# Patient Record
Sex: MALE | Race: WHITE | Employment: OTHER | URBAN - METROPOLITAN AREA
[De-identification: names, ages, dates, MRNs, and addresses within clinical notes are randomized per-mention and may not be internally consistent; named-entity substitution may affect disease eponyms.]

---

## 2018-09-26 ENCOUNTER — HOSPITAL ENCOUNTER (EMERGENCY)
Age: 53
Discharge: HOME | End: 2018-09-26
Attending: EMERGENCY MEDICINE
Payer: OTHER GOVERNMENT

## 2018-09-26 VITALS
TEMPERATURE: 98.9 F | HEIGHT: 69 IN | WEIGHT: 165 LBS | DIASTOLIC BLOOD PRESSURE: 75 MMHG | HEART RATE: 103 BPM | SYSTOLIC BLOOD PRESSURE: 112 MMHG | BODY MASS INDEX: 24.44 KG/M2 | OXYGEN SATURATION: 99 % | RESPIRATION RATE: 16 BRPM

## 2018-09-26 DIAGNOSIS — Z87.81 H/O FRACTURE OF FEMUR: ICD-10-CM

## 2018-09-26 DIAGNOSIS — G89.18 POST-OP PAIN: Primary | ICD-10-CM

## 2018-09-26 PROCEDURE — 74011250637 HC RX REV CODE- 250/637: Performed by: EMERGENCY MEDICINE

## 2018-09-26 PROCEDURE — 99285 EMERGENCY DEPT VISIT HI MDM: CPT

## 2018-09-26 PROCEDURE — 74011250636 HC RX REV CODE- 250/636: Performed by: EMERGENCY MEDICINE

## 2018-09-26 PROCEDURE — 96372 THER/PROPH/DIAG INJ SC/IM: CPT

## 2018-09-26 RX ORDER — HYDROMORPHONE HYDROCHLORIDE 2 MG/1
2 TABLET ORAL
Qty: 20 TAB | Refills: 0 | Status: SHIPPED | OUTPATIENT
Start: 2018-09-26 | End: 2020-01-01

## 2018-09-26 RX ORDER — ONDANSETRON 4 MG/1
TABLET, ORALLY DISINTEGRATING ORAL
Qty: 12 TAB | Refills: 1 | Status: SHIPPED | OUTPATIENT
Start: 2018-09-26 | End: 2020-01-01

## 2018-09-26 RX ORDER — KETOROLAC TROMETHAMINE 30 MG/ML
60 INJECTION, SOLUTION INTRAMUSCULAR; INTRAVENOUS
Status: COMPLETED | OUTPATIENT
Start: 2018-09-26 | End: 2018-09-26

## 2018-09-26 RX ORDER — ONDANSETRON 4 MG/1
4 TABLET, ORALLY DISINTEGRATING ORAL
Status: COMPLETED | OUTPATIENT
Start: 2018-09-26 | End: 2018-09-26

## 2018-09-26 RX ORDER — BUTORPHANOL TARTRATE 1 MG/ML
2 INJECTION INTRAMUSCULAR; INTRAVENOUS
Status: COMPLETED | OUTPATIENT
Start: 2018-09-26 | End: 2018-09-26

## 2018-09-26 RX ADMIN — KETOROLAC TROMETHAMINE 60 MG: 30 INJECTION, SOLUTION INTRAMUSCULAR at 04:17

## 2018-09-26 RX ADMIN — ONDANSETRON 4 MG: 4 TABLET, ORALLY DISINTEGRATING ORAL at 04:15

## 2018-09-26 RX ADMIN — BUTORPHANOL TARTRATE 2 MG: 1 INJECTION, SOLUTION INTRAMUSCULAR; INTRAVENOUS at 04:17

## 2018-09-26 NOTE — ED PROVIDER NOTES
EMERGENCY DEPARTMENT HISTORY AND PHYSICAL EXAM 
 
Date: 9/26/2018 Patient Name: Julieann Castleman History of Presenting Illness Chief Complaint Patient presents with  
 Hip Pain History Provided By: Patient Chief Complaint: Hip pain Duration: 4 Days Timing:  Acute Location: Left hip Modifying Factors: Pt has taken Percocet with no pain relief Associated Symptoms: nausea, back pain Additional History (Context):  
3:45 AM 
Julieann Castleman is a 46 y.o. male with PMHX of DM, Gastroparesis, Pancreatitis who presents to the emergency department C/O left hip pain that radiates to the foot onset 4 days ago. Pt fell in the [place] 4 days ago, had surgery 3 days ago with Dr. Sanjuana Camarillo and was released from the hospital 1 day ago. Associated sxs include nausea, back pain. Pt has been taking the rx Percocet (5mg) for pain with no relief. Pt states he smokes tobacco. Pt states he occasionally drinks EtOH. Pt does not take any long term narcotics. Pt denies any allergies to medications, any liver issues, vomiting and any other sxs or complaints. PCP: Meño Springer MD 
 
 
 
Past History Past Medical History: 
Past Medical History:  
Diagnosis Date  Diabetes ([address]  Gastroparesis  Pancreatitis Past Surgical History: 
Past Surgical History:  
Procedure Laterality Date  HX CHOLECYSTECTOMY  HX HIP FRACTURE TX    
 left hip sx 9.23/2018 Family History: No family history on file. Social History: 
Social History Substance Use Topics  Smoking status: Current Every Day Smoker  Smokeless tobacco: Not on file  Alcohol use Yes Allergies: 
No Known Allergies Review of Systems Review of Systems Gastrointestinal: Positive for nausea. Negative for vomiting. Musculoskeletal: Positive for arthralgias (left hip pain), back pain and myalgias (left hip). All other systems reviewed and are negative. Physical Exam  
 
Vitals: 09/26/18 0315 09/26/18 0330 09/26/18 0400 09/26/18 0415 BP: 101/61 105/60 105/58 112/75 Pulse:      
Resp:      
Temp:      
SpO2: 99% 100% 100% 99% Weight:      
Height:      
 
Physical Exam  
Constitutional: He is oriented to person, place, and time. He appears well-developed and well-nourished. No distress. Uncomfortable appearing, non toxic HENT:  
Head: Normocephalic and atraumatic. Right Ear: External ear normal.  
Left Ear: External ear normal.  
Mouth/Throat: Oropharynx is clear and moist. No oropharyngeal exudate. Eyes: Conjunctivae and EOM are normal. Pupils are equal, round, and reactive to light. No scleral icterus. No pallor Neck: Normal range of motion. Neck supple. No JVD present. No tracheal deviation present. No thyromegaly present. Cardiovascular: Normal rate, regular rhythm and normal heart sounds. Pulses: 
     Femoral pulses are 2+ on the left side. Popliteal pulses are 2+ on the left side. Dorsalis pedis pulses are 2+ on the left side. Posterior tibial pulses are 2+ on the left side. Pulmonary/Chest: Effort normal and breath sounds normal. No stridor. No respiratory distress. Abdominal: Soft. Bowel sounds are normal. He exhibits no distension. There is no tenderness. There is no rebound and no guarding. Musculoskeletal: Normal range of motion. He exhibits no edema or tenderness. No soft tissue injuries Left hip - dressing to surgical incision, not bleeding or red, appropriately tender, dressing to distal lateral thigh, not bleeding, swollen appropriately tender  
 
Hip and knee joint with some limited ROM (due to pain) Lymphadenopathy:  
  He has no cervical adenopathy. Neurological: He is alert and oriented to person, place, and time. He has normal reflexes. No cranial nerve deficit. Coordination normal.  
Skin: Skin is warm and dry. No rash noted. He is not diaphoretic. No erythema. Psychiatric: He has a normal mood and affect. His behavior is normal. Judgment and thought content normal.  
Nursing note and vitals reviewed. Diagnostic Study Results Labs - No results found for this or any previous visit (from the past 12 hour(s)). Radiologic Studies - No orders to display CT Results  (Last 48 hours) None CXR Results  (Last 48 hours) None Medications given in the ED- Medications  
butorphanol (STADOL) injection 2 mg (2 mg IntraMUSCular Given 9/26/18 0417)  
ondansetron (ZOFRAN ODT) tablet 4 mg (4 mg Oral Given 9/26/18 0415)  
ketorolac tromethamine (TORADOL) 60 mg/2 mL injection 60 mg (60 mg IntraMUSCular Given 9/26/18 0417) Medical Decision Making I am the first provider for this patient. I reviewed the vital signs, available nursing notes, past medical history, past surgical history, family history and social history. Vital Signs-Reviewed the patient's vital signs. Pulse Oximetry Analysis - 100% on Room Air Records Reviewed: Nursing Notes Provider Notes (Medical Decision Making): Ddx: likely inadequate pain control, chart review and personal hx reveals he does admit to periods of frequent etoh use which may change his tolerance and pain threshold. His current dose regimen is only 5/325 every 6 hours which is probably under dosing. His sx, especially after long return trip from the [place]. Procedures: 
Procedures ED Course:  
3:45 AM 
Initial assessment performed. The patients presenting problems have been discussed, and they are in agreement with the care plan formulated and outlined with them. I have encouraged them to ask questions as they arise throughout their visit. Diagnosis and Disposition DISCHARGE NOTE: 
5:39 AM 
Jacky Gillibertad  results have been reviewed with him. He has been counseled regarding his diagnosis, treatment, and plan.   He verbally conveys understanding and agreement of the signs, symptoms, diagnosis, treatment and prognosis and additionally agrees to follow up as discussed. He also agrees with the care-plan and conveys that all of his questions have been answered. I have also provided discharge instructions for him that include: educational information regarding their diagnosis and treatment, and list of reasons why they would want to return to the ED prior to their follow-up appointment, should his condition change. He has been provided with education for proper emergency department utilization. CLINICAL IMPRESSION: 
 
1. Post-op pain 2. H/O fracture of femur PLAN: 
1. D/C Home 2. Current Discharge Medication List  
  
START taking these medications Details HYDROmorphone (DILAUDID) 2 mg tablet Take 1 Tab by mouth every four (4) hours as needed for Pain. Max Daily Amount: 12 mg. Indications: Severe Pain 
Qty: 20 Tab, Refills: 0 Associated Diagnoses: Post-op pain; H/O fracture of femur  
  
ondansetron (ZOFRAN ODT) 4 mg disintegrating tablet Take 1-2 tablets every 6-8 hours as needed for nausea and vomiting. Qty: 12 Tab, Refills: 1 3. Follow-up Information Follow up With Details Comments Contact Info [facility] Schedule an appointment as soon as possible for a visit in 3 days For primary care follow up [phone] [facility] EMERGENCY DEPT Go to As needed, if symptoms worsen [address] [id number] 
[phone]  
  
 
_______________________________ Attestations: This note is prepared by Don Shabazz, acting as Scribe for Tamia Bush. Aren Zaman MD. Tamia Bush. Aren Zaman MD:  The scribe's documentation has been prepared under my direction and personally reviewed by me in its entirety. I confirm that the note above accurately reflects all work, treatment, procedures, and medical decision making performed by me. 
_______________________________

## 2018-09-26 NOTE — ED TRIAGE NOTES
Presents via medic c/o left hip pain. Pt states he suffered a fall on Saturday and had left hip sx on Sunday. Sent home on percocet 5/325. Pt c/o progressively worsening pain in left hip. Last took percocet at 2300. States he is taking 1tab 5/325 q 6 hours.

## 2018-09-26 NOTE — DISCHARGE INSTRUCTIONS
Learning About Surgery to Repair a Hip Fracture  What is surgery for a hip fracture? Surgery for a hip fracture repairs a broken hip bone. Broken hips are often caused by a fall or other injury. Some kinds of broken bones heal on their own in a cast. But a broken hip is not likely to heal well without surgery. This type of surgery is usually done right after a hip breaks. How is surgery for a hip fracture done? During surgery to fix a fractured hip, you will be asleep and will not feel pain. The surgery takes 2 to 4 hours. Your doctor will:  · Make one or two cuts (incisions) over the broken bone in your hip. · Move the pieces of bone back into the right position. · Attach the pieces of the bone together with metal pins, screws, rods, or plates. · Use X-rays to see if the pins and plates are in the correct place. · Stitch or staple the incisions closed. What can you expect after surgery for a hip fracture ? You will probably stay in the hospital for 2 to 4 days after surgery. Your rehabilitation program (rehab) will start at this time. If you do not have someone to help you at home, you may go from the hospital to a short-term rehabilitation center or a long-term care center. For several months, you may need the help of a walker or crutches. After that, you may need to walk with a cane. At first, you may need help with daily activities such as bathing, dressing, and cooking. Rehab will help you get back to your regular activities, but it will probably take at least 3 months to return to your normal routine. It may take 6 months to 1 year for you to fully recover. Some people, especially older people, are never able to move as well as they used to. · You will slowly return to most of your activities. ¨ You may be able to walk on your own in 4 to 6 weeks. Until then, you will need crutches or a walker. After that, you may need to walk with a cane. ¨ Ask your doctor when you can drive again.   ¨ You may be able to return to work in 4 weeks to 4 months, depending on your job. ¨ Your doctor will tell you when you can walk, swim, dance, golf, or bicycle. Ask your doctor about other activities you would like to do. ¨ Your doctor may advise you to avoid more strenuous activities, such as running or tennis, or those where a fall is possible, such as horseback riding or skiing. Follow-up care is a key part of your treatment and safety. Be sure to make and go to all appointments, and call your doctor if you are having problems. It's also a good idea to know your test results and keep a list of the medicines you take. Where can you learn more? Go to http://elías-shawanda.info/. Enter S946 in the search box to learn more about \"Learning About Surgery to Repair a Hip Fracture. \"  Current as of: November 29, 2017  Content Version: 11.7  © 9215-1533 [employer]. Care instructions adapted under license by [employer] (which disclaims liability or warranty for this information). If you have questions about a medical condition or this instruction, always ask your healthcare professional. [address] any warranty or liability for your use of this information.

## 2018-10-31 ENCOUNTER — HOSPITAL ENCOUNTER (OUTPATIENT)
Dept: PHYSICAL THERAPY | Age: 53
Discharge: HOME | End: 2018-10-31
Payer: OTHER GOVERNMENT

## 2018-10-31 PROCEDURE — 97161 PT EVAL LOW COMPLEX 20 MIN: CPT

## 2018-10-31 PROCEDURE — 97110 THERAPEUTIC EXERCISES: CPT

## 2018-10-31 NOTE — PROGRESS NOTES
[facility]  [address] Oleg Sheikh, [address]  Ph [phone]  Fx [phone]    Plan of Care/ Statement of Necessity for Physical Therapy Services    Patient name: Rody Burnette Start of Care: 10/31/2018   Referral source: Nat Moreno MD : 1965    Medical Diagnosis: Fracture of unspecified parts of lumbosacral spine and pelvis, subsequent encounter for fracture with routine healing [S32. 9XXD]   Onset Date:2018    Treatment Diagnosis: Left hip pain   Prior Hospitalization: see medical history Provider#: [id number]   Medications: Verified on Patient summary List    Comorbidities: DM II, asthma   Prior Level of Function: Independent with ADLs and full work duty      The [address] [place] [address] and following information is based on the information from the initial evaluation. Assessment/ key information: 46year old male presents to OP PT following traumatic left femoral/hip fracture on 2018 with surgical fixation on 2018. He displays decreased left hip ROM, strength, and gait deviations. Patient will continue to benefit from skilled PT services to modify and progress therapeutic interventions, address functional mobility deficits, address ROM deficits, address strength deficits, analyze and address soft tissue restrictions, analyze and cue movement patterns, analyze and modify body mechanics/ergonomics and address imbalance/dizziness to attain remaining goals.     Evaluation Complexity History MEDIUM  Complexity : 1-2 comorbidities / personal factors will impact the outcome/ POC ; Examination LOW Complexity : 1-2 Standardized tests and measures addressing body structure, function, activity limitation and / or participation in recreation  ;Presentation LOW Complexity : Stable, uncomplicated  ;Clinical Decision Making MEDIUM Complexity : FOTO score of 26-74  Overall Complexity Rating: LOW   Problem List: pain affecting function, decrease ROM, decrease strength, impaired gait/ balance, decrease ADL/ functional abilitiies, decrease activity tolerance, decrease flexibility/ joint mobility and decrease transfer abilities   Treatment Plan may include any combination of the following: Therapeutic exercise, Therapeutic activities, Neuromuscular re-education, Physical agent/modality, Gait/balance training, Manual therapy, Patient education, Self Care training, Functional mobility training, Home safety training and Stair training  Patient / Family readiness to learn indicated by: asking questions and trying to perform skills  Persons(s) to be included in education: patient (P)  Barriers to Learning/Limitations: None  Patient Goal (s): Normal use and strength in left hip, knee, and leg. Walk and bend normally with decreased pain and stiffness  Patient Self Reported Health Status: fair  Rehabilitation Potential: good    Short Term Goals: To be accomplished in 4 weeks:              Patient will report compliance with HEP 1x/day to aid in rehabilitation program.              Status at IE: Initiated at evaluation              Current: N/A                 Patient will increase hip ROM to 100% in all planes to aid in completion of ADLs              Status at IE: Left hip PROM: 30-95*              Current: N/A     Long Term Goals: To be accomplished in 6 weeks:              Patient will increase L LE strength to 5/5 MMT throughout to aid in completion of ADLs. Status at IE: 3/5 throughout except 5/5 DF               Current: N/A                 Patient will report pain no greater than 1-2/10 throughout entire day to aid in completion of ADLs              Status at IE: Up to 7-8/10              Current: N/A                 Patent will perform 5 sit<>stands pain-free to demonstrate improvement in status and aid and completion of ADLs.               Status at IE: Able to sit and stand from chair but with pain and compensations              Current: N/A                 Patient will improve FOTO score to 61 points to demonstrate improvement in functional status. Status at IE: 40              Current: N/A    Frequency / Duration: Patient to be seen 2 times per week for 6 weeks. Patient/ Caregiver education and instruction: Diagnosis, prognosis, activity modification and exercises   [x]  Plan of care has been reviewed with ELYSE Veras, PT 10/31/2018 4:21 PM    ________________________________________________________________________    I certify that the above Therapy Services are being furnished while the patient is under my care. I agree with the treatment plan and certify that this therapy is necessary.     Physician's Signature:____________Date:_________TIME:________    [employer], Date and Time must be completed for valid certification **  Please sign and return to [facility]  [address], [address]  Ph [phone]  Fx [phone]

## 2018-10-31 NOTE — PROGRESS NOTES
PT DAILY TREATMENT NOTE/HIP [id number]    Patient Name: Renuka Cheney  Date:10/31/2018  : 1965  [x]  Patient  Verified  Payor: [facility] / Plan: [facility] / Product Type:  /    In time:3:34  Out time:4:18  Total Treatment Time (min): 44  Visit #: 1 of 12    Treatment Area: No admission diagnoses are documented for this encounter. SUBJECTIVE  Pain Level (0-10 scale): 3/10 in left knee and greater trochanter  [x]constant []intermittent [x]improving []worsening []no change since onset  Traumatic, comminuted left hip fracture on 18 with surgery (long gamma placement to the left femur) on 2018. Currently full weightbearing without known precautions. Using quad cane and walker when needed for pain relief. Works at [employer] with 25% coverage at the [employer] that someone else is currently covering. 3 [address]IE with a railing, one flight to bedroom. He reports he has been sleeping on the couch so he doesn't have to go up the stairs. Finished his pain medication 10/30/2018, is still taking tylenol.     CLOF: difficulty donning shoes and socks, cooking, cleaning, doing dishes, waking long distances; working 6-7 hours per day, normally worked 9.5 hours 5 days per week  PLOF: reports right knee pain, but unlimited in activities   Denies numbness, tingling    OBJECTIVE/EXAMINATION    Modality rationale: decrease pain to improve the patients ability to complete his work duties and ADLs   Min Type Additional Details    [] Estim:  []Unatt       []IFC  []Premod                        []Other:  []w/ice   []w/heat  Position:  Location:    [] Estim: []Att    []TENS instruct  []NMES                    []Other:  []w/US   []w/ice   []w/heat  Position:  Location:    []  Traction: [] Cervical       []Lumbar                       [] Prone          []Supine                       []Intermittent   []Continuous Lbs:  [] before manual  [] after manual    []  Ultrasound: []Continuous   [] Pulsed []1MHz   []3MHz Location:  W/cm2:    []  Iontophoresis with dexamethasone         Location: [] Take home patch   [] In clinic   10 [x]  Ice     []  heat  []  Ice massage  []  Laser   []  Anodyne Position: supine with legs over wedge  Location: left anterior hip    []  Laser with stim  []  Other: Position:  Location:    []  Vasopneumatic Device Pressure:       [] lo [] med [] hi   Temperature: [] lo [] med [] hi   [] Skin assessment post-treatment:  []intact []redness- no adverse reaction    []redness - adverse reaction:     15 min [x]Eval                  []Re-Eval       19 min Therapeutic Exercise:  [x] See flow sheet :   Rationale: increase ROM and increase strength to improve the patients ability to return to his PLOF          With   [x] TE   [] TA   [] neuro   [] other: Patient Education: [x] Review HEP    [] Progressed/Changed HEP based on:   [] positioning   [] body mechanics   [] transfers   [x] heat/ice application    [x] other:  PT POC  Access Code: [email or web address]   URL: [facility]/   Date: 10/31/2018   Prepared by: [id number] on Table - 1 reps - 2 sets - 30 seonds hold - 1x daily - 7x weekly   Sitting Heel Slide with Towel - 10 reps - 2 sets - 5 seconds hold - 1x daily - 7x weekly   Supine Straight Leg Raises - 10 reps - 2 sets - 1x daily - 7x weekly   Seated Long Arc Quad - 10 reps - 2 sets - 1x daily - 7x weekly        Other Objective/Functional Measures: BP: 130/88 mmHg; HR: 74 bpm    Physical Therapy Evaluation- Hip    Posture:    Gait:  [] Normal    [] Abnormal    [x] Antalgic    [] NWB    Device: quad cane    Describe: shortened right step length and stance time on left         ROM/Strength        AROM                     PROM        Strength (1-5)  Hip Left Right Left Right Left Right   Flexion  115* 95*  3 5   Extension   Lacking 30* Lacing 7* 3    Abduction     3 4   Adduction     3 4   ER         IR         Knee Left Right Left Right Left Right   Extension     3 5   Flexion     3 4   DF: left 5/5, 5/5 on right     Flexibility: [] Unable to assess at this time  Hamstrings:    (L) Tightness= [] WNL   [] Min   [x] Mod   [] Severe    (R) Tightness= [] WNL   [] Min   [] Mod   [] Severe  Quadriceps:    (L) Tightness= [] WNL   [] Min   [] Mod   [] Severe    (R) Tightness= [] WNL   [] Min   [] Mod   [] Severe  Gastroc:    (L) Tightness= [] WNL   [] Min   [] Mod   [] Severe    (R) Tightness= [] WNL   [] Min   [] Mod   [] Severe                                  Palpation  [x] Min  [] Mod  [] Severe    Location: medial left hamstrings at insertion, tibial tuberosity  [] Min  [] Mod  [] Severe    Location:  [] Min  [] Mod  [] Severe    Location:      Pain Level (0-10 scale) post treatment: 4/10    ASSESSMENT/Changes in Function: 46year old male presents to OP PT following traumatic left femoral/hip fracture on 9/22/2018 with surgical fixation on 9/23/2018. He displays decreased left hip ROM, strength, and gait deviations. Patient will continue to benefit from skilled PT services to modify and progress therapeutic interventions, address functional mobility deficits, address ROM deficits, address strength deficits, analyze and address soft tissue restrictions, analyze and cue movement patterns, analyze and modify body mechanics/ergonomics and address imbalance/dizziness to attain remaining goals. [x]  See Plan of Care  []  See progress note/recertification  []  See Discharge Summary         Progress towards goals / Updated goals:  Short Term Goals: To be accomplished in 4 weeks:   Patient will report compliance with HEP 1x/day to aid in rehabilitation program.   Status at IE: Initiated at evaluation   Current: N/A      Patient will increase hip ROM to 100% in all planes to aid in completion of ADLs   Status at IE: Left hip PROM: 30-95*   Current: N/A    Long Term Goals:  To be accomplished in 6 weeks:   Patient will increase L LE strength to 5/5 MMT throughout to aid in completion of ADLs. Status at IE: 3/5 throughout except 5/5 DF    Current: N/A     Patient will report pain no greater than 1-2/10 throughout entire day to aid in completion of ADLs   Status at IE: Up to 7-8/10   Current: N/A     Patent will perform 5 sit<>stands pain-free to demonstrate improvement in status and aid and completion of ADLs. Status at IE: Able to sit and stand from chair but with pain and compensations   Current: N/A     Patient will improve FOTO score to 61 points to demonstrate improvement in functional status.    Status at IE: 40   Current: N/A    PLAN  [x]  Upgrade activities as tolerated     [x]  Continue plan of care  []  Update interventions per flow sheet       []  Discharge due to:_  []  Other:_      Avani Mueller, PT 10/31/2018  3:37 PM

## 2018-11-06 ENCOUNTER — HOSPITAL ENCOUNTER (OUTPATIENT)
Dept: PHYSICAL THERAPY | Age: 53
Discharge: HOME | End: 2018-11-06
Payer: OTHER GOVERNMENT

## 2018-11-06 PROCEDURE — 97110 THERAPEUTIC EXERCISES: CPT

## 2018-11-06 NOTE — PROGRESS NOTES
PT DAILY TREATMENT NOTE     Patient Name: Jorge Luis Davies  Date:2018  : 1965  [x]  Patient  Verified  Payor:  / Plan: [facility]  [facility]ION / Product Type:  /    In time: 02:30  Out time:03:00  Total Treatment Time (min): 30  Visit #: 2 of 12    Treatment Area: Fracture of unspecified parts of lumbosacral spine and pelvis, subsequent encounter for fracture with routine healing [S32. 9XXD]    SUBJECTIVE  Pain Level (0-10 scale): 10  Any medication changes, allergies to medications, adverse drug reactions, diagnosis change, or new procedure performed?: [x] No    [] Yes (see summary sheet for update)  Subjective functional status/changes:   [] No changes reported      OBJECTIVE    30 min Therapeutic Exercise:  [] See flow sheet :   Rationale: increase ROM and increase strength to improve the patients ability to increase stabilization of hip     Other Objective/Functional Measures:      Pain Level (0-10 scale) post treatment: 4/10    ASSESSMENT/Changes in Function:   Patient tolerated treatment fair with no reports of increased pain. Patient will continue to benefit from skilled PT services to modify and progress therapeutic interventions, address functional mobility deficits, address ROM deficits, address strength deficits, analyze and address soft tissue restrictions, analyze and cue movement patterns, analyze and modify body mechanics/ergonomics and assess and modify postural abnormalities to attain remaining goals.      []  See Plan of Care  []  See progress note/recertification  []  See Discharge Summary         Progress towards goals / Updated goals:  Short Term Goals: To be accomplished in 4 weeks:              Patient will report compliance with HEP 1x/day to aid in rehabilitation program.              HUGO at IE: Initiated at evaluation              DMXYLWR: N/A                 Patient will increase hip ROM to 100% in all planes to aid in completion of ADLs              Status at IE: Left hip PROM: 30-95*              Current: N/A     Long Term Goals: To be accomplished in 6 weeks:              Patient will increase L LE strength to 5/5 MMT throughout to aid in completion of ADLs.             XPOWLO at IE: 3/5 throughout except 5/5 DF               Current: N/A                 Patient will report pain no greater than 1-2/10 throughout entire day to aid in completion of ADLs              Status at IE: Up to 7-8/10              Current: N/A                 Patent will perform 5 sit<>stands pain-free to demonstrate improvement in status and aid and completion of ADLs.             HGAJCG at IE: Able to sit and stand from chair but with pain and compensations              Current: N/A                 Patient will improve FOTO score to 61 points to demonstrate improvement in functional status.               JCAZYQ at IE: 40              Current: N/A    PLAN  []  Upgrade activities as tolerated     [x]  Continue plan of care  []  Update interventions per flow sheet       []  Discharge due to:_  []  Other:_      Osiris Shah 11/6/2018  2:56 PM    Future Appointments   Date Time Provider Matthew Jenkins   11/8/2018  2:00 PM THE [facility] PT [facility]   11/13/2018  3:30 PM Nakul Jack [facility]   11/15/2018  2:30 PM Rivera Peace, PT [facility]   11/20/2018  2:30 PM Nakul Jack [facility]   11/27/2018  2:30 PM [facility]   11/29/2018  2:30 PM Nakul Jack [facility]

## 2018-11-08 ENCOUNTER — HOSPITAL ENCOUNTER (OUTPATIENT)
Dept: PHYSICAL THERAPY | Age: 53
Discharge: HOME | End: 2018-11-08
Payer: OTHER GOVERNMENT

## 2018-11-08 PROCEDURE — 97140 MANUAL THERAPY 1/> REGIONS: CPT

## 2018-11-08 PROCEDURE — 97110 THERAPEUTIC EXERCISES: CPT

## 2018-11-08 NOTE — PROGRESS NOTES
PT DAILY TREATMENT NOTE - [facility]     Patient Name: Brian Shin  Date:2018  : 1965  [x]  Patient  Verified  Payor:  / Plan: [facility] / Product Type:  /    In time:  Out time:   Total Treatment Time (min): 55  Total Timed Codes (min): 55  1:1 Treatment Time ( [address] only): 54  Visit #: 3 of 12    Treatment Area: Fracture of unspecified parts of lumbosacral spine and pelvis, subsequent encounter for fracture with routine healing [S32. 9XXD]    SUBJECTIVE  Pain Level (0-10 scale): 10  Any medication changes, allergies to medications, adverse drug reactions, diagnosis change, or new procedure performed?: [x] No    [] Yes (see summary sheet for update)  Subjective functional status/changes:   [] No changes reported  Patient states he feels sore after having walked a lot on Monday; wife had surgery on that day and he walked a lot around the hospital.  Pain at lateral thigh, not below the knee. Physical observation:  Patient with moderate antalgic gait, utilizing [facility]    OBJECTIVE    Modality rationale: decrease inflammation and increase tissue extensibility to improve the patients ability to tolerate treatment, ROM, WB.    Type Additional Details   [] Estim:  []Unatt       []IFC  []Premod                        []Other:  []w/ice   []w/heat  Position:  Location:   [] Estim: []Att    []TENS instruct  []NMES                    []Other:  []w/US   []w/ice   []w/heat  Position:  Location:   []  Traction: [] Cervical       []Lumbar                       [] Prone          []Supine                       []Intermittent   []Continuous Lbs:  [] before manual  [] after manual   []  Ultrasound: []Continuous   [] Pulsed                           []1MHz   []3MHz W/cm2:  Location:   []  Iontophoresis with dexamethasone         Location: [] Take home patch   [] In clinic   []  Ice     [x]  heat  []  Ice massage  []  Laser   []  Anodyne Position: Supine     Location: L thigh/hip x 10 min        [x] Skin assessment post-treatment:  [x]intact []redness- no adverse reaction    []redness - adverse reaction:       30 min Therapeutic Exercise:  [x] See flow sheet :   Rationale: increase ROM, increase strength and improve balance to improve the patients ability to complete ADLs and improve WB. 15 min Manual Therapy:  Passive quadriceps, hamstring, ITB stretch; myofascial release right quadriceps and ITB   Rationale: decrease pain, increase ROM, increase tissue extensibility and normalize gait to normalize gait. With   [x] TE   [] TA   [] neuro   [] other: Patient Education: [x] Review HEP    [] Progressed/Changed HEP based on:   [] positioning   [] body mechanics   [] transfers   [] heat/ice application    [] other:        Pain Level (0-10 scale) post treatment: 4/10    ASSESSMENT/Changes in Function:   Tolerated therapy well. Hamstring, quadriceps, ITB tightness noted; fatigues easily. Fair motor control. Gait demonstrates poor hip girdle activation. Patient will continue to benefit from skilled PT services to modify and progress therapeutic interventions, address functional mobility deficits, address ROM deficits, address strength deficits, analyze and address soft tissue restrictions, analyze and cue movement patterns, analyze and modify body mechanics/ergonomics and assess and modify postural abnormalities to attain remaining goals.      [x]  See Plan of Care  []  See progress note/recertification  []  See Discharge Summary         Progress towards goals / Updated goals:  Short Term Goals: To be accomplished in 4 weeks:              Patient will report compliance with HEP 1x/day to aid in rehabilitation program.              CWVFGV at IE: Initiated at evaluation              IXGEKEO: Partially met                 Patient will increase hip ROM to 100% in all planes to aid in completion of ADLs              Status at IE: Left hip PROM: 30-95*              Current: N/A     Long Term Goals: To be accomplished in 6 weeks:              Patient will increase L LE strength to 5/5 MMT throughout to aid in completion of ADLs.             CURCUP at IE: 3/5 throughout except 5/5 DF               Current: N/A                 Patient will report pain no greater than 1-2/10 throughout entire day to aid in completion of ADLs              Status at IE: Up to 7-8/10              Current: N/A                 Patent will perform 5 sit<>stands pain-free to demonstrate improvement in status and aid and completion of ADLs.             OHFTRH at IE: Able to sit and stand from chair but with pain and compensations              Current: N/A                 Patient will improve FOTO score to 61 points to demonstrate improvement in functional status.               EVOCNF at IE: 40              Current: N/A        PLAN  []  Upgrade activities as tolerated     [x]  Continue plan of care  []  Update interventions per flow sheet       []  Discharge due to:_  []  Other:_      Kendra Snow, PT, DPT, OCS  11/8/2018  2:10 PM    Future Appointments   Date Time Provider Matthew Jenkins   11/13/2018  3:30 PM Erika Swanson [facility]   11/15/2018  2:30 PM Clay Messina PT [facility]   11/20/2018  2:30 PM Erika Swanson [facility]   11/27/2018  2:30 PM Erika Swanson [facility]   11/29/2018  2:30 PM Erika Swanson THE [facility]

## 2018-11-13 ENCOUNTER — HOSPITAL ENCOUNTER (OUTPATIENT)
Dept: PHYSICAL THERAPY | Age: 53
Discharge: HOME | End: 2018-11-13
Payer: OTHER GOVERNMENT

## 2018-11-13 PROCEDURE — 97110 THERAPEUTIC EXERCISES: CPT

## 2018-11-13 NOTE — PROGRESS NOTES
PT DAILY TREATMENT NOTE     Patient Name: Yusef Gonzalez  Date:2018  : 1965  [x]  Patient  Verified  Payor:  / Plan: [facility] / Product Type:  /    In time:03:30  Out time:04:23  Total Treatment Time (min): 48  Visit #: 4 of 12    Treatment Area: Fracture of unspecified parts of lumbosacral spine and pelvis, subsequent encounter for fracture with routine healing [S32. 9XXD]    SUBJECTIVE  Pain Level (0-10 scale): 4/10  Any medication changes, allergies to medications, adverse drug reactions, diagnosis change, or new procedure performed?: [x] No    [] Yes (see summary sheet for update)  Subjective functional status/changes:   [] No changes reported  Patient reports increased pain today. OBJECTIVE    53 min Therapeutic Exercise:  [] See flow sheet :   Rationale: increase ROM and increase strength to improve the patients ability to increase strength/extensiblity   Other Objective/Functional Measures:      Pain Level (0-10 scale) post treatment: 5/10    ASSESSMENT/Changes in Function:   Patient limited today secondary to pain. PT focused on tissue extensibility with stretches. PT progressed HEP. Patient will continue to benefit from skilled PT services to modify and progress therapeutic interventions, address functional mobility deficits, address ROM deficits, address strength deficits, analyze and address soft tissue restrictions, analyze and cue movement patterns and analyze and modify body mechanics/ergonomics to attain remaining goals.      []  See Plan of Care  []  See progress note/recertification  []  See Discharge Summary         Progress towards goals / Updated goals:  Short Term Goals: To be accomplished in 4 weeks:              Patient will report compliance with HEP 1x/day to aid in rehabilitation program.              AFAWVQ at IE: Initiated at evaluation              HAUODSX: Partially met                 Patient will increase hip ROM to 100% in all planes to aid in completion of ADLs              Status at IE: Left hip PROM: 30-95*              Current: N/A     Long Term Goals: To be accomplished in 6 weeks:              Patient will increase L LE strength to 5/5 MMT throughout to aid in completion of ADLs.             JPULUZ at IE: 3/5 throughout except 5/5 DF               Current: N/A                 Patient will report pain no greater than 1-2/10 throughout entire day to aid in completion of ADLs              Status at IE: Up to 7-8/10              Current: N/A                 Patent will perform 5 sit<>stands pain-free to demonstrate improvement in status and aid and completion of ADLs.             RNKIKS at IE: Able to sit and stand from chair but with pain and compensations              Current: N/A                 Patient will improve FOTO score to 61 points to demonstrate improvement in functional status.               GXAFRX at IE: 40              Current: N/A    PLAN  []  Upgrade activities as tolerated     [x]  Continue plan of care  []  Update interventions per flow sheet       []  Discharge due to:_  []  Other:_      Duong Lambert 11/13/2018  3:54 PM    Future Appointments   Date Time Provider Matthew Jenkins   11/15/2018  2:30 PM Theodosia Bence, [facility]   11/20/2018  2:30 PM Paddy Kocher [facility]   11/27/2018  2:30 PM Paddy Kocher [facility]   11/29/2018  2:30 PM Paddy Kocher [facility]   12/4/2018  3:00 PM Paddy Kocher [facility]   12/6/2018  3:00 PM Paddy Kocher [facility]   12/11/2018  3:00 PM Paddy Kocher [facility]   12/13/2018  3:00 PM Paddy Kocher [facility]   12/17/2018  3:30 PM Jeanie Grimm PT [facility]   12/19/2018  3:30 PM Jeanie Grimm [facility]   12/27/2018  3:00 PM Paddy Kocher THE [facility]

## 2018-11-15 ENCOUNTER — HOSPITAL ENCOUNTER (OUTPATIENT)
Dept: PHYSICAL THERAPY | Age: 53
Discharge: HOME | End: 2018-11-15
Payer: OTHER GOVERNMENT

## 2018-11-15 PROCEDURE — 97110 THERAPEUTIC EXERCISES: CPT | Performed by: PHYSICAL THERAPIST

## 2018-11-15 NOTE — PROGRESS NOTES
PT DAILY TREATMENT NOTE     Patient Name: Emilia Lombardo  Date:11/15/2018  : 1965  [x]  Patient  Verified  Payor: JIE / Plan: [facility] / Product Type: Sara Crisp /    In UBNH:[id number]  Out time:1532  Total Treatment Time (min): 61  Visit #: 5 of 12    Treatment Area: Fracture of unspecified parts of lumbosacral spine and pelvis, subsequent encounter for fracture with routine healing [S32. 9XXD]    SUBJECTIVE  Pain Level (0-10 scale): 5  Any medication changes, allergies to medications, adverse drug reactions, diagnosis change, or new procedure performed?: [x] No    [] Yes (see summary sheet for update)  Subjective functional status/changes:   [] No changes reported  Pt states felt he overdid it walking with cane earlier this week 2 days ago at work   40-50 % better since surgery function  , pt usually using walker at work and quad cane at home , pain ranges 3 to 5-6/10    Pt due to have ortho appt soon     OBJECTIVE      59 min Therapeutic Exercise:  [x] See flow sheet :   Rationale: increase ROM and increase strength to improve the patients ability to increase strength/extensiblity               With   [] TE   [] TA   [] neuro   [] other: Patient Education: [x] Review HEP    [] Progressed/Changed HEP based on:   [] positioning   [] body mechanics   [] transfers   [] heat/ice application    [] other:      Other Objective/Functional Measures: trunk rotation 68 right , 42 left  incr left to 52 deg following repeated motions to right ,   Sit to stand 5 x 1 hand pushoff otherwise if no hands needs min assist to stand from chair     Sitting hip flexion 3/5 , quad 5/5 , HS 4-/5 , add 5/5 , abd 4/5      Balance approx 10 sec SLS    Pain Level (0-10 scale) post treatment: 5/10     ASSESSMENT/Changes in Function: pt progressing, tolerated mild increase in resistance with LAQ and SAQ this session and noted improved quad HS strength and tolerance to resist add /abd in sitting  , difficult to stand from chair without using at least one arm to push up from hand rest .  Starting to progress with SLS balance      Patient will continue to benefit from skilled PT services to modify and progress therapeutic interventions, address functional mobility deficits, address ROM deficits, address strength deficits, analyze and address soft tissue restrictions, analyze and cue movement patterns, analyze and modify body mechanics/ergonomics, assess and modify postural abnormalities and address imbalance/dizziness to attain remaining goals. [x]  See Plan of Care  []  See progress note/recertification  []  See Discharge Summary         Progress towards goals / Updated goals:  Short Term Goals: To be accomplished in 4 weeks:              Patient will report compliance with HEP 1x/day to aid in rehabilitation program.              CGGDKN at IE: Initiated at evaluation              Current: Partially met                 Patient will increase hip ROM to 100% in all planes to aid in completion of ADLs              Status at IE: Left hip PROM: 30-95*              Current: N/A     Long Term Goals: To be accomplished in 6 weeks:              Patient will increase L LE strength to 5/5 MMT throughout to aid in completion of ADLs.             NQOIFL at IE: 3/5 throughout except 5/5 DF               Current: Sitting hip flexion 3/5 , quad 5/5 , HS 4-/5 , add 5/5 , abd 4/5 progressing                  Patient will report pain no greater than 1-2/10 throughout entire day to aid in completion of ADLs              Status at IE: Up to 7-8/10              Current: progressing pain reported 5-6/10 more in thigh and lower leg ache than up in hip                  Patent will perform 5 sit<>stands pain-free to demonstrate improvement in status and aid and completion of ADLs.               WGIZHH at IE: Able to sit and stand from chair but with pain and compensations              Current: some pain must use one hand to push up from hand rest progressing                  Patient will improve FOTO score to 61 points to demonstrate improvement in functional status.               TSQSFJ at IE: 40              Current: 40/100 same            PLAN  [x]  Upgrade activities as tolerated     [x]  Continue plan of care  []  Update interventions per flow sheet       []  Discharge due to:_  []  Other:_      Ressie Bodily, PT 11/15/2018  2:53 PM    Future Appointments   Date Time Provider Matthew Jenkins   11/20/2018  2:30 PM Mary Lou Shrestha [facility]   11/27/2018  2:30 PM Mary Lou Shrestha [facility]   11/29/2018  2:30 PM [facility]   12/4/2018  3:00 PM [facility]   12/6/2018  3:00 PM [facility]   12/11/2018  3:00 PM [facility]   12/13/2018  3:00 PM [facility]   12/17/2018  3:30 PM Lashell Nick, LU[facility]   12/19/2018  3:30 PM Lashell Nick PT [facility]   12/27/2018  3:00 PM Mary Lou Shrestha [facility]

## 2018-11-15 NOTE — PROGRESS NOTES
[facility]  [address] [place], [address] [place]  Ph [phone]  Fx [phone]    Physical Therapy Progress Note  Patient name: Devaughn Terrell Start of Care: 10/31/18   Referral source: Rohan Hermosillo MD : 1965   Medical/Treatment Diagnosis: Fracture of unspecified parts of lumbosacral spine and pelvis, subsequent encounter for fracture with routine healing [S32. 9XXD] Onset Date:2018     Prior Hospitalization: see medical history Provider#: [id number]   Medications: Verified on Patient Summary List    Comorbidities: DM II, asthma   Prior Level of Function: Independent with ADLs and full work duty    Visits from [facility]: 5    Missed Visits:0      Key Functional Changes: pt reports feels he is 40-50 % better functionally since surgery , pt usually using walker at work and quad cane at home , pain ranges 3 to 5-6/10    Strength : Sitting hip flexion 3/5 , quad 5/5 , HS 4-/5 , add 5/5 , abd 4/5          Balance approx 10 sec SLS    Progress towards goals / Updated goals:  Short Term Goals: To be accomplished in 4 weeks:              Patient will report compliance with HEP 1x/day to aid in rehabilitation program.              GVZSTH at IE: Initiated at evaluation              Current: Partially met                 Patient will increase hip ROM to 100% in all planes to aid in completion of ADLs              Status at IE: Left hip PROM: 30-95*              Current: N/A     Long Term Goals: To be accomplished in 6 weeks:              Patient will increase L LE strength to 5/5 MMT throughout to aid in completion of ADLs.               SEGYRJ at IE: 3/5 throughout except 5/5 DF               Current: Sitting hip flexion 3/5 , quad 5/5 , HS 4-/5 , add 5/5 , abd 4/5 progressing                  Patient will report pain no greater than 1-2/10 throughout entire day to aid in completion of ADLs              Status at IE: Up to 7-8/10              Current: progressing pain reported 5-6/10 more in thigh and lower leg ache than up in hip                  Patent will perform 5 sit<>stands pain-free to demonstrate improvement in status and aid and completion of ADLs.             QSMCEJ at IE: Able to sit and stand from chair but with pain and compensations              Current: some pain must use one hand to push up from hand rest progressing                  Patient will improve FOTO score to 61 points to demonstrate improvement in functional status.               DAZWVI at IE: 40              Current: 40/100 same         Updated Goals: to be achieved in 4 weeks:   Continue toward unmet goals   ASSESSMENT/RECOMMENDATIONS:  [x]Continue therapy per initial plan/protocol at a frequency of  2 x per week for 6 weeks total   []Continue therapy with the following recommended changes:_____________________      _____________________________________________________________________  []Discontinue therapy progressing towards or have reached established goals  []Discontinue therapy due to lack of appreciable progress towards goals  []Discontinue therapy due to lack of attendance or compliance  []Await Physician's recommendations/decisions regarding therapy  []Other:________________________________________________________________    Thank you for this referral.   Cody Bruner, PT 11/15/2018 4:55 PM    NOTE TO PHYSICIAN:  PLEASE COMPLETE THE ORDERS BELOW AND   FAX TO [facility]: [phone]  If you are unable to process this request in 24 hours please contact our office: [phone]      ____ I have read the above report and request that my patient continue therapy with the following changes/special instructions:  ____ I have read the above report and request that my patient be discharged from therapy    Physician's Signature:____________Date:_________TIME:________    ** Signature, Date and Time must be completed for valid certification **

## 2018-11-20 ENCOUNTER — HOSPITAL ENCOUNTER (OUTPATIENT)
Dept: PHYSICAL THERAPY | Age: 53
Discharge: HOME | End: 2018-11-20
Payer: OTHER GOVERNMENT

## 2018-11-20 PROCEDURE — 97110 THERAPEUTIC EXERCISES: CPT

## 2018-11-20 NOTE — PROGRESS NOTES
PT DAILY TREATMENT NOTE     Patient Name: Ria Villalobos  Date:2018  : 1965  [x]  Patient  Verified  Payor:  / Plan: [facility] / Product Type:  /    In time:02:33  Out time:03:20  Total Treatment Time (min): 62  Visit #: 6 of 12    Treatment Area: Fracture of unspecified parts of lumbosacral spine and pelvis, subsequent encounter for fracture with routine healing [S32. 9XXD]    SUBJECTIVE  Pain Level (0-10 scale): 2-3/10 hip 4/10 knee  Any medication changes, allergies to medications, adverse drug reactions, diagnosis change, or new procedure performed?: [x] No    [] Yes (see summary sheet for update)  Subjective functional status/changes:   [] No changes reported      OBJECTIVE    Modality rationale: decrease inflammation and decrease pain to improve the patients ability to increase tolerance with activities    Type Additional Details   [] Estim:  []Unatt       []IFC  []Premod                        []Other:  []w/ice   []w/heat  Position:  Location:   [] Estim: []Att    []TENS instruct  []NMES                    []Other:  []w/US   []w/ice   []w/heat  Position:  Location:   []  Traction: [] Cervical       []Lumbar                       [] Prone          []Supine                       []Intermittent   []Continuous Lbs:  [] before manual  [] after manual   []  Ultrasound: []Continuous   [] Pulsed                           []1MHz   []3MHz W/cm2:  Location:   []  Iontophoresis with dexamethasone         Location: [] Take home patch   [] In clinic   [x]  Ice     []  heat  []  Ice massage  []  Laser   []  Anodyne Position:supine  Location: lumbar   []  Laser with stim  []  Other:  Position:  Location:   []  Vasopneumatic Device Pressure:       [] lo [] med [] hi   Temperature: [] lo [] med [] hi   [] Skin assessment post-treatment:  []intact []redness- no adverse reaction    []redness - adverse reaction:       57 min Therapeutic Exercise:  [] See flow sheet :   Rationale: increase ROM and increase strength to improve the patients ability to increase hip stabilization    Other Objective/Functional Measures:      Pain Level (0-10 scale) post treatment: 2-3/10 hip, 4/10 knee    ASSESSMENT/Changes in Function:   PT added exercises focusing on stabilization for hip. Tolerated new exercise well. Patient will continue to benefit from skilled PT services to modify and progress therapeutic interventions, address functional mobility deficits, address ROM deficits, address strength deficits, analyze and address soft tissue restrictions and analyze and cue movement patterns to attain remaining goals. []  See Plan of Care  []  See progress note/recertification  []  See Discharge Summary         Progress towards goals / Updated goals:  Short Term Goals: To be accomplished in 4 weeks:              Patient will report compliance with HEP 1x/day to aid in rehabilitation program.              VSYBZL at IE: Initiated at evaluation              Current: Partially met                 Patient will increase hip ROM to 100% in all planes to aid in completion of ADLs              Status at IE: Left hip PROM: 30-95*              Current: N/A     Long Term Goals: To be accomplished in 6 weeks:              Patient will increase L LE strength to 5/5 MMT throughout to aid in completion of ADLs.             IWDTRW at IE: 3/5 throughout except 5/5 DF               Current: N/A                 Patient will report pain no greater than 1-2/10 throughout entire day to aid in completion of ADLs              Status at IE: Up to 7-8/10              Current: N/A                 Patent will perform 5 sit<>stands pain-free to demonstrate improvement in status and aid and completion of ADLs.               EQULJY at IE: Able to sit and stand from chair but with pain and compensations              Current: N/A                 Patient will improve FOTO score to 61 points to demonstrate improvement in functional status.               TMUVUO at IE: 40              Current: N/A        PLAN  []  Upgrade activities as tolerated     []  Continue plan of care  []  Update interventions per flow sheet       []  Discharge due to:_  []  Other:_      Palmira Tiny 11/20/2018  3:02 PM    Future Appointments   Date Time Provider Matthew Jenkins   11/27/2018  2:30 PM Peggy Moralez [facility]   11/29/2018  2:30 PM Peggy Moralez [facility]   12/4/2018  3:00 PM [facility]   12/6/2018  3:00 PM [facility]   12/11/2018  3:00 PM [facility]   12/13/2018  3:00 PM [facility]   12/17/2018  3:30 PM Rick Edmonds PT [facility]   12/19/2018  3:30 PM Rick Edmonds PT [facility]   12/27/2018  3:00 PM Peggy Moralez [facility]

## 2018-11-27 ENCOUNTER — HOSPITAL ENCOUNTER (OUTPATIENT)
Dept: PHYSICAL THERAPY | Age: 53
Discharge: HOME | End: 2018-11-27
Payer: OTHER GOVERNMENT

## 2018-11-27 PROCEDURE — 97110 THERAPEUTIC EXERCISES: CPT

## 2018-11-27 NOTE — PROGRESS NOTES
PT DAILY TREATMENT NOTE - [facility]     Patient Name: Adelaide Dubois  Date:2018  : 1965  [x]  Patient  Verified  Payor: JIE / Plan: Josef Gregorio 74 / Product Type:  /    In time: 2:30  Out time:03:25  Total Treatment Time (min): 55  Visit #: 7 of 17    Treatment Area: Fracture of unspecified parts of lumbosacral spine and pelvis, subsequent encounter for fracture with routine healing [S32. 9XXD]    SUBJECTIVE  Pain Level (0-10 scale): 5/10  Any medication changes, allergies to medications, adverse drug reactions, diagnosis change, or new procedure performed?: [x] No    [] Yes (see summary sheet for update)  Subjective functional status/changes:   [] No changes reported  Patient reports he had a near fall on Friday at home and had increased pain in left LE however has improved and did contact physician. PT informed patient to observe pain for the remainder of the week to see if any changes in pain to follow up with MD.     OBJECTIVE    55 min Therapeutic Exercise:  [] See flow sheet :   Rationale: increase ROM and increase strength to improve the patients ability to increase hip stabilization     Other Objective/Functional Measures:      Pain Level (0-10 scale) post treatment: 5/10    ASSESSMENT/Changes in Function:   Patient limited with recent near fall however still has made progress since initial evaluation with strength and AROM. Patient will continue to benefit from skilled PT services to modify and progress therapeutic interventions, address functional mobility deficits, address ROM deficits, address strength deficits, analyze and address soft tissue restrictions, analyze and cue movement patterns and analyze and modify body mechanics/ergonomics to attain remaining goals.      []  See Plan of Care  []  See progress note/recertification  []  See Discharge Summary         Progress towards goals / Updated goals:  Short Term Goals: To be accomplished in 4 weeks:              KQLEFKZ will report compliance with HEP 1x/day to aid in rehabilitation program.              PANFVC at IE: Initiated at evaluation              Current: Partially met                 Patient will increase hip ROM to 100% in all planes to aid in completion of ADLs              Status at IE: Left hip PROM: 30-95*              Current: AROM 0-60 degrees, AAROM 0-90 degrees, 7 degrees of AROM extension      Long Term Goals: To be accomplished in 6 weeks:              Patient will increase L LE strength to 5/5 MMT throughout to aid in completion of ADLs.             QBQIEG at IE: 3/5 throughout except 5/5 DF               Current: 3+/5 hip                  Patient will report pain no greater than 1-2/10 throughout entire day to aid in completion of ADLs              Status at IE: Up to 7-8/10              Current: 6-7/10 with no medications                  Patent will perform 5 sit<>stands pain-free to demonstrate improvement in status and aid and completion of ADLs.             AUELLN at IE: Able to sit and stand from chair but with pain and compensations              Current: patient still having pain                  Patient will improve FOTO score to 61 points to demonstrate improvement in functional status.               WNQBKX at IE: 40              Current: 40, no change     PLAN  []  Upgrade activities as tolerated     [x]  Continue plan of care  []  Update interventions per flow sheet       []  Discharge due to:_  []  Other:_      Gregorio Cummings 11/27/2018  2:48 PM    Future Appointments   Date Time Provider Matthew Jenkins   11/29/2018  2:30 PM Zita Gauthier [facility]   12/4/2018  3:00 PM [facility]   12/6/2018  3:00 PM [facility]   12/11/2018  3:00 PM [facility]   12/13/2018  3:00 PM [facility]   12/17/2018  3:30 PM Mel Thomson [facility]   12/19/2018  3:30 PM Mel Thomson, [address] [place] [address] [facility]   12/27/2018  3:00 PM Yokasta Lipscomb THE [facility]

## 2018-11-29 ENCOUNTER — HOSPITAL ENCOUNTER (OUTPATIENT)
Dept: PHYSICAL THERAPY | Age: 53
Discharge: HOME | End: 2018-11-29
Payer: OTHER GOVERNMENT

## 2018-11-29 NOTE — PROGRESS NOTES
PT DAILY TREATMENT NOTE     Patient Name: Sammy Sessions  Date:2018  : 1965  [x]  Patient  Verified  Payor:  / Plan: [facility]ION / Product Type:  /    In time:02:25  Out time 03:35  Total Treatment Time (min): 70  Visit #: 8 of 17    Treatment Area: Fracture of unspecified parts of lumbosacral spine and pelvis, subsequent encounter for fracture with routine healing [S32. 9XXD]    SUBJECTIVE  Pain Level (0-10 scale): 4/10  Any medication changes, allergies to medications, adverse drug reactions, diagnosis change, or new procedure performed?: [x] No    [] Yes (see summary sheet for update)  Subjective functional status/changes:   [] No changes reported  Patient reports pain has much improved since Tuesday with incident last weekend.       OBJECTIVE    Modality rationale: decrease inflammation and decrease pain to improve the patients ability to increase tolerance with activities    Type Additional Details   [] Estim:  []Unatt       []IFC  []Premod                        []Other:  []w/ice   []w/heat  Position:  Location:   [] Estim: []Att    []TENS instruct  []NMES                    []Other:  []w/US   []w/ice   []w/heat  Position:  Location:   []  Traction: [] Cervical       []Lumbar                       [] Prone          []Supine                       []Intermittent   []Continuous Lbs:  [] before manual  [] after manual   []  Ultrasound: []Continuous   [] Pulsed                           []1MHz   []3MHz W/cm2:  Location:   []  Iontophoresis with dexamethasone         Location: [] Take home patch   [] In clinic   [x]  Ice     []  heat  []  Ice massage  []  Laser   []  Anodyne Position: supine  Location: left hip    []  Laser with stim  []  Other:  Position:  Location:   []  Vasopneumatic Device Pressure:       [] lo [] med [] hi   Temperature: [] lo [] med [] hi   [] Skin assessment post-treatment:  []intact []redness- no adverse reaction    []redness - adverse reaction:     30 min Therapeutic Exercise:  [] See flow sheet :   Rationale: increase ROM and increase strength to improve the patients ability to increase hip stabilization     30 min Neuromuscular Re-education:  []  See flow sheet :   Rationale: improve coordination and improve balance  to improve the patients ability to increase balance     Other Objective/Functional Measures:      Pain Level (0-10 scale) post treatment: 4/10    ASSESSMENT/Changes in Function:   Patient demonstrated improved weight shifting with decrease trendelenburg focusing. Noted difficulty with eccentric control of left quad with step downs. Patient will continue to benefit from skilled PT services to modify and progress therapeutic interventions, address functional mobility deficits, address ROM deficits, address strength deficits, analyze and address soft tissue restrictions, analyze and cue movement patterns, analyze and modify body mechanics/ergonomics and assess and modify postural abnormalities to attain remaining goals. []  See Plan of Care  []  See progress note/recertification  []  See Discharge Summary         Progress towards goals / Updated goals:  Short Term Goals: To be accomplished in 4 weeks:              Patient will report compliance with HEP 1x/day to aid in rehabilitation program.              DTXNTS at IE: Initiated at evaluation              Current: Partially met                 Patient will increase hip ROM to 100% in all planes to aid in completion of ADLs              Status at IE: Left hip PROM: 30-95*              Current: AROM 0-60 degrees, AAROM 0-90 degrees, 7 degrees of AROM extension      Long Term Goals: To be accomplished in 6 weeks:              Patient will increase L LE strength to 5/5 MMT throughout to aid in completion of ADLs.               NYVVNT at IE: 3/5 throughout except 5/5 DF               YJMFNTV: 3+/5 hip                  Patient will report pain no greater than 1-2/10 throughout entire day to aid in completion of ADLs              Status at IE: Up to 7-8/10              Current: 6-7/10 with no medications                  Patent will perform 5 sit<>stands pain-free to demonstrate improvement in status and aid and completion of ADLs.             EWQXJA at IE: Able to sit and stand from chair but with pain and compensations              Current: patient still having pain                  Patient will improve FOTO score to 61 points to demonstrate improvement in functional status.               JPNYOE at IE: 40              Current: 40, no change         PLAN  []  Upgrade activities as tolerated     [x]  Continue plan of care  []  Update interventions per flow sheet       []  Discharge due to:_  []  Other:_      Damián Pressley 11/29/2018  2:38 PM    Future Appointments   Date Time Provider Matthew Jenkins   12/4/2018  3:00 PM [facility]   12/6/2018  3:00 PM [facility]   12/11/2018  3:00 PM [facility]   12/13/2018  3:00 PM [facility]   12/17/2018  3:00 PM Lord Nhi PT [facility]   12/19/2018  3:30 PM Lord Nhi PT [facility]   12/27/2018  2:30 PM Anam Mock [facility]

## 2018-12-04 ENCOUNTER — HOSPITAL ENCOUNTER (OUTPATIENT)
Dept: PHYSICAL THERAPY | Age: 53
Discharge: HOME | End: 2018-12-04
Payer: OTHER GOVERNMENT

## 2018-12-04 PROCEDURE — 97112 NEUROMUSCULAR REEDUCATION: CPT

## 2018-12-04 PROCEDURE — 97110 THERAPEUTIC EXERCISES: CPT

## 2018-12-04 NOTE — PROGRESS NOTES
PT DAILY TREATMENT NOTE     Patient Name: Everton Thakkar  Date:2018  : 1965  [x]  Patient  Verified  Payor: JIE / Plan: Renetta Gilmore / Product Type:  /    In time:03:00  Out time:03:52  Total Treatment Time (min): 52  Visit #:     Treatment Area: Fracture of unspecified parts of lumbosacral spine and pelvis, subsequent encounter for fracture with routine healing [S32. 9XXD]    SUBJECTIVE  Pain Level (0-10 scale): 4/10  Any medication changes, allergies to medications, adverse drug reactions, diagnosis change, or new procedure performed?: [x] No    [] Yes (see summary sheet for update)  Subjective functional status/changes:   [] No changes reported  Patient reports he has felt the best he did  after exercises last visit. OBJECTIVE  CP: 10 minutes left knee  22 min Therapeutic Exercise:  [] See flow sheet :   Rationale: increase ROM and increase strength to improve the patients ability to increase hip stabilization     25 min Neuromuscular Re-education:  []  See flow sheet :   Rationale: improve coordination and improve balance  to improve the patients ability to increase balance with activities  10 minutes Gait activities: CGA without  feet with ramps up/down   Other Objective/Functional Measures:      Pain Level (0-10 scale) post treatment: 5/10    ASSESSMENT/Changes in Function:   Patient demonstrating improved weight shifting with exercises. Patient will continue to benefit from skilled PT services to modify and progress therapeutic interventions, address functional mobility deficits, address ROM deficits, address strength deficits, analyze and address soft tissue restrictions, analyze and cue movement patterns and analyze and modify body mechanics/ergonomics to attain remaining goals.      []  See Plan of Care  []  See progress note/recertification  []  See Discharge Summary         Progress towards goals / Updated goals:  Short Term Goals: To be accomplished in 4 weeks:              Patient will report compliance with HEP 1x/day to aid in rehabilitation program.              UFJQFM at IE: Initiated at evaluation              Current: Partially met                 Patient will increase hip ROM to 100% in all planes to aid in completion of ADLs              Status at IE: Left hip PROM: 30-95*              Current: AROM 0-60 degrees, AAROM 0-90 degrees, 7 degrees of AROM extension      Long Term Goals: To be accomplished in 6 weeks:              Patient will increase L LE strength to 5/5 MMT throughout to aid in completion of ADLs.             IGNZHF at IE: 3/5 throughout except 5/5 DF               Current: 3+/5 hip                  Patient will report pain no greater than 1-2/10 throughout entire day to aid in completion of ADLs              Status at IE: Up to 7-8/10              Current: 6-7/10 with no medications                  Patent will perform 5 sit<>stands pain-free to demonstrate improvement in status and aid and completion of ADLs.             LMKSNY at IE: Able to sit and stand from chair but with pain and compensations              Current: patient still having pain                  Patient will improve FOTO score to 61 points to demonstrate improvement in functional status.               QQXEFV at IE: 36              Current: 40, no change       PLAN  []  Upgrade activities as tolerated     [x]  Continue plan of care  []  Update interventions per flow sheet       []  Discharge due to:_  []  Other:_      Osiris Shah 12/4/2018  3:02 PM    Future Appointments   Date Time Provider Matthew Jenkins   12/6/2018  3:00 PM Nakul Jack [facility]   12/11/2018  3:00 PM [facility]   12/13/2018  3:00 PM [facility]   12/17/2018  3:00 PM Rivera Peace PT [facility]   12/19/2018  3:30 PM Mino Laboy PT [facility]   12/27/2018  2:30 PM Nakul Jack [facility]

## 2018-12-06 ENCOUNTER — HOSPITAL ENCOUNTER (OUTPATIENT)
Dept: PHYSICAL THERAPY | Age: 53
Discharge: HOME | End: 2018-12-06
Payer: OTHER GOVERNMENT

## 2018-12-06 PROCEDURE — 97110 THERAPEUTIC EXERCISES: CPT

## 2018-12-06 PROCEDURE — 97016 VASOPNEUMATIC DEVICE THERAPY: CPT

## 2018-12-06 NOTE — PROGRESS NOTES
PT DAILY TREATMENT NOTE     Patient Name: Antonieta Thomas  Date:2018  : 1965  [x]  Patient  Verified  Payor:  / Plan: Alexandra Cornea / Product Type:  /    In time:03:05  Out time: 04:02  Total Treatment Time (min): 57  Visit #: 10 of 17    Treatment Area: Fracture of unspecified parts of lumbosacral spine and pelvis, subsequent encounter for fracture with routine healing [S32. 9XXD]    SUBJECTIVE  Pain Level (0-10 scale): 410  Any medication changes, allergies to medications, adverse drug reactions, diagnosis change, or new procedure performed?: [x] No    [] Yes (see summary sheet for update)  Subjective functional status/changes:   [] No changes reported      OBJECTIVE    Modality rationale: decrease inflammation and decrease pain to improve the patients ability to increase tolerance with activities    Type Additional Details   [] Estim:  []Unatt       []IFC  []Premod                        []Other:  []w/ice   []w/heat  Position:  Location:   [] Estim: []Att    []TENS instruct  []NMES                    []Other:  []w/US   []w/ice   []w/heat  Position:  Location:   []  Traction: [] Cervical       []Lumbar                       [] Prone          []Supine                       []Intermittent   []Continuous Lbs:  [] before manual  [] after manual   []  Ultrasound: []Continuous   [] Pulsed                           []1MHz   []3MHz W/cm2:  Location:   []  Iontophoresis with dexamethasone         Location: [] Take home patch   [] In clinic   []  Ice     []  heat  []  Ice massage  []  Laser   []  Anodyne Position: supine   Location: left hip knee region    []  Laser with stim  []  Other:  Position:  Location:   [x]  Vasopneumatic Device Pressure:       [] lo [x] med [] hi   Temperature: [x] lo [] med [] hi   [] Skin assessment post-treatment:  []intact []redness- no adverse reaction    []redness - adverse reaction:     47 min Therapeutic Exercise:  [] See flow sheet : Rationale: increase ROM and increase strength to improve the patients ability to increase left hip stabilization     Other Objective/Functional Measures: FOTO: 45     Pain Level (0-10 scale) post treatment: 0/10    ASSESSMENT/Changes in Function:   Patient progressing towards goals set with improvement in AROM. Still lacking in strength in LE to improve step through and reduction in trendelenburg. Patient will continue to benefit from skilled PT services to modify and progress therapeutic interventions, address functional mobility deficits, address ROM deficits, address strength deficits, analyze and address soft tissue restrictions, analyze and cue movement patterns, analyze and modify body mechanics/ergonomics and assess and modify postural abnormalities to attain remaining goals. []  See Plan of Care  []  See progress note/recertification  []  See Discharge Summary         Progress towards goals / Updated goals:  Short Term Goals: To be accomplished in 4 weeks:              Patient will report compliance with HEP 1x/day to aid in rehabilitation program.              Status at IE: Initiated at evaluation              Current: Partially met                 Patient will increase hip ROM to 100% in all planes to aid in completion of ADLs              Status at IE: Left hip PROM: 30-95*              Current: AROM 0-60 degrees, AAROM 0-90 degrees, 7 degrees of AROM extension      Long Term Goals: To be accomplished in 6 weeks:              Patient will increase L LE strength to 5/5 MMT throughout to aid in completion of ADLs.               QJQCWQ at IE: 3/5 throughout except 5/5 DF               Current: 3+/5 hip                  Patient will report pain no greater than 1-2/10 throughout entire day to aid in completion of ADLs              Status at IE: Up to 7-8/10              Current: 5/10 with no medications                  Patent will perform 5 sit<>stands pain-free to demonstrate improvement in status and aid and completion of ADLs.             GPSXJM at IE: Able to sit and stand from chair but with pain and compensations              Current:still painful but able to complete sit to stands-progressing                   Patient will improve FOTO score to 61 points to demonstrate improvement in functional status.               FXNKGR at IE: 36              Current: 45, change of 5     PLAN  []  Upgrade activities as tolerated     [x]  Continue plan of care  []  Update interventions per flow sheet       []  Discharge due to:_  []  Other:_      Antonieta Pierre 12/6/2018  3:11 PM    Future Appointments   Date Time Provider Matthew Jenkins   12/11/2018  3:00 PM Arin Chatterjee [facility]   12/13/2018  3:00 PM Geoffrey Rizzo [facility]   12/17/2018  3:00 PM Bharathi Ardon [facility]   12/19/2018  3:30 PM Maggie Leigh [facility]   12/27/2018  2:30 PM Arin Chatterjee [facility]

## 2018-12-11 ENCOUNTER — HOSPITAL ENCOUNTER (OUTPATIENT)
Dept: PHYSICAL THERAPY | Age: 53
Discharge: HOME | End: 2018-12-11
Payer: OTHER GOVERNMENT

## 2018-12-11 PROCEDURE — 97016 VASOPNEUMATIC DEVICE THERAPY: CPT

## 2018-12-11 PROCEDURE — 97110 THERAPEUTIC EXERCISES: CPT

## 2018-12-11 NOTE — PROGRESS NOTES
PT DAILY TREATMENT NOTE     Patient Name: Cristin Joseph  Date:2018  : 1965  [x]  Patient  Verified  Payor: JIE / Plan: Josef Gregorio 74 / Product Type:  /    In time:03:00  Out time: 03:45  Total Treatment Time (min): 45  Visit #: 86 of 17    Treatment Area: Fracture of unspecified parts of lumbosacral spine and pelvis, subsequent encounter for fracture with routine healing [S32. 9XXD]    SUBJECTIVE  Pain Level (0-10 scale): 3/10  Any medication changes, allergies to medications, adverse drug reactions, diagnosis change, or new procedure performed?: [x] No    [] Yes (see summary sheet for update)  Subjective functional status/changes:   [] No changes reported  Patient reports he had a fall on . He reports he did have hip pain however has lessened. PT recommended to patient to report if pain stays the same or worsens for integrity of surgery.      OBJECTIVE    Modality rationale: decrease inflammation and decrease pain to improve the patients ability to increase tolerance    Type Additional Details   [] Estim:  []Unatt       []IFC  []Premod                        []Other:  []w/ice   []w/heat  Position:  Location:   [] Estim: []Att    []TENS instruct  []NMES                    []Other:  []w/US   []w/ice   []w/heat  Position:  Location:   []  Traction: [] Cervical       []Lumbar                       [] Prone          []Supine                       []Intermittent   []Continuous Lbs:  [] before manual  [] after manual   []  Ultrasound: []Continuous   [] Pulsed                           []1MHz   []3MHz W/cm2:  Location:   []  Iontophoresis with dexamethasone         Location: [] Take home patch   [] In clinic   []  Ice     []  heat  []  Ice massage  []  Laser   []  Anodyne Position:  Location:   []  Laser with stim  []  Other:  Position:  Location:   [x]  Vasopneumatic Device   [] Skin assessment post-treatment:  []intact []redness- no adverse reaction    []redness - adverse reaction:     35 min Therapeutic Exercise:  [] See flow sheet :   Rationale: increase ROM and increase strength to improve the patients ability to increase hip stabilization    Other Objective/Functional Measures:      Pain Level (0-10 scale) post treatment: 3/10    ASSESSMENT/Changes in Function:   Patient limited with progress at this time secondary to pain with recent falls. PT informed patient would be good to call physician if pain is an issue since fall to determine course of action. Patient will continue to benefit from skilled PT services to modify and progress therapeutic interventions, address functional mobility deficits, address ROM deficits, address strength deficits, analyze and address soft tissue restrictions, analyze and cue movement patterns, analyze and modify body mechanics/ergonomics and assess and modify postural abnormalities to attain remaining goals. []  See Plan of Care  []  See progress note/recertification  []  See Discharge Summary         Progress towards goals / Updated goals:  Short Term Goals: To be accomplished in 4 weeks:              Patient will report compliance with HEP 1x/day to aid in rehabilitation program.              Status at IE: Initiated at evaluation              Current: Partially met                 Patient will increase hip ROM to 100% in all planes to aid in completion of ADLs              Status at IE: Left hip PROM: 30-95*              Current: AROM 0-60 degrees, AAROM 0-90 degrees, 7 degrees of AROM extension      Long Term Goals: To be accomplished in 6 weeks:              Patient will increase L LE strength to 5/5 MMT throughout to aid in completion of ADLs.               NYWRYD at IE: 3/5 throughout except 5/5 DF               Current: 3+/5 hip                  Patient will report pain no greater than 1-2/10 throughout entire day to aid in completion of ADLs              Status at IE: Up to 7-8/10              Current: 5/10 with no medications                  Patent will perform 5 sit<>stands pain-free to demonstrate improvement in status and aid and completion of ADLs.             LZOSHF at IE: Able to sit and stand from chair but with pain and compensations              Current:still painful but able to complete sit to stands-progressing                   Patient will improve FOTO score to 61 points to demonstrate improvement in functional status.               OHMGCK at IE: 36              Current: 45, change of 5     PLAN  []  Upgrade activities as tolerated     [x]  Continue plan of care  []  Update interventions per flow sheet       []  Discharge due to:_  []  Other:_      Alejandro Harper 12/11/2018  3:14 PM    Future Appointments   Date Time Provider Matthew Jenkins   12/13/2018  3:00 PM Debi Nunes [facility]   12/17/2018  3:00 PM Jaleesa Rock, PT [facility]   12/19/2018  3:30 PM Charity Anaya, PT [facility]   12/27/2018  2:30 PM Debi Nunes [facility]

## 2018-12-13 ENCOUNTER — HOSPITAL ENCOUNTER (OUTPATIENT)
Dept: PHYSICAL THERAPY | Age: 53
Discharge: HOME | End: 2018-12-13
Payer: OTHER GOVERNMENT

## 2018-12-13 PROCEDURE — 97016 VASOPNEUMATIC DEVICE THERAPY: CPT

## 2018-12-13 PROCEDURE — 97110 THERAPEUTIC EXERCISES: CPT

## 2018-12-13 NOTE — PROGRESS NOTES
PT DAILY TREATMENT NOTE     Patient Name: Devaughn Terrell  Date:2018  : 1965  [x]  Patient  Verified  Payor:  / Plan: Josef Gregorio 74 / Product Type:  /    In time:03:00  Out time:03:51  Total Treatment Time (min): 51  Visit #:     Treatment Area: Fracture of unspecified parts of lumbosacral spine and pelvis, subsequent encounter for fracture with routine healing [S32. 9XXD]    SUBJECTIVE  Pain Level (0-10 scale): 4/10  Any medication changes, allergies to medications, adverse drug reactions, diagnosis change, or new procedure performed?: [x] No    [] Yes (see summary sheet for update)  Subjective functional status/changes:   [] No changes reported  Patient reports he had a near fall yesterday however he was able to recover. PT informed patient to follow up with MD with continued issues of balance.      OBJECTIVE    Modality rationale: decrease inflammation and decrease pain to improve the patients ability to increase tolerance with functional activities   Min Type Additional Details    [] Estim:  []Unatt       []IFC  []Premod                        []Other:  []w/ice   []w/heat  Position:  Location:    [] Estim: []Att    []TENS instruct  []NMES                    []Other:  []w/US   []w/ice   []w/heat  Position:  Location:    []  Traction: [] Cervical       []Lumbar                       [] Prone          []Supine                       []Intermittent   []Continuous Lbs:  [] before manual  [] after manual    []  Ultrasound: []Continuous   [] Pulsed                           []1MHz   []3MHz W/cm2:  Location:    []  Iontophoresis with dexamethasone         Location: [] Take home patch   [] In clinic    []  Ice     []  heat  []  Ice massage  []  Laser   []  Anodyne Position:  Location:    []  Laser with stim  []  Other:  Position:  Location:   10 [x]  Vasopneumatic Device Pressure:       [] lo [] med [] hi   Temperature: [] lo [] med [] hi   [] Skin assessment post-treatment:  []intact []redness- no adverse reaction    []redness - adverse reaction:     41 min Therapeutic Exercise:  [] See flow sheet :   Rationale: increase ROM and increase strength to improve the patients ability to increase stabilization       Other Objective/Functional Measures:      Pain Level (0-10 scale) post treatment: 2-3/10    ASSESSMENT/Changes in Function:   Patient tolerated treatment better today however had near fall yesterday. PT encourage patient to follow-up about continued balance issues especially with recent fall over the weekend. Patient will continue to benefit from skilled PT services to modify and progress therapeutic interventions, address functional mobility deficits, address ROM deficits, address strength deficits, analyze and address soft tissue restrictions, analyze and cue movement patterns, analyze and modify body mechanics/ergonomics and assess and modify postural abnormalities to attain remaining goals. []  See Plan of Care  []  See progress note/recertification  []  See Discharge Summary         Progress towards goals / Updated goals:  Short Term Goals: To be accomplished in 4 weeks:              Patient will report compliance with HEP 1x/day to aid in rehabilitation program.              Status at IE: Initiated at evaluation              Current: Partially met                 Patient will increase hip ROM to 100% in all planes to aid in completion of ADLs              Status at IE: Left hip PROM: 30-95*              Current: AROM 0-60 degrees, AAROM 0-90 degrees, 7 degrees of AROM extension      Long Term Goals: To be accomplished in 6 weeks:              Patient will increase L LE strength to 5/5 MMT throughout to aid in completion of ADLs.               ZVVRFW at IE: 3/5 throughout except 5/5 DF               Current: 3+/5 hip                  Patient will report pain no greater than 1-2/10 throughout entire day to aid in completion of ADLs              Status at IE: Up to 7-8/10              Current: 5/10 with no medications                  Patent will perform 5 sit<>stands pain-free to demonstrate improvement in status and aid and completion of ADLs.             ALWMOE at IE: Able to sit and stand from chair but with pain and compensations              Current:still painful but able to complete sit to stands-progressing                   Patient will improve FOTO score to 61 points to demonstrate improvement in functional status.               IMSSH at IE: 36              Current: 45, change of 5      PLAN  []  Upgrade activities as tolerated     [x]  Continue plan of care  []  Update interventions per flow sheet       []  Discharge due to:_  []  Other:_      Alejandro Harper 12/13/2018  3:24 PM    Future Appointments   Date Time Provider Matthew Jenkins   12/17/2018  3:00 PM [address] [facility]   12/19/2018  3:30 PM Charity Anaya, PT [address] [facility]   12/27/2018  2:30 PM Debi Nunes [facility]

## 2018-12-17 ENCOUNTER — HOSPITAL ENCOUNTER (OUTPATIENT)
Dept: PHYSICAL THERAPY | Age: 53
Discharge: HOME | End: 2018-12-17
Payer: OTHER GOVERNMENT

## 2018-12-17 PROCEDURE — 97110 THERAPEUTIC EXERCISES: CPT | Performed by: PHYSICAL THERAPIST

## 2018-12-17 PROCEDURE — 97164 PT RE-EVAL EST PLAN CARE: CPT | Performed by: PHYSICAL THERAPIST

## 2018-12-17 NOTE — PROGRESS NOTES
PT DAILY TREATMENT NOTE     Patient Name: Rody Burnette  Date:2018  : 1965  [x]  Patient  Verified  Payor: JIE / Plan: Josef Gregorio 74 / Product Type:  /    In time:1503  Out time: 5586  Total Treatment Time (min): 72  Visit #: 15     Treatment Area: Fracture of unspecified parts of lumbosacral spine and pelvis, subsequent encounter for fracture with routine healing [S32. 9XXD]    SUBJECTIVE  Pain Level (0-10 scale): 4-5/10  Any medication changes, allergies to medications, adverse drug reactions, diagnosis change, or new procedure performed?: [x] No    [] Yes (see summary sheet for update)  Subjective functional status/changes:   [] No changes reported  Pt feels he is 40-50 % better  Pt had a fall 2 wks ago lost balance in kitchen went to turn foot caught and loss balance , fell onto back and left side iced and felt better over weekend no bruising .    Today sitting at work started to get sharp pains in knee and hip , now coming and going  Usually when sitting down     OBJECTIVE    Modality rationale: decrease pain to improve the patients functional mobility    Min Type Additional Details    [] Estim:  []Unatt       []IFC  []Premod                        []Other:  []w/ice   []w/heat  Position:  Location:    [] Estim: []Att    []TENS instruct  []NMES                    []Other:  []w/US   []w/ice   []w/heat  Position:  Location:    []  Traction: [] Cervical       []Lumbar                       [] Prone          []Supine                       []Intermittent   []Continuous Lbs:  [] before manual  [] after manual    []  Ultrasound: []Continuous   [] Pulsed                           []1MHz   []3MHz W/cm2:  Location:    []  Iontophoresis with dexamethasone         Location: [] Take home patch   [] In clinic   10  [x]  Ice     []  heat  []  Ice massage  []  Laser   []  Anodyne Position:sitting left hip   Location:    []  Laser with stim  []  Other:  Position:  Location:    [] Vasopneumatic Device Pressure:       [] lo [] med [] hi   Temperature: [] lo [] med [] hi   [] Skin assessment post-treatment:  []intact []redness- no adverse reaction    []redness - adverse reaction:     15 min []Eval                  [x]Re-Eval       47 min Therapeutic Exercise:  [x] See flow sheet :   Rationale: increase ROM, increase strength and improve coordination to improve the patients ability to return to PLOF full active mobility and ambulation without device             With   [] TE   [] TA   [] neuro   [] other: Patient Education: [x] Review HEP    [] Progressed/Changed HEP based on:   [] positioning   [] body mechanics   [] transfers   [] heat/ice application    [] other:      Other Objective/Functional Measures:   ROM hip :   Left hip 7 degrees of AROM extension pt lying prone with extension   Left knee to chest 125 deg flexion   abd supine 42 deg left , 55 right   ER 23 deg + pain  , IR 46 deg left   ER 61 deg , IR 41 deg right       Strength : hip flexion today 3 to 3- /5 , hip add 5/5 , abd 3-/5 side lie , quad 4+/5 , HS 4/5 , DF ankle 4/5     Sit to stand 15 reps cues to scoot forward on chair get leg under him smoother last 10     Pain Level (0-10 scale) post treatment: 5    ASSESSMENT/Changes in Function: to day pain more in left hip than usual thigh knee. ROM progressing flexion extension , very limited in ER and pain with this motion, strength difficult at times for pt to actively lift leg up onto bed still without hand assist strength progressing with , ambulating with quad cane.  Side lie abd 50% AROM     Patient will continue to benefit from skilled PT services to modify and progress therapeutic interventions, address functional mobility deficits, address ROM deficits, address strength deficits, analyze and address soft tissue restrictions, analyze and cue movement patterns, analyze and modify body mechanics/ergonomics, assess and modify postural abnormalities and address imbalance/dizziness to attain remaining goals. []  See Plan of Care  [x]  See progress note/recertification  []  See Discharge Summary         Progress towards goals / Updated goals:  Short Term Goals: To be accomplished in 4 weeks:              Patient will report compliance with HEP 1x/day to aid in rehabilitation program.              Status at IE: Initiated at evaluation              Current: Partially met, doing ex 2-3 days a week ( very fatigued after work day )  and some during work day ( leg raises )                  Patient will increase hip ROM to 100% in all planes to aid in completion of ADLs              Status at IE: Left hip PROM: 30-95*              Current:  7 degrees of AROM extension pt lying prone with extension , knee to chest 125 deg flexion   abd supine 42 deg left , 55 right   ER 23 deg + pain  , IR 46 deg left   ER 61 deg , IR 41 deg right       Long Term Goals: To be accomplished in 6 weeks:              Patient will increase L LE strength to 5/5 MMT throughout to aid in completion of ADLs.             AXWDSS at IE: 3/5 throughout except 5/5 DF               Current:  hip flexion today 3 to 3- /5 , hip add 5/5 , abd 3-/5 side lie , quad 4+/5 , HS 4/5 , DF ankle 4/5 ( ? 5/5 at eval ) ( strength may be affected by hip pain today )                  Patient will report pain no greater than 1-2/10 throughout entire day to aid in completion of ADLs              Status at IE: Up to 7-8/10              Current: 5-6/10 with no medications                  Patent will perform 5 sit<>stands pain-free to demonstrate improvement in status and aid and completion of ADLs.             WXJTYU at IE: Able to sit and stand from chair but with pain and compensations              Current:able to complete sit to stands no more pain than baseline -MET                   Patient will improve FOTO score to 61 points to demonstrate improvement in functional status.               RBDDVD at IE: 40              Current: 45 ( 12/06/18) , today back t 40/100         PLAN  [x]  Upgrade activities as tolerated     [x]  Continue plan of care  []  Update interventions per flow sheet       []  Discharge due to:_  []  Other:_      Nawaf Gates PT 12/17/2018  3:31 PM    Future Appointments   Date Time Provider Matthew Jenkins   12/19/2018  3:30 PM Lord Nhi PT [facility]   12/27/2018  2:30 PM Anna Messina [facility]   1/3/2019  3:30 PM Anam Mock [facility]

## 2018-12-17 NOTE — PROGRESS NOTES
[facility]  [address], [address] [facility] [place]  Ph [phone]  Fx [phone]    Physical Therapy Progress Note  Patient name: Juanpablo Bush Start of Care: 10/31/2018   Referral source: Henri Hernadez MD : 1965   Medical/Treatment Diagnosis: Fracture of unspecified parts of lumbosacral spine and pelvis, subsequent encounter for fracture with routine healing [S32. 9XXD] Onset Date:2018     Prior Hospitalization: see medical history Provider#: [id number]   Medications: Verified on Patient Summary List     Comorbidities: DM II, asthma   Prior Level of Function: Independent with ADLs and full work duty    Visits from [employer] [facility]: 13    Missed Visits: 0    Pt subjective report: Pt feels he is 40-50 % better  Pt had a fall 2 wks ago lost balance in kitchen went to turn foot caught and loss balance , fell onto back and left side iced and felt better over weekend no bruising     Goal progress :   Short Term Goals: To be accomplished in 4 weeks:              Patient will report compliance with HEP 1x/day to aid in rehabilitation program.              Status at IE: Initiated at evaluation              Current: Partially met, doing ex 2-3 days a week ( very fatigued after work day )  and some during work day ( leg raises )                  Patient will increase hip ROM to 100% in all planes to aid in completion of ADLs              Status at IE: Left hip PROM: 30-95*              Current:  7 degrees of AROM extension pt lying prone with extension , knee to chest 125 deg flexion   abd supine 42 deg left , 55 right   ER 23 deg + pain  , IR 46 deg left   ER 61 deg , IR 41 deg right       Long Term Goals: To be accomplished in 6 weeks:              Patient will increase L LE strength to 5/5 MMT throughout to aid in completion of ADLs.               IPESYN at IE: 3/5 throughout except 5/5 DF               Current:  hip flexion today 3 to 3- /5 , hip add 5/5 , abd 3-/5 side lie , quad 4+/5 , HS 4/5 , DF ankle 4/5 ( ? 5/5 at eval ) ( strength may be affected by hip pain today )                  Patient will report pain no greater than 1-2/10 throughout entire day to aid in completion of ADLs              Status at IE: Up to 7-8/10              Current: 5-6/10 with no medications                  Patent will perform 5 sit<>stands pain-free to demonstrate improvement in status and aid and completion of ADLs.             VLMCNW at IE: Able to sit and stand from chair but with pain and compensations              Current:able to complete sit to stands no more pain than baseline -MET                   Patient will improve FOTO score to 61 points to demonstrate improvement in functional status.             QARIIQ at IE: 36              Current: 45 ( 12/06/18) , today back to 40/100          Key Functional Changes: progressing with strength noted by increase ease with sit to stand and increase reps in therapy  but still significant noted weakness.   Todays strength reassessment mildly worse than last assessment but probable affected more due to increase hip pain today , ROM ( flexion extension improving ER very limited )  , gait ( ambulating with quad cane )      Updated Goals: to be achieved in 4 weeks:   Continue toward unmet goals   ASSESSMENT/RECOMMENDATIONS:  [x]Continue therapy per initial plan/protocol at a frequency of  2 x per week for 4 more weeks  []Continue therapy with the following recommended changes:_____________________      _____________________________________________________________________  []Discontinue therapy progressing towards or have reached established goals  []Discontinue therapy due to lack of appreciable progress towards goals  []Discontinue therapy due to lack of attendance or compliance  []Await Physician's recommendations/decisions regarding therapy  []Other:________________________________________________________________    Thank you for this referral.   Josh Orona, PT 12/17/2018 4:32 PM    NOTE TO PHYSICIAN:  PLEASE COMPLETE THE ORDERS BELOW AND   FAX TO [facility]: [phone]  If you are unable to process this request in 24 hours please contact our office: [phone]      ____ I have read the above report and request that my patient continue therapy with the following changes/special instructions:  ____ I have read the above report and request that my patient be discharged from therapy    Physician's Signature:____________Date:_________TIME:________    ** Signature, Date and Time must be completed for valid certification **

## 2018-12-19 ENCOUNTER — HOSPITAL ENCOUNTER (OUTPATIENT)
Dept: PHYSICAL THERAPY | Age: 53
Discharge: HOME | End: 2018-12-19
Payer: OTHER GOVERNMENT

## 2018-12-19 PROCEDURE — 97016 VASOPNEUMATIC DEVICE THERAPY: CPT

## 2018-12-19 PROCEDURE — 97110 THERAPEUTIC EXERCISES: CPT

## 2018-12-19 PROCEDURE — 97530 THERAPEUTIC ACTIVITIES: CPT

## 2018-12-19 NOTE — PROGRESS NOTES
PT DAILY TREATMENT NOTE     Patient Name: Mirna Fitzgerald  Date:2018  : 1965  [x]  Patient  Verified  Payor:  / Plan: [facility] Gregorio 74 / Product Type:  /    In time:340  Out time: 428  Total Treatment Time (min): 48  Visit #: 15 of 17    Treatment Area: Fracture of unspecified parts of lumbosacral spine and pelvis, subsequent encounter for fracture with routine healing [S32. 9XXD]    SUBJECTIVE  Pain Level (0-10 scale): 4/10 left knee  Any medication changes, allergies to medications, adverse drug reactions, diagnosis change, or new procedure performed?: [x] No    [] Yes (see summary sheet for update)  Subjective functional status/changes:   [] No changes reported  Reports improvement but still difficulty with prolonged walking, has returned to work as     OBJECTIVE    Modality rationale: decrease pain to improve the patients functional mobility    Type Additional Details   [] Estim:  []Unatt       []IFC  []Premod                        []Other:  []w/ice   []w/heat  Position:  Location:   [] Estim: []Att    []TENS instruct  []NMES                    []Other:  []w/US   []w/ice   []w/heat  Position:  Location:   []  Traction: [] Cervical       []Lumbar                       [] Prone          []Supine                       []Intermittent   []Continuous Lbs:  [] before manual  [] after manual   []  Ultrasound: []Continuous   [] Pulsed                           []1MHz   []3MHz W/cm2:  Location:   []  Iontophoresis with dexamethasone         Location: [] Take home patch   [] In clinic   [x]  Ice     []  heat  []  Ice massage  []  Laser   []  Anodyne Position:sitting left hip   Location:   []  Laser with stim  []  Other:  Position:  Location:   [x]  Vasopneumatic Device left knee/ice hip Pressure:       [] lo [x] med [] hi   Temperature: [] lo [x] med [] hi   [] Skin assessment post-treatment:  []intact []redness- no adverse reaction    []redness - adverse reaction: min []Eval                  [x]Re-Eval       23 min Therapeutic Exercise:  [x] See flow sheet :focus on Glut max/med strength, added Schuyler stretch with knee Flex on left   Rationale: increase ROM, increase strength and improve coordination to improve the patients ability to return to PLOF full active mobility and ambulation without device     15 min Therapeutic Activities: updated ex to focus on primary deficits, updated HEP, initiated SLS balance             With   [] TE   [] TA   [] neuro   [] other: Patient Education: [x] Review HEP    [] Progressed/Changed HEP based on:   [] positioning   [] body mechanics   [] transfers   [] heat/ice application    [] other:      Other Objective/Functional Measures:   ROM hip :   Left hip 7 degrees of AROM extension pt lying prone with extension   Left knee to chest 125 deg flexion   abd supine 42 deg left , 55 right   ER 23 deg + pain  , IR 46 deg left   ER 61 deg , IR 41 deg right  Quad flexibility deficit  Fair Glut max strength, partial bridge  (+) left Trendelenburg     Strength : hip flexion today 3 to 3- /5 , hip add 5/5 , abd 3-/5 side lie , quad 4+/5 , HS 4/5 , DF ankle 4/5         Pain Level (0-10 scale) post treatment: 4    ASSESSMENT/Changes in Function:no change in pain with current activities, marked hip tightness    Patient will continue to benefit from skilled PT services to modify and progress therapeutic interventions, address functional mobility deficits, address ROM deficits, address strength deficits, analyze and address soft tissue restrictions, analyze and cue movement patterns, analyze and modify body mechanics/ergonomics, assess and modify postural abnormalities and address imbalance/dizziness to attain remaining goals.      []  See Plan of Care  [x]  See progress note/recertification  []  See Discharge Summary         Progress towards goals / Updated goals:  Short Term Goals: To be accomplished in 4 weeks:              Patient will report compliance with HEP 1x/day to aid in rehabilitation program.              UTYVHP at IE: Initiated at evaluation              Current: Partially met, doing ex 2-3 days a week ( very fatigued after work day )  and some during work day ( leg raises )                  Patient will increase hip ROM to 100% in all planes to aid in completion of ADLs              Status at IE: Left hip PROM: 30-95*              Current:  7 degrees of AROM extension pt lying prone with extension , knee to chest 125 deg flexion   abd supine 42 deg left , 55 right   ER 23 deg + pain  , IR 46 deg left   ER 61 deg , IR 41 deg right       Long Term Goals: To be accomplished in 6 weeks:              Patient will increase L LE strength to 5/5 MMT throughout to aid in completion of ADLs.             HIAHIK at IE: 3/5 throughout except 5/5 DF               Current:  hip flexion today 3 to 3- /5 , hip add 5/5 , abd 3-/5 side lie , quad 4+/5 , HS 4/5 , DF ankle 4/5 ( ? 5/5 at eval ) ( strength may be affected by hip pain today )                  Patient will report pain no greater than 1-2/10 throughout entire day to aid in completion of ADLs              Status at IE: Up to 7-8/10              Current: 5-6/10 with no medications                  Patent will perform 5 sit<>stands pain-free to demonstrate improvement in status and aid and completion of ADLs.             SBTUTY at IE: Able to sit and stand from chair but with pain and compensations              Current:able to complete sit to stands no more pain than baseline -MET                   Patient will improve FOTO score to 61 points to demonstrate improvement in functional status.               COLBYF at IE: 36              Current: 45 ( 12/06/18) , today back t 40/100         PLAN  [x]  Upgrade activities as tolerated     [x]  Continue plan of care  []  Update interventions per flow sheet       []  Discharge due to:_  []  Other:_      Jhonny Severino, PT 12/19/2018  3:31 PM    Future Appointments Date Time Provider Matthew Jenkins   12/27/2018  2:30 PM Edlorenzo Kimbrough [facility]   [facility]   1/8/2019  3:30 PM [facility]   1/10/2019  3:00 PM Ev Ribera PT [facility]   1/15/2019  3:30 PM Alfonso Kimbrough [facility]   1/17/2019  3:30 PM Alfonso Kimbrough [facility]   1/22/2019  3:30 PM Alfonso Kimbrough [facility]   1/24/2019  3:30 PM Alfonso Kimbrough [facility]   1/29/2019  [facility]   1/31/2019  3:30 PM Alfonso Kimbrough [facility]

## 2018-12-27 ENCOUNTER — HOSPITAL ENCOUNTER (OUTPATIENT)
Dept: PHYSICAL THERAPY | Age: 53
Discharge: HOME | End: 2018-12-27
Payer: OTHER GOVERNMENT

## 2018-12-27 PROCEDURE — 97016 VASOPNEUMATIC DEVICE THERAPY: CPT

## 2018-12-27 PROCEDURE — 97110 THERAPEUTIC EXERCISES: CPT

## 2018-12-27 NOTE — PROGRESS NOTES
PT DAILY TREATMENT NOTE     Patient Name: Eduardo Plummer  Date:2018  : 1965  [x]  Patient  Verified  Payor:  / Plan: [facility] / Product Type:  /    In time:02:30  Out time:03:25  Total Treatment Time (min): 55  Visit #: 15 of 21    Treatment Area: Fracture of unspecified parts of lumbosacral spine and pelvis, subsequent encounter for fracture with routine healing [S32. 9XXD]    SUBJECTIVE  Pain Level (0-10 scale): 3/10  Any medication changes, allergies to medications, adverse drug reactions, diagnosis change, or new procedure performed?: [x] No    [] Yes (see summary sheet for update)  Subjective functional status/changes:   [x] No changes reported      OBJECTIVE    Modality rationale: decrease edema, decrease inflammation and decrease pain to improve the patients ability to increase tolerance with activities    Min Type Additional Details    [] Estim:  []Unatt       []IFC  []Premod                        []Other:  []w/ice   []w/heat  Position:  Location:    [] Estim: []Att    []TENS instruct  []NMES                    []Other:  []w/US   []w/ice   []w/heat  Position:  Location:    []  Traction: [] Cervical       []Lumbar                       [] Prone          []Supine                       []Intermittent   []Continuous Lbs:  [] before manual  [] after manual    []  Ultrasound: []Continuous   [] Pulsed                           []1MHz   []3MHz W/cm2:  Location:    []  Iontophoresis with dexamethasone         Location: [] Take home patch   [] In clinic    []  Ice     []  heat  []  Ice massage  []  Laser   []  Anodyne Position:  Location:    []  Laser with stim  []  Other:  Position:  Location:   10 [x]  Vasopneumatic Device Pressure:       [] lo [x] med [] hi   Temperature: [x] lo [] med [] hi   [] Skin assessment post-treatment:  []intact []redness- no adverse reaction    []redness - adverse reaction:     45 min Therapeutic Exercise:  [] See flow sheet : Rationale: increase ROM and increase strength to improve the patients ability to increase stabilization of hip     Other Objective/Functional Measures:      Pain Level (0-10 scale) post treatment: 0/10    ASSESSMENT/Changes in Function:   PT focused on IR with exercises as patient continues to walk with ER LE with ambulation. Patient will continue to benefit from skilled PT services to modify and progress therapeutic interventions, address functional mobility deficits, address ROM deficits, address strength deficits, analyze and address soft tissue restrictions, analyze and cue movement patterns, analyze and modify body mechanics/ergonomics and assess and modify postural abnormalities to attain remaining goals. []  See Plan of Care  []  See progress note/recertification  []  See Discharge Summary         Progress towards goals / Updated goals:  Short Term Goals: To be accomplished in 4 weeks:              Patient will report compliance with HEP 1x/day to aid in rehabilitation program.              Status at IE: Initiated at evaluation              Current: Partially met, doing ex 2-3 days a week ( very fatigued after work day )  and some during work day ( leg raises )                  Patient will increase hip ROM to 100% in all planes to aid in completion of ADLs              Status at IE: Left hip PROM: 30-95*              Current:  7 degrees of AROM extension pt lying prone with extension , knee to chest 125 deg flexion   abd supine 42 deg left , 55 right   ER 23 deg + pain  , IR 46 deg left   ER 61 deg , IR 41 deg right       Long Term Goals: To be accomplished in 6 weeks:              Patient will increase L LE strength to 5/5 MMT throughout to aid in completion of ADLs.               UNEMNA at IE: 3/5 throughout except 5/5 DF               Current:  hip flexion today 3 to 3- /5 , hip add 5/5 , abd 3-/5 side lie , quad 4+/5 , HS 4/5 , DF ankle 4/5 ( ? 5/5 at eval ) ( strength may be affected by hip pain today )                  Patient will report pain no greater than 1-2/10 throughout entire day to aid in completion of ADLs              Status at IE: Up to 7-8/10              Current: 5-6/10 with no medications                  Patent will perform 5 sit<>stands pain-free to demonstrate improvement in status and aid and completion of ADLs.             YXYXFS at IE: Able to sit and stand from chair but with pain and compensations              Current:able to complete sit to stands no more pain than baseline -MET                   Patient will improve FOTO score to 61 points to demonstrate improvement in functional status.               HAVJRX at IE: 36              Current: 45 ( 12/06/18) , today back to 40/100      PLAN  []  Upgrade activities as tolerated     [x]  Continue plan of care  []  Update interventions per flow sheet       []  Discharge due to:_  []  Other:_      Sheridan Robbins 12/27/2018  2:50 PM    Future Appointments   Date Time Provider Matthew Jenkins   1/3/2019  3:30 PM [facility]   1/8/2019  3:30 PM [facility]   1/10/2019  3:00 PM Rubi Tsang, PT [facility]   1/15/2019  3:30 PM Alysa Cervantes [facility]   1/17/2019  3:30 PM Alysa Cervantes [facility]   1/22/2019  3:30 PM Alysa Cervantes [facility]   1/24/2019  3:30 PM Alysa Cervantes [facility]   1/29/2019  3:30 PM [facility]   1/31/2019  3:30 PM Alysa Cervantes [facility]

## 2019-01-03 ENCOUNTER — HOSPITAL ENCOUNTER (OUTPATIENT)
Dept: PHYSICAL THERAPY | Age: 54
Discharge: HOME | End: 2019-01-03
Payer: OTHER GOVERNMENT

## 2019-01-03 PROCEDURE — 97140 MANUAL THERAPY 1/> REGIONS: CPT

## 2019-01-03 PROCEDURE — 97110 THERAPEUTIC EXERCISES: CPT

## 2019-01-03 NOTE — PROGRESS NOTES
PT DAILY TREATMENT NOTE     Patient Name: Madai Guallpa  Date:1/3/2019  : 1965  [x]  Patient  Verified  Payor:  / Plan: [facility] / Product Type:  /    In time:320  Out time:410  Total Treatment Time (min): 50  Visit #: 16 of 21    Treatment Area: Fracture of unspecified parts of lumbosacral spine and pelvis, subsequent encounter for fracture with routine healing [S32. 9XXD]    SUBJECTIVE  Pain Level (0-10 scale): 3/10  Any medication changes, allergies to medications, adverse drug reactions, diagnosis change, or new procedure performed?: [x] No    [] Yes (see summary sheet for update)  Subjective functional status/changes:   [x] No changes reported  \"It hurts from my hip down to my knee. Especially if I am sitting or driving the car. It feels a bit better once I am walking. \"      OBJECTIVE    Modality rationale: decrease edema, decrease inflammation and decrease pain to improve the patients ability to increase tolerance with activities    Type Additional Details   [] Estim:  []Unatt       []IFC  []Premod                        []Other:  []w/ice   []w/heat  Position:  Location:   [] Estim: []Att    []TENS instruct  []NMES                    []Other:  []w/US   []w/ice   []w/heat  Position:  Location:   []  Traction: [] Cervical       []Lumbar                       [] Prone          []Supine                       []Intermittent   []Continuous Lbs:  [] before manual  [] after manual   []  Ultrasound: []Continuous   [] Pulsed                           []1MHz   []3MHz W/cm2:  Location:   []  Iontophoresis with dexamethasone         Location: [] Take home patch   [] In clinic   []  Ice     [x]  Heat, 10 min  []  Ice massage  []  Laser   []  Anodyne Position:supine  Location:left lateral thight   []  Laser with stim  []  Other:  Position:  Location:   []  Vasopneumatic Device   [] Skin assessment post-treatment:  []intact []redness- no adverse reaction    []redness - adverse reaction:     30 min Therapeutic Exercise:  [] See flow sheet :   Rationale: increase ROM and increase strength to improve the patients ability to increase stabilization of hip    10 min Manual Therapy: CFM to left tensor muscle, STM ITB with use of roller , manual resist for tensor inhibition, passive stretching left anterior hip    Other Objective/Functional Measures:   TTP left tensor muscle> ITB  Anterior hip tightness     Pain Level (0-10 scale) post treatment: 3/10    ASSESSMENT/Changes in Function:   Reduction in TTP post MT. Good tolerance to activities  Patient will continue to benefit from skilled PT services to modify and progress therapeutic interventions, address functional mobility deficits, address ROM deficits, address strength deficits, analyze and address soft tissue restrictions, analyze and cue movement patterns, analyze and modify body mechanics/ergonomics and assess and modify postural abnormalities to attain remaining goals.      [x]  See Plan of Care  []  See progress note/recertification  []  See Discharge Summary         Progress towards goals / Updated goals:  Short Term Goals: To be accomplished in 4 weeks:              Patient will report compliance with HEP 1x/day to aid in rehabilitation program.              Status at IE: Initiated at evaluation              Current: Partially met, doing ex 2-3 days a week ( very fatigued after work day )  and some during work day ( leg raises )                  Patient will increase hip ROM to 100% in all planes to aid in completion of ADLs              Status at IE: Left hip PROM: 30-95*              Current:  7 degrees of AROM extension pt lying prone with extension , knee to chest 125 deg flexion   abd supine 42 deg left , 55 right   ER 23 deg + pain  , IR 46 deg left   ER 61 deg , IR 41 deg right       Long Term Goals: To be accomplished in 6 weeks:              Patient will increase L LE strength to 5/5 MMT throughout to aid in completion of ADLs.              Status at IE: 3/5 throughout except 5/5 DF               Current:  hip flexion today 3 to 3- /5 , hip add 5/5 , abd 3-/5 side lie , quad 4+/5 , HS 4/5 , DF ankle 4/5 ( ? 5/5 at eval ) ( strength may be affected by hip pain today )                  Patient will report pain no greater than 1-2/10 throughout entire day to aid in completion of ADLs              Status at IE: Up to 7-8/10              Current: 5-6/10 with no medications                  Patent will perform 5 sit<>stands pain-free to demonstrate improvement in status and aid and completion of ADLs.             UUGYXU at IE: Able to sit and stand from chair but with pain and compensations              Current:able to complete sit to stands no more pain than baseline -MET                   Patient will improve FOTO score to 61 points to demonstrate improvement in functional status.               YJJLNJ at IE: 36              Current: 45 ( 12/06/18) , today back to 40/100      PLAN  []  Upgrade activities as tolerated     [x]  Continue plan of care  []  Update interventions per flow sheet       []  Discharge due to:_  []  Other:_      Aparna Ards, PT 1/3/2019  2:50 PM    Future Appointments   Date Time Provider Matthew Jenkins   1/8/2019  3:30 PM [facility]   1/10/2019  3:00 PM Barbara Ochoa, PT [facility]   1/15/2019  3:30 PM Alverna Glad [facility]   1/17/2019  3:30 PM Alverna Glad [facility]   1/22/2019  3:30 PM Alverna Glad [facility]   1/24/2019  3:30 PM Alverna Glad [facility]   1/29/2019  3:30 PM [facility]   1/31/2019  3:30 PM Alverna Gla [facility]

## 2019-01-08 ENCOUNTER — HOSPITAL ENCOUNTER (OUTPATIENT)
Dept: PHYSICAL THERAPY | Age: 54
Discharge: HOME | End: 2019-01-08
Payer: OTHER GOVERNMENT

## 2019-01-08 PROCEDURE — 97016 VASOPNEUMATIC DEVICE THERAPY: CPT

## 2019-01-08 PROCEDURE — 97110 THERAPEUTIC EXERCISES: CPT

## 2019-01-08 NOTE — PROGRESS NOTES
PT DAILY TREATMENT NOTE 12    Patient Name: Lisbeth Trotter  Date:2019  : 1965  [x]  Patient  Verified  Payor:  / Plan: Jose[facility] Greg[facility] 74 / Product Type:  /    In time: 03:30   Out time: 4:28  Total Treatment Time (min): 58  Visit #: 17 of 21    Treatment Area: Fracture of unspecified parts of lumbosacral spine and pelvis, subsequent encounter for fracture with routine healing [S32. 9XXD]    SUBJECTIVE  Pain Level (0-10 scale): 2-3/10  Any medication changes, allergies to medications, adverse drug reactions, diagnosis change, or new procedure performed?: [x] No    [] Yes (see summary sheet for update)  Subjective functional status/changes:   [x] No changes reported      OBJECTIVE    Modality rationale: decrease edema, decrease inflammation and decrease pain to improve the patients ability to increase tolerance with activities    Type Additional Details   [] Estim:  []Unatt       []IFC  []Premod                        []Other:  []w/ice   []w/heat  Position:  Location:   [] Estim: []Att    []TENS instruct  []NMES                    []Other:  []w/US   []w/ice   []w/heat  Position:  Location:   []  Traction: [] Cervical       []Lumbar                       [] Prone          []Supine                       []Intermittent   []Continuous Lbs:  [] before manual  [] after manual   []  Ultrasound: []Continuous   [] Pulsed                           []1MHz   []3MHz W/cm2:  Location:   []  Iontophoresis with dexamethasone         Location: [] Take home patch   [] In clinic   []  Ice     []  heat  []  Ice massage  []  Laser   []  Anodyne Position:  Location:   []  Laser with stim  []  Other:  Position:  Location:   [x]  Vasopneumatic Device 10 minute   [] Skin assessment post-treatment:  []intact []redness- no adverse reaction    []redness - adverse reaction:       48 min Therapeutic Exercise:  [] See flow sheet :   Rationale: increase ROM and increase strength to improve the patients ability to increase hip stabilization         With   [] TE   [] TA   [] neuro   [] other: Patient Education: [x] Review HEP    [] Progressed/Changed HEP based on:   [] positioning   [] body mechanics   [] transfers   [] heat/ice application    [] other:      Other Objective/Functional Measures:      Pain Level (0-10 scale) post treatment: 1/10    ASSESSMENT/Changes in Function:   Patient demonstrating decreased hip drop with ambulation and exercises. Patient will continue to benefit from skilled PT services to modify and progress therapeutic interventions, address functional mobility deficits, address ROM deficits, address strength deficits, analyze and address soft tissue restrictions, analyze and cue movement patterns, analyze and modify body mechanics/ergonomics and assess and modify postural abnormalities to attain remaining goals. []  See Plan of Care  []  See progress note/recertification  []  See Discharge Summary         Progress towards goals / Updated goals:    Short Term Goals: To be accomplished in 4 weeks:              Patient will report compliance with HEP 1x/day to aid in rehabilitation program.              Status at IE: Initiated at evaluation              Current: Partially met, doing ex 2-3 days a week ( very fatigued after work day )  and some during work day ( leg raises )                  Patient will increase hip ROM to 100% in all planes to aid in completion of ADLs              Status at IE: Left hip PROM: 30-95*              Current:  7 degrees of AROM extension pt lying prone with extension , knee to chest 125 deg flexion   abd supine 42 deg left , 55 right   ER 23 deg + pain  , IR 46 deg left   ER 61 deg , IR 41 deg right       Long Term Goals: To be accomplished in 6 weeks:              Patient will increase L LE strength to 5/5 MMT throughout to aid in completion of ADLs.               YVMJGS at IE: 3/5 throughout except 5/5 DF               Current:  hip flexion today 3 to 3- /5 , hip add 5/5 , abd 3-/5 side lie , quad 4+/5 , HS 4/5 , DF ankle 4/5 ( ? 5/5 at eval ) ( strength may be affected by hip pain today )                  Patient will report pain no greater than 1-2/10 throughout entire day to aid in completion of ADLs              Status at IE: Up to 7-8/10              Current: 5-6/10 with no medications                  Patent will perform 5 sit<>stands pain-free to demonstrate improvement in status and aid and completion of ADLs.             WRZZAQ at IE: Able to sit and stand from chair but with pain and compensations              Current:able to complete sit to stands no more pain than baseline -MET                   Patient will improve FOTO score to 61 points to demonstrate improvement in functional status.               FYFYTZ at IE: 36              Current: 45 ( 12/06/18) , today back to 40/100     PLAN  []  Upgrade activities as tolerated     [x]  Continue plan of care  []  Update interventions per flow sheet       []  Discharge due to:_  []  Other:_      Luis Arreguin 1/8/2019  3:47 PM    Future Appointments   Date Time Provider Matthew Jenkins   1/10/2019  3:00 PM Alanna Sutton PT [facility]   1/15/2019  3:30 PM Aundra Alcantar [facility]   1/17/2019  3:30 PM Aundra Jobleomer [facility]   1/22/2019  3:30 PM Aundra Jobleod [facility]   1/24/2019  3:30 PM Aundra Jobleomer [facility]   1/29/2019  3:30 PM Brice Pete [facility]   1/31/2019  3:30 PM Aundra Jobleomer [facility]

## 2019-01-10 ENCOUNTER — HOSPITAL ENCOUNTER (OUTPATIENT)
Dept: PHYSICAL THERAPY | Age: 54
Discharge: HOME | End: 2019-01-10
Payer: OTHER GOVERNMENT

## 2019-01-10 PROCEDURE — 97110 THERAPEUTIC EXERCISES: CPT | Performed by: PHYSICAL THERAPIST

## 2019-01-10 PROCEDURE — 97016 VASOPNEUMATIC DEVICE THERAPY: CPT | Performed by: PHYSICAL THERAPIST

## 2019-01-10 NOTE — PROGRESS NOTES
PT DAILY TREATMENT NOTE 12    Patient Name: Jairo Yoder  Date:1/10/2019  : 1965  [x]  Patient  Verified  Payor:  / Plan: Carson Lui / Product Type:  /    In time:1503  Out time:1600  Total Treatment Time (min): 62  Visit #: 18 of 21    Treatment Area: Fracture of unspecified parts of lumbosacral spine and pelvis, subsequent encounter for fracture with routine healing [S32. 9XXD]    SUBJECTIVE  Pain Level (0-10 scale): 2-3 left knee and thigh mm   Any medication changes, allergies to medications, adverse drug reactions, diagnosis change, or new procedure performed?: [x] No    [] Yes (see summary sheet for update)  Subjective functional status/changes:   [x] No changes reported  Pt feels from week to week getting better , and pt states people at work says he is walking better     OBJECTIVE           Modality rationale: decrease pain to improve the patients functional mobility    Type Additional Details    [] Estim:  []Unatt       []IFC  []Premod                        []Other:  []w/ice   []w/heat  Position:  Location:    [] Estim: []Att    []TENS instruct  []NMES                    []Other:  []w/US   []w/ice   []w/heat  Position:  Location:    []  Traction: [] Cervical       []Lumbar                       [] Prone          []Supine                       []Intermittent   []Continuous Lbs:  [] before manual  [] after manual    []  Ultrasound: []Continuous   [] Pulsed                           []1MHz   []3MHz W/cm2:  Location:    []  Iontophoresis with dexamethasone         Location: [] Take home patch   [] In clinic    []  Ice     []  heat  []  Ice massage  []  Laser   []  Anodyne Position:  Location:    []  Laser with stim  []  Other:  Position:  Location:    [x]  Vasopneumatic Device left knee/ice hip Pressure:       [] lo [x] med [] hi   Temperature: [] lo [x] med [] hi    [] Skin assessment post-treatment:  []intact []redness- no adverse reaction    []redness - adverse reaction:           47 min Therapeutic Exercise:  [x] See flow sheet :   Rationale: increase ROM and increase strength to improve the patients ability to increase hip stabilization              With   [] TE   [] TA   [] neuro   [] other: Patient Education: [x] Review HEP    [] Progressed/Changed HEP based on:   [] positioning   [] body mechanics   [] transfers   [] heat/ice application    [] other:      Other Objective/Functional Measures: see ex grid      Pain Level (0-10 scale) post treatment: 2-3    ASSESSMENT/Changes in Function:  Pt SLS still difficult on left but 1 attempt did get to 20 sec , pain in hip following any time SLS and hip hike per pt for a couple minutes but subsides. Patient will continue to benefit from skilled PT services to modify and progress therapeutic interventions, address functional mobility deficits, address ROM deficits, address strength deficits, analyze and address soft tissue restrictions, analyze and cue movement patterns, analyze and modify body mechanics/ergonomics, assess and modify postural abnormalities and address imbalance/dizziness to attain remaining goals.      []  See Plan of Care  [x]  See progress note/recertification  []  See Discharge Summary         Progress towards goals / Updated goals:  Short Term Goals: To be accomplished in 4 weeks:              Patient will report compliance with HEP 1x/day to aid in rehabilitation program.              GGGQEO at IE: Initiated at evaluation              Current: Partially met, doing ex 2-3 days a week ( very fatigued after work day )  and some during work day ( leg raises )                  Patient will increase hip ROM to 100% in all planes to aid in completion of ADLs              Status at IE: Left hip PROM: 30-95*              Current:  7 degrees of AROM extension pt lying prone with extension , knee to chest 125 deg flexion   abd supine 42 deg left , 55 right   ER 23 deg + pain  , IR 46 deg left   ER 61 deg , IR 41 deg right       Long Term Goals: To be accomplished in 6 weeks:              Patient will increase L LE strength to 5/5 MMT throughout to aid in completion of ADLs.             PUZGOH at IE: 3/5 throughout except 5/5 DF               Current:  hip flexion today 3 to 3- /5 , hip add 5/5 , abd 3-/5 side lie , quad 4+/5 , HS 4/5 , DF ankle 4/5 ( ? 5/5 at eval ) ( strength may be affected by hip pain today )                  Patient will report pain no greater than 1-2/10 throughout entire day to aid in completion of ADLs              Status at IE: Up to 7-8/10              Current: 5-6/10 with no medications                  Patent will perform 5 sit<>stands pain-free to demonstrate improvement in status and aid and completion of ADLs.             DNTMCY at IE: Able to sit and stand from chair but with pain and compensations              Current:able to complete sit to stands no more pain than baseline -MET                   Patient will improve FOTO score to 61 points to demonstrate improvement in functional status.               GZHPAD at IE: 36              Current: 45 ( 12/06/18) ,  back to 40/100 ( 12/17/2018) will assess again next session         PLAN  [x]  Upgrade activities as tolerated     [x]  Continue plan of care  []  Update interventions per flow sheet       []  Discharge due to:_  []  Other:_      Matthew Velasquez, PT 1/10/2019  3:33 PM    Future Appointments   Date Time Provider Matthew Jenkins   1/15/2019  3:30 PM [facility]   1/17/2019  3:30 PM Balbir Gupta [facility]   1/22/2019  3:30 PM Balbir Gupta [facility]   1/24/2019  3:30 PM Balbir Gupta [facility]   1/29/2019  3:30 PM [facility]   1/31/2019  3:30 PM Balbir Gupta [facility]

## 2019-01-15 ENCOUNTER — HOSPITAL ENCOUNTER (OUTPATIENT)
Dept: PHYSICAL THERAPY | Age: 54
Discharge: HOME | End: 2019-01-15
Payer: OTHER GOVERNMENT

## 2019-01-15 PROCEDURE — 97110 THERAPEUTIC EXERCISES: CPT

## 2019-01-15 PROCEDURE — 97016 VASOPNEUMATIC DEVICE THERAPY: CPT

## 2019-01-15 NOTE — PROGRESS NOTES
PT DAILY TREATMENT NOTE     Patient Name: Aniceto Jalloh  Date:1/15/2019  : 1965  [x]  Patient  Verified  Payor:  / Plan: [facility] / Product Type:  /    In time: 03:30  Out time:4:30  Total Treatment Time (min): 60  Visit #: 19 of 21    Treatment Area: Fracture of unspecified parts of lumbosacral spine and pelvis, subsequent encounter for fracture with routine healing [S32. 9XXD]    SUBJECTIVE  Pain Level (0-10 scale): 2-3/10  Any medication changes, allergies to medications, adverse drug reactions, diagnosis change, or new procedure performed?: [x] No    [] Yes (see summary sheet for update)  Subjective functional status/changes:   [] No changes reported      OBJECTIVE    Modality rationale: decrease inflammation and decrease pain to improve the patients ability to increase tolerance with activities    Min Type Additional Details    [] Estim:  []Unatt       []IFC  []Premod                        []Other:  []w/ice   []w/heat  Position:  Location:    [] Estim: []Att    []TENS instruct  []NMES                    []Other:  []w/US   []w/ice   []w/heat  Position:  Location:    []  Traction: [] Cervical       []Lumbar                       [] Prone          []Supine                       []Intermittent   []Continuous Lbs:  [] before manual  [] after manual    []  Ultrasound: []Continuous   [] Pulsed                           []1MHz   []3MHz W/cm2:  Location:    []  Iontophoresis with dexamethasone         Location: [] Take home patch   [] In clinic    []  Ice     []  heat  []  Ice massage  []  Laser   []  Anodyne Position:  Location:    []  Laser with stim  []  Other:  Position:  Location:   10 [x]  Vasopneumatic Device Pressure:       [] lo [x] med [] hi   Temperature: [x] lo [] med [] hi   [] Skin assessment post-treatment:  []intact []redness- no adverse reaction    []redness - adverse reaction:     50 min Therapeutic Exercise:  [] See flow sheet :   Rationale: increase ROM and increase strength to improve the patients ability to increase hip/knee stabilization        With   [] TE   [] TA   [] neuro   [] other: Patient Education: [x] Review HEP    [] Progressed/Changed HEP based on:   [] positioning   [] body mechanics   [] transfers   [] heat/ice application    [] other:      Other Objective/Functional Measures: FOTO: 51     Pain Level (0-10 scale) post treatment: 2-3/10    ASSESSMENT/Changes in Function:   Patient progressing with strength gains and AROM. However, patient continues to have trendenlenburg gait. Patient will continue to benefit from skilled PT services to modify and progress therapeutic interventions, address functional mobility deficits, address ROM deficits, address strength deficits, analyze and address soft tissue restrictions, analyze and cue movement patterns, analyze and modify body mechanics/ergonomics and assess and modify postural abnormalities to attain remaining goals. []  See Plan of Care  [x]  See progress note/recertification  []  See Discharge Summary         Progress towards goals / Updated goals:  Short Term Goals: To be accomplished in 4 weeks:              Patient will report compliance with HEP 1x/day to aid in rehabilitation program.              STXMMO at IE: Initiated at evaluation              EALISDL: GDKTHWL continues with partial compliance-partially MET              Patient will increase hip ROM to 100% in all planes to aid in completion of ADLs              Status at IE: Left hip PROM: 30-95*              Current:  7 degrees of AROM extension pt lying prone with extension , knee to chest 102 deg flexion   abd supine 42 deg left , 55 right   ER 18 deg + pain  , IR 35 deg left   ER 61 deg , IR 41 deg right       Long Term Goals: To be accomplished in 6 weeks:              Patient will increase L LE strength to 5/5 MMT throughout to aid in completion of ADLs.               LWVIOT at IE: 3/5 throughout except 5/5 DF               DQFFCJX:  UZD flexion today 3/5 , hip add 5/5 , abd 3/5 side lie , hip extension, quad 4+/5 , HS 5/5 , DF ankle 4/5 ( ? 5/5 at eval ) ( strength may be affected by hip pain today )                  Patient will report pain no greater than 1-2/10 throughout entire day to aid in completion of ADLs              Status at IE: Up to 7-8/10              Current: 2-3/10 with highest 4/10                  Patent will perform 5 sit<>stands pain-free to demonstrate improvement in status and aid and completion of ADLs.             ZDMHNG at IE: Able to sit and stand from chair but with pain and compensations              Current:able to complete sit to stands no more pain than baseline -MET                   Patient will improve FOTO score to 61 points to demonstrate improvement in functional status.               XSPEJJ at IE: 36              Current: 51                   PLAN  []  Upgrade activities as tolerated     [x]  Continue plan of care  []  Update interventions per flow sheet       []  Discharge due to:_  []  Other:_      Soha Marie 1/15/2019  3:37 PM    Future Appointments   Date Time Provider Matthew Jenkins   1/17/2019  3:30 PM Alverna Glad [facility]   1/22/2019  3:30 PM Alverna Glad [facility]   1/24/2019  3:30 PM Alverna Glad [facility]   1/29/2019  3:30 PM Alverna Glad [facility]   1/31/2019  3:30 PM [facility]   2/5/2019  3:00 PM [facility]   2/7/2019  3:00 PM Barbara Ochoa PT [facility]   2/12/2019  3:30 PM Barbara Ochoa PT [facility]   2/14/2019  3:30 PM Alverna Glad [facility]   2/19/2019  3:30 PM Alverna Glad [facility]   2/21/2019  3:30 PM Barbara Ochoa PT [facility]   2/26/2019  3:30 PM Barbara Ochoa, PT [facility]   2/28/2019  3:30 PM Barbara Ochoa, [facility] [facility]

## 2019-01-15 NOTE — PROGRESS NOTES
[facility]  [address], [address]  Ph [phone]  Fx [phone]    Physical Therapy Progress Note  Patient name: Anthony Lauren Start of Care: 10/31/2018   Referral source: Hailey Grace MD : 1965   Medical/Treatment Diagnosis: Fracture of unspecified parts of lumbosacral spine and pelvis, subsequent encounter for fracture with routine healing [S32. 9XXD] Onset Date:2018     Prior Hospitalization: see medical history Provider#: [id number]   Medications: Verified on Patient Summary List    Comorbidities: DM II, asthma   Prior Level of Function: Independent with ADLs and full work duty        Visits from [employer] [facility]: 19    Missed Visits: 0    Established Goals:         Excellent           Good         Limited           None  [] Increased ROM   []  [x]  []  []  [] Increased Strength   []  []  []  []  [] Increased Mobility   []  []  [x]  []   [] Decreased Pain   []  []  [x]  []  [] Decreased Swelling  []  []  []  []    Short Term Goals: To be accomplished in 4 weeks:              Patient will report compliance with HEP 1x/day to aid in rehabilitation program.              ZQHLAC at IE: Initiated at evaluation              YBOCYJI: NMGOXJD continues with partial compliance-partially MET              Patient will increase hip ROM to 100% in all planes to aid in completion of ADLs              Status at IE: Left hip PROM: 30-95*              Current:  7 degrees of AROM extension pt lying prone with extension , knee to chest 102 deg flexion   abd supine 42 deg left , 55 right   ER 18 deg + pain  , IR 35 deg left   ER 61 deg , IR 41 deg right       Long Term Goals: To be accomplished in 6 weeks:              Patient will increase L LE strength to 5/5 MMT throughout to aid in completion of ADLs.               QLZFFB at IE: 3/5 throughout except 5/5 DF               Current:  hip flexion today 3/5 , hip add 5/5 , abd 3/5 side lie , hip extension, quad 4+/5 , HS 5/5 , DF ankle 4/5 ( ? 5/5 at eval ) ( strength may be affected by hip pain today )                  Patient will report pain no greater than 1-2/10 throughout entire day to aid in completion of ADLs              Status at IE: Up to 7-8/10              Current: 2-3/10 with highest 4/10                  Patent will perform 5 sit<>stands pain-free to demonstrate improvement in status and aid and completion of ADLs.             MINI at IE: Able to sit and stand from chair but with pain and compensations              Current:able to complete sit to stands no more pain than baseline -MET                   Patient will improve FOTO score to 61 points to demonstrate improvement in functional status.             SRUZXU at IE: 40              Current:  51                           Key Functional Changes: strength and AROM   Updated Goals: to be achieved in 4 weeks:   Continue with unmet goals     ASSESSMENT/RECOMMENDATIONS:Patient progressing with strength gains and AROM. However, patient continues to have trendenlenburg gait. Patient will continue to benefit from skilled PT services to modify and progress therapeutic interventions, address functional mobility deficits, address ROM deficits, address strength deficits, analyze and address soft tissue restrictions, analyze and cue movement patterns, analyze and modify body mechanics/ergonomics and assess and modify postural abnormalities to attain remaining goals.       [x]Continue therapy per initial plan/protocol at a frequency of  2 x per week for 4 weeks  []Continue therapy with the following recommended changes:_____________________      _____________________________________________________________________  []Discontinue therapy progressing towards or have reached established goals  []Discontinue therapy due to lack of appreciable progress towards goals  []Discontinue therapy due to lack of attendance or compliance  []Await Physician's recommendations/decisions regarding therapy  []Other:________________________________________________________________    Thank you for this referral.   Soha s 1/15/2019 4:07 PM    NOTE TO PHYSICIAN:  PLEASE COMPLETE THE ORDERS BELOW AND   FAX TO [facility]: [phone]  If you are unable to process this request in 24 hours please contact our office: [phone]      ____ I have read the above report and request that my patient continue therapy with the following changes/special instructions:  ____ I have read the above report and request that my patient be discharged from therapy    Physician's Signature:____________Date:_________TIME:________    ** Signature, Date and Time must be completed for valid certification **

## 2019-01-17 ENCOUNTER — HOSPITAL ENCOUNTER (OUTPATIENT)
Dept: PHYSICAL THERAPY | Age: 54
Discharge: HOME | End: 2019-01-17
Payer: OTHER GOVERNMENT

## 2019-01-17 PROCEDURE — 97110 THERAPEUTIC EXERCISES: CPT

## 2019-01-17 PROCEDURE — 97016 VASOPNEUMATIC DEVICE THERAPY: CPT

## 2019-01-17 NOTE — PROGRESS NOTES
PT DAILY TREATMENT NOTE     Patient Name: Chioma Salcedo  Date:2019  : 1965  [x]  Patient  Verified  Payor:  / Plan: Tammy Mayen / Product Type:  /    In time:03:35  Out time:04:28  Total Treatment Time (min): 48  Visit #: 20 of     Treatment Area: Fracture of unspecified parts of lumbosacral spine and pelvis, subsequent encounter for fracture with routine healing [S32. 9XXD]    SUBJECTIVE  Pain Level (0-10 scale): 2-3/10  Any medication changes, allergies to medications, adverse drug reactions, diagnosis change, or new procedure performed?: [x] No    [] Yes (see summary sheet for update)  Subjective functional status/changes:   [x] No changes reported      OBJECTIVE    Modality rationale: decrease pain and increase tissue extensibility to improve the patients ability to increase tolerance with activities    Min Type Additional Details    [] Estim:  []Unatt       []IFC  []Premod                        []Other:  []w/ice   []w/heat  Position:  Location:    [] Estim: []Att    []TENS instruct  []NMES                    []Other:  []w/US   []w/ice   []w/heat  Position:  Location:    []  Traction: [] Cervical       []Lumbar                       [] Prone          []Supine                       []Intermittent   []Continuous Lbs:  [] before manual  [] after manual    []  Ultrasound: []Continuous   [] Pulsed                           []1MHz   []3MHz W/cm2:  Location:    []  Iontophoresis with dexamethasone         Location: [] Take home patch   [] In clinic    []  Ice     []  heat  []  Ice massage  []  Laser   []  Anodyne Position:  Location:    []  Laser with stim  []  Other:  Position:  Location:   10 [x]  Vasopneumatic Device Pressure:       [] lo [x] med [] hi   Temperature: [x] lo [] med [] hi   [] Skin assessment post-treatment:  []intact []redness- no adverse reaction    []redness - adverse reaction:     43 min Therapeutic Exercise:  [] See flow sheet :   Rationale: increase ROM and increase strength to improve the patients ability to increase hip stabilization       With   [] TE   [] TA   [] neuro   [] other: Patient Education: [x] Review HEP    [] Progressed/Changed HEP based on:   [] positioning   [] body mechanics   [] transfers   [] heat/ice application    [] other:      Other Objective/Functional Measures:      Pain Level (0-10 scale) post treatment: 2-3/10    ASSESSMENT/Changes in Function:   Patient continues to have limited tolerance with weight bearing activities. PT focused on hip strengthening in more tolerable psitions. Patient will continue to benefit from skilled PT services to modify and progress therapeutic interventions, address functional mobility deficits, address ROM deficits, address strength deficits, analyze and address soft tissue restrictions, analyze and cue movement patterns and analyze and modify body mechanics/ergonomics to attain remaining goals. []  See Plan of Care  []  See progress note/recertification  []  See Discharge Summary         Progress towards goals / Updated goals:  Short Term Goals: To be accomplished in 4 weeks:              Patient will report compliance with HEP 1x/day to aid in rehabilitation program.              VKHYMP at IE: Initiated at evaluation              UTFKTLU: MDDBXWA continues with partial compliance-partially MET              Patient will increase hip ROM to 100% in all planes to aid in completion of ADLs              Status at IE: Left hip PROM: 30-95*              Current:  7 degrees of AROM extension pt lying prone with extension , knee to chest 102 deg flexion   abd supine 42 deg left , 55 right   ER 18 deg + pain  , IR 35 deg left   ER 61 deg , IR 41 deg right       Long Term Goals: To be accomplished in 6 weeks:              Patient will increase L LE strength to 5/5 MMT throughout to aid in completion of ADLs.               RTNMUZ at IE: 3/5 throughout except 5/5 DF               DPBESUT:  BTK flexion today 3/5 , hip add 5/5 , abd 3/5 side lie , hip extension, quad 4+/5 , HS 5/5 , DF ankle 4/5 ( ? 5/5 at eval ) ( strength may be affected by hip pain today )                  Patient will report pain no greater than 1-2/10 throughout entire day to aid in completion of ADLs              Status at IE: Up to 7-8/10              Current: 2-3/10 with highest 4/10                  Patent will perform 5 sit<>stands pain-free to demonstrate improvement in status and aid and completion of ADLs.             TPQCXO at IE: Able to sit and stand from chair but with pain and compensations              Current:able to complete sit to stands no more pain than baseline -MET                   Patient will improve FOTO score to 61 points to demonstrate improvement in functional status.               WQTDSR at IE: 36              Current:  51   PLAN  []  Upgrade activities as tolerated     [x]  Continue plan of care  []  Update interventions per flow sheet       []  Discharge due to:_  []  Other:_      Ahsan Beckford 1/17/2019  3:50 PM    Future Appointments   Date Time Provider Matthew Jenkins   1/22/2019  3:30 PM Jerryl Marion [facility]   1/24/2019  3:30 PM Jerryl Marion [facility]   1/29/2019  3:30 PM Jerryl Delgado [facility]   1/31/2019  3:30 PM [facility]   2/5/2019  3:00 PM [facility]   2/7/2019  3:00 PM Gopi Alvarado PT [facility]   2/12/2019  3:30 PM Gopi Alvarado PT [facility]   2/14/2019  3:30 PM Jerryl Delgado [facility]   2/19/2019  3:30 PM Jerryl Marion [facility]   2/21/2019  3:30 PM Gopi Alvarado PT [facility]   2/26/2019  3:30 PM Gopi Alvarado PT [facility]   2/28/2019  3:30 PM Gopi Alvarado, [facility]

## 2019-01-22 ENCOUNTER — HOSPITAL ENCOUNTER (OUTPATIENT)
Dept: PHYSICAL THERAPY | Age: 54
Discharge: HOME | End: 2019-01-22
Payer: OTHER GOVERNMENT

## 2019-01-22 PROCEDURE — 97110 THERAPEUTIC EXERCISES: CPT

## 2019-01-22 PROCEDURE — 97016 VASOPNEUMATIC DEVICE THERAPY: CPT

## 2019-01-22 NOTE — PROGRESS NOTES
PT DAILY TREATMENT NOTE     Patient Name: Madai Guallpa  Date:2019  : 1965  [x]  Patient  Verified  Payor:  / Plan: Josef Gregorio 74 / Product Type:  /    In time: 3:32  Out time: 04:28  Total Treatment Time (min): 56  Visit #:  of     Treatment Area: Fracture of unspecified parts of lumbosacral spine and pelvis, subsequent encounter for fracture with routine healing [S32. 9XXD]    SUBJECTIVE  Pain Level (0-10 scale): 310  Any medication changes, allergies to medications, adverse drug reactions, diagnosis change, or new procedure performed?: [x] No    [] Yes (see summary sheet for update)  Subjective functional status/changes:   [] No changes reported      OBJECTIVE    Modality rationale: decrease inflammation and decrease pain to improve the patients ability to increase tolerance with activities    Min Type Additional Details    [] Estim:  []Unatt       []IFC  []Premod                        []Other:  []w/ice   []w/heat  Position:  Location:    [] Estim: []Att    []TENS instruct  []NMES                    []Other:  []w/US   []w/ice   []w/heat  Position:  Location:    []  Traction: [] Cervical       []Lumbar                       [] Prone          []Supine                       []Intermittent   []Continuous Lbs:  [] before manual  [] after manual    []  Ultrasound: []Continuous   [] Pulsed                           []1MHz   []3MHz W/cm2:  Location:    []  Iontophoresis with dexamethasone         Location: [] Take home patch   [] In clinic    []  Ice     []  heat  []  Ice massage  []  Laser   []  Anodyne Position:  Location:    []  Laser with stim  []  Other:  Position:  Location:   10 [x]  Vasopneumatic Device Pressure:       [] lo [x] med [] hi   Temperature: [x] lo [] med [] hi   [] Skin assessment post-treatment:  []intact []redness- no adverse reaction    []redness - adverse reaction:     46 min Therapeutic Exercise:  [] See flow sheet :   Rationale: increase ROM and increase strength to improve the patients ability to increase stabilization        With   [] TE   [] TA   [] neuro   [] other: Patient Education: [x] Review HEP    [] Progressed/Changed HEP based on:   [] positioning   [] body mechanics   [] transfers   [] heat/ice application    [] other:      Other Objective/Functional Measures:      Pain Level (0-10 scale) post treatment: 3/10    ASSESSMENT/Changes in Function:   Patient continues to have pain limitations. PT informed patient if pain near knee joint by end of next week to follow up with MD.    Patient will continue to benefit from skilled PT services to modify and progress therapeutic interventions, address functional mobility deficits, address ROM deficits, address strength deficits, analyze and address soft tissue restrictions, analyze and cue movement patterns and analyze and modify body mechanics/ergonomics to attain remaining goals. []  See Plan of Care  []  See progress note/recertification  []  See Discharge Summary         Progress towards goals / Updated goals:  Short Term Goals: To be accomplished in 4 weeks:              Patient will report compliance with HEP 1x/day to aid in rehabilitation program.              WVUUZK at IE: Initiated at evaluation              YYJMACU: RNZKKZK continues with partial compliance-partially MET              Patient will increase hip ROM to 100% in all planes to aid in completion of ADLs              Status at IE: Left hip PROM: 30-95*              Current:  7 degrees of AROM extension pt lying prone with extension , knee to chest 102 deg flexion   abd supine 42 deg left , 55 right   ER 18 deg + pain  , IR 35 deg left   ER 61 deg , IR 41 deg right       Long Term Goals: To be accomplished in 6 weeks:              Patient will increase L LE strength to 5/5 MMT throughout to aid in completion of ADLs.               EVOMCG at IE: 3/5 throughout except 5/5 DF               Current:  hip flexion today 3/5 , hip add 5/5 , abd 3/5 side lie , hip extension, quad 4+/5 , HS 5/5 , DF ankle 4/5 ( ? 5/5 at eval ) ( strength may be affected by hip pain today )                  Patient will report pain no greater than 1-2/10 throughout entire day to aid in completion of ADLs              Status at IE: Up to 7-8/10              Current: 2-3/10 with highest 4/10                  Patent will perform 5 sit<>stands pain-free to demonstrate improvement in status and aid and completion of ADLs.             DAXNZE at IE: Able to sit and stand from chair but with pain and compensations              Current:able to complete sit to stands no more pain than baseline -MET                   Patient will improve FOTO score to 61 points to demonstrate improvement in functional status.               CBQKHK at IE: 40              Current:  51    PLAN  []  Upgrade activities as tolerated     [x]  Continue plan of care  []  Update interventions per flow sheet       []  Discharge due to:_  []  Other:_      Edilia June 1/22/2019  3:53 PM    Future Appointments   Date Time Provider Matthew Jenkins   1/24/2019  3:30 PM Emilee Per [facility]   1/29/2019  3:30 PM Emilee Gentry [facility]   1/31/2019  3:30 PM Emileewesley Albarado [facility]   2/5/2019  3:00 PM [facility]   2/7/2019  3:00 PM Deni Aldrich, [facility]   2/12/2019  3:30 PM Deni Fengt, [facility]   2/14/2019  3:30 PM Emileedevi Albarado [facility]   2/19/2019  3:30 PM Emilee Gentry [facility]   2/21/2019  3:30 PM Deni Aldrich, [facility]   2/26/2019  3:30 PM Clenicko Fengt, [facility]   2/28/2019  3:30 PM Deni Aldrich, [address] [facility]

## 2019-01-24 ENCOUNTER — HOSPITAL ENCOUNTER (OUTPATIENT)
Dept: PHYSICAL THERAPY | Age: 54
Discharge: HOME | End: 2019-01-24
Payer: OTHER GOVERNMENT

## 2019-01-24 PROCEDURE — 97110 THERAPEUTIC EXERCISES: CPT

## 2019-01-24 PROCEDURE — 97016 VASOPNEUMATIC DEVICE THERAPY: CPT

## 2019-01-24 NOTE — PROGRESS NOTES
PT DAILY TREATMENT NOTE 12    Patient Name: Jairo Yoder  Date:2019  : 1965  [x]  Patient  Verified  Payor:  / Plan: Carson Lui / Product Type:  /    In time:03:24  Out time:04:31  Total Treatment Time (min): 79  Visit #: 25 of 27    Treatment Area: Fracture of unspecified parts of lumbosacral spine and pelvis, subsequent encounter for fracture with routine healing [S32. 9XXD]    SUBJECTIVE  Pain Level (0-10 scale): 2-3/10  Any medication changes, allergies to medications, adverse drug reactions, diagnosis change, or new procedure performed?: [x] No    [] Yes (see summary sheet for update)  Subjective functional status/changes:   [] No changes reported  Patient reports he followed up with MD about increased noted knee pain that PT and him discussed at last visit. MD referred patient to Dr. Bruce Nicole orthopedic. She is not concerned about integrity of surgery.      OBJECTIVE    Modality rationale: decrease pain and increase tissue extensibility to improve the patients ability to increase tolerance with activities    Min Type Additional Details    [] Estim:  []Unatt       []IFC  []Premod                        []Other:  []w/ice   []w/heat  Position:  Location:    [] Estim: []Att    []TENS instruct  []NMES                    []Other:  []w/US   []w/ice   []w/heat  Position:  Location:    []  Traction: [] Cervical       []Lumbar                       [] Prone          []Supine                       []Intermittent   []Continuous Lbs:  [] before manual  [] after manual    []  Ultrasound: []Continuous   [] Pulsed                           []1MHz   []3MHz W/cm2:  Location:    []  Iontophoresis with dexamethasone         Location: [] Take home patch   [] In clinic    []  Ice     []  heat  []  Ice massage  []  Laser   []  Anodyne Position:  Location:    []  Laser with stim  []  Other:  Position:  Location:   10 []  Vasopneumatic Device Pressure:       [] lo [x] med [] hi Temperature: [x] lo [] med [] hi   [] Skin assessment post-treatment:  []intact []redness- no adverse reaction    []redness - adverse reaction:     57 min Therapeutic Exercise:  [] See flow sheet :   Rationale: increase ROM and increase strength to improve the patients ability to         With   [] TE   [] TA   [] neuro   [] other: Patient Education: [x] Review HEP    [] Progressed/Changed HEP based on:   [] positioning   [] body mechanics   [] transfers   [] heat/ice application    [] other:      Other Objective/Functional Measures:      Pain Level (0-10 scale) post treatment: 0/10    ASSESSMENT/Changes in Function:  Focused on adding knee and lumbar activities to help correct patient's gait and with increased tolerance focusing on nn-weightbearing activities. Patient will continue to benefit from skilled PT services to modify and progress therapeutic interventions, address functional mobility deficits, address ROM deficits, address strength deficits, analyze and address soft tissue restrictions, analyze and cue movement patterns, analyze and modify body mechanics/ergonomics and assess and modify postural abnormalities to attain remaining goals.      []  See Plan of Care  []  See progress note/recertification  []  See Discharge Summary         Progress towards goals / Updated goals:  Short Term Goals: To be accomplished in 4 weeks:              Patient will report compliance with HEP 1x/day to aid in rehabilitation program.              CHRISSIE at IE: Initiated at evaluation              LUANAU: WKRKRJV continues with partial compliance-partially MET              Patient will increase hip ROM to 100% in all planes to aid in completion of ADLs              Status at IE: Left hip PROM: 30-95*              Current:  7 degrees of AROM extension pt lying prone with extension , knee to chest 102 deg flexion   abd supine 42 deg left , 55 right   ER 18 deg + pain  , IR 35 deg left   ER 61 deg , IR 41 deg right       Long Term Goals: To be accomplished in 6 weeks:              Patient will increase L LE strength to 5/5 MMT throughout to aid in completion of ADLs.             ZGLPHF at IE: 3/5 throughout except 5/5 DF               Current:  hip flexion today 3/5 , hip add 5/5 , abd 3/5 side lie , hip extension, quad 4+/5 , HS 5/5 , DF ankle 4/5 ( ? 5/5 at eval ) ( strength may be affected by hip pain today )                  Patient will report pain no greater than 1-2/10 throughout entire day to aid in completion of ADLs              Status at IE: Up to 7-8/10              Current: 2-3/10 with highest 4/10                  Patent will perform 5 sit<>stands pain-free to demonstrate improvement in status and aid and completion of ADLs.             PNCYXO at IE: Able to sit and stand from chair but with pain and compensations              Current:able to complete sit to stands no more pain than baseline -MET                   Patient will improve FOTO score to 61 points to demonstrate improvement in functional status.               TTOCXO at IE: 36              Current:  51     PLAN  []  Upgrade activities as tolerated     [x]  Continue plan of care  []  Update interventions per flow sheet       []  Discharge due to:_  []  Other:_      Emiliano Kim 1/24/2019  3:33 PM    Future Appointments   Date Time Provider Matthew Jenkins   1/29/2019  3:30 PM [address] Shreya [facility]   1/31/2019  3:30 PM [address]    2/5/2019  3:00 PM Sophy [facility]   2/7/2019  3:00 PM Driss Richardson [facility]   2/12/2019  3:30 PM Driss Richardson [facility]   2/14/2019  3:30 PM [address] Shreya [facility]   2/19/2019  3:30 PM Everette Shreya [facility]   2/21/2019  3:30 PM Driss Richardson PT [facility]   2/26/2019  3:30 PM Driss Richardson PT [facility]   2/28/2019  3:30 PM Driss Richardson, [facility] [facility]

## 2019-01-29 ENCOUNTER — HOSPITAL ENCOUNTER (OUTPATIENT)
Dept: PHYSICAL THERAPY | Age: 54
Discharge: HOME | End: 2019-01-29
Payer: OTHER GOVERNMENT

## 2019-01-29 PROCEDURE — 97110 THERAPEUTIC EXERCISES: CPT

## 2019-01-29 PROCEDURE — 97016 VASOPNEUMATIC DEVICE THERAPY: CPT

## 2019-01-29 NOTE — PROGRESS NOTES
PHYSICAL THERAPY - DAILY TREATMENT NOTE    Patient Name: Ghanshyam Ryder        Date: 2019  : 1965   yes Patient  Verified  Visit #:     Insurance: Payor: JIE / Plan: Josef Gregorio 74 / Product Type:  /      In time: 3:30 PM Out time: 4:33   Total Treatment Time: 63   TREATMENT AREA =  Fracture of unspecified parts of lumbosacral spine and pelvis, subsequent encounter for fracture with routine healing [S32. 9XXD]    SUBJECTIVE  Pain Level (on 0 to 10 scale):  2  / 10   Medication Changes/New allergies or changes in medical history, any new surgeries or procedures? yes  If yes, update Summary List   Subjective Functional Status/Changes:  []  No changes reported     I saw my doctor today. OBJECTIVE  Modalities Rationale:     decrease edema and decrease inflammation to improve patient's ability to walk for prolonged periods. min [] Estim, type/location:                                      []  att     []  unatt     []  w/US     []  w/ice    []  w/heat    min []  Mechanical Traction: type/lbs                   []  pro   []  sup   []  int   []  cont    []  before manual    []  after manual    min []  Ultrasound, settings/location:      min []  Iontophoresis w/ dexamethasone, location:                                               []  take home patch       []  in clinic    min []  Ice     []  Heat    location/position:    10 min [x]  Vasopneumatic Device, press/temp: Moderate, low     min []  Other:    [x] Skin assessment post-treatment (if applicable):    [x]  intact    []  redness- no adverse reaction     []redness - adverse reaction:        53 min Therapeutic Exercise:  [x]  See flow sheet   Rationale:      increase ROM and increase strength to improve the patients ability to improve functional tolerance.         Billed With/As:   [x] TE   [] TA   [] Neuro   [] Self Care Patient Education: [x] Review HEP    [] Progressed/Changed HEP based on:   [] positioning [] body mechanics   [] transfers   [] heat/ice application    [] other:      Other Objective/Functional Measures:    Pt demonstrated improved tolerance to STS     Post Treatment Pain Level (on 0 to 10) scale:   2  / 10     ASSESSMENT  Assessment/Changes in Function:     Pt demonstrates improved LE strength. []  See Progress Note/Recertification   Patient will continue to benefit from skilled PT services to modify and progress therapeutic interventions, address functional mobility deficits, address ROM deficits, address strength deficits and analyze and address soft tissue restrictions to attain remaining goals. Progress toward goals / Updated goals:    Short Term Goals: To be accomplished in 4 weeks:              Patient will report compliance with HEP 1x/day to aid in rehabilitation program.              CEMPGH at IE: Initiated at evaluation              GUZNTTO: WNNFFRU continues with partial compliance-partially MET              Patient will increase hip ROM to 100% in all planes to aid in completion of ADLs              Status at IE: Left hip PROM: 30-95*              Current:  7 degrees of AROM extension pt lying prone with extension , knee to chest 102 deg flexion   abd supine 42 deg left , 55 right   ER 18 deg + pain  , IR 35 deg left   ER 61 deg , IR 41 deg right       Long Term Goals: To be accomplished in 6 weeks:              Patient will increase L LE strength to 5/5 MMT throughout to aid in completion of ADLs.               SAPXYS at IE: 3/5 throughout except 5/5 DF               Current:  hip flexion today 3/5 , hip add 5/5 , abd 3/5 side lie , hip extension, quad 4+/5 , HS 5/5 , DF ankle 4/5 ( ? 5/5 at eval ) ( strength may be affected by hip pain today )                  Patient will report pain no greater than 1-2/10 throughout entire day to aid in completion of ADLs              Status at IE: Up to 7-8/10              Current: 2-3/10 with highest 4/10                  Patent will perform 5 sit<>stands pain-free to demonstrate improvement in status and aid and completion of ADLs.             ASCFSZ at IE: Able to sit and stand from chair but with pain and compensations              Current:able to complete sit to stands no more pain than baseline -MET                   Patient will improve FOTO score to 61 points to demonstrate improvement in functional status.               SZYOTF at IE: 40              Current:  51         PLAN  []  Upgrade activities as tolerated yes Continue plan of care   []  Discharge due to :    []  Other:      Therapist: Lilliana Davila PT    Date: 1/29/2019 Time: 4:40 PM     Future Appointments   Date Time Provider Matthew Jenkins   1/31/2019  3:30 PM Vida Session [facility]   [facility]   2/7/2019  3:00 PM Jaylin Marino, [address] [facility] [place] [facility]   2/12/2019  3:30 PM Vida Session [facility]   2/14/2019  3:30 PM Vida Session [facility]   2/19/2019  3:30 PM Vida Session [facility]   2/21/2019  3:30 PM Jaylin Marino PT [facility]   2/26/2019  3:30 PM Jaylin Marino, [address] [facility] [place]   2/28/2019  3:30 PM Vida Session [facility]

## 2019-01-31 ENCOUNTER — HOSPITAL ENCOUNTER (OUTPATIENT)
Dept: PHYSICAL THERAPY | Age: 54
Discharge: HOME | End: 2019-01-31
Payer: OTHER GOVERNMENT

## 2019-01-31 PROCEDURE — 97110 THERAPEUTIC EXERCISES: CPT

## 2019-01-31 PROCEDURE — 97016 VASOPNEUMATIC DEVICE THERAPY: CPT

## 2019-01-31 NOTE — PROGRESS NOTES
PT DAILY TREATMENT NOTE     Patient Name: Denise Castillo  Date:2019  : 1965  [x]  Patient  Verified  Payor: JIE / Plan: Josef Gregorio 74 / Product Type: [address] [place] [address] /    In time:03:30  Out time: 04:37  Total Treatment Time (min): 79  Visit #: 24 of 27    Treatment Area: Fracture of unspecified parts of lumbosacral spine and pelvis, subsequent encounter for fracture with routine healing [S32. 9XXD]    SUBJECTIVE  Pain Level (0-10 scale): 3-4/10  Any medication changes, allergies to medications, adverse drug reactions, diagnosis change, or new procedure performed?: [x] No    [] Yes (see summary sheet for update)  Subjective functional status/changes:   [] No changes reported  Patient reported soreness for two days following therapy. He did more standing activities.      OBJECTIVE    Modality rationale: decrease inflammation and decrease pain to improve the patients ability to have improved tolerance    Min Type Additional Details    [] Estim:  []Unatt       []IFC  []Premod                        []Other:  []w/ice   []w/heat  Position:  Location:    [] Estim: []Att    []TENS instruct  []NMES                    []Other:  []w/US   []w/ice   []w/heat  Position:  Location:    []  Traction: [] Cervical       []Lumbar                       [] Prone          []Supine                       []Intermittent   []Continuous Lbs:  [] before manual  [] after manual    []  Ultrasound: []Continuous   [] Pulsed                           []1MHz   []3MHz W/cm2:  Location:    []  Iontophoresis with dexamethasone         Location: [] Take home patch   [] In clinic    []  Ice     []  heat  []  Ice massage  []  Laser   []  Anodyne Position:  Location:    []  Laser with stim  []  Other:  Position:  Location:   10 [x]  Vasopneumatic Device Pressure:       [] lo [x] med [] hi   Temperature: [x] lo [] med [] hi   [] Skin assessment post-treatment:  []intact []redness- no adverse reaction    []redness - adverse reaction:     57 min Therapeutic Exercise:  [] See flow sheet :   Rationale: increase ROM and increase strength to improve the patients ability to increase stabilization    With   [] TE   [] TA   [] neuro   [] other: Patient Education: [x] Review HEP    [] Progressed/Changed HEP based on:   [] positioning   [] body mechanics   [] transfers   [] heat/ice application    [] other:      Other Objective/Functional Measures:      Pain Level (0-10 scale) post treatment: 3/10    ASSESSMENT/Changes in Function:   Patient reported he has improvement with tolerance of activities today. Patient will continue to benefit from skilled PT services to modify and progress therapeutic interventions, address functional mobility deficits, address ROM deficits, address strength deficits, analyze and address soft tissue restrictions, analyze and cue movement patterns, analyze and modify body mechanics/ergonomics and assess and modify postural abnormalities to attain remaining goals. []  See Plan of Care  []  See progress note/recertification  []  See Discharge Summary         Progress towards goals / Updated goals:  Short Term Goals: To be accomplished in 4 weeks:              Patient will report compliance with HEP 1x/day to aid in rehabilitation program.              NUOBWP at IE: Initiated at evaluation              JTMYGSK: OJTIZLQ continues with partial compliance-partially MET              Patient will increase hip ROM to 100% in all planes to aid in completion of ADLs              Status at IE: Left hip PROM: 30-95*              Current:  7 degrees of AROM extension pt lying prone with extension , knee to chest 102 deg flexion   abd supine 42 deg left , 55 right   ER 18 deg + pain  , IR 35 deg left   ER 61 deg , IR 41 deg right       Long Term Goals: To be accomplished in 6 weeks:              Patient will increase L LE strength to 5/5 MMT throughout to aid in completion of ADLs.               RFOVMD at IE: 3/5 throughout except 5/5 DF               QQOLEPV:  NEC flexion today 3/5 , hip add 5/5 , abd 3/5 side lie , hip extension, quad 4+/5 , HS 5/5 , DF ankle 4/5 ( ? 5/5 at eval ) ( strength may be affected by hip pain today )                  Patient will report pain no greater than 1-2/10 throughout entire day to aid in completion of ADLs              Status at IE: Up to 7-8/10              Current: 2-3/10 with highest 4/10                  Patent will perform 5 sit<>stands pain-free to demonstrate improvement in status and aid and completion of ADLs.             NJHRTH at IE: Able to sit and stand from chair but with pain and compensations              Current:able to complete sit to stands no more pain than baseline -MET                   Patient will improve FOTO score to 61 points to demonstrate improvement in functional status.               YDGAOY at IE: 40              Current:  51     PLAN  []  Upgrade activities as tolerated     [x]  Continue plan of care  []  Update interventions per flow sheet       []  Discharge due to:_  []  Other:_      Milta Ill 1/31/2019  4:27 PM    Future Appointments   Date Time Provider Matthew Jenkins   2/5/2019  3:00 PM [facility]   2/7/2019  3:00 PM Marty Ordaz PT [facility]   2/12/2019  3:30 PM Tennis [employer] [facility]   2/14/2019  3:30 PM Tennis Host [facility]   2/19/2019  3:30 PM Tennis Host [facility]   2/21/2019  3:30 PM Marty Ordaz PT [facility]   2/26/2019  3:30 PM Marty Ordaz, [facility]   2/28/2019  3:30 PM [facility]

## 2019-02-05 ENCOUNTER — HOSPITAL ENCOUNTER (OUTPATIENT)
Dept: PHYSICAL THERAPY | Age: 54
Discharge: HOME | End: 2019-02-05
Payer: OTHER GOVERNMENT

## 2019-02-05 PROCEDURE — 97110 THERAPEUTIC EXERCISES: CPT

## 2019-02-05 PROCEDURE — 97016 VASOPNEUMATIC DEVICE THERAPY: CPT

## 2019-02-05 NOTE — PROGRESS NOTES
PT DAILY TREATMENT NOTE     Patient Name: Darrian Iglesias  Date:2019  : 1965  [x]  Patient  Verified  Payor: JIE / Plan: Matilde Mack / Product Type: Eric Roxbury /    In time:03:08  Out time:03: 53  Total Treatment Time (min): 45  Visit #: 2 of 16    Treatment Area: Fracture of unspecified parts of lumbosacral spine and pelvis, subsequent encounter for fracture with routine healing [S32. 9XXD]    SUBJECTIVE  Pain Level (0-10 scale): 5/10  Any medication changes, allergies to medications, adverse drug reactions, diagnosis change, or new procedure performed?: [x] No    [] Yes (see summary sheet for update)  Subjective functional status/changes:   [x] No changes reported  Patient reports that he was found to still have fractures in his hip following surgery that still have not healed. He is to get bone stimulator to help with healing process and was instructed by MD to be weight-bearing as tolerated and to not force any activity. PT discussed with patient plans to continue refraining from standing activities.      OBJECTIVE    Modality rationale: decrease pain and increase tissue extensibility to improve the patients ability to increase tolerance with activities    Min Type Additional Details    [] Estim:  []Unatt       []IFC  []Premod                        []Other:  []w/ice   []w/heat  Position:  Location:    [] Estim: []Att    []TENS instruct  []NMES                    []Other:  []w/US   []w/ice   []w/heat  Position:  Location:    []  Traction: [] Cervical       []Lumbar                       [] Prone          []Supine                       []Intermittent   []Continuous Lbs:  [] before manual  [] after manual    []  Ultrasound: []Continuous   [] Pulsed                           []1MHz   []3MHz W/cm2:  Location:    []  Iontophoresis with dexamethasone         Location: [] Take home patch   [] In clinic    []  Ice     []  heat  []  Ice massage  []  Laser   []  Anodyne Position:  Location: []  Laser with stim  []  Other:  Position:  Location:   10 []  Vasopneumatic Device Pressure:       [] lo [x] med [] hi   Temperature: [x] lo [] med [] hi   [] Skin assessment post-treatment:  []intact []redness- no adverse reaction    []redness - adverse reaction:     35 min Therapeutic Exercise:  [] See flow sheet :   Rationale: increase ROM and increase strength to improve the patients ability to increase tolerance of activities with weight bearing as tolerated in supine and seated        With   [] TE   [] TA   [] neuro   [] other: Patient Education: [x] Review HEP    [] Progressed/Changed HEP based on:   [] positioning   [] body mechanics   [] transfers   [] heat/ice application    [] other:      Other Objective/Functional Measures:      Pain Level (0-10 scale) post treatment: 3-4/10    ASSESSMENT/Changes in Function:   Patient tolerated treatment fair and when pain was reported modified to increase tolerance. Patient will continue to benefit from skilled PT services to modify and progress therapeutic interventions, address functional mobility deficits, address ROM deficits, address strength deficits, analyze and address soft tissue restrictions, analyze and cue movement patterns, analyze and modify body mechanics/ergonomics and assess and modify postural abnormalities to attain remaining goals.      []  See Plan of Care  []  See progress note/recertification  []  See Discharge Summary         Progress towards goals / Updated goals:  Short Term Goals: To be accomplished in 4 weeks:              Patient will report compliance with HEP 1x/day to aid in rehabilitation program.              XVGNYD at IE: Initiated at evaluation              FGSVSSP: PCXDOGK continues with partial compliance-partially MET              Patient will increase hip ROM to 100% in all planes to aid in completion of ADLs              Status at IE: Left hip PROM: 30-95*              Current:  7 degrees of AROM extension pt lying prone with extension , knee to chest 102 deg flexion   abd supine 42 deg left , 55 right   ER 18 deg + pain  , IR 35 deg left   ER 61 deg , IR 41 deg right       Long Term Goals: To be accomplished in 6 weeks:              Patient will increase L LE strength to 5/5 MMT throughout to aid in completion of ADLs.             OQOIAJ at IE: 3/5 throughout except 5/5 DF               Current:  hip flexion today 3/5 , hip add 5/5 , abd 3/5 side lie , hip extension, quad 4+/5 , HS 5/5 , DF ankle 4/5 ( ? 5/5 at eval ) ( strength may be affected by hip pain today )                  Patient will report pain no greater than 1-2/10 throughout entire day to aid in completion of ADLs              Status at IE: Up to 7-8/10              Current: 2-3/10 with highest 4/10                  Patent will perform 5 sit<>stands pain-free to demonstrate improvement in status and aid and completion of ADLs.             YUPYZX at IE: Able to sit and stand from chair but with pain and compensations              Current:able to complete sit to stands no more pain than baseline -MET                   Patient will improve FOTO score to 61 points to demonstrate improvement in functional status.               VBZBCW at IE: 40              Current:  51    PLAN  []  Upgrade activities as tolerated     [x]  Continue plan of care  []  Update interventions per flow sheet       []  Discharge due to:_  []  Other:_  Patient to bring X-ray next visit from recent imaging performed     Emiliano Kim 2/5/2019  3:27 PM    Future Appointments   Date Time Provider Matthew Jenkins   2/7/2019  3:00 PM [facility]   2/12/2019  3:30 PM [address] [facility]   2/14/2019  3:30 PM [facility]   2/19/2019  3:30 PM [address] [facility]   2/21/2019  3:30 PM Driss Richardson, [email or web address] [facility]   2/26/2019  3:30 PM Driss Richardson, [email or web address] [facility]   2/28/2019  3:30 PM [address] [facility]

## 2019-02-07 ENCOUNTER — HOSPITAL ENCOUNTER (OUTPATIENT)
Dept: PHYSICAL THERAPY | Age: 54
Discharge: HOME | End: 2019-02-07
Payer: OTHER GOVERNMENT

## 2019-02-07 PROCEDURE — 97110 THERAPEUTIC EXERCISES: CPT

## 2019-02-07 PROCEDURE — 97016 VASOPNEUMATIC DEVICE THERAPY: CPT

## 2019-02-07 NOTE — PROGRESS NOTES
PT DAILY TREATMENT NOTE     Patient Name: Ghanshyam Ryder  Date:2019  : 1965  [x]  Patient  Verified  Payor: JIE / Plan: Jackie Baevers / Product Type: Allegra Kandy /    In time:03:08  Out time:04:00  Total Treatment Time (min): 46  Visit #: 32 of 27    Treatment Area: Fracture of unspecified parts of lumbosacral spine and pelvis, subsequent encounter for fracture with routine healing [S32. 9XXD]    SUBJECTIVE  Pain Level (0-10 scale): 3/10  Any medication changes, allergies to medications, adverse drug reactions, diagnosis change, or new procedure performed?: [x] No    [] Yes (see summary sheet for update)  Subjective functional status/changes:   [x] No changes reported      OBJECTIVE    Modality rationale: decrease inflammation, decrease pain and increase tissue extensibility to improve the patients ability to increase tolerance with functional activities    Min Type Additional Details    [] Estim:  []Unatt       []IFC  []Premod                        []Other:  []w/ice   []w/heat  Position:  Location:    [] Estim: []Att    []TENS instruct  []NMES                    []Other:  []w/US   []w/ice   []w/heat  Position:  Location:    []  Traction: [] Cervical       []Lumbar                       [] Prone          []Supine                       []Intermittent   []Continuous Lbs:  [] before manual  [] after manual    []  Ultrasound: []Continuous   [] Pulsed                           []1MHz   []3MHz W/cm2:  Location:    []  Iontophoresis with dexamethasone         Location: [] Take home patch   [] In clinic    []  Ice     []  heat  []  Ice massage  []  Laser   []  Anodyne Position:  Location:    []  Laser with stim  []  Other:  Position:  Location:   10 [x]  Vasopneumatic Device Pressure:       [] lo [x] med [] hi   Temperature: [x] lo [] med [] hi   [] Skin assessment post-treatment:  []intact []redness- no adverse reaction    []redness - adverse reaction:     42 min Therapeutic Exercise:  [] See flow sheet :   Rationale: increase ROM and increase strength to improve the patients ability to increase stability of left hip/knee           With   [] TE   [] TA   [] neuro   [] other: Patient Education: [x] Review HEP    [] Progressed/Changed HEP based on:   [] positioning   [] body mechanics   [] transfers   [] heat/ice application    [] other:      Other Objective/Functional Measures:      Pain Level (0-10 scale) post treatment: 3/10    ASSESSMENT/Changes in Function:   Patient tolerated treatment fair with good tolerance of activities focusing on more supine and seated activities. Patient will continue to benefit from skilled PT services to modify and progress therapeutic interventions, address functional mobility deficits, address ROM deficits, address strength deficits, analyze and address soft tissue restrictions, analyze and cue movement patterns, analyze and modify body mechanics/ergonomics and assess and modify postural abnormalities to attain remaining goals. []  See Plan of Care  []  See progress note/recertification  []  See Discharge Summary         Progress towards goals / Updated goals:  Short Term Goals: To be accomplished in 4 weeks:              Patient will report compliance with HEP 1x/day to aid in rehabilitation program.              WZFAYP at IE: Initiated at evaluation              LCPISTC: TFCBWJF continues with partial compliance-partially MET              Patient will increase hip ROM to 100% in all planes to aid in completion of ADLs              Status at IE: Left hip PROM: 30-95*              Current:  7 degrees of AROM extension pt lying prone with extension , knee to chest 102 deg flexion   abd supine 42 deg left , 55 right   ER 18 deg + pain  , IR 35 deg left   ER 61 deg , IR 41 deg right       Long Term Goals: To be accomplished in 6 weeks:              Patient will increase L LE strength to 5/5 MMT throughout to aid in completion of ADLs.               FKXYDD at IE: 3/5 throughout except 5/5 DF               IXFWPWS:  MRL flexion today 3/5 , hip add 5/5 , abd 3/5 side lie , hip extension, quad 4+/5 , HS 5/5 , DF ankle 4/5 ( ? 5/5 at eval ) ( strength may be affected by hip pain today )                  Patient will report pain no greater than 1-2/10 throughout entire day to aid in completion of ADLs              Status at IE: Up to 7-8/10              Current: 2-3/10 with highest 4/10                  Patent will perform 5 sit<>stands pain-free to demonstrate improvement in status and aid and completion of ADLs.             HXBBII at IE: Able to sit and stand from chair but with pain and compensations              Current:able to complete sit to stands no more pain than baseline -MET                   Patient will improve FOTO score to 61 points to demonstrate improvement in functional status.               GOPABR at IE: 40              Current:  51    PLAN  []  Upgrade activities as tolerated     [x]  Continue plan of care  []  Update interventions per flow sheet       []  Discharge due to:_  []  Other:_      Neftali Keita 2/7/2019  4:07 PM    Future Appointments   Date Time Provider Matthew Jenkins   2/12/2019  3:30 PM Alveda Edilma [facility]   2/14/2019  3:30 PM Alvescarlett [place] [facility]   2/19/2019  3:30 PM Alveda Edilma [facility]   2/21/2019  3:30 PM Sesar Coon [facility]   2/26/2019  3:30 PM Caro Moya [facility]   2/28/2019  3:30 PM Bridgett Patten [facility]

## 2019-02-12 ENCOUNTER — HOSPITAL ENCOUNTER (OUTPATIENT)
Dept: PHYSICAL THERAPY | Age: 54
Discharge: HOME | End: 2019-02-12
Payer: OTHER GOVERNMENT

## 2019-02-12 PROCEDURE — 97110 THERAPEUTIC EXERCISES: CPT

## 2019-02-12 PROCEDURE — 97016 VASOPNEUMATIC DEVICE THERAPY: CPT

## 2019-02-12 NOTE — PROGRESS NOTES
PT DAILY TREATMENT NOTE     Patient Name: Anthony Lauren  Date:2019  : 1965  [x]  Patient  Verified  Payor:  / Plan: Aniya Jones / Product Type:  /    In time: 3:34 Out time:04:30  Total Treatment Time (min): 56  Visit #: 32 of 27    Treatment Area: Fracture of unspecified parts of lumbosacral spine and pelvis, subsequent encounter for fracture with routine healing [S32. 9XXD]    SUBJECTIVE  Pain Level (0-10 scale): 2-3/10  Any medication changes, allergies to medications, adverse drug reactions, diagnosis change, or new procedure performed?: [x] No    [] Yes (see summary sheet for update)  Subjective functional status/changes:   [] No changes reported  Patient received bone stimulator today. PT continued to focus on supine to seated activities to decrease too much force/pressure through hip per patient tolerance.      OBJECTIVE    Modality rationale: decrease inflammation and decrease pain to improve the patients ability to increase tolerance with functional activities    Type Additional Details   [] Estim:  []Unatt       []IFC  []Premod                        []Other:  []w/ice   []w/heat  Position:  Location:   [] Estim: []Att    []TENS instruct  []NMES                    []Other:  []w/US   []w/ice   []w/heat  Position:  Location:   []  Traction: [] Cervical       []Lumbar                       [] Prone          []Supine                       []Intermittent   []Continuous Lbs:  [] before manual  [] after manual   []  Ultrasound: []Continuous   [] Pulsed                           []1MHz   []3MHz W/cm2:  Location:   []  Iontophoresis with dexamethasone         Location: [] Take home patch   [] In clinic   []  Ice     []  heat  []  Ice massage  []  Laser   []  Anodyne Position:  Location:   []  Laser with stim  []  Other:  Position:  Location:   []  Vasopneumatic Device 10 minutes Pressure:       [] lo [x] med [] hi   Temperature: [x] lo [] med [] hi   [] Skin assessment post-treatment:  []intact []redness- no adverse reaction    []redness - adverse reaction:     46 min Therapeutic Exercise:  [] See flow sheet :   Rationale: increase ROM and increase strength to improve the patients ability to increase stabilization of hip and knee          With   [] TE   [] TA   [] neuro   [] other: Patient Education: [x] Review HEP    [] Progressed/Changed HEP based on:   [] positioning   [] body mechanics   [] transfers   [] heat/ice application    [] other:      Other Objective/Functional Measures:      Pain Level (0-10 scale) post treatment: 2-3/10    ASSESSMENT/Changes in Function:   Patient is making limited progress at this time as his fracture site is \"healing slowly\" therefore limiting patient tolerance of activities. Patient has received bone stimulator today to begin. Patient will continue to benefit from skilled PT services to modify and progress therapeutic interventions, address functional mobility deficits, address ROM deficits, address strength deficits, analyze and address soft tissue restrictions, analyze and cue movement patterns, analyze and modify body mechanics/ergonomics and assess and modify postural abnormalities to attain remaining goals maintaining weight-bearing tolerance status secondary to current update with fracture site.       []  See Plan of Care  []  See progress note/recertification  []  See Discharge Summary         Progress towards goals / Updated goals:  Short Term Goals: To be accomplished in 4 weeks:              Patient will report compliance with HEP 1x/day to aid in rehabilitation program.              WKXTBJ at IE: Initiated at evaluation              IPYTTTK: PZWWKWU continues with partial compliance-partially MET              Patient will increase hip ROM to 100% in all planes to aid in completion of ADLs              Status at IE: Left hip PROM: 30-95*              Current:  refer to LT goals      Long Term Goals: To be accomplished in 6 weeks:              YYZAWYP will increase L LE strength to 5/5 MMT throughout to aid in completion of ADLs.             MUYJLG at IE: 3/5 throughout except 5/5 DF               Current:  hip flexion today 3/5 , hip add 5/5 , abd 3-/5 side lie , hip extension, quad 5/5 , HS 5/5               Patient will report pain no greater than 1-2/10 throughout entire day to aid in completion of ADLs              Status at IE: Up to 7-8/10              Current: highest 4/10                  Patent will perform 5 sit<>stands pain-free to demonstrate improvement in status and aid and completion of ADLs.             JAXUYE at IE: Able to sit and stand from chair but with pain and compensations              Current:able to complete sit to stands no more pain than baseline -MET                   Patient will improve FOTO score to 61 points to demonstrate improvement in functional status.               JCTVZY at IE: 40              Current:  51           PLAN  []  Upgrade activities as tolerated     []  Continue plan of care  []  Update interventions per flow sheet       []  Discharge due to:_  []  Other:_      Milta Ill 2/12/2019  3:48 PM    Future Appointments   Date Time Provider Matthew Jenkins   2/14/2019  3:30 PM [facility]   2/19/2019  3:30 PM [facility]   2/21/2019  3:30 PM Jorge Alberto Collins PT [facility]   2/26/2019  3:30 PM Marty Ordaz, [address] [facility]   2/28/2019  3:30 PM [facility]

## 2019-02-12 NOTE — PROGRESS NOTES
[facility]  [address] [place], [address]  Ph [phone]  Fx [phone]    Progress Report    Patient name: Marissa Dial     Start of Care: 10/31/2018  Referral source: Linh Ballard MD    : 1965  Medical/Treatment Diagnosis: Fracture of unspecified parts of lumbosacral spine and pelvis, subsequent encounter for fracture with routine healing [S32. 9XXD]  Onset Date:2018  Prior Hospitalization: see medical history   Provider#: [id number]  Comorbidities: DM II, asthma   Prior Level of Function: Independent with ADLs and full work duty         Medications: Verified on Patient Summary List    Visits from Start of Care: 27    Missed Visits: 0  Reporting Period : 1/15/2019 to 2019      Subjective Reports: \"I got my bone stimulator today\"    Short Term Goals: To be accomplished in 4 weeks:              Patient will report compliance with HEP 1x/day to aid in rehabilitation program.              VOLHZZ at IE: Initiated at evaluation              CRBHITY: AYRSFWN continues with partial compliance-partially MET              Patient will increase hip ROM to 100% in all planes to aid in completion of ADLs              Status at IE: Left hip PROM: 30-95*              Current:  refer to LT goals      Long Term Goals: To be accomplished in 6 weeks:              Patient will increase L LE strength to 5/5 MMT throughout to aid in completion of ADLs.             ZXCCUZ at IE: 3/5 throughout except 5/5 DF               Current:  hip flexion today 3/5 , hip add 5/5 , abd 3-/5 side lie , hip extension, quad 5/5 , HS 5/5               Patient will report pain no greater than 1-2/10 throughout entire day to aid in completion of ADLs              Status at IE: Up to 7-8/10              Current: highest 4/10                  Patent will perform 5 sit<>stands pain-free to demonstrate improvement in status and aid and completion of ADLs.               BHNCZG at IE: Able to sit and stand from chair but with pain and compensations              Current:able to complete sit to stands no more pain than baseline -MET                   Patient will improve FOTO score to 61 points to demonstrate improvement in functional status.             PRFSSU at IE: 36              Current:  51    Key functional changes: improvement in knee strength      Problems/ barriers to goal attainment: none     Assessment / Recommendations:Patient is making limited progress at this time as his fracture site is \"healing slowly\" therefore limiting patient tolerance of activities. Patient has received bone stimulator today to begin. Patient will continue to benefit from skilled PT services to modify and progress therapeutic interventions, address functional mobility deficits, address ROM deficits, address strength deficits, analyze and address soft tissue restrictions, analyze and cue movement patterns, analyze and modify body mechanics/ergonomics and assess and modify postural abnormalities to attain remaining goals maintaining weight-bearing tolerance status secondary to current update with fracture site.      Problem List: pain affecting function, decrease ROM, decrease strength, edema affecting function, impaired gait/ balance, decrease ADL/ functional abilitiies, decrease activity tolerance, decrease flexibility/ joint mobility and decrease transfer abilities   Treatment Plan: Therapeutic exercise, Therapeutic activities, Neuromuscular re-education, Physical agent/modality, Gait/balance training, Manual therapy, Patient education, Self Care training, Functional mobility training, Home safety training and Stair training    Updated Goals to be accomplished in 4 weeks:  Continue with unmet goals     Frequency / Duration: Patient to be seen 2 times per week for 4 weeks:    Candace Bragg 2/12/2019 4:35 PM

## 2019-02-14 ENCOUNTER — HOSPITAL ENCOUNTER (OUTPATIENT)
Dept: PHYSICAL THERAPY | Age: 54
Discharge: HOME | End: 2019-02-14
Payer: OTHER GOVERNMENT

## 2019-02-14 PROCEDURE — 97016 VASOPNEUMATIC DEVICE THERAPY: CPT

## 2019-02-14 PROCEDURE — 97110 THERAPEUTIC EXERCISES: CPT

## 2019-02-14 NOTE — PROGRESS NOTES
PT DAILY TREATMENT NOTE     Patient Name: Marissa Dial  Date:2019  : 1965  [x]  Patient  Verified  Payor:  / Plan: Cresencio Gurrola / Product Type: Carvel Dears /    In time:03:30  Out time:04:32  Total Treatment Time (min): 58  Visit #: 28  of 28    Treatment Area: Fracture of unspecified parts of lumbosacral spine and pelvis, subsequent encounter for fracture with routine healing [S32. 9XXD]    SUBJECTIVE  Pain Level (0-10 scale): 210  Any medication changes, allergies to medications, adverse drug reactions, diagnosis change, or new procedure performed?: [x] No    [] Yes (see summary sheet for update)  Subjective functional status/changes:   [] No changes reported      OBJECTIVE    Modality rationale: decrease pain and increase tissue extensibility to improve the patients ability to increase ease with activities    Min Type Additional Details    [] Estim:  []Unatt       []IFC  []Premod                        []Other:  []w/ice   []w/heat  Position:  Location:    [] Estim: []Att    []TENS instruct  []NMES                    []Other:  []w/US   []w/ice   []w/heat  Position:  Location:    []  Traction: [] Cervical       []Lumbar                       [] Prone          []Supine                       []Intermittent   []Continuous Lbs:  [] before manual  [] after manual    []  Ultrasound: []Continuous   [] Pulsed                           []1MHz   []3MHz W/cm2:  Location:    []  Iontophoresis with dexamethasone         Location: [] Take home patch   [] In clinic    []  Ice     []  Heat   []  Ice massage  []  Laser   []  Anodyne Position:  Location:    []  Laser with stim  []  Other:  Position:  Location:   10 []  Vasopneumatic Device 10 minutes Pressure:       [] lo [x] med [] hi   Temperature: [x] lo [] med [] hi   [] Skin assessment post-treatment:  []intact []redness- no adverse reaction    []redness - adverse reaction:       52 min Therapeutic Exercise:  [] See flow sheet : Rationale: increase ROM and increase strength to improve the patients ability to increase hip stabilization     With   [] TE   [] TA   [] neuro   [] other: Patient Education: [x] Review HEP    [] Progressed/Changed HEP based on:   [] positioning   [] body mechanics   [] transfers   [] heat/ice application    [] other:      Other Objective/Functional Measures:      Pain Level (0-10 scale) post treatment: 0/10    ASSESSMENT/Changes in Function:   Patient tolerated treatment fair with no reports of pain. Patient will continue to benefit from skilled PT services to modify and progress therapeutic interventions, address functional mobility deficits, address ROM deficits, address strength deficits, analyze and address soft tissue restrictions, analyze and cue movement patterns and analyze and modify body mechanics/ergonomics to attain remaining goals. []  See Plan of Care  []  See progress note/recertification  []  See Discharge Summary         Progress towards goals / Updated goals:  Short Term Goals: To be accomplished in 4 weeks:              Patient will report compliance with HEP 1x/day to aid in rehabilitation program.              AVJEXT at IE: Initiated at evaluation              XWFJIWV: ZGNFCLZ continues with partial compliance-partially MET              Patient will increase hip ROM to 100% in all planes to aid in completion of ADLs              Status at IE: Left hip PROM: 30-95*              Current:  refer to LT goals      Long Term Goals: To be accomplished in 6 weeks:              Patient will increase L LE strength to 5/5 MMT throughout to aid in completion of ADLs.               MFJAWE at IE: 3/5 throughout except 5/5 DF               Current:  hip flexion today 3/5 , hip add 5/5 , abd 3-/5 side lie , hip extension, quad 5/5 , HS 5/5               Patient will report pain no greater than 1-2/10 throughout entire day to aid in completion of ADLs              Status at IE: Up to 7-8/10              Current: highest 4/10                  Patent will perform 5 sit<>stands pain-free to demonstrate improvement in status and aid and completion of ADLs.             RAUBOM at IE: Able to sit and stand from chair but with pain and compensations              Current:able to complete sit to stands no more pain than baseline -MET                   Patient will improve FOTO score to 61 points to demonstrate improvement in functional status.               WPSFMI at IE: 40              Current:  51  PLAN  []  Upgrade activities as tolerated     [x]  Continue plan of care  []  Update interventions per flow sheet       []  Discharge due to:_  []  Other:_      Stephanie Chavarria 2/14/2019  3:43 PM    Future Appointments   Date Time Provider Matthew Jenkins   2/19/2019  3:30 PM Leidy Nicholson THE [facility]   2/21/2019  3:30 PM Shanita Horton PT [facility] THE [facility]   2/26/2019  3:30 PM Edwin Mejia, [address] THE [facility]   2/28/2019  3:30 PM Leidy Nicholson THE [facility]

## 2019-02-19 ENCOUNTER — APPOINTMENT (OUTPATIENT)
Dept: PHYSICAL THERAPY | Age: 54
End: 2019-02-19
Payer: OTHER GOVERNMENT

## 2019-02-21 ENCOUNTER — HOSPITAL ENCOUNTER (OUTPATIENT)
Dept: PHYSICAL THERAPY | Age: 54
Discharge: HOME | End: 2019-02-21
Payer: OTHER GOVERNMENT

## 2019-02-21 PROCEDURE — 97016 VASOPNEUMATIC DEVICE THERAPY: CPT | Performed by: PHYSICAL THERAPIST

## 2019-02-21 PROCEDURE — 97110 THERAPEUTIC EXERCISES: CPT | Performed by: PHYSICAL THERAPIST

## 2019-02-21 NOTE — PROGRESS NOTES
PT DAILY TREATMENT NOTE     Patient Name: Madai Guallpa  Date:2019  : 1965  [x]  Patient  Verified  Payor: JIE / Plan: Josef Gregorio 74 / Product Type: Teri Leventhal /    In SSBT:5879  Out time:1635  Total Treatment Time (min): 61  Visit #: 34 of 35    Treatment Area: Fracture of unspecified parts of lumbosacral spine and pelvis, subsequent encounter for fracture with routine healing [S32. 9XXD]    SUBJECTIVe  Pain Level (0-10 scale): 2/10  Any medication changes, allergies to medications, adverse drug reactions, diagnosis change, or new procedure performed?: [x] No    [] Yes (see summary sheet for update)  Subjective functional status/changes:   [x] No changes reported      OBJECTIVE    Modality rationale: decrease pain and increase tissue extensibility to improve the patients ability to increase ease with activities    Min Type Additional Details      [] Estim:  []Unatt       []IFC  []Premod                        []Other:  []w/ice   []w/heat  Position:  Location:      [] Estim: []Att    []TENS instruct  []NMES                    []Other:  []w/US   []w/ice   []w/heat  Position:  Location:      []  Traction: [] Cervical       []Lumbar                       [] Prone          []Supine                       []Intermittent   []Continuous Lbs:  [] before manual  [] after manual      []  Ultrasound: []Continuous   [] Pulsed                           []1MHz   []3MHz W/cm2:  Location:      []  Iontophoresis with dexamethasone         Location: [] Take home patch   [] In clinic      []  Ice     []  Heat   []  Ice massage  []  Laser   []  Anodyne Position:  Location:      []  Laser with stim  []  Other:  Position:  Location:    10 []  Vasopneumatic Device 10 minutes Pressure:       [] lo [x] med [] hi   Temperature: [x] lo [] med [] hi    [] Skin assessment post-treatment:  []intact []redness- no adverse reaction    []redness - adverse reaction:         53 min Therapeutic Exercise:  [x] See flow sheet :   Rationale: increase ROM and increase strength to improve the patients ability to increase hip stabilization             With   [] TE   [] TA   [] neuro   [] other: Patient Education: [x] Review HEP    [] Progressed/Changed HEP based on:   [] positioning   [] body mechanics   [] transfers   [] heat/ice application    [] other:      Other Objective/Functional Measures:      Pain Level (0-10 scale) post treatment: 2    ASSESSMENT/Changes in Function: sore with some of the exercise but no worse once finished tolerated well with no adverse affect     Patient will continue to benefit from skilled PT services to modify and progress therapeutic interventions, address functional mobility deficits, address ROM deficits, address strength deficits, analyze and address soft tissue restrictions, analyze and cue movement patterns, analyze and modify body mechanics/ergonomics, assess and modify postural abnormalities and address imbalance/dizziness to attain remaining goals. [x]  See Plan of Care  []  See progress note/recertification  []  See Discharge Summary         Progress towards goals / Updated goals:  Short Term Goals: To be accomplished in 4 weeks:              Patient will report compliance with HEP 1x/day to aid in rehabilitation program.              SYPUDV at IE: Initiated at evaluation              AEMUXRV: OVFSMIL continues with partial compliance-partially MET              Patient will increase hip ROM to 100% in all planes to aid in completion of ADLs              Status at IE: Left hip PROM: 30-95*              Current:  refer to LT goals      Long Term Goals: To be accomplished in 6 weeks:              Patient will increase L LE strength to 5/5 MMT throughout to aid in completion of ADLs.               XNCDSW at IE: 3/5 throughout except 5/5 DF               Current:  hip flexion today 3/5 , hip add 5/5 , abd 3-/5 side lie , hip extension, quad 5/5 , HS 5/5               Patient will report pain no greater than 1-2/10 throughout entire day to aid in completion of ADLs              Status at IE: Up to 7-8/10              Current: highest 3-4/10 in last week                  Patent will perform 5 sit<>stands pain-free to demonstrate improvement in status and aid and completion of ADLs.             PRHAHY at IE: Able to sit and stand from chair but with pain and compensations              Current:able to complete sit to stands no more pain than baseline -MET                   Patient will improve FOTO score to 61 points to demonstrate improvement in functional status.               KQUNNQ at IE: 40              Current:  51        PLAN  [x]  Upgrade activities as tolerated     [x]  Continue plan of care  []  Update interventions per flow sheet       []  Discharge due to:_  []  Other:_      Cheyenne Jane PT 2/21/2019  3:35 PM    Future Appointments   Date Time Provider Matthew Jenkins   2/26/2019  3:30 PM Erlin Castro PT [facility] THE [facility]   2/28/2019  3:30 PM Paul Infante THE [facility]

## 2019-02-26 ENCOUNTER — HOSPITAL ENCOUNTER (OUTPATIENT)
Dept: PHYSICAL THERAPY | Age: 54
Discharge: HOME | End: 2019-02-26
Payer: OTHER GOVERNMENT

## 2019-02-26 PROCEDURE — 97016 VASOPNEUMATIC DEVICE THERAPY: CPT

## 2019-02-26 PROCEDURE — 97110 THERAPEUTIC EXERCISES: CPT

## 2019-02-26 NOTE — PROGRESS NOTES
PT DAILY TREATMENT NOTE     Patient Name: Hector Rocha  Date:2019  : 1965  [x]  Patient  Verified  Payor: JIE / Plan: Lora Nelson / Product Type:  /    In time:03:20  Out time: 4:22  Total Treatment Time (min): 62  Visit #: 30 of 35    Treatment Area: Fracture of unspecified parts of lumbosacral spine and pelvis, subsequent encounter for fracture with routine healing [S32. 9XXD]    SUBJECTIVE  Pain Level (0-10 scale): 2/10  Any medication changes, allergies to medications, adverse drug reactions, diagnosis change, or new procedure performed?: [x] No    [] Yes (see summary sheet for update)  Subjective functional status/changes:   [] No changes reported      OBJECTIVE    Modality rationale: decrease pain and increase tissue extensibility to improve the patients ability to increase tolerance with functional activities    Min Type Additional Details    [] Estim:  []Unatt       []IFC  []Premod                        []Other:  []w/ice   []w/heat  Position:  Location:    [] Estim: []Att    []TENS instruct  []NMES                    []Other:  []w/US   []w/ice   []w/heat  Position:  Location:    []  Traction: [] Cervical       []Lumbar                       [] Prone          []Supine                       []Intermittent   []Continuous Lbs:  [] before manual  [] after manual    []  Ultrasound: []Continuous   [] Pulsed                           []1MHz   []3MHz W/cm2:  Location:    []  Iontophoresis with dexamethasone         Location: [] Take home patch   [] In clinic    []  Ice     []  heat  []  Ice massage  []  Laser   []  Anodyne Position:  Location:    []  Laser with stim  []  Other:  Position:  Location:   10 [x]  Vasopneumatic Device Pressure:       [] lo [x] med [] hi   Temperature: [x] lo [] med [] hi   [] Skin assessment post-treatment:  []intact []redness- no adverse reaction    []redness - adverse reaction:       52 min Therapeutic Exercise:  [] See flow sheet : Rationale: increase ROM and increase strength to improve the patients ability to increase knee stabilization   Other Objective/Functional Measures:      Pain Level (0-10 scale) post treatment: 0/10    ASSESSMENT/Changes in Function:   Patient demonstrated good form with exercises and no reports of increased pain with increased resisted activities. Patient will continue to benefit from skilled PT services to modify and progress therapeutic interventions, address functional mobility deficits, address ROM deficits, address strength deficits, analyze and address soft tissue restrictions, analyze and cue movement patterns, analyze and modify body mechanics/ergonomics and assess and modify postural abnormalities to attain remaining goals. []  See Plan of Care  []  See progress note/recertification  []  See Discharge Summary         Progress towards goals / Updated goals:  Short Term Goals: To be accomplished in 4 weeks:              Patient will report compliance with HEP 1x/day to aid in rehabilitation program.              KOJBSQ at IE: Initiated at evaluation              VOSYJNN: CTMCNTA continues with partial compliance-partially MET              Patient will increase hip ROM to 100% in all planes to aid in completion of ADLs              Status at IE: Left hip PROM: 30-95*              Current:  refer to LT goals      Long Term Goals: To be accomplished in 6 weeks:              Patient will increase L LE strength to 5/5 MMT throughout to aid in completion of ADLs.               VUCZKG at IE: 3/5 throughout except 5/5 DF               Current:  hip flexion today 3/5 , hip add 5/5 , abd 3-/5 side lie , hip extension, quad 5/5 , HS 5/5               Patient will report pain no greater than 1-2/10 throughout entire day to aid in completion of ADLs              Status at IE: Up to 7-8/10              Current: highest 3-4/10 in last week                  Patent will perform 5 sit<>stands pain-free to demonstrate improvement in status and aid and completion of ADLs.             SNAVDD at IE: Able to sit and stand from chair but with pain and compensations              Current:able to complete sit to stands no more pain than baseline -MET                   Patient will improve FOTO score to 61 points to demonstrate improvement in functional status.               NGVCZG at IE: 36              Current:  51, change of 11     PLAN  []  Upgrade activities as tolerated     [x]  Continue plan of care  []  Update interventions per flow sheet        []  Discharge due to:_  []  Other:_      Gabriel Guo 2/26/2019  3:43 PM    Future Appointments   Date Time Provider Matthew Jenkins   2/28/2019  3:30 PM Shannon Cesar THE [facility]   3/5/2019  3:30 PM Winifred Casillas [facility]   3/7/2019  3:30 PM Carlton L[facility]   3/12/2019  3:30 PM Donte Boudreaux, [address] THE [facility]   3/14/2019  3:30 PM Carlton Larson [facility]   3/19/2019  3:30 PM Donte Boudreaux PT [facility]   3/21/2019  3:00 PM Carlton Larson [facility]   3/28/2019  3:30 PM Carlton Larson [facility]

## 2019-02-28 ENCOUNTER — HOSPITAL ENCOUNTER (OUTPATIENT)
Dept: PHYSICAL THERAPY | Age: 54
Discharge: HOME | End: 2019-02-28
Payer: OTHER GOVERNMENT

## 2019-02-28 PROCEDURE — 97016 VASOPNEUMATIC DEVICE THERAPY: CPT

## 2019-02-28 PROCEDURE — 97110 THERAPEUTIC EXERCISES: CPT

## 2019-02-28 NOTE — PROGRESS NOTES
PT DAILY TREATMENT NOTE     Patient Name: Colletta Springer  Date:2019  : 1965  [x]  Patient  Verified  Payor:  / Plan: Ginger Thompson / Product Type:  /    In time: 03:28  Out time: 04:27  Total Treatment Time (min):59  1:1 Time:   Visit #: 52 of 35    Treatment Area: Fracture of unspecified parts of lumbosacral spine and pelvis, subsequent encounter for fracture with routine healing [S32. 9XXD]    SUBJECTIVE  Pain Level (0-10 scale):1-2/10  Any medication changes, allergies to medications, adverse drug reactions, diagnosis change, or new procedure performed?: [x] No    [] Yes (see summary sheet for update)  Subjective functional status/changes:   [] No changes reported      OBJECTIVE    Modality rationale: decrease pain and increase tissue extensibility to improve the patients ability to increase    Min Type Additional Details    [] Estim:  []Unatt       []IFC  []Premod                        []Other:  []w/ice   []w/heat  Position:  Location:    [] Estim: []Att    []TENS instruct  []NMES                    []Other:  []w/US   []w/ice   []w/heat  Position:  Location:    []  Traction: [] Cervical       []Lumbar                       [] Prone          []Supine                       []Intermittent   []Continuous Lbs:  [] before manual  [] after manual    []  Ultrasound: []Continuous   [] Pulsed                           []1MHz   []3MHz W/cm2:  Location:    []  Iontophoresis with dexamethasone         Location: [] Take home patch   [] In clinic    []  Ice     []  heat  []  Ice massage  []  Laser   []  Anodyne Position:  Location:    []  Laser with stim  []  Other:  Position:  Location:   10 [x]  Vasopneumatic Device Pressure:       [] lo [x] med [] hi   Temperature: [x] lo [] med [] hi   [] Skin assessment post-treatment:  []intact []redness- no adverse reaction    []redness - adverse reaction:       49 min Therapeutic Exercise:  [] See flow sheet :   Rationale: increase ROM and increase strength to improve the patients ability to increase hip stabilization           With   [] TE   [] TA   [] neuro   [] other: Patient Education: [x] Review HEP    [] Progressed/Changed HEP based on:   [] positioning   [] body mechanics   [] transfers   [] heat/ice application    [] other:      Other Objective/Functional Measures:      Pain Level (0-10 scale) post treatment: 1-2/10    ASSESSMENT/Changes in Function:   Patient able to tolerated increased resisted activities and still continuing with WBAT. Patient will continue to benefit from skilled PT services to modify and progress therapeutic interventions, address functional mobility deficits, address ROM deficits, address strength deficits, analyze and address soft tissue restrictions, analyze and cue movement patterns, analyze and modify body mechanics/ergonomics and assess and modify postural abnormalities to attain remaining goals. []  See Plan of Care  []  See progress note/recertification  []  See Discharge Summary         Progress towards goals / Updated goals:  Short Term Goals: To be accomplished in 4 weeks:              Patient will report compliance with HEP 1x/day to aid in rehabilitation program.              ANALIA at IE: Initiated at evaluation              MARLENED: ТАТЬЯНА continues with partial compliance-partially MET              Patient will increase hip ROM to 100% in all planes to aid in completion of ADLs              Status at IE: Left hip PROM: 30-95*              Current:  refer to LT goals      Long Term Goals: To be accomplished in 6 weeks:              Patient will increase L LE strength to 5/5 MMT throughout to aid in completion of ADLs.               FYLNAL at IE: 3/5 throughout except 5/5 DF               Current:  hip flexion today 3/5 , hip add 5/5 , abd 3-/5 side lie , hip extension, quad 5/5 , HS 5/5               Patient will report pain no greater than 1-2/10 throughout entire day to aid in completion of ADLs              Status at IE: Up to 7-8/10              Current: highest 3-4/10 in last week                  Patent will perform 5 sit<>stands pain-free to demonstrate improvement in status and aid and completion of ADLs.             SQGKWA at IE: Able to sit and stand from chair but with pain and compensations              Current:able to complete sit to stands no more pain than baseline -MET                   Patient will improve FOTO score to 61 points to demonstrate improvement in functional status.               PNIJQM at IE: 36              Current:  51, change of 11     PLAN  []  Upgrade activities as tolerated     [x]  Continue plan of care  []  Update interventions per flow sheet       []  Discharge due to:_  []  Other:_      Ella Perkins 2/28/2019  3:40 PM    Future Appointments   Date Time Provider Matthew Jenkins   3/5/2019  3:30 PM Aftab Ferrer [facility]   3/7/2019  3:30 PM [facility]   3/12/2019  3:30 PM Jerald Motley, [address] [place] [address] THE [facility]   3/14/2019  3:30 PM [facility]   3/19/2019  3:30 PM Jerald Motley [facility]   3/21/2019  3:00 PM [facility], [facility]   3/28/2019  3:30 PM [facility], [facility]

## 2019-03-05 ENCOUNTER — HOSPITAL ENCOUNTER (OUTPATIENT)
Dept: PHYSICAL THERAPY | Age: 54
Discharge: HOME | End: 2019-03-05
Payer: OTHER GOVERNMENT

## 2019-03-05 PROCEDURE — 97016 VASOPNEUMATIC DEVICE THERAPY: CPT

## 2019-03-05 PROCEDURE — 97110 THERAPEUTIC EXERCISES: CPT

## 2019-03-05 NOTE — PROGRESS NOTES
PT DAILY TREATMENT NOTE     Patient Name: Houston Ruiz  Date:3/5/2019  : 1965  [x]  Patient  Verified  Payor:  / Plan: [facility] / Product Type:  /    In time:3:30  Out time:04:25  Total Treatment Time (min):55   Visit #: 28 of 35    Treatment Area: Fracture of unspecified parts of lumbosacral spine and pelvis, subsequent encounter for fracture with routine healing [S32. 9XXD]    SUBJECTIVE  Pain Level (0-10 scale): 3-4/10  Any medication changes, allergies to medications, adverse drug reactions, diagnosis change, or new procedure performed?: [x] No    [] Yes (see summary sheet for update)  Subjective functional status/changes:   [] No changes reported      OBJECTIVE    Modality rationale: decrease inflammation, decrease pain and increase tissue extensibility to improve the patients ability to increase tolerance with activities    Type Additional Details   [] Estim:  []Unatt       []IFC  []Premod                        []Other:  []w/ice   []w/heat  Position:  Location:   [] Estim: []Att    []TENS instruct  []NMES                    []Other:  []w/US   []w/ice   []w/heat  Position:  Location:   []  Traction: [] Cervical       []Lumbar                       [] Prone          []Supine                       []Intermittent   []Continuous Lbs:  [] before manual  [] after manual   []  Ultrasound: []Continuous   [] Pulsed                           []1MHz   []3MHz W/cm2:  Location:   []  Iontophoresis with dexamethasone         Location: [] Take home patch   [] In clinic   []  Ice     []  heat  []  Ice massage  []  Laser   []  Anodyne Position:  Location:   []  Laser with stim  []  Other:  Position:  Location:   [x]  Vasopneumatic Device 10 minutes   [] Skin assessment post-treatment:  []intact []redness- no adverse reaction    []redness - adverse reaction:     45 min Therapeutic Exercise:  [] See flow sheet :   Rationale: increase ROM and increase strength to improve the patients ability to increase hip stabilization     With   [] TE   [] TA   [] neuro   [] other: Patient Education: [x] Review HEP    [] Progressed/Changed HEP based on:   [] positioning   [] body mechanics   [] transfers   [] heat/ice application    [] other:      Other Objective/Functional Measures:      Pain Level (0-10 scale) post treatment: 2-3/10    ASSESSMENT/Changes in Function:   Patient tolerated treatment fair however no change in treatment as patient had done too much strenuous ambulation and standing activities causing increase in pain. Patient will continue to benefit from skilled PT services to modify and progress therapeutic interventions, address functional mobility deficits, address ROM deficits, address strength deficits, analyze and address soft tissue restrictions, analyze and cue movement patterns, analyze and modify body mechanics/ergonomics and assess and modify postural abnormalities to attain remaining goals. []  See Plan of Care  []  See progress note/recertification  []  See Discharge Summary         Progress towards goals / Updated goals:  Short Term Goals: To be accomplished in 4 weeks:              Patient will report compliance with HEP 1x/day to aid in rehabilitation program.              YTZZMR at IE: Initiated at evaluation              KGADPZQ: OAMSHFC continues with partial compliance-partially MET              Patient will increase hip ROM to 100% in all planes to aid in completion of ADLs              Status at IE: Left hip PROM: 30-95*              Current:  refer to LT goals      Long Term Goals: To be accomplished in 6 weeks:              Patient will increase L LE strength to 5/5 MMT throughout to aid in completion of ADLs.               FQPUGL at IE: 3/5 throughout except 5/5 DF               Current:  hip flexion today 3/5 , hip add 5/5 , abd 3-/5 side lie , hip extension, quad 5/5 , HS 5/5               Patient will report pain no greater than 1-2/10 throughout entire day to aid in completion of ADLs              Status at IE: Up to 7-8/10              Current: highest 3-4/10 in last week                  Patent will perform 5 sit<>stands pain-free to demonstrate improvement in status and aid and completion of ADLs.             ERDCOU at IE: Able to sit and stand from chair but with pain and compensations              Current:able to complete sit to stands no more pain than baseline -MET                   Patient will improve FOTO score to 61 points to demonstrate improvement in functional status.               FDSAEB at IE: 36              Current:  51, change of 11     PLAN  []  Upgrade activities as tolerated     [x]  Continue plan of care  []  Update interventions per flow sheet       []  Discharge due to:_  []  Other:_      Jerri Parrish 3/5/2019  2:57 PM    Future Appointments   Date Time Provider Matthew Jenknis   3/5/2019  3:30 PM Liliya Ashby [facility]   3/7/2019  3:30 PM Floyde Flatness, [facility]   3/12/2019  3:30 PM Keila Challenger, [address] [facility]   3/14/2019  3:30 PM Floyde Flatness, PT [facility]   3/19/2019  3:30 PM Keila Challenger, PT [facility]   3/21/2019  3:00 PM Floyde Flatness, PT [facility]   3/28/2019  3:30 PM Floyde Flatness, PT [facility]

## 2019-03-07 ENCOUNTER — HOSPITAL ENCOUNTER (OUTPATIENT)
Dept: PHYSICAL THERAPY | Age: 54
Discharge: HOME | End: 2019-03-07
Payer: OTHER GOVERNMENT

## 2019-03-07 PROCEDURE — 97110 THERAPEUTIC EXERCISES: CPT | Performed by: PHYSICAL THERAPIST

## 2019-03-07 NOTE — PROGRESS NOTES
PT DAILY TREATMENT NOTE 12-    Patient Name: Venkata Salomon  Date:3/7/2019  : 1965  [x]  Patient  Verified  Payor: JIE / Plan: [facility] / Product Type: [employer] /    In GORM:6533  Out time:1625  Total Treatment Time (min): 48  Visit #: 35 of 35    Treatment Area: Fracture of unspecified parts of lumbosacral spine and pelvis, subsequent encounter for fracture with routine healing [S32. 9XXD]    SUBJECTIVE  Pain Level (0-10 scale): 3  Any medication changes, allergies to medications, adverse drug reactions, diagnosis change, or new procedure performed?: [x] No    [] Yes (see summary sheet for update)  Subjective functional status/changes:   [] No changes reported  Per pt up until this week pain more in left knee but this week more in the hip up to 3-4/10   Pt missed one day at work this week due to hip pain and had to leave early yesterday due to pain today some better      OBJECTIVE       48 min Therapeutic Exercise:  [x] See flow sheet :   Rationale: increase ROM and increase strength to improve the patients ability to increase hip stabilization and return to improved functional mobility , gait with out pain           With   [] TE   [] TA   [] neuro   [] other: Patient Education: [x] Review HEP    [] Progressed/Changed HEP based on:   [] positioning   [] body mechanics   [] transfers   [] heat/ice application    [] other:      Other Objective/Functional Measures:      Pain Level (0-10 scale) post treatment: 3    ASSESSMENT/Changes in Function: attempted to add resist to clamshell with orange band tolerated 15 reps then sore , will switch to peach band until 20 reps is tolerated well, also will attempt to add peach band to SLR in standing with left leg movement .  LAQ and SAQ tolerating 4# just fatigue will switch to 5# next session     Patient will continue to benefit from skilled PT services to modify and progress therapeutic interventions, address functional mobility deficits, address ROM deficits, address strength deficits, analyze and address soft tissue restrictions, analyze and cue movement patterns, analyze and modify body mechanics/ergonomics, assess and modify postural abnormalities and address imbalance/dizziness to attain remaining goals. [x]  See Plan of Care  []  See progress note/recertification  []  See Discharge Summary         Progress towards goals / Updated goals:  Short Term Goals: To be accomplished in 4 weeks:              Patient will report compliance with HEP 1x/day to aid in rehabilitation program.              NXMVYN at IE: Initiated at evaluation              MIXOYNE: VMFBUKT continues with partial compliance-partially MET                Patient will increase hip ROM to 100% in all planes to aid in completion of ADLs              Status at IE: Left hip PROM: 30-95*              Current:  refer to LT goals      Long Term Goals: To be accomplished in 6 weeks:              Patient will increase L LE strength to 5/5 MMT throughout to aid in completion of ADLs.             SIVKBN at IE: 3/5 throughout except 5/5 DF               Current:  hip flexion today 3/5 , hip add 5/5 , abd 3-/5 side lie , hip extension, quad 5/5 , HS 5/5                 Patient will report pain no greater than 1-2/10 throughout entire day to aid in completion of ADLs              Status at IE: Up to 7-8/10              Current: highest 3-4/10 in last week                  Patent will perform 5 sit<>stands pain-free to demonstrate improvement in status and aid and completion of ADLs.             PGHQEY at IE: Able to sit and stand from chair but with pain and compensations              Current:able to complete sit to stands no more pain than baseline -MET                   Patient will improve FOTO score to 61 points to demonstrate improvement in functional status.               GKJEIG at IE: 36              Current:  51 ( 2/26/2019 ) , change of 11            PLAN  [x]  Upgrade activities as tolerated     [x]  Continue plan of care  []  Update interventions per flow sheet       []  Discharge due to:_  []  Other:_      Broadus Moritz, PT 3/7/2019  4:05 PM    Future Appointments   Date Time Provider Matthew Jenkins   3/12/2019  3:30 PM Nery Fonseca, PT [facility]   3/14/2019  3:30 PM Caroline Merchant, PT [facility]   3/19/2019  3:30 PM Nery Fonseca, PT [facility]   3/21/2019  3:00 PM Caroline Merchant, PT [facility]   3/28/2019  3:30 PM Caroline Merchant, PT [facility]

## 2019-03-12 ENCOUNTER — HOSPITAL ENCOUNTER (OUTPATIENT)
Dept: PHYSICAL THERAPY | Age: 54
Discharge: HOME | End: 2019-03-12
Payer: OTHER GOVERNMENT

## 2019-03-12 PROCEDURE — 97530 THERAPEUTIC ACTIVITIES: CPT

## 2019-03-12 PROCEDURE — 97110 THERAPEUTIC EXERCISES: CPT

## 2019-03-12 NOTE — PROGRESS NOTES
PT DAILY TREATMENT NOTE     Patient Name: Waylon Mcdonnell  Date:3/12/2019  : 1965  [x]  Patient  Verified  Payor: JIE / Plan: [facility] / Product Type:  /    In time:331  Out time:431  Total Treatment Time (min): 50  Visit #: 29 of 29 + 8    Treatment Area: Fracture of unspecified parts of lumbosacral spine and pelvis, subsequent encounter for fracture with routine healing [S32. 9XXD]    SUBJECTIVE  Pain Level (0-10 scale): 3  Any medication changes, allergies to medications, adverse drug reactions, diagnosis change, or new procedure performed?: [x] No    [] Yes (see summary sheet for update)  Subjective functional status/changes:   [] No changes reported  MD visit 3/11/19, reports that fracture site has not healed, continue to use bone stimulator, still working full time  Next MD visit 19, consult for pain management  Pain 2-4/10 left hip and leg past knee  Pain with prolonged sitting and standing  Some nocturnal pain  Unable to lie on left side    OBJECTIVE       35 min Therapeutic Exercise:  [x] See flow sheet :   Rationale: increase ROM and increase strength to improve the patients ability to increase hip stabilization and return to improved functional mobility , gait with out pain    15 min therapeutic Activity: reevaluation and discussion of further POC                 With   [] TE   [] TA   [] neuro   [] other: Patient Education: [x] Review HEP    [] Progressed/Changed HEP based on:   [] positioning   [] body mechanics   [] transfers   [] heat/ice application    [] other:      Other Objective/Functional Measures:   Pain 2-4/10  Still left hip pain with prolonged ambulation and sitting  Non healing fracture     Pain Level (0-10 scale) post treatment: 3    ASSESSMENT/Changes in Function: attempted to add resist to clamshell with orange band tolerated 15 reps then sore , will switch to peach band until 20 reps is tolerated well, also will attempt to add peach band to SLR in standing with left leg movement . LAQ and SAQ tolerating 4# just fatigue will switch to 5# next session     Patient will continue to benefit from skilled PT services to modify and progress therapeutic interventions, address functional mobility deficits, address ROM deficits, address strength deficits, analyze and address soft tissue restrictions, analyze and cue movement patterns, analyze and modify body mechanics/ergonomics, assess and modify postural abnormalities and address imbalance/dizziness to attain remaining goals. [x]  See Plan of Care  [x]  See progress note/recertification  []  See Discharge Summary         Progress towards goals / Updated goals:  Short Term Goals: To be accomplished in 4 weeks:              1. Patient will report compliance with HEP 1x/day to aid in rehabilitation program.              TFAQYK at IE: Initiated at evaluation              WNWTUAT: PLASASH continues with partial compliance, limited tolerance to activities as he is working full time and exhausted when coming home-partially MET                2.Patient will increase hip ROM to 100% in all planes to aid in completion of ADLs              Status at IE: Left hip PROM: 30-95*              Current:  passive Flex 110, Ext 0, ABD 40, progressing     Long Term Goals: To be accomplished in 6 weeks:              1. Patient will increase L LE strength to 5/5 MMT throughout to aid in completion of ADLs.               QVYOXR at IE: 3/5 throughout except 5/5 DF               Current:  hip flexion today 4/5 , hip add 5/5 , abd 4-/5 side lie , hip extension 4-/5, quad 5/5 , HS 5/5, progressing                 2.Patient will report pain no greater than 1-2/10 throughout entire day to aid in completion of ADLs              Status at IE: Up to 7-8/10              Current: pain 2-4/10 with ADL, progressing                 Patent will perform 5 sit<>stands pain-free to demonstrate improvement in status and aid and completion of ADLs.              Status at IE: Able to sit and stand from chair but with pain and compensations              Current:able to complete sit to stands no more pain than baseline -MET                   Patient will improve FOTO score to 61 points to demonstrate improvement in functional status.               FYMRXU at IE: 36              Current:  51 ( 2/26/2019 ) , change of 11, progressing            PLAN  [x]  Upgrade activities as tolerated     [x]  Continue plan of care with focus on left hip strength and stability  []  Update interventions per flow sheet       []  Discharge due to:_  []  Other:_      Marion Espinoza, PT 3/12/2019  4:05 PM    Future Appointments   Date Time Provider Matthew Jenkins   [facility]   3/19/2019  3:30 PM Brett Gil, PT [facility]   3/21/2019  3:00 PM Rachelle Levine PT [facility]   3/28/2019  3:30 PM Rachelle Levine PT [facility]

## 2019-03-14 ENCOUNTER — HOSPITAL ENCOUNTER (OUTPATIENT)
Dept: PHYSICAL THERAPY | Age: 54
Discharge: HOME | End: 2019-03-14
Payer: OTHER GOVERNMENT

## 2019-03-14 PROCEDURE — 97016 VASOPNEUMATIC DEVICE THERAPY: CPT | Performed by: PHYSICAL THERAPIST

## 2019-03-14 PROCEDURE — 97110 THERAPEUTIC EXERCISES: CPT | Performed by: PHYSICAL THERAPIST

## 2019-03-14 NOTE — PROGRESS NOTES
PT DAILY TREATMENT NOTE     Patient Name: Madai Guallpa  Date:3/14/2019  : 1965  [x]  Patient  Verified  Payor: JIE / Plan: [facility] 74 / Product Type: Teri Mayal /    In [facility]:8230  Out time:1635  Total Treatment Time (min): 61  Visit #: 28 of 42    Treatment Area: Fracture of unspecified parts of lumbosacral spine and pelvis, subsequent encounter for fracture with routine healing [S32. 9XXD]    SUBJECTIVE  Pain Level (0-10 scale): 3-4 left hip and knee worse earlier to day   Any medication changes, allergies to medications, adverse drug reactions, diagnosis change, or new procedure performed?: [x] No    [] Yes (see summary sheet for update)  Subjective functional status/changes:   [] No changes reported  Pt seen by ortho on Monday new xray notes still not healing fx in hip , talked with PCM through  put referrel into pain management     OBJECTIVE           Modality rationale: decrease pain and increase tissue extensibility to improve the patients ability to increase ease with activities    Min Type Additional Details       [] Estim:  []Unatt       []IFC  []Premod                        []Other:  []w/ice   []w/heat  Position:  Location:       [] Estim: []Att    []TENS instruct  []NMES                    []Other:  []w/US   []w/ice   []w/heat  Position:  Location:       []  Traction: [] Cervical       []Lumbar                       [] Prone          []Supine                       []Intermittent   []Continuous Lbs:  [] before manual  [] after manual       []  Ultrasound: []Continuous   [] Pulsed                           []1MHz   []3MHz W/cm2:  Location:       []  Iontophoresis with dexamethasone         Location: [] Take home patch   [] In clinic       []  Ice     []  Heat   []  Ice massage  []  Laser   []  Anodyne Position:  Location:       []  Laser with stim  []  Other:  Position:  Location:     10 []  Vasopneumatic Device 10 minutes Pressure:       [] lo [x] med [] hi Temperature: [x] lo [] med [] hi     [] Skin assessment post-treatment:  []intact []redness- no adverse reaction    []redness - adverse reaction:         53 min Therapeutic Exercise:  [x] See flow sheet :   Rationale: increase ROM and increase strength to improve the patients ability to increase hip stabilization             With   [] TE   [] TA   [] neuro   [] other: Patient Education: [x] Review HEP    [] Progressed/Changed HEP based on:   [] positioning   [] body mechanics   [] transfers   [] heat/ice application    [] other:      Other Objective/Functional Measures: FOTO :      Pain Level (0-10 scale) post treatment: 3    ASSESSMENT/Changes in Function: pt more sore today was able to watch pain level with ex and adjust as needed , tolerated appt with no adverse affect , pt reported vaso helps when knee is irritated overall no increase pain post therapy     Patient will continue to benefit from skilled PT services to modify and progress therapeutic interventions, address functional mobility deficits, address ROM deficits, address strength deficits, analyze and address soft tissue restrictions, analyze and cue movement patterns, analyze and modify body mechanics/ergonomics, assess and modify postural abnormalities and address imbalance/dizziness to attain remaining goals. [x]  See Plan of Care  []  See progress note/recertification  []  See Discharge Summary         Progress towards goals / Updated goals:  Short Term Goals: To be accomplished in 4 weeks:              1. Patient will report compliance with HEP 1x/day to aid in rehabilitation program.              CEHLGD at IE: Initiated at evaluation              KNSNNMQ: WLNBSZP continues with partial compliance, limited tolerance to activities as he is working full time and exhausted when coming home-partially MET                 2.Patient will increase hip ROM to 100% in all planes to aid in completion of ADLs              Status at IE: Left hip PROM: 30-95*              Current:  passive Flex 110, Ext 0, ABD 40, progressing     Long Term Goals: To be accomplished in 6 weeks:              1. Patient will increase L LE strength to 5/5 MMT throughout to aid in completion of ADLs.             JTZKSE at IE: 3/5 throughout except 5/5 DF               Current:  hip flexion today 4/5 , hip add 5/5 , abd 4-/5 side lie , hip extension 4-/5, quad 5/5 , HS 5/5, progressing                 2.Patient will report pain no greater than 1-2/10 throughout entire day to aid in completion of ADLs              Status at IE: Up to 7-8/10              Current: pain 2-4/10 with ADL, progressing                 Patent will perform 5 sit<>stands pain-free to demonstrate improvement in status and aid and completion of ADLs.             UIDXOI at IE: Able to sit and stand from chair but with pain and compensations              Current:able to complete sit to stands no more pain than baseline -MET                   Patient will improve FOTO score to 61 points to demonstrate improvement in functional status.               BYGIZJ at IE: 36              Current:  51 ( 2/26/2019 ) , change of 11, progressing     PLAN  [x]  Upgrade activities as tolerated     [x]  Continue plan of care  []  Update interventions per flow sheet       []  Discharge due to:_  []  Other:_      Hudson Mandujano PT 3/14/2019  3:38 PM    Future Appointments   Date Time Provider Matthew Jenkins   3/19/2019  3:30 PM Kirby Kline PT [facility]   3/21/2019  3:00 PM Lisa López PT [facility]   3/28/2019  3:30 PM Lisa López PT [facility]

## 2019-03-19 ENCOUNTER — HOSPITAL ENCOUNTER (OUTPATIENT)
Dept: PHYSICAL THERAPY | Age: 54
Discharge: HOME | End: 2019-03-19
Payer: OTHER GOVERNMENT

## 2019-03-19 PROCEDURE — 97016 VASOPNEUMATIC DEVICE THERAPY: CPT

## 2019-03-19 PROCEDURE — 97110 THERAPEUTIC EXERCISES: CPT

## 2019-03-19 NOTE — PROGRESS NOTES
[facility]  [address], [address] [facility]  Ph [phone]  Fx [phone]    Physical Therapy Progress Note     Patient name: Roberto Hicks                                                            Start of Care: 10/31/2018  Referral source: Flash Lemons MD                                       : 1965  Medical/Treatment Diagnosis: Fracture of unspecified parts of lumbosacral spine and pelvis, subsequent encounter for fracture with routine healing [S32. 9XXD]                        Onset Date:2018  Prior Hospitalization: see medical history                           Provider#: [id number]  Comorbidities: DM II, asthma   Prior Level of Function: Independent with ADLs and full work duty        Visits from [employer] [facility]: 34    Missed Visits: 1      Progress towards goals / Updated goals:  Short Term Goals: To be accomplished in 4 weeks:              1. Patient will report compliance with HEP 1x/day to aid in rehabilitation program.              OGGKFX at IE: Initiated at evaluation              GCGCWIQ: MQFDONU continues with partial compliance, limited tolerance to activities as he is working full time and exhausted when coming home-partially MET                2.Patient will increase hip ROM to 100% in all planes to aid in completion of ADLs              Status at IE: Left hip PROM: 30-95*              Current:  passive Flex 110, Ext 0, ABD 40, progressing     Long Term Goals: To be accomplished in 6 weeks:              1. Patient will increase L LE strength to 5/5 MMT throughout to aid in completion of ADLs.               ZBQHTO at IE: 3/5 throughout except 5/5 DF               Current:  hip flexion today 4/5 , hip add 5/5 , abd 4-/5 side lie , hip extension 4-/5, quad 5/5 , HS 5/5, progressing                 2.Patient will report pain no greater than 1-2/10 throughout entire day to aid in completion of ADLs              Status at IE: Up to 7-8/10              Current: pain 2-4/10 with ADL, progressing                 Patent will perform 5 sit<>stands pain-free to demonstrate improvement in status and aid and completion of ADLs.             VVOOVC at IE: Able to sit and stand from chair but with pain and compensations              Current:able to complete sit to stands no more pain than baseline -MET                   Patient will improve FOTO score to 61 points to demonstrate improvement in functional status.             JAZKFS at IE: 36              Current:  51 ( 2/26/2019 ) , change of 11, progressing      Key Functional Changes: increased ease with functional ambulation    ASSESSMENT/RECOMMENDATIONS:Mr. Marshall has been showing gradual improvement in strength , hip stability and has returned to  limited community ambulation with SC. He is still presenting with a non healing fracture left hip and steady pain ranging from 2-4/10. I recommend continuation of therapy to address remaining deficits , establish independent HEP with the goal to d/c patient to independent management of his hip.    []Continue therapy per initial plan/protocol at a frequency of  2 x per week for 4 weeks  []Continue therapy with the following recommended changes:_____________________      _____________________________________________________________________  []Discontinue therapy progressing towards or have reached established goals  []Discontinue therapy due to lack of appreciable progress towards goals  []Discontinue therapy due to lack of attendance or compliance  []Await Physician's recommendations/decisions regarding therapy  []Other:________________________________________________________________    Thank you for this referral.   Charline eDjesus, PT 3/19/2019 4:06 PM    NOTE TO PHYSICIAN:  [address] AND   [facility]X TO [facility]: [phone]  If you are unable to process this request in 24 hours please contact our office: [phone]      ____ I have read the above report and request that my patient continue therapy with the following changes/special instructions:  ____ I have read the above report and request that my patient be discharged from therapy    Physician's Signature:____________Date:_________TIME:________    ** Signature, Date and Time must be completed for valid certification **

## 2019-03-21 ENCOUNTER — HOSPITAL ENCOUNTER (OUTPATIENT)
Dept: PHYSICAL THERAPY | Age: 54
Discharge: HOME | End: 2019-03-21
Payer: OTHER GOVERNMENT

## 2019-03-21 PROCEDURE — 97016 VASOPNEUMATIC DEVICE THERAPY: CPT | Performed by: PHYSICAL THERAPIST

## 2019-03-21 PROCEDURE — 97110 THERAPEUTIC EXERCISES: CPT | Performed by: PHYSICAL THERAPIST

## 2019-03-22 NOTE — PROGRESS NOTES
PT DAILY TREATMENT NOTE    Patient Name: Chante Ramires  Date:3/22/2019  : 1965  [x]  Patient  Verified  Payor:  / Plan: Donell Pantoja / Product Type:  /    In time:1502  Out time:1557  Total Treatment Time (min): 54    Visit #: 40 of 42    Treatment Area: Fracture of unspecified parts of lumbosacral spine and pelvis, subsequent encounter for fracture with routine healing [S32. 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    OBJECTIVE                Modality rationale: decrease pain and increase tissue extensibility to improve the patients ability to increase ease with activities    Min Type Additional Details       [] Estim:  []Unatt       []IFC  []Premod                        []Other:  []w/ice   []w/heat  Position:  Location:       [] Estim: []Att    []TENS instruct  []NMES                    []Other:  []w/US   []w/ice   []w/heat  Position:  Location:       []  Traction: [] Cervical       []Lumbar                       [] Prone          []Supine                       []Intermittent   []Continuous Lbs:  [] before manual  [] after manual       []  Ultrasound: []Continuous   [] Pulsed                           []1MHz   []3MHz W/cm2:  Location:       []  Iontophoresis with dexamethasone         Location: [] Take home patch   [] In clinic       []  Ice     []  Heat   []  Ice massage  []  Laser   []  Anodyne Position:  Location:       []  Laser with stim  []  Other:  Position:  Location:   10 []  Vasopneumatic Device 10 minutes Pressure:       [] lo [x] med [] hi   Temperature: [x] lo [] med [] hi     [] Skin assessment post-treatment:  []intact []redness- no adverse reaction    []redness - adverse reaction:         45 min Therapeutic Exercise:  [x] See flow sheet :   Rationale: increase ROM and increase strength to improve the patients ability to increase hip stabilization             With   [] TE   [] TA   [] neuro   [] other: Patient Education: [x] Review HEP    [] Progressed/Changed HEP based on:   [] positioning   [] body mechanics   [] transfers   [] heat/ice application    [] other:      Other Objective/Functional Measures: see ex grid progressing as pain tolerates      Pain Level (0-10 scale) post treatment: 3    ASSESSMENT/Changes in Function: continuing to progress with current ex , difficulty adding onto ex as pain in hip is limiting , watching pain response closely due to reports of poor healing still at this point. tolerated appt with no adverse affect , pt reported vaso helps when knee is irritated overall no increase pain post therapy     Patient will continue to benefit from skilled PT services to modify and progress therapeutic interventions, address functional mobility deficits, address ROM deficits, address strength deficits, analyze and address soft tissue restrictions, analyze and cue movement patterns, analyze and modify body mechanics/ergonomics, assess and modify postural abnormalities and address imbalance/dizziness to attain remaining goals. [x]  See Plan of Care  []  See progress note/recertification  []  See Discharge Summary         Progress towards goals / Updated goals:  Short Term Goals: To be accomplished in 4 weeks:              1. Patient will report compliance with HEP 1x/day to aid in rehabilitation program.              PHXNNS at IE: Initiated at evaluation              JRCRENF: QKJPLZY continues with partial compliance, limited tolerance to activities as he is working full time and exhausted when coming home-partially MET                 2.Patient will increase hip ROM to 100% in all planes to aid in completion of ADLs              Status at IE: Left hip PROM: 30-95*              Current:  passive Flex 110, Ext 0, ABD 40, progressing     Long Term Goals: To be accomplished in 6 weeks:              1. Patient will increase L LE strength to 5/5 MMT throughout to aid in completion of ADLs.             SCACQR at IE: 3/5 throughout except 5/5 DF               Current:  hip flexion today 4/5 , hip add 5/5 , abd 4-/5 side lie , hip extension 4-/5, quad 5/5 , HS 5/5, progressing                 2.Patient will report pain no greater than 1-2/10 throughout entire day to aid in completion of ADLs              Status at IE: Up to 7-8/10              Current: pain 2-4/10 with ADL, progressing                 Patent will perform 5 sit<>stands pain-free to demonstrate improvement in status and aid and completion of ADLs.             QZXMBJ at IE: Able to sit and stand from chair but with pain and compensations              Current:able to complete sit to stands no more pain than baseline -MET                   Patient will improve FOTO score to 61 points to demonstrate improvement in functional status.               GNYIJW at IE: 36              Current:  51 ( 2/26/2019 ) , change of 11, progressing     PLAN  [x]  Upgrade activities as tolerated     [x]  Continue plan of care  []  Update interventions per flow sheet       []  Discharge due to:_  []  Other:_      Tom Hurt PT 3/22/2019  10:34 AM    Future Appointments   Date Time Provider Matthew Jenkins   3/28/2019  3:30 PM Shaye Wevaer, PT [facility]

## 2019-03-28 ENCOUNTER — HOSPITAL ENCOUNTER (OUTPATIENT)
Dept: PHYSICAL THERAPY | Age: 54
Discharge: HOME | End: 2019-03-28
Payer: OTHER GOVERNMENT

## 2019-03-28 PROCEDURE — 97110 THERAPEUTIC EXERCISES: CPT | Performed by: PHYSICAL THERAPIST

## 2019-03-28 PROCEDURE — 97164 PT RE-EVAL EST PLAN CARE: CPT | Performed by: PHYSICAL THERAPIST

## 2019-03-28 NOTE — PROGRESS NOTES
PT DAILY TREATMENT NOTE    Patient Name: Palmira Cooepr  Date:3/28/2019  : 1965  [x]  Patient  Verified  Payor:  / Plan: Josef Gregorio 74 / Product Type:  /    In time:1525  Out time:1600   Total Treatment Time (min): 35    Visit #: 45 of 42    Treatment Area: Fracture of unspecified parts of lumbosacral spine and pelvis, subsequent encounter for fracture with routine healing [S32. 9XXD]    SUBJECTIVE  Pain Level (0-10 scale): 3  Any medication changes, allergies to medications, adverse drug reactions, diagnosis change, or new procedure performed?: [x] No    [] Yes (see summary sheet for update)  Subjective functional status/changes:   [] No changes reported  Pt states talked with PCM , ok with plan to continue with HEP on own at level pt is currently able to ex till bone heals more , then rerefer to therapy when bone heals and he is able to progress   Pt will see ortho on 2019 , pain management on 2019 ,   Pain 2-4/10 during the day liza with prolonged sitting or ambulation  Sleeping on couch due to pain with stair ambulation  Working full time at [employer] job    Pt feels he is 50-60% better since start of therapy but limited in current progress past this point due to multiple hip fractures not healing   Pt now able to walk better using cane instead of walker , more balance and more strength in leg since start of therapy, dressing easier     OBJECTIVE    15 min []Eval                  [x]Re-Eval       20  min Therapeutic Exercise:  [x] See flow sheet :   Rationale: increase ROM, increase strength and improve coordination to improve the patients ability to progress /increase functional hip mobility for smoother gait and ease with daily activities             With   [] TE   [] TA   [] neuro   [] other: Patient Education: [x] Review HEP    [] Progressed/Changed HEP based on:   [x] positioning   [x] body mechanics   [] transfers   [] heat/ice application    [] other: pt states no questions regarding HEP      Other Objective/Functional Measures: 49 abd , 120/130 flexion   135 knee flexion in prone , 15 deg extension     Hip flexion 4 to 4+/5 , hip ER 3/5 , quAD AND hs 5/5 , add 5/5 , abd 4+/5 in sitting      Pain Level (0-10 scale) post treatment: 3 left hip     ASSESSMENT/Changes in Function: todays reassessment notes improvement in overall strength and ROM but pt has had difficulty progressing exercises further due to pain and poor fx healing   I feel pt would benefit the most from continuing his indep HEP and returning when his fractures have healed more allowing further progression of his exercises. []  See Plan of Care  []  See progress note/recertification  [x]  See Discharge Summary         Progress towards goals / Updated goals:  Short Term Goals: To be accomplished in 4 weeks:              1. Patient will report compliance with HEP 1x/day to aid in rehabilitation program.              UIXZTG at IE: Initiated at evaluation              HQXRRON: EAEIJEJ continues with partial compliance, limited tolerance to activities as he is working full time and exhausted when coming home-partially MET                 2.Patient will increase hip ROM to 100% in all planes to aid in completion of ADLs              Status at IE: Left hip PROM: 30-95*              Current: 49 abd , 120/130 flexion 15 deg extension  135 knee flexion in prone ( quad tightness ) , progressing     Long Term Goals: To be accomplished in 6 weeks:              1. Patient will increase L LE strength to 5/5 MMT throughout to aid in completion of ADLs.               VGOSFG at IE: 3/5 throughout except 5/5 DF               Current: Hip flexion 4 to 4+/5 , hip ER 3/5 , quAD AND hs 5/5 , add 5/5 , abd 4+/5 in sitting ,  progressing                 2.Patient will report pain no greater than 1-2/10 throughout entire day to aid in completion of ADLs              Status at IE: Up to 7-8/10              Current: pain 2-4/10 with ADL, progressing                 Patent will perform 5 sit<>stands pain-free to demonstrate improvement in status and aid and completion of ADLs.             ENRMLV at IE: Able to sit and stand from chair but with pain and compensations              Current:able to complete sit to stands no more pain than baseline -MET  No increase pain 5 sit to stands In 13 sec no hands   ( 3/28/2019 )                  Patient will improve FOTO score to 61 points to demonstrate improvement in functional status.             YNVRZR at IE: 36              Current:  51/100 same  ( grossly staying same FOTO score since 1/15/2019 ) , change of 11 since initial eval , Not met             PLAN  []  Upgrade activities as tolerated     []  Continue plan of care  []  Update interventions per flow sheet       [x]  Discharge due to: although pt has been progressing with ROM and strength he is now indep with current HEP but further progressing his ex is limited due to his persistent hip fracture limited healing. Pt would benifit from continuing his current indep HEP and being re refered back to therapy once his hip heals more . []  Other:_      Sosa Roth, PT 3/28/2019  3:41 PM    No future appointments.

## 2019-03-28 NOTE — PROGRESS NOTES
[facility]  [address], [address] [facility] [address]  Ph [phone]  Fx [phone]    Physical Therapy Discharge Summary    Patient name: Josef Blackburn Start of Care: 10/31/2018   Referral source: Anna Lafleur MD : 1965   Medical/Treatment Diagnosis: Fracture of unspecified parts of lumbosacral spine and pelvis, subsequent encounter for fracture with routine healing [S32. 9XXD] Onset Date:2018     Prior Hospitalization: see medical history Provider#: [id number]   Medications: Verified on Patient Summary List    Comorbidities: DM II, asthma   Prior Level of Function: Independent with ADLs and full work duty    Visits from Start of Care: 38    Missed Visits: 0    Reporting Period : 3/12/2019 to 3/28/2019     Summary of Care:    Subjective functional status/changes:      Pt will see ortho on 2019 , pain management on 2019 ,   Pain 2-4/10 during the day liza with prolonged sitting or ambulation  Sleeping on couch due to pain with stair ambulation  Working full time at [employer] job     Pt feels he is 50-60% better since start of therapy but limited in current progress past this point due to multiple hip fractures not healing   Pt now able to walk better using cane instead of walker , more balance and more strength in leg since start of therapy, dressing easier     Short Term Goals: To be accomplished in 4 weeks:              1. Patient will report compliance with HEP 1x/day to aid in rehabilitation program.              RVZEKA at IE: Initiated at evaluation              Current: patient continues with partial compliance, limited tolerance to activities as he is working full time and exhausted when coming home-partially MET                 2.Patient will increase hip ROM to 100% in all planes to aid in completion of ADLs              Status at IE: Left hip PROM: 30-95*              Current: 49 abd , 120/130 flexion 15 deg extension  135 knee flexion in prone ( quad tightness ) , progressing     Long Term Goals: To be accomplished in 6 weeks:              1. Patient will increase L LE strength to 5/5 MMT throughout to aid in completion of ADLs.             TYEDING at IE: 3/5 throughout except 5/5 DF               Current: Hip flexion 4 to 4+/5 , hip ER 3/5 , quAD AND hs 5/5 , add 5/5 , abd 4+/5 in sitting ,  progressing                 2.Patient will report pain no greater than 1-2/10 throughout entire day to aid in completion of ADLs              Status at IE: Up to 7-8/10              Current: pain 2-4/10 with ADL, progressing                 Patent will perform 5 sit<>stands pain-free to demonstrate improvement in status and aid and completion of ADLs.             HACYNTHIAZO at IE: Able to sit and stand from chair but with pain and compensations              Current:able to complete sit to stands no more pain than baseline -MET  No increase pain 5 sit to stands In 13 sec no hands   ( 3/28/2019 )                  Patient will improve FOTO score to 61 points to demonstrate improvement in functional status.             EYLNWR at IE: 36              Current:  51/100 same  ( grossly staying same FOTO score since 1/15/2019 ) , change of 11 since initial eval , Not met           ASSESSMENT/RECOMMENDATIONS:Mr. Marshall has been showing gradual improvement in strength , hip ROM and stability and has returned to  limited community ambulation with SC. He is still presenting with a non healing fracture left hip and steady pain ranging from 2-4/10. Since he is indep with his current tolerated level of ex with his HEP , I recommend d/c patient with HEP for independent management ,  then as his fracture heals re refer pt once  Healing allows higher level ex progression.      [x]Discontinue therapy: []Patient has reached or is progressing toward set goals      []Patient is non-compliant or has abdicated      [x]Due to indep with HEP and plateau in further progress with current non healing fractures of his left hip  Limiting further ex progression tolerance . Simón Garrett, PT 3/28/2019 4:22 PM    ------------------------------------------------------------------------------------------------------------------------------  NOTE TO PHYSICIAN:  Please complete the following and fax to: [facility] at [phone]  If you are unable to process this request in   24 hours, please contact our office.        [de-identified] Signature:____________________ Date:_________ TIME:________    [facility], Date and Time must be completed for valid certification **

## 2019-04-17 ENCOUNTER — HOSPITAL ENCOUNTER (OUTPATIENT)
Dept: CT IMAGING | Age: 54
Discharge: HOME | End: 2019-04-17
Attending: ORTHOPAEDIC SURGERY
Payer: OTHER GOVERNMENT

## 2019-04-17 DIAGNOSIS — S72.142A INTERTROCHANTERIC FRACTURE OF LEFT FEMUR (HCC): ICD-10-CM

## 2019-04-17 DIAGNOSIS — S72.142K: ICD-10-CM

## 2019-04-17 PROCEDURE — 73700 CT LOWER EXTREMITY W/O DYE: CPT

## 2019-10-07 ENCOUNTER — HOSPITAL ENCOUNTER (OUTPATIENT)
Dept: MRI IMAGING | Age: 54
Discharge: HOME | End: 2019-10-07
Attending: SURGERY
Payer: OTHER GOVERNMENT

## 2019-10-07 DIAGNOSIS — K60.3 ANAL FISTULA: ICD-10-CM

## 2019-10-07 LAB — CREAT UR-MCNC: 0.9 MG/DL (ref 0.6–1.3)

## 2019-10-07 PROCEDURE — 82565 ASSAY OF CREATININE: CPT

## 2019-10-07 PROCEDURE — 72197 MRI PELVIS W/O & W/DYE: CPT

## 2019-10-07 PROCEDURE — A9575 INJ GADOTERATE MEGLUMI 0.1ML: HCPCS | Performed by: SURGERY

## 2019-10-07 PROCEDURE — 74011636320 HC RX REV CODE- 636/320: Performed by: SURGERY

## 2019-10-07 RX ADMIN — GADOTERATE MEGLUMINE 15 ML: 376.9 INJECTION INTRAVENOUS at 15:56

## 2020-01-01 ENCOUNTER — HOME CARE VISIT (OUTPATIENT)
Dept: HOME HEALTH SERVICES | Facility: HOME HEALTH | Age: 55
End: 2020-01-01
Payer: OTHER GOVERNMENT

## 2020-01-01 ENCOUNTER — APPOINTMENT (OUTPATIENT)
Dept: GENERAL RADIOLOGY | Age: 55
DRG: 432 | End: 2020-01-01
Attending: INTERNAL MEDICINE
Payer: OTHER GOVERNMENT

## 2020-01-01 ENCOUNTER — APPOINTMENT (OUTPATIENT)
Dept: VASCULAR SURGERY | Age: 55
DRG: 432 | End: 2020-01-01
Attending: INTERNAL MEDICINE
Payer: OTHER GOVERNMENT

## 2020-01-01 ENCOUNTER — APPOINTMENT (OUTPATIENT)
Dept: GENERAL RADIOLOGY | Age: 55
DRG: 432 | End: 2020-01-01
Attending: HOSPITALIST
Payer: OTHER GOVERNMENT

## 2020-01-01 ENCOUNTER — HOSPITAL ENCOUNTER (OUTPATIENT)
Dept: LAB | Age: 55
Discharge: HOME | End: 2020-11-03

## 2020-01-01 ENCOUNTER — HOME CARE VISIT (OUTPATIENT)
Dept: SCHEDULING | Facility: HOME HEALTH | Age: 55
End: 2020-01-01
Payer: OTHER GOVERNMENT

## 2020-01-01 ENCOUNTER — HOSPITAL ENCOUNTER (EMERGENCY)
Age: 55
Discharge: HOME | End: 2020-09-25
Attending: EMERGENCY MEDICINE
Payer: OTHER GOVERNMENT

## 2020-01-01 ENCOUNTER — APPOINTMENT (OUTPATIENT)
Dept: WOUND CARE | Age: 55
End: 2020-01-01
Attending: HOSPITALIST

## 2020-01-01 ENCOUNTER — HOSPITAL ENCOUNTER (OUTPATIENT)
Dept: LAB | Age: 55
Discharge: HOME | End: 2020-11-10

## 2020-01-01 ENCOUNTER — APPOINTMENT (OUTPATIENT)
Dept: INTERVENTIONAL RADIOLOGY/VASCULAR | Age: 55
DRG: 432 | End: 2020-01-01
Attending: INTERNAL MEDICINE
Payer: OTHER GOVERNMENT

## 2020-01-01 ENCOUNTER — APPOINTMENT (OUTPATIENT)
Dept: VASCULAR SURGERY | Age: 55
DRG: 432 | End: 2020-01-01
Attending: STUDENT IN AN ORGANIZED HEALTH CARE EDUCATION/TRAINING PROGRAM
Payer: OTHER GOVERNMENT

## 2020-01-01 ENCOUNTER — HOSPITAL ENCOUNTER (INPATIENT)
Age: 55
LOS: 5 days | Discharge: HOME HEALTH | DRG: 175 | End: 2020-12-05
Attending: EMERGENCY MEDICINE | Admitting: INTERNAL MEDICINE
Payer: OTHER GOVERNMENT

## 2020-01-01 ENCOUNTER — APPOINTMENT (OUTPATIENT)
Dept: NON INVASIVE DIAGNOSTICS | Age: 55
DRG: 432 | End: 2020-01-01
Attending: INTERNAL MEDICINE
Payer: OTHER GOVERNMENT

## 2020-01-01 ENCOUNTER — ANESTHESIA (OUTPATIENT)
Dept: ICU | Age: 55
DRG: 432 | End: 2020-01-01
Payer: OTHER GOVERNMENT

## 2020-01-01 ENCOUNTER — APPOINTMENT (OUTPATIENT)
Dept: CT IMAGING | Age: 55
DRG: 175 | End: 2020-01-01
Attending: EMERGENCY MEDICINE
Payer: OTHER GOVERNMENT

## 2020-01-01 ENCOUNTER — APPOINTMENT (OUTPATIENT)
Dept: GENERAL RADIOLOGY | Age: 55
DRG: 432 | End: 2020-01-01
Attending: EMERGENCY MEDICINE
Payer: OTHER GOVERNMENT

## 2020-01-01 ENCOUNTER — HOSPITAL ENCOUNTER (OUTPATIENT)
Dept: LAB | Age: 55
Discharge: HOME | End: 2020-11-09
Payer: OTHER GOVERNMENT

## 2020-01-01 ENCOUNTER — HOSPITAL ENCOUNTER (EMERGENCY)
Age: 55
Discharge: HOME | End: 2020-11-23
Attending: EMERGENCY MEDICINE
Payer: OTHER GOVERNMENT

## 2020-01-01 ENCOUNTER — HOSPITAL ENCOUNTER (OUTPATIENT)
Dept: LAB | Age: 55
Discharge: HOME | DRG: 639 | End: 2020-11-03
Payer: OTHER GOVERNMENT

## 2020-01-01 ENCOUNTER — APPOINTMENT (OUTPATIENT)
Dept: GENERAL RADIOLOGY | Age: 55
End: 2020-01-01
Attending: EMERGENCY MEDICINE
Payer: OTHER GOVERNMENT

## 2020-01-01 ENCOUNTER — HOSPITAL ENCOUNTER (INPATIENT)
Age: 55
LOS: 14 days | Discharge: HOME HEALTH | DRG: 432 | End: 2020-12-27
Attending: EMERGENCY MEDICINE | Admitting: INTERNAL MEDICINE
Payer: OTHER GOVERNMENT

## 2020-01-01 ENCOUNTER — ANESTHESIA EVENT (OUTPATIENT)
Dept: ICU | Age: 55
DRG: 432 | End: 2020-01-01
Payer: OTHER GOVERNMENT

## 2020-01-01 ENCOUNTER — HOSPITAL ENCOUNTER (OUTPATIENT)
Dept: LAB | Age: 55
Discharge: HOME | End: 2020-11-17

## 2020-01-01 ENCOUNTER — HOSPITAL ENCOUNTER (OUTPATIENT)
Dept: LAB | Age: 55
Discharge: HOME | End: 2020-11-04

## 2020-01-01 ENCOUNTER — HOSPITAL ENCOUNTER (OUTPATIENT)
Dept: ULTRASOUND IMAGING | Age: 55
Discharge: HOME | DRG: 175 | End: 2020-12-04
Attending: HOSPITALIST
Payer: OTHER GOVERNMENT

## 2020-01-01 ENCOUNTER — HOSPITAL ENCOUNTER (OUTPATIENT)
Dept: WOUND CARE | Age: 55
Discharge: HOME | End: 2020-12-09
Attending: HOSPITALIST
Payer: OTHER GOVERNMENT

## 2020-01-01 ENCOUNTER — APPOINTMENT (OUTPATIENT)
Dept: CT IMAGING | Age: 55
DRG: 432 | End: 2020-01-01
Attending: PHYSICIAN ASSISTANT
Payer: OTHER GOVERNMENT

## 2020-01-01 ENCOUNTER — APPOINTMENT (OUTPATIENT)
Dept: MRI IMAGING | Age: 55
DRG: 432 | End: 2020-01-01
Attending: INTERNAL MEDICINE
Payer: OTHER GOVERNMENT

## 2020-01-01 ENCOUNTER — APPOINTMENT (OUTPATIENT)
Dept: GENERAL RADIOLOGY | Age: 55
DRG: 175 | End: 2020-01-01
Attending: EMERGENCY MEDICINE
Payer: OTHER GOVERNMENT

## 2020-01-01 ENCOUNTER — HOSPITAL ENCOUNTER (INPATIENT)
Age: 55
LOS: 20 days | Discharge: NURSING, REHAB OR LONG-TERM CARE | DRG: 432 | End: 2020-10-29
Attending: EMERGENCY MEDICINE | Admitting: INTERNAL MEDICINE
Payer: OTHER GOVERNMENT

## 2020-01-01 ENCOUNTER — APPOINTMENT (OUTPATIENT)
Dept: NUCLEAR MEDICINE | Age: 55
DRG: 432 | End: 2020-01-01
Attending: INTERNAL MEDICINE
Payer: OTHER GOVERNMENT

## 2020-01-01 ENCOUNTER — APPOINTMENT (OUTPATIENT)
Dept: NON INVASIVE DIAGNOSTICS | Age: 55
DRG: 175 | End: 2020-01-01
Attending: INTERNAL MEDICINE
Payer: OTHER GOVERNMENT

## 2020-01-01 ENCOUNTER — HOSPITAL ENCOUNTER (OUTPATIENT)
Dept: ULTRASOUND IMAGING | Age: 55
Discharge: HOME | DRG: 175 | End: 2020-12-02
Attending: HOSPITALIST
Payer: OTHER GOVERNMENT

## 2020-01-01 ENCOUNTER — HOSPITAL ENCOUNTER (INPATIENT)
Dept: ULTRASOUND IMAGING | Age: 55
Discharge: HOME | DRG: 432 | End: 2020-10-13
Attending: INTERNAL MEDICINE
Payer: OTHER GOVERNMENT

## 2020-01-01 ENCOUNTER — APPOINTMENT (OUTPATIENT)
Dept: VASCULAR SURGERY | Age: 55
DRG: 175 | End: 2020-01-01
Attending: INTERNAL MEDICINE
Payer: OTHER GOVERNMENT

## 2020-01-01 ENCOUNTER — HOSPITAL ENCOUNTER (OUTPATIENT)
Dept: ULTRASOUND IMAGING | Age: 55
Discharge: HOME | DRG: 432 | End: 2020-12-18
Attending: HOSPITALIST
Payer: OTHER GOVERNMENT

## 2020-01-01 ENCOUNTER — APPOINTMENT (OUTPATIENT)
Dept: CT IMAGING | Age: 55
DRG: 432 | End: 2020-01-01
Attending: EMERGENCY MEDICINE
Payer: OTHER GOVERNMENT

## 2020-01-01 VITALS
RESPIRATION RATE: 17 BRPM | DIASTOLIC BLOOD PRESSURE: 68 MMHG | HEART RATE: 78 BPM | SYSTOLIC BLOOD PRESSURE: 110 MMHG | OXYGEN SATURATION: 97 % | TEMPERATURE: 98.2 F

## 2020-01-01 VITALS
HEART RATE: 116 BPM | DIASTOLIC BLOOD PRESSURE: 72 MMHG | TEMPERATURE: 98 F | OXYGEN SATURATION: 97 % | SYSTOLIC BLOOD PRESSURE: 100 MMHG

## 2020-01-01 VITALS — DIASTOLIC BLOOD PRESSURE: 74 MMHG | SYSTOLIC BLOOD PRESSURE: 121 MMHG | TEMPERATURE: 97.8 F

## 2020-01-01 VITALS
SYSTOLIC BLOOD PRESSURE: 120 MMHG | HEART RATE: 77 BPM | OXYGEN SATURATION: 97 % | DIASTOLIC BLOOD PRESSURE: 80 MMHG | TEMPERATURE: 96.9 F | RESPIRATION RATE: 16 BRPM

## 2020-01-01 VITALS
DIASTOLIC BLOOD PRESSURE: 73 MMHG | HEIGHT: 72 IN | BODY MASS INDEX: 18.58 KG/M2 | SYSTOLIC BLOOD PRESSURE: 108 MMHG | HEART RATE: 103 BPM | OXYGEN SATURATION: 100 % | RESPIRATION RATE: 16 BRPM | WEIGHT: 137.2 LBS | TEMPERATURE: 98.3 F

## 2020-01-01 VITALS
OXYGEN SATURATION: 98 % | DIASTOLIC BLOOD PRESSURE: 64 MMHG | SYSTOLIC BLOOD PRESSURE: 90 MMHG | HEART RATE: 116 BPM | TEMPERATURE: 97.8 F

## 2020-01-01 VITALS
HEIGHT: 69 IN | RESPIRATION RATE: 18 BRPM | SYSTOLIC BLOOD PRESSURE: 106 MMHG | BODY MASS INDEX: 20.73 KG/M2 | TEMPERATURE: 98.2 F | OXYGEN SATURATION: 99 % | HEART RATE: 91 BPM | DIASTOLIC BLOOD PRESSURE: 68 MMHG | WEIGHT: 139.99 LBS

## 2020-01-01 VITALS
TEMPERATURE: 98 F | DIASTOLIC BLOOD PRESSURE: 73 MMHG | SYSTOLIC BLOOD PRESSURE: 97 MMHG | HEART RATE: 84 BPM | RESPIRATION RATE: 16 BRPM | OXYGEN SATURATION: 96 %

## 2020-01-01 VITALS
HEART RATE: 103 BPM | WEIGHT: 145 LBS | OXYGEN SATURATION: 98 % | SYSTOLIC BLOOD PRESSURE: 98 MMHG | TEMPERATURE: 97.8 F | DIASTOLIC BLOOD PRESSURE: 75 MMHG | BODY MASS INDEX: 21.48 KG/M2 | RESPIRATION RATE: 16 BRPM | HEIGHT: 69 IN

## 2020-01-01 VITALS
RESPIRATION RATE: 18 BRPM | OXYGEN SATURATION: 99 % | TEMPERATURE: 98.6 F | DIASTOLIC BLOOD PRESSURE: 90 MMHG | HEART RATE: 97 BPM | SYSTOLIC BLOOD PRESSURE: 117 MMHG

## 2020-01-01 VITALS
HEART RATE: 121 BPM | OXYGEN SATURATION: 100 % | TEMPERATURE: 97.4 F | DIASTOLIC BLOOD PRESSURE: 74 MMHG | RESPIRATION RATE: 16 BRPM | SYSTOLIC BLOOD PRESSURE: 121 MMHG

## 2020-01-01 VITALS
RESPIRATION RATE: 16 BRPM | HEART RATE: 122 BPM | TEMPERATURE: 96.8 F | SYSTOLIC BLOOD PRESSURE: 107 MMHG | OXYGEN SATURATION: 100 % | DIASTOLIC BLOOD PRESSURE: 61 MMHG

## 2020-01-01 VITALS
OXYGEN SATURATION: 100 % | HEART RATE: 118 BPM | BODY MASS INDEX: 20.73 KG/M2 | TEMPERATURE: 97.7 F | HEIGHT: 69 IN | DIASTOLIC BLOOD PRESSURE: 80 MMHG | WEIGHT: 140 LBS | SYSTOLIC BLOOD PRESSURE: 107 MMHG | RESPIRATION RATE: 18 BRPM

## 2020-01-01 VITALS
TEMPERATURE: 98.4 F | HEART RATE: 113 BPM | RESPIRATION RATE: 18 BRPM | SYSTOLIC BLOOD PRESSURE: 96 MMHG | DIASTOLIC BLOOD PRESSURE: 70 MMHG | OXYGEN SATURATION: 100 %

## 2020-01-01 VITALS
DIASTOLIC BLOOD PRESSURE: 100 MMHG | TEMPERATURE: 97.7 F | SYSTOLIC BLOOD PRESSURE: 118 MMHG | HEART RATE: 116 BPM | OXYGEN SATURATION: 99 %

## 2020-01-01 VITALS
OXYGEN SATURATION: 97 % | HEART RATE: 113 BPM | TEMPERATURE: 97.5 F | DIASTOLIC BLOOD PRESSURE: 72 MMHG | SYSTOLIC BLOOD PRESSURE: 127 MMHG

## 2020-01-01 VITALS
SYSTOLIC BLOOD PRESSURE: 100 MMHG | BODY MASS INDEX: 17.77 KG/M2 | OXYGEN SATURATION: 100 % | RESPIRATION RATE: 16 BRPM | DIASTOLIC BLOOD PRESSURE: 64 MMHG | HEART RATE: 116 BPM | WEIGHT: 131 LBS

## 2020-01-01 VITALS
TEMPERATURE: 99.5 F | DIASTOLIC BLOOD PRESSURE: 64 MMHG | OXYGEN SATURATION: 100 % | HEIGHT: 69 IN | BODY MASS INDEX: 21.94 KG/M2 | HEART RATE: 97 BPM | SYSTOLIC BLOOD PRESSURE: 103 MMHG | WEIGHT: 148.1 LBS | RESPIRATION RATE: 15 BRPM

## 2020-01-01 VITALS
OXYGEN SATURATION: 99 % | SYSTOLIC BLOOD PRESSURE: 110 MMHG | RESPIRATION RATE: 18 BRPM | HEART RATE: 122 BPM | DIASTOLIC BLOOD PRESSURE: 94 MMHG | TEMPERATURE: 98.7 F

## 2020-01-01 VITALS
RESPIRATION RATE: 16 BRPM | DIASTOLIC BLOOD PRESSURE: 64 MMHG | OXYGEN SATURATION: 95 % | TEMPERATURE: 97.8 F | SYSTOLIC BLOOD PRESSURE: 122 MMHG | HEART RATE: 88 BPM

## 2020-01-01 VITALS
TEMPERATURE: 98 F | SYSTOLIC BLOOD PRESSURE: 100 MMHG | DIASTOLIC BLOOD PRESSURE: 70 MMHG | HEART RATE: 88 BPM | OXYGEN SATURATION: 94 %

## 2020-01-01 VITALS — HEART RATE: 84 BPM | RESPIRATION RATE: 16 BRPM | OXYGEN SATURATION: 96 %

## 2020-01-01 DIAGNOSIS — R74.01 TRANSAMINITIS: ICD-10-CM

## 2020-01-01 DIAGNOSIS — E87.6 HYPOKALEMIA: ICD-10-CM

## 2020-01-01 DIAGNOSIS — J69.0 ASPIRATION PNEUMONIA, UNSPECIFIED ASPIRATION PNEUMONIA TYPE, UNSPECIFIED LATERALITY, UNSPECIFIED PART OF LUNG (HCC): ICD-10-CM

## 2020-01-01 DIAGNOSIS — K72.00: ICD-10-CM

## 2020-01-01 DIAGNOSIS — K70.31 ALCOHOLIC CIRRHOSIS OF LIVER WITH ASCITES (HCC): ICD-10-CM

## 2020-01-01 DIAGNOSIS — D69.6 THROMBOCYTOPENIA (HCC): ICD-10-CM

## 2020-01-01 DIAGNOSIS — L89.309 PRESSURE INJURY OF SKIN OF BUTTOCK, UNSPECIFIED INJURY STAGE, UNSPECIFIED LATERALITY: ICD-10-CM

## 2020-01-01 DIAGNOSIS — D68.9 COAGULOPATHY (HCC): ICD-10-CM

## 2020-01-01 DIAGNOSIS — D68.9 COAGULOPATHY (HCC): Primary | ICD-10-CM

## 2020-01-01 DIAGNOSIS — F17.210 CIGARETTE NICOTINE DEPENDENCE WITHOUT COMPLICATION: ICD-10-CM

## 2020-01-01 DIAGNOSIS — D64.9 ANEMIA, UNSPECIFIED TYPE: ICD-10-CM

## 2020-01-01 DIAGNOSIS — E43 SEVERE PROTEIN-CALORIE MALNUTRITION (HCC): ICD-10-CM

## 2020-01-01 DIAGNOSIS — Z71.89 ADVANCED CARE PLANNING/COUNSELING DISCUSSION: ICD-10-CM

## 2020-01-01 DIAGNOSIS — K76.82 HEPATIC ENCEPHALOPATHY: ICD-10-CM

## 2020-01-01 DIAGNOSIS — L89.152 DECUBITUS ULCER OF SACRAL REGION, STAGE 2 (HCC): Primary | ICD-10-CM

## 2020-01-01 DIAGNOSIS — R73.9 HYPERGLYCEMIA: ICD-10-CM

## 2020-01-01 DIAGNOSIS — I26.93 SINGLE SUBSEGMENTAL PULMONARY EMBOLISM WITHOUT ACUTE COR PULMONALE (HCC): ICD-10-CM

## 2020-01-01 DIAGNOSIS — D65 DIC (DISSEMINATED INTRAVASCULAR COAGULATION) (HCC): ICD-10-CM

## 2020-01-01 DIAGNOSIS — L89.153 PRESSURE INJURY OF SACRAL REGION, STAGE 3 (HCC): ICD-10-CM

## 2020-01-01 DIAGNOSIS — R06.02 SOB (SHORTNESS OF BREATH): ICD-10-CM

## 2020-01-01 DIAGNOSIS — N17.9 AKI (ACUTE KIDNEY INJURY) (HCC): ICD-10-CM

## 2020-01-01 DIAGNOSIS — F10.239 ALCOHOL DEPENDENCE WITH WITHDRAWAL WITH COMPLICATION (HCC): Primary | ICD-10-CM

## 2020-01-01 DIAGNOSIS — E44.0 MODERATE PROTEIN-CALORIE MALNUTRITION (HCC): ICD-10-CM

## 2020-01-01 DIAGNOSIS — F10.20 ALCOHOLISM (HCC): ICD-10-CM

## 2020-01-01 DIAGNOSIS — Z00.00 NORMAL EXAM: ICD-10-CM

## 2020-01-01 DIAGNOSIS — K70.30 ALCOHOLIC CIRRHOSIS OF LIVER WITHOUT ASCITES (HCC): ICD-10-CM

## 2020-01-01 DIAGNOSIS — Z01.810 PREOPERATIVE CARDIOVASCULAR EXAMINATION: ICD-10-CM

## 2020-01-01 DIAGNOSIS — R94.31 ABNORMAL EKG: ICD-10-CM

## 2020-01-01 DIAGNOSIS — M62.59 ATROPHY OF MUSCLE OF MULTIPLE SITES: ICD-10-CM

## 2020-01-01 DIAGNOSIS — J96.01 ACUTE RESPIRATORY FAILURE WITH HYPOXEMIA (HCC): ICD-10-CM

## 2020-01-01 DIAGNOSIS — K70.9 ALCOHOLIC LIVER DISEASE (HCC): ICD-10-CM

## 2020-01-01 DIAGNOSIS — A41.9 SEPSIS WITHOUT ACUTE ORGAN DYSFUNCTION, DUE TO UNSPECIFIED ORGANISM (HCC): Primary | ICD-10-CM

## 2020-01-01 DIAGNOSIS — M19.012 OSTEOARTHRITIS OF LEFT SHOULDER, UNSPECIFIED OSTEOARTHRITIS TYPE: Primary | ICD-10-CM

## 2020-01-01 DIAGNOSIS — K70.31 ASCITES DUE TO ALCOHOLIC CIRRHOSIS (HCC): ICD-10-CM

## 2020-01-01 DIAGNOSIS — S40.012A CONTUSION OF LEFT SHOULDER, INITIAL ENCOUNTER: ICD-10-CM

## 2020-01-01 DIAGNOSIS — K86.0 ALCOHOL-INDUCED CHRONIC PANCREATITIS (HCC): ICD-10-CM

## 2020-01-01 DIAGNOSIS — K52.9 COLITIS: ICD-10-CM

## 2020-01-01 DIAGNOSIS — R65.20: ICD-10-CM

## 2020-01-01 DIAGNOSIS — S70.02XA CONTUSION OF LEFT HIP, INITIAL ENCOUNTER: ICD-10-CM

## 2020-01-01 DIAGNOSIS — E83.51 HYPOCALCEMIA: ICD-10-CM

## 2020-01-01 DIAGNOSIS — A40.8: ICD-10-CM

## 2020-01-01 LAB
ABO + RH BLD: NORMAL
ABO + RH BLD: NORMAL
ACTIN IGG SERPL-ACNC: 18 UNITS (ref 0–19)
ALBUMIN SERPL-MCNC: 2.2 G/DL (ref 3.4–5)
ALBUMIN SERPL-MCNC: 2.4 G/DL (ref 3.4–5)
ALBUMIN SERPL-MCNC: 2.5 G/DL (ref 3.4–5)
ALBUMIN SERPL-MCNC: 2.6 G/DL (ref 3.4–5)
ALBUMIN SERPL-MCNC: 2.7 G/DL (ref 3.4–5)
ALBUMIN SERPL-MCNC: 2.8 G/DL (ref 3.4–5)
ALBUMIN SERPL-MCNC: 2.8 G/DL (ref 3.4–5)
ALBUMIN SERPL-MCNC: 2.9 G/DL (ref 3.4–5)
ALBUMIN SERPL-MCNC: 2.9 G/DL (ref 3.4–5)
ALBUMIN SERPL-MCNC: 3 G/DL (ref 3.4–5)
ALBUMIN SERPL-MCNC: 3.2 G/DL (ref 3.4–5)
ALBUMIN SERPL-MCNC: 3.3 G/DL (ref 3.4–5)
ALBUMIN SERPL-MCNC: 3.3 G/DL (ref 3.4–5)
ALBUMIN SERPL-MCNC: 3.4 G/DL (ref 3.4–5)
ALBUMIN SERPL-MCNC: 3.5 G/DL (ref 3.4–5)
ALBUMIN SERPL-MCNC: 3.6 G/DL (ref 3.4–5)
ALBUMIN SERPL-MCNC: 3.7 G/DL (ref 3.4–5)
ALBUMIN SERPL-MCNC: 3.8 G/DL (ref 3.4–5)
ALBUMIN SERPL-MCNC: 3.9 G/DL (ref 3.4–5)
ALBUMIN SERPL-MCNC: 4 G/DL (ref 3.4–5)
ALBUMIN SERPL-MCNC: 4 G/DL (ref 3.4–5)
ALBUMIN SERPL-MCNC: 4.1 G/DL (ref 3.4–5)
ALBUMIN SERPL-MCNC: 4.1 G/DL (ref 3.4–5)
ALBUMIN SERPL-MCNC: 4.4 G/DL (ref 3.4–5)
ALBUMIN SERPL-MCNC: 5 G/DL (ref 3.4–5)
ALBUMIN/GLOB SERPL: 0.5 {RATIO} (ref 0.8–1.7)
ALBUMIN/GLOB SERPL: 0.6 {RATIO} (ref 0.8–1.7)
ALBUMIN/GLOB SERPL: 0.7 {RATIO} (ref 0.8–1.7)
ALBUMIN/GLOB SERPL: 0.9 {RATIO} (ref 0.8–1.7)
ALBUMIN/GLOB SERPL: 0.9 {RATIO} (ref 0.8–1.7)
ALBUMIN/GLOB SERPL: 1 {RATIO} (ref 0.8–1.7)
ALBUMIN/GLOB SERPL: 1 {RATIO} (ref 0.8–1.7)
ALBUMIN/GLOB SERPL: 1.1 {RATIO} (ref 0.8–1.7)
ALBUMIN/GLOB SERPL: 1.1 {RATIO} (ref 0.8–1.7)
ALBUMIN/GLOB SERPL: 1.2 {RATIO} (ref 0.8–1.7)
ALBUMIN/GLOB SERPL: 1.2 {RATIO} (ref 0.8–1.7)
ALBUMIN/GLOB SERPL: 1.3 {RATIO} (ref 0.8–1.7)
ALBUMIN/GLOB SERPL: 1.3 {RATIO} (ref 0.8–1.7)
ALBUMIN/GLOB SERPL: 1.4 {RATIO} (ref 0.8–1.7)
ALBUMIN/GLOB SERPL: 1.5 {RATIO} (ref 0.8–1.7)
ALBUMIN/GLOB SERPL: 1.6 {RATIO} (ref 0.8–1.7)
ALBUMIN/GLOB SERPL: 1.6 {RATIO} (ref 0.8–1.7)
ALBUMIN/GLOB SERPL: 1.7 {RATIO} (ref 0.8–1.7)
ALBUMIN/GLOB SERPL: 1.8 {RATIO} (ref 0.8–1.7)
ALBUMIN/GLOB SERPL: 1.9 {RATIO} (ref 0.8–1.7)
ALBUMIN/GLOB SERPL: 2 {RATIO} (ref 0.8–1.7)
ALBUMIN/GLOB SERPL: 2.1 {RATIO} (ref 0.8–1.7)
ALBUMIN/GLOB SERPL: 2.2 {RATIO} (ref 0.8–1.7)
ALBUMIN/GLOB SERPL: 2.6 {RATIO} (ref 0.8–1.7)
ALBUMIN/GLOB SERPL: 2.9 {RATIO} (ref 0.8–1.7)
ALP SERPL-CCNC: 106 U/L (ref 45–117)
ALP SERPL-CCNC: 24 U/L (ref 45–117)
ALP SERPL-CCNC: 25 U/L (ref 45–117)
ALP SERPL-CCNC: 29 U/L (ref 45–117)
ALP SERPL-CCNC: 29 U/L (ref 45–117)
ALP SERPL-CCNC: 34 U/L (ref 45–117)
ALP SERPL-CCNC: 37 U/L (ref 45–117)
ALP SERPL-CCNC: 39 U/L (ref 45–117)
ALP SERPL-CCNC: 39 U/L (ref 45–117)
ALP SERPL-CCNC: 40 U/L (ref 45–117)
ALP SERPL-CCNC: 41 U/L (ref 45–117)
ALP SERPL-CCNC: 43 U/L (ref 45–117)
ALP SERPL-CCNC: 44 U/L (ref 45–117)
ALP SERPL-CCNC: 45 U/L (ref 45–117)
ALP SERPL-CCNC: 46 U/L (ref 45–117)
ALP SERPL-CCNC: 47 U/L (ref 45–117)
ALP SERPL-CCNC: 48 U/L (ref 45–117)
ALP SERPL-CCNC: 48 U/L (ref 45–117)
ALP SERPL-CCNC: 49 U/L (ref 45–117)
ALP SERPL-CCNC: 50 U/L (ref 45–117)
ALP SERPL-CCNC: 51 U/L (ref 45–117)
ALP SERPL-CCNC: 53 U/L (ref 45–117)
ALP SERPL-CCNC: 54 U/L (ref 45–117)
ALP SERPL-CCNC: 55 U/L (ref 45–117)
ALP SERPL-CCNC: 55 U/L (ref 45–117)
ALP SERPL-CCNC: 57 U/L (ref 45–117)
ALP SERPL-CCNC: 57 U/L (ref 45–117)
ALP SERPL-CCNC: 59 U/L (ref 45–117)
ALP SERPL-CCNC: 59 U/L (ref 45–117)
ALP SERPL-CCNC: 61 U/L (ref 45–117)
ALP SERPL-CCNC: 66 U/L (ref 45–117)
ALP SERPL-CCNC: 67 U/L (ref 45–117)
ALP SERPL-CCNC: 68 U/L (ref 45–117)
ALP SERPL-CCNC: 73 U/L (ref 45–117)
ALP SERPL-CCNC: 76 U/L (ref 45–117)
ALT SERPL-CCNC: 11 U/L (ref 16–61)
ALT SERPL-CCNC: 12 U/L (ref 16–61)
ALT SERPL-CCNC: 12 U/L (ref 16–61)
ALT SERPL-CCNC: 14 U/L (ref 16–61)
ALT SERPL-CCNC: 15 U/L (ref 16–61)
ALT SERPL-CCNC: 15 U/L (ref 16–61)
ALT SERPL-CCNC: 16 U/L (ref 16–61)
ALT SERPL-CCNC: 16 U/L (ref 16–61)
ALT SERPL-CCNC: 17 U/L (ref 16–61)
ALT SERPL-CCNC: 20 U/L (ref 16–61)
ALT SERPL-CCNC: 22 U/L (ref 16–61)
ALT SERPL-CCNC: 22 U/L (ref 16–61)
ALT SERPL-CCNC: 24 U/L (ref 16–61)
ALT SERPL-CCNC: 24 U/L (ref 16–61)
ALT SERPL-CCNC: 31 U/L (ref 16–61)
ALT SERPL-CCNC: 36 U/L (ref 16–61)
ALT SERPL-CCNC: 37 U/L (ref 16–61)
ALT SERPL-CCNC: 38 U/L (ref 16–61)
ALT SERPL-CCNC: 40 U/L (ref 16–61)
ALT SERPL-CCNC: 41 U/L (ref 16–61)
ALT SERPL-CCNC: 41 U/L (ref 16–61)
ALT SERPL-CCNC: 42 U/L (ref 16–61)
ALT SERPL-CCNC: 43 U/L (ref 16–61)
ALT SERPL-CCNC: 44 U/L (ref 16–61)
ALT SERPL-CCNC: 48 U/L (ref 16–61)
ALT SERPL-CCNC: 48 U/L (ref 16–61)
ALT SERPL-CCNC: 49 U/L (ref 16–61)
ALT SERPL-CCNC: 52 U/L (ref 16–61)
ALT SERPL-CCNC: 59 U/L (ref 16–61)
ALT SERPL-CCNC: 8 U/L (ref 16–61)
ALT SERPL-CCNC: 81 U/L (ref 16–61)
ALT SERPL-CCNC: 9 U/L (ref 16–61)
AMMONIA PLAS-SCNC: 14 UMOL/L (ref 11–32)
AMMONIA PLAS-SCNC: 15 UMOL/L (ref 11–32)
AMMONIA PLAS-SCNC: 16 UMOL/L (ref 11–32)
AMMONIA PLAS-SCNC: 19 UMOL/L (ref 11–32)
AMMONIA PLAS-SCNC: 20 UMOL/L (ref 11–32)
AMMONIA PLAS-SCNC: 21 UMOL/L (ref 11–32)
AMMONIA PLAS-SCNC: 21 UMOL/L (ref 11–32)
AMMONIA PLAS-SCNC: 22 UMOL/L (ref 11–32)
AMMONIA PLAS-SCNC: 24 UMOL/L (ref 11–32)
AMMONIA PLAS-SCNC: 25 UMOL/L (ref 11–32)
AMMONIA PLAS-SCNC: 26 UMOL/L (ref 11–32)
AMMONIA PLAS-SCNC: 32 UMOL/L (ref 11–32)
AMMONIA PLAS-SCNC: 32 UMOL/L (ref 11–32)
AMMONIA PLAS-SCNC: 33 UMOL/L (ref 11–32)
AMMONIA PLAS-SCNC: 34 UMOL/L (ref 11–32)
AMMONIA PLAS-SCNC: 35 UMOL/L (ref 11–32)
AMMONIA PLAS-SCNC: 35 UMOL/L (ref 11–32)
AMMONIA PLAS-SCNC: 43 UMOL/L (ref 11–32)
AMMONIA PLAS-SCNC: 44 UMOL/L (ref 11–32)
AMMONIA PLAS-SCNC: 49 UMOL/L (ref 11–32)
AMMONIA PLAS-SCNC: 50 UMOL/L (ref 11–32)
AMMONIA PLAS-SCNC: 63 UMOL/L (ref 11–32)
AMPHET UR QL SCN: NEGATIVE
AMYLASE SERPL-CCNC: 28 U/L (ref 25–115)
AMYLASE SERPL-CCNC: 40 U/L (ref 25–115)
ANA SER QL: NEGATIVE
ANION GAP SERPL CALC-SCNC: 10 MMOL/L (ref 3–18)
ANION GAP SERPL CALC-SCNC: 11 MMOL/L (ref 3–18)
ANION GAP SERPL CALC-SCNC: 13 MMOL/L (ref 3–18)
ANION GAP SERPL CALC-SCNC: 14 MMOL/L (ref 3–18)
ANION GAP SERPL CALC-SCNC: 3 MMOL/L (ref 3–18)
ANION GAP SERPL CALC-SCNC: 4 MMOL/L (ref 3–18)
ANION GAP SERPL CALC-SCNC: 4 MMOL/L (ref 3–18)
ANION GAP SERPL CALC-SCNC: 6 MMOL/L (ref 3–18)
ANION GAP SERPL CALC-SCNC: 7 MMOL/L (ref 3–18)
ANION GAP SERPL CALC-SCNC: 8 MMOL/L (ref 3–18)
ANION GAP SERPL CALC-SCNC: 9 MMOL/L (ref 3–18)
APPEARANCE FLD: CLEAR
APPEARANCE UR: CLEAR
APTT HEX PL PPP: 7 SEC
APTT IMM NP PPP: 30 SEC
APTT PPP 1:1 SALINE: >121.9 SEC
APTT PPP: 122.3 SEC (ref 23–36.4)
APTT PPP: 134.4 SEC (ref 23–36.4)
APTT PPP: 138.4 SEC (ref 23–36.4)
APTT PPP: 41.1 SEC (ref 23–36.4)
APTT PPP: 55.6 SEC
APTT PPP: 61.9 SEC (ref 23–36.4)
APTT PPP: 68.8 SEC (ref 23–36.4)
APTT PPP: 69.6 SEC (ref 23–36.4)
APTT PPP: 71.3 SEC (ref 23–36.4)
APTT PPP: 74.5 SEC (ref 23–36.4)
APTT PPP: 74.8 SEC (ref 23–36.4)
APTT PPP: 77 SEC (ref 23–36.4)
APTT PPP: 78.4 SEC (ref 23–36.4)
APTT PPP: 83.1 SEC (ref 23–36.4)
APTT PPP: 83.3 SEC (ref 23–36.4)
APTT PPP: 86.5 SEC (ref 23–36.4)
APTT PPP: 98.7 SEC (ref 23–36.4)
APTT PPP: >180 SEC (ref 23–36.4)
ARTERIAL PATENCY WRIST A: ABNORMAL
ARTERIAL PATENCY WRIST A: YES
AST SERPL-CCNC: 102 U/L (ref 10–38)
AST SERPL-CCNC: 119 U/L (ref 10–38)
AST SERPL-CCNC: 14 U/L (ref 10–38)
AST SERPL-CCNC: 172 U/L (ref 10–38)
AST SERPL-CCNC: 18 U/L (ref 10–38)
AST SERPL-CCNC: 18 U/L (ref 10–38)
AST SERPL-CCNC: 19 U/L (ref 10–38)
AST SERPL-CCNC: 21 U/L (ref 10–38)
AST SERPL-CCNC: 26 U/L (ref 10–38)
AST SERPL-CCNC: 26 U/L (ref 10–38)
AST SERPL-CCNC: 28 U/L (ref 10–38)
AST SERPL-CCNC: 32 U/L (ref 10–38)
AST SERPL-CCNC: 33 U/L (ref 10–38)
AST SERPL-CCNC: 37 U/L (ref 10–38)
AST SERPL-CCNC: 39 U/L (ref 10–38)
AST SERPL-CCNC: 42 U/L (ref 10–38)
AST SERPL-CCNC: 42 U/L (ref 10–38)
AST SERPL-CCNC: 43 U/L (ref 10–38)
AST SERPL-CCNC: 43 U/L (ref 10–38)
AST SERPL-CCNC: 44 U/L (ref 10–38)
AST SERPL-CCNC: 46 U/L (ref 10–38)
AST SERPL-CCNC: 47 U/L (ref 10–38)
AST SERPL-CCNC: 48 U/L (ref 10–38)
AST SERPL-CCNC: 50 U/L (ref 10–38)
AST SERPL-CCNC: 54 U/L (ref 10–38)
AST SERPL-CCNC: 56 U/L (ref 10–38)
AST SERPL-CCNC: 56 U/L (ref 10–38)
AST SERPL-CCNC: 63 U/L (ref 10–38)
AST SERPL-CCNC: 65 U/L (ref 10–38)
AST SERPL-CCNC: 68 U/L (ref 10–38)
AST SERPL-CCNC: 68 U/L (ref 10–38)
AST SERPL-CCNC: 72 U/L (ref 10–38)
AST SERPL-CCNC: 73 U/L (ref 10–38)
AST SERPL-CCNC: 86 U/L (ref 10–38)
AST SERPL-CCNC: 86 U/L (ref 10–38)
ATRIAL RATE: 104 BPM
ATRIAL RATE: 108 BPM
ATRIAL RATE: 120 BPM
ATRIAL RATE: 357 BPM
ATRIAL RATE: 88 BPM
AV VELOCITY RATIO: 0.97
AV VELOCITY RATIO: 1
AV VELOCITY RATIO: 1.75
AV VTI RATIO: 0.8
AV VTI RATIO: 0.9
AV VTI RATIO: 1
B2 GLYCOPROT1 IGA SER-ACNC: <10 SAU
B2 GLYCOPROT1 IGA SER-ACNC: <9 GPI IGA UNITS (ref 0–25)
B2 GLYCOPROT1 IGG SER-ACNC: <10 SGU
B2 GLYCOPROT1 IGG SER-ACNC: <9 GPI IGG UNITS (ref 0–20)
B2 GLYCOPROT1 IGM SER-ACNC: <10 SMU
B2 GLYCOPROT1 IGM SER-ACNC: <9 GPI IGM UNITS (ref 0–32)
BACTERIA SPEC CULT: ABNORMAL
BACTERIA SPEC CULT: NORMAL
BARBITURATES UR QL SCN: NEGATIVE
BASE DEFICIT BLD-SCNC: 2 MMOL/L
BASE DEFICIT BLD-SCNC: 2 MMOL/L
BASE DEFICIT BLD-SCNC: 3 MMOL/L
BASE DEFICIT BLD-SCNC: 9 MMOL/L
BASE EXCESS BLD CALC-SCNC: 1 MMOL/L
BASE EXCESS BLD CALC-SCNC: 11 MMOL/L
BASE EXCESS BLD CALC-SCNC: 2 MMOL/L
BASE EXCESS BLD CALC-SCNC: 6 MMOL/L
BASE EXCESS BLD CALC-SCNC: 8 MMOL/L
BASE EXCESS BLDV CALC-SCNC: 6 MMOL/L
BASOPHILS # BLD: 0 K/UL (ref 0–0.1)
BASOPHILS # BLD: 0.1 K/UL (ref 0–0.1)
BASOPHILS NFR BLD: 0 % (ref 0–2)
BASOPHILS NFR BLD: 0 % (ref 0–3)
BASOPHILS NFR BLD: 1 % (ref 0–2)
BASOPHILS NFR BLD: 2 % (ref 0–2)
BDY SITE: ABNORMAL
BENZODIAZ UR QL: NEGATIVE
BILIRUB SERPL-MCNC: 1.1 MG/DL (ref 0.2–1)
BILIRUB SERPL-MCNC: 1.4 MG/DL (ref 0.2–1)
BILIRUB SERPL-MCNC: 1.5 MG/DL (ref 0.2–1)
BILIRUB SERPL-MCNC: 1.6 MG/DL (ref 0.2–1)
BILIRUB SERPL-MCNC: 1.7 MG/DL (ref 0.2–1)
BILIRUB SERPL-MCNC: 1.9 MG/DL (ref 0.2–1)
BILIRUB SERPL-MCNC: 2 MG/DL (ref 0.2–1)
BILIRUB SERPL-MCNC: 2.1 MG/DL (ref 0.2–1)
BILIRUB SERPL-MCNC: 2.2 MG/DL (ref 0.2–1)
BILIRUB SERPL-MCNC: 2.3 MG/DL (ref 0.2–1)
BILIRUB SERPL-MCNC: 2.9 MG/DL (ref 0.2–1)
BILIRUB SERPL-MCNC: 3 MG/DL (ref 0.2–1)
BILIRUB SERPL-MCNC: 3.1 MG/DL (ref 0.2–1)
BILIRUB SERPL-MCNC: 3.2 MG/DL (ref 0.2–1)
BILIRUB SERPL-MCNC: 3.3 MG/DL (ref 0.2–1)
BILIRUB SERPL-MCNC: 3.3 MG/DL (ref 0.2–1)
BILIRUB SERPL-MCNC: 3.6 MG/DL (ref 0.2–1)
BILIRUB SERPL-MCNC: 3.6 MG/DL (ref 0.2–1)
BILIRUB SERPL-MCNC: 3.7 MG/DL (ref 0.2–1)
BILIRUB SERPL-MCNC: 3.8 MG/DL (ref 0.2–1)
BILIRUB SERPL-MCNC: 4 MG/DL (ref 0.2–1)
BILIRUB SERPL-MCNC: 4.1 MG/DL (ref 0.2–1)
BILIRUB SERPL-MCNC: 4.1 MG/DL (ref 0.2–1)
BILIRUB SERPL-MCNC: 4.2 MG/DL (ref 0.2–1)
BILIRUB SERPL-MCNC: 4.3 MG/DL (ref 0.2–1)
BILIRUB SERPL-MCNC: 4.4 MG/DL (ref 0.2–1)
BILIRUB SERPL-MCNC: 4.4 MG/DL (ref 0.2–1)
BILIRUB SERPL-MCNC: 4.7 MG/DL (ref 0.2–1)
BILIRUB SERPL-MCNC: 4.8 MG/DL (ref 0.2–1)
BILIRUB SERPL-MCNC: 5 MG/DL (ref 0.2–1)
BILIRUB SERPL-MCNC: 5.1 MG/DL (ref 0.2–1)
BILIRUB SERPL-MCNC: 5.1 MG/DL (ref 0.2–1)
BILIRUB SERPL-MCNC: 5.5 MG/DL (ref 0.2–1)
BILIRUB UR QL: ABNORMAL
BLD PROD TYP BPU: NORMAL
BLOOD GROUP ANTIBODIES SERPL: NORMAL
BLOOD GROUP ANTIBODIES SERPL: NORMAL
BNP SERPL-MCNC: 407 PG/ML (ref 0–900)
BODY TEMPERATURE: 97.7
BODY TEMPERATURE: 98.1
BODY TEMPERATURE: 98.3
BODY TEMPERATURE: 98.4
BODY TEMPERATURE: 98.7
BPU ID: NORMAL
BUN SERPL-MCNC: 10 MG/DL (ref 7–18)
BUN SERPL-MCNC: 11 MG/DL (ref 7–18)
BUN SERPL-MCNC: 12 MG/DL (ref 7–18)
BUN SERPL-MCNC: 12 MG/DL (ref 7–18)
BUN SERPL-MCNC: 13 MG/DL (ref 7–18)
BUN SERPL-MCNC: 15 MG/DL (ref 7–18)
BUN SERPL-MCNC: 16 MG/DL (ref 7–18)
BUN SERPL-MCNC: 17 MG/DL (ref 7–18)
BUN SERPL-MCNC: 19 MG/DL (ref 7–18)
BUN SERPL-MCNC: 2 MG/DL (ref 7–18)
BUN SERPL-MCNC: 21 MG/DL (ref 7–18)
BUN SERPL-MCNC: 22 MG/DL (ref 7–18)
BUN SERPL-MCNC: 3 MG/DL (ref 7–18)
BUN SERPL-MCNC: 4 MG/DL (ref 7–18)
BUN SERPL-MCNC: 4 MG/DL (ref 7–18)
BUN SERPL-MCNC: 40 MG/DL (ref 7–18)
BUN SERPL-MCNC: 5 MG/DL (ref 7–18)
BUN SERPL-MCNC: 6 MG/DL (ref 7–18)
BUN SERPL-MCNC: 7 MG/DL (ref 7–18)
BUN SERPL-MCNC: 8 MG/DL (ref 7–18)
BUN SERPL-MCNC: 8 MG/DL (ref 7–18)
BUN SERPL-MCNC: 9 MG/DL (ref 7–18)
BUN/CREAT SERPL: 10 (ref 12–20)
BUN/CREAT SERPL: 11 (ref 12–20)
BUN/CREAT SERPL: 12 (ref 12–20)
BUN/CREAT SERPL: 13 (ref 12–20)
BUN/CREAT SERPL: 14 (ref 12–20)
BUN/CREAT SERPL: 15 (ref 12–20)
BUN/CREAT SERPL: 16 (ref 12–20)
BUN/CREAT SERPL: 17 (ref 12–20)
BUN/CREAT SERPL: 18 (ref 12–20)
BUN/CREAT SERPL: 19 (ref 12–20)
BUN/CREAT SERPL: 19 (ref 12–20)
BUN/CREAT SERPL: 2 (ref 12–20)
BUN/CREAT SERPL: 2 (ref 12–20)
BUN/CREAT SERPL: 21 (ref 12–20)
BUN/CREAT SERPL: 23 (ref 12–20)
BUN/CREAT SERPL: 24 (ref 12–20)
BUN/CREAT SERPL: 25 (ref 12–20)
BUN/CREAT SERPL: 3 (ref 12–20)
BUN/CREAT SERPL: 4 (ref 12–20)
BUN/CREAT SERPL: 5 (ref 12–20)
BUN/CREAT SERPL: 5 (ref 12–20)
BUN/CREAT SERPL: 6 (ref 12–20)
BUN/CREAT SERPL: 7 (ref 12–20)
BUN/CREAT SERPL: 7 (ref 12–20)
BUN/CREAT SERPL: 8 (ref 12–20)
BUN/CREAT SERPL: 9 (ref 12–20)
CA-I SERPL-SCNC: 0.82 MMOL/L (ref 1.12–1.32)
CA-I SERPL-SCNC: 0.92 MMOL/L (ref 1.12–1.32)
CA-I SERPL-SCNC: 0.98 MMOL/L (ref 1.12–1.32)
CA-I SERPL-SCNC: 1 MMOL/L (ref 1.12–1.32)
CA-I SERPL-SCNC: 1 MMOL/L (ref 1.12–1.32)
CA-I SERPL-SCNC: 1.05 MMOL/L (ref 1.12–1.32)
CA-I SERPL-SCNC: 1.06 MMOL/L (ref 1.12–1.32)
CA-I SERPL-SCNC: 1.06 MMOL/L (ref 1.12–1.32)
CA-I SERPL-SCNC: 1.08 MMOL/L (ref 1.12–1.32)
CA-I SERPL-SCNC: 1.09 MMOL/L (ref 1.12–1.32)
CA-I SERPL-SCNC: 1.11 MMOL/L (ref 1.12–1.32)
CA-I SERPL-SCNC: 1.11 MMOL/L (ref 1.12–1.32)
CA-I SERPL-SCNC: 1.12 MMOL/L (ref 1.12–1.32)
CA-I SERPL-SCNC: 1.13 MMOL/L (ref 1.12–1.32)
CA-I SERPL-SCNC: 1.18 MMOL/L (ref 1.12–1.32)
CA-I SERPL-SCNC: 1.21 MMOL/L (ref 1.12–1.32)
CALCIUM SERPL-MCNC: 10 MG/DL (ref 8.5–10.1)
CALCIUM SERPL-MCNC: 10.1 MG/DL (ref 8.5–10.1)
CALCIUM SERPL-MCNC: 6.6 MG/DL (ref 8.5–10.1)
CALCIUM SERPL-MCNC: 7 MG/DL (ref 8.5–10.1)
CALCIUM SERPL-MCNC: 7.5 MG/DL (ref 8.5–10.1)
CALCIUM SERPL-MCNC: 7.5 MG/DL (ref 8.5–10.1)
CALCIUM SERPL-MCNC: 7.6 MG/DL (ref 8.5–10.1)
CALCIUM SERPL-MCNC: 7.6 MG/DL (ref 8.5–10.1)
CALCIUM SERPL-MCNC: 7.8 MG/DL (ref 8.5–10.1)
CALCIUM SERPL-MCNC: 8 MG/DL (ref 8.5–10.1)
CALCIUM SERPL-MCNC: 8.1 MG/DL (ref 8.5–10.1)
CALCIUM SERPL-MCNC: 8.2 MG/DL (ref 8.5–10.1)
CALCIUM SERPL-MCNC: 8.2 MG/DL (ref 8.5–10.1)
CALCIUM SERPL-MCNC: 8.3 MG/DL (ref 8.5–10.1)
CALCIUM SERPL-MCNC: 8.3 MG/DL (ref 8.5–10.1)
CALCIUM SERPL-MCNC: 8.4 MG/DL (ref 8.5–10.1)
CALCIUM SERPL-MCNC: 8.5 MG/DL (ref 8.5–10.1)
CALCIUM SERPL-MCNC: 8.5 MG/DL (ref 8.5–10.1)
CALCIUM SERPL-MCNC: 8.6 MG/DL (ref 8.5–10.1)
CALCIUM SERPL-MCNC: 8.7 MG/DL (ref 8.5–10.1)
CALCIUM SERPL-MCNC: 8.7 MG/DL (ref 8.5–10.1)
CALCIUM SERPL-MCNC: 8.9 MG/DL (ref 8.5–10.1)
CALCIUM SERPL-MCNC: 9.1 MG/DL (ref 8.5–10.1)
CALCIUM SERPL-MCNC: 9.2 MG/DL (ref 8.5–10.1)
CALCIUM SERPL-MCNC: 9.3 MG/DL (ref 8.5–10.1)
CALCIUM SERPL-MCNC: 9.4 MG/DL (ref 8.5–10.1)
CALCIUM SERPL-MCNC: 9.5 MG/DL (ref 8.5–10.1)
CALCIUM SERPL-MCNC: 9.6 MG/DL (ref 8.5–10.1)
CALCIUM SERPL-MCNC: 9.7 MG/DL (ref 8.5–10.1)
CALCIUM SERPL-MCNC: 9.7 MG/DL (ref 8.5–10.1)
CALCULATED P AXIS, ECG09: -90 DEGREES
CALCULATED P AXIS, ECG09: -90 DEGREES
CALCULATED P AXIS, ECG09: 66 DEGREES
CALCULATED R AXIS, ECG10: -6 DEGREES
CALCULATED R AXIS, ECG10: -7 DEGREES
CALCULATED R AXIS, ECG10: 16 DEGREES
CALCULATED R AXIS, ECG10: 33 DEGREES
CALCULATED R AXIS, ECG10: 39 DEGREES
CALCULATED T AXIS, ECG11: -12 DEGREES
CALCULATED T AXIS, ECG11: -5 DEGREES
CALCULATED T AXIS, ECG11: 17 DEGREES
CALCULATED T AXIS, ECG11: 28 DEGREES
CALCULATED T AXIS, ECG11: 4 DEGREES
CALLED TO:,BCALL1: NORMAL
CANNABINOIDS UR QL SCN: NEGATIVE
CARDIOLIPIN IGA SER IA-ACNC: <9 APL U/ML (ref 0–11)
CARDIOLIPIN IGG SER IA-ACNC: <10 GPL
CARDIOLIPIN IGM SER IA-ACNC: <10 MPL
CERULOPLASMIN SERPL-MCNC: 13.7 MG/DL (ref 16–31)
CHLORIDE SERPL-SCNC: 100 MMOL/L (ref 100–111)
CHLORIDE SERPL-SCNC: 100 MMOL/L (ref 100–111)
CHLORIDE SERPL-SCNC: 101 MMOL/L (ref 100–111)
CHLORIDE SERPL-SCNC: 101 MMOL/L (ref 100–111)
CHLORIDE SERPL-SCNC: 102 MMOL/L (ref 100–111)
CHLORIDE SERPL-SCNC: 103 MMOL/L (ref 100–111)
CHLORIDE SERPL-SCNC: 104 MMOL/L (ref 100–111)
CHLORIDE SERPL-SCNC: 105 MMOL/L (ref 100–111)
CHLORIDE SERPL-SCNC: 106 MMOL/L (ref 100–111)
CHLORIDE SERPL-SCNC: 107 MMOL/L (ref 100–111)
CHLORIDE SERPL-SCNC: 108 MMOL/L (ref 100–111)
CHLORIDE SERPL-SCNC: 108 MMOL/L (ref 100–111)
CHLORIDE SERPL-SCNC: 109 MMOL/L (ref 100–111)
CHLORIDE SERPL-SCNC: 110 MMOL/L (ref 100–111)
CHLORIDE SERPL-SCNC: 111 MMOL/L (ref 100–111)
CHLORIDE SERPL-SCNC: 111 MMOL/L (ref 100–111)
CHLORIDE SERPL-SCNC: 112 MMOL/L (ref 100–111)
CHLORIDE SERPL-SCNC: 112 MMOL/L (ref 100–111)
CHLORIDE SERPL-SCNC: 113 MMOL/L (ref 100–111)
CHLORIDE SERPL-SCNC: 95 MMOL/L (ref 100–111)
CHLORIDE SERPL-SCNC: 98 MMOL/L (ref 100–111)
CHLORIDE SERPL-SCNC: 98 MMOL/L (ref 100–111)
CHLORIDE SERPL-SCNC: 99 MMOL/L (ref 100–111)
CK MB CFR SERPL CALC: 2.3 % (ref 0–4)
CK MB CFR SERPL CALC: 2.5 % (ref 0–4)
CK MB CFR SERPL CALC: 2.6 % (ref 0–4)
CK MB CFR SERPL CALC: 2.9 % (ref 0–4)
CK MB CFR SERPL CALC: 3.1 % (ref 0–4)
CK MB CFR SERPL CALC: 3.1 % (ref 0–4)
CK MB CFR SERPL CALC: 4 % (ref 0–4)
CK MB CFR SERPL CALC: ABNORMAL % (ref 0–4)
CK MB CFR SERPL CALC: NORMAL % (ref 0–4)
CK MB SERPL-MCNC: 1.5 NG/ML (ref 5–25)
CK MB SERPL-MCNC: 1.6 NG/ML (ref 5–25)
CK MB SERPL-MCNC: 1.6 NG/ML (ref 5–25)
CK MB SERPL-MCNC: 1.7 NG/ML (ref 5–25)
CK MB SERPL-MCNC: 1.7 NG/ML (ref 5–25)
CK MB SERPL-MCNC: 2.1 NG/ML (ref 5–25)
CK MB SERPL-MCNC: 6.8 NG/ML (ref 5–25)
CK MB SERPL-MCNC: <1 NG/ML (ref 5–25)
CK MB SERPL-MCNC: <1 NG/ML (ref 5–25)
CK SERPL-CCNC: 295 U/L (ref 39–308)
CK SERPL-CCNC: 37 U/L (ref 39–308)
CK SERPL-CCNC: 39 U/L (ref 39–308)
CK SERPL-CCNC: 48 U/L (ref 39–308)
CK SERPL-CCNC: 52 U/L (ref 39–308)
CK SERPL-CCNC: 53 U/L (ref 39–308)
CK SERPL-CCNC: 58 U/L (ref 39–308)
CK SERPL-CCNC: 61 U/L (ref 39–308)
CK SERPL-CCNC: 69 U/L (ref 39–308)
CO2 SERPL-SCNC: 21 MMOL/L (ref 21–32)
CO2 SERPL-SCNC: 21 MMOL/L (ref 21–32)
CO2 SERPL-SCNC: 22 MMOL/L (ref 21–32)
CO2 SERPL-SCNC: 23 MMOL/L (ref 21–32)
CO2 SERPL-SCNC: 24 MMOL/L (ref 21–32)
CO2 SERPL-SCNC: 25 MMOL/L (ref 21–32)
CO2 SERPL-SCNC: 26 MMOL/L (ref 21–32)
CO2 SERPL-SCNC: 27 MMOL/L (ref 21–32)
CO2 SERPL-SCNC: 28 MMOL/L (ref 21–32)
CO2 SERPL-SCNC: 29 MMOL/L (ref 21–32)
CO2 SERPL-SCNC: 30 MMOL/L (ref 21–32)
CO2 SERPL-SCNC: 30 MMOL/L (ref 21–32)
CO2 SERPL-SCNC: 31 MMOL/L (ref 21–32)
CO2 SERPL-SCNC: 31 MMOL/L (ref 21–32)
CO2 SERPL-SCNC: 32 MMOL/L (ref 21–32)
CO2 SERPL-SCNC: 33 MMOL/L (ref 21–32)
CO2 SERPL-SCNC: 35 MMOL/L (ref 21–32)
COCAINE UR QL SCN: NEGATIVE
COLOR FLD: YELLOW
COLOR UR: ABNORMAL
CONFIRM DRVVT: 117.8 SEC
COVID-19 RAPID TEST, COVR: NOT DETECTED
CREAT SERPL-MCNC: 0.61 MG/DL (ref 0.6–1.3)
CREAT SERPL-MCNC: 0.61 MG/DL (ref 0.6–1.3)
CREAT SERPL-MCNC: 0.62 MG/DL (ref 0.6–1.3)
CREAT SERPL-MCNC: 0.63 MG/DL (ref 0.6–1.3)
CREAT SERPL-MCNC: 0.65 MG/DL (ref 0.6–1.3)
CREAT SERPL-MCNC: 0.67 MG/DL (ref 0.6–1.3)
CREAT SERPL-MCNC: 0.77 MG/DL (ref 0.6–1.3)
CREAT SERPL-MCNC: 0.77 MG/DL (ref 0.6–1.3)
CREAT SERPL-MCNC: 0.78 MG/DL (ref 0.6–1.3)
CREAT SERPL-MCNC: 0.8 MG/DL (ref 0.6–1.3)
CREAT SERPL-MCNC: 0.8 MG/DL (ref 0.6–1.3)
CREAT SERPL-MCNC: 0.81 MG/DL (ref 0.6–1.3)
CREAT SERPL-MCNC: 0.81 MG/DL (ref 0.6–1.3)
CREAT SERPL-MCNC: 0.82 MG/DL (ref 0.6–1.3)
CREAT SERPL-MCNC: 0.82 MG/DL (ref 0.6–1.3)
CREAT SERPL-MCNC: 0.84 MG/DL (ref 0.6–1.3)
CREAT SERPL-MCNC: 0.85 MG/DL (ref 0.6–1.3)
CREAT SERPL-MCNC: 0.86 MG/DL (ref 0.6–1.3)
CREAT SERPL-MCNC: 0.87 MG/DL (ref 0.6–1.3)
CREAT SERPL-MCNC: 0.87 MG/DL (ref 0.6–1.3)
CREAT SERPL-MCNC: 0.88 MG/DL (ref 0.6–1.3)
CREAT SERPL-MCNC: 0.89 MG/DL (ref 0.6–1.3)
CREAT SERPL-MCNC: 0.89 MG/DL (ref 0.6–1.3)
CREAT SERPL-MCNC: 0.9 MG/DL (ref 0.6–1.3)
CREAT SERPL-MCNC: 0.91 MG/DL (ref 0.6–1.3)
CREAT SERPL-MCNC: 0.91 MG/DL (ref 0.6–1.3)
CREAT SERPL-MCNC: 0.93 MG/DL (ref 0.6–1.3)
CREAT SERPL-MCNC: 0.95 MG/DL (ref 0.6–1.3)
CREAT SERPL-MCNC: 0.95 MG/DL (ref 0.6–1.3)
CREAT SERPL-MCNC: 0.96 MG/DL (ref 0.6–1.3)
CREAT SERPL-MCNC: 0.98 MG/DL (ref 0.6–1.3)
CREAT SERPL-MCNC: 1 MG/DL (ref 0.6–1.3)
CREAT SERPL-MCNC: 1.05 MG/DL (ref 0.6–1.3)
CREAT SERPL-MCNC: 1.07 MG/DL (ref 0.6–1.3)
CREAT SERPL-MCNC: 1.08 MG/DL (ref 0.6–1.3)
CREAT SERPL-MCNC: 1.09 MG/DL (ref 0.6–1.3)
CREAT SERPL-MCNC: 1.11 MG/DL (ref 0.6–1.3)
CREAT SERPL-MCNC: 1.2 MG/DL (ref 0.6–1.3)
CREAT SERPL-MCNC: 1.58 MG/DL (ref 0.6–1.3)
D DIMER PPP FEU-MCNC: 3.43 UG/ML(FEU)
D DIMER PPP FEU-MCNC: 3.8 UG/ML(FEU)
D DIMER PPP FEU-MCNC: 5.37 UG/ML(FEU)
D DIMER PPP FEU-MCNC: 7.84 UG/ML(FEU)
DAT POLY-SP REAG RBC QL: NORMAL
DATE LAST DOSE: NORMAL
DATE LAST DOSE: NORMAL
DIAGNOSIS, 93000: NORMAL
DIFFERENTIAL METHOD BLD: ABNORMAL
ECHO AO ROOT DIAM: 3.72 CM
ECHO AV ANNULUS DIAM: 3.74 CM
ECHO AV ANNULUS DIAM: 4.31 CM
ECHO AV AREA PEAK VELOCITY: 2500.49 CM2
ECHO AV AREA PEAK VELOCITY: 4.1 CM2
ECHO AV AREA PEAK VELOCITY: 4.2 CM2
ECHO AV AREA PEAK VELOCITY: 4.23 CM2
ECHO AV AREA VTI: 3.5 CM2
ECHO AV AREA VTI: 4.1 CM2
ECHO AV AREA VTI: 4.45 CM2
ECHO AV AREA/BSA PEAK VELOCITY: 2.2 CM2/M2
ECHO AV AREA/BSA PEAK VELOCITY: 2.3 CM2/M2
ECHO AV AREA/BSA VTI: 2 CM2/M2
ECHO AV AREA/BSA VTI: 2.2 CM2/M2
ECHO AV AREA/BSA VTI: 2.6 CM2/M2
ECHO AV MEAN GRADIENT: 2.5 MMHG
ECHO AV MEAN GRADIENT: 3.8 MMHG
ECHO AV MEAN GRADIENT: 6.61 MMHG
ECHO AV MEAN VELOCITY: 0.76 M/S
ECHO AV MEAN VELOCITY: 0.92 M/S
ECHO AV MEAN VELOCITY: 1.23 M/S
ECHO AV PEAK GRADIENT: 0 MMHG
ECHO AV PEAK GRADIENT: 11.22 MMHG
ECHO AV PEAK GRADIENT: 4.2 MMHG
ECHO AV PEAK GRADIENT: 6.4 MMHG
ECHO AV PEAK VELOCITY: 0.28 CM/S
ECHO AV PEAK VELOCITY: 102.33 CM/S
ECHO AV PEAK VELOCITY: 126.32 CM/S
ECHO AV PEAK VELOCITY: 167.5 CM/S
ECHO AV VTI: 22.03 CM
ECHO AV VTI: 24.69 CM
ECHO AV VTI: 30.59 CM
ECHO IVC PROX: 1.9 CM
ECHO IVC PROX: 2 CM
ECHO IVC PROX: 2.02 CM
ECHO LA AREA 2C: 7.17 CM2
ECHO LA AREA 2C: 8.58 CM2
ECHO LA AREA 4C: 15.4 CM2
ECHO LA AREA 4C: 8.7 CM2
ECHO LA MAJOR AXIS: 2.72 CM
ECHO LA MAJOR AXIS: 3.79 CM
ECHO LA MAJOR AXIS: 4.09 CM
ECHO LA MINOR AXIS: 1.49 CM
ECHO LA MINOR AXIS: 2.12 CM
ECHO LA MINOR AXIS: 2.35 CM
ECHO LA TO AORTIC ROOT RATIO: 1.1
ECHO LA VOL 2C: 12.02 ML (ref 18–58)
ECHO LA VOL 2C: 16.47 ML (ref 18–58)
ECHO LA VOL 2C: 46.04 ML (ref 18–58)
ECHO LA VOL 4C: 13.94 ML (ref 18–58)
ECHO LA VOL 4C: 32.85 ML (ref 18–58)
ECHO LA VOL 4C: 46.02 ML (ref 18–58)
ECHO LA VOL BP: 16.29 ML (ref 18–58)
ECHO LA VOL BP: 32.26 ML (ref 18–58)
ECHO LA VOL BP: 56.19 ML (ref 18–58)
ECHO LA VOL/BSA BIPLANE: 32.25 ML/M2 (ref 16–28)
ECHO LA VOLUME INDEX A2C: 26.42 ML/M2 (ref 16–28)
ECHO LA VOLUME INDEX A2C: 6.57 ML/M2 (ref 16–28)
ECHO LA VOLUME INDEX A2C: 9.2 ML/M2 (ref 16–28)
ECHO LA VOLUME INDEX A4C: 18.34 ML/M2 (ref 16–28)
ECHO LA VOLUME INDEX A4C: 26.41 ML/M2 (ref 16–28)
ECHO LA VOLUME INDEX A4C: 7.61 ML/M2 (ref 16–28)
ECHO LV E' LATERAL VELOCITY: 12 CM/S
ECHO LV E' LATERAL VELOCITY: 13 CM/S
ECHO LV E' LATERAL VELOCITY: 7 CM/S
ECHO LV E' SEPTAL VELOCITY: 10 CM/S
ECHO LV E' SEPTAL VELOCITY: 6 CM/S
ECHO LV E' SEPTAL VELOCITY: 7 CM/S
ECHO LV EDV A2C: 52.2 ML
ECHO LV EDV A2C: 58.1 ML
ECHO LV EDV A2C: 69.02 ML
ECHO LV EDV A4C: 110.32 ML
ECHO LV EDV A4C: 57.3 ML
ECHO LV EDV A4C: 81.3 ML
ECHO LV EDV BP: 59.7 ML (ref 67–155)
ECHO LV EDV BP: 67.3 ML (ref 67–155)
ECHO LV EDV BP: 87.16 ML (ref 67–155)
ECHO LV EDV INDEX A4C: 31.3 ML/M2
ECHO LV EDV INDEX A4C: 45.4 ML/M2
ECHO LV EDV INDEX A4C: 63.3 ML/M2
ECHO LV EDV INDEX BP: 32.6 ML/M2
ECHO LV EDV INDEX BP: 37.6 ML/M2
ECHO LV EDV INDEX BP: 50 ML/M2
ECHO LV EDV NDEX A2C: 29.1 ML/M2
ECHO LV EDV NDEX A2C: 31.7 ML/M2
ECHO LV EDV NDEX A2C: 39.6 ML/M2
ECHO LV EDV TEICHHOLZ: 0.3 ML
ECHO LV EDV TEICHHOLZ: 0.44 ML
ECHO LV EDV TEICHHOLZ: 0.83 ML
ECHO LV EJECTION FRACTION A2C: 58 %
ECHO LV EJECTION FRACTION A2C: 58 %
ECHO LV EJECTION FRACTION A2C: 64 PERCENT
ECHO LV EJECTION FRACTION A4C: 50 %
ECHO LV EJECTION FRACTION A4C: 67 %
ECHO LV EJECTION FRACTION A4C: 71 PERCENT
ECHO LV EJECTION FRACTION BIPLANE: 54.5 % (ref 55–100)
ECHO LV EJECTION FRACTION BIPLANE: 63.9 % (ref 55–100)
ECHO LV EJECTION FRACTION BIPLANE: 67.3 PERCENT (ref 55–100)
ECHO LV ESV A2C: 21.8 ML
ECHO LV ESV A2C: 24.5 ML
ECHO LV ESV A2C: 24.98 ML
ECHO LV ESV A4C: 27.3 ML
ECHO LV ESV A4C: 28.7 ML
ECHO LV ESV A4C: 32.04 ML
ECHO LV ESV BP: 24.3 ML (ref 22–58)
ECHO LV ESV BP: 27.2 ML (ref 22–58)
ECHO LV ESV BP: 28.46 ML (ref 22–58)
ECHO LV ESV INDEX A2C: 12.2 ML/M2
ECHO LV ESV INDEX A2C: 13.4 ML/M2
ECHO LV ESV INDEX A2C: 14.3 ML/M2
ECHO LV ESV INDEX A4C: 15.2 ML/M2
ECHO LV ESV INDEX A4C: 15.7 ML/M2
ECHO LV ESV INDEX A4C: 18.4 ML/M2
ECHO LV ESV INDEX BP: 13.6 ML/M2
ECHO LV ESV INDEX BP: 14.9 ML/M2
ECHO LV ESV INDEX BP: 16.3 ML/M2
ECHO LV ESV TEICHHOLZ: 0.15 ML
ECHO LV ESV TEICHHOLZ: 0.24 ML
ECHO LV ESV TEICHHOLZ: 0.25 ML
ECHO LV INTERNAL DIMENSION DIASTOLIC: 3.62 CM (ref 4.2–5.9)
ECHO LV INTERNAL DIMENSION DIASTOLIC: 4.17 CM (ref 4.2–5.9)
ECHO LV INTERNAL DIMENSION DIASTOLIC: 5.46 CM (ref 4.2–5.9)
ECHO LV INTERNAL DIMENSION SYSTOLIC: 2.72 CM
ECHO LV INTERNAL DIMENSION SYSTOLIC: 3.23 CM
ECHO LV INTERNAL DIMENSION SYSTOLIC: 3.31 CM
ECHO LV IVSD: 0.92 CM (ref 0.6–1)
ECHO LV IVSD: 1.02 CM (ref 0.6–1)
ECHO LV IVSD: 1.34 CM (ref 0.6–1)
ECHO LV MASS 2D: 133.8 G (ref 88–224)
ECHO LV MASS 2D: 177.2 G (ref 88–224)
ECHO LV MASS 2D: 234.7 G (ref 88–224)
ECHO LV MASS INDEX 2D: 134.7 G/M2 (ref 49–115)
ECHO LV MASS INDEX 2D: 74.7 G/M2 (ref 49–115)
ECHO LV MASS INDEX 2D: 96.8 G/M2 (ref 49–115)
ECHO LV POSTERIOR WALL DIASTOLIC: 1.06 CM (ref 0.6–1)
ECHO LV POSTERIOR WALL DIASTOLIC: 1.15 CM (ref 0.6–1)
ECHO LV POSTERIOR WALL DIASTOLIC: 1.42 CM (ref 0.6–1)
ECHO LV POSTERIOR WALL SYSTOLIC: 0 CM
ECHO LV POSTERIOR WALL SYSTOLIC: 0 CM
ECHO LVOT CARDIAC OUTPUT: 15.8 L/MIN
ECHO LVOT CARDIAC OUTPUT: 21.9 L/MIN
ECHO LVOT DIAM: 2.32 CM
ECHO LVOT DIAM: 2.33 CM
ECHO LVOT DIAM: 2.48 CM
ECHO LVOT PEAK GRADIENT: 4.2 MMHG
ECHO LVOT PEAK GRADIENT: 6 MMHG
ECHO LVOT PEAK GRADIENT: 8.66 MMHG
ECHO LVOT PEAK VELOCITY: 101.95 CM/S
ECHO LVOT PEAK VELOCITY: 122.8 CM/S
ECHO LVOT PEAK VELOCITY: 147.11 CM/S
ECHO LVOT SV: 100.9 ML
ECHO LVOT SV: 136.1 ML
ECHO LVOT SV: 44.1 ML
ECHO LVOT SV: 76.4 ML
ECHO LVOT VTI: 17.93 CM
ECHO LVOT VTI: 23.9 CM
ECHO LVOT VTI: 28.28 CM
ECHO MV A VELOCITY: 75.54 CM/S
ECHO MV A VELOCITY: 82.3 CM/S
ECHO MV A VELOCITY: 90.38 CM/S
ECHO MV AREA PHT: 3.86 CM2
ECHO MV AREA PHT: 4.9 CM2
ECHO MV AREA PHT: 6.4 CM2
ECHO MV E DECELERATION TIME (DT): 119.2 MS
ECHO MV E DECELERATION TIME (DT): 156 MS
ECHO MV E DECELERATION TIME (DT): 196.41 MS
ECHO MV E VELOCITY: 104.09 CM/S
ECHO MV E VELOCITY: 66.13 CM/S
ECHO MV E VELOCITY: 79.07 CM/S
ECHO MV E/A RATIO: 0.73
ECHO MV E/A RATIO: 0.96
ECHO MV E/A RATIO: 1.38
ECHO MV E/E' LATERAL: 6.59
ECHO MV E/E' LATERAL: 8.01
ECHO MV E/E' LATERAL: 9.45
ECHO MV E/E' RATIO (AVERAGED): 10.23
ECHO MV E/E' RATIO (AVERAGED): 8.94
ECHO MV E/E' RATIO (AVERAGED): 9.21
ECHO MV E/E' SEPTAL: 10.41
ECHO MV E/E' SEPTAL: 11.02
ECHO MV E/E' SEPTAL: 11.3
ECHO MV PRESSURE HALF TIME (PHT): 34.6 MS
ECHO MV PRESSURE HALF TIME (PHT): 45.3 MS
ECHO MV PRESSURE HALF TIME (PHT): 56.96 MS
ECHO RA AREA 4C: 17.18 CM2
ECHO RA AREA 4C: 7.46 CM2
ECHO RA AREA 4C: 9.64 CM2
ECHO RA VOLUME: 46.9 ML
ECHO RA VOLUME: 9.8 ML
ECHO RV INTERNAL DIMENSION: 2.19 CM
ECHO RV INTERNAL DIMENSION: 2.24 CM
ECHO RV INTERNAL DIMENSION: 2.32 CM
ECHO RV TAPSE: 1.9 CM (ref 1.5–2)
ECHO RV TAPSE: 2.1 CM (ref 1.5–2)
ECHO RV TAPSE: 2.3 CM (ref 1.5–2)
EOSINOPHIL # BLD: 0 K/UL (ref 0–0.4)
EOSINOPHIL # BLD: 0.1 K/UL (ref 0–0.4)
EOSINOPHIL # BLD: 0.2 K/UL (ref 0–0.4)
EOSINOPHIL NFR BLD: 0 % (ref 0–5)
EOSINOPHIL NFR BLD: 1 % (ref 0–5)
EOSINOPHIL NFR BLD: 2 % (ref 0–5)
EOSINOPHIL NFR BLD: 3 % (ref 0–5)
EOSINOPHIL NFR BLD: 4 % (ref 0–5)
EOSINOPHIL NFR BLD: 4 % (ref 0–5)
EOSINOPHIL NFR FLD MANUAL: 0 %
ERYTHROCYTE [DISTWIDTH] IN BLOOD BY AUTOMATED COUNT: 14.3 % (ref 11.6–14.5)
ERYTHROCYTE [DISTWIDTH] IN BLOOD BY AUTOMATED COUNT: 14.4 % (ref 11.6–14.5)
ERYTHROCYTE [DISTWIDTH] IN BLOOD BY AUTOMATED COUNT: 14.5 % (ref 11.6–14.5)
ERYTHROCYTE [DISTWIDTH] IN BLOOD BY AUTOMATED COUNT: 14.6 % (ref 11.6–14.5)
ERYTHROCYTE [DISTWIDTH] IN BLOOD BY AUTOMATED COUNT: 14.7 % (ref 11.6–14.5)
ERYTHROCYTE [DISTWIDTH] IN BLOOD BY AUTOMATED COUNT: 14.8 % (ref 11.6–14.5)
ERYTHROCYTE [DISTWIDTH] IN BLOOD BY AUTOMATED COUNT: 14.9 % (ref 11.6–14.5)
ERYTHROCYTE [DISTWIDTH] IN BLOOD BY AUTOMATED COUNT: 15 % (ref 11.6–14.5)
ERYTHROCYTE [DISTWIDTH] IN BLOOD BY AUTOMATED COUNT: 15.6 % (ref 11.6–14.5)
ERYTHROCYTE [DISTWIDTH] IN BLOOD BY AUTOMATED COUNT: 15.6 % (ref 11.6–14.5)
ERYTHROCYTE [DISTWIDTH] IN BLOOD BY AUTOMATED COUNT: 15.9 % (ref 11.6–14.5)
ERYTHROCYTE [DISTWIDTH] IN BLOOD BY AUTOMATED COUNT: 16 % (ref 11.6–14.5)
ERYTHROCYTE [DISTWIDTH] IN BLOOD BY AUTOMATED COUNT: 16.1 % (ref 11.6–14.5)
ERYTHROCYTE [DISTWIDTH] IN BLOOD BY AUTOMATED COUNT: 16.6 % (ref 11.6–14.5)
ERYTHROCYTE [DISTWIDTH] IN BLOOD BY AUTOMATED COUNT: 16.7 % (ref 11.6–14.5)
ERYTHROCYTE [DISTWIDTH] IN BLOOD BY AUTOMATED COUNT: 16.7 % (ref 11.6–14.5)
ERYTHROCYTE [DISTWIDTH] IN BLOOD BY AUTOMATED COUNT: 17.2 % (ref 11.6–14.5)
ERYTHROCYTE [DISTWIDTH] IN BLOOD BY AUTOMATED COUNT: 18 % (ref 11.6–14.5)
ERYTHROCYTE [DISTWIDTH] IN BLOOD BY AUTOMATED COUNT: 18.6 % (ref 11.6–14.5)
ERYTHROCYTE [DISTWIDTH] IN BLOOD BY AUTOMATED COUNT: 18.7 % (ref 11.6–14.5)
ERYTHROCYTE [DISTWIDTH] IN BLOOD BY AUTOMATED COUNT: 19.6 % (ref 11.6–14.5)
ERYTHROCYTE [DISTWIDTH] IN BLOOD BY AUTOMATED COUNT: 20 % (ref 11.6–14.5)
ERYTHROCYTE [DISTWIDTH] IN BLOOD BY AUTOMATED COUNT: 20.1 % (ref 11.6–14.5)
ERYTHROCYTE [DISTWIDTH] IN BLOOD BY AUTOMATED COUNT: 20.5 % (ref 11.6–14.5)
ERYTHROCYTE [DISTWIDTH] IN BLOOD BY AUTOMATED COUNT: 21.1 % (ref 11.6–14.5)
ERYTHROCYTE [DISTWIDTH] IN BLOOD BY AUTOMATED COUNT: 21.1 % (ref 11.6–14.5)
EST. AVERAGE GLUCOSE BLD GHB EST-MCNC: 114 MG/DL
EST. AVERAGE GLUCOSE BLD GHB EST-MCNC: 120 MG/DL
ETHANOL SERPL-MCNC: <3 MG/DL (ref 0–3)
FDP BLD QL AGGL: NORMAL UG/ML
FERRITIN SERPL-MCNC: 227 NG/ML (ref 8–388)
FIBRINOGEN PPP-MCNC: 127 MG/DL (ref 210–451)
FIBRINOGEN PPP-MCNC: 129 MG/DL (ref 210–451)
FIBRINOGEN PPP-MCNC: 145 MG/DL (ref 210–451)
FIBRINOGEN PPP-MCNC: 147 MG/DL (ref 210–451)
FIBRINOGEN PPP-MCNC: 149 MG/DL (ref 210–451)
FIBRINOGEN PPP-MCNC: 195 MG/DL (ref 210–451)
FIBRINOGEN PPP-MCNC: 209 MG/DL (ref 210–451)
FIBRINOGEN PPP-MCNC: 226 MG/DL (ref 210–451)
FIBRINOGEN PPP-MCNC: 260 MG/DL (ref 210–451)
FIBRINOGEN PPP-MCNC: 75 MG/DL (ref 210–451)
FIBRINOGEN PPP-MCNC: 85 MG/DL (ref 210–451)
FIBRINOGEN PPP-MCNC: 85 MG/DL (ref 210–451)
FIBRINOGEN PPP-MCNC: 92 MG/DL (ref 210–451)
GAS FLOW.O2 O2 DELIVERY SYS: ABNORMAL L/MIN
GAS FLOW.O2 SETTING OXYMISER: 14 BPM
GAS FLOW.O2 SETTING OXYMISER: 15 L/M
GAS FLOW.O2 SETTING OXYMISER: 16 BPM
GAS FLOW.O2 SETTING OXYMISER: 18 BPM
GAS FLOW.O2 SETTING OXYMISER: 18 BPM
GAS FLOW.O2 SETTING OXYMISER: 22 BPM
GLOBULIN SER CALC-MCNC: 1.7 G/DL (ref 2–4)
GLOBULIN SER CALC-MCNC: 1.7 G/DL (ref 2–4)
GLOBULIN SER CALC-MCNC: 1.8 G/DL (ref 2–4)
GLOBULIN SER CALC-MCNC: 1.9 G/DL (ref 2–4)
GLOBULIN SER CALC-MCNC: 1.9 G/DL (ref 2–4)
GLOBULIN SER CALC-MCNC: 2 G/DL (ref 2–4)
GLOBULIN SER CALC-MCNC: 2 G/DL (ref 2–4)
GLOBULIN SER CALC-MCNC: 2.2 G/DL (ref 2–4)
GLOBULIN SER CALC-MCNC: 2.3 G/DL (ref 2–4)
GLOBULIN SER CALC-MCNC: 2.3 G/DL (ref 2–4)
GLOBULIN SER CALC-MCNC: 2.4 G/DL (ref 2–4)
GLOBULIN SER CALC-MCNC: 2.6 G/DL (ref 2–4)
GLOBULIN SER CALC-MCNC: 2.7 G/DL (ref 2–4)
GLOBULIN SER CALC-MCNC: 2.8 G/DL (ref 2–4)
GLOBULIN SER CALC-MCNC: 2.8 G/DL (ref 2–4)
GLOBULIN SER CALC-MCNC: 2.9 G/DL (ref 2–4)
GLOBULIN SER CALC-MCNC: 3.1 G/DL (ref 2–4)
GLOBULIN SER CALC-MCNC: 3.3 G/DL (ref 2–4)
GLOBULIN SER CALC-MCNC: 3.4 G/DL (ref 2–4)
GLOBULIN SER CALC-MCNC: 3.5 G/DL (ref 2–4)
GLOBULIN SER CALC-MCNC: 3.8 G/DL (ref 2–4)
GLOBULIN SER CALC-MCNC: 4.1 G/DL (ref 2–4)
GLOBULIN SER CALC-MCNC: 4.3 G/DL (ref 2–4)
GLOBULIN SER CALC-MCNC: 4.5 G/DL (ref 2–4)
GLUCOSE BLD STRIP.AUTO-MCNC: 100 MG/DL (ref 70–110)
GLUCOSE BLD STRIP.AUTO-MCNC: 100 MG/DL (ref 70–110)
GLUCOSE BLD STRIP.AUTO-MCNC: 101 MG/DL (ref 70–110)
GLUCOSE BLD STRIP.AUTO-MCNC: 102 MG/DL (ref 70–110)
GLUCOSE BLD STRIP.AUTO-MCNC: 103 MG/DL (ref 70–110)
GLUCOSE BLD STRIP.AUTO-MCNC: 105 MG/DL (ref 70–110)
GLUCOSE BLD STRIP.AUTO-MCNC: 110 MG/DL (ref 70–110)
GLUCOSE BLD STRIP.AUTO-MCNC: 112 MG/DL (ref 70–110)
GLUCOSE BLD STRIP.AUTO-MCNC: 115 MG/DL (ref 70–110)
GLUCOSE BLD STRIP.AUTO-MCNC: 117 MG/DL (ref 70–110)
GLUCOSE BLD STRIP.AUTO-MCNC: 118 MG/DL (ref 70–110)
GLUCOSE BLD STRIP.AUTO-MCNC: 119 MG/DL (ref 70–110)
GLUCOSE BLD STRIP.AUTO-MCNC: 119 MG/DL (ref 70–110)
GLUCOSE BLD STRIP.AUTO-MCNC: 120 MG/DL (ref 70–110)
GLUCOSE BLD STRIP.AUTO-MCNC: 120 MG/DL (ref 70–110)
GLUCOSE BLD STRIP.AUTO-MCNC: 121 MG/DL (ref 70–110)
GLUCOSE BLD STRIP.AUTO-MCNC: 121 MG/DL (ref 70–110)
GLUCOSE BLD STRIP.AUTO-MCNC: 122 MG/DL (ref 70–110)
GLUCOSE BLD STRIP.AUTO-MCNC: 122 MG/DL (ref 70–110)
GLUCOSE BLD STRIP.AUTO-MCNC: 124 MG/DL (ref 70–110)
GLUCOSE BLD STRIP.AUTO-MCNC: 124 MG/DL (ref 70–110)
GLUCOSE BLD STRIP.AUTO-MCNC: 125 MG/DL (ref 70–110)
GLUCOSE BLD STRIP.AUTO-MCNC: 125 MG/DL (ref 70–110)
GLUCOSE BLD STRIP.AUTO-MCNC: 127 MG/DL (ref 70–110)
GLUCOSE BLD STRIP.AUTO-MCNC: 129 MG/DL (ref 70–110)
GLUCOSE BLD STRIP.AUTO-MCNC: 130 MG/DL (ref 70–110)
GLUCOSE BLD STRIP.AUTO-MCNC: 132 MG/DL (ref 70–110)
GLUCOSE BLD STRIP.AUTO-MCNC: 134 MG/DL (ref 70–110)
GLUCOSE BLD STRIP.AUTO-MCNC: 136 MG/DL (ref 70–110)
GLUCOSE BLD STRIP.AUTO-MCNC: 137 MG/DL (ref 70–110)
GLUCOSE BLD STRIP.AUTO-MCNC: 138 MG/DL (ref 70–110)
GLUCOSE BLD STRIP.AUTO-MCNC: 139 MG/DL (ref 70–110)
GLUCOSE BLD STRIP.AUTO-MCNC: 141 MG/DL (ref 70–110)
GLUCOSE BLD STRIP.AUTO-MCNC: 142 MG/DL (ref 70–110)
GLUCOSE BLD STRIP.AUTO-MCNC: 143 MG/DL (ref 70–110)
GLUCOSE BLD STRIP.AUTO-MCNC: 143 MG/DL (ref 70–110)
GLUCOSE BLD STRIP.AUTO-MCNC: 144 MG/DL (ref 70–110)
GLUCOSE BLD STRIP.AUTO-MCNC: 145 MG/DL (ref 70–110)
GLUCOSE BLD STRIP.AUTO-MCNC: 146 MG/DL (ref 70–110)
GLUCOSE BLD STRIP.AUTO-MCNC: 147 MG/DL (ref 70–110)
GLUCOSE BLD STRIP.AUTO-MCNC: 150 MG/DL (ref 70–110)
GLUCOSE BLD STRIP.AUTO-MCNC: 153 MG/DL (ref 70–110)
GLUCOSE BLD STRIP.AUTO-MCNC: 157 MG/DL (ref 70–110)
GLUCOSE BLD STRIP.AUTO-MCNC: 158 MG/DL (ref 70–110)
GLUCOSE BLD STRIP.AUTO-MCNC: 158 MG/DL (ref 70–110)
GLUCOSE BLD STRIP.AUTO-MCNC: 162 MG/DL (ref 70–110)
GLUCOSE BLD STRIP.AUTO-MCNC: 165 MG/DL (ref 70–110)
GLUCOSE BLD STRIP.AUTO-MCNC: 166 MG/DL (ref 70–110)
GLUCOSE BLD STRIP.AUTO-MCNC: 166 MG/DL (ref 70–110)
GLUCOSE BLD STRIP.AUTO-MCNC: 172 MG/DL (ref 70–110)
GLUCOSE BLD STRIP.AUTO-MCNC: 176 MG/DL (ref 70–110)
GLUCOSE BLD STRIP.AUTO-MCNC: 176 MG/DL (ref 70–110)
GLUCOSE BLD STRIP.AUTO-MCNC: 178 MG/DL (ref 70–110)
GLUCOSE BLD STRIP.AUTO-MCNC: 181 MG/DL (ref 70–110)
GLUCOSE BLD STRIP.AUTO-MCNC: 181 MG/DL (ref 70–110)
GLUCOSE BLD STRIP.AUTO-MCNC: 182 MG/DL (ref 70–110)
GLUCOSE BLD STRIP.AUTO-MCNC: 183 MG/DL (ref 70–110)
GLUCOSE BLD STRIP.AUTO-MCNC: 185 MG/DL (ref 70–110)
GLUCOSE BLD STRIP.AUTO-MCNC: 185 MG/DL (ref 70–110)
GLUCOSE BLD STRIP.AUTO-MCNC: 188 MG/DL (ref 70–110)
GLUCOSE BLD STRIP.AUTO-MCNC: 188 MG/DL (ref 70–110)
GLUCOSE BLD STRIP.AUTO-MCNC: 189 MG/DL (ref 70–110)
GLUCOSE BLD STRIP.AUTO-MCNC: 189 MG/DL (ref 70–110)
GLUCOSE BLD STRIP.AUTO-MCNC: 190 MG/DL (ref 70–110)
GLUCOSE BLD STRIP.AUTO-MCNC: 191 MG/DL (ref 70–110)
GLUCOSE BLD STRIP.AUTO-MCNC: 192 MG/DL (ref 70–110)
GLUCOSE BLD STRIP.AUTO-MCNC: 194 MG/DL (ref 70–110)
GLUCOSE BLD STRIP.AUTO-MCNC: 197 MG/DL (ref 70–110)
GLUCOSE BLD STRIP.AUTO-MCNC: 199 MG/DL (ref 70–110)
GLUCOSE BLD STRIP.AUTO-MCNC: 199 MG/DL (ref 70–110)
GLUCOSE BLD STRIP.AUTO-MCNC: 201 MG/DL (ref 70–110)
GLUCOSE BLD STRIP.AUTO-MCNC: 204 MG/DL (ref 70–110)
GLUCOSE BLD STRIP.AUTO-MCNC: 204 MG/DL (ref 70–110)
GLUCOSE BLD STRIP.AUTO-MCNC: 205 MG/DL (ref 70–110)
GLUCOSE BLD STRIP.AUTO-MCNC: 206 MG/DL (ref 70–110)
GLUCOSE BLD STRIP.AUTO-MCNC: 209 MG/DL (ref 70–110)
GLUCOSE BLD STRIP.AUTO-MCNC: 211 MG/DL (ref 70–110)
GLUCOSE BLD STRIP.AUTO-MCNC: 212 MG/DL (ref 70–110)
GLUCOSE BLD STRIP.AUTO-MCNC: 213 MG/DL (ref 70–110)
GLUCOSE BLD STRIP.AUTO-MCNC: 215 MG/DL (ref 70–110)
GLUCOSE BLD STRIP.AUTO-MCNC: 223 MG/DL (ref 70–110)
GLUCOSE BLD STRIP.AUTO-MCNC: 224 MG/DL (ref 70–110)
GLUCOSE BLD STRIP.AUTO-MCNC: 229 MG/DL (ref 70–110)
GLUCOSE BLD STRIP.AUTO-MCNC: 229 MG/DL (ref 70–110)
GLUCOSE BLD STRIP.AUTO-MCNC: 230 MG/DL (ref 70–110)
GLUCOSE BLD STRIP.AUTO-MCNC: 232 MG/DL (ref 70–110)
GLUCOSE BLD STRIP.AUTO-MCNC: 233 MG/DL (ref 70–110)
GLUCOSE BLD STRIP.AUTO-MCNC: 235 MG/DL (ref 70–110)
GLUCOSE BLD STRIP.AUTO-MCNC: 238 MG/DL (ref 70–110)
GLUCOSE BLD STRIP.AUTO-MCNC: 241 MG/DL (ref 70–110)
GLUCOSE BLD STRIP.AUTO-MCNC: 244 MG/DL (ref 70–110)
GLUCOSE BLD STRIP.AUTO-MCNC: 244 MG/DL (ref 70–110)
GLUCOSE BLD STRIP.AUTO-MCNC: 245 MG/DL (ref 70–110)
GLUCOSE BLD STRIP.AUTO-MCNC: 250 MG/DL (ref 70–110)
GLUCOSE BLD STRIP.AUTO-MCNC: 250 MG/DL (ref 70–110)
GLUCOSE BLD STRIP.AUTO-MCNC: 252 MG/DL (ref 70–110)
GLUCOSE BLD STRIP.AUTO-MCNC: 254 MG/DL (ref 70–110)
GLUCOSE BLD STRIP.AUTO-MCNC: 264 MG/DL (ref 70–110)
GLUCOSE BLD STRIP.AUTO-MCNC: 265 MG/DL (ref 70–110)
GLUCOSE BLD STRIP.AUTO-MCNC: 267 MG/DL (ref 70–110)
GLUCOSE BLD STRIP.AUTO-MCNC: 268 MG/DL (ref 70–110)
GLUCOSE BLD STRIP.AUTO-MCNC: 269 MG/DL (ref 70–110)
GLUCOSE BLD STRIP.AUTO-MCNC: 270 MG/DL (ref 70–110)
GLUCOSE BLD STRIP.AUTO-MCNC: 272 MG/DL (ref 70–110)
GLUCOSE BLD STRIP.AUTO-MCNC: 281 MG/DL (ref 70–110)
GLUCOSE BLD STRIP.AUTO-MCNC: 285 MG/DL (ref 70–110)
GLUCOSE BLD STRIP.AUTO-MCNC: 289 MG/DL (ref 70–110)
GLUCOSE BLD STRIP.AUTO-MCNC: 297 MG/DL (ref 70–110)
GLUCOSE BLD STRIP.AUTO-MCNC: 302 MG/DL (ref 70–110)
GLUCOSE BLD STRIP.AUTO-MCNC: 302 MG/DL (ref 70–110)
GLUCOSE BLD STRIP.AUTO-MCNC: 311 MG/DL (ref 70–110)
GLUCOSE BLD STRIP.AUTO-MCNC: 312 MG/DL (ref 70–110)
GLUCOSE BLD STRIP.AUTO-MCNC: 317 MG/DL (ref 70–110)
GLUCOSE BLD STRIP.AUTO-MCNC: 325 MG/DL (ref 70–110)
GLUCOSE BLD STRIP.AUTO-MCNC: 326 MG/DL (ref 70–110)
GLUCOSE BLD STRIP.AUTO-MCNC: 328 MG/DL (ref 70–110)
GLUCOSE BLD STRIP.AUTO-MCNC: 332 MG/DL (ref 70–110)
GLUCOSE BLD STRIP.AUTO-MCNC: 353 MG/DL (ref 70–110)
GLUCOSE BLD STRIP.AUTO-MCNC: 367 MG/DL (ref 70–110)
GLUCOSE BLD STRIP.AUTO-MCNC: 375 MG/DL (ref 70–110)
GLUCOSE BLD STRIP.AUTO-MCNC: 395 MG/DL (ref 70–110)
GLUCOSE BLD STRIP.AUTO-MCNC: 409 MG/DL (ref 70–110)
GLUCOSE BLD STRIP.AUTO-MCNC: 69 MG/DL (ref 70–110)
GLUCOSE BLD STRIP.AUTO-MCNC: 70 MG/DL (ref 70–110)
GLUCOSE BLD STRIP.AUTO-MCNC: 79 MG/DL (ref 70–110)
GLUCOSE BLD STRIP.AUTO-MCNC: 89 MG/DL (ref 70–110)
GLUCOSE BLD STRIP.AUTO-MCNC: 90 MG/DL (ref 70–110)
GLUCOSE BLD STRIP.AUTO-MCNC: 91 MG/DL (ref 70–110)
GLUCOSE BLD STRIP.AUTO-MCNC: 95 MG/DL (ref 70–110)
GLUCOSE BLD STRIP.AUTO-MCNC: 97 MG/DL (ref 70–110)
GLUCOSE BLD STRIP.AUTO-MCNC: 99 MG/DL (ref 70–110)
GLUCOSE SERPL-MCNC: 110 MG/DL (ref 74–99)
GLUCOSE SERPL-MCNC: 112 MG/DL (ref 74–99)
GLUCOSE SERPL-MCNC: 114 MG/DL (ref 74–99)
GLUCOSE SERPL-MCNC: 120 MG/DL (ref 74–99)
GLUCOSE SERPL-MCNC: 123 MG/DL (ref 74–99)
GLUCOSE SERPL-MCNC: 125 MG/DL (ref 74–99)
GLUCOSE SERPL-MCNC: 134 MG/DL (ref 74–99)
GLUCOSE SERPL-MCNC: 135 MG/DL (ref 74–99)
GLUCOSE SERPL-MCNC: 139 MG/DL (ref 74–99)
GLUCOSE SERPL-MCNC: 139 MG/DL (ref 74–99)
GLUCOSE SERPL-MCNC: 141 MG/DL (ref 74–99)
GLUCOSE SERPL-MCNC: 143 MG/DL (ref 74–99)
GLUCOSE SERPL-MCNC: 145 MG/DL (ref 74–99)
GLUCOSE SERPL-MCNC: 147 MG/DL (ref 74–99)
GLUCOSE SERPL-MCNC: 147 MG/DL (ref 74–99)
GLUCOSE SERPL-MCNC: 149 MG/DL (ref 74–99)
GLUCOSE SERPL-MCNC: 151 MG/DL (ref 74–99)
GLUCOSE SERPL-MCNC: 153 MG/DL (ref 74–99)
GLUCOSE SERPL-MCNC: 154 MG/DL (ref 74–99)
GLUCOSE SERPL-MCNC: 159 MG/DL (ref 74–99)
GLUCOSE SERPL-MCNC: 162 MG/DL (ref 74–99)
GLUCOSE SERPL-MCNC: 164 MG/DL (ref 74–99)
GLUCOSE SERPL-MCNC: 165 MG/DL (ref 74–99)
GLUCOSE SERPL-MCNC: 168 MG/DL (ref 74–99)
GLUCOSE SERPL-MCNC: 173 MG/DL (ref 74–99)
GLUCOSE SERPL-MCNC: 181 MG/DL (ref 74–99)
GLUCOSE SERPL-MCNC: 186 MG/DL (ref 74–99)
GLUCOSE SERPL-MCNC: 187 MG/DL (ref 74–99)
GLUCOSE SERPL-MCNC: 188 MG/DL (ref 74–99)
GLUCOSE SERPL-MCNC: 188 MG/DL (ref 74–99)
GLUCOSE SERPL-MCNC: 199 MG/DL (ref 74–99)
GLUCOSE SERPL-MCNC: 200 MG/DL (ref 74–99)
GLUCOSE SERPL-MCNC: 203 MG/DL (ref 74–99)
GLUCOSE SERPL-MCNC: 215 MG/DL (ref 74–99)
GLUCOSE SERPL-MCNC: 249 MG/DL (ref 74–99)
GLUCOSE SERPL-MCNC: 251 MG/DL (ref 74–99)
GLUCOSE SERPL-MCNC: 301 MG/DL (ref 74–99)
GLUCOSE SERPL-MCNC: 316 MG/DL (ref 74–99)
GLUCOSE SERPL-MCNC: 83 MG/DL (ref 74–99)
GLUCOSE SERPL-MCNC: 89 MG/DL (ref 74–99)
GLUCOSE SERPL-MCNC: 91 MG/DL (ref 74–99)
GLUCOSE SERPL-MCNC: 95 MG/DL (ref 74–99)
GLUCOSE UR STRIP.AUTO-MCNC: NEGATIVE MG/DL
GRAM STN SPEC: ABNORMAL
GRAM STN SPEC: NORMAL
GRAM STN SPEC: NORMAL
H PYLORI IGA SER-ACNC: <9 UNITS (ref 0–8.9)
H PYLORI IGG SER IA-ACNC: 0.37 INDEX VALUE (ref 0–0.79)
HAV AB SER QL IA: POSITIVE
HBA1C MFR BLD: 5.6 % (ref 4.2–5.6)
HBA1C MFR BLD: 5.8 % (ref 4.2–5.6)
HBV CORE AB SERPL QL IA: NEGATIVE
HBV SURFACE AB SER QL IA: NEGATIVE
HBV SURFACE AB SER QL IA: NEGATIVE
HBV SURFACE AB SERPL IA-ACNC: 5.85 MIU/ML
HBV SURFACE AB SERPL IA-ACNC: <3.1 MIU/ML
HBV SURFACE AG SER QL: 0.11 INDEX
HBV SURFACE AG SER QL: NEGATIVE
HCO3 BLD-SCNC: 19.2 MMOL/L (ref 22–26)
HCO3 BLD-SCNC: 23.1 MMOL/L (ref 22–26)
HCO3 BLD-SCNC: 23.8 MMOL/L (ref 22–26)
HCO3 BLD-SCNC: 24.4 MMOL/L (ref 22–26)
HCO3 BLD-SCNC: 26.3 MMOL/L (ref 22–26)
HCO3 BLD-SCNC: 26.7 MMOL/L (ref 22–26)
HCO3 BLD-SCNC: 27.6 MMOL/L (ref 22–26)
HCO3 BLD-SCNC: 28.5 MMOL/L (ref 22–26)
HCO3 BLD-SCNC: 28.6 MMOL/L (ref 22–26)
HCO3 BLD-SCNC: 31 MMOL/L (ref 22–26)
HCO3 BLD-SCNC: 33.9 MMOL/L (ref 22–26)
HCO3 BLDV-SCNC: 28.6 MMOL/L (ref 23–28)
HCT VFR BLD AUTO: 20.5 % (ref 36–48)
HCT VFR BLD AUTO: 22.8 % (ref 36–48)
HCT VFR BLD AUTO: 22.9 % (ref 36–48)
HCT VFR BLD AUTO: 23.1 % (ref 36–48)
HCT VFR BLD AUTO: 23.2 % (ref 36–48)
HCT VFR BLD AUTO: 23.3 % (ref 36–48)
HCT VFR BLD AUTO: 23.4 % (ref 36–48)
HCT VFR BLD AUTO: 23.5 % (ref 36–48)
HCT VFR BLD AUTO: 23.5 % (ref 36–48)
HCT VFR BLD AUTO: 23.6 % (ref 36–48)
HCT VFR BLD AUTO: 23.6 % (ref 36–48)
HCT VFR BLD AUTO: 24.4 % (ref 36–48)
HCT VFR BLD AUTO: 24.5 % (ref 36–48)
HCT VFR BLD AUTO: 24.8 % (ref 36–48)
HCT VFR BLD AUTO: 24.8 % (ref 36–48)
HCT VFR BLD AUTO: 24.9 % (ref 36–48)
HCT VFR BLD AUTO: 25.4 % (ref 36–48)
HCT VFR BLD AUTO: 25.7 % (ref 36–48)
HCT VFR BLD AUTO: 27.7 % (ref 36–48)
HCT VFR BLD AUTO: 28.2 % (ref 36–48)
HCT VFR BLD AUTO: 28.6 % (ref 36–48)
HCT VFR BLD AUTO: 29.4 % (ref 36–48)
HCT VFR BLD AUTO: 29.8 % (ref 36–48)
HCT VFR BLD AUTO: 30.4 % (ref 36–48)
HCT VFR BLD AUTO: 30.8 % (ref 36–48)
HCT VFR BLD AUTO: 30.8 % (ref 36–48)
HCT VFR BLD AUTO: 31 % (ref 36–48)
HCT VFR BLD AUTO: 31.2 % (ref 36–48)
HCT VFR BLD AUTO: 31.2 % (ref 36–48)
HCT VFR BLD AUTO: 31.4 % (ref 36–48)
HCT VFR BLD AUTO: 31.7 % (ref 36–48)
HCT VFR BLD AUTO: 31.8 % (ref 36–48)
HCT VFR BLD AUTO: 32.2 % (ref 36–48)
HCT VFR BLD AUTO: 32.8 % (ref 36–48)
HCT VFR BLD AUTO: 32.9 % (ref 36–48)
HCT VFR BLD AUTO: 34.6 % (ref 36–48)
HCT VFR BLD AUTO: 36.3 % (ref 36–48)
HCT VFR BLD AUTO: 40.6 % (ref 36–48)
HCV AB SER IA-ACNC: 0.11 INDEX
HCV AB SERPL QL IA: NEGATIVE
HCV COMMENT,HCGAC: NORMAL
HDSCOM,HDSCOM: ABNORMAL
HEP BS AB COMMENT,HBSAC: ABNORMAL
HEP BS AB COMMENT,HBSAC: ABNORMAL
HGB BLD-MCNC: 10 G/DL (ref 13–16)
HGB BLD-MCNC: 10.1 G/DL (ref 13–16)
HGB BLD-MCNC: 10.2 G/DL (ref 13–16)
HGB BLD-MCNC: 10.3 G/DL (ref 13–16)
HGB BLD-MCNC: 10.4 G/DL (ref 13–16)
HGB BLD-MCNC: 10.6 G/DL (ref 13–16)
HGB BLD-MCNC: 10.7 G/DL (ref 13–16)
HGB BLD-MCNC: 10.8 G/DL (ref 13–16)
HGB BLD-MCNC: 10.8 G/DL (ref 13–16)
HGB BLD-MCNC: 11.2 G/DL (ref 13–16)
HGB BLD-MCNC: 11.6 G/DL (ref 13–16)
HGB BLD-MCNC: 12.4 G/DL (ref 13–16)
HGB BLD-MCNC: 13.9 G/DL (ref 13–16)
HGB BLD-MCNC: 7.1 G/DL (ref 13–16)
HGB BLD-MCNC: 7.5 G/DL (ref 13–16)
HGB BLD-MCNC: 7.7 G/DL (ref 13–16)
HGB BLD-MCNC: 7.8 G/DL (ref 13–16)
HGB BLD-MCNC: 7.8 G/DL (ref 13–16)
HGB BLD-MCNC: 7.9 G/DL (ref 13–16)
HGB BLD-MCNC: 7.9 G/DL (ref 13–16)
HGB BLD-MCNC: 8 G/DL (ref 13–16)
HGB BLD-MCNC: 8 G/DL (ref 13–16)
HGB BLD-MCNC: 8.1 G/DL (ref 13–16)
HGB BLD-MCNC: 8.1 G/DL (ref 13–16)
HGB BLD-MCNC: 8.2 G/DL (ref 13–16)
HGB BLD-MCNC: 8.2 G/DL (ref 13–16)
HGB BLD-MCNC: 8.3 G/DL (ref 13–16)
HGB BLD-MCNC: 8.6 G/DL (ref 13–16)
HGB BLD-MCNC: 8.7 G/DL (ref 13–16)
HGB BLD-MCNC: 8.7 G/DL (ref 13–16)
HGB BLD-MCNC: 8.8 G/DL (ref 13–16)
HGB BLD-MCNC: 9.5 G/DL (ref 13–16)
HGB BLD-MCNC: 9.8 G/DL (ref 13–16)
HGB BLD-MCNC: 9.9 G/DL (ref 13–16)
HGB UR QL STRIP: NEGATIVE
INR PPP: 1.6 (ref 0.8–1.2)
INR PPP: 1.7 (ref 0.8–1.2)
INR PPP: 1.7 (ref 0.8–1.2)
INR PPP: 1.8 (ref 0.8–1.2)
INR PPP: 1.9 (ref 0.8–1.2)
INR PPP: 2 (ref 0.8–1.2)
INR PPP: 2.1 (ref 0.8–1.2)
INR PPP: 2.1 (ref 0.8–1.2)
INR PPP: 2.2 (ref 0.8–1.2)
INR PPP: 2.3 (ref 0.8–1.2)
INR PPP: 2.3 RATIO
INR PPP: 2.4 (ref 0.8–1.2)
INR PPP: 2.4 (ref 0.9–1.1)
INR PPP: 2.5 (ref 0.8–1.2)
INR PPP: 2.7 (ref 0.8–1.2)
INR PPP: 2.9 (ref 0.8–1.2)
INR PPP: 3.4 (ref 0.8–1.2)
INR PPP: 3.5 (ref 0.8–1.2)
INR PPP: 3.5 (ref 0.8–1.2)
INR PPP: 3.6 (ref 0.8–1.2)
INR PPP: 3.7 (ref 0.8–1.2)
INR PPP: 3.9 (ref 0.8–1.2)
INR PPP: 4 (ref 0.8–1.2)
INR PPP: 4 (ref 0.8–1.2)
INR PPP: 4.7 (ref 0.8–1.2)
INR PPP: 6 (ref 0.8–1.2)
INR PPP: 6.9 (ref 0.8–1.2)
INR PPP: 7.5 (ref 0.8–1.2)
INR PPP: 8.5 (ref 0.8–1.2)
INR PPP: 9 (ref 0.8–1.2)
INSPIRATION.DURATION SETTING TIME VENT: 0.85 SEC
INSPIRATION.DURATION SETTING TIME VENT: 0.85 SEC
INSPIRATION.DURATION SETTING TIME VENT: 0.9 SEC
INSPIRATION.DURATION SETTING TIME VENT: 1.09 SEC
IRON SATN MFR SERPL: 100 % (ref 20–50)
IRON SATN MFR SERPL: 65 % (ref 20–50)
IRON SERPL-MCNC: 101 UG/DL (ref 50–175)
IRON SERPL-MCNC: 45 UG/DL (ref 50–175)
KETONES UR QL STRIP.AUTO: NEGATIVE MG/DL
LAC INTERPRETATION, 502038: ABNORMAL
LACTATE BLD-SCNC: 2.38 MMOL/L (ref 0.4–2)
LACTATE BLD-SCNC: 2.45 MMOL/L (ref 0.4–2)
LACTATE SERPL-SCNC: 1.8 MMOL/L (ref 0.4–2)
LACTATE SERPL-SCNC: 2.2 MMOL/L (ref 0.4–2)
LACTATE SERPL-SCNC: 2.3 MMOL/L (ref 0.4–2)
LACTATE SERPL-SCNC: 3.4 MMOL/L (ref 0.4–2)
LEUKOCYTE ESTERASE UR QL STRIP.AUTO: NEGATIVE
LIPASE SERPL-CCNC: 10 U/L (ref 73–393)
LIPASE SERPL-CCNC: 12 U/L (ref 73–393)
LIPASE SERPL-CCNC: 54 U/L (ref 73–393)
LIPASE SERPL-CCNC: 59 U/L (ref 73–393)
LIPASE SERPL-CCNC: <10 U/L (ref 73–393)
LVFS 2D: 22.42 %
LVFS 2D: 24.92 %
LVFS 2D: 39.37 %
LVOT MG: 2.44 MMHG
LVOT MG: 3.71 MMHG
LVOT MG: 6.12 MMHG
LVOT MV: 0.76 CM/S
LVOT MV: 0.93 CM/S
LVOT MV: 1.2 CM/S
LVSV (MOD BI): 18.11 ML
LVSV (MOD BI): 24.22 ML
LVSV (MOD BI): 34.12 ML
LVSV (MOD SINGLE 4C): 15.93 ML
LVSV (MOD SINGLE 4C): 30.45 ML
LVSV (MOD SINGLE 4C): 45.51 ML
LVSV (MOD SINGLE): 17.12 ML
LVSV (MOD SINGLE): 18.69 ML
LVSV (MOD SINGLE): 25.61 ML
LVSV (TEICH): 15.45 ML
LVSV (TEICH): 19.79 ML
LVSV (TEICH): 58.54 ML
LYMPHOCYTES # BLD: 0.4 K/UL (ref 0.9–3.6)
LYMPHOCYTES # BLD: 0.5 K/UL (ref 0.9–3.6)
LYMPHOCYTES # BLD: 0.5 K/UL (ref 0.9–3.6)
LYMPHOCYTES # BLD: 0.6 K/UL (ref 0.9–3.6)
LYMPHOCYTES # BLD: 0.7 K/UL (ref 0.8–3.5)
LYMPHOCYTES # BLD: 0.7 K/UL (ref 0.9–3.6)
LYMPHOCYTES # BLD: 0.7 K/UL (ref 0.9–3.6)
LYMPHOCYTES # BLD: 0.9 K/UL (ref 0.9–3.6)
LYMPHOCYTES # BLD: 1 K/UL (ref 0.9–3.6)
LYMPHOCYTES # BLD: 1.1 K/UL (ref 0.8–3.5)
LYMPHOCYTES # BLD: 1.1 K/UL (ref 0.9–3.6)
LYMPHOCYTES # BLD: 1.1 K/UL (ref 0.9–3.6)
LYMPHOCYTES # BLD: 1.2 K/UL (ref 0.9–3.6)
LYMPHOCYTES # BLD: 1.3 K/UL (ref 0.9–3.6)
LYMPHOCYTES # BLD: 1.4 K/UL (ref 0.9–3.6)
LYMPHOCYTES # BLD: 1.5 K/UL (ref 0.9–3.6)
LYMPHOCYTES # BLD: 1.6 K/UL (ref 0.9–3.6)
LYMPHOCYTES # BLD: 1.6 K/UL (ref 0.9–3.6)
LYMPHOCYTES # BLD: 1.7 K/UL (ref 0.8–3.5)
LYMPHOCYTES # BLD: 1.7 K/UL (ref 0.9–3.6)
LYMPHOCYTES # BLD: 1.7 K/UL (ref 0.9–3.6)
LYMPHOCYTES # BLD: 2.1 K/UL (ref 0.9–3.6)
LYMPHOCYTES # BLD: 2.3 K/UL (ref 0.9–3.6)
LYMPHOCYTES # BLD: 5.4 K/UL (ref 0.8–3.5)
LYMPHOCYTES NFR BLD: 10 % (ref 21–52)
LYMPHOCYTES NFR BLD: 11 % (ref 21–52)
LYMPHOCYTES NFR BLD: 13 % (ref 21–52)
LYMPHOCYTES NFR BLD: 14 % (ref 21–52)
LYMPHOCYTES NFR BLD: 15 % (ref 21–52)
LYMPHOCYTES NFR BLD: 17 % (ref 21–52)
LYMPHOCYTES NFR BLD: 18 % (ref 21–52)
LYMPHOCYTES NFR BLD: 20 % (ref 21–52)
LYMPHOCYTES NFR BLD: 21 % (ref 20–51)
LYMPHOCYTES NFR BLD: 21 % (ref 21–52)
LYMPHOCYTES NFR BLD: 22 % (ref 21–52)
LYMPHOCYTES NFR BLD: 24 % (ref 21–52)
LYMPHOCYTES NFR BLD: 25 % (ref 21–52)
LYMPHOCYTES NFR BLD: 26 % (ref 21–52)
LYMPHOCYTES NFR BLD: 27 % (ref 21–52)
LYMPHOCYTES NFR BLD: 31 % (ref 21–52)
LYMPHOCYTES NFR BLD: 31 % (ref 21–52)
LYMPHOCYTES NFR BLD: 32 % (ref 21–52)
LYMPHOCYTES NFR BLD: 35 % (ref 21–52)
LYMPHOCYTES NFR BLD: 36 % (ref 21–52)
LYMPHOCYTES NFR BLD: 36 % (ref 21–52)
LYMPHOCYTES NFR BLD: 39 % (ref 21–52)
LYMPHOCYTES NFR BLD: 39 % (ref 21–52)
LYMPHOCYTES NFR BLD: 72 % (ref 20–51)
LYMPHOCYTES NFR BLD: 8 % (ref 21–52)
LYMPHOCYTES NFR BLD: 8 % (ref 21–52)
LYMPHOCYTES NFR BLD: 9 % (ref 20–51)
LYMPHOCYTES NFR BLD: 9 % (ref 21–52)
LYMPHOCYTES NFR BLD: 9 % (ref 21–52)
LYMPHOCYTES NFR FLD: 41 %
MACROPHAGES NFR FLD: 42 %
MAGNESIUM SERPL-MCNC: 1.2 MG/DL (ref 1.6–2.6)
MAGNESIUM SERPL-MCNC: 1.3 MG/DL (ref 1.6–2.6)
MAGNESIUM SERPL-MCNC: 1.4 MG/DL (ref 1.6–2.6)
MAGNESIUM SERPL-MCNC: 1.5 MG/DL (ref 1.6–2.6)
MAGNESIUM SERPL-MCNC: 1.6 MG/DL (ref 1.6–2.6)
MAGNESIUM SERPL-MCNC: 1.7 MG/DL (ref 1.6–2.6)
MAGNESIUM SERPL-MCNC: 1.8 MG/DL (ref 1.6–2.6)
MAGNESIUM SERPL-MCNC: 1.9 MG/DL (ref 1.6–2.6)
MAGNESIUM SERPL-MCNC: 2 MG/DL (ref 1.6–2.6)
MAGNESIUM SERPL-MCNC: 2.1 MG/DL (ref 1.6–2.6)
MAGNESIUM SERPL-MCNC: 2.2 MG/DL (ref 1.6–2.6)
MAGNESIUM SERPL-MCNC: 2.4 MG/DL (ref 1.6–2.6)
MCH RBC QN AUTO: 30.7 PG (ref 24–34)
MCH RBC QN AUTO: 30.8 PG (ref 24–34)
MCH RBC QN AUTO: 30.8 PG (ref 24–34)
MCH RBC QN AUTO: 30.9 PG (ref 24–34)
MCH RBC QN AUTO: 31 PG (ref 24–34)
MCH RBC QN AUTO: 31.1 PG (ref 24–34)
MCH RBC QN AUTO: 31.1 PG (ref 24–34)
MCH RBC QN AUTO: 31.3 PG (ref 24–34)
MCH RBC QN AUTO: 31.4 PG (ref 24–34)
MCH RBC QN AUTO: 31.5 PG (ref 24–34)
MCH RBC QN AUTO: 31.6 PG (ref 24–34)
MCH RBC QN AUTO: 31.6 PG (ref 24–34)
MCH RBC QN AUTO: 31.7 PG (ref 24–34)
MCH RBC QN AUTO: 31.8 PG (ref 24–34)
MCH RBC QN AUTO: 31.8 PG (ref 24–34)
MCH RBC QN AUTO: 31.9 PG (ref 24–34)
MCH RBC QN AUTO: 32 PG (ref 24–34)
MCH RBC QN AUTO: 32 PG (ref 24–34)
MCH RBC QN AUTO: 32.2 PG (ref 24–34)
MCH RBC QN AUTO: 32.3 PG (ref 24–34)
MCH RBC QN AUTO: 32.5 PG (ref 24–34)
MCH RBC QN AUTO: 32.5 PG (ref 24–34)
MCH RBC QN AUTO: 32.7 PG (ref 24–34)
MCH RBC QN AUTO: 32.7 PG (ref 24–34)
MCH RBC QN AUTO: 32.8 PG (ref 24–34)
MCH RBC QN AUTO: 32.8 PG (ref 24–34)
MCH RBC QN AUTO: 33.2 PG (ref 24–34)
MCHC RBC AUTO-ENTMCNC: 32.3 G/DL (ref 31–37)
MCHC RBC AUTO-ENTMCNC: 32.5 G/DL (ref 31–37)
MCHC RBC AUTO-ENTMCNC: 32.5 G/DL (ref 31–37)
MCHC RBC AUTO-ENTMCNC: 32.9 G/DL (ref 31–37)
MCHC RBC AUTO-ENTMCNC: 33.2 G/DL (ref 31–37)
MCHC RBC AUTO-ENTMCNC: 33.3 G/DL (ref 31–37)
MCHC RBC AUTO-ENTMCNC: 33.3 G/DL (ref 31–37)
MCHC RBC AUTO-ENTMCNC: 33.4 G/DL (ref 31–37)
MCHC RBC AUTO-ENTMCNC: 33.4 G/DL (ref 31–37)
MCHC RBC AUTO-ENTMCNC: 33.5 G/DL (ref 31–37)
MCHC RBC AUTO-ENTMCNC: 33.6 G/DL (ref 31–37)
MCHC RBC AUTO-ENTMCNC: 33.8 G/DL (ref 31–37)
MCHC RBC AUTO-ENTMCNC: 33.9 G/DL (ref 31–37)
MCHC RBC AUTO-ENTMCNC: 34 G/DL (ref 31–37)
MCHC RBC AUTO-ENTMCNC: 34.1 G/DL (ref 31–37)
MCHC RBC AUTO-ENTMCNC: 34.2 G/DL (ref 31–37)
MCHC RBC AUTO-ENTMCNC: 34.3 G/DL (ref 31–37)
MCHC RBC AUTO-ENTMCNC: 34.3 G/DL (ref 31–37)
MCHC RBC AUTO-ENTMCNC: 34.4 G/DL (ref 31–37)
MCHC RBC AUTO-ENTMCNC: 34.4 G/DL (ref 31–37)
MCHC RBC AUTO-ENTMCNC: 34.6 G/DL (ref 31–37)
MCHC RBC AUTO-ENTMCNC: 34.9 G/DL (ref 31–37)
MCHC RBC AUTO-ENTMCNC: 35.1 G/DL (ref 31–37)
MCHC RBC AUTO-ENTMCNC: 35.1 G/DL (ref 31–37)
MCHC RBC AUTO-ENTMCNC: 35.5 G/DL (ref 31–37)
MCV RBC AUTO: 103 FL (ref 74–97)
MCV RBC AUTO: 88.4 FL (ref 74–97)
MCV RBC AUTO: 88.6 FL (ref 74–97)
MCV RBC AUTO: 88.7 FL (ref 74–97)
MCV RBC AUTO: 88.8 FL (ref 74–97)
MCV RBC AUTO: 89.2 FL (ref 74–97)
MCV RBC AUTO: 89.9 FL (ref 74–97)
MCV RBC AUTO: 90.5 FL (ref 74–97)
MCV RBC AUTO: 91 FL (ref 74–97)
MCV RBC AUTO: 91.2 FL (ref 74–97)
MCV RBC AUTO: 91.4 FL (ref 74–97)
MCV RBC AUTO: 91.8 FL (ref 74–97)
MCV RBC AUTO: 91.9 FL (ref 74–97)
MCV RBC AUTO: 92.4 FL (ref 74–97)
MCV RBC AUTO: 92.7 FL (ref 74–97)
MCV RBC AUTO: 92.8 FL (ref 74–97)
MCV RBC AUTO: 93 FL (ref 74–97)
MCV RBC AUTO: 93.3 FL (ref 74–97)
MCV RBC AUTO: 93.5 FL (ref 74–97)
MCV RBC AUTO: 93.5 FL (ref 74–97)
MCV RBC AUTO: 93.6 FL (ref 74–97)
MCV RBC AUTO: 93.9 FL (ref 74–97)
MCV RBC AUTO: 94.3 FL (ref 74–97)
MCV RBC AUTO: 94.5 FL (ref 74–97)
MCV RBC AUTO: 94.8 FL (ref 74–97)
MCV RBC AUTO: 94.9 FL (ref 74–97)
MCV RBC AUTO: 95.1 FL (ref 74–97)
MCV RBC AUTO: 95.1 FL (ref 74–97)
MCV RBC AUTO: 95.4 FL (ref 74–97)
MCV RBC AUTO: 95.7 FL (ref 74–97)
MCV RBC AUTO: 95.8 FL (ref 74–97)
MCV RBC AUTO: 95.9 FL (ref 74–97)
MCV RBC AUTO: 96.5 FL (ref 74–97)
MCV RBC AUTO: 96.9 FL (ref 74–97)
MCV RBC AUTO: 97.1 FL (ref 74–97)
MCV RBC AUTO: 97.5 FL (ref 74–97)
MCV RBC AUTO: 98 FL (ref 74–97)
MCV RBC AUTO: 98 FL (ref 74–97)
MCV RBC AUTO: 98.7 FL (ref 74–97)
METHADONE UR QL: NEGATIVE
MIXING STUDIES PPP-IMP: ABNORMAL
MONOCYTES # BLD: 0.2 K/UL (ref 0.05–1.2)
MONOCYTES # BLD: 0.2 K/UL (ref 0–1)
MONOCYTES # BLD: 0.3 K/UL (ref 0.05–1.2)
MONOCYTES # BLD: 0.3 K/UL (ref 0–1)
MONOCYTES # BLD: 0.4 K/UL (ref 0.05–1.2)
MONOCYTES # BLD: 0.4 K/UL (ref 0.05–1.2)
MONOCYTES # BLD: 0.5 K/UL (ref 0.05–1.2)
MONOCYTES # BLD: 0.6 K/UL (ref 0.05–1.2)
MONOCYTES # BLD: 0.7 K/UL (ref 0.05–1.2)
MONOCYTES # BLD: 0.7 K/UL (ref 0–1)
MONOCYTES # BLD: 0.8 K/UL (ref 0.05–1.2)
MONOCYTES # BLD: 0.9 K/UL (ref 0.05–1.2)
MONOCYTES # BLD: 1 K/UL (ref 0.05–1.2)
MONOCYTES # BLD: 1.1 K/UL (ref 0.05–1.2)
MONOCYTES NFR BLD: 10 % (ref 2–9)
MONOCYTES NFR BLD: 10 % (ref 3–10)
MONOCYTES NFR BLD: 11 % (ref 3–10)
MONOCYTES NFR BLD: 11 % (ref 3–10)
MONOCYTES NFR BLD: 12 % (ref 3–10)
MONOCYTES NFR BLD: 13 % (ref 3–10)
MONOCYTES NFR BLD: 3 % (ref 2–9)
MONOCYTES NFR BLD: 4 % (ref 2–9)
MONOCYTES NFR BLD: 5 % (ref 3–10)
MONOCYTES NFR BLD: 5 % (ref 3–10)
MONOCYTES NFR BLD: 6 % (ref 3–10)
MONOCYTES NFR BLD: 7 % (ref 3–10)
MONOCYTES NFR BLD: 8 % (ref 3–10)
MONOCYTES NFR BLD: 9 % (ref 3–10)
MONOCYTES NFR FLD: 0 %
MV DEC SLOPE: 5.07
MV DEC SLOPE: 5.3
MV DEC SLOPE: 5.55
NEUTROPHILS NFR FLD: 17 %
NEUTS BAND # FLD: 0 %
NEUTS BAND NFR BLD MANUAL: 1 % (ref 0–5)
NEUTS SEG # BLD: 1.7 K/UL (ref 1.8–8)
NEUTS SEG # BLD: 1.8 K/UL (ref 1.8–8)
NEUTS SEG # BLD: 1.8 K/UL (ref 1.8–8)
NEUTS SEG # BLD: 1.9 K/UL (ref 1.8–8)
NEUTS SEG # BLD: 1.9 K/UL (ref 1.8–8)
NEUTS SEG # BLD: 2 K/UL (ref 1.8–8)
NEUTS SEG # BLD: 2 K/UL (ref 1.8–8)
NEUTS SEG # BLD: 2.3 K/UL (ref 1.8–8)
NEUTS SEG # BLD: 2.5 K/UL (ref 1.8–8)
NEUTS SEG # BLD: 2.5 K/UL (ref 1.8–8)
NEUTS SEG # BLD: 2.7 K/UL (ref 1.8–8)
NEUTS SEG # BLD: 2.8 K/UL (ref 1.8–8)
NEUTS SEG # BLD: 2.8 K/UL (ref 1.8–8)
NEUTS SEG # BLD: 3 K/UL (ref 1.8–8)
NEUTS SEG # BLD: 3.5 K/UL (ref 1.8–8)
NEUTS SEG # BLD: 3.5 K/UL (ref 1.8–8)
NEUTS SEG # BLD: 3.6 K/UL (ref 1.8–8)
NEUTS SEG # BLD: 3.7 K/UL (ref 1.8–8)
NEUTS SEG # BLD: 3.9 K/UL (ref 1.8–8)
NEUTS SEG # BLD: 4.1 K/UL (ref 1.8–8)
NEUTS SEG # BLD: 4.6 K/UL (ref 1.8–8)
NEUTS SEG # BLD: 5.3 K/UL (ref 1.8–8)
NEUTS SEG # BLD: 5.4 K/UL (ref 1.8–8)
NEUTS SEG # BLD: 5.5 K/UL (ref 1.8–8)
NEUTS SEG # BLD: 5.9 K/UL (ref 1.8–8)
NEUTS SEG # BLD: 5.9 K/UL (ref 1.8–8)
NEUTS SEG # BLD: 6 K/UL (ref 1.8–8)
NEUTS SEG # BLD: 6 K/UL (ref 1.8–8)
NEUTS SEG # BLD: 6.1 K/UL (ref 1.8–8)
NEUTS SEG # BLD: 6.6 K/UL (ref 1.8–8)
NEUTS SEG # BLD: 7.2 K/UL (ref 1.8–8)
NEUTS SEG # BLD: 7.8 K/UL (ref 1.8–8)
NEUTS SEG # BLD: 8.7 K/UL (ref 1.8–8)
NEUTS SEG # BLD: 8.9 K/UL (ref 1.8–8)
NEUTS SEG # BLD: 9.2 K/UL (ref 1.8–8)
NEUTS SEG # BLD: 9.5 K/UL (ref 1.8–8)
NEUTS SEG NFR BLD: 25 % (ref 42–75)
NEUTS SEG NFR BLD: 47 % (ref 40–73)
NEUTS SEG NFR BLD: 48 % (ref 40–73)
NEUTS SEG NFR BLD: 50 % (ref 40–73)
NEUTS SEG NFR BLD: 50 % (ref 40–73)
NEUTS SEG NFR BLD: 53 % (ref 40–73)
NEUTS SEG NFR BLD: 55 % (ref 40–73)
NEUTS SEG NFR BLD: 56 % (ref 40–73)
NEUTS SEG NFR BLD: 58 % (ref 40–73)
NEUTS SEG NFR BLD: 58 % (ref 40–73)
NEUTS SEG NFR BLD: 59 % (ref 40–73)
NEUTS SEG NFR BLD: 60 % (ref 40–73)
NEUTS SEG NFR BLD: 62 % (ref 40–73)
NEUTS SEG NFR BLD: 63 % (ref 40–73)
NEUTS SEG NFR BLD: 69 % (ref 40–73)
NEUTS SEG NFR BLD: 72 % (ref 40–73)
NEUTS SEG NFR BLD: 72 % (ref 40–73)
NEUTS SEG NFR BLD: 74 % (ref 40–73)
NEUTS SEG NFR BLD: 74 % (ref 40–73)
NEUTS SEG NFR BLD: 74 % (ref 42–75)
NEUTS SEG NFR BLD: 76 % (ref 40–73)
NEUTS SEG NFR BLD: 76 % (ref 40–73)
NEUTS SEG NFR BLD: 78 % (ref 40–73)
NEUTS SEG NFR BLD: 80 % (ref 40–73)
NEUTS SEG NFR BLD: 81 % (ref 40–73)
NEUTS SEG NFR BLD: 81 % (ref 42–75)
NEUTS SEG NFR BLD: 82 % (ref 40–73)
NEUTS SEG NFR BLD: 83 % (ref 40–73)
NEUTS SEG NFR BLD: 85 % (ref 40–73)
NEUTS SEG NFR BLD: 86 % (ref 40–73)
NITRITE UR QL STRIP.AUTO: NEGATIVE
NUC CELL # FLD: 54 /CU MM
O2/TOTAL GAS SETTING VFR VENT: 0.35 %
O2/TOTAL GAS SETTING VFR VENT: 0.5 %
O2/TOTAL GAS SETTING VFR VENT: 0.5 %
O2/TOTAL GAS SETTING VFR VENT: 0.6 %
O2/TOTAL GAS SETTING VFR VENT: 0.7 %
O2/TOTAL GAS SETTING VFR VENT: 1 %
O2/TOTAL GAS SETTING VFR VENT: 100 %
O2/TOTAL GAS SETTING VFR VENT: 100 %
O2/TOTAL GAS SETTING VFR VENT: 70 %
O2/TOTAL GAS SETTING VFR VENT: 80 %
OPIATES UR QL: POSITIVE
P-R INTERVAL, ECG05: 156 MS
P-R INTERVAL, ECG05: 168 MS
P-R INTERVAL, ECG05: 170 MS
P-R INTERVAL, ECG05: 176 MS
PCO2 BLD: 32.3 MMHG (ref 35–45)
PCO2 BLD: 37.2 MMHG (ref 35–45)
PCO2 BLD: 37.8 MMHG (ref 35–45)
PCO2 BLD: 38.4 MMHG (ref 35–45)
PCO2 BLD: 40.7 MMHG (ref 35–45)
PCO2 BLD: 40.9 MMHG (ref 35–45)
PCO2 BLD: 47.3 MMHG (ref 35–45)
PCO2 BLD: 48.4 MMHG (ref 35–45)
PCO2 BLD: 48.8 MMHG (ref 35–45)
PCO2 BLD: 51 MMHG (ref 35–45)
PCO2 BLD: 72.7 MMHG (ref 35–45)
PCO2 BLDV: 34.5 MMHG (ref 41–51)
PCP UR QL: NEGATIVE
PEEP RESPIRATORY: 10 CMH2O
PEEP RESPIRATORY: 12 CMH2O
PEEP RESPIRATORY: 12 CMH2O
PEEP RESPIRATORY: 14 CMH2O
PEEP RESPIRATORY: 5 CMH2O
PEEP RESPIRATORY: 5 CMH2O
PEEP RESPIRATORY: 6 CMH2O
PEEP RESPIRATORY: 8 CMH2O
PH BLD: 7.2 [PH] (ref 7.35–7.45)
PH BLD: 7.2 [PH] (ref 7.35–7.45)
PH BLD: 7.3 [PH] (ref 7.35–7.45)
PH BLD: 7.32 [PH] (ref 7.35–7.45)
PH BLD: 7.34 [PH] (ref 7.35–7.45)
PH BLD: 7.39 [PH] (ref 7.35–7.45)
PH BLD: 7.43 [PH] (ref 7.35–7.45)
PH BLD: 7.46 [PH] (ref 7.35–7.45)
PH BLD: 7.51 [PH] (ref 7.35–7.45)
PH BLD: 7.53 [PH] (ref 7.35–7.45)
PH BLD: 7.55 [PH] (ref 7.35–7.45)
PH BLDV: 7.53 [PH] (ref 7.32–7.42)
PH UR STRIP: 5.5 [PH] (ref 5–8)
PHOSPHATE SERPL-MCNC: 1.3 MG/DL (ref 2.5–4.9)
PHOSPHATE SERPL-MCNC: 1.7 MG/DL (ref 2.5–4.9)
PHOSPHATE SERPL-MCNC: 1.8 MG/DL (ref 2.5–4.9)
PHOSPHATE SERPL-MCNC: 1.8 MG/DL (ref 2.5–4.9)
PHOSPHATE SERPL-MCNC: 2 MG/DL (ref 2.5–4.9)
PHOSPHATE SERPL-MCNC: 2 MG/DL (ref 2.5–4.9)
PHOSPHATE SERPL-MCNC: 2.1 MG/DL (ref 2.5–4.9)
PHOSPHATE SERPL-MCNC: 2.3 MG/DL (ref 2.5–4.9)
PHOSPHATE SERPL-MCNC: 2.4 MG/DL (ref 2.5–4.9)
PHOSPHATE SERPL-MCNC: 2.5 MG/DL (ref 2.5–4.9)
PHOSPHATE SERPL-MCNC: 2.6 MG/DL (ref 2.5–4.9)
PHOSPHATE SERPL-MCNC: 2.8 MG/DL (ref 2.5–4.9)
PHOSPHATE SERPL-MCNC: 2.8 MG/DL (ref 2.5–4.9)
PHOSPHATE SERPL-MCNC: 3.1 MG/DL (ref 2.5–4.9)
PHOSPHATE SERPL-MCNC: 3.3 MG/DL (ref 2.5–4.9)
PHOSPHATE SERPL-MCNC: 3.3 MG/DL (ref 2.5–4.9)
PHOSPHATE SERPL-MCNC: 3.5 MG/DL (ref 2.5–4.9)
PHOSPHATE SERPL-MCNC: 3.5 MG/DL (ref 2.5–4.9)
PHOSPHATE SERPL-MCNC: 3.6 MG/DL (ref 2.5–4.9)
PIP ISTAT,IPIP: 12
PIP ISTAT,IPIP: 12
PIP ISTAT,IPIP: 18
PIP ISTAT,IPIP: 26
PIP ISTAT,IPIP: 26
PIP ISTAT,IPIP: 27
PIP ISTAT,IPIP: 30
PIP ISTAT,IPIP: 32
PLATELET # BLD AUTO: 107 K/UL (ref 135–420)
PLATELET # BLD AUTO: 107 K/UL (ref 135–420)
PLATELET # BLD AUTO: 22 K/UL (ref 135–420)
PLATELET # BLD AUTO: 23 K/UL (ref 135–420)
PLATELET # BLD AUTO: 25 K/UL (ref 135–420)
PLATELET # BLD AUTO: 28 K/UL (ref 135–420)
PLATELET # BLD AUTO: 29 K/UL (ref 135–420)
PLATELET # BLD AUTO: 31 K/UL (ref 135–420)
PLATELET # BLD AUTO: 34 K/UL (ref 135–420)
PLATELET # BLD AUTO: 34 K/UL (ref 135–420)
PLATELET # BLD AUTO: 38 K/UL (ref 135–420)
PLATELET # BLD AUTO: 39 K/UL (ref 135–420)
PLATELET # BLD AUTO: 40 K/UL (ref 135–420)
PLATELET # BLD AUTO: 42 K/UL (ref 135–420)
PLATELET # BLD AUTO: 43 K/UL (ref 135–420)
PLATELET # BLD AUTO: 44 K/UL (ref 135–420)
PLATELET # BLD AUTO: 44 K/UL (ref 135–420)
PLATELET # BLD AUTO: 46 K/UL (ref 135–420)
PLATELET # BLD AUTO: 47 K/UL (ref 135–420)
PLATELET # BLD AUTO: 50 K/UL (ref 135–420)
PLATELET # BLD AUTO: 52 K/UL (ref 135–420)
PLATELET # BLD AUTO: 52 K/UL (ref 135–420)
PLATELET # BLD AUTO: 53 K/UL (ref 135–420)
PLATELET # BLD AUTO: 57 K/UL (ref 135–420)
PLATELET # BLD AUTO: 63 K/UL (ref 135–420)
PLATELET # BLD AUTO: 68 K/UL (ref 135–420)
PLATELET # BLD AUTO: 74 K/UL (ref 135–420)
PLATELET # BLD AUTO: 76 K/UL (ref 135–420)
PLATELET # BLD AUTO: 77 K/UL (ref 135–420)
PLATELET # BLD AUTO: 82 K/UL (ref 135–420)
PLATELET # BLD AUTO: 84 K/UL (ref 135–420)
PLATELET # BLD AUTO: 85 K/UL (ref 135–420)
PLATELET # BLD AUTO: 88 K/UL (ref 135–420)
PLATELET # BLD AUTO: 89 K/UL (ref 135–420)
PLATELET COMMENTS,PCOM: ABNORMAL
PLATELET NEUTRALIZATION 500049: 0 SEC
PMV BLD AUTO: 11.7 FL (ref 9.2–11.8)
PMV BLD AUTO: 11.8 FL (ref 9.2–11.8)
PMV BLD AUTO: 11.9 FL (ref 9.2–11.8)
PMV BLD AUTO: 12 FL (ref 9.2–11.8)
PMV BLD AUTO: 12.2 FL (ref 9.2–11.8)
PMV BLD AUTO: 12.3 FL (ref 9.2–11.8)
PMV BLD AUTO: 12.4 FL (ref 9.2–11.8)
PMV BLD AUTO: 12.4 FL (ref 9.2–11.8)
PMV BLD AUTO: 12.5 FL (ref 9.2–11.8)
PMV BLD AUTO: 12.7 FL (ref 9.2–11.8)
PMV BLD AUTO: 12.9 FL (ref 9.2–11.8)
PMV BLD AUTO: 13 FL (ref 9.2–11.8)
PMV BLD AUTO: 13.1 FL (ref 9.2–11.8)
PMV BLD AUTO: 13.9 FL (ref 9.2–11.8)
PO2 BLD: 109 MMHG (ref 80–100)
PO2 BLD: 117 MMHG (ref 80–100)
PO2 BLD: 119 MMHG (ref 80–100)
PO2 BLD: 175 MMHG (ref 80–100)
PO2 BLD: 228 MMHG (ref 80–100)
PO2 BLD: 45 MMHG (ref 80–100)
PO2 BLD: 52 MMHG (ref 80–100)
PO2 BLD: 55 MMHG (ref 80–100)
PO2 BLD: 69 MMHG (ref 80–100)
PO2 BLD: 69 MMHG (ref 80–100)
PO2 BLD: 75 MMHG (ref 80–100)
PO2 BLDV: 60 MMHG (ref 25–40)
POTASSIUM SERPL-SCNC: 2.9 MMOL/L (ref 3.5–5.5)
POTASSIUM SERPL-SCNC: 3.1 MMOL/L (ref 3.5–5.5)
POTASSIUM SERPL-SCNC: 3.2 MMOL/L (ref 3.5–5.5)
POTASSIUM SERPL-SCNC: 3.3 MMOL/L (ref 3.5–5.5)
POTASSIUM SERPL-SCNC: 3.4 MMOL/L (ref 3.5–5.5)
POTASSIUM SERPL-SCNC: 3.5 MMOL/L (ref 3.5–5.5)
POTASSIUM SERPL-SCNC: 3.6 MMOL/L (ref 3.5–5.5)
POTASSIUM SERPL-SCNC: 3.7 MMOL/L (ref 3.5–5.5)
POTASSIUM SERPL-SCNC: 3.8 MMOL/L (ref 3.5–5.5)
POTASSIUM SERPL-SCNC: 3.9 MMOL/L (ref 3.5–5.5)
POTASSIUM SERPL-SCNC: 4 MMOL/L (ref 3.5–5.5)
POTASSIUM SERPL-SCNC: 4.1 MMOL/L (ref 3.5–5.5)
POTASSIUM SERPL-SCNC: 4.2 MMOL/L (ref 3.5–5.5)
POTASSIUM SERPL-SCNC: 4.2 MMOL/L (ref 3.5–5.5)
POTASSIUM SERPL-SCNC: 4.3 MMOL/L (ref 3.5–5.5)
POTASSIUM SERPL-SCNC: 4.3 MMOL/L (ref 3.5–5.5)
POTASSIUM SERPL-SCNC: 4.5 MMOL/L (ref 3.5–5.5)
POTASSIUM SERPL-SCNC: 4.8 MMOL/L (ref 3.5–5.5)
PRESSURE SUPPORT SETTING VENT: 7 CMH2O
PROT SERPL-MCNC: 5.2 G/DL (ref 6.4–8.2)
PROT SERPL-MCNC: 5.4 G/DL (ref 6.4–8.2)
PROT SERPL-MCNC: 5.5 G/DL (ref 6.4–8.2)
PROT SERPL-MCNC: 5.5 G/DL (ref 6.4–8.2)
PROT SERPL-MCNC: 5.6 G/DL (ref 6.4–8.2)
PROT SERPL-MCNC: 5.7 G/DL (ref 6.4–8.2)
PROT SERPL-MCNC: 5.7 G/DL (ref 6.4–8.2)
PROT SERPL-MCNC: 5.8 G/DL (ref 6.4–8.2)
PROT SERPL-MCNC: 5.9 G/DL (ref 6.4–8.2)
PROT SERPL-MCNC: 5.9 G/DL (ref 6.4–8.2)
PROT SERPL-MCNC: 6 G/DL (ref 6.4–8.2)
PROT SERPL-MCNC: 6 G/DL (ref 6.4–8.2)
PROT SERPL-MCNC: 6.1 G/DL (ref 6.4–8.2)
PROT SERPL-MCNC: 6.2 G/DL (ref 6.4–8.2)
PROT SERPL-MCNC: 6.3 G/DL (ref 6.4–8.2)
PROT SERPL-MCNC: 6.3 G/DL (ref 6.4–8.2)
PROT SERPL-MCNC: 6.4 G/DL (ref 6.4–8.2)
PROT SERPL-MCNC: 6.4 G/DL (ref 6.4–8.2)
PROT SERPL-MCNC: 6.5 G/DL (ref 6.4–8.2)
PROT SERPL-MCNC: 6.5 G/DL (ref 6.4–8.2)
PROT SERPL-MCNC: 6.6 G/DL (ref 6.4–8.2)
PROT SERPL-MCNC: 6.7 G/DL (ref 6.4–8.2)
PROT SERPL-MCNC: 6.7 G/DL (ref 6.4–8.2)
PROT SERPL-MCNC: 6.8 G/DL (ref 6.4–8.2)
PROT SERPL-MCNC: 6.9 G/DL (ref 6.4–8.2)
PROT SERPL-MCNC: 6.9 G/DL (ref 6.4–8.2)
PROT SERPL-MCNC: 7 G/DL (ref 6.4–8.2)
PROT SERPL-MCNC: 7.1 G/DL (ref 6.4–8.2)
PROT SERPL-MCNC: 7.3 G/DL (ref 6.4–8.2)
PROT UR STRIP-MCNC: NEGATIVE MG/DL
PROTHROMBIN TIME 500372: 22.6 SEC
PROTHROMBIN TIME: 18.3 SEC (ref 11.5–15.2)
PROTHROMBIN TIME: 19.7 SEC (ref 11.5–15.2)
PROTHROMBIN TIME: 19.9 SEC (ref 11.5–15.2)
PROTHROMBIN TIME: 20.2 SEC (ref 11.5–15.2)
PROTHROMBIN TIME: 20.3 SEC (ref 11.5–15.2)
PROTHROMBIN TIME: 20.3 SEC (ref 11.5–15.2)
PROTHROMBIN TIME: 20.4 SEC (ref 11.5–15.2)
PROTHROMBIN TIME: 20.4 SEC (ref 11.5–15.2)
PROTHROMBIN TIME: 20.8 SEC (ref 11.5–15.2)
PROTHROMBIN TIME: 20.9 SEC (ref 11.5–15.2)
PROTHROMBIN TIME: 21 SEC (ref 11.5–15.2)
PROTHROMBIN TIME: 21.1 SEC (ref 11.5–15.2)
PROTHROMBIN TIME: 21.4 SEC (ref 11.5–15.2)
PROTHROMBIN TIME: 22.5 SEC (ref 11.5–15.2)
PROTHROMBIN TIME: 22.8 SEC (ref 11.5–15.2)
PROTHROMBIN TIME: 22.9 SEC (ref 11.5–15.2)
PROTHROMBIN TIME: 23.7 SEC (ref 11.5–15.2)
PROTHROMBIN TIME: 24.5 SEC (ref 9–11.1)
PROTHROMBIN TIME: 25.1 SEC (ref 11.5–15.2)
PROTHROMBIN TIME: 25.4 SEC (ref 11.5–15.2)
PROTHROMBIN TIME: 26.3 SEC (ref 11.5–15.2)
PROTHROMBIN TIME: 28.3 SEC (ref 11.5–15.2)
PROTHROMBIN TIME: 29.5 SEC (ref 11.5–15.2)
PROTHROMBIN TIME: 33.3 SEC (ref 11.5–15.2)
PROTHROMBIN TIME: 34.3 SEC (ref 11.5–15.2)
PROTHROMBIN TIME: 34.3 SEC (ref 11.5–15.2)
PROTHROMBIN TIME: 35.1 SEC (ref 11.5–15.2)
PROTHROMBIN TIME: 35.2 SEC (ref 11.5–15.2)
PROTHROMBIN TIME: 35.6 SEC (ref 11.5–15.2)
PROTHROMBIN TIME: 35.8 SEC (ref 11.5–15.2)
PROTHROMBIN TIME: 36.7 SEC (ref 11.5–15.2)
PROTHROMBIN TIME: 37.4 SEC (ref 11.5–15.2)
PROTHROMBIN TIME: 37.6 SEC (ref 11.5–15.2)
PROTHROMBIN TIME: 42.8 SEC (ref 11.5–15.2)
PROTHROMBIN TIME: 51.5 SEC (ref 11.5–15.2)
PROTHROMBIN TIME: 57 SEC (ref 11.5–15.2)
PROTHROMBIN TIME: 61.2 SEC (ref 11.5–15.2)
PROTHROMBIN TIME: 67.1 SEC (ref 11.5–15.2)
PROTHROMBIN TIME: 69.8 SEC (ref 11.5–15.2)
PT 1H P INC PPP: 13 SEC
PT IMM NP PPP: 13.5 SEC
PT MIXING STUDY,CMS3: ABNORMAL
Q-T INTERVAL, ECG07: 342 MS
Q-T INTERVAL, ECG07: 372 MS
Q-T INTERVAL, ECG07: 376 MS
Q-T INTERVAL, ECG07: 378 MS
Q-T INTERVAL, ECG07: 414 MS
QRS DURATION, ECG06: 100 MS
QRS DURATION, ECG06: 66 MS
QRS DURATION, ECG06: 74 MS
QRS DURATION, ECG06: 76 MS
QRS DURATION, ECG06: 80 MS
QTC CALCULATION (BEZET), ECG08: 457 MS
QTC CALCULATION (BEZET), ECG08: 458 MS
QTC CALCULATION (BEZET), ECG08: 489 MS
QTC CALCULATION (BEZET), ECG08: 528 MS
QTC CALCULATION (BEZET), ECG08: 585 MS
RBC # BLD AUTO: 2.31 M/UL (ref 4.7–5.5)
RBC # BLD AUTO: 2.37 M/UL (ref 4.7–5.5)
RBC # BLD AUTO: 2.38 M/UL (ref 4.7–5.5)
RBC # BLD AUTO: 2.43 M/UL (ref 4.7–5.5)
RBC # BLD AUTO: 2.45 M/UL (ref 4.7–5.5)
RBC # BLD AUTO: 2.46 M/UL (ref 4.7–5.5)
RBC # BLD AUTO: 2.48 M/UL (ref 4.7–5.5)
RBC # BLD AUTO: 2.53 M/UL (ref 4.7–5.5)
RBC # BLD AUTO: 2.54 M/UL (ref 4.7–5.5)
RBC # BLD AUTO: 2.54 M/UL (ref 4.7–5.5)
RBC # BLD AUTO: 2.55 M/UL (ref 4.7–5.5)
RBC # BLD AUTO: 2.57 M/UL (ref 4.7–5.5)
RBC # BLD AUTO: 2.58 M/UL (ref 4.7–5.5)
RBC # BLD AUTO: 2.58 M/UL (ref 4.7–5.5)
RBC # BLD AUTO: 2.6 M/UL (ref 4.7–5.5)
RBC # BLD AUTO: 2.68 M/UL (ref 4.7–5.5)
RBC # BLD AUTO: 2.72 M/UL (ref 4.7–5.5)
RBC # BLD AUTO: 2.73 M/UL (ref 4.7–5.5)
RBC # BLD AUTO: 2.8 M/UL (ref 4.7–5.5)
RBC # BLD AUTO: 2.97 M/UL (ref 4.7–5.5)
RBC # BLD AUTO: 3.01 M/UL (ref 4.7–5.5)
RBC # BLD AUTO: 3.13 M/UL (ref 4.7–5.5)
RBC # BLD AUTO: 3.16 M/UL (ref 4.7–5.5)
RBC # BLD AUTO: 3.18 M/UL (ref 4.7–5.5)
RBC # BLD AUTO: 3.23 M/UL (ref 4.7–5.5)
RBC # BLD AUTO: 3.26 M/UL (ref 4.7–5.5)
RBC # BLD AUTO: 3.26 M/UL (ref 4.7–5.5)
RBC # BLD AUTO: 3.29 M/UL (ref 4.7–5.5)
RBC # BLD AUTO: 3.32 M/UL (ref 4.7–5.5)
RBC # BLD AUTO: 3.35 M/UL (ref 4.7–5.5)
RBC # BLD AUTO: 3.39 M/UL (ref 4.7–5.5)
RBC # BLD AUTO: 3.42 M/UL (ref 4.7–5.5)
RBC # BLD AUTO: 3.44 M/UL (ref 4.7–5.5)
RBC # BLD AUTO: 3.46 M/UL (ref 4.7–5.5)
RBC # BLD AUTO: 3.47 M/UL (ref 4.7–5.5)
RBC # BLD AUTO: 3.57 M/UL (ref 4.7–5.5)
RBC # BLD AUTO: 3.7 M/UL (ref 4.7–5.5)
RBC # BLD AUTO: 3.85 M/UL (ref 4.7–5.5)
RBC # BLD AUTO: 4.38 M/UL (ref 4.7–5.5)
RBC # FLD: 204 /CU MM
RBC MORPH BLD: ABNORMAL
REPORTED DOSE,DOSE: NORMAL UNITS
REPORTED DOSE,DOSE: NORMAL UNITS
REPORTED DOSE/TIME,TMG: 1303
REPORTED DOSE/TIME,TMG: NORMAL
RETICS/RBC NFR AUTO: 0.9 % (ref 0.5–2.3)
SAO2 % BLD: 100 % (ref 92–97)
SAO2 % BLD: 100 % (ref 92–97)
SAO2 % BLD: 78 % (ref 92–97)
SAO2 % BLD: 83 % (ref 92–97)
SAO2 % BLD: 88 % (ref 92–97)
SAO2 % BLD: 92 % (ref 92–97)
SAO2 % BLD: 94 % (ref 92–97)
SAO2 % BLD: 96 % (ref 92–97)
SAO2 % BLD: 98 % (ref 92–97)
SAO2 % BLD: 98 % (ref 92–97)
SAO2 % BLD: 99 % (ref 92–97)
SAO2 % BLDV: 93 % (ref 65–88)
SARS-COV-2, COV2NT: NOT DETECTED
SCREEN DRVVT/NORMAL: 0.8 RATIO
SCREEN DRVVT: 101.8 SEC
SERVICE CMNT-IMP: ABNORMAL
SERVICE CMNT-IMP: NORMAL
SODIUM SERPL-SCNC: 134 MMOL/L (ref 136–145)
SODIUM SERPL-SCNC: 136 MMOL/L (ref 136–145)
SODIUM SERPL-SCNC: 137 MMOL/L (ref 136–145)
SODIUM SERPL-SCNC: 137 MMOL/L (ref 136–145)
SODIUM SERPL-SCNC: 138 MMOL/L (ref 136–145)
SODIUM SERPL-SCNC: 139 MMOL/L (ref 136–145)
SODIUM SERPL-SCNC: 140 MMOL/L (ref 136–145)
SODIUM SERPL-SCNC: 141 MMOL/L (ref 136–145)
SODIUM SERPL-SCNC: 142 MMOL/L (ref 136–145)
SODIUM SERPL-SCNC: 143 MMOL/L (ref 136–145)
SODIUM SERPL-SCNC: 144 MMOL/L (ref 136–145)
SODIUM SERPL-SCNC: 145 MMOL/L (ref 136–145)
SODIUM SERPL-SCNC: 146 MMOL/L (ref 136–145)
SODIUM SERPL-SCNC: 147 MMOL/L (ref 136–145)
SODIUM SERPL-SCNC: 148 MMOL/L (ref 136–145)
SODIUM SERPL-SCNC: 151 MMOL/L (ref 136–145)
SOURCE, COVRS: NORMAL
SOURCE, COVRS: NORMAL
SP GR UR REFRACTOMETRY: 1.02 (ref 1–1.03)
SPECIMEN EXP DATE BLD: NORMAL
SPECIMEN EXP DATE BLD: NORMAL
SPECIMEN SOURCE FLD: ABNORMAL
SPECIMEN TYPE, XMCV1T: NORMAL
SPECIMEN TYPE, XMCV1T: NORMAL
SPECIMEN TYPE: ABNORMAL
SPONTANEOUS TIMED, IST: YES
SPONTANEOUS TIMED, IST: YES
STATUS OF UNIT,%ST: NORMAL
STRESS BASELINE HR: 120 BPM
STRESS ESTIMATED WORKLOAD: 1 METS
STRESS EXERCISE DUR MIN: NORMAL
STRESS PEAK DIAS BP: 110 MMHG
STRESS PEAK SYS BP: 150 MMHG
STRESS PERCENT HR ACHIEVED: 75 %
STRESS POST PEAK HR: 125 BPM
STRESS RATE PRESSURE PRODUCT: NORMAL BPM*MMHG
STRESS TARGET HR: 166 BPM
THROMBIN TIME: 29 SEC
TIBC SERPL-MCNC: 101 UG/DL (ref 250–450)
TIBC SERPL-MCNC: 69 UG/DL (ref 250–450)
TOTAL RESP. RATE, ITRR: 14
TOTAL RESP. RATE, ITRR: 16
TOTAL RESP. RATE, ITRR: 18
TOTAL RESP. RATE, ITRR: 22
TOTAL RESP. RATE, ITRR: 25
TOTAL RESP. RATE, ITRR: 32
TOTAL RESP. RATE, ITRR: 44
TROPONIN I SERPL-MCNC: <0.02 NG/ML (ref 0–0.04)
UNIT DIVISION, %UDIV: 0
UROBILINOGEN UR QL STRIP.AUTO: 1 EU/DL (ref 0.2–1)
VANCOMYCIN TROUGH SERPL-MCNC: 15.9 UG/ML (ref 10–20)
VANCOMYCIN TROUGH SERPL-MCNC: 17.6 UG/ML (ref 10–20)
VANCOMYCIN TROUGH SERPL-MCNC: 19.8 UG/ML (ref 10–20)
VANCOMYCIN TROUGH SERPL-MCNC: 23.7 UG/ML (ref 10–20)
VANCOMYCIN TROUGH SERPL-MCNC: 26.4 UG/ML (ref 10–20)
VENTILATION MODE VENT: ABNORMAL
VENTRICULAR RATE, ECG03: 104 BPM
VENTRICULAR RATE, ECG03: 108 BPM
VENTRICULAR RATE, ECG03: 119 BPM
VENTRICULAR RATE, ECG03: 120 BPM
VENTRICULAR RATE, ECG03: 88 BPM
VOLUME CONTROL PLUS IVLCP: YES
VT SETTING VENT: 420 ML
WBC # BLD AUTO: 10.7 K/UL (ref 4.6–13.2)
WBC # BLD AUTO: 10.8 K/UL (ref 4.6–13.2)
WBC # BLD AUTO: 11.5 K/UL (ref 4.6–13.2)
WBC # BLD AUTO: 11.9 K/UL (ref 4.6–13.2)
WBC # BLD AUTO: 2.9 K/UL (ref 4.6–13.2)
WBC # BLD AUTO: 3.1 K/UL (ref 4.6–13.2)
WBC # BLD AUTO: 3.1 K/UL (ref 4.6–13.2)
WBC # BLD AUTO: 3.4 K/UL (ref 4.6–13.2)
WBC # BLD AUTO: 3.8 K/UL (ref 4.6–13.2)
WBC # BLD AUTO: 3.9 K/UL (ref 4.6–13.2)
WBC # BLD AUTO: 4 K/UL (ref 4.6–13.2)
WBC # BLD AUTO: 4 K/UL (ref 4.6–13.2)
WBC # BLD AUTO: 4.4 K/UL (ref 4.6–13.2)
WBC # BLD AUTO: 4.8 K/UL (ref 4.6–13.2)
WBC # BLD AUTO: 4.8 K/UL (ref 4.6–13.2)
WBC # BLD AUTO: 4.9 K/UL (ref 4.6–13.2)
WBC # BLD AUTO: 5 K/UL (ref 4.6–13.2)
WBC # BLD AUTO: 5.5 K/UL (ref 4.6–13.2)
WBC # BLD AUTO: 5.6 K/UL (ref 4.6–13.2)
WBC # BLD AUTO: 5.7 K/UL (ref 4.6–13.2)
WBC # BLD AUTO: 5.8 K/UL (ref 4.6–13.2)
WBC # BLD AUTO: 5.9 K/UL (ref 4.6–13.2)
WBC # BLD AUTO: 6.5 K/UL (ref 4.6–13.2)
WBC # BLD AUTO: 6.6 K/UL (ref 4.6–13.2)
WBC # BLD AUTO: 6.9 K/UL (ref 4.6–13.2)
WBC # BLD AUTO: 7.1 K/UL (ref 4.6–13.2)
WBC # BLD AUTO: 7.4 K/UL (ref 4.6–13.2)
WBC # BLD AUTO: 7.5 K/UL (ref 4.6–13.2)
WBC # BLD AUTO: 7.6 K/UL (ref 4.6–13.2)
WBC # BLD AUTO: 7.8 K/UL (ref 4.6–13.2)
WBC # BLD AUTO: 8 K/UL (ref 4.6–13.2)
WBC # BLD AUTO: 8 K/UL (ref 4.6–13.2)
WBC # BLD AUTO: 8.3 K/UL (ref 4.6–13.2)
WBC # BLD AUTO: 8.3 K/UL (ref 4.6–13.2)
WBC # BLD AUTO: 8.7 K/UL (ref 4.6–13.2)
WBC # BLD AUTO: 9.4 K/UL (ref 4.6–13.2)
WBC # BLD AUTO: 9.6 K/UL (ref 4.6–13.2)

## 2020-01-01 PROCEDURE — 85610 PROTHROMBIN TIME: CPT

## 2020-01-01 PROCEDURE — 85025 COMPLETE CBC W/AUTO DIFF WBC: CPT

## 2020-01-01 PROCEDURE — 97530 THERAPEUTIC ACTIVITIES: CPT

## 2020-01-01 PROCEDURE — P9047 ALBUMIN (HUMAN), 25%, 50ML: HCPCS | Performed by: HOSPITALIST

## 2020-01-01 PROCEDURE — 74011000250 HC RX REV CODE- 250: Performed by: HOSPITALIST

## 2020-01-01 PROCEDURE — 74011250637 HC RX REV CODE- 250/637: Performed by: INTERNAL MEDICINE

## 2020-01-01 PROCEDURE — 85730 THROMBOPLASTIN TIME PARTIAL: CPT

## 2020-01-01 PROCEDURE — 74011250636 HC RX REV CODE- 250/636: Performed by: INTERNAL MEDICINE

## 2020-01-01 PROCEDURE — 65610000006 HC RM INTENSIVE CARE

## 2020-01-01 PROCEDURE — 74011000258 HC RX REV CODE- 258: Performed by: INTERNAL MEDICINE

## 2020-01-01 PROCEDURE — P9047 ALBUMIN (HUMAN), 25%, 50ML: HCPCS | Performed by: INTERNAL MEDICINE

## 2020-01-01 PROCEDURE — 74011000258 HC RX REV CODE- 258: Performed by: EMERGENCY MEDICINE

## 2020-01-01 PROCEDURE — 84100 ASSAY OF PHOSPHORUS: CPT

## 2020-01-01 PROCEDURE — 74011000250 HC RX REV CODE- 250: Performed by: INTERNAL MEDICINE

## 2020-01-01 PROCEDURE — G0157 HHC PT ASSISTANT EA 15: HCPCS

## 2020-01-01 PROCEDURE — 89051 BODY FLUID CELL COUNT: CPT

## 2020-01-01 PROCEDURE — 74011250637 HC RX REV CODE- 250/637: Performed by: HOSPITALIST

## 2020-01-01 PROCEDURE — 83735 ASSAY OF MAGNESIUM: CPT

## 2020-01-01 PROCEDURE — 97116 GAIT TRAINING THERAPY: CPT

## 2020-01-01 PROCEDURE — 80053 COMPREHEN METABOLIC PANEL: CPT

## 2020-01-01 PROCEDURE — 82550 ASSAY OF CK (CPK): CPT

## 2020-01-01 PROCEDURE — 83540 ASSAY OF IRON: CPT

## 2020-01-01 PROCEDURE — 96375 TX/PRO/DX INJ NEW DRUG ADDON: CPT

## 2020-01-01 PROCEDURE — 97164 PT RE-EVAL EST PLAN CARE: CPT

## 2020-01-01 PROCEDURE — 87040 BLOOD CULTURE FOR BACTERIA: CPT

## 2020-01-01 PROCEDURE — G0152 HHCP-SERV OF OT,EA 15 MIN: HCPCS

## 2020-01-01 PROCEDURE — 74011250636 HC RX REV CODE- 250/636: Performed by: HOSPITALIST

## 2020-01-01 PROCEDURE — 87635 SARS-COV-2 COVID-19 AMP PRB: CPT

## 2020-01-01 PROCEDURE — 36430 TRANSFUSION BLD/BLD COMPNT: CPT

## 2020-01-01 PROCEDURE — C9113 INJ PANTOPRAZOLE SODIUM, VIA: HCPCS | Performed by: INTERNAL MEDICINE

## 2020-01-01 PROCEDURE — 82962 GLUCOSE BLOOD TEST: CPT

## 2020-01-01 PROCEDURE — 74011636637 HC RX REV CODE- 636/637: Performed by: INTERNAL MEDICINE

## 2020-01-01 PROCEDURE — 77010033678 HC OXYGEN DAILY

## 2020-01-01 PROCEDURE — 3331090001 HH PPS REVENUE CREDIT

## 2020-01-01 PROCEDURE — G0299 HHS/HOSPICE OF RN EA 15 MIN: HCPCS

## 2020-01-01 PROCEDURE — 84132 ASSAY OF SERUM POTASSIUM: CPT

## 2020-01-01 PROCEDURE — 65270000029 HC RM PRIVATE

## 2020-01-01 PROCEDURE — 80202 ASSAY OF VANCOMYCIN: CPT

## 2020-01-01 PROCEDURE — 86706 HEP B SURFACE ANTIBODY: CPT

## 2020-01-01 PROCEDURE — 77030040392 HC DRSG OPTIFOAM MDII -A

## 2020-01-01 PROCEDURE — 06HM33Z INSERTION OF INFUSION DEVICE INTO RIGHT FEMORAL VEIN, PERCUTANEOUS APPROACH: ICD-10-PCS | Performed by: INTERNAL MEDICINE

## 2020-01-01 PROCEDURE — 82140 ASSAY OF AMMONIA: CPT

## 2020-01-01 PROCEDURE — 36415 COLL VENOUS BLD VENIPUNCTURE: CPT

## 2020-01-01 PROCEDURE — 82150 ASSAY OF AMYLASE: CPT

## 2020-01-01 PROCEDURE — 3331090002 HH PPS REVENUE DEBIT

## 2020-01-01 PROCEDURE — 96374 THER/PROPH/DIAG INJ IV PUSH: CPT

## 2020-01-01 PROCEDURE — 36600 WITHDRAWAL OF ARTERIAL BLOOD: CPT

## 2020-01-01 PROCEDURE — 93017 CV STRESS TEST TRACING ONLY: CPT

## 2020-01-01 PROCEDURE — 82330 ASSAY OF CALCIUM: CPT

## 2020-01-01 PROCEDURE — 82390 ASSAY OF CERULOPLASMIN: CPT

## 2020-01-01 PROCEDURE — 93005 ELECTROCARDIOGRAM TRACING: CPT

## 2020-01-01 PROCEDURE — 82803 BLOOD GASES ANY COMBINATION: CPT

## 2020-01-01 PROCEDURE — 96361 HYDRATE IV INFUSION ADD-ON: CPT

## 2020-01-01 PROCEDURE — 74011250637 HC RX REV CODE- 250/637: Performed by: EMERGENCY MEDICINE

## 2020-01-01 PROCEDURE — 93970 EXTREMITY STUDY: CPT

## 2020-01-01 PROCEDURE — 97162 PT EVAL MOD COMPLEX 30 MIN: CPT

## 2020-01-01 PROCEDURE — 99223 1ST HOSP IP/OBS HIGH 75: CPT | Performed by: INTERNAL MEDICINE

## 2020-01-01 PROCEDURE — 85611 PROTHROMBIN TEST: CPT

## 2020-01-01 PROCEDURE — 80048 BASIC METABOLIC PNL TOTAL CA: CPT

## 2020-01-01 PROCEDURE — 74011000258 HC RX REV CODE- 258: Performed by: HOSPITALIST

## 2020-01-01 PROCEDURE — 83690 ASSAY OF LIPASE: CPT

## 2020-01-01 PROCEDURE — 74011636637 HC RX REV CODE- 636/637: Performed by: HOSPITALIST

## 2020-01-01 PROCEDURE — 93308 TTE F-UP OR LMTD: CPT

## 2020-01-01 PROCEDURE — 71045 X-RAY EXAM CHEST 1 VIEW: CPT

## 2020-01-01 PROCEDURE — 74011250636 HC RX REV CODE- 250/636: Performed by: EMERGENCY MEDICINE

## 2020-01-01 PROCEDURE — 73030 X-RAY EXAM OF SHOULDER: CPT

## 2020-01-01 PROCEDURE — 85379 FIBRIN DEGRADATION QUANT: CPT

## 2020-01-01 PROCEDURE — 78452 HT MUSCLE IMAGE SPECT MULT: CPT

## 2020-01-01 PROCEDURE — 11045 DBRDMT SUBQ TISS EACH ADDL: CPT

## 2020-01-01 PROCEDURE — 97535 SELF CARE MNGMENT TRAINING: CPT

## 2020-01-01 PROCEDURE — 94003 VENT MGMT INPAT SUBQ DAY: CPT

## 2020-01-01 PROCEDURE — 74011250636 HC RX REV CODE- 250/636: Performed by: FAMILY MEDICINE

## 2020-01-01 PROCEDURE — 30233M1 TRANSFUSION OF NONAUTOLOGOUS PLASMA CRYOPRECIPITATE INTO PERIPHERAL VEIN, PERCUTANEOUS APPROACH: ICD-10-PCS | Performed by: INTERNAL MEDICINE

## 2020-01-01 PROCEDURE — 96376 TX/PRO/DX INJ SAME DRUG ADON: CPT

## 2020-01-01 PROCEDURE — 86677 HELICOBACTER PYLORI ANTIBODY: CPT

## 2020-01-01 PROCEDURE — 77030018798 HC PMP KT ENTRL FED COVD -A

## 2020-01-01 PROCEDURE — P9012 CRYOPRECIPITATE EACH UNIT: HCPCS

## 2020-01-01 PROCEDURE — P9059 PLASMA, FRZ BETWEEN 8-24HOUR: HCPCS

## 2020-01-01 PROCEDURE — 99233 SBSQ HOSP IP/OBS HIGH 50: CPT | Performed by: INTERNAL MEDICINE

## 2020-01-01 PROCEDURE — 400013 HH SOC

## 2020-01-01 PROCEDURE — 94660 CPAP INITIATION&MGMT: CPT

## 2020-01-01 PROCEDURE — 36592 COLLECT BLOOD FROM PICC: CPT

## 2020-01-01 PROCEDURE — 77030027138 HC INCENT SPIROMETER -A

## 2020-01-01 PROCEDURE — 99232 SBSQ HOSP IP/OBS MODERATE 35: CPT | Performed by: INTERNAL MEDICINE

## 2020-01-01 PROCEDURE — 97110 THERAPEUTIC EXERCISES: CPT

## 2020-01-01 PROCEDURE — 65660000000 HC RM CCU STEPDOWN

## 2020-01-01 PROCEDURE — 0W9G3ZZ DRAINAGE OF PERITONEAL CAVITY, PERCUTANEOUS APPROACH: ICD-10-PCS | Performed by: RADIOLOGY

## 2020-01-01 PROCEDURE — 74011636320 HC RX REV CODE- 636/320: Performed by: INTERNAL MEDICINE

## 2020-01-01 PROCEDURE — 97166 OT EVAL MOD COMPLEX 45 MIN: CPT

## 2020-01-01 PROCEDURE — P9035 PLATELET PHERES LEUKOREDUCED: HCPCS

## 2020-01-01 PROCEDURE — 74011250636 HC RX REV CODE- 250/636

## 2020-01-01 PROCEDURE — 02HV33Z INSERTION OF INFUSION DEVICE INTO SUPERIOR VENA CAVA, PERCUTANEOUS APPROACH: ICD-10-PCS | Performed by: INTERNAL MEDICINE

## 2020-01-01 PROCEDURE — G0151 HHCP-SERV OF PT,EA 15 MIN: HCPCS

## 2020-01-01 PROCEDURE — 86880 COOMBS TEST DIRECT: CPT

## 2020-01-01 PROCEDURE — 85384 FIBRINOGEN ACTIVITY: CPT

## 2020-01-01 PROCEDURE — 87070 CULTURE OTHR SPECIMN AEROBIC: CPT

## 2020-01-01 PROCEDURE — 2709999900 HC NON-CHARGEABLE SUPPLY

## 2020-01-01 PROCEDURE — P9045 ALBUMIN (HUMAN), 5%, 250 ML: HCPCS | Performed by: INTERNAL MEDICINE

## 2020-01-01 PROCEDURE — A6260 WOUND CLEANSER ANY TYPE/SIZE: HCPCS

## 2020-01-01 PROCEDURE — 77030021566 MRI ABD W MRCP W WO CONT

## 2020-01-01 PROCEDURE — 77030040361 HC SLV COMPR DVT MDII -B

## 2020-01-01 PROCEDURE — 51798 US URINE CAPACITY MEASURE: CPT

## 2020-01-01 PROCEDURE — 74011250636 HC RX REV CODE- 250/636: Performed by: PHYSICIAN ASSISTANT

## 2020-01-01 PROCEDURE — 77030040393 HC DRSG OPTIFOAM GENT MDII -B

## 2020-01-01 PROCEDURE — 85597 PHOSPHOLIPID PLTLT NEUTRALIZ: CPT

## 2020-01-01 PROCEDURE — 83605 ASSAY OF LACTIC ACID: CPT

## 2020-01-01 PROCEDURE — 87205 SMEAR GRAM STAIN: CPT

## 2020-01-01 PROCEDURE — 77030019563 HC DEV ATTCH FEED HOLL -A

## 2020-01-01 PROCEDURE — 87015 SPECIMEN INFECT AGNT CONCNTJ: CPT

## 2020-01-01 PROCEDURE — 5A1955Z RESPIRATORY VENTILATION, GREATER THAN 96 CONSECUTIVE HOURS: ICD-10-PCS | Performed by: INTERNAL MEDICINE

## 2020-01-01 PROCEDURE — 80307 DRUG TEST PRSMV CHEM ANLYZR: CPT

## 2020-01-01 PROCEDURE — 92526 ORAL FUNCTION THERAPY: CPT

## 2020-01-01 PROCEDURE — 96365 THER/PROPH/DIAG IV INF INIT: CPT

## 2020-01-01 PROCEDURE — 93306 TTE W/DOPPLER COMPLETE: CPT

## 2020-01-01 PROCEDURE — 99233 SBSQ HOSP IP/OBS HIGH 50: CPT | Performed by: NURSE PRACTITIONER

## 2020-01-01 PROCEDURE — 99283 EMERGENCY DEPT VISIT LOW MDM: CPT

## 2020-01-01 PROCEDURE — 86708 HEPATITIS A ANTIBODY: CPT

## 2020-01-01 PROCEDURE — 81003 URINALYSIS AUTO W/O SCOPE: CPT

## 2020-01-01 PROCEDURE — 76450000000

## 2020-01-01 PROCEDURE — 5A09357 ASSISTANCE WITH RESPIRATORY VENTILATION, LESS THAN 24 CONSECUTIVE HOURS, CONTINUOUS POSITIVE AIRWAY PRESSURE: ICD-10-PCS | Performed by: FAMILY MEDICINE

## 2020-01-01 PROCEDURE — C1751 CATH, INF, PER/CENT/MIDLINE: HCPCS

## 2020-01-01 PROCEDURE — 86146 BETA-2 GLYCOPROTEIN ANTIBODY: CPT

## 2020-01-01 PROCEDURE — 97167 OT EVAL HIGH COMPLEX 60 MIN: CPT

## 2020-01-01 PROCEDURE — 85362 FIBRIN DEGRADATION PRODUCTS: CPT

## 2020-01-01 PROCEDURE — 85018 HEMOGLOBIN: CPT

## 2020-01-01 PROCEDURE — 99223 1ST HOSP IP/OBS HIGH 75: CPT | Performed by: NURSE PRACTITIONER

## 2020-01-01 PROCEDURE — 83516 IMMUNOASSAY NONANTIBODY: CPT

## 2020-01-01 PROCEDURE — 74011000636 HC RX REV CODE- 636: Performed by: EMERGENCY MEDICINE

## 2020-01-01 PROCEDURE — 99285 EMERGENCY DEPT VISIT HI MDM: CPT

## 2020-01-01 PROCEDURE — 82728 ASSAY OF FERRITIN: CPT

## 2020-01-01 PROCEDURE — 83036 HEMOGLOBIN GLYCOSYLATED A1C: CPT

## 2020-01-01 PROCEDURE — A9575 INJ GADOTERATE MEGLUMI 0.1ML: HCPCS | Performed by: INTERNAL MEDICINE

## 2020-01-01 PROCEDURE — 77030005513 HC CATH URETH FOL11 MDII -B

## 2020-01-01 PROCEDURE — 76705 ECHO EXAM OF ABDOMEN: CPT

## 2020-01-01 PROCEDURE — P9047 ALBUMIN (HUMAN), 25%, 50ML: HCPCS | Performed by: FAMILY MEDICINE

## 2020-01-01 PROCEDURE — 2709999900 HC NON-CHARGEABLE SUPPLY: Performed by: INTERNAL MEDICINE

## 2020-01-01 PROCEDURE — 30233K1 TRANSFUSION OF NONAUTOLOGOUS FROZEN PLASMA INTO PERIPHERAL VEIN, PERCUTANEOUS APPROACH: ICD-10-PCS | Performed by: INTERNAL MEDICINE

## 2020-01-01 PROCEDURE — 11042 DBRDMT SUBQ TIS 1ST 20SQCM/<: CPT

## 2020-01-01 PROCEDURE — 92610 EVALUATE SWALLOWING FUNCTION: CPT

## 2020-01-01 PROCEDURE — 99231 SBSQ HOSP IP/OBS SF/LOW 25: CPT | Performed by: NURSE PRACTITIONER

## 2020-01-01 PROCEDURE — 0BH17EZ INSERTION OF ENDOTRACHEAL AIRWAY INTO TRACHEA, VIA NATURAL OR ARTIFICIAL OPENING: ICD-10-PCS | Performed by: ANESTHESIOLOGY

## 2020-01-01 PROCEDURE — 74177 CT ABD & PELVIS W/CONTRAST: CPT

## 2020-01-01 PROCEDURE — 86900 BLOOD TYPING SEROLOGIC ABO: CPT

## 2020-01-01 PROCEDURE — 43235 EGD DIAGNOSTIC BRUSH WASH: CPT | Performed by: INTERNAL MEDICINE

## 2020-01-01 PROCEDURE — 85027 COMPLETE CBC AUTOMATED: CPT

## 2020-01-01 PROCEDURE — 93971 EXTREMITY STUDY: CPT

## 2020-01-01 PROCEDURE — A6210 FOAM DRG >16<=48 SQ IN W/O B: HCPCS

## 2020-01-01 PROCEDURE — 83880 ASSAY OF NATRIURETIC PEPTIDE: CPT

## 2020-01-01 PROCEDURE — 80069 RENAL FUNCTION PANEL: CPT

## 2020-01-01 PROCEDURE — 71275 CT ANGIOGRAPHY CHEST: CPT

## 2020-01-01 PROCEDURE — 76040000007: Performed by: INTERNAL MEDICINE

## 2020-01-01 PROCEDURE — 49083 ABD PARACENTESIS W/IMAGING: CPT

## 2020-01-01 PROCEDURE — 86803 HEPATITIS C AB TEST: CPT

## 2020-01-01 PROCEDURE — 31500 INSERT EMERGENCY AIRWAY: CPT

## 2020-01-01 PROCEDURE — 0DJ08ZZ INSPECTION OF UPPER INTESTINAL TRACT, VIA NATURAL OR ARTIFICIAL OPENING ENDOSCOPIC: ICD-10-PCS | Performed by: INTERNAL MEDICINE

## 2020-01-01 PROCEDURE — 85045 AUTOMATED RETICULOCYTE COUNT: CPT

## 2020-01-01 PROCEDURE — 94002 VENT MGMT INPAT INIT DAY: CPT

## 2020-01-01 PROCEDURE — 86038 ANTINUCLEAR ANTIBODIES: CPT

## 2020-01-01 PROCEDURE — 74011250637 HC RX REV CODE- 250/637: Performed by: FAMILY MEDICINE

## 2020-01-01 PROCEDURE — 6A551Z2 PHERESIS OF PLATELETS, MULTIPLE: ICD-10-PCS | Performed by: INTERNAL MEDICINE

## 2020-01-01 PROCEDURE — 73502 X-RAY EXAM HIP UNI 2-3 VIEWS: CPT

## 2020-01-01 PROCEDURE — G0158 HHC OT ASSISTANT EA 15: HCPCS

## 2020-01-01 PROCEDURE — 86704 HEP B CORE ANTIBODY TOTAL: CPT

## 2020-01-01 PROCEDURE — 87340 HEPATITIS B SURFACE AG IA: CPT

## 2020-01-01 PROCEDURE — 87077 CULTURE AEROBIC IDENTIFY: CPT

## 2020-01-01 PROCEDURE — 87186 SC STD MICRODIL/AGAR DIL: CPT

## 2020-01-01 PROCEDURE — 86147 CARDIOLIPIN ANTIBODY EA IG: CPT

## 2020-01-01 PROCEDURE — 99291 CRITICAL CARE FIRST HOUR: CPT

## 2020-01-01 RX ORDER — MAGNESIUM SULFATE HEPTAHYDRATE 40 MG/ML
2 INJECTION, SOLUTION INTRAVENOUS ONCE
Status: COMPLETED | OUTPATIENT
Start: 2020-01-01 | End: 2020-01-01

## 2020-01-01 RX ORDER — HYDROCODONE BITARTRATE AND ACETAMINOPHEN 5; 325 MG/1; MG/1
1 TABLET ORAL
Qty: 8 TAB | Refills: 0 | Status: SHIPPED | OUTPATIENT
Start: 2020-01-01 | End: 2020-01-01

## 2020-01-01 RX ORDER — DULOXETIN HYDROCHLORIDE 60 MG/1
60 CAPSULE, DELAYED RELEASE ORAL DAILY
Status: DISCONTINUED | OUTPATIENT
Start: 2020-01-01 | End: 2020-01-01 | Stop reason: HOSPADM

## 2020-01-01 RX ORDER — INSULIN GLARGINE 100 [IU]/ML
12 INJECTION, SOLUTION SUBCUTANEOUS DAILY
Status: DISCONTINUED | OUTPATIENT
Start: 2020-01-01 | End: 2020-01-01

## 2020-01-01 RX ORDER — METHOCARBAMOL 500 MG/1
2 TABLET, FILM COATED ORAL 4 TIMES DAILY
COMMUNITY
End: 2020-01-01

## 2020-01-01 RX ORDER — IBUPROFEN 200 MG
1 TABLET ORAL DAILY
Status: DISCONTINUED | OUTPATIENT
Start: 2020-01-01 | End: 2020-01-01

## 2020-01-01 RX ORDER — OXYCODONE HYDROCHLORIDE 5 MG/1
5 TABLET ORAL
Qty: 20 TAB | Refills: 0 | Status: SHIPPED | OUTPATIENT
Start: 2020-01-01 | End: 2020-01-01

## 2020-01-01 RX ORDER — SODIUM CHLORIDE 0.9 % (FLUSH) 0.9 %
5-10 SYRINGE (ML) INJECTION AS NEEDED
Status: DISCONTINUED | OUTPATIENT
Start: 2020-01-01 | End: 2020-01-01 | Stop reason: HOSPADM

## 2020-01-01 RX ORDER — FUROSEMIDE 10 MG/ML
40 INJECTION INTRAMUSCULAR; INTRAVENOUS DAILY
Status: DISCONTINUED | OUTPATIENT
Start: 2020-01-01 | End: 2020-01-01

## 2020-01-01 RX ORDER — INSULIN GLARGINE 100 [IU]/ML
5 INJECTION, SOLUTION SUBCUTANEOUS DAILY
Status: DISCONTINUED | OUTPATIENT
Start: 2020-01-01 | End: 2020-01-01

## 2020-01-01 RX ORDER — METOPROLOL TARTRATE 5 MG/5ML
2.5 INJECTION INTRAVENOUS EVERY 6 HOURS
Status: DISCONTINUED | OUTPATIENT
Start: 2020-01-01 | End: 2020-01-01

## 2020-01-01 RX ORDER — FUROSEMIDE 40 MG/1
40 TABLET ORAL DAILY
Status: DISCONTINUED | OUTPATIENT
Start: 2020-01-01 | End: 2020-01-01

## 2020-01-01 RX ORDER — MORPHINE SULFATE 2 MG/ML
1 INJECTION, SOLUTION INTRAMUSCULAR; INTRAVENOUS
Status: DISCONTINUED | OUTPATIENT
Start: 2020-01-01 | End: 2020-01-01

## 2020-01-01 RX ORDER — FUROSEMIDE 20 MG/1
20 TABLET ORAL ONCE
Status: COMPLETED | OUTPATIENT
Start: 2020-01-01 | End: 2020-01-01

## 2020-01-01 RX ORDER — INSULIN LISPRO 100 [IU]/ML
3 INJECTION, SOLUTION INTRAVENOUS; SUBCUTANEOUS
Status: DISCONTINUED | OUTPATIENT
Start: 2020-01-01 | End: 2020-01-01

## 2020-01-01 RX ORDER — MIDAZOLAM HYDROCHLORIDE 1 MG/ML
.25-5 INJECTION, SOLUTION INTRAMUSCULAR; INTRAVENOUS
Status: DISCONTINUED | OUTPATIENT
Start: 2020-01-01 | End: 2020-01-01 | Stop reason: HOSPADM

## 2020-01-01 RX ORDER — ONDANSETRON 2 MG/ML
4 INJECTION INTRAMUSCULAR; INTRAVENOUS
Status: COMPLETED | OUTPATIENT
Start: 2020-01-01 | End: 2020-01-01

## 2020-01-01 RX ORDER — GLYBURIDE-METFORMIN HYDROCHLORIDE 2.5; 5 MG/1; MG/1
1 TABLET ORAL 2 TIMES DAILY
COMMUNITY
End: 2020-01-01

## 2020-01-01 RX ORDER — PHYTONADIONE 10 MG/ML
10 INJECTION, EMULSION INTRAMUSCULAR; INTRAVENOUS; SUBCUTANEOUS ONCE
Status: COMPLETED | OUTPATIENT
Start: 2020-01-01 | End: 2020-01-01

## 2020-01-01 RX ORDER — PANTOPRAZOLE SODIUM 40 MG/1
40 TABLET, DELAYED RELEASE ORAL
Status: DISCONTINUED | OUTPATIENT
Start: 2020-01-01 | End: 2020-01-01 | Stop reason: HOSPADM

## 2020-01-01 RX ORDER — INSULIN GLARGINE 100 [IU]/ML
10 INJECTION, SOLUTION SUBCUTANEOUS DAILY
Status: DISCONTINUED | OUTPATIENT
Start: 2020-01-01 | End: 2020-01-01

## 2020-01-01 RX ORDER — DOCUSATE SODIUM 100 MG/1
100 CAPSULE, LIQUID FILLED ORAL DAILY
Status: DISCONTINUED | OUTPATIENT
Start: 2020-01-01 | End: 2020-01-01

## 2020-01-01 RX ORDER — POTASSIUM CHLORIDE 20 MEQ/1
40 TABLET, EXTENDED RELEASE ORAL
Status: COMPLETED | OUTPATIENT
Start: 2020-01-01 | End: 2020-01-01

## 2020-01-01 RX ORDER — MAGNESIUM SULFATE HEPTAHYDRATE 40 MG/ML
2 INJECTION, SOLUTION INTRAVENOUS
Status: COMPLETED | OUTPATIENT
Start: 2020-01-01 | End: 2020-01-01

## 2020-01-01 RX ORDER — SODIUM CHLORIDE 0.9 % (FLUSH) 0.9 %
5-40 SYRINGE (ML) INJECTION EVERY 8 HOURS
Status: DISCONTINUED | OUTPATIENT
Start: 2020-01-01 | End: 2020-01-01 | Stop reason: HOSPADM

## 2020-01-01 RX ORDER — SODIUM CHLORIDE 9 MG/ML
250 INJECTION, SOLUTION INTRAVENOUS AS NEEDED
Status: DISCONTINUED | OUTPATIENT
Start: 2020-01-01 | End: 2020-01-01 | Stop reason: SDUPTHER

## 2020-01-01 RX ORDER — POTASSIUM CHLORIDE 20 MEQ/1
20 TABLET, EXTENDED RELEASE ORAL DAILY
Qty: 10 TAB | Refills: 0 | Status: SHIPPED
Start: 2020-01-01

## 2020-01-01 RX ORDER — SODIUM CHLORIDE 0.9 % (FLUSH) 0.9 %
5-40 SYRINGE (ML) INJECTION AS NEEDED
Status: DISCONTINUED | OUTPATIENT
Start: 2020-01-01 | End: 2020-01-01 | Stop reason: HOSPADM

## 2020-01-01 RX ORDER — HYDROCODONE BITARTRATE AND ACETAMINOPHEN 5; 325 MG/1; MG/1
1 TABLET ORAL
Status: COMPLETED | OUTPATIENT
Start: 2020-01-01 | End: 2020-01-01

## 2020-01-01 RX ORDER — INSULIN GLARGINE 100 [IU]/ML
20 INJECTION, SOLUTION SUBCUTANEOUS
Qty: 1 VIAL | Refills: 0 | Status: ON HOLD
Start: 2020-01-01 | End: 2020-01-01 | Stop reason: SDUPTHER

## 2020-01-01 RX ORDER — ALBUMIN HUMAN 250 G/1000ML
12.5 SOLUTION INTRAVENOUS EVERY 6 HOURS
Status: DISCONTINUED | OUTPATIENT
Start: 2020-01-01 | End: 2020-01-01

## 2020-01-01 RX ORDER — DEXAMETHASONE SODIUM PHOSPHATE 10 MG/ML
40 INJECTION INTRAMUSCULAR; INTRAVENOUS EVERY 24 HOURS
Status: COMPLETED | OUTPATIENT
Start: 2020-01-01 | End: 2020-01-01

## 2020-01-01 RX ORDER — POTASSIUM CHLORIDE 20 MEQ/1
40 TABLET, EXTENDED RELEASE ORAL EVERY 4 HOURS
Status: DISPENSED | OUTPATIENT
Start: 2020-01-01 | End: 2020-01-01

## 2020-01-01 RX ORDER — METOPROLOL TARTRATE 5 MG/5ML
2.5 INJECTION INTRAVENOUS
Status: DISCONTINUED | OUTPATIENT
Start: 2020-01-01 | End: 2020-01-01 | Stop reason: HOSPADM

## 2020-01-01 RX ORDER — METOPROLOL TARTRATE 5 MG/5ML
5 INJECTION INTRAVENOUS EVERY 6 HOURS
Status: DISCONTINUED | OUTPATIENT
Start: 2020-01-01 | End: 2020-01-01

## 2020-01-01 RX ORDER — POTASSIUM CHLORIDE 7.45 MG/ML
10 INJECTION INTRAVENOUS
Status: DISCONTINUED | OUTPATIENT
Start: 2020-01-01 | End: 2020-01-01

## 2020-01-01 RX ORDER — LORAZEPAM 2 MG/ML
1 INJECTION INTRAMUSCULAR
Status: DISCONTINUED | OUTPATIENT
Start: 2020-01-01 | End: 2020-01-01

## 2020-01-01 RX ORDER — LIDOCAINE HYDROCHLORIDE 10 MG/ML
1-20 INJECTION INFILTRATION; PERINEURAL
Status: COMPLETED | OUTPATIENT
Start: 2020-01-01 | End: 2020-01-01

## 2020-01-01 RX ORDER — ASPIRIN 325 MG/1
100 TABLET, FILM COATED ORAL DAILY
Status: DISCONTINUED | OUTPATIENT
Start: 2020-01-01 | End: 2020-01-01

## 2020-01-01 RX ORDER — SPIRONOLACTONE 25 MG/1
50 TABLET ORAL DAILY
Status: DISCONTINUED | OUTPATIENT
Start: 2020-01-01 | End: 2020-01-01

## 2020-01-01 RX ORDER — SODIUM CHLORIDE 9 MG/ML
250 INJECTION, SOLUTION INTRAVENOUS AS NEEDED
Status: DISCONTINUED | OUTPATIENT
Start: 2020-01-01 | End: 2020-01-01

## 2020-01-01 RX ORDER — POTASSIUM CHLORIDE 7.45 MG/ML
10 INJECTION INTRAVENOUS
Status: COMPLETED | OUTPATIENT
Start: 2020-01-01 | End: 2020-01-01

## 2020-01-01 RX ORDER — LIDOCAINE HYDROCHLORIDE 10 MG/ML
10 INJECTION, SOLUTION EPIDURAL; INFILTRATION; INTRACAUDAL; PERINEURAL
Status: COMPLETED | OUTPATIENT
Start: 2020-01-01 | End: 2020-01-01

## 2020-01-01 RX ORDER — PROPOFOL 10 MG/ML
INJECTION, EMULSION INTRAVENOUS
Status: DISPENSED
Start: 2020-01-01 | End: 2020-01-01

## 2020-01-01 RX ORDER — CALCIUM GLUCONATE 20 MG/ML
2 INJECTION, SOLUTION INTRAVENOUS
Status: COMPLETED | OUTPATIENT
Start: 2020-01-01 | End: 2020-01-01

## 2020-01-01 RX ORDER — MAGNESIUM SULFATE 1 G/100ML
1 INJECTION INTRAVENOUS ONCE
Status: COMPLETED | OUTPATIENT
Start: 2020-01-01 | End: 2020-01-01

## 2020-01-01 RX ORDER — INSULIN LISPRO 100 [IU]/ML
6 INJECTION, SOLUTION INTRAVENOUS; SUBCUTANEOUS
Status: DISCONTINUED | OUTPATIENT
Start: 2020-01-01 | End: 2020-01-01

## 2020-01-01 RX ORDER — POTASSIUM CHLORIDE 20 MEQ/1
40 TABLET, EXTENDED RELEASE ORAL 2 TIMES DAILY
Status: COMPLETED | OUTPATIENT
Start: 2020-01-01 | End: 2020-01-01

## 2020-01-01 RX ORDER — ONDANSETRON 2 MG/ML
4 INJECTION INTRAMUSCULAR; INTRAVENOUS
Status: DISCONTINUED | OUTPATIENT
Start: 2020-01-01 | End: 2020-01-01 | Stop reason: HOSPADM

## 2020-01-01 RX ORDER — ACETAMINOPHEN 650 MG/1
650 SUPPOSITORY RECTAL
Status: DISCONTINUED | OUTPATIENT
Start: 2020-01-01 | End: 2020-01-01 | Stop reason: HOSPADM

## 2020-01-01 RX ORDER — HYDROCODONE BITARTRATE AND ACETAMINOPHEN 5; 325 MG/1; MG/1
1 TABLET ORAL
Qty: 10 TAB | Refills: 0 | Status: SHIPPED | OUTPATIENT
Start: 2020-01-01 | End: 2020-01-01

## 2020-01-01 RX ORDER — FENTANYL CITRATE 50 UG/ML
INJECTION, SOLUTION INTRAMUSCULAR; INTRAVENOUS AS NEEDED
Status: DISCONTINUED | OUTPATIENT
Start: 2020-01-01 | End: 2020-01-01 | Stop reason: HOSPADM

## 2020-01-01 RX ORDER — FUROSEMIDE 10 MG/ML
20 INJECTION INTRAMUSCULAR; INTRAVENOUS EVERY 12 HOURS
Status: DISCONTINUED | OUTPATIENT
Start: 2020-01-01 | End: 2020-01-01

## 2020-01-01 RX ORDER — INSULIN GLARGINE 100 [IU]/ML
10 INJECTION, SOLUTION SUBCUTANEOUS
Qty: 1 VIAL | Refills: 0 | Status: SHIPPED
Start: 2020-01-01

## 2020-01-01 RX ORDER — NOREPINEPHRINE BIT/0.9 % NACL 8 MG/250ML
2-16 INFUSION BOTTLE (ML) INTRAVENOUS
Status: DISCONTINUED | OUTPATIENT
Start: 2020-01-01 | End: 2020-01-01

## 2020-01-01 RX ORDER — DEXAMETHASONE SODIUM PHOSPHATE 4 MG/ML
6 INJECTION, SOLUTION INTRA-ARTICULAR; INTRALESIONAL; INTRAMUSCULAR; INTRAVENOUS; SOFT TISSUE EVERY 24 HOURS
Status: DISCONTINUED | OUTPATIENT
Start: 2020-01-01 | End: 2020-01-01

## 2020-01-01 RX ORDER — MAGNESIUM SULFATE 100 %
4 CRYSTALS MISCELLANEOUS AS NEEDED
Status: DISCONTINUED | OUTPATIENT
Start: 2020-01-01 | End: 2020-01-01 | Stop reason: HOSPADM

## 2020-01-01 RX ORDER — ALBUMIN HUMAN 250 G/1000ML
25 SOLUTION INTRAVENOUS EVERY 6 HOURS
Status: DISCONTINUED | OUTPATIENT
Start: 2020-01-01 | End: 2020-01-01 | Stop reason: HOSPADM

## 2020-01-01 RX ORDER — ONDANSETRON 4 MG/1
4 TABLET, ORALLY DISINTEGRATING ORAL
Status: DISCONTINUED | OUTPATIENT
Start: 2020-01-01 | End: 2020-01-01 | Stop reason: HOSPADM

## 2020-01-01 RX ORDER — FENTANYL CITRATE 50 UG/ML
50 INJECTION, SOLUTION INTRAMUSCULAR; INTRAVENOUS
Status: DISCONTINUED | OUTPATIENT
Start: 2020-01-01 | End: 2020-01-01

## 2020-01-01 RX ORDER — INSULIN LISPRO 100 [IU]/ML
INJECTION, SOLUTION INTRAVENOUS; SUBCUTANEOUS
Status: DISCONTINUED | OUTPATIENT
Start: 2020-01-01 | End: 2020-01-01 | Stop reason: HOSPADM

## 2020-01-01 RX ORDER — CALCIUM GLUCONATE 20 MG/ML
2 INJECTION, SOLUTION INTRAVENOUS ONCE
Status: COMPLETED | OUTPATIENT
Start: 2020-01-01 | End: 2020-01-01

## 2020-01-01 RX ORDER — AMOXICILLIN AND CLAVULANATE POTASSIUM 875; 125 MG/1; MG/1
1 TABLET, FILM COATED ORAL 2 TIMES DAILY
Qty: 8 TAB | Refills: 0 | Status: SHIPPED | OUTPATIENT
Start: 2020-01-01 | End: 2020-01-01

## 2020-01-01 RX ORDER — INSULIN LISPRO 100 [IU]/ML
6 INJECTION, SOLUTION INTRAVENOUS; SUBCUTANEOUS
Status: DISCONTINUED | OUTPATIENT
Start: 2020-01-01 | End: 2020-01-01 | Stop reason: HOSPADM

## 2020-01-01 RX ORDER — OXYCODONE HYDROCHLORIDE 5 MG/1
5 TABLET ORAL
Status: DISCONTINUED | OUTPATIENT
Start: 2020-01-01 | End: 2020-01-01 | Stop reason: HOSPADM

## 2020-01-01 RX ORDER — MIDAZOLAM IN 0.9 % SOD.CHLORID 1 MG/ML
1-5 PLASTIC BAG, INJECTION (ML) INTRAVENOUS
Status: DISCONTINUED | OUTPATIENT
Start: 2020-01-01 | End: 2020-01-01

## 2020-01-01 RX ORDER — LIDOCAINE 4 G/100G
2 PATCH TOPICAL EVERY 24 HOURS
Status: DISCONTINUED | OUTPATIENT
Start: 2020-01-01 | End: 2020-01-01 | Stop reason: HOSPADM

## 2020-01-01 RX ORDER — LANOLIN ALCOHOL/MO/W.PET/CERES
1 CREAM (GRAM) TOPICAL
Status: DISCONTINUED | OUTPATIENT
Start: 2020-01-01 | End: 2020-01-01

## 2020-01-01 RX ORDER — HEPARIN SODIUM 200 [USP'U]/100ML
INJECTION, SOLUTION INTRAVENOUS
Status: COMPLETED
Start: 2020-01-01 | End: 2020-01-01

## 2020-01-01 RX ORDER — LANOLIN ALCOHOL/MO/W.PET/CERES
400 CREAM (GRAM) TOPICAL 2 TIMES DAILY
Status: DISCONTINUED | OUTPATIENT
Start: 2020-01-01 | End: 2020-01-01 | Stop reason: HOSPADM

## 2020-01-01 RX ORDER — HEPARIN SODIUM 10000 [USP'U]/100ML
18-36 INJECTION, SOLUTION INTRAVENOUS
Status: DISCONTINUED | OUTPATIENT
Start: 2020-01-01 | End: 2020-01-01

## 2020-01-01 RX ORDER — SPIRONOLACTONE 100 MG/1
100 TABLET, FILM COATED ORAL DAILY
Status: DISCONTINUED | OUTPATIENT
Start: 2020-01-01 | End: 2020-01-01

## 2020-01-01 RX ORDER — FENTANYL CITRATE 50 UG/ML
50-100 INJECTION, SOLUTION INTRAMUSCULAR; INTRAVENOUS
Status: DISCONTINUED | OUTPATIENT
Start: 2020-01-01 | End: 2020-01-01 | Stop reason: DRUGHIGH

## 2020-01-01 RX ORDER — MORPHINE SULFATE 2 MG/ML
4 INJECTION, SOLUTION INTRAMUSCULAR; INTRAVENOUS
Status: COMPLETED | OUTPATIENT
Start: 2020-01-01 | End: 2020-01-01

## 2020-01-01 RX ORDER — SODIUM CHLORIDE 9 MG/ML
75 INJECTION, SOLUTION INTRAVENOUS CONTINUOUS
Status: DISPENSED | OUTPATIENT
Start: 2020-01-01 | End: 2020-01-01

## 2020-01-01 RX ORDER — ALBUMIN HUMAN 50 G/1000ML
25 SOLUTION INTRAVENOUS EVERY 6 HOURS
Status: DISCONTINUED | OUTPATIENT
Start: 2020-01-01 | End: 2020-01-01

## 2020-01-01 RX ORDER — FUROSEMIDE 10 MG/ML
20 INJECTION INTRAMUSCULAR; INTRAVENOUS ONCE
Status: COMPLETED | OUTPATIENT
Start: 2020-01-01 | End: 2020-01-01

## 2020-01-01 RX ORDER — POTASSIUM CHLORIDE 20 MEQ/1
40 TABLET, EXTENDED RELEASE ORAL ONCE
Status: COMPLETED | OUTPATIENT
Start: 2020-01-01 | End: 2020-01-01

## 2020-01-01 RX ORDER — ALBUMIN HUMAN 250 G/1000ML
25 SOLUTION INTRAVENOUS EVERY 12 HOURS
Status: DISCONTINUED | OUTPATIENT
Start: 2020-01-01 | End: 2020-01-01

## 2020-01-01 RX ORDER — LORAZEPAM 2 MG/ML
1 INJECTION INTRAMUSCULAR ONCE
Status: COMPLETED | OUTPATIENT
Start: 2020-01-01 | End: 2020-01-01

## 2020-01-01 RX ORDER — NALOXONE HYDROCHLORIDE 0.4 MG/ML
0.4 INJECTION, SOLUTION INTRAMUSCULAR; INTRAVENOUS; SUBCUTANEOUS
Status: CANCELLED | OUTPATIENT
Start: 2020-01-01 | End: 2020-01-01

## 2020-01-01 RX ORDER — TRAZODONE HYDROCHLORIDE 50 MG/1
50 TABLET ORAL
Status: DISCONTINUED | OUTPATIENT
Start: 2020-01-01 | End: 2020-01-01 | Stop reason: HOSPADM

## 2020-01-01 RX ORDER — ALBUMIN HUMAN 250 G/1000ML
25 SOLUTION INTRAVENOUS EVERY 6 HOURS
Status: DISCONTINUED | OUTPATIENT
Start: 2020-01-01 | End: 2020-01-01

## 2020-01-01 RX ORDER — FUROSEMIDE 40 MG/1
40 TABLET ORAL DAILY
Status: DISCONTINUED | OUTPATIENT
Start: 2020-01-01 | End: 2020-01-01 | Stop reason: HOSPADM

## 2020-01-01 RX ORDER — ATROPINE SULFATE 0.1 MG/ML
0.5 INJECTION INTRAVENOUS
Status: CANCELLED | OUTPATIENT
Start: 2020-01-01 | End: 2020-01-01

## 2020-01-01 RX ORDER — SODIUM,POTASSIUM PHOSPHATES 280-250MG
2 POWDER IN PACKET (EA) ORAL
Status: COMPLETED | OUTPATIENT
Start: 2020-01-01 | End: 2020-01-01

## 2020-01-01 RX ORDER — FENTANYL CITRATE 50 UG/ML
100 INJECTION, SOLUTION INTRAMUSCULAR; INTRAVENOUS ONCE
Status: COMPLETED | OUTPATIENT
Start: 2020-01-01 | End: 2020-01-01

## 2020-01-01 RX ORDER — INSULIN LISPRO 100 [IU]/ML
INJECTION, SOLUTION INTRAVENOUS; SUBCUTANEOUS EVERY 6 HOURS
Status: DISCONTINUED | OUTPATIENT
Start: 2020-01-01 | End: 2020-01-01

## 2020-01-01 RX ORDER — MAGNESIUM SULFATE 1 G/100ML
1 INJECTION INTRAVENOUS
Status: COMPLETED | OUTPATIENT
Start: 2020-01-01 | End: 2020-01-01

## 2020-01-01 RX ORDER — ASPIRIN 325 MG/1
100 TABLET, FILM COATED ORAL DAILY
Status: DISCONTINUED | OUTPATIENT
Start: 2020-01-01 | End: 2020-01-01 | Stop reason: HOSPADM

## 2020-01-01 RX ORDER — VANCOMYCIN HYDROCHLORIDE
1250 ONCE
Status: COMPLETED | OUTPATIENT
Start: 2020-01-01 | End: 2020-01-01

## 2020-01-01 RX ORDER — MIDAZOLAM HYDROCHLORIDE 1 MG/ML
2 INJECTION, SOLUTION INTRAMUSCULAR; INTRAVENOUS
Status: DISCONTINUED | OUTPATIENT
Start: 2020-01-01 | End: 2020-01-01

## 2020-01-01 RX ORDER — ALBUMIN HUMAN 250 G/1000ML
25 SOLUTION INTRAVENOUS 3 TIMES DAILY
Status: DISCONTINUED | OUTPATIENT
Start: 2020-01-01 | End: 2020-01-01

## 2020-01-01 RX ORDER — FOLIC ACID 1 MG/1
1 TABLET ORAL DAILY
Status: DISCONTINUED | OUTPATIENT
Start: 2020-01-01 | End: 2020-01-01

## 2020-01-01 RX ORDER — SPIRONOLACTONE 25 MG/1
25 TABLET ORAL DAILY
Status: DISCONTINUED | OUTPATIENT
Start: 2020-01-01 | End: 2020-01-01 | Stop reason: HOSPADM

## 2020-01-01 RX ORDER — PHYTONADIONE 10 MG/ML
10 INJECTION, EMULSION INTRAMUSCULAR; INTRAVENOUS; SUBCUTANEOUS DAILY
Status: COMPLETED | OUTPATIENT
Start: 2020-01-01 | End: 2020-01-01

## 2020-01-01 RX ORDER — POTASSIUM CHLORIDE 20 MEQ/1
20 TABLET, EXTENDED RELEASE ORAL DAILY
Status: DISCONTINUED | OUTPATIENT
Start: 2020-01-01 | End: 2020-01-01 | Stop reason: HOSPADM

## 2020-01-01 RX ORDER — INSULIN GLARGINE 100 [IU]/ML
10 INJECTION, SOLUTION SUBCUTANEOUS
Status: DISCONTINUED | OUTPATIENT
Start: 2020-01-01 | End: 2020-01-01

## 2020-01-01 RX ORDER — VANCOMYCIN HYDROCHLORIDE 1 G/20ML
INJECTION, POWDER, LYOPHILIZED, FOR SOLUTION INTRAVENOUS
Status: DISPENSED
Start: 2020-01-01 | End: 2020-01-01

## 2020-01-01 RX ORDER — FUROSEMIDE 40 MG/1
20 TABLET ORAL DAILY
Qty: 30 TAB | Refills: 1 | Status: SHIPPED | OUTPATIENT
Start: 2020-01-01

## 2020-01-01 RX ORDER — MORPHINE SULFATE 2 MG/ML
4 INJECTION, SOLUTION INTRAMUSCULAR; INTRAVENOUS ONCE
Status: COMPLETED | OUTPATIENT
Start: 2020-01-01 | End: 2020-01-01

## 2020-01-01 RX ORDER — FUROSEMIDE 40 MG/1
80 TABLET ORAL DAILY
Status: DISCONTINUED | OUTPATIENT
Start: 2020-01-01 | End: 2020-01-01 | Stop reason: HOSPADM

## 2020-01-01 RX ORDER — INSULIN LISPRO 100 [IU]/ML
INJECTION, SOLUTION INTRAVENOUS; SUBCUTANEOUS
Status: DISCONTINUED | OUTPATIENT
Start: 2020-01-01 | End: 2020-01-01

## 2020-01-01 RX ORDER — ASCORBIC ACID 250 MG
500 TABLET ORAL DAILY
Status: DISCONTINUED | OUTPATIENT
Start: 2020-01-01 | End: 2020-01-01 | Stop reason: HOSPADM

## 2020-01-01 RX ORDER — INSULIN GLARGINE 100 [IU]/ML
14 INJECTION, SOLUTION SUBCUTANEOUS DAILY
Status: DISCONTINUED | OUTPATIENT
Start: 2020-01-01 | End: 2020-01-01

## 2020-01-01 RX ORDER — SODIUM CHLORIDE 0.9 % (FLUSH) 0.9 %
5-40 SYRINGE (ML) INJECTION AS NEEDED
Status: CANCELLED | OUTPATIENT
Start: 2020-01-01

## 2020-01-01 RX ORDER — ASPIRIN 81 MG/1
1 TABLET ORAL DAILY
COMMUNITY
End: 2020-01-01

## 2020-01-01 RX ORDER — POTASSIUM CHLORIDE 20 MEQ/1
40 TABLET, EXTENDED RELEASE ORAL EVERY 4 HOURS
Status: COMPLETED | OUTPATIENT
Start: 2020-01-01 | End: 2020-01-01

## 2020-01-01 RX ORDER — FAMOTIDINE 10 MG/ML
20 INJECTION INTRAVENOUS
Status: DISCONTINUED | OUTPATIENT
Start: 2020-01-01 | End: 2020-01-01

## 2020-01-01 RX ORDER — HEPARIN SODIUM 200 [USP'U]/100ML
500 INJECTION, SOLUTION INTRAVENOUS
Status: COMPLETED | OUTPATIENT
Start: 2020-01-01 | End: 2020-01-01

## 2020-01-01 RX ORDER — DEXTROSE MONOHYDRATE 100 MG/ML
125-250 INJECTION, SOLUTION INTRAVENOUS AS NEEDED
Status: DISCONTINUED | OUTPATIENT
Start: 2020-01-01 | End: 2020-01-01 | Stop reason: HOSPADM

## 2020-01-01 RX ORDER — IBUPROFEN 200 MG
1 TABLET ORAL DAILY
Status: DISCONTINUED | OUTPATIENT
Start: 2020-01-01 | End: 2020-01-01 | Stop reason: HOSPADM

## 2020-01-01 RX ORDER — AMPICILLIN 2 G/1
2 INJECTION, POWDER, FOR SOLUTION INTRAVENOUS EVERY 4 HOURS
Status: DISCONTINUED | OUTPATIENT
Start: 2020-01-01 | End: 2020-01-01 | Stop reason: CLARIF

## 2020-01-01 RX ORDER — DIPHENHYDRAMINE HYDROCHLORIDE 50 MG/ML
50 INJECTION, SOLUTION INTRAMUSCULAR; INTRAVENOUS
Status: DISCONTINUED | OUTPATIENT
Start: 2020-01-01 | End: 2020-01-01

## 2020-01-01 RX ORDER — FLUMAZENIL 0.1 MG/ML
0.2 INJECTION INTRAVENOUS
Status: CANCELLED | OUTPATIENT
Start: 2020-01-01 | End: 2020-01-01

## 2020-01-01 RX ORDER — ASPIRIN 325 MG/1
100 TABLET, FILM COATED ORAL DAILY
Status: DISCONTINUED | OUTPATIENT
Start: 2020-01-01 | End: 2020-01-01 | Stop reason: SDUPTHER

## 2020-01-01 RX ORDER — SODIUM CHLORIDE 9 MG/ML
100 INJECTION, SOLUTION INTRAVENOUS CONTINUOUS
Status: DISPENSED | OUTPATIENT
Start: 2020-01-01 | End: 2020-01-01

## 2020-01-01 RX ORDER — OXYCODONE HYDROCHLORIDE 5 MG/1
5 TABLET ORAL
Qty: 10 TAB | Refills: 0 | Status: SHIPPED | OUTPATIENT
Start: 2020-01-01 | End: 2020-01-01

## 2020-01-01 RX ORDER — METOCLOPRAMIDE HYDROCHLORIDE 5 MG/ML
5 INJECTION INTRAMUSCULAR; INTRAVENOUS EVERY 6 HOURS
Status: DISCONTINUED | OUTPATIENT
Start: 2020-01-01 | End: 2020-01-01

## 2020-01-01 RX ORDER — HYDROMORPHONE HYDROCHLORIDE 1 MG/ML
1 INJECTION, SOLUTION INTRAMUSCULAR; INTRAVENOUS; SUBCUTANEOUS
Status: DISCONTINUED | OUTPATIENT
Start: 2020-01-01 | End: 2020-01-01

## 2020-01-01 RX ORDER — FUROSEMIDE 10 MG/ML
40 INJECTION INTRAMUSCULAR; INTRAVENOUS ONCE
Status: COMPLETED | OUTPATIENT
Start: 2020-01-01 | End: 2020-01-01

## 2020-01-01 RX ORDER — POTASSIUM CHLORIDE 20 MEQ/1
20 TABLET, EXTENDED RELEASE ORAL
Status: DISCONTINUED | OUTPATIENT
Start: 2020-01-01 | End: 2020-01-01

## 2020-01-01 RX ORDER — HEPARIN SODIUM (PORCINE) LOCK FLUSH IV SOLN 100 UNIT/ML 100 UNIT/ML
SOLUTION INTRAVENOUS
Status: COMPLETED
Start: 2020-01-01 | End: 2020-01-01

## 2020-01-01 RX ORDER — PANTOPRAZOLE SODIUM 40 MG/1
40 TABLET, DELAYED RELEASE ORAL
Qty: 30 TAB | Refills: 0 | Status: SHIPPED
Start: 2020-01-01

## 2020-01-01 RX ORDER — CALCIUM CARBONATE 200(500)MG
200 TABLET,CHEWABLE ORAL
Status: DISCONTINUED | OUTPATIENT
Start: 2020-01-01 | End: 2020-01-01

## 2020-01-01 RX ORDER — POTASSIUM CHLORIDE 7.45 MG/ML
10 INJECTION INTRAVENOUS ONCE
Status: COMPLETED | OUTPATIENT
Start: 2020-01-01 | End: 2020-01-01

## 2020-01-01 RX ORDER — CLONIDINE 0.1 MG/24H
1 PATCH, EXTENDED RELEASE TRANSDERMAL
Status: DISCONTINUED | OUTPATIENT
Start: 2020-01-01 | End: 2020-01-01

## 2020-01-01 RX ORDER — MORPHINE SULFATE 2 MG/ML
2 INJECTION, SOLUTION INTRAMUSCULAR; INTRAVENOUS
Status: COMPLETED | OUTPATIENT
Start: 2020-01-01 | End: 2020-01-01

## 2020-01-01 RX ORDER — ACETAMINOPHEN 325 MG/1
650 TABLET ORAL
Status: DISCONTINUED | OUTPATIENT
Start: 2020-01-01 | End: 2020-01-01 | Stop reason: HOSPADM

## 2020-01-01 RX ORDER — OXYCODONE AND ACETAMINOPHEN 5; 325 MG/1; MG/1
1-2 TABLET ORAL
Status: DISCONTINUED | OUTPATIENT
Start: 2020-01-01 | End: 2020-01-01 | Stop reason: HOSPADM

## 2020-01-01 RX ORDER — ENOXAPARIN SODIUM 100 MG/ML
40 INJECTION SUBCUTANEOUS EVERY 24 HOURS
Status: DISCONTINUED | OUTPATIENT
Start: 2020-01-01 | End: 2020-01-01

## 2020-01-01 RX ORDER — LABETALOL HYDROCHLORIDE 5 MG/ML
20 INJECTION, SOLUTION INTRAVENOUS
Status: DISCONTINUED | OUTPATIENT
Start: 2020-01-01 | End: 2020-01-01

## 2020-01-01 RX ORDER — FUROSEMIDE 20 MG/1
20 TABLET ORAL DAILY
Status: DISCONTINUED | OUTPATIENT
Start: 2020-01-01 | End: 2020-01-01

## 2020-01-01 RX ORDER — FUROSEMIDE 10 MG/ML
40 INJECTION INTRAMUSCULAR; INTRAVENOUS 2 TIMES DAILY
Status: DISCONTINUED | OUTPATIENT
Start: 2020-01-01 | End: 2020-01-01

## 2020-01-01 RX ORDER — POTASSIUM CHLORIDE 20 MEQ/1
20 TABLET, EXTENDED RELEASE ORAL DAILY
Status: DISCONTINUED | OUTPATIENT
Start: 2020-01-01 | End: 2020-01-01

## 2020-01-01 RX ORDER — EPINEPHRINE 0.1 MG/ML
1 INJECTION INTRACARDIAC; INTRAVENOUS
Status: CANCELLED | OUTPATIENT
Start: 2020-01-01 | End: 2020-01-01

## 2020-01-01 RX ORDER — INSULIN GLARGINE 100 [IU]/ML
20 INJECTION, SOLUTION SUBCUTANEOUS DAILY
Status: DISCONTINUED | OUTPATIENT
Start: 2020-01-01 | End: 2020-01-01 | Stop reason: HOSPADM

## 2020-01-01 RX ORDER — MORPHINE SULFATE 4 MG/ML
4 INJECTION INTRAVENOUS
Status: COMPLETED | OUTPATIENT
Start: 2020-01-01 | End: 2020-01-01

## 2020-01-01 RX ORDER — LORATADINE 10 MG/1
10 TABLET ORAL DAILY
COMMUNITY
End: 2020-01-01

## 2020-01-01 RX ORDER — PROPOFOL 10 MG/ML
0-50 VIAL (ML) INTRAVENOUS
Status: DISCONTINUED | OUTPATIENT
Start: 2020-01-01 | End: 2020-01-01

## 2020-01-01 RX ORDER — SODIUM CHLORIDE 9 MG/ML
250 INJECTION, SOLUTION INTRAVENOUS AS NEEDED
Status: DISCONTINUED | OUTPATIENT
Start: 2020-01-01 | End: 2020-01-01 | Stop reason: HOSPADM

## 2020-01-01 RX ORDER — POTASSIUM CHLORIDE 750 MG/1
10 TABLET, EXTENDED RELEASE ORAL ONCE
Status: COMPLETED | OUTPATIENT
Start: 2020-01-01 | End: 2020-01-01

## 2020-01-01 RX ORDER — DEXTROSE MONOHYDRATE AND SODIUM CHLORIDE 5; .9 G/100ML; G/100ML
50 INJECTION, SOLUTION INTRAVENOUS CONTINUOUS
Status: DISCONTINUED | OUTPATIENT
Start: 2020-01-01 | End: 2020-01-01

## 2020-01-01 RX ORDER — HEPARIN SODIUM (PORCINE) LOCK FLUSH IV SOLN 100 UNIT/ML 100 UNIT/ML
500 SOLUTION INTRAVENOUS
Status: DISCONTINUED | OUTPATIENT
Start: 2020-01-01 | End: 2020-01-01 | Stop reason: HOSPADM

## 2020-01-01 RX ORDER — SODIUM CHLORIDE 0.9 % (FLUSH) 0.9 %
5-40 SYRINGE (ML) INJECTION EVERY 8 HOURS
Status: CANCELLED | OUTPATIENT
Start: 2020-01-01

## 2020-01-01 RX ORDER — CLINDAMYCIN PHOSPHATE 600 MG/50ML
600 INJECTION, SOLUTION INTRAVENOUS EVERY 8 HOURS
Status: DISCONTINUED | OUTPATIENT
Start: 2020-01-01 | End: 2020-01-01

## 2020-01-01 RX ORDER — LORAZEPAM 2 MG/ML
1 INJECTION INTRAMUSCULAR
Status: DISCONTINUED | OUTPATIENT
Start: 2020-01-01 | End: 2020-01-01 | Stop reason: HOSPADM

## 2020-01-01 RX ORDER — HEPARIN SODIUM 1000 [USP'U]/ML
80 INJECTION, SOLUTION INTRAVENOUS; SUBCUTANEOUS ONCE
Status: COMPLETED | OUTPATIENT
Start: 2020-01-01 | End: 2020-01-01

## 2020-01-01 RX ORDER — POTASSIUM CHLORIDE 20 MEQ/1
20 TABLET, EXTENDED RELEASE ORAL
Status: COMPLETED | OUTPATIENT
Start: 2020-01-01 | End: 2020-01-01

## 2020-01-01 RX ORDER — INSULIN GLARGINE 100 [IU]/ML
10 INJECTION, SOLUTION SUBCUTANEOUS
Status: DISCONTINUED | OUTPATIENT
Start: 2020-01-01 | End: 2020-01-01 | Stop reason: HOSPADM

## 2020-01-01 RX ORDER — ADALIMUMAB 40MG/0.8ML
40 KIT SUBCUTANEOUS
COMMUNITY
End: 2020-01-01

## 2020-01-01 RX ORDER — DULOXETIN HYDROCHLORIDE 60 MG/1
1 CAPSULE, DELAYED RELEASE ORAL DAILY
COMMUNITY
Start: 2019-01-06

## 2020-01-01 RX ORDER — LIDOCAINE 50 MG/G
2 PATCH TOPICAL EVERY 24 HOURS
COMMUNITY

## 2020-01-01 RX ORDER — POLYETHYLENE GLYCOL 3350 17 G/17G
17 POWDER, FOR SOLUTION ORAL DAILY PRN
Status: DISCONTINUED | OUTPATIENT
Start: 2020-01-01 | End: 2020-01-01 | Stop reason: HOSPADM

## 2020-01-01 RX ORDER — TRAZODONE HYDROCHLORIDE 50 MG/1
50 TABLET ORAL
COMMUNITY

## 2020-01-01 RX ORDER — OXYCODONE AND ACETAMINOPHEN 5; 325 MG/1; MG/1
1-2 TABLET ORAL
Qty: 12 TAB | Refills: 0 | Status: SHIPPED | OUTPATIENT
Start: 2020-01-01 | End: 2020-01-01

## 2020-01-01 RX ORDER — INSULIN LISPRO 100 [IU]/ML
6 INJECTION, SOLUTION INTRAVENOUS; SUBCUTANEOUS
Qty: 1 VIAL | Refills: 0 | Status: SHIPPED
Start: 2020-01-01

## 2020-01-01 RX ORDER — IPRATROPIUM BROMIDE AND ALBUTEROL SULFATE 2.5; .5 MG/3ML; MG/3ML
3 SOLUTION RESPIRATORY (INHALATION)
Status: ACTIVE | OUTPATIENT
Start: 2020-01-01 | End: 2020-01-01

## 2020-01-01 RX ORDER — ASPIRIN 325 MG/1
100 TABLET, FILM COATED ORAL DAILY
Qty: 10 TAB | Refills: 0 | Status: SHIPPED
Start: 2020-01-01

## 2020-01-01 RX ORDER — SPIRONOLACTONE 100 MG/1
200 TABLET, FILM COATED ORAL DAILY
Status: DISCONTINUED | OUTPATIENT
Start: 2020-01-01 | End: 2020-01-01 | Stop reason: HOSPADM

## 2020-01-01 RX ORDER — PROPOFOL 10 MG/ML
INJECTION, EMULSION INTRAVENOUS AS NEEDED
Status: DISCONTINUED | OUTPATIENT
Start: 2020-01-01 | End: 2020-01-01 | Stop reason: HOSPADM

## 2020-01-01 RX ORDER — INSULIN GLARGINE 100 [IU]/ML
5 INJECTION, SOLUTION SUBCUTANEOUS DAILY
COMMUNITY
End: 2020-01-01

## 2020-01-01 RX ORDER — OXYCODONE AND ACETAMINOPHEN 5; 325 MG/1; MG/1
1-2 TABLET ORAL
Status: DISCONTINUED | OUTPATIENT
Start: 2020-01-01 | End: 2020-01-01

## 2020-01-01 RX ORDER — MORPHINE SULFATE 2 MG/ML
2 INJECTION, SOLUTION INTRAMUSCULAR; INTRAVENOUS
Status: DISCONTINUED | OUTPATIENT
Start: 2020-01-01 | End: 2020-01-01 | Stop reason: HOSPADM

## 2020-01-01 RX ORDER — SUCCINYLCHOLINE CHLORIDE 100 MG/5ML
SYRINGE (ML) INTRAVENOUS AS NEEDED
Status: DISCONTINUED | OUTPATIENT
Start: 2020-01-01 | End: 2020-01-01 | Stop reason: HOSPADM

## 2020-01-01 RX ORDER — DEXTROMETHORPHAN/PSEUDOEPHED 2.5-7.5/.8
1.2 DROPS ORAL
Status: CANCELLED | OUTPATIENT
Start: 2020-01-01

## 2020-01-01 RX ORDER — METOCLOPRAMIDE HYDROCHLORIDE 5 MG/ML
5 INJECTION INTRAMUSCULAR; INTRAVENOUS
Status: DISCONTINUED | OUTPATIENT
Start: 2020-01-01 | End: 2020-01-01 | Stop reason: HOSPADM

## 2020-01-01 RX ORDER — PHYTONADIONE 10 MG/ML
10 INJECTION, EMULSION INTRAMUSCULAR; INTRAVENOUS; SUBCUTANEOUS ONCE
Status: DISCONTINUED | OUTPATIENT
Start: 2020-01-01 | End: 2020-01-01

## 2020-01-01 RX ADMIN — CALCIUM GLUCONATE 2 G: 20 INJECTION, SOLUTION INTRAVENOUS at 05:30

## 2020-01-01 RX ADMIN — POTASSIUM CHLORIDE 10 MEQ: 7.46 INJECTION, SOLUTION INTRAVENOUS at 14:31

## 2020-01-01 RX ADMIN — INSULIN GLARGINE 10 UNITS: 100 INJECTION, SOLUTION SUBCUTANEOUS at 22:15

## 2020-01-01 RX ADMIN — INSULIN LISPRO 2 UNITS: 100 INJECTION, SOLUTION INTRAVENOUS; SUBCUTANEOUS at 09:07

## 2020-01-01 RX ADMIN — DEXAMETHASONE SODIUM PHOSPHATE 40 MG: 10 INJECTION, SOLUTION INTRAMUSCULAR; INTRAVENOUS at 21:37

## 2020-01-01 RX ADMIN — FOLIC ACID: 5 INJECTION, SOLUTION INTRAMUSCULAR; INTRAVENOUS; SUBCUTANEOUS at 09:42

## 2020-01-01 RX ADMIN — ALBUMIN (HUMAN) 12.5 G: 0.25 INJECTION, SOLUTION INTRAVENOUS at 03:00

## 2020-01-01 RX ADMIN — AMPICILLIN SODIUM 2 G: 2 INJECTION, POWDER, FOR SOLUTION INTRAMUSCULAR; INTRAVENOUS at 23:39

## 2020-01-01 RX ADMIN — MORPHINE SULFATE 1 MG: 2 INJECTION, SOLUTION INTRAMUSCULAR; INTRAVENOUS at 09:41

## 2020-01-01 RX ADMIN — ALBUMIN (HUMAN) 25 G: 0.25 INJECTION, SOLUTION INTRAVENOUS at 12:07

## 2020-01-01 RX ADMIN — OXYCODONE 5 MG: 5 TABLET ORAL at 23:58

## 2020-01-01 RX ADMIN — Medication 10 ML: at 21:53

## 2020-01-01 RX ADMIN — ALBUMIN (HUMAN) 12.5 G: 0.25 INJECTION, SOLUTION INTRAVENOUS at 00:29

## 2020-01-01 RX ADMIN — OXYCODONE 5 MG: 5 TABLET ORAL at 23:14

## 2020-01-01 RX ADMIN — POTASSIUM CHLORIDE 40 MEQ: 1500 TABLET, EXTENDED RELEASE ORAL at 20:01

## 2020-01-01 RX ADMIN — DEXAMETHASONE SODIUM PHOSPHATE 40 MG: 10 INJECTION, SOLUTION INTRAMUSCULAR; INTRAVENOUS at 21:06

## 2020-01-01 RX ADMIN — CALCIUM GLUCONATE 2 G: 20 INJECTION, SOLUTION INTRAVENOUS at 23:43

## 2020-01-01 RX ADMIN — METOPROLOL TARTRATE 5 MG: 5 INJECTION INTRAVENOUS at 23:17

## 2020-01-01 RX ADMIN — OXYCODONE HYDROCHLORIDE AND ACETAMINOPHEN 2 TABLET: 5; 325 TABLET ORAL at 11:00

## 2020-01-01 RX ADMIN — HUMAN INSULIN 5 UNITS: 100 INJECTION, SOLUTION SUBCUTANEOUS at 11:53

## 2020-01-01 RX ADMIN — INSULIN LISPRO 3 UNITS: 100 INJECTION, SOLUTION INTRAVENOUS; SUBCUTANEOUS at 18:47

## 2020-01-01 RX ADMIN — CLINDAMYCIN PHOSPHATE 600 MG: 600 INJECTION, SOLUTION INTRAVENOUS at 18:24

## 2020-01-01 RX ADMIN — ONDANSETRON 4 MG: 2 INJECTION INTRAMUSCULAR; INTRAVENOUS at 07:35

## 2020-01-01 RX ADMIN — PIPERACILLIN AND TAZOBACTAM 3.38 G: 3; .375 INJECTION, POWDER, LYOPHILIZED, FOR SOLUTION INTRAVENOUS at 01:48

## 2020-01-01 RX ADMIN — POTASSIUM CHLORIDE 40 MEQ: 20 TABLET, EXTENDED RELEASE ORAL at 11:19

## 2020-01-01 RX ADMIN — ALBUMIN (HUMAN) 25 G: 0.25 INJECTION, SOLUTION INTRAVENOUS at 06:41

## 2020-01-01 RX ADMIN — PIPERACILLIN AND TAZOBACTAM 3.38 G: 3; .375 INJECTION, POWDER, LYOPHILIZED, FOR SOLUTION INTRAVENOUS at 05:06

## 2020-01-01 RX ADMIN — PIPERACILLIN AND TAZOBACTAM 3.38 G: 3; .375 INJECTION, POWDER, LYOPHILIZED, FOR SOLUTION INTRAVENOUS at 11:17

## 2020-01-01 RX ADMIN — HEPARIN SODIUM (PORCINE) LOCK FLUSH IV SOLN 100 UNIT/ML 500 UNITS: 100 SOLUTION at 15:39

## 2020-01-01 RX ADMIN — ONDANSETRON 4 MG: 2 INJECTION INTRAMUSCULAR; INTRAVENOUS at 04:38

## 2020-01-01 RX ADMIN — VANCOMYCIN HYDROCHLORIDE 1000 MG: 1 INJECTION, POWDER, LYOPHILIZED, FOR SOLUTION INTRAVENOUS at 12:13

## 2020-01-01 RX ADMIN — Medication 400 MG: at 08:18

## 2020-01-01 RX ADMIN — SODIUM CHLORIDE 1000 ML: 900 INJECTION, SOLUTION INTRAVENOUS at 10:15

## 2020-01-01 RX ADMIN — INSULIN LISPRO 2 UNITS: 100 INJECTION, SOLUTION INTRAVENOUS; SUBCUTANEOUS at 22:29

## 2020-01-01 RX ADMIN — ALBUMIN (HUMAN) 12.5 G: 0.25 INJECTION, SOLUTION INTRAVENOUS at 14:36

## 2020-01-01 RX ADMIN — TRAZODONE HYDROCHLORIDE 50 MG: 50 TABLET ORAL at 22:56

## 2020-01-01 RX ADMIN — SODIUM CHLORIDE 75 ML/HR: 900 INJECTION, SOLUTION INTRAVENOUS at 17:14

## 2020-01-01 RX ADMIN — PIPERACILLIN AND TAZOBACTAM 3.38 G: 3; .375 INJECTION, POWDER, LYOPHILIZED, FOR SOLUTION INTRAVENOUS at 13:55

## 2020-01-01 RX ADMIN — INSULIN LISPRO 2 UNITS: 100 INJECTION, SOLUTION INTRAVENOUS; SUBCUTANEOUS at 22:56

## 2020-01-01 RX ADMIN — AMPICILLIN SODIUM 2 G: 2 INJECTION, POWDER, FOR SOLUTION INTRAMUSCULAR; INTRAVENOUS at 08:16

## 2020-01-01 RX ADMIN — ALBUMIN (HUMAN) 12.5 G: 0.25 INJECTION, SOLUTION INTRAVENOUS at 22:12

## 2020-01-01 RX ADMIN — Medication 500 MG: at 09:20

## 2020-01-01 RX ADMIN — HYDROMORPHONE HYDROCHLORIDE 1 MG: 1 INJECTION, SOLUTION INTRAMUSCULAR; INTRAVENOUS; SUBCUTANEOUS at 01:52

## 2020-01-01 RX ADMIN — MIDAZOLAM HYDROCHLORIDE 2 MG: 1 INJECTION, SOLUTION INTRAMUSCULAR; INTRAVENOUS at 05:55

## 2020-01-01 RX ADMIN — INSULIN LISPRO 2 UNITS: 100 INJECTION, SOLUTION INTRAVENOUS; SUBCUTANEOUS at 11:53

## 2020-01-01 RX ADMIN — POTASSIUM CHLORIDE 40 MEQ: 1500 TABLET, EXTENDED RELEASE ORAL at 12:06

## 2020-01-01 RX ADMIN — INSULIN LISPRO 6 UNITS: 100 INJECTION, SOLUTION INTRAVENOUS; SUBCUTANEOUS at 13:56

## 2020-01-01 RX ADMIN — POTASSIUM CHLORIDE 20 MEQ: 1500 TABLET, EXTENDED RELEASE ORAL at 09:18

## 2020-01-01 RX ADMIN — DULOXETINE HYDROCHLORIDE 60 MG: 60 CAPSULE, DELAYED RELEASE ORAL at 08:46

## 2020-01-01 RX ADMIN — CLINDAMYCIN PHOSPHATE 600 MG: 600 INJECTION, SOLUTION INTRAVENOUS at 17:09

## 2020-01-01 RX ADMIN — PIPERACILLIN AND TAZOBACTAM 3.38 G: 3; .375 INJECTION, POWDER, LYOPHILIZED, FOR SOLUTION INTRAVENOUS at 15:38

## 2020-01-01 RX ADMIN — POTASSIUM CHLORIDE 40 MEQ: 1500 TABLET, EXTENDED RELEASE ORAL at 00:23

## 2020-01-01 RX ADMIN — HYDROMORPHONE HYDROCHLORIDE 1 MG: 1 INJECTION, SOLUTION INTRAMUSCULAR; INTRAVENOUS; SUBCUTANEOUS at 22:48

## 2020-01-01 RX ADMIN — CISATRACURIUM BESYLATE 3 MCG/KG/MIN: 2 INJECTION, SOLUTION INTRAVENOUS at 00:21

## 2020-01-01 RX ADMIN — ALBUMIN (HUMAN) 25 G: 0.25 INJECTION, SOLUTION INTRAVENOUS at 23:22

## 2020-01-01 RX ADMIN — PANTOPRAZOLE SODIUM 40 MG: 40 TABLET, DELAYED RELEASE ORAL at 06:54

## 2020-01-01 RX ADMIN — OXYCODONE HYDROCHLORIDE AND ACETAMINOPHEN 1 TABLET: 5; 325 TABLET ORAL at 09:12

## 2020-01-01 RX ADMIN — Medication 100 MG: at 08:12

## 2020-01-01 RX ADMIN — LIDOCAINE HYDROCHLORIDE ANHYDROUS 10 ML: 10 INJECTION, SOLUTION INFILTRATION at 17:00

## 2020-01-01 RX ADMIN — INSULIN LISPRO 4 UNITS: 100 INJECTION, SOLUTION INTRAVENOUS; SUBCUTANEOUS at 18:39

## 2020-01-01 RX ADMIN — AMPICILLIN SODIUM 2 G: 2 INJECTION, POWDER, FOR SOLUTION INTRAMUSCULAR; INTRAVENOUS at 15:23

## 2020-01-01 RX ADMIN — INSULIN LISPRO 6 UNITS: 100 INJECTION, SOLUTION INTRAVENOUS; SUBCUTANEOUS at 12:23

## 2020-01-01 RX ADMIN — FUROSEMIDE 40 MG: 40 TABLET ORAL at 09:17

## 2020-01-01 RX ADMIN — VANCOMYCIN HYDROCHLORIDE 1000 MG: 1 INJECTION, POWDER, LYOPHILIZED, FOR SOLUTION INTRAVENOUS at 13:47

## 2020-01-01 RX ADMIN — HYDROMORPHONE HYDROCHLORIDE 1 MG: 1 INJECTION, SOLUTION INTRAMUSCULAR; INTRAVENOUS; SUBCUTANEOUS at 09:40

## 2020-01-01 RX ADMIN — MORPHINE SULFATE 1 MG: 2 INJECTION, SOLUTION INTRAMUSCULAR; INTRAVENOUS at 04:03

## 2020-01-01 RX ADMIN — OXYCODONE HYDROCHLORIDE AND ACETAMINOPHEN 1 TABLET: 5; 325 TABLET ORAL at 16:30

## 2020-01-01 RX ADMIN — PIPERACILLIN AND TAZOBACTAM 3.38 G: 3; .375 INJECTION, POWDER, LYOPHILIZED, FOR SOLUTION INTRAVENOUS at 05:32

## 2020-01-01 RX ADMIN — DULOXETINE HYDROCHLORIDE 60 MG: 60 CAPSULE, DELAYED RELEASE ORAL at 09:34

## 2020-01-01 RX ADMIN — VANCOMYCIN HYDROCHLORIDE 1000 MG: 1 INJECTION, POWDER, LYOPHILIZED, FOR SOLUTION INTRAVENOUS at 05:19

## 2020-01-01 RX ADMIN — MORPHINE SULFATE 2 MG: 2 INJECTION, SOLUTION INTRAMUSCULAR; INTRAVENOUS at 13:54

## 2020-01-01 RX ADMIN — OXYCODONE 5 MG: 5 TABLET ORAL at 21:02

## 2020-01-01 RX ADMIN — PIPERACILLIN AND TAZOBACTAM 4.5 G: 4; .5 INJECTION, POWDER, LYOPHILIZED, FOR SOLUTION INTRAVENOUS; PARENTERAL at 12:09

## 2020-01-01 RX ADMIN — OXYCODONE 5 MG: 5 TABLET ORAL at 12:03

## 2020-01-01 RX ADMIN — SPIRONOLACTONE 200 MG: 100 TABLET ORAL at 08:19

## 2020-01-01 RX ADMIN — AMPICILLIN SODIUM 2 G: 2 INJECTION, POWDER, FOR SOLUTION INTRAMUSCULAR; INTRAVENOUS at 03:30

## 2020-01-01 RX ADMIN — POTASSIUM CHLORIDE 20 MEQ: 1500 TABLET, EXTENDED RELEASE ORAL at 09:23

## 2020-01-01 RX ADMIN — DULOXETINE HYDROCHLORIDE 60 MG: 60 CAPSULE, DELAYED RELEASE ORAL at 10:21

## 2020-01-01 RX ADMIN — PIPERACILLIN AND TAZOBACTAM 3.38 G: 3; .375 INJECTION, POWDER, LYOPHILIZED, FOR SOLUTION INTRAVENOUS at 11:11

## 2020-01-01 RX ADMIN — INSULIN LISPRO 6 UNITS: 100 INJECTION, SOLUTION INTRAVENOUS; SUBCUTANEOUS at 09:23

## 2020-01-01 RX ADMIN — SPIRONOLACTONE 200 MG: 100 TABLET ORAL at 09:48

## 2020-01-01 RX ADMIN — OXYCODONE 5 MG: 5 TABLET ORAL at 04:21

## 2020-01-01 RX ADMIN — DULOXETINE HYDROCHLORIDE 60 MG: 60 CAPSULE, DELAYED RELEASE ORAL at 09:20

## 2020-01-01 RX ADMIN — INSULIN LISPRO 2 UNITS: 100 INJECTION, SOLUTION INTRAVENOUS; SUBCUTANEOUS at 17:58

## 2020-01-01 RX ADMIN — OXYCODONE HYDROCHLORIDE AND ACETAMINOPHEN 1 TABLET: 5; 325 TABLET ORAL at 12:34

## 2020-01-01 RX ADMIN — AMPICILLIN SODIUM 2 G: 2 INJECTION, POWDER, FOR SOLUTION INTRAMUSCULAR; INTRAVENOUS at 09:09

## 2020-01-01 RX ADMIN — ALBUMIN (HUMAN) 12.5 G: 0.25 INJECTION, SOLUTION INTRAVENOUS at 12:12

## 2020-01-01 RX ADMIN — POTASSIUM CHLORIDE 20 MEQ: 20 TABLET, EXTENDED RELEASE ORAL at 09:25

## 2020-01-01 RX ADMIN — INSULIN LISPRO 6 UNITS: 100 INJECTION, SOLUTION INTRAVENOUS; SUBCUTANEOUS at 22:00

## 2020-01-01 RX ADMIN — OXYCODONE 5 MG: 5 TABLET ORAL at 11:59

## 2020-01-01 RX ADMIN — MORPHINE SULFATE 2 MG: 2 INJECTION, SOLUTION INTRAMUSCULAR; INTRAVENOUS at 13:29

## 2020-01-01 RX ADMIN — METOPROLOL TARTRATE 2.5 MG: 5 INJECTION INTRAVENOUS at 11:01

## 2020-01-01 RX ADMIN — HYDROMORPHONE HYDROCHLORIDE 1 MG: 1 INJECTION, SOLUTION INTRAMUSCULAR; INTRAVENOUS; SUBCUTANEOUS at 02:34

## 2020-01-01 RX ADMIN — ALBUMIN (HUMAN) 25 G: 0.25 INJECTION, SOLUTION INTRAVENOUS at 05:00

## 2020-01-01 RX ADMIN — OXYCODONE 5 MG: 5 TABLET ORAL at 12:05

## 2020-01-01 RX ADMIN — MORPHINE SULFATE 1 MG: 2 INJECTION, SOLUTION INTRAMUSCULAR; INTRAVENOUS at 15:47

## 2020-01-01 RX ADMIN — LACTULOSE 15 ML: 20 SOLUTION ORAL at 21:27

## 2020-01-01 RX ADMIN — FUROSEMIDE 40 MG: 10 INJECTION, SOLUTION INTRAMUSCULAR; INTRAVENOUS at 18:24

## 2020-01-01 RX ADMIN — OXYCODONE 5 MG: 5 TABLET ORAL at 20:33

## 2020-01-01 RX ADMIN — THIAMINE HCL TAB 100 MG 100 MG: 100 TAB at 09:36

## 2020-01-01 RX ADMIN — HYDROCODONE BITARTRATE AND ACETAMINOPHEN 1 TABLET: 5; 325 TABLET ORAL at 08:58

## 2020-01-01 RX ADMIN — OXYCODONE 5 MG: 5 TABLET ORAL at 11:57

## 2020-01-01 RX ADMIN — FOLIC ACID 1 MG: 1 TABLET ORAL at 09:37

## 2020-01-01 RX ADMIN — MORPHINE SULFATE 2 MG: 2 INJECTION, SOLUTION INTRAMUSCULAR; INTRAVENOUS at 10:39

## 2020-01-01 RX ADMIN — ALBUMIN (HUMAN) 25 G: 0.25 INJECTION, SOLUTION INTRAVENOUS at 18:15

## 2020-01-01 RX ADMIN — PANCRELIPASE LIPASE, PANCRELIPASE PROTEASE, PANCRELIPASE AMYLASE 1 CAPSULE: 5000; 17000; 24000 CAPSULE, DELAYED RELEASE ORAL at 12:32

## 2020-01-01 RX ADMIN — VANCOMYCIN HYDROCHLORIDE 1000 MG: 1 INJECTION, POWDER, LYOPHILIZED, FOR SOLUTION INTRAVENOUS at 12:40

## 2020-01-01 RX ADMIN — AMPICILLIN SODIUM 2 G: 2 INJECTION, POWDER, FOR SOLUTION INTRAMUSCULAR; INTRAVENOUS at 08:44

## 2020-01-01 RX ADMIN — MORPHINE SULFATE 1 MG: 2 INJECTION, SOLUTION INTRAMUSCULAR; INTRAVENOUS at 20:33

## 2020-01-01 RX ADMIN — VANCOMYCIN HYDROCHLORIDE 1000 MG: 1 INJECTION, POWDER, LYOPHILIZED, FOR SOLUTION INTRAVENOUS at 00:20

## 2020-01-01 RX ADMIN — LACTULOSE 45 ML: 20 SOLUTION ORAL at 08:18

## 2020-01-01 RX ADMIN — Medication 500 MG: at 10:39

## 2020-01-01 RX ADMIN — VANCOMYCIN HYDROCHLORIDE 1250 MG: 1.25 INJECTION, POWDER, LYOPHILIZED, FOR SOLUTION INTRAVENOUS at 10:13

## 2020-01-01 RX ADMIN — MAGNESIUM SULFATE HEPTAHYDRATE 1 G: 1 INJECTION, SOLUTION INTRAVENOUS at 05:31

## 2020-01-01 RX ADMIN — DEXTROSE MONOHYDRATE AND SODIUM CHLORIDE 50 ML/HR: 5; .9 INJECTION, SOLUTION INTRAVENOUS at 16:18

## 2020-01-01 RX ADMIN — HEPARIN SODIUM 5080 UNITS: 1000 INJECTION INTRAVENOUS; SUBCUTANEOUS at 22:45

## 2020-01-01 RX ADMIN — PANTOPRAZOLE SODIUM 40 MG: 40 TABLET, DELAYED RELEASE ORAL at 06:57

## 2020-01-01 RX ADMIN — Medication 400 MG: at 09:51

## 2020-01-01 RX ADMIN — MIDAZOLAM HYDROCHLORIDE 5 MG/HR: 5 INJECTION INTRAMUSCULAR; INTRAVENOUS at 11:20

## 2020-01-01 RX ADMIN — SPIRONOLACTONE 200 MG: 100 TABLET ORAL at 09:09

## 2020-01-01 RX ADMIN — MORPHINE SULFATE 4 MG: 2 INJECTION, SOLUTION INTRAMUSCULAR; INTRAVENOUS at 18:44

## 2020-01-01 RX ADMIN — HYDROMORPHONE HYDROCHLORIDE 1 MG: 1 INJECTION, SOLUTION INTRAMUSCULAR; INTRAVENOUS; SUBCUTANEOUS at 22:38

## 2020-01-01 RX ADMIN — FOLIC ACID: 5 INJECTION, SOLUTION INTRAMUSCULAR; INTRAVENOUS; SUBCUTANEOUS at 13:26

## 2020-01-01 RX ADMIN — ALBUMIN (HUMAN) 12.5 G: 0.25 INJECTION, SOLUTION INTRAVENOUS at 22:44

## 2020-01-01 RX ADMIN — FOLIC ACID: 5 INJECTION, SOLUTION INTRAMUSCULAR; INTRAVENOUS; SUBCUTANEOUS at 12:05

## 2020-01-01 RX ADMIN — PIPERACILLIN AND TAZOBACTAM 3.38 G: 3; .375 INJECTION, POWDER, LYOPHILIZED, FOR SOLUTION INTRAVENOUS at 23:21

## 2020-01-01 RX ADMIN — FOLIC ACID: 5 INJECTION, SOLUTION INTRAMUSCULAR; INTRAVENOUS; SUBCUTANEOUS at 09:11

## 2020-01-01 RX ADMIN — MORPHINE SULFATE 2 MG: 2 INJECTION, SOLUTION INTRAMUSCULAR; INTRAVENOUS at 08:18

## 2020-01-01 RX ADMIN — FUROSEMIDE 20 MG: 10 INJECTION, SOLUTION INTRAMUSCULAR; INTRAVENOUS at 13:05

## 2020-01-01 RX ADMIN — INSULIN LISPRO 9 UNITS: 100 INJECTION, SOLUTION INTRAVENOUS; SUBCUTANEOUS at 18:37

## 2020-01-01 RX ADMIN — MORPHINE SULFATE 2 MG: 2 INJECTION, SOLUTION INTRAMUSCULAR; INTRAVENOUS at 20:58

## 2020-01-01 RX ADMIN — POTASSIUM CHLORIDE 40 MEQ: 1500 TABLET, EXTENDED RELEASE ORAL at 10:08

## 2020-01-01 RX ADMIN — PIPERACILLIN AND TAZOBACTAM 3.38 G: 3; .375 INJECTION, POWDER, LYOPHILIZED, FOR SOLUTION INTRAVENOUS at 05:44

## 2020-01-01 RX ADMIN — OXYCODONE 5 MG: 5 TABLET ORAL at 20:23

## 2020-01-01 RX ADMIN — POTASSIUM CHLORIDE 40 MEQ: 1500 TABLET, EXTENDED RELEASE ORAL at 05:50

## 2020-01-01 RX ADMIN — INSULIN GLARGINE 10 UNITS: 100 INJECTION, SOLUTION SUBCUTANEOUS at 22:12

## 2020-01-01 RX ADMIN — CLINDAMYCIN PHOSPHATE 600 MG: 600 INJECTION, SOLUTION INTRAVENOUS at 10:16

## 2020-01-01 RX ADMIN — Medication 10 ML: at 22:57

## 2020-01-01 RX ADMIN — HYDROMORPHONE HYDROCHLORIDE 1 MG: 1 INJECTION, SOLUTION INTRAMUSCULAR; INTRAVENOUS; SUBCUTANEOUS at 04:56

## 2020-01-01 RX ADMIN — PIPERACILLIN AND TAZOBACTAM 3.38 G: 3; .375 INJECTION, POWDER, LYOPHILIZED, FOR SOLUTION INTRAVENOUS at 18:20

## 2020-01-01 RX ADMIN — INSULIN LISPRO 4 UNITS: 100 INJECTION, SOLUTION INTRAVENOUS; SUBCUTANEOUS at 23:13

## 2020-01-01 RX ADMIN — PIPERACILLIN AND TAZOBACTAM 4.5 G: 4; .5 INJECTION, POWDER, LYOPHILIZED, FOR SOLUTION INTRAVENOUS; PARENTERAL at 05:15

## 2020-01-01 RX ADMIN — POTASSIUM CHLORIDE 10 MEQ: 7.46 INJECTION, SOLUTION INTRAVENOUS at 12:21

## 2020-01-01 RX ADMIN — DEXAMETHASONE SODIUM PHOSPHATE 40 MG: 10 INJECTION, SOLUTION INTRAMUSCULAR; INTRAVENOUS at 20:48

## 2020-01-01 RX ADMIN — PANCRELIPASE LIPASE, PANCRELIPASE PROTEASE, PANCRELIPASE AMYLASE 1 CAPSULE: 5000; 17000; 24000 CAPSULE, DELAYED RELEASE ORAL at 17:23

## 2020-01-01 RX ADMIN — ALBUMIN (HUMAN) 25 G: 0.25 INJECTION, SOLUTION INTRAVENOUS at 08:14

## 2020-01-01 RX ADMIN — AMPICILLIN SODIUM 2 G: 2 INJECTION, POWDER, FOR SOLUTION INTRAMUSCULAR; INTRAVENOUS at 20:57

## 2020-01-01 RX ADMIN — ALBUMIN (HUMAN) 25 G: 0.25 INJECTION, SOLUTION INTRAVENOUS at 21:17

## 2020-01-01 RX ADMIN — FUROSEMIDE 20 MG: 20 TABLET ORAL at 14:21

## 2020-01-01 RX ADMIN — LACTULOSE 30 ML: 10 SOLUTION ORAL at 09:09

## 2020-01-01 RX ADMIN — ALBUMIN (HUMAN) 25 G: 0.25 INJECTION, SOLUTION INTRAVENOUS at 05:47

## 2020-01-01 RX ADMIN — POTASSIUM PHOSPHATE, MONOBASIC AND POTASSIUM PHOSPHATE, DIBASIC: 224; 236 INJECTION, SOLUTION INTRAVENOUS at 17:28

## 2020-01-01 RX ADMIN — PIPERACILLIN AND TAZOBACTAM 4.5 G: 4; .5 INJECTION, POWDER, LYOPHILIZED, FOR SOLUTION INTRAVENOUS; PARENTERAL at 13:05

## 2020-01-01 RX ADMIN — OXYCODONE HYDROCHLORIDE AND ACETAMINOPHEN 2 TABLET: 5; 325 TABLET ORAL at 03:35

## 2020-01-01 RX ADMIN — OXYCODONE 5 MG: 5 TABLET ORAL at 16:02

## 2020-01-01 RX ADMIN — OXYCODONE 5 MG: 5 TABLET ORAL at 22:14

## 2020-01-01 RX ADMIN — PIPERACILLIN AND TAZOBACTAM 3.38 G: 3; .375 INJECTION, POWDER, LYOPHILIZED, FOR SOLUTION INTRAVENOUS at 20:26

## 2020-01-01 RX ADMIN — TRAZODONE HYDROCHLORIDE 50 MG: 50 TABLET ORAL at 23:18

## 2020-01-01 RX ADMIN — INSULIN LISPRO 15 UNITS: 100 INJECTION, SOLUTION INTRAVENOUS; SUBCUTANEOUS at 11:53

## 2020-01-01 RX ADMIN — HYDROMORPHONE HYDROCHLORIDE 1 MG: 1 INJECTION, SOLUTION INTRAMUSCULAR; INTRAVENOUS; SUBCUTANEOUS at 21:27

## 2020-01-01 RX ADMIN — SPIRONOLACTONE 200 MG: 100 TABLET ORAL at 13:54

## 2020-01-01 RX ADMIN — INSULIN LISPRO 6 UNITS: 100 INJECTION, SOLUTION INTRAVENOUS; SUBCUTANEOUS at 09:16

## 2020-01-01 RX ADMIN — PANCRELIPASE LIPASE, PANCRELIPASE PROTEASE, PANCRELIPASE AMYLASE 1 CAPSULE: 5000; 17000; 24000 CAPSULE, DELAYED RELEASE ORAL at 16:38

## 2020-01-01 RX ADMIN — ONDANSETRON 4 MG: 2 INJECTION INTRAMUSCULAR; INTRAVENOUS at 16:10

## 2020-01-01 RX ADMIN — INSULIN LISPRO 6 UNITS: 100 INJECTION, SOLUTION INTRAVENOUS; SUBCUTANEOUS at 17:30

## 2020-01-01 RX ADMIN — MORPHINE SULFATE 2 MG: 2 INJECTION, SOLUTION INTRAMUSCULAR; INTRAVENOUS at 16:28

## 2020-01-01 RX ADMIN — PIPERACILLIN AND TAZOBACTAM 4.5 G: 4; .5 INJECTION, POWDER, LYOPHILIZED, FOR SOLUTION INTRAVENOUS; PARENTERAL at 07:46

## 2020-01-01 RX ADMIN — PIPERACILLIN AND TAZOBACTAM 3.38 G: 3; .375 INJECTION, POWDER, LYOPHILIZED, FOR SOLUTION INTRAVENOUS at 17:26

## 2020-01-01 RX ADMIN — Medication 400 MG: at 20:58

## 2020-01-01 RX ADMIN — MORPHINE SULFATE 1 MG: 2 INJECTION, SOLUTION INTRAMUSCULAR; INTRAVENOUS at 23:08

## 2020-01-01 RX ADMIN — ONDANSETRON 4 MG: 2 INJECTION INTRAMUSCULAR; INTRAVENOUS at 17:16

## 2020-01-01 RX ADMIN — Medication 10 ML: at 13:01

## 2020-01-01 RX ADMIN — PIPERACILLIN AND TAZOBACTAM 3.38 G: 3; .375 INJECTION, POWDER, LYOPHILIZED, FOR SOLUTION INTRAVENOUS at 02:59

## 2020-01-01 RX ADMIN — METOPROLOL TARTRATE 5 MG: 5 INJECTION INTRAVENOUS at 17:42

## 2020-01-01 RX ADMIN — INSULIN LISPRO 4 UNITS: 100 INJECTION, SOLUTION INTRAVENOUS; SUBCUTANEOUS at 18:24

## 2020-01-01 RX ADMIN — HYDROMORPHONE HYDROCHLORIDE 1 MG: 1 INJECTION, SOLUTION INTRAMUSCULAR; INTRAVENOUS; SUBCUTANEOUS at 11:17

## 2020-01-01 RX ADMIN — SPIRONOLACTONE 200 MG: 100 TABLET ORAL at 08:56

## 2020-01-01 RX ADMIN — POTASSIUM BICARBONATE 40 MEQ: 782 TABLET, EFFERVESCENT ORAL at 05:22

## 2020-01-01 RX ADMIN — ONDANSETRON 4 MG: 2 INJECTION INTRAMUSCULAR; INTRAVENOUS at 22:27

## 2020-01-01 RX ADMIN — ALBUMIN (HUMAN) 12.5 G: 0.25 INJECTION, SOLUTION INTRAVENOUS at 08:46

## 2020-01-01 RX ADMIN — MORPHINE SULFATE 2 MG: 2 INJECTION, SOLUTION INTRAMUSCULAR; INTRAVENOUS at 09:57

## 2020-01-01 RX ADMIN — ONDANSETRON 4 MG: 2 INJECTION INTRAMUSCULAR; INTRAVENOUS at 10:15

## 2020-01-01 RX ADMIN — PROPOFOL 180 MG: 10 INJECTION, EMULSION INTRAVENOUS at 09:30

## 2020-01-01 RX ADMIN — SPIRONOLACTONE 200 MG: 100 TABLET ORAL at 10:20

## 2020-01-01 RX ADMIN — PANTOPRAZOLE SODIUM 40 MG: 40 TABLET, DELAYED RELEASE ORAL at 06:33

## 2020-01-01 RX ADMIN — PANTOPRAZOLE SODIUM 40 MG: 40 TABLET, DELAYED RELEASE ORAL at 06:40

## 2020-01-01 RX ADMIN — PIPERACILLIN AND TAZOBACTAM 3.38 G: 3; .375 INJECTION, POWDER, LYOPHILIZED, FOR SOLUTION INTRAVENOUS at 10:22

## 2020-01-01 RX ADMIN — ONDANSETRON 4 MG: 2 INJECTION INTRAMUSCULAR; INTRAVENOUS at 17:20

## 2020-01-01 RX ADMIN — ALBUMIN (HUMAN) 25 G: 0.25 INJECTION, SOLUTION INTRAVENOUS at 23:32

## 2020-01-01 RX ADMIN — MORPHINE SULFATE 2 MG: 2 INJECTION, SOLUTION INTRAMUSCULAR; INTRAVENOUS at 17:15

## 2020-01-01 RX ADMIN — INSULIN LISPRO 12 UNITS: 100 INJECTION, SOLUTION INTRAVENOUS; SUBCUTANEOUS at 18:02

## 2020-01-01 RX ADMIN — PIPERACILLIN AND TAZOBACTAM 3.38 G: 3; .375 INJECTION, POWDER, LYOPHILIZED, FOR SOLUTION INTRAVENOUS at 23:35

## 2020-01-01 RX ADMIN — Medication 10 ML: at 06:13

## 2020-01-01 RX ADMIN — MIDAZOLAM HYDROCHLORIDE 2 MG: 1 INJECTION, SOLUTION INTRAMUSCULAR; INTRAVENOUS at 00:39

## 2020-01-01 RX ADMIN — PIPERACILLIN AND TAZOBACTAM 4.5 G: 4; .5 INJECTION, POWDER, LYOPHILIZED, FOR SOLUTION INTRAVENOUS; PARENTERAL at 05:38

## 2020-01-01 RX ADMIN — Medication 400 MG: at 20:15

## 2020-01-01 RX ADMIN — DEXTROSE MONOHYDRATE AND SODIUM CHLORIDE 50 ML/HR: 5; .9 INJECTION, SOLUTION INTRAVENOUS at 12:46

## 2020-01-01 RX ADMIN — ALBUMIN (HUMAN) 25 G: 0.25 INJECTION, SOLUTION INTRAVENOUS at 09:59

## 2020-01-01 RX ADMIN — HUMAN INSULIN 10 UNITS: 100 INJECTION, SOLUTION SUBCUTANEOUS at 18:13

## 2020-01-01 RX ADMIN — INSULIN LISPRO 6 UNITS: 100 INJECTION, SOLUTION INTRAVENOUS; SUBCUTANEOUS at 21:01

## 2020-01-01 RX ADMIN — INSULIN GLARGINE 20 UNITS: 100 INJECTION, SOLUTION SUBCUTANEOUS at 08:59

## 2020-01-01 RX ADMIN — PANTOPRAZOLE SODIUM 40 MG: 40 TABLET, DELAYED RELEASE ORAL at 06:44

## 2020-01-01 RX ADMIN — OXYCODONE HYDROCHLORIDE AND ACETAMINOPHEN 1 TABLET: 5; 325 TABLET ORAL at 08:18

## 2020-01-01 RX ADMIN — DULOXETINE HYDROCHLORIDE 60 MG: 60 CAPSULE, DELAYED RELEASE ORAL at 10:40

## 2020-01-01 RX ADMIN — DEXAMETHASONE SODIUM PHOSPHATE 40 MG: 10 INJECTION, SOLUTION INTRAMUSCULAR; INTRAVENOUS at 22:33

## 2020-01-01 RX ADMIN — ONDANSETRON 4 MG: 2 INJECTION INTRAMUSCULAR; INTRAVENOUS at 09:52

## 2020-01-01 RX ADMIN — CALCIUM GLUCONATE 2 G: 20 INJECTION, SOLUTION INTRAVENOUS at 15:22

## 2020-01-01 RX ADMIN — INSULIN GLARGINE 10 UNITS: 100 INJECTION, SOLUTION SUBCUTANEOUS at 22:44

## 2020-01-01 RX ADMIN — TRAZODONE HYDROCHLORIDE 50 MG: 50 TABLET ORAL at 21:46

## 2020-01-01 RX ADMIN — MIDAZOLAM HYDROCHLORIDE 5 MG/HR: 5 INJECTION INTRAMUSCULAR; INTRAVENOUS at 03:05

## 2020-01-01 RX ADMIN — POTASSIUM CHLORIDE 10 MEQ: 7.46 INJECTION, SOLUTION INTRAVENOUS at 11:55

## 2020-01-01 RX ADMIN — INSULIN LISPRO 2 UNITS: 100 INJECTION, SOLUTION INTRAVENOUS; SUBCUTANEOUS at 13:18

## 2020-01-01 RX ADMIN — HYDROMORPHONE HYDROCHLORIDE 1 MG: 1 INJECTION, SOLUTION INTRAMUSCULAR; INTRAVENOUS; SUBCUTANEOUS at 18:54

## 2020-01-01 RX ADMIN — PIPERACILLIN AND TAZOBACTAM 3.38 G: 3; .375 INJECTION, POWDER, LYOPHILIZED, FOR SOLUTION INTRAVENOUS at 01:37

## 2020-01-01 RX ADMIN — PIPERACILLIN AND TAZOBACTAM 3.38 G: 3; .375 INJECTION, POWDER, LYOPHILIZED, FOR SOLUTION INTRAVENOUS at 00:22

## 2020-01-01 RX ADMIN — ANTACID TABLETS 200 MG: 500 TABLET, CHEWABLE ORAL at 08:05

## 2020-01-01 RX ADMIN — OXYCODONE HYDROCHLORIDE AND ACETAMINOPHEN 2 TABLET: 5; 325 TABLET ORAL at 12:42

## 2020-01-01 RX ADMIN — THIAMINE HCL TAB 100 MG 100 MG: 100 TAB at 10:53

## 2020-01-01 RX ADMIN — Medication 100 MG: at 09:49

## 2020-01-01 RX ADMIN — MULTIPLE VITAMINS W/ MINERALS TAB 1 TABLET: TAB at 09:20

## 2020-01-01 RX ADMIN — INSULIN LISPRO 12 UNITS: 100 INJECTION, SOLUTION INTRAVENOUS; SUBCUTANEOUS at 11:58

## 2020-01-01 RX ADMIN — Medication 100 MG: at 08:59

## 2020-01-01 RX ADMIN — POTASSIUM CHLORIDE 20 MEQ: 1500 TABLET, EXTENDED RELEASE ORAL at 11:17

## 2020-01-01 RX ADMIN — FUROSEMIDE 40 MG: 40 TABLET ORAL at 09:23

## 2020-01-01 RX ADMIN — LACTULOSE 15 ML: 20 SOLUTION ORAL at 20:31

## 2020-01-01 RX ADMIN — METOCLOPRAMIDE 5 MG: 5 INJECTION, SOLUTION INTRAMUSCULAR; INTRAVENOUS at 05:46

## 2020-01-01 RX ADMIN — PIPERACILLIN AND TAZOBACTAM 3.38 G: 3; .375 INJECTION, POWDER, LYOPHILIZED, FOR SOLUTION INTRAVENOUS at 12:59

## 2020-01-01 RX ADMIN — THIAMINE HCL TAB 100 MG 100 MG: 100 TAB at 09:52

## 2020-01-01 RX ADMIN — PROMETHAZINE HYDROCHLORIDE 25 MG: 25 INJECTION, SOLUTION INTRAMUSCULAR; INTRAVENOUS at 17:28

## 2020-01-01 RX ADMIN — PIPERACILLIN AND TAZOBACTAM 3.38 G: 3; .375 INJECTION, POWDER, LYOPHILIZED, FOR SOLUTION INTRAVENOUS at 09:45

## 2020-01-01 RX ADMIN — INSULIN LISPRO 6 UNITS: 100 INJECTION, SOLUTION INTRAVENOUS; SUBCUTANEOUS at 00:24

## 2020-01-01 RX ADMIN — INSULIN GLARGINE 5 UNITS: 100 INJECTION, SOLUTION SUBCUTANEOUS at 08:41

## 2020-01-01 RX ADMIN — PIPERACILLIN AND TAZOBACTAM 3.38 G: 3; .375 INJECTION, POWDER, LYOPHILIZED, FOR SOLUTION INTRAVENOUS at 13:25

## 2020-01-01 RX ADMIN — FUROSEMIDE 80 MG: 40 TABLET ORAL at 08:56

## 2020-01-01 RX ADMIN — POTASSIUM CHLORIDE 20 MEQ: 1500 TABLET, EXTENDED RELEASE ORAL at 05:12

## 2020-01-01 RX ADMIN — PIPERACILLIN AND TAZOBACTAM 4.5 G: 4; .5 INJECTION, POWDER, LYOPHILIZED, FOR SOLUTION INTRAVENOUS; PARENTERAL at 00:36

## 2020-01-01 RX ADMIN — AMPICILLIN SODIUM 2 G: 2 INJECTION, POWDER, FOR SOLUTION INTRAMUSCULAR; INTRAVENOUS at 13:30

## 2020-01-01 RX ADMIN — ALBUMIN (HUMAN) 25 G: 12.5 INJECTION, SOLUTION INTRAVENOUS at 13:25

## 2020-01-01 RX ADMIN — FUROSEMIDE 80 MG: 40 TABLET ORAL at 09:52

## 2020-01-01 RX ADMIN — FOLIC ACID: 5 INJECTION, SOLUTION INTRAMUSCULAR; INTRAVENOUS; SUBCUTANEOUS at 09:29

## 2020-01-01 RX ADMIN — OXYCODONE HYDROCHLORIDE AND ACETAMINOPHEN 2 TABLET: 5; 325 TABLET ORAL at 14:17

## 2020-01-01 RX ADMIN — TRAZODONE HYDROCHLORIDE 50 MG: 50 TABLET ORAL at 21:34

## 2020-01-01 RX ADMIN — PIPERACILLIN AND TAZOBACTAM 3.38 G: 3; .375 INJECTION, POWDER, LYOPHILIZED, FOR SOLUTION INTRAVENOUS at 20:00

## 2020-01-01 RX ADMIN — IOPAMIDOL 100 ML: 755 INJECTION, SOLUTION INTRAVENOUS at 19:02

## 2020-01-01 RX ADMIN — MORPHINE SULFATE 2 MG: 2 INJECTION, SOLUTION INTRAMUSCULAR; INTRAVENOUS at 05:06

## 2020-01-01 RX ADMIN — ONDANSETRON 4 MG: 2 INJECTION INTRAMUSCULAR; INTRAVENOUS at 22:12

## 2020-01-01 RX ADMIN — INSULIN LISPRO 6 UNITS: 100 INJECTION, SOLUTION INTRAVENOUS; SUBCUTANEOUS at 17:16

## 2020-01-01 RX ADMIN — ALBUMIN (HUMAN) 25 G: 0.25 INJECTION, SOLUTION INTRAVENOUS at 06:27

## 2020-01-01 RX ADMIN — ALBUMIN (HUMAN) 25 G: 0.25 INJECTION, SOLUTION INTRAVENOUS at 21:46

## 2020-01-01 RX ADMIN — CISATRACURIUM BESYLATE 3 MCG/KG/MIN: 2 INJECTION, SOLUTION INTRAVENOUS at 16:45

## 2020-01-01 RX ADMIN — MORPHINE SULFATE 2 MG: 2 INJECTION, SOLUTION INTRAMUSCULAR; INTRAVENOUS at 19:04

## 2020-01-01 RX ADMIN — MORPHINE SULFATE 2 MG: 2 INJECTION, SOLUTION INTRAMUSCULAR; INTRAVENOUS at 11:56

## 2020-01-01 RX ADMIN — MAGNESIUM SULFATE 2 G: 2 INJECTION INTRAVENOUS at 21:20

## 2020-01-01 RX ADMIN — INSULIN GLARGINE 10 UNITS: 100 INJECTION, SOLUTION SUBCUTANEOUS at 09:32

## 2020-01-01 RX ADMIN — CISATRACURIUM BESYLATE 3 MCG/KG/MIN: 2 INJECTION, SOLUTION INTRAVENOUS at 21:03

## 2020-01-01 RX ADMIN — ALBUMIN (HUMAN) 25 G: 0.25 INJECTION, SOLUTION INTRAVENOUS at 14:28

## 2020-01-01 RX ADMIN — PIPERACILLIN AND TAZOBACTAM 3.38 G: 3; .375 INJECTION, POWDER, LYOPHILIZED, FOR SOLUTION INTRAVENOUS at 22:08

## 2020-01-01 RX ADMIN — MAGNESIUM SULFATE HEPTAHYDRATE 2 G: 40 INJECTION, SOLUTION INTRAVENOUS at 20:34

## 2020-01-01 RX ADMIN — ALBUMIN (HUMAN) 25 G: 0.25 INJECTION, SOLUTION INTRAVENOUS at 21:26

## 2020-01-01 RX ADMIN — INSULIN LISPRO 4 UNITS: 100 INJECTION, SOLUTION INTRAVENOUS; SUBCUTANEOUS at 08:20

## 2020-01-01 RX ADMIN — POTASSIUM & SODIUM PHOSPHATES POWDER PACK 280-160-250 MG 2 PACKET: 280-160-250 PACK at 09:38

## 2020-01-01 RX ADMIN — MORPHINE SULFATE 2 MG: 2 INJECTION, SOLUTION INTRAMUSCULAR; INTRAVENOUS at 16:24

## 2020-01-01 RX ADMIN — SODIUM CHLORIDE 40 MG: 9 INJECTION, SOLUTION INTRAMUSCULAR; INTRAVENOUS; SUBCUTANEOUS at 21:40

## 2020-01-01 RX ADMIN — Medication 100 MG: at 08:31

## 2020-01-01 RX ADMIN — MORPHINE SULFATE 2 MG: 2 INJECTION, SOLUTION INTRAMUSCULAR; INTRAVENOUS at 01:10

## 2020-01-01 RX ADMIN — CALCIUM GLUCONATE 2 G: 20 INJECTION, SOLUTION INTRAVENOUS at 11:43

## 2020-01-01 RX ADMIN — VANCOMYCIN HYDROCHLORIDE 1500 MG: 1.5 INJECTION, POWDER, LYOPHILIZED, FOR SOLUTION INTRAVENOUS at 21:00

## 2020-01-01 RX ADMIN — AMPICILLIN SODIUM 2 G: 2 INJECTION, POWDER, FOR SOLUTION INTRAMUSCULAR; INTRAVENOUS at 03:55

## 2020-01-01 RX ADMIN — ALBUMIN (HUMAN) 12.5 G: 0.25 INJECTION, SOLUTION INTRAVENOUS at 19:45

## 2020-01-01 RX ADMIN — INSULIN LISPRO 6 UNITS: 100 INJECTION, SOLUTION INTRAVENOUS; SUBCUTANEOUS at 12:28

## 2020-01-01 RX ADMIN — INSULIN LISPRO 6 UNITS: 100 INJECTION, SOLUTION INTRAVENOUS; SUBCUTANEOUS at 11:55

## 2020-01-01 RX ADMIN — ALBUMIN (HUMAN) 12.5 G: 0.25 INJECTION, SOLUTION INTRAVENOUS at 20:30

## 2020-01-01 RX ADMIN — FOLIC ACID 1 MG: 1 TABLET ORAL at 09:32

## 2020-01-01 RX ADMIN — FUROSEMIDE 40 MG: 10 INJECTION, SOLUTION INTRAMUSCULAR; INTRAVENOUS at 08:46

## 2020-01-01 RX ADMIN — FOLIC ACID: 5 INJECTION, SOLUTION INTRAMUSCULAR; INTRAVENOUS; SUBCUTANEOUS at 19:10

## 2020-01-01 RX ADMIN — INSULIN LISPRO 3 UNITS: 100 INJECTION, SOLUTION INTRAVENOUS; SUBCUTANEOUS at 12:49

## 2020-01-01 RX ADMIN — OXYCODONE HYDROCHLORIDE AND ACETAMINOPHEN 1 TABLET: 5; 325 TABLET ORAL at 17:36

## 2020-01-01 RX ADMIN — POTASSIUM BICARBONATE 20 MEQ: 782 TABLET, EFFERVESCENT ORAL at 05:09

## 2020-01-01 RX ADMIN — MULTIPLE VITAMINS W/ MINERALS TAB 1 TABLET: TAB at 08:19

## 2020-01-01 RX ADMIN — CISATRACURIUM BESYLATE 3 MCG/KG/MIN: 2 INJECTION, SOLUTION INTRAVENOUS at 11:21

## 2020-01-01 RX ADMIN — INSULIN LISPRO 6 UNITS: 100 INJECTION, SOLUTION INTRAVENOUS; SUBCUTANEOUS at 11:54

## 2020-01-01 RX ADMIN — PIPERACILLIN AND TAZOBACTAM 4.5 G: 4; .5 INJECTION, POWDER, LYOPHILIZED, FOR SOLUTION INTRAVENOUS; PARENTERAL at 21:34

## 2020-01-01 RX ADMIN — Medication 100 MG: at 09:45

## 2020-01-01 RX ADMIN — CISATRACURIUM BESYLATE 3 MCG/KG/MIN: 2 INJECTION, SOLUTION INTRAVENOUS at 16:00

## 2020-01-01 RX ADMIN — ONDANSETRON 4 MG: 2 INJECTION INTRAMUSCULAR; INTRAVENOUS at 20:15

## 2020-01-01 RX ADMIN — MAGNESIUM SULFATE 2 G: 2 INJECTION INTRAVENOUS at 20:53

## 2020-01-01 RX ADMIN — PIPERACILLIN AND TAZOBACTAM 3.38 G: 3; .375 INJECTION, POWDER, LYOPHILIZED, FOR SOLUTION INTRAVENOUS at 05:12

## 2020-01-01 RX ADMIN — SODIUM CHLORIDE 1000 ML: 900 INJECTION, SOLUTION INTRAVENOUS at 17:49

## 2020-01-01 RX ADMIN — ALBUMIN (HUMAN) 25 G: 0.25 INJECTION, SOLUTION INTRAVENOUS at 20:14

## 2020-01-01 RX ADMIN — PIPERACILLIN AND TAZOBACTAM 3.38 G: 3; .375 INJECTION, POWDER, LYOPHILIZED, FOR SOLUTION INTRAVENOUS at 18:56

## 2020-01-01 RX ADMIN — POTASSIUM CHLORIDE 20 MEQ: 1500 TABLET, EXTENDED RELEASE ORAL at 10:38

## 2020-01-01 RX ADMIN — THIAMINE HCL TAB 100 MG 100 MG: 100 TAB at 10:08

## 2020-01-01 RX ADMIN — PROMETHAZINE HYDROCHLORIDE 25 MG: 25 INJECTION, SOLUTION INTRAMUSCULAR; INTRAVENOUS at 02:59

## 2020-01-01 RX ADMIN — MORPHINE SULFATE 2 MG: 2 INJECTION, SOLUTION INTRAMUSCULAR; INTRAVENOUS at 02:37

## 2020-01-01 RX ADMIN — LACTULOSE 45 ML: 20 SOLUTION ORAL at 09:24

## 2020-01-01 RX ADMIN — INSULIN LISPRO 8 UNITS: 100 INJECTION, SOLUTION INTRAVENOUS; SUBCUTANEOUS at 12:06

## 2020-01-01 RX ADMIN — LIDOCAINE HYDROCHLORIDE ANHYDROUS 10 ML: 10 INJECTION, SOLUTION INFILTRATION at 15:10

## 2020-01-01 RX ADMIN — POTASSIUM CHLORIDE 40 MEQ: 1500 TABLET, EXTENDED RELEASE ORAL at 09:17

## 2020-01-01 RX ADMIN — PIPERACILLIN AND TAZOBACTAM 4.5 G: 4; .5 INJECTION, POWDER, LYOPHILIZED, FOR SOLUTION INTRAVENOUS; PARENTERAL at 17:08

## 2020-01-01 RX ADMIN — INSULIN LISPRO 8 UNITS: 100 INJECTION, SOLUTION INTRAVENOUS; SUBCUTANEOUS at 16:38

## 2020-01-01 RX ADMIN — Medication 75 MCG/HR: at 12:30

## 2020-01-01 RX ADMIN — PANTOPRAZOLE SODIUM 40 MG: 40 TABLET, DELAYED RELEASE ORAL at 06:03

## 2020-01-01 RX ADMIN — INSULIN LISPRO 9 UNITS: 100 INJECTION, SOLUTION INTRAVENOUS; SUBCUTANEOUS at 05:36

## 2020-01-01 RX ADMIN — INSULIN LISPRO 6 UNITS: 100 INJECTION, SOLUTION INTRAVENOUS; SUBCUTANEOUS at 16:01

## 2020-01-01 RX ADMIN — MAGNESIUM SULFATE HEPTAHYDRATE 2 G: 40 INJECTION, SOLUTION INTRAVENOUS at 23:22

## 2020-01-01 RX ADMIN — AMPICILLIN SODIUM 2 G: 2 INJECTION, POWDER, FOR SOLUTION INTRAMUSCULAR; INTRAVENOUS at 09:51

## 2020-01-01 RX ADMIN — MORPHINE SULFATE 1 MG: 2 INJECTION, SOLUTION INTRAMUSCULAR; INTRAVENOUS at 07:35

## 2020-01-01 RX ADMIN — PIPERACILLIN AND TAZOBACTAM 4.5 G: 4; .5 INJECTION, POWDER, LYOPHILIZED, FOR SOLUTION INTRAVENOUS; PARENTERAL at 21:01

## 2020-01-01 RX ADMIN — LACTULOSE 15 ML: 20 SOLUTION ORAL at 09:46

## 2020-01-01 RX ADMIN — OXYCODONE 5 MG: 5 TABLET ORAL at 15:14

## 2020-01-01 RX ADMIN — ALBUMIN (HUMAN) 25 G: 0.25 INJECTION, SOLUTION INTRAVENOUS at 09:32

## 2020-01-01 RX ADMIN — ALBUMIN (HUMAN) 25 G: 0.25 INJECTION, SOLUTION INTRAVENOUS at 11:56

## 2020-01-01 RX ADMIN — INSULIN LISPRO 4 UNITS: 100 INJECTION, SOLUTION INTRAVENOUS; SUBCUTANEOUS at 17:42

## 2020-01-01 RX ADMIN — ALBUMIN (HUMAN) 25 G: 0.25 INJECTION, SOLUTION INTRAVENOUS at 09:20

## 2020-01-01 RX ADMIN — LACTULOSE 45 ML: 20 SOLUTION ORAL at 22:34

## 2020-01-01 RX ADMIN — Medication 400 MG: at 23:12

## 2020-01-01 RX ADMIN — INSULIN LISPRO 6 UNITS: 100 INJECTION, SOLUTION INTRAVENOUS; SUBCUTANEOUS at 16:42

## 2020-01-01 RX ADMIN — PIPERACILLIN AND TAZOBACTAM 3.38 G: 3; .375 INJECTION, POWDER, LYOPHILIZED, FOR SOLUTION INTRAVENOUS at 05:49

## 2020-01-01 RX ADMIN — POTASSIUM CHLORIDE 40 MEQ: 1500 TABLET, EXTENDED RELEASE ORAL at 22:29

## 2020-01-01 RX ADMIN — THIAMINE HCL TAB 100 MG 100 MG: 100 TAB at 10:38

## 2020-01-01 RX ADMIN — PIPERACILLIN AND TAZOBACTAM 3.38 G: 3; .375 INJECTION, POWDER, LYOPHILIZED, FOR SOLUTION INTRAVENOUS at 14:34

## 2020-01-01 RX ADMIN — INSULIN LISPRO 2 UNITS: 100 INJECTION, SOLUTION INTRAVENOUS; SUBCUTANEOUS at 13:36

## 2020-01-01 RX ADMIN — LACTULOSE 45 ML: 20 SOLUTION ORAL at 09:52

## 2020-01-01 RX ADMIN — MULTIPLE VITAMINS W/ MINERALS TAB 1 TABLET: TAB at 09:51

## 2020-01-01 RX ADMIN — INSULIN LISPRO 6 UNITS: 100 INJECTION, SOLUTION INTRAVENOUS; SUBCUTANEOUS at 12:07

## 2020-01-01 RX ADMIN — DULOXETINE HYDROCHLORIDE 60 MG: 60 CAPSULE, DELAYED RELEASE ORAL at 09:52

## 2020-01-01 RX ADMIN — POTASSIUM & SODIUM PHOSPHATES POWDER PACK 280-160-250 MG 2 PACKET: 280-160-250 PACK at 11:37

## 2020-01-01 RX ADMIN — ONDANSETRON 4 MG: 2 INJECTION INTRAMUSCULAR; INTRAVENOUS at 13:54

## 2020-01-01 RX ADMIN — THIAMINE HCL TAB 100 MG 100 MG: 100 TAB at 09:10

## 2020-01-01 RX ADMIN — INSULIN GLARGINE 10 UNITS: 100 INJECTION, SOLUTION SUBCUTANEOUS at 09:49

## 2020-01-01 RX ADMIN — OXYCODONE HYDROCHLORIDE AND ACETAMINOPHEN 2 TABLET: 5; 325 TABLET ORAL at 11:52

## 2020-01-01 RX ADMIN — OXYCODONE HYDROCHLORIDE AND ACETAMINOPHEN 2 TABLET: 5; 325 TABLET ORAL at 17:30

## 2020-01-01 RX ADMIN — FOLIC ACID: 5 INJECTION, SOLUTION INTRAMUSCULAR; INTRAVENOUS; SUBCUTANEOUS at 11:04

## 2020-01-01 RX ADMIN — MULTIPLE VITAMINS W/ MINERALS TAB 1 TABLET: TAB at 09:34

## 2020-01-01 RX ADMIN — SODIUM CHLORIDE 1000 ML: 900 INJECTION, SOLUTION INTRAVENOUS at 11:53

## 2020-01-01 RX ADMIN — MORPHINE SULFATE 2 MG: 2 INJECTION, SOLUTION INTRAMUSCULAR; INTRAVENOUS at 10:30

## 2020-01-01 RX ADMIN — PHYTONADIONE 10 MG: 10 INJECTION, EMULSION INTRAMUSCULAR; INTRAVENOUS; SUBCUTANEOUS at 09:29

## 2020-01-01 RX ADMIN — FUROSEMIDE 80 MG: 40 TABLET ORAL at 10:20

## 2020-01-01 RX ADMIN — FUROSEMIDE 80 MG: 40 TABLET ORAL at 08:19

## 2020-01-01 RX ADMIN — OXYCODONE 5 MG: 5 TABLET ORAL at 19:18

## 2020-01-01 RX ADMIN — MAGNESIUM SULFATE HEPTAHYDRATE 2 G: 40 INJECTION, SOLUTION INTRAVENOUS at 02:17

## 2020-01-01 RX ADMIN — SODIUM CHLORIDE 40 MG: 9 INJECTION, SOLUTION INTRAMUSCULAR; INTRAVENOUS; SUBCUTANEOUS at 20:31

## 2020-01-01 RX ADMIN — ALBUMIN (HUMAN) 25 G: 0.25 INJECTION, SOLUTION INTRAVENOUS at 17:30

## 2020-01-01 RX ADMIN — LORAZEPAM 1 MG: 2 INJECTION INTRAMUSCULAR; INTRAVENOUS at 00:27

## 2020-01-01 RX ADMIN — HYDROMORPHONE HYDROCHLORIDE 1 MG: 1 INJECTION, SOLUTION INTRAMUSCULAR; INTRAVENOUS; SUBCUTANEOUS at 05:36

## 2020-01-01 RX ADMIN — INSULIN LISPRO 4 UNITS: 100 INJECTION, SOLUTION INTRAVENOUS; SUBCUTANEOUS at 12:13

## 2020-01-01 RX ADMIN — PHYTONADIONE 10 MG: 10 INJECTION, EMULSION INTRAMUSCULAR; INTRAVENOUS; SUBCUTANEOUS at 16:46

## 2020-01-01 RX ADMIN — MORPHINE SULFATE 4 MG: 4 INJECTION INTRAVENOUS at 18:38

## 2020-01-01 RX ADMIN — TRAZODONE HYDROCHLORIDE 50 MG: 50 TABLET ORAL at 22:15

## 2020-01-01 RX ADMIN — PHYTONADIONE 10 MG: 10 INJECTION, EMULSION INTRAMUSCULAR; INTRAVENOUS; SUBCUTANEOUS at 02:17

## 2020-01-01 RX ADMIN — INSULIN LISPRO 6 UNITS: 100 INJECTION, SOLUTION INTRAVENOUS; SUBCUTANEOUS at 06:31

## 2020-01-01 RX ADMIN — AMPICILLIN SODIUM 2 G: 2 INJECTION, POWDER, FOR SOLUTION INTRAMUSCULAR; INTRAVENOUS at 12:00

## 2020-01-01 RX ADMIN — THIAMINE HCL TAB 100 MG 100 MG: 100 TAB at 08:40

## 2020-01-01 RX ADMIN — DULOXETINE HYDROCHLORIDE 60 MG: 60 CAPSULE, DELAYED RELEASE ORAL at 09:09

## 2020-01-01 RX ADMIN — INSULIN GLARGINE 12 UNITS: 100 INJECTION, SOLUTION SUBCUTANEOUS at 08:15

## 2020-01-01 RX ADMIN — HYDROCODONE BITARTRATE AND ACETAMINOPHEN 1 TABLET: 5; 325 TABLET ORAL at 17:18

## 2020-01-01 RX ADMIN — VANCOMYCIN HYDROCHLORIDE 1000 MG: 1 INJECTION, POWDER, LYOPHILIZED, FOR SOLUTION INTRAVENOUS at 10:37

## 2020-01-01 RX ADMIN — PIPERACILLIN AND TAZOBACTAM 3.38 G: 3; .375 INJECTION, POWDER, LYOPHILIZED, FOR SOLUTION INTRAVENOUS at 00:33

## 2020-01-01 RX ADMIN — MORPHINE SULFATE 2 MG: 2 INJECTION, SOLUTION INTRAMUSCULAR; INTRAVENOUS at 23:19

## 2020-01-01 RX ADMIN — INSULIN LISPRO 4 UNITS: 100 INJECTION, SOLUTION INTRAVENOUS; SUBCUTANEOUS at 12:06

## 2020-01-01 RX ADMIN — SPIRONOLACTONE 25 MG: 25 TABLET ORAL at 12:51

## 2020-01-01 RX ADMIN — Medication 400 MG: at 13:54

## 2020-01-01 RX ADMIN — PIPERACILLIN AND TAZOBACTAM 4.5 G: 4; .5 INJECTION, POWDER, LYOPHILIZED, FOR SOLUTION INTRAVENOUS; PARENTERAL at 05:23

## 2020-01-01 RX ADMIN — INSULIN LISPRO 15 UNITS: 100 INJECTION, SOLUTION INTRAVENOUS; SUBCUTANEOUS at 08:24

## 2020-01-01 RX ADMIN — PANTOPRAZOLE SODIUM 40 MG: 40 TABLET, DELAYED RELEASE ORAL at 06:43

## 2020-01-01 RX ADMIN — PANTOPRAZOLE SODIUM 40 MG: 40 TABLET, DELAYED RELEASE ORAL at 10:21

## 2020-01-01 RX ADMIN — VANCOMYCIN HYDROCHLORIDE 1250 MG: 1.25 INJECTION, POWDER, LYOPHILIZED, FOR SOLUTION INTRAVENOUS at 02:18

## 2020-01-01 RX ADMIN — THIAMINE HCL TAB 100 MG 100 MG: 100 TAB at 09:23

## 2020-01-01 RX ADMIN — LACTULOSE 15 ML: 20 SOLUTION ORAL at 08:41

## 2020-01-01 RX ADMIN — ALBUMIN (HUMAN) 12.5 G: 0.25 INJECTION, SOLUTION INTRAVENOUS at 21:44

## 2020-01-01 RX ADMIN — MORPHINE SULFATE 2 MG: 2 INJECTION, SOLUTION INTRAMUSCULAR; INTRAVENOUS at 21:07

## 2020-01-01 RX ADMIN — MORPHINE SULFATE 2 MG: 2 INJECTION, SOLUTION INTRAMUSCULAR; INTRAVENOUS at 06:46

## 2020-01-01 RX ADMIN — INSULIN LISPRO 15 UNITS: 100 INJECTION, SOLUTION INTRAVENOUS; SUBCUTANEOUS at 18:13

## 2020-01-01 RX ADMIN — HUMAN INSULIN 10 UNITS: 100 INJECTION, SOLUTION SUBCUTANEOUS at 00:38

## 2020-01-01 RX ADMIN — HEPARIN SODIUM 1000 UNITS: 200 INJECTION, SOLUTION INTRAVENOUS at 15:38

## 2020-01-01 RX ADMIN — POTASSIUM BICARBONATE 20 MEQ: 782 TABLET, EFFERVESCENT ORAL at 13:37

## 2020-01-01 RX ADMIN — OXYCODONE HYDROCHLORIDE AND ACETAMINOPHEN 1 TABLET: 5; 325 TABLET ORAL at 03:07

## 2020-01-01 RX ADMIN — ALBUMIN (HUMAN) 25 G: 0.25 INJECTION, SOLUTION INTRAVENOUS at 11:50

## 2020-01-01 RX ADMIN — ALBUMIN (HUMAN) 25 G: 0.25 INJECTION, SOLUTION INTRAVENOUS at 20:31

## 2020-01-01 RX ADMIN — MORPHINE SULFATE 2 MG: 2 INJECTION, SOLUTION INTRAMUSCULAR; INTRAVENOUS at 19:49

## 2020-01-01 RX ADMIN — SPIRONOLACTONE 100 MG: 100 TABLET ORAL at 09:23

## 2020-01-01 RX ADMIN — ALBUMIN (HUMAN) 25 G: 0.25 INJECTION, SOLUTION INTRAVENOUS at 10:12

## 2020-01-01 RX ADMIN — PIPERACILLIN AND TAZOBACTAM 4.5 G: 4; .5 INJECTION, POWDER, LYOPHILIZED, FOR SOLUTION INTRAVENOUS; PARENTERAL at 21:00

## 2020-01-01 RX ADMIN — ALBUMIN (HUMAN) 25 G: 0.25 INJECTION, SOLUTION INTRAVENOUS at 02:30

## 2020-01-01 RX ADMIN — PIPERACILLIN AND TAZOBACTAM 3.38 G: 3; .375 INJECTION, POWDER, LYOPHILIZED, FOR SOLUTION INTRAVENOUS at 02:16

## 2020-01-01 RX ADMIN — METOCLOPRAMIDE 5 MG: 5 INJECTION, SOLUTION INTRAMUSCULAR; INTRAVENOUS at 13:26

## 2020-01-01 RX ADMIN — INSULIN LISPRO 6 UNITS: 100 INJECTION, SOLUTION INTRAVENOUS; SUBCUTANEOUS at 09:36

## 2020-01-01 RX ADMIN — ALBUMIN (HUMAN) 25 G: 12.5 INJECTION, SOLUTION INTRAVENOUS at 05:31

## 2020-01-01 RX ADMIN — HYDROMORPHONE HYDROCHLORIDE 1 MG: 1 INJECTION, SOLUTION INTRAMUSCULAR; INTRAVENOUS; SUBCUTANEOUS at 11:30

## 2020-01-01 RX ADMIN — DEXAMETHASONE SODIUM PHOSPHATE 6 MG: 4 INJECTION, SOLUTION INTRAMUSCULAR; INTRAVENOUS at 13:26

## 2020-01-01 RX ADMIN — ALBUMIN (HUMAN) 12.5 G: 0.25 INJECTION, SOLUTION INTRAVENOUS at 10:37

## 2020-01-01 RX ADMIN — ACETAMINOPHEN 650 MG: 325 TABLET ORAL at 10:07

## 2020-01-01 RX ADMIN — IOPAMIDOL 100 ML: 612 INJECTION, SOLUTION INTRAVENOUS at 10:26

## 2020-01-01 RX ADMIN — MORPHINE SULFATE 2 MG: 2 INJECTION, SOLUTION INTRAMUSCULAR; INTRAVENOUS at 16:10

## 2020-01-01 RX ADMIN — FUROSEMIDE 40 MG: 10 INJECTION, SOLUTION INTRAMUSCULAR; INTRAVENOUS at 12:51

## 2020-01-01 RX ADMIN — RIVAROXABAN 15 MG: 15 TABLET, FILM COATED ORAL at 09:16

## 2020-01-01 RX ADMIN — PIPERACILLIN AND TAZOBACTAM 3.38 G: 3; .375 INJECTION, POWDER, LYOPHILIZED, FOR SOLUTION INTRAVENOUS at 03:07

## 2020-01-01 RX ADMIN — CLINDAMYCIN PHOSPHATE 600 MG: 600 INJECTION, SOLUTION INTRAVENOUS at 10:40

## 2020-01-01 RX ADMIN — ACETAMINOPHEN 650 MG: 325 TABLET ORAL at 16:49

## 2020-01-01 RX ADMIN — ALBUMIN (HUMAN) 12.5 G: 0.25 INJECTION, SOLUTION INTRAVENOUS at 13:18

## 2020-01-01 RX ADMIN — PIPERACILLIN AND TAZOBACTAM 4.5 G: 4; .5 INJECTION, POWDER, LYOPHILIZED, FOR SOLUTION INTRAVENOUS; PARENTERAL at 13:59

## 2020-01-01 RX ADMIN — ONDANSETRON 4 MG: 2 INJECTION INTRAMUSCULAR; INTRAVENOUS at 03:21

## 2020-01-01 RX ADMIN — PIPERACILLIN AND TAZOBACTAM 4.5 G: 4; .5 INJECTION, POWDER, LYOPHILIZED, FOR SOLUTION INTRAVENOUS; PARENTERAL at 18:15

## 2020-01-01 RX ADMIN — ALBUMIN (HUMAN) 12.5 G: 0.25 INJECTION, SOLUTION INTRAVENOUS at 10:39

## 2020-01-01 RX ADMIN — OXYCODONE HYDROCHLORIDE AND ACETAMINOPHEN 2 TABLET: 5; 325 TABLET ORAL at 14:31

## 2020-01-01 RX ADMIN — VANCOMYCIN HYDROCHLORIDE 1000 MG: 1 INJECTION, POWDER, LYOPHILIZED, FOR SOLUTION INTRAVENOUS at 13:03

## 2020-01-01 RX ADMIN — OXYCODONE 5 MG: 5 TABLET ORAL at 13:00

## 2020-01-01 RX ADMIN — POTASSIUM & SODIUM PHOSPHATES POWDER PACK 280-160-250 MG 2 PACKET: 280-160-250 PACK at 08:31

## 2020-01-01 RX ADMIN — METOCLOPRAMIDE 5 MG: 5 INJECTION, SOLUTION INTRAMUSCULAR; INTRAVENOUS at 05:32

## 2020-01-01 RX ADMIN — OXYCODONE HYDROCHLORIDE AND ACETAMINOPHEN 2 TABLET: 5; 325 TABLET ORAL at 20:59

## 2020-01-01 RX ADMIN — MULTIPLE VITAMINS W/ MINERALS TAB 1 TABLET: TAB at 08:18

## 2020-01-01 RX ADMIN — MORPHINE SULFATE 2 MG: 2 INJECTION, SOLUTION INTRAMUSCULAR; INTRAVENOUS at 18:47

## 2020-01-01 RX ADMIN — OXYCODONE 5 MG: 5 TABLET ORAL at 06:40

## 2020-01-01 RX ADMIN — PIPERACILLIN AND TAZOBACTAM 3.38 G: 3; .375 INJECTION, POWDER, LYOPHILIZED, FOR SOLUTION INTRAVENOUS at 01:42

## 2020-01-01 RX ADMIN — OXYCODONE HYDROCHLORIDE AND ACETAMINOPHEN 2 TABLET: 5; 325 TABLET ORAL at 13:47

## 2020-01-01 RX ADMIN — ALBUMIN (HUMAN) 25 G: 0.25 INJECTION, SOLUTION INTRAVENOUS at 18:21

## 2020-01-01 RX ADMIN — MIDAZOLAM HYDROCHLORIDE 4 MG/HR: 5 INJECTION INTRAMUSCULAR; INTRAVENOUS at 19:50

## 2020-01-01 RX ADMIN — Medication 100 MG: at 08:41

## 2020-01-01 RX ADMIN — OXYCODONE HYDROCHLORIDE AND ACETAMINOPHEN 2 TABLET: 5; 325 TABLET ORAL at 20:39

## 2020-01-01 RX ADMIN — PANCRELIPASE LIPASE, PANCRELIPASE PROTEASE, PANCRELIPASE AMYLASE 1 CAPSULE: 5000; 17000; 24000 CAPSULE, DELAYED RELEASE ORAL at 11:32

## 2020-01-01 RX ADMIN — MAGNESIUM SULFATE HEPTAHYDRATE 2 G: 40 INJECTION, SOLUTION INTRAVENOUS at 12:44

## 2020-01-01 RX ADMIN — INSULIN LISPRO 2 UNITS: 100 INJECTION, SOLUTION INTRAVENOUS; SUBCUTANEOUS at 19:12

## 2020-01-01 RX ADMIN — MORPHINE SULFATE 2 MG: 2 INJECTION, SOLUTION INTRAMUSCULAR; INTRAVENOUS at 17:32

## 2020-01-01 RX ADMIN — FUROSEMIDE 80 MG: 40 TABLET ORAL at 13:01

## 2020-01-01 RX ADMIN — ALBUMIN (HUMAN) 25 G: 0.25 INJECTION, SOLUTION INTRAVENOUS at 23:19

## 2020-01-01 RX ADMIN — MIDAZOLAM HYDROCHLORIDE 2 MG: 1 INJECTION, SOLUTION INTRAMUSCULAR; INTRAVENOUS at 13:07

## 2020-01-01 RX ADMIN — Medication 10 ML: at 17:39

## 2020-01-01 RX ADMIN — MORPHINE SULFATE 2 MG: 2 INJECTION, SOLUTION INTRAMUSCULAR; INTRAVENOUS at 01:02

## 2020-01-01 RX ADMIN — INSULIN LISPRO 2 UNITS: 100 INJECTION, SOLUTION INTRAVENOUS; SUBCUTANEOUS at 05:54

## 2020-01-01 RX ADMIN — INSULIN LISPRO 6 UNITS: 100 INJECTION, SOLUTION INTRAVENOUS; SUBCUTANEOUS at 05:45

## 2020-01-01 RX ADMIN — INSULIN LISPRO 6 UNITS: 100 INJECTION, SOLUTION INTRAVENOUS; SUBCUTANEOUS at 09:07

## 2020-01-01 RX ADMIN — DULOXETINE HYDROCHLORIDE 60 MG: 60 CAPSULE, DELAYED RELEASE ORAL at 09:36

## 2020-01-01 RX ADMIN — POTASSIUM CHLORIDE 20 MEQ: 20 TABLET, EXTENDED RELEASE ORAL at 08:59

## 2020-01-01 RX ADMIN — INSULIN LISPRO 2 UNITS: 100 INJECTION, SOLUTION INTRAVENOUS; SUBCUTANEOUS at 16:27

## 2020-01-01 RX ADMIN — MORPHINE SULFATE 2 MG: 2 INJECTION, SOLUTION INTRAMUSCULAR; INTRAVENOUS at 00:23

## 2020-01-01 RX ADMIN — INSULIN LISPRO 3 UNITS: 100 INJECTION, SOLUTION INTRAVENOUS; SUBCUTANEOUS at 08:25

## 2020-01-01 RX ADMIN — ALBUMIN (HUMAN) 25 G: 0.25 INJECTION, SOLUTION INTRAVENOUS at 18:19

## 2020-01-01 RX ADMIN — PIPERACILLIN AND TAZOBACTAM 3.38 G: 3; .375 INJECTION, POWDER, LYOPHILIZED, FOR SOLUTION INTRAVENOUS at 08:06

## 2020-01-01 RX ADMIN — PANTOPRAZOLE SODIUM 40 MG: 40 TABLET, DELAYED RELEASE ORAL at 06:12

## 2020-01-01 RX ADMIN — OXYCODONE 5 MG: 5 TABLET ORAL at 03:47

## 2020-01-01 RX ADMIN — THIAMINE HCL TAB 100 MG 100 MG: 100 TAB at 08:18

## 2020-01-01 RX ADMIN — ANTACID TABLETS 200 MG: 500 TABLET, CHEWABLE ORAL at 12:24

## 2020-01-01 RX ADMIN — MORPHINE SULFATE 2 MG: 2 INJECTION, SOLUTION INTRAMUSCULAR; INTRAVENOUS at 23:17

## 2020-01-01 RX ADMIN — PANTOPRAZOLE SODIUM 40 MG: 40 TABLET, DELAYED RELEASE ORAL at 06:30

## 2020-01-01 RX ADMIN — ALBUMIN (HUMAN) 25 G: 12.5 INJECTION, SOLUTION INTRAVENOUS at 19:13

## 2020-01-01 RX ADMIN — INSULIN LISPRO 2 UNITS: 100 INJECTION, SOLUTION INTRAVENOUS; SUBCUTANEOUS at 12:24

## 2020-01-01 RX ADMIN — MAGNESIUM SULFATE HEPTAHYDRATE 1 G: 1 INJECTION, SOLUTION INTRAVENOUS at 11:08

## 2020-01-01 RX ADMIN — Medication 100 MG: at 15:07

## 2020-01-01 RX ADMIN — POTASSIUM CHLORIDE 40 MEQ: 1500 TABLET, EXTENDED RELEASE ORAL at 10:15

## 2020-01-01 RX ADMIN — HUMAN INSULIN 5 UNITS: 100 INJECTION, SOLUTION SUBCUTANEOUS at 18:02

## 2020-01-01 RX ADMIN — INSULIN LISPRO 2 UNITS: 100 INJECTION, SOLUTION INTRAVENOUS; SUBCUTANEOUS at 17:30

## 2020-01-01 RX ADMIN — HYDROMORPHONE HYDROCHLORIDE 1 MG: 1 INJECTION, SOLUTION INTRAMUSCULAR; INTRAVENOUS; SUBCUTANEOUS at 15:07

## 2020-01-01 RX ADMIN — OXYCODONE HYDROCHLORIDE AND ACETAMINOPHEN 1 TABLET: 5; 325 TABLET ORAL at 21:10

## 2020-01-01 RX ADMIN — VANCOMYCIN HYDROCHLORIDE 1250 MG: 1.25 INJECTION, POWDER, LYOPHILIZED, FOR SOLUTION INTRAVENOUS at 09:32

## 2020-01-01 RX ADMIN — AMPICILLIN SODIUM 2 G: 2 INJECTION, POWDER, FOR SOLUTION INTRAMUSCULAR; INTRAVENOUS at 03:43

## 2020-01-01 RX ADMIN — PIPERACILLIN AND TAZOBACTAM 3.38 G: 3; .375 INJECTION, POWDER, LYOPHILIZED, FOR SOLUTION INTRAVENOUS at 22:13

## 2020-01-01 RX ADMIN — POTASSIUM CHLORIDE 40 MEQ: 1500 TABLET, EXTENDED RELEASE ORAL at 01:36

## 2020-01-01 RX ADMIN — ANTACID TABLETS 200 MG: 500 TABLET, CHEWABLE ORAL at 11:53

## 2020-01-01 RX ADMIN — AMPICILLIN SODIUM 2 G: 2 INJECTION, POWDER, FOR SOLUTION INTRAMUSCULAR; INTRAVENOUS at 20:05

## 2020-01-01 RX ADMIN — ALBUMIN (HUMAN) 25 G: 12.5 INJECTION, SOLUTION INTRAVENOUS at 02:38

## 2020-01-01 RX ADMIN — TRAZODONE HYDROCHLORIDE 50 MG: 50 TABLET ORAL at 23:12

## 2020-01-01 RX ADMIN — SODIUM CHLORIDE 75 ML/HR: 900 INJECTION, SOLUTION INTRAVENOUS at 07:59

## 2020-01-01 RX ADMIN — VANCOMYCIN HYDROCHLORIDE 1000 MG: 1 INJECTION, POWDER, LYOPHILIZED, FOR SOLUTION INTRAVENOUS at 01:04

## 2020-01-01 RX ADMIN — INSULIN LISPRO 4 UNITS: 100 INJECTION, SOLUTION INTRAVENOUS; SUBCUTANEOUS at 21:21

## 2020-01-01 RX ADMIN — INSULIN LISPRO 6 UNITS: 100 INJECTION, SOLUTION INTRAVENOUS; SUBCUTANEOUS at 08:23

## 2020-01-01 RX ADMIN — LACTULOSE 30 ML: 10 SOLUTION ORAL at 21:00

## 2020-01-01 RX ADMIN — POTASSIUM CHLORIDE 40 MEQ: 1500 TABLET, EXTENDED RELEASE ORAL at 21:46

## 2020-01-01 RX ADMIN — AMPICILLIN SODIUM 2 G: 2 INJECTION, POWDER, FOR SOLUTION INTRAMUSCULAR; INTRAVENOUS at 23:12

## 2020-01-01 RX ADMIN — DULOXETINE HYDROCHLORIDE 60 MG: 60 CAPSULE, DELAYED RELEASE ORAL at 10:06

## 2020-01-01 RX ADMIN — ALBUMIN (HUMAN) 25 G: 0.25 INJECTION, SOLUTION INTRAVENOUS at 05:57

## 2020-01-01 RX ADMIN — INSULIN LISPRO 4 UNITS: 100 INJECTION, SOLUTION INTRAVENOUS; SUBCUTANEOUS at 19:14

## 2020-01-01 RX ADMIN — INSULIN LISPRO 6 UNITS: 100 INJECTION, SOLUTION INTRAVENOUS; SUBCUTANEOUS at 13:00

## 2020-01-01 RX ADMIN — FUROSEMIDE 20 MG: 10 INJECTION, SOLUTION INTRAMUSCULAR; INTRAVENOUS at 08:52

## 2020-01-01 RX ADMIN — INSULIN LISPRO 3 UNITS: 100 INJECTION, SOLUTION INTRAVENOUS; SUBCUTANEOUS at 06:28

## 2020-01-01 RX ADMIN — MAGNESIUM SULFATE HEPTAHYDRATE 2 G: 40 INJECTION, SOLUTION INTRAVENOUS at 00:51

## 2020-01-01 RX ADMIN — ALBUMIN (HUMAN) 12.5 G: 0.25 INJECTION, SOLUTION INTRAVENOUS at 08:31

## 2020-01-01 RX ADMIN — MAGNESIUM SULFATE HEPTAHYDRATE 2 G: 40 INJECTION, SOLUTION INTRAVENOUS at 09:32

## 2020-01-01 RX ADMIN — ALBUMIN (HUMAN) 12.5 G: 0.25 INJECTION, SOLUTION INTRAVENOUS at 03:43

## 2020-01-01 RX ADMIN — HYDROMORPHONE HYDROCHLORIDE 1 MG: 1 INJECTION, SOLUTION INTRAMUSCULAR; INTRAVENOUS; SUBCUTANEOUS at 18:55

## 2020-01-01 RX ADMIN — POTASSIUM & SODIUM PHOSPHATES POWDER PACK 280-160-250 MG 2 PACKET: 280-160-250 PACK at 06:22

## 2020-01-01 RX ADMIN — LACTULOSE 45 ML: 20 SOLUTION ORAL at 09:12

## 2020-01-01 RX ADMIN — METOPROLOL TARTRATE 2.5 MG: 5 INJECTION INTRAVENOUS at 18:12

## 2020-01-01 RX ADMIN — HYDROMORPHONE HYDROCHLORIDE 1 MG: 1 INJECTION, SOLUTION INTRAMUSCULAR; INTRAVENOUS; SUBCUTANEOUS at 14:34

## 2020-01-01 RX ADMIN — INSULIN LISPRO 6 UNITS: 100 INJECTION, SOLUTION INTRAVENOUS; SUBCUTANEOUS at 13:05

## 2020-01-01 RX ADMIN — LACTULOSE 15 ML: 20 SOLUTION ORAL at 08:12

## 2020-01-01 RX ADMIN — INSULIN LISPRO 6 UNITS: 100 INJECTION, SOLUTION INTRAVENOUS; SUBCUTANEOUS at 09:22

## 2020-01-01 RX ADMIN — ONDANSETRON 4 MG: 2 INJECTION INTRAMUSCULAR; INTRAVENOUS at 21:39

## 2020-01-01 RX ADMIN — MULTIPLE VITAMINS W/ MINERALS TAB 1 TABLET: TAB at 10:40

## 2020-01-01 RX ADMIN — LORAZEPAM 1 MG: 2 INJECTION INTRAMUSCULAR; INTRAVENOUS at 21:00

## 2020-01-01 RX ADMIN — INSULIN LISPRO 2 UNITS: 100 INJECTION, SOLUTION INTRAVENOUS; SUBCUTANEOUS at 18:11

## 2020-01-01 RX ADMIN — INSULIN LISPRO 6 UNITS: 100 INJECTION, SOLUTION INTRAVENOUS; SUBCUTANEOUS at 09:50

## 2020-01-01 RX ADMIN — AMPICILLIN SODIUM 2 G: 2 INJECTION, POWDER, FOR SOLUTION INTRAMUSCULAR; INTRAVENOUS at 04:59

## 2020-01-01 RX ADMIN — Medication 10 ML: at 14:00

## 2020-01-01 RX ADMIN — ALBUMIN (HUMAN) 25 G: 0.25 INJECTION, SOLUTION INTRAVENOUS at 12:35

## 2020-01-01 RX ADMIN — INSULIN LISPRO 3 UNITS: 100 INJECTION, SOLUTION INTRAVENOUS; SUBCUTANEOUS at 21:34

## 2020-01-01 RX ADMIN — INSULIN GLARGINE 10 UNITS: 100 INJECTION, SOLUTION SUBCUTANEOUS at 23:53

## 2020-01-01 RX ADMIN — AMPICILLIN SODIUM 2 G: 2 INJECTION, POWDER, FOR SOLUTION INTRAMUSCULAR; INTRAVENOUS at 14:25

## 2020-01-01 RX ADMIN — ONDANSETRON 4 MG: 2 INJECTION INTRAMUSCULAR; INTRAVENOUS at 08:12

## 2020-01-01 RX ADMIN — Medication 100 MG: at 09:13

## 2020-01-01 RX ADMIN — ALBUMIN (HUMAN) 25 G: 0.25 INJECTION, SOLUTION INTRAVENOUS at 21:31

## 2020-01-01 RX ADMIN — INSULIN LISPRO 6 UNITS: 100 INJECTION, SOLUTION INTRAVENOUS; SUBCUTANEOUS at 22:07

## 2020-01-01 RX ADMIN — INSULIN LISPRO 3 UNITS: 100 INJECTION, SOLUTION INTRAVENOUS; SUBCUTANEOUS at 17:07

## 2020-01-01 RX ADMIN — Medication 125 MCG/HR: at 13:38

## 2020-01-01 RX ADMIN — CISATRACURIUM BESYLATE 3 MCG/KG/MIN: 2 INJECTION, SOLUTION INTRAVENOUS at 01:54

## 2020-01-01 RX ADMIN — LACTULOSE 45 ML: 20 SOLUTION ORAL at 09:49

## 2020-01-01 RX ADMIN — POTASSIUM CHLORIDE 40 MEQ: 1500 TABLET, EXTENDED RELEASE ORAL at 05:20

## 2020-01-01 RX ADMIN — DULOXETINE HYDROCHLORIDE 60 MG: 60 CAPSULE, DELAYED RELEASE ORAL at 09:18

## 2020-01-01 RX ADMIN — OXYCODONE HYDROCHLORIDE AND ACETAMINOPHEN 2 TABLET: 5; 325 TABLET ORAL at 23:34

## 2020-01-01 RX ADMIN — LACTULOSE 45 ML: 20 SOLUTION ORAL at 09:34

## 2020-01-01 RX ADMIN — INSULIN LISPRO 12 UNITS: 100 INJECTION, SOLUTION INTRAVENOUS; SUBCUTANEOUS at 12:30

## 2020-01-01 RX ADMIN — MORPHINE SULFATE 2 MG: 2 INJECTION, SOLUTION INTRAMUSCULAR; INTRAVENOUS at 04:45

## 2020-01-01 RX ADMIN — CALCIUM GLUCONATE 2 G: 20 INJECTION, SOLUTION INTRAVENOUS at 05:38

## 2020-01-01 RX ADMIN — ONDANSETRON 4 MG: 2 INJECTION INTRAMUSCULAR; INTRAVENOUS at 15:44

## 2020-01-01 RX ADMIN — TRAZODONE HYDROCHLORIDE 50 MG: 50 TABLET ORAL at 21:10

## 2020-01-01 RX ADMIN — INSULIN LISPRO 6 UNITS: 100 INJECTION, SOLUTION INTRAVENOUS; SUBCUTANEOUS at 19:13

## 2020-01-01 RX ADMIN — Medication 10 ML: at 06:49

## 2020-01-01 RX ADMIN — LACTULOSE 15 ML: 20 SOLUTION ORAL at 08:34

## 2020-01-01 RX ADMIN — INSULIN LISPRO 6 UNITS: 100 INJECTION, SOLUTION INTRAVENOUS; SUBCUTANEOUS at 16:50

## 2020-01-01 RX ADMIN — SODIUM CHLORIDE 40 MG: 9 INJECTION, SOLUTION INTRAMUSCULAR; INTRAVENOUS; SUBCUTANEOUS at 20:15

## 2020-01-01 RX ADMIN — DULOXETINE HYDROCHLORIDE 60 MG: 60 CAPSULE, DELAYED RELEASE ORAL at 08:18

## 2020-01-01 RX ADMIN — INSULIN LISPRO 9 UNITS: 100 INJECTION, SOLUTION INTRAVENOUS; SUBCUTANEOUS at 12:23

## 2020-01-01 RX ADMIN — CLINDAMYCIN PHOSPHATE 600 MG: 600 INJECTION, SOLUTION INTRAVENOUS at 10:12

## 2020-01-01 RX ADMIN — SODIUM CHLORIDE 40 MG: 9 INJECTION, SOLUTION INTRAMUSCULAR; INTRAVENOUS; SUBCUTANEOUS at 20:54

## 2020-01-01 RX ADMIN — OXYCODONE 5 MG: 5 TABLET ORAL at 10:29

## 2020-01-01 RX ADMIN — SPIRONOLACTONE 200 MG: 100 TABLET ORAL at 09:34

## 2020-01-01 RX ADMIN — SODIUM CHLORIDE 40 MG: 9 INJECTION, SOLUTION INTRAMUSCULAR; INTRAVENOUS; SUBCUTANEOUS at 22:16

## 2020-01-01 RX ADMIN — ONDANSETRON 4 MG: 2 INJECTION INTRAMUSCULAR; INTRAVENOUS at 22:45

## 2020-01-01 RX ADMIN — OXYCODONE HYDROCHLORIDE AND ACETAMINOPHEN 2 TABLET: 5; 325 TABLET ORAL at 07:07

## 2020-01-01 RX ADMIN — POTASSIUM & SODIUM PHOSPHATES POWDER PACK 280-160-250 MG 2 PACKET: 280-160-250 PACK at 05:09

## 2020-01-01 RX ADMIN — POTASSIUM & SODIUM PHOSPHATES POWDER PACK 280-160-250 MG 2 PACKET: 280-160-250 PACK at 21:20

## 2020-01-01 RX ADMIN — ALBUMIN (HUMAN) 25 G: 0.25 INJECTION, SOLUTION INTRAVENOUS at 00:13

## 2020-01-01 RX ADMIN — ALBUMIN (HUMAN) 25 G: 0.25 INJECTION, SOLUTION INTRAVENOUS at 16:05

## 2020-01-01 RX ADMIN — MAGNESIUM SULFATE IN WATER 2 G: 40 INJECTION, SOLUTION INTRAVENOUS at 10:54

## 2020-01-01 RX ADMIN — PIPERACILLIN AND TAZOBACTAM 3.38 G: 3; .375 INJECTION, POWDER, LYOPHILIZED, FOR SOLUTION INTRAVENOUS at 18:08

## 2020-01-01 RX ADMIN — POTASSIUM CHLORIDE 40 MEQ: 1500 TABLET, EXTENDED RELEASE ORAL at 09:51

## 2020-01-01 RX ADMIN — INSULIN GLARGINE 12 UNITS: 100 INJECTION, SOLUTION SUBCUTANEOUS at 08:33

## 2020-01-01 RX ADMIN — LACTULOSE 45 ML: 20 SOLUTION ORAL at 09:13

## 2020-01-01 RX ADMIN — ALBUMIN (HUMAN) 25 G: 0.25 INJECTION, SOLUTION INTRAVENOUS at 08:51

## 2020-01-01 RX ADMIN — VANCOMYCIN HYDROCHLORIDE 1000 MG: 1 INJECTION, POWDER, LYOPHILIZED, FOR SOLUTION INTRAVENOUS at 02:09

## 2020-01-01 RX ADMIN — PIPERACILLIN AND TAZOBACTAM 3.38 G: 3; .375 INJECTION, POWDER, LYOPHILIZED, FOR SOLUTION INTRAVENOUS at 16:38

## 2020-01-01 RX ADMIN — INSULIN LISPRO 6 UNITS: 100 INJECTION, SOLUTION INTRAVENOUS; SUBCUTANEOUS at 13:37

## 2020-01-01 RX ADMIN — ONDANSETRON 4 MG: 2 INJECTION INTRAMUSCULAR; INTRAVENOUS at 09:35

## 2020-01-01 RX ADMIN — HUMAN INSULIN 10 UNITS: 100 INJECTION, SOLUTION SUBCUTANEOUS at 05:55

## 2020-01-01 RX ADMIN — MORPHINE SULFATE 2 MG: 2 INJECTION, SOLUTION INTRAMUSCULAR; INTRAVENOUS at 13:00

## 2020-01-01 RX ADMIN — HYDROMORPHONE HYDROCHLORIDE 1 MG: 1 INJECTION, SOLUTION INTRAMUSCULAR; INTRAVENOUS; SUBCUTANEOUS at 21:39

## 2020-01-01 RX ADMIN — METOCLOPRAMIDE 5 MG: 5 INJECTION, SOLUTION INTRAMUSCULAR; INTRAVENOUS at 05:29

## 2020-01-01 RX ADMIN — ALBUMIN (HUMAN) 25 G: 0.25 INJECTION, SOLUTION INTRAVENOUS at 08:40

## 2020-01-01 RX ADMIN — ALBUMIN (HUMAN) 25 G: 0.25 INJECTION, SOLUTION INTRAVENOUS at 17:11

## 2020-01-01 RX ADMIN — LACTULOSE 45 ML: 20 SOLUTION ORAL at 08:55

## 2020-01-01 RX ADMIN — POTASSIUM CHLORIDE 20 MEQ: 20 TABLET, EXTENDED RELEASE ORAL at 08:24

## 2020-01-01 RX ADMIN — MORPHINE SULFATE 2 MG: 2 INJECTION, SOLUTION INTRAMUSCULAR; INTRAVENOUS at 04:02

## 2020-01-01 RX ADMIN — PIPERACILLIN AND TAZOBACTAM 4.5 G: 4; .5 INJECTION, POWDER, LYOPHILIZED, FOR SOLUTION INTRAVENOUS; PARENTERAL at 22:46

## 2020-01-01 RX ADMIN — POTASSIUM CHLORIDE 40 MEQ: 1500 TABLET, EXTENDED RELEASE ORAL at 18:55

## 2020-01-01 RX ADMIN — MORPHINE SULFATE 1 MG: 2 INJECTION, SOLUTION INTRAMUSCULAR; INTRAVENOUS at 15:48

## 2020-01-01 RX ADMIN — ARGATROBAN 0.3 MCG/KG/MIN: 100 INJECTION, SOLUTION INTRAVENOUS at 19:28

## 2020-01-01 RX ADMIN — OXYCODONE 5 MG: 5 TABLET ORAL at 09:45

## 2020-01-01 RX ADMIN — POTASSIUM CHLORIDE 40 MEQ: 1500 TABLET, EXTENDED RELEASE ORAL at 10:21

## 2020-01-01 RX ADMIN — PHYTONADIONE 10 MG: 10 INJECTION, EMULSION INTRAMUSCULAR; INTRAVENOUS; SUBCUTANEOUS at 16:00

## 2020-01-01 RX ADMIN — SODIUM PHOSPHATE, MONOBASIC, MONOHYDRATE: 276; 142 INJECTION, SOLUTION INTRAVENOUS at 14:16

## 2020-01-01 RX ADMIN — ALBUMIN (HUMAN) 12.5 G: 0.25 INJECTION, SOLUTION INTRAVENOUS at 06:49

## 2020-01-01 RX ADMIN — INSULIN LISPRO 6 UNITS: 100 INJECTION, SOLUTION INTRAVENOUS; SUBCUTANEOUS at 09:00

## 2020-01-01 RX ADMIN — MAGNESIUM SULFATE HEPTAHYDRATE 1 G: 1 INJECTION, SOLUTION INTRAVENOUS at 16:37

## 2020-01-01 RX ADMIN — THIAMINE HCL TAB 100 MG 100 MG: 100 TAB at 08:19

## 2020-01-01 RX ADMIN — ALBUMIN (HUMAN) 25 G: 0.25 INJECTION, SOLUTION INTRAVENOUS at 22:15

## 2020-01-01 RX ADMIN — OXYCODONE 5 MG: 5 TABLET ORAL at 10:32

## 2020-01-01 RX ADMIN — Medication 10 ML: at 06:29

## 2020-01-01 RX ADMIN — PIPERACILLIN AND TAZOBACTAM 3.38 G: 3; .375 INJECTION, POWDER, LYOPHILIZED, FOR SOLUTION INTRAVENOUS at 13:17

## 2020-01-01 RX ADMIN — ANTACID TABLETS 200 MG: 500 TABLET, CHEWABLE ORAL at 18:03

## 2020-01-01 RX ADMIN — HYDROMORPHONE HYDROCHLORIDE 1 MG: 1 INJECTION, SOLUTION INTRAMUSCULAR; INTRAVENOUS; SUBCUTANEOUS at 00:42

## 2020-01-01 RX ADMIN — HUMAN INSULIN 10 UNITS: 100 INJECTION, SOLUTION SUBCUTANEOUS at 11:53

## 2020-01-01 RX ADMIN — PIPERACILLIN AND TAZOBACTAM 3.38 G: 3; .375 INJECTION, POWDER, LYOPHILIZED, FOR SOLUTION INTRAVENOUS at 18:24

## 2020-01-01 RX ADMIN — METOCLOPRAMIDE 5 MG: 5 INJECTION, SOLUTION INTRAMUSCULAR; INTRAVENOUS at 17:24

## 2020-01-01 RX ADMIN — MORPHINE SULFATE 2 MG: 2 INJECTION, SOLUTION INTRAMUSCULAR; INTRAVENOUS at 03:53

## 2020-01-01 RX ADMIN — LACTULOSE 15 ML: 20 SOLUTION ORAL at 21:47

## 2020-01-01 RX ADMIN — INSULIN GLARGINE 10 UNITS: 100 INJECTION, SOLUTION SUBCUTANEOUS at 21:15

## 2020-01-01 RX ADMIN — VANCOMYCIN HYDROCHLORIDE 1000 MG: 1 INJECTION, POWDER, LYOPHILIZED, FOR SOLUTION INTRAVENOUS at 22:41

## 2020-01-01 RX ADMIN — INSULIN LISPRO 2 UNITS: 100 INJECTION, SOLUTION INTRAVENOUS; SUBCUTANEOUS at 21:15

## 2020-01-01 RX ADMIN — PROPOFOL 50 MCG/KG/MIN: 10 INJECTION, EMULSION INTRAVENOUS at 10:11

## 2020-01-01 RX ADMIN — POTASSIUM & SODIUM PHOSPHATES POWDER PACK 280-160-250 MG 2 PACKET: 280-160-250 PACK at 06:51

## 2020-01-01 RX ADMIN — HYDROMORPHONE HYDROCHLORIDE 1 MG: 1 INJECTION, SOLUTION INTRAMUSCULAR; INTRAVENOUS; SUBCUTANEOUS at 02:18

## 2020-01-01 RX ADMIN — LACTULOSE 45 ML: 20 SOLUTION ORAL at 10:08

## 2020-01-01 RX ADMIN — INSULIN LISPRO 9 UNITS: 100 INJECTION, SOLUTION INTRAVENOUS; SUBCUTANEOUS at 13:05

## 2020-01-01 RX ADMIN — HYDROMORPHONE HYDROCHLORIDE 1 MG: 1 INJECTION, SOLUTION INTRAMUSCULAR; INTRAVENOUS; SUBCUTANEOUS at 05:43

## 2020-01-01 RX ADMIN — MORPHINE SULFATE 2 MG: 2 INJECTION, SOLUTION INTRAMUSCULAR; INTRAVENOUS at 20:15

## 2020-01-01 RX ADMIN — FOLIC ACID 1 MG: 1 TABLET ORAL at 09:49

## 2020-01-01 RX ADMIN — Medication 100 MG: at 08:34

## 2020-01-01 RX ADMIN — LACTULOSE 45 ML: 20 SOLUTION ORAL at 16:13

## 2020-01-01 RX ADMIN — PIPERACILLIN AND TAZOBACTAM 3.38 G: 3; .375 INJECTION, POWDER, LYOPHILIZED, FOR SOLUTION INTRAVENOUS at 06:47

## 2020-01-01 RX ADMIN — ALBUMIN (HUMAN) 25 G: 0.25 INJECTION, SOLUTION INTRAVENOUS at 06:57

## 2020-01-01 RX ADMIN — HYDROMORPHONE HYDROCHLORIDE 1 MG: 1 INJECTION, SOLUTION INTRAMUSCULAR; INTRAVENOUS; SUBCUTANEOUS at 12:09

## 2020-01-01 RX ADMIN — INSULIN LISPRO 2 UNITS: 100 INJECTION, SOLUTION INTRAVENOUS; SUBCUTANEOUS at 22:16

## 2020-01-01 RX ADMIN — POTASSIUM & SODIUM PHOSPHATES POWDER PACK 280-160-250 MG 2 PACKET: 280-160-250 PACK at 05:22

## 2020-01-01 RX ADMIN — INSULIN LISPRO 6 UNITS: 100 INJECTION, SOLUTION INTRAVENOUS; SUBCUTANEOUS at 11:51

## 2020-01-01 RX ADMIN — CALCIUM GLUCONATE 2 G: 20 INJECTION, SOLUTION INTRAVENOUS at 18:36

## 2020-01-01 RX ADMIN — PIPERACILLIN AND TAZOBACTAM 4.5 G: 4; .5 INJECTION, POWDER, LYOPHILIZED, FOR SOLUTION INTRAVENOUS; PARENTERAL at 13:02

## 2020-01-01 RX ADMIN — OXYCODONE HYDROCHLORIDE AND ACETAMINOPHEN 2 TABLET: 5; 325 TABLET ORAL at 20:01

## 2020-01-01 RX ADMIN — INSULIN LISPRO 4 UNITS: 100 INJECTION, SOLUTION INTRAVENOUS; SUBCUTANEOUS at 06:55

## 2020-01-01 RX ADMIN — PIPERACILLIN AND TAZOBACTAM 4.5 G: 4; .5 INJECTION, POWDER, LYOPHILIZED, FOR SOLUTION INTRAVENOUS; PARENTERAL at 21:02

## 2020-01-01 RX ADMIN — MAGNESIUM SULFATE HEPTAHYDRATE 2 G: 40 INJECTION, SOLUTION INTRAVENOUS at 12:15

## 2020-01-01 RX ADMIN — AMPICILLIN SODIUM 2 G: 2 INJECTION, POWDER, FOR SOLUTION INTRAMUSCULAR; INTRAVENOUS at 16:48

## 2020-01-01 RX ADMIN — INSULIN LISPRO 12 UNITS: 100 INJECTION, SOLUTION INTRAVENOUS; SUBCUTANEOUS at 01:14

## 2020-01-01 RX ADMIN — METOCLOPRAMIDE 5 MG: 5 INJECTION, SOLUTION INTRAMUSCULAR; INTRAVENOUS at 12:32

## 2020-01-01 RX ADMIN — POTASSIUM CHLORIDE 10 MEQ: 7.46 INJECTION, SOLUTION INTRAVENOUS at 13:29

## 2020-01-01 RX ADMIN — POTASSIUM & SODIUM PHOSPHATES POWDER PACK 280-160-250 MG 2 PACKET: 280-160-250 PACK at 03:19

## 2020-01-01 RX ADMIN — OXYCODONE HYDROCHLORIDE AND ACETAMINOPHEN 1 TABLET: 5; 325 TABLET ORAL at 23:38

## 2020-01-01 RX ADMIN — Medication 100 MG: at 08:24

## 2020-01-01 RX ADMIN — SODIUM CHLORIDE 40 MG: 9 INJECTION, SOLUTION INTRAMUSCULAR; INTRAVENOUS; SUBCUTANEOUS at 21:01

## 2020-01-01 RX ADMIN — PIPERACILLIN AND TAZOBACTAM 3.38 G: 3; .375 INJECTION, POWDER, LYOPHILIZED, FOR SOLUTION INTRAVENOUS at 18:03

## 2020-01-01 RX ADMIN — MAGNESIUM SULFATE HEPTAHYDRATE 1 G: 1 INJECTION, SOLUTION INTRAVENOUS at 05:03

## 2020-01-01 RX ADMIN — PIPERACILLIN AND TAZOBACTAM 4.5 G: 4; .5 INJECTION, POWDER, LYOPHILIZED, FOR SOLUTION INTRAVENOUS; PARENTERAL at 17:50

## 2020-01-01 RX ADMIN — ALBUMIN (HUMAN) 25 G: 0.25 INJECTION, SOLUTION INTRAVENOUS at 01:46

## 2020-01-01 RX ADMIN — INSULIN LISPRO 6 UNITS: 100 INJECTION, SOLUTION INTRAVENOUS; SUBCUTANEOUS at 11:58

## 2020-01-01 RX ADMIN — METOCLOPRAMIDE 5 MG: 5 INJECTION, SOLUTION INTRAMUSCULAR; INTRAVENOUS at 18:24

## 2020-01-01 RX ADMIN — POTASSIUM & SODIUM PHOSPHATES POWDER PACK 280-160-250 MG 2 PACKET: 280-160-250 PACK at 23:43

## 2020-01-01 RX ADMIN — LACTULOSE 15 ML: 20 SOLUTION ORAL at 20:26

## 2020-01-01 RX ADMIN — PIPERACILLIN AND TAZOBACTAM 3.38 G: 3; .375 INJECTION, POWDER, LYOPHILIZED, FOR SOLUTION INTRAVENOUS at 23:48

## 2020-01-01 RX ADMIN — DULOXETINE HYDROCHLORIDE 60 MG: 60 CAPSULE, DELAYED RELEASE ORAL at 09:17

## 2020-01-01 RX ADMIN — INSULIN LISPRO 6 UNITS: 100 INJECTION, SOLUTION INTRAVENOUS; SUBCUTANEOUS at 00:48

## 2020-01-01 RX ADMIN — MORPHINE SULFATE 2 MG: 2 INJECTION, SOLUTION INTRAMUSCULAR; INTRAVENOUS at 23:35

## 2020-01-01 RX ADMIN — CISATRACURIUM BESYLATE 3 MCG/KG/MIN: 2 INJECTION, SOLUTION INTRAVENOUS at 07:02

## 2020-01-01 RX ADMIN — ALBUMIN (HUMAN) 25 G: 0.25 INJECTION, SOLUTION INTRAVENOUS at 23:55

## 2020-01-01 RX ADMIN — METOCLOPRAMIDE 5 MG: 5 INJECTION, SOLUTION INTRAMUSCULAR; INTRAVENOUS at 13:19

## 2020-01-01 RX ADMIN — ARGATROBAN 0.5 MCG/KG/MIN: 100 INJECTION, SOLUTION INTRAVENOUS at 14:41

## 2020-01-01 RX ADMIN — PIPERACILLIN AND TAZOBACTAM 3.38 G: 3; .375 INJECTION, POWDER, LYOPHILIZED, FOR SOLUTION INTRAVENOUS at 22:10

## 2020-01-01 RX ADMIN — ALBUMIN (HUMAN) 25 G: 0.25 INJECTION, SOLUTION INTRAVENOUS at 18:24

## 2020-01-01 RX ADMIN — MIDAZOLAM HYDROCHLORIDE 3 MG/HR: 5 INJECTION INTRAMUSCULAR; INTRAVENOUS at 09:48

## 2020-01-01 RX ADMIN — TRAZODONE HYDROCHLORIDE 50 MG: 50 TABLET ORAL at 22:31

## 2020-01-01 RX ADMIN — POTASSIUM BICARBONATE 20 MEQ: 782 TABLET, EFFERVESCENT ORAL at 05:31

## 2020-01-01 RX ADMIN — FUROSEMIDE 80 MG: 40 TABLET ORAL at 09:35

## 2020-01-01 RX ADMIN — ALBUMIN (HUMAN) 25 G: 0.25 INJECTION, SOLUTION INTRAVENOUS at 18:02

## 2020-01-01 RX ADMIN — ANTACID TABLETS 200 MG: 500 TABLET, CHEWABLE ORAL at 17:29

## 2020-01-01 RX ADMIN — Medication 100 MG: at 11:17

## 2020-01-01 RX ADMIN — INSULIN LISPRO 2 UNITS: 100 INJECTION, SOLUTION INTRAVENOUS; SUBCUTANEOUS at 22:13

## 2020-01-01 RX ADMIN — HYDROMORPHONE HYDROCHLORIDE 1 MG: 1 INJECTION, SOLUTION INTRAMUSCULAR; INTRAVENOUS; SUBCUTANEOUS at 22:06

## 2020-01-01 RX ADMIN — MORPHINE SULFATE 2 MG: 2 INJECTION, SOLUTION INTRAMUSCULAR; INTRAVENOUS at 00:44

## 2020-01-01 RX ADMIN — FUROSEMIDE 80 MG: 40 TABLET ORAL at 09:09

## 2020-01-01 RX ADMIN — ALBUMIN (HUMAN) 25 G: 0.25 INJECTION, SOLUTION INTRAVENOUS at 21:00

## 2020-01-01 RX ADMIN — POTASSIUM CHLORIDE 20 MEQ: 1500 TABLET, EXTENDED RELEASE ORAL at 09:17

## 2020-01-01 RX ADMIN — ONDANSETRON 4 MG: 2 INJECTION INTRAMUSCULAR; INTRAVENOUS at 10:14

## 2020-01-01 RX ADMIN — MORPHINE SULFATE 4 MG: 2 INJECTION, SOLUTION INTRAMUSCULAR; INTRAVENOUS at 10:14

## 2020-01-01 RX ADMIN — MORPHINE SULFATE 2 MG: 2 INJECTION, SOLUTION INTRAMUSCULAR; INTRAVENOUS at 09:52

## 2020-01-01 RX ADMIN — INSULIN LISPRO 2 UNITS: 100 INJECTION, SOLUTION INTRAVENOUS; SUBCUTANEOUS at 22:34

## 2020-01-01 RX ADMIN — SODIUM CHLORIDE 40 MG: 9 INJECTION, SOLUTION INTRAMUSCULAR; INTRAVENOUS; SUBCUTANEOUS at 20:48

## 2020-01-01 RX ADMIN — HUMAN INSULIN 5 UNITS: 100 INJECTION, SOLUTION SUBCUTANEOUS at 00:15

## 2020-01-01 RX ADMIN — TRAZODONE HYDROCHLORIDE 50 MG: 50 TABLET ORAL at 22:29

## 2020-01-01 RX ADMIN — LACTULOSE 45 ML: 20 SOLUTION ORAL at 10:15

## 2020-01-01 RX ADMIN — LACTULOSE 15 ML: 20 SOLUTION ORAL at 21:01

## 2020-01-01 RX ADMIN — OXYCODONE 5 MG: 5 TABLET ORAL at 13:04

## 2020-01-01 RX ADMIN — Medication 10 ML: at 05:17

## 2020-01-01 RX ADMIN — PIPERACILLIN AND TAZOBACTAM 4.5 G: 4; .5 INJECTION, POWDER, LYOPHILIZED, FOR SOLUTION INTRAVENOUS; PARENTERAL at 18:27

## 2020-01-01 RX ADMIN — MORPHINE SULFATE 2 MG: 2 INJECTION, SOLUTION INTRAMUSCULAR; INTRAVENOUS at 03:46

## 2020-01-01 RX ADMIN — FOLIC ACID 1 MG: 1 TABLET ORAL at 15:08

## 2020-01-01 RX ADMIN — SODIUM CHLORIDE 40 MG: 9 INJECTION, SOLUTION INTRAMUSCULAR; INTRAVENOUS; SUBCUTANEOUS at 21:14

## 2020-01-01 RX ADMIN — METOPROLOL TARTRATE 2.5 MG: 5 INJECTION INTRAVENOUS at 05:25

## 2020-01-01 RX ADMIN — THIAMINE HCL TAB 100 MG 100 MG: 100 TAB at 09:17

## 2020-01-01 RX ADMIN — SODIUM PHOSPHATE, MONOBASIC, MONOHYDRATE: 276; 142 INJECTION, SOLUTION INTRAVENOUS at 12:32

## 2020-01-01 RX ADMIN — INSULIN LISPRO 6 UNITS: 100 INJECTION, SOLUTION INTRAVENOUS; SUBCUTANEOUS at 17:05

## 2020-01-01 RX ADMIN — TRAZODONE HYDROCHLORIDE 50 MG: 50 TABLET ORAL at 21:49

## 2020-01-01 RX ADMIN — ENOXAPARIN SODIUM 40 MG: 40 INJECTION SUBCUTANEOUS at 15:29

## 2020-01-01 RX ADMIN — METOPROLOL TARTRATE 5 MG: 5 INJECTION INTRAVENOUS at 05:09

## 2020-01-01 RX ADMIN — PIPERACILLIN AND TAZOBACTAM 3.38 G: 3; .375 INJECTION, POWDER, LYOPHILIZED, FOR SOLUTION INTRAVENOUS at 00:30

## 2020-01-01 RX ADMIN — INSULIN LISPRO 2 UNITS: 100 INJECTION, SOLUTION INTRAVENOUS; SUBCUTANEOUS at 13:55

## 2020-01-01 RX ADMIN — MORPHINE SULFATE 2 MG: 2 INJECTION, SOLUTION INTRAMUSCULAR; INTRAVENOUS at 14:25

## 2020-01-01 RX ADMIN — LACTULOSE 15 ML: 20 SOLUTION ORAL at 09:37

## 2020-01-01 RX ADMIN — OXYCODONE 5 MG: 5 TABLET ORAL at 16:41

## 2020-01-01 RX ADMIN — FUROSEMIDE 80 MG: 40 TABLET ORAL at 09:36

## 2020-01-01 RX ADMIN — PIPERACILLIN AND TAZOBACTAM 3.38 G: 3; .375 INJECTION, POWDER, LYOPHILIZED, FOR SOLUTION INTRAVENOUS at 06:12

## 2020-01-01 RX ADMIN — Medication 125 MCG/HR: at 20:49

## 2020-01-01 RX ADMIN — POTASSIUM CHLORIDE 20 MEQ: 1500 TABLET, EXTENDED RELEASE ORAL at 08:39

## 2020-01-01 RX ADMIN — INSULIN LISPRO 9 UNITS: 100 INJECTION, SOLUTION INTRAVENOUS; SUBCUTANEOUS at 00:03

## 2020-01-01 RX ADMIN — INSULIN LISPRO 2 UNITS: 100 INJECTION, SOLUTION INTRAVENOUS; SUBCUTANEOUS at 08:08

## 2020-01-01 RX ADMIN — REGADENOSON 0.4 MG: 0.08 INJECTION, SOLUTION INTRAVENOUS at 10:45

## 2020-01-01 RX ADMIN — INSULIN LISPRO 12 UNITS: 100 INJECTION, SOLUTION INTRAVENOUS; SUBCUTANEOUS at 00:14

## 2020-01-01 RX ADMIN — HYDROMORPHONE HYDROCHLORIDE 1 MG: 1 INJECTION, SOLUTION INTRAMUSCULAR; INTRAVENOUS; SUBCUTANEOUS at 00:12

## 2020-01-01 RX ADMIN — VANCOMYCIN HYDROCHLORIDE 1000 MG: 1 INJECTION, POWDER, LYOPHILIZED, FOR SOLUTION INTRAVENOUS at 12:29

## 2020-01-01 RX ADMIN — PHYTONADIONE 10 MG: 10 INJECTION, EMULSION INTRAMUSCULAR; INTRAVENOUS; SUBCUTANEOUS at 08:41

## 2020-01-01 RX ADMIN — MIDAZOLAM HYDROCHLORIDE 2 MG/HR: 5 INJECTION INTRAMUSCULAR; INTRAVENOUS at 04:54

## 2020-01-01 RX ADMIN — ALBUMIN (HUMAN) 12.5 G: 0.25 INJECTION, SOLUTION INTRAVENOUS at 14:25

## 2020-01-01 RX ADMIN — VANCOMYCIN HYDROCHLORIDE 1250 MG: 10 INJECTION, POWDER, LYOPHILIZED, FOR SOLUTION INTRAVENOUS at 17:11

## 2020-01-01 RX ADMIN — INSULIN LISPRO 2 UNITS: 100 INJECTION, SOLUTION INTRAVENOUS; SUBCUTANEOUS at 13:38

## 2020-01-01 RX ADMIN — INSULIN LISPRO 4 UNITS: 100 INJECTION, SOLUTION INTRAVENOUS; SUBCUTANEOUS at 17:31

## 2020-01-01 RX ADMIN — VANCOMYCIN HYDROCHLORIDE 1000 MG: 1 INJECTION, POWDER, LYOPHILIZED, FOR SOLUTION INTRAVENOUS at 03:11

## 2020-01-01 RX ADMIN — HYDROMORPHONE HYDROCHLORIDE 1 MG: 1 INJECTION, SOLUTION INTRAMUSCULAR; INTRAVENOUS; SUBCUTANEOUS at 20:29

## 2020-01-01 RX ADMIN — METOCLOPRAMIDE 5 MG: 5 INJECTION, SOLUTION INTRAMUSCULAR; INTRAVENOUS at 12:09

## 2020-01-01 RX ADMIN — POTASSIUM CHLORIDE 20 MEQ: 20 TABLET, EXTENDED RELEASE ORAL at 09:46

## 2020-01-01 RX ADMIN — INSULIN LISPRO 6 UNITS: 100 INJECTION, SOLUTION INTRAVENOUS; SUBCUTANEOUS at 09:33

## 2020-01-01 RX ADMIN — ANTACID TABLETS 200 MG: 500 TABLET, CHEWABLE ORAL at 07:35

## 2020-01-01 RX ADMIN — POTASSIUM CHLORIDE 10 MEQ: 7.46 INJECTION, SOLUTION INTRAVENOUS at 15:40

## 2020-01-01 RX ADMIN — LACTULOSE 30 ML: 10 SOLUTION ORAL at 08:31

## 2020-01-01 RX ADMIN — VANCOMYCIN HYDROCHLORIDE 1250 MG: 1.25 INJECTION, POWDER, LYOPHILIZED, FOR SOLUTION INTRAVENOUS at 18:45

## 2020-01-01 RX ADMIN — ALBUMIN (HUMAN) 12.5 G: 0.25 INJECTION, SOLUTION INTRAVENOUS at 17:35

## 2020-01-01 RX ADMIN — MORPHINE SULFATE 2 MG: 2 INJECTION, SOLUTION INTRAMUSCULAR; INTRAVENOUS at 15:30

## 2020-01-01 RX ADMIN — ALBUMIN (HUMAN) 25 G: 0.25 INJECTION, SOLUTION INTRAVENOUS at 17:22

## 2020-01-01 RX ADMIN — MORPHINE SULFATE 2 MG: 2 INJECTION, SOLUTION INTRAMUSCULAR; INTRAVENOUS at 21:48

## 2020-01-01 RX ADMIN — PIPERACILLIN AND TAZOBACTAM 3.38 G: 3; .375 INJECTION, POWDER, LYOPHILIZED, FOR SOLUTION INTRAVENOUS at 06:38

## 2020-01-01 RX ADMIN — ALBUMIN (HUMAN) 25 G: 0.25 INJECTION, SOLUTION INTRAVENOUS at 10:56

## 2020-01-01 RX ADMIN — HYDROMORPHONE HYDROCHLORIDE 1 MG: 1 INJECTION, SOLUTION INTRAMUSCULAR; INTRAVENOUS; SUBCUTANEOUS at 15:54

## 2020-01-01 RX ADMIN — HYDROMORPHONE HYDROCHLORIDE 1 MG: 1 INJECTION, SOLUTION INTRAMUSCULAR; INTRAVENOUS; SUBCUTANEOUS at 12:21

## 2020-01-01 RX ADMIN — PIPERACILLIN AND TAZOBACTAM 4.5 G: 4; .5 INJECTION, POWDER, LYOPHILIZED, FOR SOLUTION INTRAVENOUS; PARENTERAL at 23:16

## 2020-01-01 RX ADMIN — DULOXETINE HYDROCHLORIDE 60 MG: 60 CAPSULE, DELAYED RELEASE ORAL at 09:23

## 2020-01-01 RX ADMIN — MORPHINE SULFATE 2 MG: 2 INJECTION, SOLUTION INTRAMUSCULAR; INTRAVENOUS at 04:41

## 2020-01-01 RX ADMIN — POTASSIUM CHLORIDE 40 MEQ: 1500 TABLET, EXTENDED RELEASE ORAL at 22:15

## 2020-01-01 RX ADMIN — INSULIN LISPRO 4 UNITS: 100 INJECTION, SOLUTION INTRAVENOUS; SUBCUTANEOUS at 21:50

## 2020-01-01 RX ADMIN — ALBUMIN (HUMAN) 25 G: 0.25 INJECTION, SOLUTION INTRAVENOUS at 08:31

## 2020-01-01 RX ADMIN — METOPROLOL TARTRATE 2.5 MG: 5 INJECTION INTRAVENOUS at 00:38

## 2020-01-01 RX ADMIN — ALBUMIN (HUMAN) 25 G: 12.5 INJECTION, SOLUTION INTRAVENOUS at 00:33

## 2020-01-01 RX ADMIN — PANCRELIPASE LIPASE, PANCRELIPASE PROTEASE, PANCRELIPASE AMYLASE 1 CAPSULE: 5000; 17000; 24000 CAPSULE, DELAYED RELEASE ORAL at 13:06

## 2020-01-01 RX ADMIN — POTASSIUM CHLORIDE 20 MEQ: 1500 TABLET, EXTENDED RELEASE ORAL at 09:20

## 2020-01-01 RX ADMIN — PIPERACILLIN AND TAZOBACTAM 3.38 G: 3; .375 INJECTION, POWDER, LYOPHILIZED, FOR SOLUTION INTRAVENOUS at 19:04

## 2020-01-01 RX ADMIN — TRAZODONE HYDROCHLORIDE 50 MG: 50 TABLET ORAL at 23:54

## 2020-01-01 RX ADMIN — Medication 400 MG: at 21:34

## 2020-01-01 RX ADMIN — MORPHINE SULFATE 2 MG: 2 INJECTION, SOLUTION INTRAMUSCULAR; INTRAVENOUS at 13:09

## 2020-01-01 RX ADMIN — ONDANSETRON 4 MG: 2 INJECTION INTRAMUSCULAR; INTRAVENOUS at 15:39

## 2020-01-01 RX ADMIN — THIAMINE HCL TAB 100 MG 100 MG: 100 TAB at 10:20

## 2020-01-01 RX ADMIN — INSULIN LISPRO 10 UNITS: 100 INJECTION, SOLUTION INTRAVENOUS; SUBCUTANEOUS at 05:21

## 2020-01-01 RX ADMIN — POTASSIUM BICARBONATE 20 MEQ: 782 TABLET, EFFERVESCENT ORAL at 19:14

## 2020-01-01 RX ADMIN — HEPARIN SODIUM 18 UNITS/KG/HR: 10000 INJECTION, SOLUTION INTRAVENOUS at 22:45

## 2020-01-01 RX ADMIN — INSULIN LISPRO 6 UNITS: 100 INJECTION, SOLUTION INTRAVENOUS; SUBCUTANEOUS at 08:20

## 2020-01-01 RX ADMIN — INSULIN LISPRO 4 UNITS: 100 INJECTION, SOLUTION INTRAVENOUS; SUBCUTANEOUS at 17:23

## 2020-01-01 RX ADMIN — PIPERACILLIN AND TAZOBACTAM 4.5 G: 4; .5 INJECTION, POWDER, LYOPHILIZED, FOR SOLUTION INTRAVENOUS; PARENTERAL at 05:31

## 2020-01-01 RX ADMIN — OXYCODONE HYDROCHLORIDE AND ACETAMINOPHEN 1 TABLET: 5; 325 TABLET ORAL at 08:06

## 2020-01-01 RX ADMIN — INSULIN LISPRO 2 UNITS: 100 INJECTION, SOLUTION INTRAVENOUS; SUBCUTANEOUS at 05:21

## 2020-01-01 RX ADMIN — Medication 125 MCG/HR: at 04:00

## 2020-01-01 RX ADMIN — AMPICILLIN SODIUM 2 G: 2 INJECTION, POWDER, FOR SOLUTION INTRAMUSCULAR; INTRAVENOUS at 00:00

## 2020-01-01 RX ADMIN — OXYCODONE 5 MG: 5 TABLET ORAL at 08:59

## 2020-01-01 RX ADMIN — DULOXETINE HYDROCHLORIDE 60 MG: 60 CAPSULE, DELAYED RELEASE ORAL at 09:06

## 2020-01-01 RX ADMIN — FOLIC ACID 1 MG: 1 TABLET ORAL at 08:34

## 2020-01-01 RX ADMIN — MORPHINE SULFATE 2 MG: 2 INJECTION, SOLUTION INTRAMUSCULAR; INTRAVENOUS at 09:34

## 2020-01-01 RX ADMIN — POTASSIUM CHLORIDE 40 MEQ: 20 TABLET, EXTENDED RELEASE ORAL at 08:36

## 2020-01-01 RX ADMIN — INSULIN LISPRO 6 UNITS: 100 INJECTION, SOLUTION INTRAVENOUS; SUBCUTANEOUS at 12:24

## 2020-01-01 RX ADMIN — PIPERACILLIN AND TAZOBACTAM 4.5 G: 4; .5 INJECTION, POWDER, LYOPHILIZED, FOR SOLUTION INTRAVENOUS; PARENTERAL at 13:33

## 2020-01-01 RX ADMIN — PIPERACILLIN AND TAZOBACTAM 3.38 G: 3; .375 INJECTION, POWDER, LYOPHILIZED, FOR SOLUTION INTRAVENOUS at 13:20

## 2020-01-01 RX ADMIN — POTASSIUM CHLORIDE 20 MEQ: 1500 TABLET, EXTENDED RELEASE ORAL at 09:09

## 2020-01-01 RX ADMIN — INSULIN LISPRO 6 UNITS: 100 INJECTION, SOLUTION INTRAVENOUS; SUBCUTANEOUS at 01:13

## 2020-01-01 RX ADMIN — INSULIN LISPRO 4 UNITS: 100 INJECTION, SOLUTION INTRAVENOUS; SUBCUTANEOUS at 05:20

## 2020-01-01 RX ADMIN — Medication 100 MG: at 09:37

## 2020-01-01 RX ADMIN — OXYCODONE 5 MG: 5 TABLET ORAL at 13:09

## 2020-01-01 RX ADMIN — ALBUMIN (HUMAN) 12.5 G: 0.25 INJECTION, SOLUTION INTRAVENOUS at 21:38

## 2020-01-01 RX ADMIN — ONDANSETRON 4 MG: 2 INJECTION INTRAMUSCULAR; INTRAVENOUS at 17:32

## 2020-01-01 RX ADMIN — POTASSIUM CHLORIDE 10 MEQ: 7.46 INJECTION, SOLUTION INTRAVENOUS at 05:17

## 2020-01-01 RX ADMIN — METOCLOPRAMIDE 5 MG: 5 INJECTION, SOLUTION INTRAMUSCULAR; INTRAVENOUS at 23:31

## 2020-01-01 RX ADMIN — SPIRONOLACTONE 200 MG: 100 TABLET ORAL at 09:51

## 2020-01-01 RX ADMIN — PHYTONADIONE 10 MG: 10 INJECTION, EMULSION INTRAMUSCULAR; INTRAVENOUS; SUBCUTANEOUS at 12:47

## 2020-01-01 RX ADMIN — ENOXAPARIN SODIUM 40 MG: 40 INJECTION SUBCUTANEOUS at 17:57

## 2020-01-01 RX ADMIN — INSULIN LISPRO 4 UNITS: 100 INJECTION, SOLUTION INTRAVENOUS; SUBCUTANEOUS at 05:21

## 2020-01-01 RX ADMIN — OXYCODONE 5 MG: 5 TABLET ORAL at 22:28

## 2020-01-01 RX ADMIN — INSULIN GLARGINE 10 UNITS: 100 INJECTION, SOLUTION SUBCUTANEOUS at 21:10

## 2020-01-01 RX ADMIN — POTASSIUM CHLORIDE 10 MEQ: 7.46 INJECTION, SOLUTION INTRAVENOUS at 19:59

## 2020-01-01 RX ADMIN — FUROSEMIDE 80 MG: 40 TABLET ORAL at 09:18

## 2020-01-01 RX ADMIN — MAGNESIUM SULFATE HEPTAHYDRATE 2 G: 40 INJECTION, SOLUTION INTRAVENOUS at 12:51

## 2020-01-01 RX ADMIN — DULOXETINE HYDROCHLORIDE 60 MG: 60 CAPSULE, DELAYED RELEASE ORAL at 09:48

## 2020-01-01 RX ADMIN — DULOXETINE HYDROCHLORIDE 60 MG: 60 CAPSULE, DELAYED RELEASE ORAL at 10:38

## 2020-01-01 RX ADMIN — PIPERACILLIN AND TAZOBACTAM 3.38 G: 3; .375 INJECTION, POWDER, LYOPHILIZED, FOR SOLUTION INTRAVENOUS at 05:00

## 2020-01-01 RX ADMIN — SPIRONOLACTONE 200 MG: 100 TABLET ORAL at 13:01

## 2020-01-01 RX ADMIN — Medication 100 MG: at 09:25

## 2020-01-01 RX ADMIN — INSULIN LISPRO 4 UNITS: 100 INJECTION, SOLUTION INTRAVENOUS; SUBCUTANEOUS at 13:02

## 2020-01-01 RX ADMIN — OXYCODONE 5 MG: 5 TABLET ORAL at 15:47

## 2020-01-01 RX ADMIN — PANTOPRAZOLE SODIUM 40 MG: 40 TABLET, DELAYED RELEASE ORAL at 06:46

## 2020-01-01 RX ADMIN — CISATRACURIUM BESYLATE 3 MCG/KG/MIN: 2 INJECTION, SOLUTION INTRAVENOUS at 20:16

## 2020-01-01 RX ADMIN — PIPERACILLIN AND TAZOBACTAM 4.5 G: 4; .5 INJECTION, POWDER, LYOPHILIZED, FOR SOLUTION INTRAVENOUS; PARENTERAL at 05:12

## 2020-01-01 RX ADMIN — SODIUM CHLORIDE 40 MG: 9 INJECTION, SOLUTION INTRAMUSCULAR; INTRAVENOUS; SUBCUTANEOUS at 21:26

## 2020-01-01 RX ADMIN — ONDANSETRON 4 MG: 2 INJECTION INTRAMUSCULAR; INTRAVENOUS at 21:28

## 2020-01-01 RX ADMIN — INSULIN LISPRO 6 UNITS: 100 INJECTION, SOLUTION INTRAVENOUS; SUBCUTANEOUS at 08:16

## 2020-01-01 RX ADMIN — INSULIN LISPRO 4 UNITS: 100 INJECTION, SOLUTION INTRAVENOUS; SUBCUTANEOUS at 00:36

## 2020-01-01 RX ADMIN — INSULIN LISPRO 2 UNITS: 100 INJECTION, SOLUTION INTRAVENOUS; SUBCUTANEOUS at 16:33

## 2020-01-01 RX ADMIN — HUMAN INSULIN 10 UNITS: 100 INJECTION, SOLUTION SUBCUTANEOUS at 18:15

## 2020-01-01 RX ADMIN — ALBUMIN (HUMAN) 25 G: 0.25 INJECTION, SOLUTION INTRAVENOUS at 13:26

## 2020-01-01 RX ADMIN — INSULIN LISPRO 2 UNITS: 100 INJECTION, SOLUTION INTRAVENOUS; SUBCUTANEOUS at 06:59

## 2020-01-01 RX ADMIN — MORPHINE SULFATE 2 MG: 2 INJECTION, SOLUTION INTRAMUSCULAR; INTRAVENOUS at 18:52

## 2020-01-01 RX ADMIN — MORPHINE SULFATE 2 MG: 2 INJECTION, SOLUTION INTRAMUSCULAR; INTRAVENOUS at 09:15

## 2020-01-01 RX ADMIN — ALBUMIN (HUMAN) 25 G: 0.25 INJECTION, SOLUTION INTRAVENOUS at 06:05

## 2020-01-01 RX ADMIN — LACTULOSE 45 ML: 20 SOLUTION ORAL at 10:21

## 2020-01-01 RX ADMIN — PIPERACILLIN AND TAZOBACTAM 4.5 G: 4; .5 INJECTION, POWDER, LYOPHILIZED, FOR SOLUTION INTRAVENOUS; PARENTERAL at 21:26

## 2020-01-01 RX ADMIN — ALBUMIN (HUMAN) 12.5 G: 0.25 INJECTION, SOLUTION INTRAVENOUS at 05:14

## 2020-01-01 RX ADMIN — POTASSIUM CHLORIDE 10 MEQ: 7.46 INJECTION, SOLUTION INTRAVENOUS at 13:13

## 2020-01-01 RX ADMIN — FOLIC ACID 1 MG: 1 TABLET ORAL at 08:41

## 2020-01-01 RX ADMIN — AMPICILLIN SODIUM 2 G: 2 INJECTION, POWDER, FOR SOLUTION INTRAMUSCULAR; INTRAVENOUS at 17:20

## 2020-01-01 RX ADMIN — PANTOPRAZOLE SODIUM 40 MG: 40 TABLET, DELAYED RELEASE ORAL at 06:59

## 2020-01-01 RX ADMIN — ONDANSETRON 4 MG: 2 INJECTION INTRAMUSCULAR; INTRAVENOUS at 00:23

## 2020-01-01 RX ADMIN — OXYCODONE HYDROCHLORIDE AND ACETAMINOPHEN 2 TABLET: 5; 325 TABLET ORAL at 08:11

## 2020-01-01 RX ADMIN — POTASSIUM BICARBONATE 40 MEQ: 782 TABLET, EFFERVESCENT ORAL at 21:47

## 2020-01-01 RX ADMIN — OXYCODONE 5 MG: 5 TABLET ORAL at 04:35

## 2020-01-01 RX ADMIN — ALBUMIN (HUMAN) 25 G: 0.25 INJECTION, SOLUTION INTRAVENOUS at 12:05

## 2020-01-01 RX ADMIN — PIPERACILLIN AND TAZOBACTAM 4.5 G: 4; .5 INJECTION, POWDER, LYOPHILIZED, FOR SOLUTION INTRAVENOUS; PARENTERAL at 23:58

## 2020-01-01 RX ADMIN — TRAZODONE HYDROCHLORIDE 50 MG: 50 TABLET ORAL at 22:16

## 2020-01-01 RX ADMIN — INSULIN LISPRO 2 UNITS: 100 INJECTION, SOLUTION INTRAVENOUS; SUBCUTANEOUS at 12:10

## 2020-01-01 RX ADMIN — HYDROMORPHONE HYDROCHLORIDE 1 MG: 1 INJECTION, SOLUTION INTRAMUSCULAR; INTRAVENOUS; SUBCUTANEOUS at 08:41

## 2020-01-01 RX ADMIN — LACTULOSE 45 ML: 20 SOLUTION ORAL at 08:24

## 2020-01-01 RX ADMIN — OXYCODONE HYDROCHLORIDE AND ACETAMINOPHEN 2 TABLET: 5; 325 TABLET ORAL at 06:02

## 2020-01-01 RX ADMIN — POTASSIUM CHLORIDE 10 MEQ: 7.46 INJECTION, SOLUTION INTRAVENOUS at 18:31

## 2020-01-01 RX ADMIN — THIAMINE HCL TAB 100 MG 100 MG: 100 TAB at 08:56

## 2020-01-01 RX ADMIN — LACTULOSE 15 ML: 20 SOLUTION ORAL at 09:32

## 2020-01-01 RX ADMIN — ALBUMIN (HUMAN) 25 G: 0.25 INJECTION, SOLUTION INTRAVENOUS at 16:17

## 2020-01-01 RX ADMIN — HYDROMORPHONE HYDROCHLORIDE 1 MG: 1 INJECTION, SOLUTION INTRAMUSCULAR; INTRAVENOUS; SUBCUTANEOUS at 20:59

## 2020-01-01 RX ADMIN — DULOXETINE HYDROCHLORIDE 60 MG: 60 CAPSULE, DELAYED RELEASE ORAL at 08:40

## 2020-01-01 RX ADMIN — ALBUMIN (HUMAN) 12.5 G: 0.25 INJECTION, SOLUTION INTRAVENOUS at 09:20

## 2020-01-01 RX ADMIN — INSULIN GLARGINE 10 UNITS: 100 INJECTION, SOLUTION SUBCUTANEOUS at 08:25

## 2020-01-01 RX ADMIN — POTASSIUM BICARBONATE 40 MEQ: 782 TABLET, EFFERVESCENT ORAL at 06:11

## 2020-01-01 RX ADMIN — INSULIN LISPRO 2 UNITS: 100 INJECTION, SOLUTION INTRAVENOUS; SUBCUTANEOUS at 06:33

## 2020-01-01 RX ADMIN — METOCLOPRAMIDE 5 MG: 5 INJECTION, SOLUTION INTRAMUSCULAR; INTRAVENOUS at 17:36

## 2020-01-01 RX ADMIN — VANCOMYCIN HYDROCHLORIDE 1000 MG: 1 INJECTION, POWDER, LYOPHILIZED, FOR SOLUTION INTRAVENOUS at 19:39

## 2020-01-01 RX ADMIN — PIPERACILLIN AND TAZOBACTAM 4.5 G: 4; .5 INJECTION, POWDER, LYOPHILIZED, FOR SOLUTION INTRAVENOUS; PARENTERAL at 13:49

## 2020-01-01 RX ADMIN — INSULIN LISPRO 2 UNITS: 100 INJECTION, SOLUTION INTRAVENOUS; SUBCUTANEOUS at 18:13

## 2020-01-01 RX ADMIN — SODIUM CHLORIDE 40 MG: 9 INJECTION, SOLUTION INTRAMUSCULAR; INTRAVENOUS; SUBCUTANEOUS at 20:26

## 2020-01-01 RX ADMIN — INSULIN LISPRO 12 UNITS: 100 INJECTION, SOLUTION INTRAVENOUS; SUBCUTANEOUS at 12:49

## 2020-01-01 RX ADMIN — ALBUMIN (HUMAN) 25 G: 0.25 INJECTION, SOLUTION INTRAVENOUS at 21:02

## 2020-01-01 RX ADMIN — FOLIC ACID 1 MG: 1 TABLET ORAL at 08:12

## 2020-01-01 RX ADMIN — OXYCODONE HYDROCHLORIDE AND ACETAMINOPHEN 1 TABLET: 5; 325 TABLET ORAL at 14:33

## 2020-01-01 RX ADMIN — Medication 100 MG: at 09:32

## 2020-01-01 RX ADMIN — MORPHINE SULFATE 1 MG: 2 INJECTION, SOLUTION INTRAMUSCULAR; INTRAVENOUS at 20:09

## 2020-01-01 RX ADMIN — HUMAN INSULIN 10 UNITS: 100 INJECTION, SOLUTION SUBCUTANEOUS at 00:07

## 2020-01-01 RX ADMIN — FOLIC ACID 1 MG: 1 TABLET ORAL at 11:17

## 2020-01-01 RX ADMIN — PIPERACILLIN AND TAZOBACTAM 4.5 G: 4; .5 INJECTION, POWDER, LYOPHILIZED, FOR SOLUTION INTRAVENOUS; PARENTERAL at 05:13

## 2020-01-01 RX ADMIN — THIAMINE HCL TAB 100 MG 100 MG: 100 TAB at 10:07

## 2020-01-01 RX ADMIN — HYDROMORPHONE HYDROCHLORIDE 1 MG: 1 INJECTION, SOLUTION INTRAMUSCULAR; INTRAVENOUS; SUBCUTANEOUS at 15:48

## 2020-01-01 RX ADMIN — HUMAN INSULIN 10 UNITS: 100 INJECTION, SOLUTION SUBCUTANEOUS at 06:30

## 2020-01-01 RX ADMIN — METOPROLOL TARTRATE 5 MG: 5 INJECTION INTRAVENOUS at 11:41

## 2020-01-01 RX ADMIN — ALBUMIN (HUMAN) 12.5 G: 0.25 INJECTION, SOLUTION INTRAVENOUS at 17:38

## 2020-01-01 RX ADMIN — ALBUMIN (HUMAN) 25 G: 0.25 INJECTION, SOLUTION INTRAVENOUS at 03:55

## 2020-01-01 RX ADMIN — INSULIN LISPRO 2 UNITS: 100 INJECTION, SOLUTION INTRAVENOUS; SUBCUTANEOUS at 17:29

## 2020-01-01 RX ADMIN — PANTOPRAZOLE SODIUM 40 MG: 40 TABLET, DELAYED RELEASE ORAL at 05:56

## 2020-01-01 RX ADMIN — POTASSIUM CHLORIDE 20 MEQ: 20 TABLET, EXTENDED RELEASE ORAL at 09:13

## 2020-01-01 RX ADMIN — INSULIN LISPRO 4 UNITS: 100 INJECTION, SOLUTION INTRAVENOUS; SUBCUTANEOUS at 22:24

## 2020-01-01 RX ADMIN — POTASSIUM CHLORIDE 40 MEQ: 1500 TABLET, EXTENDED RELEASE ORAL at 22:44

## 2020-01-01 RX ADMIN — ONDANSETRON 4 MG: 2 INJECTION INTRAMUSCULAR; INTRAVENOUS at 16:01

## 2020-01-01 RX ADMIN — TRAZODONE HYDROCHLORIDE 50 MG: 50 TABLET ORAL at 22:23

## 2020-01-01 RX ADMIN — OXYCODONE 5 MG: 5 TABLET ORAL at 22:09

## 2020-01-01 RX ADMIN — MORPHINE SULFATE 2 MG: 2 INJECTION, SOLUTION INTRAMUSCULAR; INTRAVENOUS at 14:14

## 2020-01-01 RX ADMIN — PIPERACILLIN AND TAZOBACTAM 4.5 G: 4; .5 INJECTION, POWDER, LYOPHILIZED, FOR SOLUTION INTRAVENOUS; PARENTERAL at 14:47

## 2020-01-01 RX ADMIN — DULOXETINE HYDROCHLORIDE 60 MG: 60 CAPSULE, DELAYED RELEASE ORAL at 08:19

## 2020-01-01 RX ADMIN — POTASSIUM CHLORIDE 10 MEQ: 10 INJECTION, SOLUTION INTRAVENOUS at 08:28

## 2020-01-01 RX ADMIN — INSULIN LISPRO 4 UNITS: 100 INJECTION, SOLUTION INTRAVENOUS; SUBCUTANEOUS at 12:32

## 2020-01-01 RX ADMIN — CALCIUM GLUCONATE 2 G: 20 INJECTION, SOLUTION INTRAVENOUS at 05:11

## 2020-01-01 RX ADMIN — INSULIN LISPRO 15 UNITS: 100 INJECTION, SOLUTION INTRAVENOUS; SUBCUTANEOUS at 18:21

## 2020-01-01 RX ADMIN — MORPHINE SULFATE 1 MG: 2 INJECTION, SOLUTION INTRAMUSCULAR; INTRAVENOUS at 20:00

## 2020-01-01 RX ADMIN — SODIUM CHLORIDE 40 MG: 9 INJECTION, SOLUTION INTRAMUSCULAR; INTRAVENOUS; SUBCUTANEOUS at 21:47

## 2020-01-01 RX ADMIN — OXYCODONE HYDROCHLORIDE AND ACETAMINOPHEN 1 TABLET: 5; 325 TABLET ORAL at 13:30

## 2020-01-01 RX ADMIN — ALBUMIN (HUMAN) 25 G: 0.25 INJECTION, SOLUTION INTRAVENOUS at 15:12

## 2020-01-01 RX ADMIN — PIPERACILLIN AND TAZOBACTAM 3.38 G: 3; .375 INJECTION, POWDER, LYOPHILIZED, FOR SOLUTION INTRAVENOUS at 12:05

## 2020-01-01 RX ADMIN — INSULIN GLARGINE 5 UNITS: 100 INJECTION, SOLUTION SUBCUTANEOUS at 10:12

## 2020-01-01 RX ADMIN — FUROSEMIDE 20 MG: 10 INJECTION, SOLUTION INTRAMUSCULAR; INTRAVENOUS at 20:48

## 2020-01-01 RX ADMIN — MIDAZOLAM HYDROCHLORIDE 5 MG/HR: 5 INJECTION INTRAMUSCULAR; INTRAVENOUS at 16:00

## 2020-01-01 RX ADMIN — INSULIN LISPRO 6 UNITS: 100 INJECTION, SOLUTION INTRAVENOUS; SUBCUTANEOUS at 17:58

## 2020-01-01 RX ADMIN — INSULIN GLARGINE 5 UNITS: 100 INJECTION, SOLUTION SUBCUTANEOUS at 09:36

## 2020-01-01 RX ADMIN — INSULIN GLARGINE 10 UNITS: 100 INJECTION, SOLUTION SUBCUTANEOUS at 00:22

## 2020-01-01 RX ADMIN — PIPERACILLIN AND TAZOBACTAM 4.5 G: 4; .5 INJECTION, POWDER, LYOPHILIZED, FOR SOLUTION INTRAVENOUS; PARENTERAL at 13:13

## 2020-01-01 RX ADMIN — PIPERACILLIN AND TAZOBACTAM 3.38 G: 3; .375 INJECTION, POWDER, LYOPHILIZED, FOR SOLUTION INTRAVENOUS at 06:46

## 2020-01-01 RX ADMIN — AMPICILLIN SODIUM 2 G: 2 INJECTION, POWDER, FOR SOLUTION INTRAMUSCULAR; INTRAVENOUS at 01:11

## 2020-01-01 RX ADMIN — PIPERACILLIN AND TAZOBACTAM 3.38 G: 3; .375 INJECTION, POWDER, LYOPHILIZED, FOR SOLUTION INTRAVENOUS at 00:15

## 2020-01-01 RX ADMIN — POTASSIUM BICARBONATE 20 MEQ: 782 TABLET, EFFERVESCENT ORAL at 00:03

## 2020-01-01 RX ADMIN — FUROSEMIDE 20 MG: 10 INJECTION, SOLUTION INTRAMUSCULAR; INTRAVENOUS at 09:13

## 2020-01-01 RX ADMIN — HYDROMORPHONE HYDROCHLORIDE 1 MG: 1 INJECTION, SOLUTION INTRAMUSCULAR; INTRAVENOUS; SUBCUTANEOUS at 23:23

## 2020-01-01 RX ADMIN — Medication 400 MG: at 08:19

## 2020-01-01 RX ADMIN — INSULIN LISPRO 9 UNITS: 100 INJECTION, SOLUTION INTRAVENOUS; SUBCUTANEOUS at 01:04

## 2020-01-01 RX ADMIN — POTASSIUM PHOSPHATE, MONOBASIC AND POTASSIUM PHOSPHATE, DIBASIC: 224; 236 INJECTION, SOLUTION INTRAVENOUS at 20:13

## 2020-01-01 RX ADMIN — GADOTERATE MEGLUMINE 15 ML: 376.9 INJECTION INTRAVENOUS at 11:26

## 2020-01-01 RX ADMIN — MORPHINE SULFATE 2 MG: 2 INJECTION, SOLUTION INTRAMUSCULAR; INTRAVENOUS at 17:16

## 2020-01-01 RX ADMIN — THIAMINE HCL TAB 100 MG 100 MG: 100 TAB at 09:20

## 2020-01-01 RX ADMIN — ALBUMIN (HUMAN) 25 G: 0.25 INJECTION, SOLUTION INTRAVENOUS at 20:57

## 2020-01-01 RX ADMIN — INSULIN LISPRO 6 UNITS: 100 INJECTION, SOLUTION INTRAVENOUS; SUBCUTANEOUS at 17:21

## 2020-01-01 RX ADMIN — POTASSIUM & SODIUM PHOSPHATES POWDER PACK 280-160-250 MG 2 PACKET: 280-160-250 PACK at 05:17

## 2020-01-01 RX ADMIN — MAGNESIUM SULFATE HEPTAHYDRATE 2 G: 40 INJECTION, SOLUTION INTRAVENOUS at 22:27

## 2020-01-01 RX ADMIN — CALCIUM GLUCONATE 2 G: 20 INJECTION, SOLUTION INTRAVENOUS at 04:40

## 2020-01-01 RX ADMIN — FUROSEMIDE 80 MG: 40 TABLET ORAL at 09:48

## 2020-01-01 RX ADMIN — POTASSIUM CHLORIDE 40 MEQ: 1500 TABLET, EXTENDED RELEASE ORAL at 03:44

## 2020-01-01 RX ADMIN — MORPHINE SULFATE 1 MG: 2 INJECTION, SOLUTION INTRAMUSCULAR; INTRAVENOUS at 03:04

## 2020-01-01 RX ADMIN — VANCOMYCIN HYDROCHLORIDE 1000 MG: 1 INJECTION, POWDER, LYOPHILIZED, FOR SOLUTION INTRAVENOUS at 06:53

## 2020-01-01 RX ADMIN — FOLIC ACID: 5 INJECTION, SOLUTION INTRAMUSCULAR; INTRAVENOUS; SUBCUTANEOUS at 08:35

## 2020-01-01 RX ADMIN — LACTULOSE 45 ML: 20 SOLUTION ORAL at 08:59

## 2020-01-01 RX ADMIN — PIPERACILLIN AND TAZOBACTAM 3.38 G: 3; .375 INJECTION, POWDER, LYOPHILIZED, FOR SOLUTION INTRAVENOUS at 10:00

## 2020-01-01 RX ADMIN — ALBUMIN (HUMAN) 12.5 G: 0.25 INJECTION, SOLUTION INTRAVENOUS at 23:40

## 2020-01-01 RX ADMIN — OXYCODONE HYDROCHLORIDE AND ACETAMINOPHEN 2 TABLET: 5; 325 TABLET ORAL at 05:55

## 2020-01-01 RX ADMIN — INSULIN GLARGINE 14 UNITS: 100 INJECTION, SOLUTION SUBCUTANEOUS at 09:23

## 2020-01-01 RX ADMIN — MULTIPLE VITAMINS W/ MINERALS TAB 1 TABLET: TAB at 09:17

## 2020-01-01 RX ADMIN — ALBUMIN (HUMAN) 25 G: 0.25 INJECTION, SOLUTION INTRAVENOUS at 03:44

## 2020-01-01 RX ADMIN — POTASSIUM CHLORIDE 10 MEQ: 750 TABLET, EXTENDED RELEASE ORAL at 12:29

## 2020-01-01 RX ADMIN — MORPHINE SULFATE 2 MG: 2 INJECTION, SOLUTION INTRAMUSCULAR; INTRAVENOUS at 09:47

## 2020-01-01 RX ADMIN — LACTULOSE 45 ML: 20 SOLUTION ORAL at 16:11

## 2020-01-01 RX ADMIN — MORPHINE SULFATE 2 MG: 2 INJECTION, SOLUTION INTRAMUSCULAR; INTRAVENOUS at 04:56

## 2020-01-01 RX ADMIN — MORPHINE SULFATE 2 MG: 2 INJECTION, SOLUTION INTRAMUSCULAR; INTRAVENOUS at 09:23

## 2020-01-01 RX ADMIN — OXYCODONE HYDROCHLORIDE AND ACETAMINOPHEN 1 TABLET: 5; 325 TABLET ORAL at 00:33

## 2020-01-01 RX ADMIN — POTASSIUM & SODIUM PHOSPHATES POWDER PACK 280-160-250 MG 2 PACKET: 280-160-250 PACK at 09:32

## 2020-01-01 RX ADMIN — TRAZODONE HYDROCHLORIDE 50 MG: 50 TABLET ORAL at 21:38

## 2020-01-01 RX ADMIN — ALBUMIN (HUMAN) 25 G: 0.25 INJECTION, SOLUTION INTRAVENOUS at 19:19

## 2020-01-01 RX ADMIN — PIPERACILLIN AND TAZOBACTAM 4.5 G: 4; .5 INJECTION, POWDER, LYOPHILIZED, FOR SOLUTION INTRAVENOUS; PARENTERAL at 05:41

## 2020-01-01 RX ADMIN — ANTACID TABLETS 200 MG: 500 TABLET, CHEWABLE ORAL at 11:29

## 2020-01-01 RX ADMIN — MORPHINE SULFATE 1 MG: 2 INJECTION, SOLUTION INTRAMUSCULAR; INTRAVENOUS at 00:08

## 2020-01-01 RX ADMIN — LACTULOSE 15 ML: 20 SOLUTION ORAL at 08:54

## 2020-01-01 RX ADMIN — Medication 100 MCG/HR: at 09:49

## 2020-01-01 RX ADMIN — INSULIN LISPRO 6 UNITS: 100 INJECTION, SOLUTION INTRAVENOUS; SUBCUTANEOUS at 12:05

## 2020-01-01 RX ADMIN — PANTOPRAZOLE SODIUM 40 MG: 40 TABLET, DELAYED RELEASE ORAL at 06:14

## 2020-01-01 RX ADMIN — LIDOCAINE HYDROCHLORIDE 3 ML: 10 INJECTION, SOLUTION INFILTRATION; PERINEURAL at 15:39

## 2020-01-01 RX ADMIN — MORPHINE SULFATE 1 MG: 2 INJECTION, SOLUTION INTRAMUSCULAR; INTRAVENOUS at 08:03

## 2020-01-01 RX ADMIN — LACTULOSE 30 ML: 10 SOLUTION ORAL at 20:54

## 2020-01-01 RX ADMIN — INSULIN LISPRO 2 UNITS: 100 INJECTION, SOLUTION INTRAVENOUS; SUBCUTANEOUS at 06:31

## 2020-01-01 RX ADMIN — POTASSIUM & SODIUM PHOSPHATES POWDER PACK 280-160-250 MG 2 PACKET: 280-160-250 PACK at 08:20

## 2020-01-01 RX ADMIN — SODIUM CHLORIDE 40 MG: 9 INJECTION, SOLUTION INTRAMUSCULAR; INTRAVENOUS; SUBCUTANEOUS at 21:43

## 2020-01-01 RX ADMIN — TRAZODONE HYDROCHLORIDE 50 MG: 50 TABLET ORAL at 00:24

## 2020-01-01 RX ADMIN — MAGNESIUM SULFATE IN WATER 2 G: 40 INJECTION, SOLUTION INTRAVENOUS at 15:31

## 2020-01-01 RX ADMIN — Medication 10 ML: at 22:25

## 2020-01-01 RX ADMIN — PIPERACILLIN AND TAZOBACTAM 3.38 G: 3; .375 INJECTION, POWDER, LYOPHILIZED, FOR SOLUTION INTRAVENOUS at 08:41

## 2020-01-01 RX ADMIN — Medication 400 MG: at 09:35

## 2020-01-01 RX ADMIN — POTASSIUM CHLORIDE 40 MEQ: 1500 TABLET, EXTENDED RELEASE ORAL at 20:58

## 2020-01-01 RX ADMIN — POTASSIUM BICARBONATE 40 MEQ: 782 TABLET, EFFERVESCENT ORAL at 03:18

## 2020-01-01 RX ADMIN — INSULIN LISPRO 4 UNITS: 100 INJECTION, SOLUTION INTRAVENOUS; SUBCUTANEOUS at 12:14

## 2020-01-01 RX ADMIN — PIPERACILLIN AND TAZOBACTAM 3.38 G: 3; .375 INJECTION, POWDER, LYOPHILIZED, FOR SOLUTION INTRAVENOUS at 18:54

## 2020-01-01 RX ADMIN — INSULIN LISPRO 4 UNITS: 100 INJECTION, SOLUTION INTRAVENOUS; SUBCUTANEOUS at 12:05

## 2020-01-01 RX ADMIN — LACTULOSE 30 ML: 10 SOLUTION ORAL at 21:35

## 2020-01-01 RX ADMIN — PIPERACILLIN AND TAZOBACTAM 4.5 G: 4; .5 INJECTION, POWDER, LYOPHILIZED, FOR SOLUTION INTRAVENOUS; PARENTERAL at 05:09

## 2020-01-01 RX ADMIN — INSULIN GLARGINE 10 UNITS: 100 INJECTION, SOLUTION SUBCUTANEOUS at 21:47

## 2020-01-01 RX ADMIN — MORPHINE SULFATE 1 MG: 2 INJECTION, SOLUTION INTRAMUSCULAR; INTRAVENOUS at 12:23

## 2020-01-01 RX ADMIN — IOPAMIDOL 100 ML: 612 INJECTION, SOLUTION INTRAVENOUS at 18:44

## 2020-01-01 RX ADMIN — PANTOPRAZOLE SODIUM 40 MG: 40 TABLET, DELAYED RELEASE ORAL at 09:25

## 2020-01-01 RX ADMIN — ALBUMIN (HUMAN) 12.5 G: 0.25 INJECTION, SOLUTION INTRAVENOUS at 13:38

## 2020-01-01 RX ADMIN — PIPERACILLIN AND TAZOBACTAM 4.5 G: 4; .5 INJECTION, POWDER, LYOPHILIZED, FOR SOLUTION INTRAVENOUS; PARENTERAL at 21:14

## 2020-01-01 RX ADMIN — HYDROMORPHONE HYDROCHLORIDE 1 MG: 1 INJECTION, SOLUTION INTRAMUSCULAR; INTRAVENOUS; SUBCUTANEOUS at 11:51

## 2020-01-01 RX ADMIN — INSULIN LISPRO 6 UNITS: 100 INJECTION, SOLUTION INTRAVENOUS; SUBCUTANEOUS at 14:39

## 2020-01-01 RX ADMIN — ALBUMIN (HUMAN) 25 G: 0.25 INJECTION, SOLUTION INTRAVENOUS at 09:10

## 2020-01-01 RX ADMIN — LACTULOSE 15 ML: 20 SOLUTION ORAL at 21:14

## 2020-01-01 RX ADMIN — SPIRONOLACTONE 25 MG: 25 TABLET ORAL at 09:17

## 2020-01-01 RX ADMIN — INSULIN LISPRO 6 UNITS: 100 INJECTION, SOLUTION INTRAVENOUS; SUBCUTANEOUS at 13:31

## 2020-01-01 RX ADMIN — MORPHINE SULFATE 2 MG: 2 INJECTION, SOLUTION INTRAMUSCULAR; INTRAVENOUS at 09:08

## 2020-01-01 RX ADMIN — LACTULOSE 45 ML: 20 SOLUTION ORAL at 09:18

## 2020-01-01 RX ADMIN — POTASSIUM CHLORIDE 10 MEQ: 7.46 INJECTION, SOLUTION INTRAVENOUS at 15:45

## 2020-01-01 RX ADMIN — Medication 10 ML: at 22:45

## 2020-01-01 RX ADMIN — DEXTROSE MONOHYDRATE AND SODIUM CHLORIDE 50 ML/HR: 5; .9 INJECTION, SOLUTION INTRAVENOUS at 19:22

## 2020-01-01 RX ADMIN — ONDANSETRON 4 MG: 2 INJECTION INTRAMUSCULAR; INTRAVENOUS at 23:36

## 2020-01-01 RX ADMIN — PIPERACILLIN AND TAZOBACTAM 3.38 G: 3; .375 INJECTION, POWDER, LYOPHILIZED, FOR SOLUTION INTRAVENOUS at 15:30

## 2020-01-01 RX ADMIN — AMPICILLIN SODIUM 2 G: 2 INJECTION, POWDER, FOR SOLUTION INTRAMUSCULAR; INTRAVENOUS at 21:33

## 2020-01-01 RX ADMIN — HEPARIN SODIUM IN SODIUM CHLORIDE 1000 UNITS: 200 INJECTION INTRAVENOUS at 15:38

## 2020-01-01 RX ADMIN — PIPERACILLIN AND TAZOBACTAM 3.38 G: 3; .375 INJECTION, POWDER, LYOPHILIZED, FOR SOLUTION INTRAVENOUS at 04:43

## 2020-01-01 RX ADMIN — MORPHINE SULFATE 2 MG: 2 INJECTION, SOLUTION INTRAMUSCULAR; INTRAVENOUS at 20:48

## 2020-01-01 RX ADMIN — SODIUM PHOSPHATE, MONOBASIC, MONOHYDRATE 6 MMOL: 276; 142 INJECTION, SOLUTION INTRAVENOUS at 19:53

## 2020-01-01 RX ADMIN — ALBUMIN (HUMAN) 25 G: 12.5 INJECTION, SOLUTION INTRAVENOUS at 04:00

## 2020-01-01 RX ADMIN — TRAZODONE HYDROCHLORIDE 50 MG: 50 TABLET ORAL at 21:17

## 2020-01-01 RX ADMIN — INSULIN LISPRO 2 UNITS: 100 INJECTION, SOLUTION INTRAVENOUS; SUBCUTANEOUS at 13:01

## 2020-01-01 RX ADMIN — LACTULOSE 30 ML: 10 SOLUTION ORAL at 20:30

## 2020-01-01 RX ADMIN — PROMETHAZINE HYDROCHLORIDE 25 MG: 25 INJECTION, SOLUTION INTRAMUSCULAR; INTRAVENOUS at 11:22

## 2020-01-01 RX ADMIN — SODIUM CHLORIDE 1000 ML: 900 INJECTION, SOLUTION INTRAVENOUS at 18:31

## 2020-01-01 RX ADMIN — HYDROMORPHONE HYDROCHLORIDE 1 MG: 1 INJECTION, SOLUTION INTRAMUSCULAR; INTRAVENOUS; SUBCUTANEOUS at 22:32

## 2020-01-01 RX ADMIN — HUMAN INSULIN 10 UNITS: 100 INJECTION, SOLUTION SUBCUTANEOUS at 14:39

## 2020-01-01 RX ADMIN — TRAZODONE HYDROCHLORIDE 50 MG: 50 TABLET ORAL at 22:44

## 2020-01-01 RX ADMIN — ALBUMIN (HUMAN) 25 G: 12.5 INJECTION, SOLUTION INTRAVENOUS at 00:24

## 2020-01-01 RX ADMIN — SPIRONOLACTONE 200 MG: 100 TABLET ORAL at 09:36

## 2020-01-01 RX ADMIN — LORAZEPAM 1 MG: 2 INJECTION INTRAMUSCULAR; INTRAVENOUS at 03:18

## 2020-01-01 RX ADMIN — PHYTONADIONE 10 MG: 10 INJECTION, EMULSION INTRAMUSCULAR; INTRAVENOUS; SUBCUTANEOUS at 10:44

## 2020-01-01 RX ADMIN — INSULIN LISPRO 6 UNITS: 100 INJECTION, SOLUTION INTRAVENOUS; SUBCUTANEOUS at 10:06

## 2020-01-01 RX ADMIN — ENOXAPARIN SODIUM 40 MG: 40 INJECTION SUBCUTANEOUS at 17:22

## 2020-01-01 RX ADMIN — PIPERACILLIN AND TAZOBACTAM 4.5 G: 4; .5 INJECTION, POWDER, LYOPHILIZED, FOR SOLUTION INTRAVENOUS; PARENTERAL at 12:50

## 2020-01-01 RX ADMIN — CALCIUM GLUCONATE 2 G: 20 INJECTION, SOLUTION INTRAVENOUS at 05:22

## 2020-01-01 RX ADMIN — PANTOPRAZOLE SODIUM 40 MG: 40 TABLET, DELAYED RELEASE ORAL at 06:38

## 2020-01-01 RX ADMIN — PANTOPRAZOLE SODIUM 40 MG: 40 TABLET, DELAYED RELEASE ORAL at 08:11

## 2020-01-01 RX ADMIN — MORPHINE SULFATE 2 MG: 2 INJECTION, SOLUTION INTRAMUSCULAR; INTRAVENOUS at 20:38

## 2020-01-01 RX ADMIN — HUMAN INSULIN 5 UNITS: 100 INJECTION, SOLUTION SUBCUTANEOUS at 05:41

## 2020-01-01 RX ADMIN — LACTULOSE 45 ML: 20 SOLUTION ORAL at 09:35

## 2020-01-01 RX ADMIN — PIPERACILLIN AND TAZOBACTAM 3.38 G: 3; .375 INJECTION, POWDER, LYOPHILIZED, FOR SOLUTION INTRAVENOUS at 11:18

## 2020-01-01 RX ADMIN — MORPHINE SULFATE 2 MG: 2 INJECTION, SOLUTION INTRAMUSCULAR; INTRAVENOUS at 20:34

## 2020-01-01 RX ADMIN — POTASSIUM CHLORIDE 10 MEQ: 7.46 INJECTION, SOLUTION INTRAVENOUS at 21:27

## 2020-01-01 RX ADMIN — CLINDAMYCIN PHOSPHATE 600 MG: 600 INJECTION, SOLUTION INTRAVENOUS at 03:47

## 2020-01-01 RX ADMIN — THIAMINE HCL TAB 100 MG 100 MG: 100 TAB at 09:18

## 2020-01-01 RX ADMIN — PIPERACILLIN AND TAZOBACTAM 3.38 G: 3; .375 INJECTION, POWDER, LYOPHILIZED, FOR SOLUTION INTRAVENOUS at 05:11

## 2020-01-01 RX ADMIN — PIPERACILLIN AND TAZOBACTAM 4.5 G: 4; .5 INJECTION, POWDER, LYOPHILIZED, FOR SOLUTION INTRAVENOUS; PARENTERAL at 13:37

## 2020-01-01 RX ADMIN — LORAZEPAM 1 MG: 2 INJECTION INTRAMUSCULAR; INTRAVENOUS at 23:38

## 2020-01-01 RX ADMIN — POTASSIUM CHLORIDE 10 MEQ: 7.46 INJECTION, SOLUTION INTRAVENOUS at 06:22

## 2020-01-01 RX ADMIN — OXYCODONE 5 MG: 5 TABLET ORAL at 09:03

## 2020-01-01 RX ADMIN — INSULIN LISPRO 2 UNITS: 100 INJECTION, SOLUTION INTRAVENOUS; SUBCUTANEOUS at 18:54

## 2020-01-01 RX ADMIN — INSULIN LISPRO 9 UNITS: 100 INJECTION, SOLUTION INTRAVENOUS; SUBCUTANEOUS at 22:09

## 2020-01-01 RX ADMIN — HYDROMORPHONE HYDROCHLORIDE 1 MG: 1 INJECTION, SOLUTION INTRAMUSCULAR; INTRAVENOUS; SUBCUTANEOUS at 20:15

## 2020-01-01 RX ADMIN — INSULIN GLARGINE 10 UNITS: 100 INJECTION, SOLUTION SUBCUTANEOUS at 21:49

## 2020-01-01 RX ADMIN — PANTOPRAZOLE SODIUM 40 MG: 40 TABLET, DELAYED RELEASE ORAL at 08:06

## 2020-01-01 RX ADMIN — ALBUMIN (HUMAN) 25 G: 0.25 INJECTION, SOLUTION INTRAVENOUS at 21:13

## 2020-01-01 RX ADMIN — FUROSEMIDE 40 MG: 10 INJECTION, SOLUTION INTRAMUSCULAR; INTRAVENOUS at 16:02

## 2020-01-01 RX ADMIN — PIPERACILLIN AND TAZOBACTAM 3.38 G: 3; .375 INJECTION, POWDER, LYOPHILIZED, FOR SOLUTION INTRAVENOUS at 12:03

## 2020-01-01 RX ADMIN — POTASSIUM CHLORIDE 40 MEQ: 1500 TABLET, EXTENDED RELEASE ORAL at 06:46

## 2020-01-01 RX ADMIN — VANCOMYCIN HYDROCHLORIDE 1250 MG: 1.25 INJECTION, POWDER, LYOPHILIZED, FOR SOLUTION INTRAVENOUS at 14:19

## 2020-01-01 RX ADMIN — LACTULOSE 45 ML: 20 SOLUTION ORAL at 10:06

## 2020-01-01 RX ADMIN — ALBUMIN (HUMAN) 25 G: 0.25 INJECTION, SOLUTION INTRAVENOUS at 08:34

## 2020-01-01 RX ADMIN — CLINDAMYCIN PHOSPHATE 600 MG: 600 INJECTION, SOLUTION INTRAVENOUS at 02:02

## 2020-01-01 RX ADMIN — HYDROMORPHONE HYDROCHLORIDE 1 MG: 1 INJECTION, SOLUTION INTRAMUSCULAR; INTRAVENOUS; SUBCUTANEOUS at 18:24

## 2020-01-01 RX ADMIN — PANTOPRAZOLE SODIUM 40 MG: 40 TABLET, DELAYED RELEASE ORAL at 06:47

## 2020-01-01 RX ADMIN — POTASSIUM & SODIUM PHOSPHATES POWDER PACK 280-160-250 MG 2 PACKET: 280-160-250 PACK at 10:00

## 2020-01-01 RX ADMIN — INSULIN LISPRO 3 UNITS: 100 INJECTION, SOLUTION INTRAVENOUS; SUBCUTANEOUS at 23:17

## 2020-01-01 RX ADMIN — POTASSIUM CHLORIDE 20 MEQ: 1500 TABLET, EXTENDED RELEASE ORAL at 10:40

## 2020-01-01 RX ADMIN — MAGNESIUM SULFATE HEPTAHYDRATE 2 G: 40 INJECTION, SOLUTION INTRAVENOUS at 11:04

## 2020-01-01 RX ADMIN — Medication 100 MG: at 09:30

## 2020-01-01 RX ADMIN — MORPHINE SULFATE 2 MG: 2 INJECTION, SOLUTION INTRAMUSCULAR; INTRAVENOUS at 07:45

## 2020-01-01 RX ADMIN — THIAMINE HCL TAB 100 MG 100 MG: 100 TAB at 09:48

## 2020-01-01 RX ADMIN — THIAMINE HCL TAB 100 MG 100 MG: 100 TAB at 09:35

## 2020-01-01 RX ADMIN — PIPERACILLIN AND TAZOBACTAM 3.38 G: 3; .375 INJECTION, POWDER, LYOPHILIZED, FOR SOLUTION INTRAVENOUS at 17:15

## 2020-01-01 RX ADMIN — MORPHINE SULFATE 2 MG: 2 INJECTION, SOLUTION INTRAMUSCULAR; INTRAVENOUS at 06:31

## 2020-01-01 RX ADMIN — METOCLOPRAMIDE 5 MG: 5 INJECTION, SOLUTION INTRAMUSCULAR; INTRAVENOUS at 00:12

## 2020-01-01 RX ADMIN — LACTULOSE 15 ML: 20 SOLUTION ORAL at 20:49

## 2020-01-01 RX ADMIN — HYDROMORPHONE HYDROCHLORIDE 1 MG: 1 INJECTION, SOLUTION INTRAMUSCULAR; INTRAVENOUS; SUBCUTANEOUS at 09:32

## 2020-01-01 RX ADMIN — POTASSIUM CHLORIDE 10 MEQ: 7.46 INJECTION, SOLUTION INTRAVENOUS at 14:30

## 2020-01-01 RX ADMIN — OXYCODONE HYDROCHLORIDE AND ACETAMINOPHEN 2 TABLET: 5; 325 TABLET ORAL at 12:58

## 2020-01-01 RX ADMIN — PIPERACILLIN AND TAZOBACTAM 3.38 G: 3; .375 INJECTION, POWDER, LYOPHILIZED, FOR SOLUTION INTRAVENOUS at 17:30

## 2020-01-01 RX ADMIN — POTASSIUM BICARBONATE 20 MEQ: 782 TABLET, EFFERVESCENT ORAL at 06:51

## 2020-01-01 RX ADMIN — PANTOPRAZOLE SODIUM 40 MG: 40 TABLET, DELAYED RELEASE ORAL at 06:50

## 2020-01-01 RX ADMIN — MORPHINE SULFATE 2 MG: 2 INJECTION, SOLUTION INTRAMUSCULAR; INTRAVENOUS at 05:10

## 2020-01-01 RX ADMIN — POTASSIUM CHLORIDE 20 MEQ: 1500 TABLET, EXTENDED RELEASE ORAL at 10:08

## 2020-01-01 RX ADMIN — INSULIN LISPRO 6 UNITS: 100 INJECTION, SOLUTION INTRAVENOUS; SUBCUTANEOUS at 18:47

## 2020-01-01 RX ADMIN — DOCUSATE SODIUM 100 MG: 100 CAPSULE, LIQUID FILLED ORAL at 09:23

## 2020-01-01 RX ADMIN — ALBUMIN (HUMAN) 25 G: 12.5 INJECTION, SOLUTION INTRAVENOUS at 16:28

## 2020-01-01 RX ADMIN — OXYCODONE 5 MG: 5 TABLET ORAL at 06:17

## 2020-01-01 RX ADMIN — INSULIN LISPRO 4 UNITS: 100 INJECTION, SOLUTION INTRAVENOUS; SUBCUTANEOUS at 17:46

## 2020-01-01 RX ADMIN — CISATRACURIUM BESYLATE 3 MCG/KG/MIN: 2 INJECTION, SOLUTION INTRAVENOUS at 10:15

## 2020-01-01 RX ADMIN — SODIUM CHLORIDE 40 MG: 9 INJECTION, SOLUTION INTRAMUSCULAR; INTRAVENOUS; SUBCUTANEOUS at 22:07

## 2020-01-01 RX ADMIN — PHYTONADIONE 10 MG: 10 INJECTION, EMULSION INTRAMUSCULAR; INTRAVENOUS; SUBCUTANEOUS at 13:10

## 2020-01-01 RX ADMIN — INSULIN GLARGINE 10 UNITS: 100 INJECTION, SOLUTION SUBCUTANEOUS at 22:28

## 2020-01-01 RX ADMIN — MORPHINE SULFATE 1 MG: 2 INJECTION, SOLUTION INTRAMUSCULAR; INTRAVENOUS at 10:57

## 2020-01-01 RX ADMIN — MORPHINE SULFATE 2 MG: 2 INJECTION, SOLUTION INTRAMUSCULAR; INTRAVENOUS at 15:03

## 2020-01-01 RX ADMIN — POTASSIUM BICARBONATE 40 MEQ: 782 TABLET, EFFERVESCENT ORAL at 20:54

## 2020-01-01 RX ADMIN — HYDROMORPHONE HYDROCHLORIDE 1 MG: 1 INJECTION, SOLUTION INTRAMUSCULAR; INTRAVENOUS; SUBCUTANEOUS at 08:06

## 2020-01-01 RX ADMIN — MIDAZOLAM HYDROCHLORIDE 4 MG/HR: 5 INJECTION INTRAMUSCULAR; INTRAVENOUS at 10:52

## 2020-01-01 RX ADMIN — ALBUMIN (HUMAN) 25 G: 0.25 INJECTION, SOLUTION INTRAVENOUS at 00:14

## 2020-01-01 RX ADMIN — Medication 25 MCG/HR: at 06:06

## 2020-01-01 RX ADMIN — OXYCODONE HYDROCHLORIDE AND ACETAMINOPHEN 1 TABLET: 5; 325 TABLET ORAL at 06:14

## 2020-01-01 RX ADMIN — PHYTONADIONE 10 MG: 10 INJECTION, EMULSION INTRAMUSCULAR; INTRAVENOUS; SUBCUTANEOUS at 11:06

## 2020-01-01 RX ADMIN — Medication 500 MG: at 09:18

## 2020-01-01 RX ADMIN — PIPERACILLIN AND TAZOBACTAM 3.38 G: 3; .375 INJECTION, POWDER, LYOPHILIZED, FOR SOLUTION INTRAVENOUS at 21:23

## 2020-01-01 RX ADMIN — RIVAROXABAN 15 MG: 15 TABLET, FILM COATED ORAL at 22:23

## 2020-01-01 RX ADMIN — LACTULOSE 15 ML: 20 SOLUTION ORAL at 20:15

## 2020-01-01 RX ADMIN — Medication 10 ML: at 13:39

## 2020-01-01 RX ADMIN — POTASSIUM CHLORIDE 10 MEQ: 10 INJECTION, SOLUTION INTRAVENOUS at 10:58

## 2020-01-01 RX ADMIN — INSULIN LISPRO 6 UNITS: 100 INJECTION, SOLUTION INTRAVENOUS; SUBCUTANEOUS at 09:24

## 2020-01-01 RX ADMIN — AMPICILLIN SODIUM 2 G: 2 INJECTION, POWDER, FOR SOLUTION INTRAMUSCULAR; INTRAVENOUS at 11:48

## 2020-01-01 RX ADMIN — MORPHINE SULFATE 2 MG: 2 INJECTION, SOLUTION INTRAMUSCULAR; INTRAVENOUS at 23:39

## 2020-01-01 RX ADMIN — Medication 10 ML: at 21:40

## 2020-01-01 RX ADMIN — ALBUMIN (HUMAN) 25 G: 0.25 INJECTION, SOLUTION INTRAVENOUS at 12:06

## 2020-01-01 RX ADMIN — DULOXETINE HYDROCHLORIDE 60 MG: 60 CAPSULE, DELAYED RELEASE ORAL at 10:08

## 2020-01-01 RX ADMIN — PIPERACILLIN AND TAZOBACTAM 4.5 G: 4; .5 INJECTION, POWDER, LYOPHILIZED, FOR SOLUTION INTRAVENOUS; PARENTERAL at 21:59

## 2020-01-01 RX ADMIN — ALBUMIN (HUMAN) 25 G: 0.25 INJECTION, SOLUTION INTRAVENOUS at 21:20

## 2020-01-01 RX ADMIN — OXYCODONE 5 MG: 5 TABLET ORAL at 05:11

## 2020-01-01 RX ADMIN — CLINDAMYCIN PHOSPHATE 600 MG: 600 INJECTION, SOLUTION INTRAVENOUS at 01:36

## 2020-09-25 NOTE — ED PROVIDER NOTES
EMERGENCY DEPARTMENT HISTORY AND PHYSICAL EXAM 
 
Date: 9/25/2020 Patient Name: Arnie Giles History of Presenting Illness Chief Complaint Patient presents with  Fall History Provided By: Patient Additional History (Context):  
Arnie Giles is a 47 y.o. male with PMHX nicotine dependence, diabetes, prior left hip fracture presents to the emergency department C/O left hip pain and left shoulder pain after losing his balance yesterday. States that he took Robaxin with minimal relief. Reports that the pain worsened and that is why he is presenting today for evaluation. Patient reports the left shoulder pain radiates up to the left side of his neck but denies any midline neck tenderness. Pt denies consciousness, head injury, numbness, weakness, chest pain or shortness of breath, and any other sxs or complaints. Able to ambulate after the fall. PCP: Nancie Mueller NP Current Outpatient Medications Medication Sig Dispense Refill  DULoxetine (CYMBALTA) 60 mg capsule Take 1 Cap by mouth as needed.  HYDROcodone-acetaminophen (Norco) 5-325 mg per tablet Take 1 Tab by mouth every six (6) hours as needed for Pain for up to 3 days. Max Daily Amount: 4 Tabs. 8 Tab 0  
 aspirin delayed-release 81 mg tablet Take 1 Tab by mouth daily.  glyBURIDE-metFORMIN (GLUCOVANCE) 2.5-500 mg per tablet Take 1 Tab by mouth two (2) times a day.  insulin glargine (Lantus Solostar U-100 Insulin) 100 unit/mL (3 mL) inpn 5 Units by SubCUTAneous route daily.  methocarbamoL (ROBAXIN) 500 mg tablet Take 2 Tabs by mouth four (4) times daily.  traZODone (DESYREL) 50 mg tablet Take 50 mg by mouth nightly.  HYDROmorphone (DILAUDID) 2 mg tablet Take 1 Tab by mouth every four (4) hours as needed for Pain. Max Daily Amount: 12 mg.  Indications: Severe Pain 20 Tab 0  
 ondansetron (ZOFRAN ODT) 4 mg disintegrating tablet Take 1-2 tablets every 6-8 hours as needed for nausea and vomiting. 12 Tab 1 Past History Past Medical History: 
Past Medical History:  
Diagnosis Date  Diabetes ([address]  Gastroparesis  Pancreatitis Past Surgical History: 
Past Surgical History:  
Procedure Laterality Date  HX CHOLECYSTECTOMY  HX HIP FRACTURE TX    
 left hip sx 9.23/2018 Family History: No family history on file. Social History: 
Social History Tobacco Use  Smoking status: Current Every Day Smoker Substance Use Topics  Alcohol use: Yes  Drug use: No  
 
 
Allergies: 
No Known Allergies Review of Systems Review of Systems Constitutional: Negative for chills and fever. HENT: Negative for congestion, ear pain, sinus pain and sore throat. Eyes: Negative for pain and visual disturbance. Respiratory: Negative for cough and shortness of breath. Cardiovascular: Negative for chest pain and leg swelling. Gastrointestinal: Negative for abdominal pain, constipation, diarrhea, nausea and vomiting. Genitourinary: Negative for dysuria and hematuria. Musculoskeletal: Positive for arthralgias. Negative for back pain and neck pain. Skin: Negative for pallor and rash. Neurological: Negative for dizziness, tremors, weakness, light-headedness and headaches. All other systems reviewed and are negative. Physical Exam  
 
Vitals:  
 09/25/20 0409 09/25/20 1040 BP: 106/80 98/75 Pulse: (!) 120 (!) 103 Resp: 16 Temp: 97.8 °F (36.6 °C) SpO2: 98% Weight: 65.8 kg (145 lb) Height: 5' 9\" (1.753 m) Physical Exam 
Musculoskeletal:  
     Arms: 
 
     Legs: 
 
 
 
 
Nursing note and vitals reviewed Constitutional: Middle-aged  male, no acute distress Head: Normocephalic, Atraumatic Eyes: Pupils are equal, round, and reactive to light, EOMI Neck: Supple, non-tender my no midline tenderness, no step-offs or misalignment. Good range of motion of his neck. Cardiovascular: Regular rate and rhythm, no murmurs, rubs, or gallops, + 2 radial pulses bilaterally Chest: Normal work of breathing and chest excursion bilaterally Lungs: Clear to ausculation bilaterally, no wheezes, no rhonchi Abdomen: Soft, non tender, non distended, normoactive bowel sounds Back: No evidence of trauma or deformity Extremities: Tenderness over the left shoulder with no overlying erythema or ecchymoses. No swelling. Pain is elicited with range of motion of his left shoulder. +2 radial pulse. No elbow, humerus tenderness. Compartments are soft and supple. Minimal tenderness over the left hip with no pelvic instability. No knee tenderness. No calf swelling. No streaking erythema, vesicular lesions, ulcerations or bulla +2 DP pulse Skin: Warm and dry, normal cap refill Neuro: Alert and appropriate, CN intact, normal speech, moving all 4 extremities freely and symmetrically Psychiatric: Normal mood and affect Diagnostic Study Results Labs - No results found for this or any previous visit (from the past 12 hour(s)). Radiologic Studies -  
XR SHOULDER LT AP/LAT MIN 2 V Final Result IMPRESSION:  
  
Mild degenerative changes as described without superimposed acute radiographic  
abnormality. XR HIP LT W OR WO PELV 2-3 VWS Final Result IMPRESSION:  
  
  
1. Reduced and internally fixated proximal left femoral fracture with likely  
chronic avulsion of the lesser trochanter the left femur. Comparison prior  
imaging is recommended. No acute displaced fracture demonstrated. Mild bilateral  
hip joint osteoarthritis. CT Results  (Last 48 hours) None CXR Results  (Last 48 hours) None Medical Decision Making I am the first provider for this patient. I reviewed the vital signs, available nursing notes, past medical history, past surgical history, family history and social history. Vital Signs-Reviewed the patient's vital signs. Pulse Oximetry Analysis -98% on room air Records Reviewed: Nursing Notes and Old Medical Records Provider Notes:  
47 y.o. male presenting with left shoulder and left hip pain after fall. Exam patient does not appear toxic or acutely ill. He has some tenderness over the left shoulder and left hip with no obvious deformity or crepitus. Pain is elicited with range of motion. Neurovascular intact with soft compartments. Will obtain x-ray imaging to evaluate. Procedures: 
Procedures ED Course:  
8:42 AM 
 Initial assessment performed. The patients presenting problems have been discussed, and they are in agreement with the care plan formulated and outlined with them. I have encouraged them to ask questions as they arise throughout their visit. SMOKING CESSATION: 
The patient was counseled on the dangers of tobacco use, and was advised to quit. Reviewed strategies to maximize success, including removing cigarettes and smoking materials from environment. Discussion took 3-5 minutes, and pt expressed understanding. 11:06 AM 
Mild DJD on shoulder X ray. No acute fractures or dislocation. No acute fractures on hip x-ray. Will discharge with short course of Norco for symptom control. Diagnosis and Disposition DISCHARGE NOTE: 
11:05 AM 
 
Jewell Youssef  results have been reviewed with him. He has been counseled regarding his diagnosis, treatment, and plan. He verbally conveys understanding and agreement of the signs, symptoms, diagnosis, treatment and prognosis and additionally agrees to follow up as discussed. He also agrees with the care-plan and conveys that all of his questions have been answered.   I have also provided discharge instructions for him that include: educational information regarding their diagnosis and treatment, and list of reasons why they would want to return to the ED prior to their follow-up appointment, should his condition change. He has been provided with education for proper emergency department utilization. CLINICAL IMPRESSION: 
 
1. Osteoarthritis of left shoulder, unspecified osteoarthritis type 2. Contusion of left shoulder, initial encounter 3. Contusion of left hip, initial encounter 4. Cigarette nicotine dependence without complication PLAN: 
1. D/C Home 2. Current Discharge Medication List  
  
START taking these medications Details HYDROcodone-acetaminophen (Norco) 5-325 mg per tablet Take 1 Tab by mouth every six (6) hours as needed for Pain for up to 3 days. Max Daily Amount: 4 Tabs. Qty: 8 Tab, Refills: 0 Associated Diagnoses: Contusion of left shoulder, initial encounter 3. Follow-up Information Follow up With Specialties Details Why Contact Info Christine Clifton NP Nurse Practitioner Schedule an appointment as soon as possible for a visit in 2 days  [address] 
[phone] Harvey Padilla MD Orthopedic Surgery Schedule an appointment as soon as possible for a visit in 2 days  [address] [facility] [address] [place] [address] 
[phone] [facility] EMERGENCY DEPT Emergency Medicine  As needed if symptoms worsen [address] [address] 
[phone]  
  
 
____________________________________ Please note that this dictation was completed with [employer], the computer voice recognition software. Quite often unanticipated grammatical, syntax, homophones, and other interpretive errors are inadvertently transcribed by the computer software. Please disregard these errors. Please excuse any errors that have escaped final proofreading.

## 2020-09-25 NOTE — ED TRIAGE NOTES
Pt w/ c/o LEFT hip, shoulder, neck and lower back pain post fall last night. Pt reports he had on compression socks and unaware if he slipped from the socks gliding on the hardwood. Pt denies head injury or LOC, denies fever, cough sob, or use of blood thinners.

## 2020-09-25 NOTE — DISCHARGE INSTRUCTIONS
You were seen and evaluated in the Emergency Department. Please understand that your work up is not all encompassing and you should follow up with your primary care physician for further management and continuity of care. Please return to Emergency Department or seek medical attention immediately if you have acute worsening in your symptoms or develop chest pain, shortness of breath, repeated vomiting, fever, altered level of consciousness, coughing up blood, or start sweating and feel clammy. If you were prescribed any medicine for home, please take as prescribed by your health-care provider. If you were given any follow-up appointments or numbers to call, please do so as instructed. Avoid any tobacco products or excessive alcohol. Patient Education        Shoulder Arthritis: Exercises  Introduction  Here are some examples of exercises for you to try. The exercises may be suggested for a condition or for rehabilitation. Start each exercise slowly. Ease off the exercises if you start to have pain. You will be told when to start these exercises and which ones will work best for you. How to do the exercises  Shoulder flexion (lying down)   To make a wand for this exercise, use a piece of PVC pipe or a broom handle with the broom removed. Make the wand about a foot wider than your shoulders. 1. Lie on your back, holding a wand with both hands. Your palms should face down as you hold the wand. 2. Keeping your elbows straight, slowly raise your arms over your head. Raise them until you feel a stretch in your shoulders, upper back, and chest.  3. Hold for 15 to 30 seconds. 4. Repeat 2 to 4 times. Shoulder rotation (lying down)   To make a wand for this exercise, use a piece of PVC pipe or a broom handle with the broom removed. Make the wand about a foot wider than your shoulders. 1. Lie on your back. Hold a wand with both hands with your elbows bent and palms up.   2. Keep your elbows close to your body, and move the wand across your body toward the sore arm. 3. Hold for 8 to 12 seconds. 4. Repeat 2 to 4 times. Shoulder internal rotation with towel   1. Hold a towel above and behind your head with the arm that is not sore. 2. With your sore arm, reach behind your back and grasp the towel. 3. With the arm above your head, pull the towel upward. Do this until you feel a stretch on the front and outside of your sore shoulder. 4. Hold 15 to 30 seconds. 5. Repeat 2 to 4 times. Shoulder blade squeeze   1. Stand with your arms at your sides, and squeeze your shoulder blades together. Do not raise your shoulders up as you squeeze. 2. Hold 6 seconds. 3. Repeat 8 to 12 times. Resisted rows   For this exercise, you will need elastic exercise material, such as surgical tubing or Thera-Band. 1. Put the band around a solid object at about waist level. (A bedpost will work well.) Each hand should hold an end of the band. 2. With your elbows at your sides and bent to 90 degrees, pull the band back. Your shoulder blades should move toward each other. Return to the starting position. 3. Repeat 8 to 12 times. External rotator strengthening exercise   1. Start by tying a piece of elastic exercise material to a doorknob. You can use surgical tubing or Thera-Band. (You may also hold one end of the band in each hand.)  2. Stand or sit with your shoulder relaxed and your elbow bent 90 degrees. Your upper arm should rest comfortably against your side. Squeeze a rolled towel between your elbow and your body for comfort. This will help keep your arm at your side. 3. Hold one end of the elastic band with the hand of the painful arm. 4. Start with your forearm across your belly. Slowly rotate the forearm out away from your body. Keep your elbow and upper arm tucked against the towel roll or the side of your body until you begin to feel tightness in your shoulder.  Slowly move your arm back to where you started. 5. Repeat 8 to 12 times. Internal rotator strengthening exercise   1. Start by tying a piece of elastic exercise material to a doorknob. You can use surgical tubing or Thera-Band. 2. Stand or sit with your shoulder relaxed and your elbow bent 90 degrees. Your upper arm should rest comfortably against your side. Squeeze a rolled towel between your elbow and your body for comfort. This will help keep your arm at your side. 3. Hold one end of the elastic band in the hand of the painful arm. 4. Slowly rotate your forearm toward your body until it touches your belly. Slowly move it back to where you started. 5. Keep your elbow and upper arm firmly tucked against the towel roll or at your side. 6. Repeat 8 to 12 times. Pendulum swing   If you have pain in your back, do not do this exercise. 1. Hold on to a table or the back of a chair with your good arm. Then bend forward a little and let your sore arm hang straight down. This exercise does not use the arm muscles. Rather, use your legs and your hips to create movement that makes your arm swing freely. 2. Use the movement from your hips and legs to guide the slightly swinging arm back and forth like a pendulum (or elephant trunk). Then guide it in circles that start small (about the size of a dinner plate). Make the circles a bit larger each day, as your pain allows. 3. Do this exercise for 5 minutes, 5 to 7 times each day. 4. As you have less pain, try bending over a little farther to do this exercise. This will increase the amount of movement at your shoulder. Follow-up care is a key part of your treatment and safety. Be sure to make and go to all appointments, and call your doctor if you are having problems. It's also a good idea to know your test results and keep a list of the medicines you take. Where can you learn more?   Go to http://elías-shawanda.info/  Enter H562 in the search box to learn more about \"Shoulder Arthritis: Exercises. \"  Current as of: March 2, 2020               Content Version: 12.6  © 1141-8293 [employer]. Care instructions adapted under license by [employer] (which disclaims liability or warranty for this information). If you have questions about a medical condition or this instruction, always ask your healthcare professional. [address] any warranty or liability for your use of this information. Patient Education        Contusion: Care Instructions  Your Care Instructions     Contusion is the medical term for a bruise. It is the result of a direct blow or an impact, such as a fall. Contusions are common sports injuries. Most people think of a bruise as a black-and-blue spot. This happens when small blood vessels get torn and leak blood under the skin. But bones, muscles, and organs can also get bruised. This may damage deep tissues but not cause a bruise you can see. The doctor will do a physical exam to find the location of your contusion. You may also have tests to make sure you do not have a more serious injury, such as a broken bone or nerve damage. These may include X-rays or other imaging tests like a CT scan or MRI. Deep-tissue contusions may cause pain and swelling. But if there is no serious damage, they will often get better in a few weeks with home treatment. The doctor has checked you carefully, but problems can develop later. If you notice any problems or new symptoms, get medical treatment right away. Follow-up care is a key part of your treatment and safety. Be sure to make and go to all appointments, and call your doctor if you are having problems. It's also a good idea to know your test results and keep a list of the medicines you take. How can you care for yourself at home? · Put ice or a cold pack on the sore area for 10 to 20 minutes at a time to stop swelling. Put a thin cloth between the ice pack and your skin.   · Be safe with medicines. Read and follow all instructions on the label. ? If the doctor gave you a prescription medicine for pain, take it as prescribed. ? If you are not taking a prescription pain medicine, ask your doctor if you can take an over-the-counter medicine. · If you can, prop up the sore area on pillows as much as possible for the next few days. Try to keep the sore area above the level of your heart. When should you call for help? Call your doctor now or seek immediate medical care if:    · Your pain gets worse.     · You have new or worse swelling.     · You have tingling, weakness, or numbness in the area near the contusion.     · The area near the contusion is cold or pale. Watch closely for changes in your health, and be sure to contact your doctor if:    · You do not get better as expected. Where can you learn more? Go to http://elías-shawanda.info/  Enter [id number] in the search box to learn more about \"Contusion: Care Instructions. \"  Current as of: June 26, 2019               Content Version: 12.6  © 7865-3294 [employer]. Care instructions adapted under license by [employer] (which disclaims liability or warranty for this information). If you have questions about a medical condition or this instruction, always ask your healthcare professional. [address] any warranty or liability for your use of this information. Patient Education        Arthritis: Care Instructions  Overview  Arthritis, also called osteoarthritis, is a breakdown of the cartilage that cushions your joints. When the cartilage wears down, your bones rub against each other. This causes pain and stiffness. Many people have some arthritis as they age. Arthritis most often affects the joints of the spine, hands, hips, knees, or feet. Arthritis never goes away completely.  But medicine and home treatment can help reduce pain and help you stay active. Follow-up care is a key part of your treatment and safety. Be sure to make and go to all appointments, and call your doctor if you are having problems. It's also a good idea to know your test results and keep a list of the medicines you take. How can you care for yourself at home? · Stay at a healthy weight. Being overweight puts extra strain on your joints. · Talk to your doctor or physical therapist about exercises that will help ease joint pain. ? Stretch. You may enjoy gentle forms of yoga to help keep your joints and muscles flexible. ? Walk instead of jog. Other types of exercise that are less stressful on the joints include riding a bike, swimming, ezra chi, or water exercise. ? Lift weights. Strong muscles help reduce stress on your joints. Stronger thigh muscles, for example, take some of the stress off of the knees and hips. Learn the right way to lift weights so you don't make joint pain worse. · Take your medicines exactly as prescribed. Call your doctor if you think you are having a problem with your medicine. · Take pain medicines exactly as directed. ? If the doctor gave you a prescription medicine for pain, take it as prescribed. ? If you are not taking a prescription pain medicine, ask your doctor if you can take an over-the-counter medicine. · Use a cane, crutch, walker, or another device if you need help to get around. These can help rest your joints. You also can use other things to make life easier, such as a higher toilet seat and padded handles on kitchen utensils. · Do not sit in low chairs. They can make it hard to get up. · Put heat or cold on your sore joints as needed. Use whichever helps you most. You can also switch between hot and cold packs. ? Apply heat 2 or 3 times a day for 20 to 30 minutes--using a heating pad, hot shower, or hot pack--to relieve pain and stiffness. But don't use heat on a swollen joint.   ? Put ice or a cold pack on your sore joint for 10 to 20 minutes at a time. Put a thin cloth between the ice and your skin. When should you call for help? Call your doctor now or seek immediate medical care if:    · You have sudden swelling, warmth, or pain in any joint.     · You have joint pain and a fever or rash.     · You have such bad pain that you cannot use a joint. Watch closely for changes in your health, and be sure to contact your doctor if:    · You have mild joint symptoms that continue even with more than 6 weeks of care at home.     · You have stomach pain or other problems with your medicine. Where can you learn more? Go to http://elías-shawanda.info/  Enter P031 in the search box to learn more about \"Arthritis: Care Instructions. \"  Current as of: December 9, 2019               Content Version: 12.6  © 6217-8456 [employer]. Care instructions adapted under license by [employer] (which disclaims liability or warranty for this information). If you have questions about a medical condition or this instruction, always ask your healthcare professional. [address] any warranty or liability for your use of this information.

## 2020-10-09 PROBLEM — E83.42 HYPOMAGNESEMIA: Status: ACTIVE | Noted: 2020-01-01

## 2020-10-09 PROBLEM — K74.60 CIRRHOSIS (HCC): Status: ACTIVE | Noted: 2020-01-01

## 2020-10-09 PROBLEM — F10.20 ALCOHOLISM (HCC): Status: ACTIVE | Noted: 2020-01-01

## 2020-10-09 PROBLEM — K52.9 COLITIS: Status: ACTIVE | Noted: 2020-01-01

## 2020-10-09 PROBLEM — E83.51 HYPOCALCEMIA: Status: ACTIVE | Noted: 2020-01-01

## 2020-10-09 NOTE — ED NOTES
TRANSFER - OUT REPORT: 
 
Verbal report given to brendon FRANCO (name) on Martha Waters  being transferred to 312(unit) for routine progression of care Report consisted of patients Situation, Background, Assessment and  
Recommendations(SBAR). Information from the following report(s) SBAR, Kardex and ED Summary was reviewed with the receiving nurse. Lines:  
Peripheral IV 10/09/20 Left Antecubital (Active) Site Assessment Clean, dry, & intact 10/09/20 0930 Phlebitis Assessment 0 10/09/20 0930 Infiltration Assessment 0 10/09/20 0930 Dressing Status Clean, dry, & intact 10/09/20 0930 Dressing Type Transparent 10/09/20 0930 Hub Color/Line Status Pink;Patent; Flushed 10/09/20 0930 Action Taken Blood drawn 10/09/20 0930 Opportunity for questions and clarification was provided. Patient transported with: 
 Registered Nurse

## 2020-10-09 NOTE — ED TRIAGE NOTES
Pt states 'For a few weeks I have been getting worse not being able to eat with nausea and I feel like getting weaker and I hurt everywhere. \" Pt C/O abdominal, back and bilateral shoulder pain. 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

## 2020-10-09 NOTE — H&P
History & Physical 
 
Patient: Bobbi Coley MRN: [id number]  CSN: [id number] YOB: 1965  Age: 47 y.o. Sex: male DOA: 10/9/2020 Chief Complaint:  
Chief Complaint Patient presents with  Fatigue HPI:  
 
Bobbi Coley is a 47 y.o.  male who has history of chronic pancreatitis, diabetes, gastroparesis, hydroadenitis on Humira presents with decrease appetite nausea and lower abdominal pain and cramping that is been progressive since last Monday patient had stress of wife falling and having a fracture. Last week patient state he worked over time for 32 hours and since he is been suffering from fatigue leg pain abdominal pain. He denies any bloody stools or history of Crohn's he is on Humira for colitis headache in the emergency room CT scan showed colitis and anasarca I am asked to admit for further treatment Past Medical History:  
Diagnosis Date  Diabetes ([address]  Gastroparesis  Pancreatitis Past Surgical History:  
Procedure Laterality Date  HX CHOLECYSTECTOMY  HX HIP FRACTURE TX    
 left hip sx 9.23/2018 History reviewed. No pertinent family history. Social History Socioeconomic History  Marital status:  Spouse name: Not on file  Number of children: Not on file  Years of education: Not on file  Highest education level: Not on file Tobacco Use  Smoking status: Current Every Day Smoker  Smokeless tobacco: Never Used Substance and Sexual Activity  Alcohol use: Yes  Drug use: No  
 
 
Prior to Admission medications Medication Sig Start Date End Date Taking? Authorizing Provider  
aspirin delayed-release 81 mg tablet Take 1 Tab by mouth daily. Meño Springer MD  
DULoxetine (CYMBALTA) 60 mg capsule Take 1 Cap by mouth as needed. 1/6/19   Meño Springer MD  
glyBURIDE-metFORMIN (GLUCOVANCE) 2.5-500 mg per tablet Take 1 Tab by mouth two (2) times a day.     Meño Springer MD  
 insulin glargine (Lantus Solostar U-100 Insulin) 100 unit/mL (3 mL) inpn 5 Units by SubCUTAneous route daily. Meño Springer MD  
methocarbamoL (ROBAXIN) 500 mg tablet Take 2 Tabs by mouth four (4) times daily. Meño Springer MD  
traZODone (DESYREL) 50 mg tablet Take 50 mg by mouth nightly. Meño Springer MD  
HYDROmorphone (DILAUDID) 2 mg tablet Take 1 Tab by mouth every four (4) hours as needed for Pain. Max Daily Amount: 12 mg. Indications: Severe Pain 18   Gonzalez Otero MD  
ondansetron (ZOFRAN ODT) 4 mg disintegrating tablet Take 1-2 tablets every 6-8 hours as needed for nausea and vomiting. 18   Gonzalez Otero MD  
 
 
No Known Allergies Review of Systems GENERAL: Patient alert, awake and oriented times 3, able to communicate full sentences and not in distress. HEENT: No change in vision, no earache, tinnitus, sore throat or sinus congestion. NECK: No pain or stiffness. PULMONARY: No shortness of breath, cough or wheeze. Cardiovascular: no pnd or orthopnea, no CP GASTROINTESTINAL+abdominal pain, nausea, vomiting or diarrhea, melena or bright red blood per rectum. GENITOURINARY: No urinary frequency, urgency, hesitancy or dysuria. MUSCULOSKELETAL: No joint or muscle pain, no back pain, no recent trauma. DERMATOLOGIC: No rash, no itching, no lesions. ENDOCRINE: No polyuria, polydipsia, no heat or cold intolerance. No recent change in weight. HEMATOLOGICAL: No anemia or easy bruising or bleeding. NEUROLOGIC: No headache, seizures, numbness, tingling or weakness. Physical Exam:  
 
Physical Exam: 
Visit Vitals /80 Pulse 84 Temp 98.6 °F (37 °C) Resp 14 Ht 5' 9\" (1.753 m) Wt 63.5 kg (140 lb) SpO2 100% BMI 20.67 kg/m² O2 Device: Room air Temp (24hrs), Av.2 °F (36.8 °C), Min:97.8 °F (36.6 °C), Max:98.6 °F (37 °C) No intake/output data recorded. No intake/output data recorded. General:  Alert, cooperative, no distress, appears stated age. Head: Normocephalic, without obvious abnormality, atraumatic. Eyes:  Conjunctivae/corneas clear. PERRL, EOMs intact. Nose: Nares normal. No drainage or sinus tenderness. Neck: Supple, symmetrical, trachea midline, no adenopathy, thyroid: no enlargement, no carotid bruit and no JVD. Lungs:   Clear to auscultation bilaterally. Heart:  Regular rate and rhythm, S1, S2 normal.  
  Abdomen: Soft, non-tender. Bowel sounds normal.   
Extremities: Extremities normal, atraumatic, no cyanosis or edema. Pulses: 2+ and symmetric all extremities. Skin:  No rashes or lesions Neurologic: AAOx3, No focal motor or sensory deficit. Labs Reviewed: All lab results for the last 24 hours reviewed. Recent Results (from the past 24 hour(s)) CBC WITH AUTOMATED DIFF Collection Time: 10/09/20  9:30 AM  
Result Value Ref Range WBC 11.9 4.6 - 13.2 K/uL  
 RBC 4.38 (L) 4.70 - 5.50 M/uL  
 HGB 13.9 13.0 - 16.0 g/dL HCT 40.6 36.0 - 48.0 % MCV 92.7 74.0 - 97.0 FL  
 MCH 31.7 24.0 - 34.0 PG  
 MCHC 34.2 31.0 - 37.0 g/dL  
 RDW 14.3 11.6 - 14.5 % PLATELET 76 (L) 586 - 420 K/uL NEUTROPHILS 80 (H) 40 - 73 % LYMPHOCYTES 11 (L) 21 - 52 % MONOCYTES 9 3 - 10 % EOSINOPHILS 0 0 - 5 % BASOPHILS 0 0 - 2 %  
 ABS. NEUTROPHILS 9.5 (H) 1.8 - 8.0 K/UL  
 ABS. LYMPHOCYTES 1.3 0.9 - 3.6 K/UL  
 ABS. MONOCYTES 1.1 0.05 - 1.2 K/UL  
 ABS. EOSINOPHILS 0.0 0.0 - 0.4 K/UL  
 ABS. BASOPHILS 0.0 0.0 - 0.1 K/UL PLATELET COMMENTS LARGE PLATELETS    
 RBC COMMENTS TARGET CELLS 2+ 
    
 RBC COMMENTS POLYCHROMASIA 1+ 
    
 DF AUTOMATED METABOLIC PANEL, COMPREHENSIVE Collection Time: 10/09/20  9:30 AM  
Result Value Ref Range Sodium 134 (L) 136 - 145 mmol/L Potassium 4.3 3.5 - 5.5 mmol/L Chloride 95 (L) 100 - 111 mmol/L  
 CO2 25 21 - 32 mmol/L Anion gap 14 3.0 - 18 mmol/L Glucose 123 (H) 74 - 99 mg/dL BUN 40 (H) 7.0 - 18 MG/DL Creatinine 1.58 (H) 0.6 - 1.3 MG/DL  
 BUN/Creatinine ratio 25 (H) 12 - 20 GFR est AA 56 (L) >60 ml/min/1.73m2 GFR est non-AA 46 (L) >60 ml/min/1.73m2 Calcium 7.5 (L) 8.5 - 10.1 MG/DL Bilirubin, total 4.1 (H) 0.2 - 1.0 MG/DL  
 ALT (SGPT) 81 (H) 16 - 61 U/L  
 AST (SGOT) 172 (H) 10 - 38 U/L Alk. phosphatase 106 45 - 117 U/L Protein, total 6.9 6.4 - 8.2 g/dL Albumin 2.4 (L) 3.4 - 5.0 g/dL Globulin 4.5 (H) 2.0 - 4.0 g/dL A-G Ratio 0.5 (L) 0.8 - 1.7 CARDIAC PANEL,(CK, CKMB & TROPONIN) Collection Time: 10/09/20  9:30 AM  
Result Value Ref Range CK - MB 6.8 (H) <3.6 ng/ml CK-MB Index 2.3 0.0 - 4.0 %  39 - 308 U/L Troponin-I, QT <0.02 0.0 - 0.045 NG/ML  
LIPASE Collection Time: 10/09/20  9:30 AM  
Result Value Ref Range Lipase 54 (L) 73 - 393 U/L  
ETHYL ALCOHOL Collection Time: 10/09/20  9:30 AM  
Result Value Ref Range ALCOHOL(ETHYL),SERUM <3 0 - 3 MG/DL MAGNESIUM Collection Time: 10/09/20  9:30 AM  
Result Value Ref Range Magnesium 1.5 (L) 1.6 - 2.6 mg/dL PHOSPHORUS Collection Time: 10/09/20  9:30 AM  
Result Value Ref Range Phosphorus 3.3 2.5 - 4.9 MG/DL PROTHROMBIN TIME + INR Collection Time: 10/09/20  9:30 AM  
Result Value Ref Range Prothrombin time 18.3 (H) 11.5 - 15.2 sec INR 1.6 (H) 0.8 - 1.2 GLUCOSE, POC Collection Time: 10/09/20  9:35 AM  
Result Value Ref Range Glucose (POC) 119 (H) 70 - 110 mg/dL EKG, 12 LEAD, INITIAL Collection Time: 10/09/20  9:38 AM  
Result Value Ref Range Ventricular Rate 88 BPM  
 Atrial Rate 88 BPM  
 P-R Interval 176 ms QRS Duration 80 ms  
 Q-T Interval 378 ms QTC Calculation (Bezet) 457 ms Calculated P Axis 66 degrees Calculated R Axis 33 degrees Calculated T Axis 28 degrees Diagnosis Normal sinus rhythm Low voltage QRS 
ST abnormality, possible digitalis effect Abnormal ECG No previous ECGs available Confirmed by Cony Jimenez MD, -- ([id number]) on 10/9/2020 5:02:51 PM 
  
CALCIUM, IONIZED Collection Time: 10/09/20 11:54 AM  
Result Value Ref Range Ionized Calcium 0.82 (L) 1.12 - 1.32 MMOL/L  
GLUCOSE, POC Collection Time: 10/09/20  4:02 PM  
Result Value Ref Range Glucose (POC) 124 (H) 70 - 110 mg/dL  
 and EKG Procedures/imaging: see electronic medical records for all procedures/Xrays and details which were not copied into this note but were reviewed prior to creation of Plan Assessment/Plan Active Problems: 1. Hypocalcemia (10/9/2020) we will place on oral replacement low due to current albumin low from renal disease will start IV albumin 3. Colitis started on IV Zosyn GI consulted 3. Hidradenitis on chronic Humira 4. Cirrhosis with portal hypertension we will start IV albumin 5. Alcoholism placed on a banana bag CIWA advised to quit 6. Tobacco disorder started on a nicotine patch 7. History of chronic pancreatitis with normal lipase Creon as needed 8. Diabetes hold Lantus use sliding scale insulin clear liquid diet advance as tolerated 9. Coagulopathy from cirrhosis we will start vitamin K 
10. Hypomagnesium Magnesium IV  
  
 
DVT/GI Prophylaxis: SCD's Discussed with patient at bedside about hospital admission and my plan care, who understood and agree with my plan care.  
 
Tye Galvan MD 
10/9/2020 12:55 PM

## 2020-10-09 NOTE — PROGRESS NOTES
Bedside and Verbal shift change report given to [address] (oncoming nurse) by VAMSI Shen (offgoing nurse). Report included the following information SBAR, Kardex, Intake/Output, MAR and Recent Results. Shift Summary-  
Patient Vitals for the past 12 hrs: 
 Temp Pulse Resp BP SpO2  
10/10/20 0327 99 °F (37.2 °C) 85 18 100/75 99 % 10/09/20 2234 98.5 °F (36.9 °C) (!) 103 16 112/75 99 % 10/09/20 1915 98.3 °F (36.8 °C) 93 16 106/76 100 % Pt A&Ox4 and lethargic. Pt given PRN morphine x1 for pain rated 7/10 to abdomen and back. Pt did not ambulate overnight. Bedside and Verbal shift change report given to [facility] (oncoming nurse) by [address] (offgoing nurse). Report included the following information SBAR, Kardex, Intake/Output, MAR and Recent Results.

## 2020-10-09 NOTE — PROGRESS NOTES
1313 Verbal report received from Clevester Stalling, [place] from ED. Report included the following information SBAR, Kardex, OR Summary, Intake/Output and MAR. 
 
1358  Pt arrive to unit Dual skin assessment done with Jorge L Field RN. Noted pt have open area to left flank and open area to sacral/coccyx. Covered sacral area with mepilex, CDI. 
 
1920 Bedside and verbal shift change report given by Sergio Carrillo RN (off going nurse) to Lacey Hussein RN(on coming nurse). Report included the following information SBAR, Kardex, OR Summary, Intake/Output and MAR.

## 2020-10-09 NOTE — ED PROVIDER NOTES
EMERGENCY DEPARTMENT HISTORY AND PHYSICAL EXAM 
 
Date: 10/9/2020 Patient Name: Bianca Plummer History of Presenting Illness Chief Complaint Patient presents with  Fatigue History Provided By: Patient [facility] Bianca Plummer is a 47 y.o. male with PMHX of cholesectomy, pancreatitis secondary to alcohol use, diabetes, gastroparesis who presents to the emergency department C/O abdominal pain and weakness. Says that since Tuesday, 4 days ago, he has been having increased fatigue, malaise, lack of appetite, intractable nausea vomiting, and abdominal pain. He says he has been unable to tolerate p.o. Reports diffuse abdominal pain predominantly left lower quadrant. States his been passing bowel movements but they have been hard and small. Urinating normally but dark urine. He has had no fever no sick contacts. Denies recent alcohol use. Says his abdominal pain feels different than his pancreatitis. He does report a normal colonoscopy 3 years ago PCP: Augusto Donald NP Current Facility-Administered Medications Medication Dose Route Frequency Provider Last Rate Last Dose  calcium carbonate (TUMS) chewable tablet 200 mg [elemental]  200 mg Oral TID WITH MEALS Anu Alcaraz MD   200 mg at 10/09/20 1153 Current Outpatient Medications Medication Sig Dispense Refill  aspirin delayed-release 81 mg tablet Take 1 Tab by mouth daily.  DULoxetine (CYMBALTA) 60 mg capsule Take 1 Cap by mouth as needed.  glyBURIDE-metFORMIN (GLUCOVANCE) 2.5-500 mg per tablet Take 1 Tab by mouth two (2) times a day.  insulin glargine (Lantus Solostar U-100 Insulin) 100 unit/mL (3 mL) inpn 5 Units by SubCUTAneous route daily.  methocarbamoL (ROBAXIN) 500 mg tablet Take 2 Tabs by mouth four (4) times daily.  traZODone (DESYREL) 50 mg tablet Take 50 mg by mouth nightly.     
 HYDROmorphone (DILAUDID) 2 mg tablet Take 1 Tab by mouth every four (4) hours as needed for Pain. Max Daily Amount: 12 mg. Indications: Severe Pain 20 Tab 0  
 ondansetron (ZOFRAN ODT) 4 mg disintegrating tablet Take 1-2 tablets every 6-8 hours as needed for nausea and vomiting. 12 Tab 1 Past History Past Medical History: 
Past Medical History:  
Diagnosis Date  Diabetes ([address]  Gastroparesis  Pancreatitis Past Surgical History: 
Past Surgical History:  
Procedure Laterality Date  HX CHOLECYSTECTOMY  HX HIP FRACTURE TX    
 left hip sx 9.23/2018 Family History: 
History reviewed. No pertinent family history. Social History: 
Social History Tobacco Use  Smoking status: Current Every Day Smoker  Smokeless tobacco: Never Used Substance Use Topics  Alcohol use: Yes  Drug use: No  
 
 
Allergies: 
No Known Allergies Review of Systems Review of Systems Constitutional: Positive for activity change, appetite change and fatigue. Negative for chills and fever. Respiratory: Negative for shortness of breath. Cardiovascular: Negative for chest pain and leg swelling. Gastrointestinal: Positive for abdominal pain, constipation, nausea and vomiting. Negative for abdominal distention and diarrhea. Genitourinary: Positive for decreased urine volume. Negative for dysuria. All other systems reviewed and are negative. Physical Exam  
 
Vitals:  
 10/09/20 0913 10/09/20 0930 10/09/20 1000 10/09/20 1230 BP: 101/77 112/78 118/81 102/80 Pulse: (!) 108  99 84 Resp: 19  19 14 Temp: 97.8 °F (36.6 °C) SpO2: 97%  100% 100% Weight: 63.5 kg (140 lb) Height: 5' 9\" (1.753 m) Physical Exam 
Vitals signs and nursing note reviewed. Constitutional:   
   General: He is not in acute distress. Appearance: Normal appearance. He is well-developed. He is ill-appearing. Comments: Pale coloration HENT:  
   Head: Normocephalic and atraumatic. Eyes: Extraocular Movements: Extraocular movements intact. Conjunctiva/sclera: Conjunctivae normal.  
   Pupils: Pupils are equal, round, and reactive to light. Neck: Musculoskeletal: Normal range of motion and neck supple. Cardiovascular:  
   Rate and Rhythm: Normal rate and regular rhythm. Pulses: Normal pulses. Heart sounds: Normal heart sounds. Pulmonary:  
   Effort: Pulmonary effort is normal. No respiratory distress. Breath sounds: Normal breath sounds. No wheezing or rales. Chest:  
   Chest wall: No tenderness. Abdominal:  
   General: Abdomen is flat. Bowel sounds are decreased. There is no distension. Palpations: Abdomen is soft. Tenderness: There is generalized abdominal tenderness and tenderness in the left lower quadrant. There is no guarding or rebound. Negative signs include McBurney's sign. Musculoskeletal: Normal range of motion. General: No tenderness. Right lower leg: No edema. Left lower leg: No edema. Lymphadenopathy:  
   Cervical: No cervical adenopathy. Skin: 
   General: Skin is warm and dry. Neurological:  
   General: No focal deficit present. Mental Status: He is alert and oriented to person, place, and time. Cranial Nerves: No cranial nerve deficit. Motor: No abnormal muscle tone. Coordination: Coordination normal.  
Psychiatric:     
   Mood and Affect: Mood normal.     
   Behavior: Behavior normal.  
 
 
 
 
 
 
Diagnostic Study Results Labs - Recent Results (from the past 12 hour(s)) CBC WITH AUTOMATED DIFF Collection Time: 10/09/20  9:30 AM  
Result Value Ref Range WBC 11.9 4.6 - 13.2 K/uL  
 RBC 4.38 (L) 4.70 - 5.50 M/uL  
 HGB 13.9 13.0 - 16.0 g/dL HCT 40.6 36.0 - 48.0 % MCV 92.7 74.0 - 97.0 FL  
 MCH 31.7 24.0 - 34.0 PG  
 MCHC 34.2 31.0 - 37.0 g/dL  
 RDW 14.3 11.6 - 14.5 % PLATELET 76 (L) 798 - 420 K/uL NEUTROPHILS 80 (H) 40 - 73 % LYMPHOCYTES 11 (L) 21 - 52 % MONOCYTES 9 3 - 10 % EOSINOPHILS 0 0 - 5 % BASOPHILS 0 0 - 2 %  
 ABS. NEUTROPHILS 9.5 (H) 1.8 - 8.0 K/UL  
 ABS. LYMPHOCYTES 1.3 0.9 - 3.6 K/UL  
 ABS. MONOCYTES 1.1 0.05 - 1.2 K/UL  
 ABS. EOSINOPHILS 0.0 0.0 - 0.4 K/UL  
 ABS. BASOPHILS 0.0 0.0 - 0.1 K/UL PLATELET COMMENTS LARGE PLATELETS    
 RBC COMMENTS TARGET CELLS 2+ 
    
 RBC COMMENTS POLYCHROMASIA 1+ 
    
 DF AUTOMATED METABOLIC PANEL, COMPREHENSIVE Collection Time: 10/09/20  9:30 AM  
Result Value Ref Range Sodium 134 (L) 136 - 145 mmol/L Potassium 4.3 3.5 - 5.5 mmol/L Chloride 95 (L) 100 - 111 mmol/L  
 CO2 25 21 - 32 mmol/L Anion gap 14 3.0 - 18 mmol/L Glucose 123 (H) 74 - 99 mg/dL BUN 40 (H) 7.0 - 18 MG/DL Creatinine 1.58 (H) 0.6 - 1.3 MG/DL  
 BUN/Creatinine ratio 25 (H) 12 - 20 GFR est AA 56 (L) >60 ml/min/1.73m2 GFR est non-AA 46 (L) >60 ml/min/1.73m2 Calcium 7.5 (L) 8.5 - 10.1 MG/DL Bilirubin, total 4.1 (H) 0.2 - 1.0 MG/DL  
 ALT (SGPT) 81 (H) 16 - 61 U/L  
 AST (SGOT) 172 (H) 10 - 38 U/L Alk. phosphatase 106 45 - 117 U/L Protein, total 6.9 6.4 - 8.2 g/dL Albumin 2.4 (L) 3.4 - 5.0 g/dL Globulin 4.5 (H) 2.0 - 4.0 g/dL A-G Ratio 0.5 (L) 0.8 - 1.7 CARDIAC PANEL,(CK, CKMB & TROPONIN) Collection Time: 10/09/20  9:30 AM  
Result Value Ref Range CK - MB 6.8 (H) <3.6 ng/ml CK-MB Index 2.3 0.0 - 4.0 %  39 - 308 U/L Troponin-I, QT <0.02 0.0 - 0.045 NG/ML  
LIPASE Collection Time: 10/09/20  9:30 AM  
Result Value Ref Range Lipase 54 (L) 73 - 393 U/L  
ETHYL ALCOHOL Collection Time: 10/09/20  9:30 AM  
Result Value Ref Range ALCOHOL(ETHYL),SERUM <3 0 - 3 MG/DL  
GLUCOSE, POC Collection Time: 10/09/20  9:35 AM  
Result Value Ref Range Glucose (POC) 119 (H) 70 - 110 mg/dL EKG, 12 LEAD, INITIAL Collection Time: 10/09/20  9:38 AM  
Result Value Ref Range  Ventricular Rate 88 BPM  
 Atrial Rate 88 BPM  
 P-R Interval 176 ms QRS Duration 80 ms  
 Q-T Interval 378 ms QTC Calculation (Bezet) 457 ms Calculated P Axis 66 degrees Calculated R Axis 33 degrees Calculated T Axis 28 degrees Diagnosis Normal sinus rhythm Low voltage QRS 
ST abnormality, possible digitalis effect Abnormal ECG No previous ECGs available Radiologic Studies -  
XR CHEST PORT Final Result IMPRESSION:  
  
No acute cardiopulmonary abnormality. CT ABD PELV W CONT Final Result IMPRESSION:  
  
1. Diffuse hepatic hypoattenuation compatible with steatosis with hepatomegaly,  
mild splenomegaly, and small quantity of abdominal/pelvic ascites. The totality  
of findings compatible with hepatocellular dysfunction and potentially  
associated portal hypertension. > Patent main and proximal portal venous branches.   
  > Mild thickening of the proximal ascending colon and cecum may reflect  
sequela of portal colopathy. Infectious/inflammatory colitis also a possibility  
but thought less likely given the imaging appearance. 2. Likely correlate for small area of perianal fistula noted on prior MRI  
pelvis. 3. Punctate nonobstructing upper pole left renal stone. CT Results  (Last 48 hours) 10/09/20 1031  CT ABD PELV W CONT Final result Impression:  IMPRESSION:  
   
1. Diffuse hepatic hypoattenuation compatible with steatosis with hepatomegaly,  
mild splenomegaly, and small quantity of abdominal/pelvic ascites. The totality  
of findings compatible with hepatocellular dysfunction and potentially  
associated portal hypertension. > Patent main and proximal portal venous branches.   
  > Mild thickening of the proximal ascending colon and cecum may reflect  
sequela of portal colopathy. Infectious/inflammatory colitis also a possibility  
but thought less likely given the imaging appearance. 2. Likely correlate for small area of perianal fistula noted on prior MRI pelvis. 3. Punctate nonobstructing upper pole left renal stone. Narrative:  EXAM: CT of the abdomen and pelvis INDICATION: Abdominal pain, greatest in the left lower quadrant with nausea and  
vomiting. COMPARISON: MRI pelvis 10/7/2019. CT left hip 4/17/2019 TECHNIQUE: Axial CT imaging of the abdomen and pelvis was performed with  
intravenous contrast. Multiplanar reformats were generated. One or more dose reduction techniques were used on this CT: automated exposure  
control, adjustment of the mAs and/or kVp according to patient size, and  
iterative reconstruction techniques. The specific techniques used on this CT  
exam have been documented in the patient's electronic medical record. Digital  
Imaging and Communications in Medicine (DICOM) format image data are available  
to nonaffiliated external healthcare facilities or entities on a secure, media  
free, reciprocally searchable basis with patient authorization for at least a  
12-month period after this study. _______________ FINDINGS:  
   
LOWER CHEST: Minor basilar changes of atelectasis. No alveolar consolidation or  
pleural effusion. Normal cardiac size without pericardial effusion. LIVER, BILIARY: Diffuse hepatic hypoattenuation is present with hepatic size  
estimated at approximately 27 cm in craniocaudal span. No discrete liver lesion. No intrahepatic or extra hepatic biliary ductal dilatation. Patient is status  
post cholecystectomy. PANCREAS: Pancreatic enhancement is normal. Mild and diffuse pancreatic ductal  
ectasia is noted. No discrete pancreatic mass lesion noted. SPLEEN: Mildly enlarged at 14 cm. ADRENALS: Normal.  
   
KIDNEYS/URETERS/BLADDER: Symmetric renal enhancement. No bowel obstruction PELVIC ORGANS: Symmetric renal enhancement. Punctate nonobstructing upper pole  
left renal stone. No distal ureteral or urinary bladder stone. Urinary bladder unremarkable in CT appearance. VASCULATURE: Diffuse aortobiiliac atherosclerotic vascular calcification is  
present without evidence of aneurysmal dilatation. Main portal vein as well as  
right and left portal venous branches are widely patent. LYMPH NODES: Scattered subcentimeter mesenteric and retroperitoneal lymph nodes  
are noted. No evidence of abdominal or pelvic adenopathy. GASTROINTESTINAL TRACT: No morphology of bowel obstruction. No free  
intraperitoneal gas. Mild thickening of the proximal ascending colon and cecum  
noted. BONES: No acute or aggressive osseous abnormalities identified. Partial  
visualization healed proximal left femoral fracture status post IM nailing and  
dynamic hip screw placement. OTHER: Small quantity of abdominal/pelvic ascites. Thin subcutaneous linear  
density tracking along the right gluteal fold, likely in keeping previously  
noted fistula tract.  
   
_______________ CXR Results  (Last 48 hours) 10/09/20 1046  XR CHEST PORT Final result Impression:  IMPRESSION:  
   
No acute cardiopulmonary abnormality. Narrative:  EXAM: XR CHEST PORT  
   
CLINICAL INDICATION/HISTORY: abnormal ekg  
-Additional: None COMPARISON: None TECHNIQUE: Portable frontal view of the chest  
   
_______________ FINDINGS:  
   
SUPPORT DEVICES: None. HEART AND MEDIASTINUM: Cardiomediastinal silhouette within normal limits. LUNGS AND PLEURAL SPACES: No dense consolidation, large effusion or  
pneumothorax.  
   
_______________ Medications given in the ED- Medications  
calcium carbonate (TUMS) chewable tablet 200 mg [elemental] (200 mg Oral Given 10/9/20 1153) morphine injection 4 mg (4 mg IntraVENous Given 10/9/20 1014) ondansetron [facility]) injection 4 mg (4 mg IntraVENous Given 10/9/20 1014)  
sodium chloride 0.9 % bolus infusion 1,000 mL (0 mL IntraVENous IV Completed 10/9/20 1158) iopamidoL (ISOVUE 300) 61 % contrast injection 100 mL (100 mL IntraVENous Given 10/9/20 1026)  
sodium chloride 0.9 % bolus infusion 1,000 mL (1,000 mL IntraVENous New Bag 10/9/20 1153) Medical Decision Making I am the first provider for this patient. I reviewed the vital signs, available nursing notes, past medical history, past surgical history, family history and social history. Vital Signs-Reviewed the patient's vital signs. Pulse Oximetry Analysis and Interpretation:  
97% on RA, normal 
 
Cardiac Monitor: Interpreted by Dr. Diane Epstein at 4110 Rate: 87 bpm 
Rhythm: NSR 
 
EKG interpretation: (Preliminary) Interpreted by Diane Epstein MD at 2943 Normal sinus rhythm rate of 88, low voltage, nonspecific ST changes in precordial leads, abnormal EKG, no priors available Records Reviewed: Nursing Notes and Old Medical Records Provider Notes (Medical Decision Making): Radha Tate is a 47 y.o. male who is here today with 3 to 4 days of abdominal pain intractable nausea vomiting, malaise and fatigue. Vital signs on arrival patient initially tachycardic 1 going through triage process however in room is in normal sinus rhythm. He is afebrile does not have any other sirs criteria. Exam is diffusely tender without peritoneal signs. He does have a history of pancreatitis and diabetic gastroparesis which may be contributory. Additionally considering obstruction/ileus versus possible mass-effect so we will proceed with imaging. Procedures: 
Procedures ED Course:  
CONSULT NOTE:  
12:15 PM  
Dr. Lio Damon discussed care with Dr Priscilla Donahue, Hospitalist 
It was a standard discussion, including history of patients chief complaint, available diagnostic results, and treatment course. Discussed case. And on admitting to general medicine for electrolyte abnormalities, possible colitis. She is requesting GI consult CONSULT NOTE:  
12:38 PM  
 Dr. Zion Davis discussed care with Dr Silviano Curry GI It was a standard discussion, including history of patients chief complaint, available diagnostic results, and treatment course. Discussed case. Results and imaging. Agrees with daily PPI and fluid hydration would not start antibiotics regarding CT report Diagnosis and Disposition Critical Care Time:  
 
Core Measures: 
For Hospitalized Patients: 
 
1. Hospitalization Decision Time: The decision to hospitalize the patient was made by Dr Zion Davis at [phone] on 10/9/2020 2. Aspirin: Aspirin was not given because the patient did not present with a stroke at the time of their Emergency Department evaluation 12:38 PM  Patient is being admitted to the hospital by Dr Aquiles Mireles. The results of their tests and reasons for their admission have been discussed with them and/or available family. They convey agreement and understanding for the need to be admitted and for their admission diagnosis. CONDITIONS ON ADMISSION: 
Sepsis is not present at the time of admission. Deep Vein Thrombosis is not present at the time of admission. Thrombosis is not present at the time of admission. Urinary Tract Infection is not present at the time of admission. Pneumonia is not present at the time of admission. MRSA is not present at the time of admission. Wound infection is not present at the time of admission. Pressure Ulcer is not present at the time of admission. CLINICAL IMPRESSION: 
 
1. Alcohol dependence with withdrawal with complication ([address] 2. RODERICK (acute kidney injury) ([address] 3. Thrombocytopenia ([address] 4. Transaminitis 5. Hyperglycemia 6. Hypocalcemia   
 
_______________________________ Please note that this dictation was completed with [employer], the [employer] voice recognition software.   Quite often unanticipated grammatical, syntax, homophones, and other interpretive errors are inadvertently transcribed by the computer software. Please disregard these errors. Please excuse any errors that have escaped final proofreading.

## 2020-10-09 NOTE — ED NOTES
First attempt to call report to receiving RN made. Advised receiving RN currently on break at this time and will call return call to the ED.

## 2020-10-10 NOTE — ROUTINE PROCESS
Bedside and Verbal shift change report given to [address] [place] [address] by Nina Viera RN. Report included the following information SBAR, Kardex, Intake/Output and MAR.

## 2020-10-10 NOTE — PROGRESS NOTES
1115-Paged MD ada adams because pt have nausea but cant have Zofran because its Q 6hrs. 1718-Informed MD Amador Ray of pt phosphorus and calcium levels.

## 2020-10-10 NOTE — PROGRESS NOTES
Hospitalist Progress Note Patient: Bharati Hinson MRN: [id number]  CSN: [id number] YOB: 1965  Age: 47 y.o. Sex: male DOA: 10/9/2020 LOS:  LOS: 1 day Chief Complaint: 
 
 
 
 
Assessment/Plan Active Problems: 1. Hypocalcemia (10/9/2020) we will place give IV calcium and phos today  albumin low from renal disease will start IV albumin 3. Colitis started on IV Zosyn GI consulted 3. Hidradenitis on chronic Humira 4. Cirrhosis with portal hypertension we will start IV albumin 5. Alcoholism placed on a banana bag CIWA advised to quit 6. Tobacco disorder started on a nicotine patch 7. History of chronic pancreatitis with normal lipase Creon as needed 8. Diabetes hold Lantus use sliding scale insulin clear liquid diet advance as tolerated 9. Coagulopathy from cirrhosis we will start vitamin K 
10. Hypomagnesium Magnesium IV Patient Active Problem List  
Diagnosis Code  Hypocalcemia E83.51  
 Hypomagnesemia E83.42  
 Alcoholism ([facility] F10.20  Cirrhosis ([facility] K74.60  Colitis K52.9 Subjective: 
 
 
 
Review of systems: 
 
Constitutional: denies fevers, chills, myalgias Respiratory: denies SOB, cough Cardiovascular: denies chest pain, palpitations Gastrointestinal: +nausea, vomiting, diarrhea Vital signs/Intake and Output: 
Visit Vitals /84 (BP 1 Location: Right arm, BP Patient Position: At rest) Pulse 78 Temp 98 °F (36.7 °C) Resp 17 Ht 5' 9\" (1.753 m) Wt 65.2 kg (143 lb 12.8 oz) SpO2 100% BMI 21.24 kg/m² Current Shift:  No intake/output data recorded. Last three shifts:  10/08 1901 - 10/10 0700 In: 1613.8 [I.V.:1613.8] Out: 625 [Urine:625] Exam: 
 
General: Well developed, alert, NAD, OX3 Head/Neck: NCAT, supple, No masses, No lymphadenopathy CVS:Regular rate and rhythm, no M/R/G, S1/S2 heard, no thrill Lungs:Clear to auscultation bilaterally, no wheezes, rhonchi, or rales Abdomen: Soft, Nontender, No distention, Normal Bowel sounds, No hepatomegaly Extremities: No C/C/E, pulses palpable 2+ Skin:normal texture and turgor, no rashes, no lesions Neuro:grossly normal , follows commands Psych:appropriate Labs: Results:  
   
Chemistry Recent Labs 10/10/20 
0355 10/09/20 
0930 * 123*  134* K 3.2* 4.3  
 95* CO2 26 25 BUN 21* 40* CREA 1.09 1.58* CA 6.6* 7.5* AGAP 10 14 BUCR 19 25* AP 76 106  
TP 5.6* 6.9 ALB 2.2* 2.4*  
GLOB 3.4 4.5* AGRAT 0.6* 0.5* CBC w/Diff Recent Labs 10/10/20 
0355 10/09/20 
0930 WBC 5.5 11.9 RBC 3.57* 4.38* HGB 11.2* 13.9 HCT 32.8* 40.6 PLT 39* 76* GRANS 63 80* LYMPH 21 11* EOS 2 0 Cardiac Enzymes Recent Labs 10/09/20 
0930  CKND1 2.3 Coagulation Recent Labs 10/09/20 
0930 PTP 18.3* INR 1.6* Lipid Panel No results found for: CHOL, CHOLPOCT, CHOLX, CHLST, CHOLV, 880228, HDL, HDLP, LDL, LDLC, DLDLP, 375436, VLDLC, VLDL, TGLX, TRIGL, TRIGP, TGLPOCT, CHHD, CHHDX  
BNP No results for input(s): BNPP in the last 72 hours. Liver Enzymes Recent Labs 10/10/20 
0355 TP 5.6* ALB 2.2* AP 76 Thyroid Studies No results found for: T4, T3U, TSH, TSHEXT Procedures/imaging: see electronic medical records for all procedures/Xrays and details which were not copied into this note but were reviewed prior to creation of Plan Tiff Hutchinson MD

## 2020-10-10 NOTE — PROGRESS NOTES
Reason for Admission:   Chart reviewed as Care Manager on call; patient is a 47 yr old male w/ hx of pancreatitis, diabetes, gastroparesis, hydroadenitis on Humira presented to ED w/ c/o decreased appetite, nausea and lower abdominal pain and cramping x 5 days. Stated he worked overtime last week 32 hrs and had been suffering from fatigue, leg and abdominal pain; CT showed colitis and anasarca. RUR Score:     Low, 16% Plan for utilizing home health:    TBD based on patient's medical progress PCP: First and Last name:  Ghada Shetty, NP Name of Practice: [facility] in [place] Are you a current patient: Yes/No: yes Approximate date of last visit: phone discussion last Thursday Can you participate in a virtual visit with your PCP: yes, by phone Current Advanced Directive/Advance Care Plan: Full code, no ACP Transition of Care Plan:       Met with patient at bedside to introduce care manager role; per patient, he works for [employer] and has been under considerable stress as his wife had just recently come home after being at a rehab facility for repair of hip fx. Patient stated that his boss had brought him to the hospital because he had alteration in his normal baseline and boss had also picked his wife up from her dialysis tx at [address] [facility]. Patient appeared more concerned about how his wife would get to her dialysis tx on Monday as he is the sole transporter; stated that he had to get home by Monday in order to drive her to dialysis tx; this care manager discussed with patient that there are transport services available for this such as Lyft and that this CM would reach out to wife to help explain to her how to do this. CM to follow for discharge needs. Care Management Interventions PCP Verified by CM: Yes Last Visit to PCP: 10/08/20 Transition of Care Consult (CM Consult): Discharge Planning Current Support Network: Lives with Spouse, Own Home Confirm Follow Up Transport: Self The Plan for Transition of Care is Related to the Following Treatment Goals : home when medically stable The Patient and/or Patient Representative was Provided with a Choice of Provider and Agrees with the Discharge Plan?: Yes Name of the Patient Representative Who was Provided with a Choice of Provider and Agrees with the Discharge Plan: patient Discharge Location Discharge Placement: Home          
 
1351: Spoke with wife to walk her through how to set up a Lyft pickup; when the wife was called back she said that she has a neighbor who will transport her to her dialysis appointment on Monday.

## 2020-10-11 NOTE — PROGRESS NOTES
Bedside and Verbal shift change report given to [address] (oncoming nurse) by DAVE Nance (offgoing nurse). Report included the following information SBAR, Kardex, Intake/Output, MAR and Recent Results. 0518- Lab reported critical platelet level of 25. Dr. Cecilia Ruiz notified, no orders received at this time. Shift Summary-  
Patient Vitals for the past 12 hrs: 
 Temp Pulse Resp BP SpO2  
10/12/20 0259 98.7 °F (37.1 °C) 79 18 112/71 100 % 10/11/20 2336 98.4 °F (36.9 °C) 88 18 125/87 99 % 10/11/20 2048 98.6 °F (37 °C) 85 18 127/84 100 % Dressing to wound of left flank CDI. Pt given PRN morphine x3 for pain rated 7/10 to abdomen. Bedside and Verbal shift change report given to [address] [place] [address] (oncoming nurse) by [address] (offgoing nurse). Report included the following information SBAR, Kardex, Intake/Output, MAR and Recent Results.

## 2020-10-11 NOTE — PROGRESS NOTES
200- MD said to stop IV fluids. Banana bag stopped. 1307-Paged MD to request Reglan be given instead of phenergan since pt is requesting nausea medication less than 2 hours after having phenergan. MD said she would place order, also said IV banana bag order can be removed from orders.

## 2020-10-11 NOTE — PROGRESS NOTES
Shift Summary: patient alert and oriented 4. Lungs are clear upon auscultation. Reported upper abdomen and left back pain, pain medication given with relief. Zofran and phenergan was also given for nausea. Assisted patient to the side of the bed to use the urinal. Call bell within reach. 5008 Dr. Isis Rojas made aware of critical lab result for platelet of 28, no order received, just have the day shift nurse to ask the day hospitalist what the  plan of care to address the low platelet. Patient Vitals for the past 12 hrs: 
 Temp Pulse Resp BP SpO2  
10/11/20 0247 98.1 °F (36.7 °C) 88 18 113/85 99 % 10/10/20 2220 98.3 °F (36.8 °C) 96 18 113/82 99 % 10/10/20 1935 97.9 °F (36.6 °C) 95 17 119/89 100 %  
 
 
0708 Bedside and Verbal shift change report given to Dina Douglas RN (oncoming nurse) by Dagmar Brown RN 
 (offgoing nurse). Report included the following information SBAR, Intake/Output, MAR and Recent Results.

## 2020-10-11 NOTE — PROGRESS NOTES
Hospitalist Progress Note Patient: Nae Bella MRN: [id number]  CSN: [id number] YOB: 1965  Age: 47 y.o. Sex: male DOA: 10/9/2020 LOS:  LOS: 2 days Chief Complaint: 
 
 
 
 
Assessment/Plan Active Problems: 1. Hypocalcemia (we will place give IV calcium and phos today  albumin low from renal disease will start IV albumin 3. Colitis started on IV Zosyn GI consulted 3. Hidradenitis on chronic Humira 4. Cirrhosis with portal hypertension we will start IV albumin 5. Alcoholism placed on a banana bag CIWA advised to quit fluids stop due to diminished sounds 6. Tobacco disorder started on a nicotine patch 7. History of chronic pancreatitis with normal lipase Creon restarted advance diet to full 8. Diabetes hold Lantus use sliding scale insulin clear liquid diet advance as tolerated 9. Coagulopathy from cirrhosis we will start vitamin K 
10. Hypomagnesium Magnesium IV today Patient Active Problem List  
Diagnosis Code  Hypocalcemia E83.51  
 Hypomagnesemia E83.42  
 Alcoholism ([facility] [address] F10.20  Cirrhosis ([facility] [address] K74.60  Colitis K52.9 Subjective: 
 
 
 
Review of systems: 
 
Constitutional: denies fevers, chills, myalgias Respiratory: denies SOB, cough Cardiovascular: denies chest pain, palpitations Gastrointestinal: +nausea, vomiting, diarrhea Vital signs/Intake and Output: 
Visit Vitals /89 Pulse 89 Temp 98.5 °F (36.9 °C) Resp 18 Ht 5' 9\" (1.753 m) Wt 65.2 kg (143 lb 12.8 oz) SpO2 100% BMI 21.24 kg/m² Current Shift:  10/11 0701 - 10/11 1900 In: 2200 [I.V.:1355] Out: - Last three shifts:  10/09 1901 - 10/11 0700 In: 3871.3 [P.O.:940; I.V.:2931.3] Out: 1675 [JUQWE:7471] Exam: 
 
General: Well developed, alert, NAD, OX3 Head/Neck: NCAT, supple, No masses, No lymphadenopathy CVS:Regular rate and rhythm, no M/R/G, S1/S2 heard, no thrill Lungs:Clear to auscultation bilaterally, no wheezes, rhonchi, or rales Abdomen: TTPntender, No distention, Normal Bowel sounds, No hepatomegaly Extremities: No C/C/E, pulses palpable 2+ Skin:normal texture and turgor, no rashes, no lesions Neuro:grossly normal , follows commands Psych:appropriate Labs: Results:  
   
Chemistry Recent Labs 10/11/20 
0416 10/10/20 
0355 10/09/20 
0930 GLU 89 120* 123*  142 134* K 3.3* 3.2* 4.3  
 106 95* CO2 25 26 25 BUN 10 21* 40* CREA 0.81 1.09 1.58* CA 7.0* 6.6* 7.5* AGAP 10 10 14 BUCR 12 19 25* AP 67 76 106  
TP 5.2* 5.6* 6.9 ALB 2.5* 2.2* 2.4*  
GLOB 2.7 3.4 4.5* AGRAT 0.9 0.6* 0.5* CBC w/Diff Recent Labs 10/11/20 
0416 10/10/20 
0355 10/09/20 
0930 WBC 4.9 5.5 11.9 RBC 3.32* 3.57* 4.38* HGB 10.4* 11.2* 13.9 HCT 30.8* 32.8* 40.6 PLT 28* 39* 76* GRANS 60 63 80* LYMPH 25 21 11* EOS 2 2 0 Cardiac Enzymes Recent Labs 10/09/20 
0930  CKND1 2.3 Coagulation Recent Labs 10/09/20 
0930 PTP 18.3* INR 1.6* Lipid Panel No results found for: CHOL, CHOLPOCT, CHOLX, CHLST, CHOLV, 274356, HDL, HDLP, LDL, LDLC, DLDLP, 461453, VLDLC, VLDL, TGLX, TRIGL, TRIGP, TGLPOCT, CHHD, CHHDX  
BNP No results for input(s): BNPP in the last 72 hours. Liver Enzymes Recent Labs 10/11/20 
0416  
TP 5.2* ALB 2.5* AP 67 Thyroid Studies No results found for: T4, T3U, TSH, TSHEXT, TSHEXT Procedures/imaging: see electronic medical records for all procedures/Xrays and details which were not copied into this note but were reviewed prior to creation of Plan Edgar Lanier MD

## 2020-10-11 NOTE — ROUTINE PROCESS
Bedside and Verbal shift change report given to Debbie FRANCO by Chelle Hodgson RN. Report included the following information SBAR, Kardex, Intake/Output and MAR.

## 2020-10-12 NOTE — PROGRESS NOTES
Problem: Pressure Injury - Risk of 
Goal: *Prevention of pressure injury Description: Document Trav Scale and appropriate interventions in the flowsheet. Outcome: Progressing Towards Goal 
Note: Pressure Injury Interventions: Activity Interventions: Increase time out of bed, Pressure redistribution bed/mattress(bed type) Mobility Interventions: Pressure redistribution bed/mattress (bed type) Nutrition Interventions: Document food/fluid/supplement intake Friction and Shear Interventions: Minimize layers Reposition with pillows Problem: Pain Goal: *Control of Pain Outcome: Progressing Towards Goal 
  
Problem: Falls - Risk of 
Goal: *Absence of Falls Description: Document Madeline Judge Fall Risk and appropriate interventions in the flowsheet. Outcome: Progressing Towards Goal 
Note: Fall Risk Interventions: 
Mobility Interventions: Patient to call before getting OOB, Utilize walker, cane, or other assistive device Medication Interventions: Patient to call before getting OOB, Teach patient to arise slowly Elimination Interventions: Call light in reach, Patient to call for help with toileting needs, Urinal in reach History of Falls Interventions: Investigate reason for fall, Room close to nurse's station

## 2020-10-12 NOTE — PROGRESS NOTES
Hospitalist Progress Note Patient: Bianca Plummer MRN: [id number]  CSN: [id number] YOB: 1965  Age: 47 y.o. Sex: male DOA: 10/9/2020 LOS:  LOS: 3 days Chief Complaint: 
 
Chief complaint morphine is not helping pain having to wait too long to get medicine advance diet to full liquid and is tolerating it well over the last 24 hours lites today have dropped to 24 Assessment/Plan Active Problems: 1. Hypocalcemia (we will place give IV calcium and phos today  albumin low from renal disease will start IV albumin 3. Colitis started on IV Zosyn GI consulted 3. Hidradenitis on chronic Humira 4. Cirrhosis with portal hypertension  IV albumin lakelets are trending downward will obtain hepatology consult 5. Alcoholism stable no evidence of DTs CIWA discontinued 6. Tobacco disorder started on a nicotine patch 7. History of chronic pancreatitis with normal lipase Creon restarted advance diet to full tolerating well but states morphine is not helping will DC start p.o. Percocet and IV Dilaudid 8. Diabetes hold Lantus use sliding scale insulin clear liquid diet advance as tolerated 9. Coagulopathy from cirrhosis we will start vitamin K 
10. Hypomagnesium Magnesium IV today improving slowly Patient Active Problem List  
Diagnosis Code  Hypocalcemia E83.51  
 Hypomagnesemia E83.42  
 Alcoholism ([facility] [address] F10.20  Cirrhosis ([facility] [address] K74.60  Colitis K52.9 Subjective: 
 
 
 
Review of systems: 
 
Constitutional: denies fevers, chills, myalgias Respiratory: denies SOB, cough Cardiovascular: denies chest pain, palpitations Gastrointestinal: +nausea, vomiting, diarrhea worsening abdominal pain today states he had to wait for 8 hours Vital signs/Intake and Output: 
Visit Vitals /89 Pulse 85 Temp 98.7 °F (37.1 °C) Resp 17 Ht 5' 9\" (1.753 m) Wt 65.2 kg (143 lb 12.8 oz) SpO2 99% BMI 21.24 kg/m² Current Shift:  10/12 0701 - 10/12 1900 In: -  
Out: 580 [QRIKM:601] Last three shifts:  10/10 1901 - 10/12 0700 In: 4132.4 [P.O.:1440; I.V.:2692.4] Out: 1525 [PRAWS:5276] Exam: 
 
General: Well developed, alert, NAD, OX3 Head/Neck: NCAT, supple, No masses, No lymphadenopathy CVS:Regular rate and rhythm, no M/R/G, S1/S2 heard, no thrill Lungs:Clear to auscultation bilaterally, no wheezes, rhonchi, or rales Abdomen: TTPntender, No distention, Normal Bowel sounds, No hepatomegaly Extremities: No C/C/E, pulses palpable 2+ Skin:normal texture and turgor, no rashes, no lesions Neuro:grossly normal , follows commands Psych:appropriate Labs: Results:  
   
Chemistry Recent Labs 10/12/20 
0400 10/11/20 
0416 10/10/20 
0355 * 89 120*  144 142  
K 4.0 3.3* 3.2*  
 109 106 CO2 25 25 26 BUN 6* 10 21* CREA 0.86 0.81 1.09  
CA 7.6* 7.0* 6.6* AGAP 10 10 10 BUCR 7* 12 19 AP 66 67 76  
TP 5.8* 5.2* 5.6* ALB 3.0* 2.5* 2.2*  
GLOB 2.8 2.7 3.4 AGRAT 1.1 0.9 0.6* CBC w/Diff Recent Labs 10/12/20 
0400 10/11/20 
0416 10/10/20 
0355 WBC 5.8 4.9 5.5  
RBC 3.39* 3.32* 3.57* HGB 10.6* 10.4* 11.2* HCT 31.7* 30.8* 32.8* PLT 25* 28* 39* GRANS 60 60 63 LYMPH 27 25 21 EOS 1 2 2 Cardiac Enzymes Recent Labs 10/09/20 
0930  CKND1 2.3 Coagulation Recent Labs 10/12/20 
0400 10/11/20 
1222 PTP 20.2* 19.7* INR 1.8* 1.7* Lipid Panel No results found for: CHOL, CHOLPOCT, CHOLX, CHLST, CHOLV, 973076, HDL, HDLP, LDL, LDLC, DLDLP, 708707, VLDLC, VLDL, TGLX, TRIGL, TRIGP, TGLPOCT, CHHD, CHHDX  
BNP No results for input(s): BNPP in the last 72 hours. Liver Enzymes Recent Labs 10/12/20 
0400 TP 5.8* ALB 3.0* AP 66 Thyroid Studies No results found for: T4, T3U, TSH, TSHEXT, TSHEXT Procedures/imaging: see electronic medical records for all procedures/Xrays and details which were not copied into this note but were reviewed prior to creation of Plan Kanchan Ramos MD

## 2020-10-12 NOTE — PROGRESS NOTES
Physician Progress Note Bc Rivas 
CSN #:                  [id number] :                       1965 ADMIT DATE:       10/9/2020 9:19 AM 
[address] DATE: 
RESPONDING 
PROVIDER #:        Taqueria Montano MD 
 
 
 
 
QUERY TEXT: 
 
Dear Hospitalist, Patient admitted with hypocalcemia, noted to have low potassium levels . If possible, please document in progress notes and discharge summary if you are evaluating and/or treating any of the following: The medical record reflects the following: 
 
Risk Factors: Alcoholism, PMH pancreatitis, Cirrhosis Clinical Indicators: 
> potassium 3.2, 3.3, 4.0 Treatment: Receiving CMP, IV potassium phosphate Thank you, Jey Carreno RN, BSN, [address] [place] [address] 
[phone] Options provided: 
-- Hypokalemia 
-- Hypokalemia not clinically significant 
-- Other - I will add my own diagnosis -- Disagree - Not applicable / Not valid -- Disagree - Clinically unable to determine / Unknown 
-- Refer to Clinical Documentation Reviewer PROVIDER RESPONSE TEXT: 
 
This patient has hypokalemia.  
 
Query created by: Dani Head on 10/12/2020 11:55 AM 
 
 
Electronically signed by:  Taqueria Montano MD 10/12/2020 1:57 PM

## 2020-10-12 NOTE — PROGRESS NOTES
Pt is independent. Anticipate pt will transition home with physician follow up when medically stable. CM to continue to follow and assist. 
 
Care Management Interventions PCP Verified by CM: Yes Last Visit to PCP: 10/08/20 Transition of Care Consult (CM Consult): Discharge Planning Current Support Network: Lives with Spouse, Own Home Confirm Follow Up Transport: Self The Plan for Transition of Care is Related to the Following Treatment Goals : home when medically stable The Patient and/or Patient Representative was Provided with a Choice of Provider and Agrees with the Discharge Plan?: Yes Name of the Patient Representative Who was Provided with a Choice of Provider and Agrees with the Discharge Plan: patient Discharge Location Discharge Placement: Home

## 2020-10-12 NOTE — CONSULTS
500 [facility] 137 [facility] Kristin Jovani Wallace MD, Yoana Larson [facility] Eleni Curiel, RASHID Best, [facility]-BC April S Estelita, [facility]   Laquita Rose, FNP-C Amarilis Jm, [facility] Jonatan Marinelli[facility] 136 
  at [place] [address] [address] [facility], [address] [address]  22. 
  [phone] FAX: [address] [place] [address] Avenue 
  [facility] 
  [address], [address] 98 [facility], [address] 
  [phone] FAX: [phone] HEPATOLOGY CONSULT NOTE I was asked to see this patient in consultation by Dr Bette New for management of cirrhosis. I have reviewed the Emergency room note, Hospital admission note, Notes by all other physicians who have seen the patient during this hospitalization to date. I have reviewed the problem list and the reason for this hospitalization. I have reviewed the allergies and the medications the patient was taking at home prior to this hospitalization. HISTORY: 
The patient is a 47year old  male with out previous knowledge of liver disease. He says he used to consume alcohol fairly heavily in several years ago but cut back to only 1-2 a few days per week 2 years ago. He notes progressive weakness and lower extremity swelling for the past few months and has no alcohol for 2-3 weeks. He notes problem concentrating on work and increased forgetfullness. In the Ed labs were significant for: INR 1.8, TBILI 4, PLT 25, HB 10 gm, He finally came to the ED and was hospitalized. CT scan demonstrated hepatomegaly and fatty liver. No obvious cirrhosis. No ascites. PMH: 
Chronic pancreatitis Left femur fracture Perianal fistula ASSESSMENT AND PLAN: 
Cirrhosis Cirrhosis of unclear etiology. This may be due to alcohol The diagnosis of cirrhosis is based upon laboratory studies The CTP is 11. Child class C. The MELD score is 20. 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 Will initiate liver transplant evaluation testing. ECHO today is normal. 
Will need nuclear stress. Serologic testing for causes of chronic liver disease were ordered. 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 This is based upon a history of consuming significant alcohol on a daily basis for many years. The patient has been abstinent from alcohol since 9/2020. I do not suspect he has alcoholic hepatitis. He has been abstinent from alcohol for 2-3 weeks and he has not been drinking excessively for several months prior to that. Suspect the changes to his labs are all due to chronic liver disease and cirrhosis The DF is right at 28. Even if this was alcoholic hepatitis it is mild and does not require steroids. Acute kidney injury The patient had RODERICK on admission with SCr 1.58 RODERICK is secondary to the effects of cirrhosis Scr has come down to normal with IV albumin. Can continue IV albumin until DC. Lower extremity edema He had significant edema on admission. Edema has resolved with IV albumin. The renal function is normal and edema should be able to be controled with diuretics. Screening for Esophageal varices The patient has not had an EGD to screen for varices. I do not want to do EGD now because of the very low PLT count. Will given PLT growth factor and perform EGD as OP after PLT is up and it would be safer for banding and bx if any lesions found. Hepatic encephalopathy Overt HE has not developed to date. 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 Will start lactulose 30 cc BID. Anemia This is due to multifactorial causes including portal hypertension with chronic GI blood loss, bone marrow suppression secondary to malnutrition and alcohol. Will obtain FE panel to assess for iron stores. Will need EGD to assess for UGI blood loss. Thrombocytopenia This is secondary to cirrhosis. However the value of 25K is much lower than we typically see with cirrhosis and will check anti-PLT antibodies. There is no evidence of overt bleeding. No treatment is required. The platelet count is under 50K. The use of a platelet growth factor to raise the platelet count and avoid platelet transfusions would be indicated if the patient requires a medical or surgical procedure. Screening for Hepatocellular Carcinoma [facility] [address] screening has not been not been performed A non-contrasted CT is not [facility]. Will get US duplex of liver in AM. 
 
 
SYSTEM REVIEW: 
Constitution systems: Generalized weakness. Eyes: Negative for visual changes. ENT: Negative for sore throat, painful swallowing. Respiratory: Negative for cough, hemoptysis, SOB. Cardiology: Swelling of legs has resolved. GI:  Negative for constipation or diarrhea. : Negative for urinary frequency, dysuria, hematuria, nocturia. Skin: Negative for rash. Hematology: Negative for easy bruising, blood clots. Musculo-skelatal: Negative for back pain, muscle pain, weakness. Neurologic: Negative for headaches, dizziness, vertigo, memory problems not related to HE. Psychology: Negative for anxiety, depression. FAMILY HISTORY: 
The patient has no knowledge of the father's medical condition. The mother Has/had the following chronic disease(s): MS. There is no family history of liver disease. SOCIAL HISTORY: 
The patient is . The patient has 2 children, 1 stepchild, and 3 grandchildren. The patient currently smokes 1 pack of tobacco daily. The patient has previously consumed alcohol in excess. The patient has been abstinent from alcohol since 9/2020. The patient currently works full time as  for [email or web address] at [employer]. PHYSICAL EXAMINATION: 
VS: per nursing note General:  No acute distress. Eyes:  Sclera anicteric. ENT:  No oral lesions. Thyroid normal. 
Nodes:  No adenopathy. Skin:  Spider angiomata. No jaundice. Respiratory:  Lungs clear to auscultation. Cardiovascular:  Regular heart rate. Abdomen:  Soft non-tender, Hepatomegally with liver 5 fingers below the right costal margin. No obvious ascites. Extremities:  No lower extremity edema. Neurologic:  Alert and oriented. Cranial nerves grossly intact. No asterixis. LABORATORY: 
Results for Gerri Asif (MRN [id number]) as of 10/12/2020 18:09 Ref. Range 10/10/2020 03:55 10/11/2020 04:16 10/12/2020 04:00 WBC Latest Ref Range: 4.6 - 13.2 K/uL 5.5 4.9 5.8 HGB Latest Ref Range: 13.0 - 16.0 g/dL 11.2 (L) 10.4 (L) 10.6 (L) PLATELET Latest Ref Range: 135 - 420 K/uL 39 (L) 28 (LL) 25 (LL) INR Latest Ref Range: 0.8 - 1.2    1.7 (H) 1.8 (H) Sodium Latest Ref Range: 136 - 145 mmol/L 142 144 143 Potassium Latest Ref Range: 3.5 - 5.5 mmol/L 3.2 (L) 3.3 (L) 4.0 Chloride Latest Ref Range: 100 - 111 mmol/L 106 109 108 CO2 Latest Ref Range: 21 - 32 mmol/L 26 25 25 Glucose Latest Ref Range: 74 - 99 mg/dL 120 (H) 89 145 (H) BUN Latest Ref Range: 7.0 - 18 MG/DL 21 (H) 10 6 (L) Creatinine Latest Ref Range: 0.6 - 1.3 MG/DL 1.09 0.81 0.86 Bilirubin, total Latest Ref Range: 0.2 - 1.0 MG/DL 4.4 (H) 4.4 (H) 4.7 (H) Albumin Latest Ref Range: 3.4 - 5.0 g/dL 2.2 (L) 2.5 (L) 3.0 (L) ALT Latest Ref Range: 16 - 61 U/L 59 48 43 AST Latest Ref Range: 10 - 38 U/L 119 (H) 102 (H) 86 (H) Alk. phosphatase Latest Ref Range: 45 - 117 U/L 76 67 66 RADIOLOGY: 
10/2020. CT scan abdomen without IV contrast.  Changes consistent with fatty liver and hepatomegaly. Splenomegaly. No ascites. Melony Tuttle MD 
[address] [employer] [address] [facility], [address] 98 Jemma Gilman, [address] 
[phone] [facility]7 [address]

## 2020-10-12 NOTE — PROGRESS NOTES
0730- Bedside and Verbal shift change report given to Ankur Bella RN (oncoming nurse) by VAMSI Miles RN (offgoing nurse). Report included the following information SBAR, Kardex, Intake/Output and MAR.  
 
0830- called pharmacy to request Zenpep oral tab. Pharmacy verbalized understanding. 36- Spoke to Dr. Bette New regarding insufficient pain managament per patient request and low platelet count. Dr. Bette New verbalized understanding. [facility] still not in [place] [address]. Pharmacy was called again to bring up dose for lunch. Breakfast dose missed. See MAR. 
 
1310- Patients states that pain is well controlled and that he is comfortable. 1430- Changed patient's dressing on left back and coccyx. Changed linens and gown changed. Patient ambulated in hallway with cane and tolerated fairly. See Flowsheets. 1651- Spoke to Dr. Bette New regarding lidocaine patch and Himera injection, which patient typically uses at home. She stated to put in an order for 4% lidocaine patch but to hold the Himera. 1930- Bedside and Verbal shift change report given to LAVON Li (oncoming nurse) by Ankur Bella RN (offgoing nurse). Report included the following information SBAR, Kardex, Intake/Output and MAR.

## 2020-10-13 PROBLEM — I50.31 ACUTE DIASTOLIC CHF (CONGESTIVE HEART FAILURE) (HCC): Status: ACTIVE | Noted: 2020-01-01

## 2020-10-13 PROBLEM — J96.01 ACUTE RESPIRATORY FAILURE WITH HYPOXEMIA (HCC): Status: ACTIVE | Noted: 2020-01-01

## 2020-10-13 PROBLEM — J69.0 ASPIRATION PNEUMONIA (HCC): Status: ACTIVE | Noted: 2020-01-01

## 2020-10-13 PROBLEM — D69.6 THROMBOCYTOPENIA (HCC): Status: ACTIVE | Noted: 2020-01-01

## 2020-10-13 NOTE — PROGRESS NOTES
Pharmacy Dosing Services: Vancomycin Consult for Vancomycin Dosing by Pharmacy by Dr. Yue Velazquez Consult provided for this 47y.o. year old male , for indication of aspiration pneumonia. Day of Therapy 1 Ht Readings from Last 1 Encounters:  
10/12/20 175.3 cm (69\") Wt Readings from Last 1 Encounters:  
10/12/20 64.9 kg (143 lb) Serum Creatinine Lab Results Component Value Date/Time Creatinine 0.91 10/13/2020 04:40 PM  
 Creatinine, POC 0.9 10/07/2019 03:54 PM  
  
Creatinine Clearance Estimated Creatinine Clearance: 85.2 mL/min (based on SCr of 0.91 mg/dL). BUN Lab Results Component Value Date/Time BUN 4 (L) 10/13/2020 04:40 PM  
  
WBC Lab Results Component Value Date/Time WBC 10.7 10/13/2020 04:40 PM  
  
H/H Lab Results Component Value Date/Time HGB 10.7 (L) 10/13/2020 04:40 PM  
  
Platelets Lab Results Component Value Date/Time PLATELET 29 (LL) 98/81/4164 04:40 PM  
  
Temp 97.7 °F (36.5 °C) Start Vancomycin therapy, with loading dose of 1500 mg at 1900 10/13/20. Follow with maintenance dose of 1000 mg at 0700 10/14/20, every 12 hours. Dose calculated to approximate a therapeutic trough of 15-20 mcg/mL. Pharmacy to follow daily and will make changes to dose and/or frequency based on clinical status.  
 
Pharmacist JERARDO Bennett

## 2020-10-13 NOTE — PROGRESS NOTES
RESPIRATORY NOTE: 
RRT called, pt in respiratory distress, low SPO2 in the 70's, 100% NRB mask initiated ABG done, BIPAP initiated per Dr. Cristobal-Janelle/ABG results, BS reveal crackles throughout, tolerating BIPAP well at this time, will continue to monitor.

## 2020-10-13 NOTE — ROUTINE PROCESS
RRT called to room 304 @ 1635. Arrived at room to find Dr. Shin Ramirez in with the patient and three RN's and a CNA. Patient was beginning to \"De-sat\" and his mentation was diminishing to affecting his verbal response to verbal stimuli. Respiratory was in room and obtained ABG's as per Doctor's order, I obtained a \"Luna\" @ doctor's request through an additional (18 GA PIV) started in left distal posterior cephalic vein. Labeled blood after verifying patient's ID and took it to the lab. Returned to patient's RN and nothing more was needed at the time. Assisted in admitting patient to ICU bed #9 a half hour later with Respiratory Tech and RN Duarte. End of event.  
LESTER GALLARDO

## 2020-10-13 NOTE — PROGRESS NOTES
1633- Patient O2 sat at 78 n 5L nasal cannula. Patient increased to 6L 
 
1635- Rapid response called 1640- Non rebrether placed on 15L. O2 at at 88%

## 2020-10-13 NOTE — PROGRESS NOTES
Bedside and Verbal shift change report given to KIRK Tinoco RN (oncoming nurse) by Ronit Herzog (offgoing nurse). Report included the following information SBAR, Kardex, Intake/Output and MAR. Patient A? OX4. NPO for Stress test and liver ultra sound today. Patient in bed resting quietly no complaints at this time. 0747-Patient taken down to US and stress test.  
 
1140-Patient arrived back on the floor. 1210-Patient requested something for pain. Administered Dilaudid 1 MG IV. Pain 4/10 
 
1450-Patient ambulatory with PT. Tolerated well. 1510-Patient requested something for pain. Administered Dilaudid 1 MG IV. Pain 4/10 
 
1540-O2 of 84 on 4L nasal cannuala with pulse of 113. Dr. Kendra Fuller paged. Stat amonia, abg, chest xray lasix 40 mg IV ordered. Orders placed 1630-Patient o2 at 78 on 5L nasal cannula. RRT Called per Dr. Kendra Fuller. 1640-Patient placed on non rebrether 15L o2 at 88%. Patient to be transferred to  ICU. Orders placed. Patient placed on ByPap, Respiratory in room. Bedside and Verbal shift change report given to Jesu Amaya (oncoming nurse) by Pura Henao (offgoing nurse). Report included the following information SBAR, Kardex, Intake/Output and MAR.

## 2020-10-13 NOTE — PROGRESS NOTES
RAPID RESPONSE NOTE:  
 
Rapid response called because pt 02 sat  78 n 5L nasal cannula. Non-re breather mask placed on 15L, oxygen would not increase higher than 88% Pt sluggish, but able to communicate, alert and oriented x4. CXR demonstrates coarse breath sounds with crackles , less than an hour ago, Lasix 40mg IV was given per nurse >almonte bag filled with cocoa colored urine. ABG shows ph ~7.2 with pC02 in 40s , p02 in 50s and SP02 78% CBC, BMP, Ammonia, PCXR all pending Transfer to ICU for acute respiratory failure with hypoxia D/w respiratory therapy, will place on BiPAP vs HFNC 
 
D/w pt his wishes for resuscitation and intubation, pt desires to be FULL code. Desires intubation if needed. Desires that his wife and daughter be contacted if he is unable to make medical decisions for himself. Consult Dr. Caroline Alejandre Called Maria Del Carmen Marshall, pt's spouse to discuss pt's change in care. Wife did not answer. Will try again soon

## 2020-10-13 NOTE — DIABETES MGMT
GLYCEMIC CONTROL PROGRESS NOTE: 
 
- discussed in rounds, known h/o T2DM, basal/oral home regimen - BG out of target range non-ICU:< 180 mg/dl, requiring consistent corrective coverage - TDD = 16 units - Humalog Normal Insulin Sensitivity Corrective Coverage 
- NPO, recommend monitor BG trends once diet resumes pt may benefit from basal insulin Recent Glucose Results:  
Lab Results Component Value Date/Time  (H) 10/13/2020 03:48 AM  
 GLUCPOC 185 (H) 10/13/2020 11:48 AM  
 GLUCPOC 141 (H) 10/13/2020 07:21 AM  
 GLUCPOC 158 (H) 10/12/2020 09:24 PM  
 
India Forrest MS, RN, CDE Glycemic Control Team 
[phone] Pager [phone] (M-TH 8:00-4:30P) *After Hours pager [phone]

## 2020-10-13 NOTE — PROGRESS NOTES
Problem: Pressure Injury - Risk of 
Goal: *Prevention of pressure injury Description: Document Trav Scale and appropriate interventions in the flowsheet. Outcome: Progressing Towards Goal 
Note: Pressure Injury Interventions: Activity Interventions: Pressure redistribution bed/mattress(bed type) Mobility Interventions: Pressure redistribution bed/mattress (bed type) Nutrition Interventions: Document food/fluid/supplement intake Friction and Shear Interventions: Foam dressings/transparent film/skin sealants, Transfer aides:transfer board/Thao lift/ceiling lift, Transferring/repositioning devices, Lift sheet

## 2020-10-13 NOTE — PROGRESS NOTES
Hospitalist Progress Note Patient: Andi Powers MRN: [id number]  CSN: [id number] YOB: 1965  Age: 47 y.o. Sex: male DOA: 10/9/2020 LOS:  LOS: 4 days Chief Complaint: 
 
cirrhosis Assessment/Plan 48 yo male admitted with weakness, cirrhosis changes, low electrolytes Cirrhosis likely due to etoh abuse-PV thrombosis on US today, defer tx if needed to hepatology Alcohol abuse, off 2-3 weeks, continue lactulose, albumin Severe thrombocytopenia-no bleeding, transfuse for less than 20K or bleeding Hypophosphatemia-repletion IV Hypomagnesemia-repletion IV Hypocalcemia Colitis-empiric IV abxx Diabetes-a1c less leopoldo 6%, hyperglycemia likely stress response Coagulopathy-follow, watch for bleeding, due to liver disease Stress test done today Discussed with patient he has long road ahead, and will need to adjust and notify hiw work requirements He is very debilitated and should not return to fulltime work, in my opinion He will need further testing for possible LT evaluation Start PT Ed for now Appreciate hepatology help on case Disposition : 
Patient Active Problem List  
Diagnosis Code  Hypocalcemia E83.51  
 Hypomagnesemia E83.42  
 Alcoholism ([facility] [address] F10.20  Cirrhosis ([facility] [address] K74.60  Colitis K52.9 Subjective: 
I feel really weak, I just thought I was run down Denies new pain 
has some discomfort in abdomen Denies nausea, vomiting, diarrhea Review of systems: 
 
Constitutional: denies fevers, chills Respiratory: denies SOB, 
Cardiovascular: denies chest pain Gastrointestinal: denies nausea, vomiting, diarrhea Vital signs/Intake and Output: 
Visit Vitals /77 (BP 1 Location: Right arm, BP Patient Position: At rest) Pulse (!) 116 Temp 98.5 °F (36.9 °C) Resp 18 Ht 5' 9\" (1.753 m) Wt 64.9 kg (143 lb) SpO2 90% BMI 21.12 kg/m² Current Shift:  No intake/output data recorded. Last three shifts:  10/11 1901 - 10/13 0700 In: 1050 [I.V.:1050] Out: 1400 [[id number]] Exam: 
 
General: ill debilitated appearing WM, alert, NAD, OX3 Head/Neck: NCAT, supple, No masses, No lymphadenopathy CVS:Regular rate and rhythm, no M/R/G, S1/S2 heard, no thrill Lungs:Clear to auscultation bilaterally, no wheezes, rhonchi, or rales Abdomen: Soft, Nontender,moderate distention, Normal Bowel sounds, No hepatomegaly Extremities: No C/C/E, pulses palpable 2+ Skin:grayish jaundice appearance Neuro:grossly normal , follows commands Psych:appropriate Labs: Results:  
   
Chemistry Recent Labs 10/13/20 
0348 10/12/20 
0400 10/11/20 
0416 * 145* 89  143 144  
K 4.2 4.0 3.3*  
 108 109 CO2 25 25 25 BUN 3* 6* 10  
CREA 0.89 0.86 0.81 CA 8.0* 7.6* 7.0* AGAP 8 10 10 BUCR 3* 7* 12  
AP 73 66 67  
TP 6.3* 5.8* 5.2* ALB 3.2* 3.0* 2.5*  
GLOB 3.1 2.8 2.7 AGRAT 1.0 1.1 0.9  
  
CBC w/Diff Recent Labs 10/13/20 
0348 10/12/20 
0400 10/11/20 
0416 WBC 11.5 5.8 4.9  
RBC 3.70* 3.39* 3.32* HGB 11.6* 10.6* 10.4* HCT 34.6* 31.7* 30.8* PLT 22* 25* 28* GRANS 78* 60 60  
LYMPH 13* 27 25 EOS 1 1 2 Cardiac Enzymes No results for input(s): CPK, CKND1, LYDIA in the last 72 hours. No lab exists for component: Marly Dunn Coagulation Recent Labs 10/13/20 
0348 10/12/20 
0400 PTP 20.4* 20.2* INR 1.8* 1.8* Lipid Panel No results found for: CHOL, CHOLPOCT, CHOLX, CHLST, CHOLV, 646747, HDL, HDLP, LDL, LDLC, DLDLP, 053100, VLDLC, VLDL, TGLX, TRIGL, TRIGP, TGLPOCT, CHHD, CHHDX  
BNP No results for input(s): BNPP in the last 72 hours. Liver Enzymes Recent Labs 10/13/20 
0348 TP 6.3* ALB 3.2* AP 73 Thyroid Studies No results found for: T4, T3U, TSH, TSHEXT Procedures/imaging: see electronic medical records for all procedures/Xrays and details which were not copied into this note but were reviewed prior to creation of Plan Kasia David MD

## 2020-10-13 NOTE — PROGRESS NOTES
Zosyn (Piperacillin/Tazobactam) Extended Infusion Janak Kinney, a 47 y.o. yo male, has been converted to an extended infusion of Zosyn while in the intensive care unit. A loading dose of 3.375 or 4.5 gm will be given over 30 minutes depending on indication. Extended infusions will begin 4 hours after the initial dose if CrCl  >/= 20 ml/min or 8 hours after the initial dose if CrCl < 20 ml/min. Extended infusions will run over 4 hours (240 minutes). Recent Labs 10/13/20 
1640 10/13/20 
0348 10/12/20 
0400 CREA 0.91 0.89 0.86 Ht Readings from Last 1 Encounters:  
10/12/20 175.3 cm (69\") Wt Readings from Last 1 Encounters:  
10/12/20 64.9 kg (143 lb) CrCl : Serum creatinine: 0.91 mg/dL 10/13/20 1640 Estimated creatinine clearance: 85.2 mL/min Renal adjustment of extended infusion of Zosyn 3.375 or 4.5 gm every 8 hours for CrCl >/= 20 ml/min 3.375 or 4.5 gm every 12 hours for CrCl < 20 ml/min, intermittent HD or PD

## 2020-10-13 NOTE — PROGRESS NOTES
Bedside shift change report received from [facility] nurse). Report included the following information SBAR, Kardex, ED Summary, Intake/Output, MAR, Accordion, Recent Results, Med Rec Status, Cardiac Rhythm ST and Quality Measures. Patient lethargic and weak, barely able to hold head up, currenty on BIBAP. Report received from [address] [place] [address] Rd suspect for CovId, Rapid swab ordered by Dr Theodora Dunn, and Dr Hayley Reddy approved,Picking up swab from lab at this time. Swab obtained awaiting results. Patient tolerated well. Visit Vitals /80 Pulse 100 Temp 97.7 °F (36.5 °C) Resp 21 Ht 5' 9\" (1.753 m) Wt 64.9 kg (143 lb) SpO2 96% BMI 21.12 kg/m²

## 2020-10-13 NOTE — CONSULTS
[facility] Pulmonary, Critical Care, and Sleep Medicine Name: Bianca Plummer MRN: [id number] : 1965 Hospital: [facility] [place] Date: 10/13/2020  Room: 109/[address] [place] [address] Note Consult requesting physician: Dr. Shane Philip Reason for Consult: Acute respiratory failure with hypoxemia Subjective/History of Present Illness:  
 
Patient is a 47 y.o. male with PMHx significant for chronic pancreatitis, diabetes, gastroparesis, hydroadenitis; history of colitis on Humiranot clear if patient has inflammatory bowel disease. Patient was admitted on 10/9 with nausea and lower abdominal pains. The patient was admitted to the hospital.  Dr. Nigel Tate consulted for cirrhosis. Today evening, patient had shortness of breath symptoms and hypoxemia. RRT was done. Chest x-ray shows bilateral pulmonary infiltrates. Patient was placed on BiPAP and moved to the ICU. He is currently getting Lasix injection. Patient denies any vomiting or aspirations recently; he denies any COVID-19 contacts at home. He feels comfortable on BiPAP. No significant cough or productive sputum noted; no chest pain or hemoptysis. Telemetry mild sinus tach. Afebrile; blood pressure stable; overall fluid positive by 5 L this admission. Review of Systems: Limited since patient on BiPAP. HEENT: No epistaxis, no difficulty in swallowing, no redness in eyes Respiratory: as above Cardiovascular: no chest pain, no palpitations, no syncope Gastrointestinal: no abd pain, no vomiting, no diarrhea, no bleeding symptoms Genitourinary: No urinary symptoms or hematuria; patient has Ward catheter now Neurological: No focal weakness, no seizures, no headaches Behvioral/Psych: No anxiety, no depression Constitutional: No fever, no chills No Known Allergies Past Medical History:  
Diagnosis Date  Diabetes ([facility] [address]  Gastroparesis  Pancreatitis Past Surgical History:  
Procedure Laterality Date  HX CHOLECYSTECTOMY  HX HIP FRACTURE TX    
 left hip sx 9.23/2018 Social History Tobacco Use  Smoking status: Current Every Day Smoker  Smokeless tobacco: Never Used Substance Use Topics  Alcohol use: Yes History reviewed. No pertinent family history. Prior to Admission medications Medication Sig Start Date End Date Taking? Authorizing Provider  
adalimumab (Humira Pen) 40 mg/0.8 mL injection pen 40 mg by SubCUTAneous route every seven (7) days. Yes Provider, Historical  
lidocaine (LIDODERM) 5 % 2 Patches by TransDERmal route every twenty-four (24) hours. Apply patch to the affected area for 12 hours a day and remove for 12 hours a day. One patch is applied to left hip. One patch is applied to right shoulder blade. Yes Provider, Historical  
loratadine (Claritin) 10 mg tablet Take 10 mg by mouth daily. Indications: inflammation of the nose due to an allergy   Yes Provider, Historical  
aspirin delayed-release 81 mg tablet Take 1 Tab by mouth daily. Yes Other, MD Meño  
insulin glargine (Lantus Solostar U-100 Insulin) 100 unit/mL (3 mL) inpn 5 Units by SubCUTAneous route daily. Yes Other, MD Meño  
DULoxetine (CYMBALTA) 60 mg capsule Take 1 Cap by mouth as needed. 1/6/19   Meño Springer MD  
glyBURIDE-metFORMIN (GLUCOVANCE) 2.5-500 mg per tablet Take 1 Tab by mouth two (2) times a day. Meño Springer MD  
methocarbamoL (ROBAXIN) 500 mg tablet Take 2 Tabs by mouth four (4) times daily. Meño Springer MD  
traZODone (DESYREL) 50 mg tablet Take 50 mg by mouth nightly. Meño Springer MD  
HYDROmorphone (DILAUDID) 2 mg tablet Take 1 Tab by mouth every four (4) hours as needed for Pain. Max Daily Amount: 12 mg.  Indications: Severe Pain 9/26/18   Marilu Zuñiga MD  
ondansetron [facility] ODT) 4 mg disintegrating tablet Take 1-2 tablets every 6-8 hours as needed for nausea and vomiting. 18   Cecille Mcardle, MD  
 
Current Facility-Administered Medications Medication Dose Route Frequency  albumin human 25% (BUMINATE) solution 25 g  25 g IntraVENous Q6H  
 thiamine mononitrate (B-1) tablet 100 mg  100 mg Oral DAILY  folic acid (FOLVITE) tablet 1 mg  1 mg Oral DAILY  [START ON 10/14/2020] ferrous sulfate tablet 325 mg  1 Tab Oral DAILY WITH BREAKFAST  albuterol-ipratropium (DUO-NEB) 2.5 MG-0.5 MG/3 ML  3 mL Nebulization NOW  furosemide (LASIX) injection 40 mg  40 mg IntraVENous BID  [START ON 10/14/2020] piperacillin-tazobactam (ZOSYN) 3.375 g in 0.9% sodium chloride (MBP/ADV) 100 mL MBP  3.375 g IntraVENous Q8H  
 vancomycin (VANCOCIN) 1,500 mg in 0.9% sodium chloride 500 mL (VIAL-MATE)_  1,500 mg IntraVENous ONCE  
 [START ON 10/14/2020] vancomycin (VANCOCIN) 1,000 mg in 0.9% sodium chloride 250 mL (VIAL-MATE)  1,000 mg IntraVENous Q12H  Vancomycin - Rx to dose and monitor  1 Each Other Rx Dosing/Monitoring  lactulose (CHRONULAC) 10 gram/15 mL solution 30 mL  20 g Oral BID  lidocaine 4 % patch 2 Patch  2 Patch TransDERmal Q24H  
 lipase-protease-amylase (ZENPEP 5,000) capsule 1 Cap  1 Cap Oral TID WITH MEALS  metoclopramide HCl (REGLAN) injection 5 mg  5 mg IntraVENous Q6H  
 nicotine (NICODERM CQ) 21 mg/24 hr patch 1 Patch  1 Patch TransDERmal DAILY  insulin lispro (HUMALOG) injection   SubCUTAneous AC&HS Objective:  
Vital Signs:   
Visit Vitals /83 Pulse (!) 124 Temp 97.7 °F (36.5 °C) Resp 10 Ht 5' 9\" (1.753 m) Wt 64.9 kg (143 lb) SpO2 98% BMI 21.12 kg/m² O2 Device: BIPAP  
O2 Flow Rate (L/min): 4 l/min Temp (24hrs), Av.5 °F (36.9 °C), Min:97.7 °F (36.5 °C), Max:98.9 °F (37.2 °C) Intake/Output:  
Last shift:      10/13 0701 - 10/13 1900 In: 100 [I.V.:100] Out: 900 [Urine:900] Last 3 shifts: 10/11 1901 - 10/13 0700 In: 1050 [I.V.:1050] Out: 1400 [OAHO] Intake/Output Summary (Last 24 hours) at 10/13/2020 1845 Last data filed at 10/13/2020 1825 Gross per 24 hour Intake 850 ml Output 1750 ml Net -900 ml Last 3 Recorded Weights in this Encounter 10/09/20 2234 10/12/20 1045 10/12/20 1046 Weight: 65.2 kg (143 lb 12.8 oz) 64.9 kg (143 lb) 64.9 kg (143 lb) Physical Exam:  
Comfortable; on BiPAP; acyanotic HEENT: pupils not dilated, reactive, no scleral jaundice, moist oral mucosa, no nasal drainage; neck supple Neck: No adenopathy or thyroid swelling CVS: S1S2 no murmurs; JVD not elevated RS: Mod air entry bilaterally, decreased BS at bases, no wheezes, bilateral crackles: Not tachypneic or in distress Abd: soft, non tender, 2 cm hepatomegaly palpable; no splenomegaly; mild abdominal distention from ascites; no guarding or rigidity, bowel sounds heard Neuro: non focal, awake, alert Extrm: Mild bilateral pitting ankle edema; no focal swelling or clubbing Skin: no rash Lymphatic: no cervical or supraclavicular adenopathy 
 
 
Data:  
   
Recent Results (from the past 24 hour(s)) GLUCOSE, POC Collection Time: 10/12/20  9:24 PM  
Result Value Ref Range Glucose (POC) 158 (H) 70 - 110 mg/dL CBC WITH AUTOMATED DIFF Collection Time: 10/13/20  3:48 AM  
Result Value Ref Range WBC 11.5 4.6 - 13.2 K/uL  
 RBC 3.70 (L) 4.70 - 5.50 M/uL  
 HGB 11.6 (L) 13.0 - 16.0 g/dL HCT 34.6 (L) 36.0 - 48.0 % MCV 93.5 74.0 - 97.0 FL  
 MCH 31.4 24.0 - 34.0 PG  
 MCHC 33.5 31.0 - 37.0 g/dL  
 RDW 15.0 (H) 11.6 - 14.5 % PLATELET 22 (LL) 773 - 420 K/uL NEUTROPHILS 78 (H) 40 - 73 % LYMPHOCYTES 13 (L) 21 - 52 % MONOCYTES 8 3 - 10 % EOSINOPHILS 1 0 - 5 % BASOPHILS 0 0 - 2 %  
 ABS. NEUTROPHILS 8.9 (H) 1.8 - 8.0 K/UL  
 ABS. LYMPHOCYTES 1.5 0.9 - 3.6 K/UL  
 ABS. MONOCYTES 1.0 0.05 - 1.2 K/UL  
 ABS. EOSINOPHILS 0.1 0.0 - 0.4 K/UL  
 ABS.  BASOPHILS 0.1 0.0 - 0.1 K/UL  
 DF AUTOMATED    
CALCIUM, IONIZED  
 Collection Time: 10/13/20  3:48 AM  
Result Value Ref Range Ionized Calcium 1.08 (L) 1.12 - 1.32 MMOL/L  
PROTHROMBIN TIME + INR Collection Time: 10/13/20  3:48 AM  
Result Value Ref Range Prothrombin time 20.4 (H) 11.5 - 15.2 sec INR 1.8 (H) 0.8 - 1.2 HEP B SURFACE AB Collection Time: 10/13/20  3:48 AM  
Result Value Ref Range Hepatitis B surface Ab <3.10 (L) >10.0 mIU/mL Hep B surface Ab Interp. Negative (A) POS Hep B surface Ab comment Samples with a  value of 10 mIU/mL or greater are considered positive (protective immunity) in accordance with the CDC guidelines. FERRITIN Collection Time: 10/13/20  3:48 AM  
Result Value Ref Range Ferritin 227 8 - 388 NG/ML  
IRON PROFILE Collection Time: 10/13/20  3:48 AM  
Result Value Ref Range Iron 45 (L) 50 - 175 ug/dL TIBC 69 (L) 250 - 450 ug/dL Iron % saturation 65 (H) 20 - 50 % HEPATITIS C AB Collection Time: 10/13/20  3:48 AM  
Result Value Ref Range Hepatitis C virus Ab 0.11 <0.80 Index Hep C virus Ab Interp. Negative NEG Hep C  virus Ab comment METABOLIC PANEL, COMPREHENSIVE Collection Time: 10/13/20  3:48 AM  
Result Value Ref Range Sodium 143 136 - 145 mmol/L Potassium 4.2 3.5 - 5.5 mmol/L Chloride 110 100 - 111 mmol/L  
 CO2 25 21 - 32 mmol/L Anion gap 8 3.0 - 18 mmol/L Glucose 123 (H) 74 - 99 mg/dL BUN 3 (L) 7.0 - 18 MG/DL Creatinine 0.89 0.6 - 1.3 MG/DL  
 BUN/Creatinine ratio 3 (L) 12 - 20 GFR est AA >60 >60 ml/min/1.73m2 GFR est non-AA >60 >60 ml/min/1.73m2 Calcium 8.0 (L) 8.5 - 10.1 MG/DL Bilirubin, total 5.5 (H) 0.2 - 1.0 MG/DL  
 ALT (SGPT) 43 16 - 61 U/L  
 AST (SGOT) 86 (H) 10 - 38 U/L Alk. phosphatase 73 45 - 117 U/L Protein, total 6.3 (L) 6.4 - 8.2 g/dL Albumin 3.2 (L) 3.4 - 5.0 g/dL Globulin 3.1 2.0 - 4.0 g/dL A-G Ratio 1.0 0.8 - 1.7 MAGNESIUM Collection Time: 10/13/20  3:48 AM  
Result Value Ref Range Magnesium 1.5 (L) 1.6 - 2.6 mg/dL PHOSPHORUS Collection Time: 10/13/20  3:48 AM  
Result Value Ref Range Phosphorus 2.3 (L) 2.5 - 4.9 MG/DL  
GLUCOSE, POC Collection Time: 10/13/20  7:21 AM  
Result Value Ref Range Glucose (POC) 141 (H) 70 - 110 mg/dL NUCLEAR CARDIAC STRESS TEST Collection Time: 10/13/20 11:23 AM  
Result Value Ref Range Target  bpm  
 Exercise duration time 00:00:49 Stress Base Systolic  mmHg Stress Base Diastolic  mmHg Post peak  BPM  
 Baseline  BPM  
 Estimated workload 1.0 METS  
GLUCOSE, POC Collection Time: 10/13/20 11:48 AM  
Result Value Ref Range Glucose (POC) 185 (H) 70 - 110 mg/dL GLUCOSE, POC Collection Time: 10/13/20  3:50 PM  
Result Value Ref Range Glucose (POC) 192 (H) 70 - 110 mg/dL AMMONIA Collection Time: 10/13/20  4:40 PM  
Result Value Ref Range Ammonia 34 (H) 11 - 32 UMOL/L  
CBC WITH AUTOMATED DIFF Collection Time: 10/13/20  4:40 PM  
Result Value Ref Range WBC 10.7 4.6 - 13.2 K/uL  
 RBC 3.46 (L) 4.70 - 5.50 M/uL  
 HGB 10.7 (L) 13.0 - 16.0 g/dL HCT 32.9 (L) 36.0 - 48.0 % MCV 95.1 74.0 - 97.0 FL  
 MCH 30.9 24.0 - 34.0 PG  
 MCHC 32.5 31.0 - 37.0 g/dL  
 RDW 15.6 (H) 11.6 - 14.5 % PLATELET 29 (LL) 451 - 420 K/uL NEUTROPHILS 86 (H) 40 - 73 % LYMPHOCYTES 9 (L) 21 - 52 % MONOCYTES 5 3 - 10 % EOSINOPHILS 0 0 - 5 % BASOPHILS 0 0 - 2 %  
 ABS. NEUTROPHILS 9.2 (H) 1.8 - 8.0 K/UL  
 ABS. LYMPHOCYTES 0.9 0.9 - 3.6 K/UL  
 ABS. MONOCYTES 0.6 0.05 - 1.2 K/UL  
 ABS. EOSINOPHILS 0.0 0.0 - 0.4 K/UL  
 ABS. BASOPHILS 0.0 0.0 - 0.1 K/UL  
 DF AUTOMATED METABOLIC PANEL, BASIC Collection Time: 10/13/20  4:40 PM  
Result Value Ref Range Sodium 142 136 - 145 mmol/L Potassium 4.1 3.5 - 5.5 mmol/L Chloride 109 100 - 111 mmol/L  
 CO2 22 21 - 32 mmol/L Anion gap 11 3.0 - 18 mmol/L Glucose 203 (H) 74 - 99 mg/dL  BUN 4 (L) 7.0 - 18 MG/DL  
 Creatinine 0.91 0.6 - 1.3 MG/DL  
 BUN/Creatinine ratio 4 (L) 12 - 20 GFR est AA >60 >60 ml/min/1.73m2 GFR est non-AA >60 >60 ml/min/1.73m2 Calcium 7.8 (L) 8.5 - 10.1 MG/DL  
POC G3 Collection Time: 10/13/20  4:46 PM  
Result Value Ref Range Device: Non rebreather Flow rate (POC) 15 L/M  
 FIO2 (POC) 100 % pH (POC) 7.20 (LL) 7.35 - 7.45    
 pCO2 (POC) 48.8 (H) 35.0 - 45.0 MMHG  
 pO2 (POC) 52 (L) 80 - 100 MMHG  
 HCO3 (POC) 19.2 (L) 22 - 26 MMOL/L  
 sO2 (POC) 78 (L) 92 - 97 % Base deficit (POC) 9 mmol/L Allens test (POC) YES Site RIGHT RADIAL Specimen type (POC) ARTERIAL Performed by Telma Snow GLUCOSE, POC Collection Time: 10/13/20  6:30 PM  
Result Value Ref Range Glucose (POC) 209 (H) 70 - 110 mg/dL Chemistry Recent Labs 10/13/20 
1640 10/13/20 
0348 10/12/20 
0400 10/11/20 
0416 * 123* 145* 89  143 143 144  
K 4.1 4.2 4.0 3.3*  
 110 108 109 CO2 22 25 25 25 BUN 4* 3* 6* 10  
CREA 0.91 0.89 0.86 0.81 CA 7.8* 8.0* 7.6* 7.0*  
MG  --  1.5* 1.6 1.4* PHOS  --  2.3* 1.3* 1.8* AGAP 11 8 10 10 BUCR 4* 3* 7* 12  
AP  --  73 66 67 TP  --  6.3* 5.8* 5.2* ALB  --  3.2* 3.0* 2.5*  
GLOB  --  3.1 2.8 2.7 AGRAT  --  1.0 1.1 0.9 Lactic Acid No results found for: LAC No results for input(s): LAC in the last 72 hours. Liver Enzymes Protein, total  
Date Value Ref Range Status 10/13/2020 6.3 (L) 6.4 - 8.2 g/dL Final  
 
Albumin Date Value Ref Range Status 10/13/2020 3.2 (L) 3.4 - 5.0 g/dL Final  
 
Globulin Date Value Ref Range Status 10/13/2020 3.1 2.0 - 4.0 g/dL Final  
 
A-G Ratio Date Value Ref Range Status 10/13/2020 1.0 0.8 - 1.7   Final  
 
Alk. phosphatase Date Value Ref Range Status 10/13/2020 73 45 - 117 U/L Final  
 
Recent Labs 10/13/20 
0348 10/12/20 
0400 10/11/20 
0416  
TP 6.3* 5.8* 5.2* ALB 3.2* 3.0* 2.5*  
GLOB 3.1 2.8 2.7 AGRAT 1.0 1.1 0.9 AP 73 66 67  
  
 
 CBC w/Diff Recent Labs 10/13/20 
1640 10/13/20 
0348 10/12/20 
0400 WBC 10.7 11.5 5.8  
RBC 3.46* 3.70* 3.39* HGB 10.7* 11.6* 10.6* HCT 32.9* 34.6* 31.7* PLT 29* 22* 25* GRANS 86* 78* 60  
LYMPH 9* 13* 27 EOS 0 1 1 Cardiac Enzymes No results found for: CPK, CK, CKMMB, CKMB, RCK3, CKMBT, CKNDX, CKND1, LYDIA, TROPT, TROIQ, LIANNA, TROPT, TNIPOC, BNP, BNPP  
 
BNP No results found for: BNP, BNPP, XBNPT Coagulation Recent Labs 10/13/20 
0348 10/12/20 
0400 10/11/20 
1222 PTP 20.4* 20.2* 19.7* INR 1.8* 1.8* 1.7* Thyroid  No results found for: T4, T3U, TSH, TSHEXT No results found for: T4  
 
Urinalysis Lab Results Component Value Date/Time Color DARK YELLOW 10/11/2020 01:03 AM  
 Appearance CLEAR 10/11/2020 01:03 AM  
 Specific gravity 1.021 10/11/2020 01:03 AM  
 pH (UA) 5.5 10/11/2020 01:03 AM  
 Protein Negative 10/11/2020 01:03 AM  
 Glucose Negative 10/11/2020 01:03 AM  
 Ketone Negative 10/11/2020 01:03 AM  
 Bilirubin SMALL (A) 10/11/2020 01:03 AM  
 Urobilinogen 1.0 10/11/2020 01:03 AM  
 Nitrites Negative 10/11/2020 01:03 AM  
 Leukocyte Esterase Negative 10/11/2020 01:03 AM  
  
 
Culture data during this hospitalization. All Micro Results None ECHO  Interpretation Summary Result status: Final result  · LV: Estimated LVEF is 55 - 60%. Normal cavity size, wall thickness and systolic function (ejection fraction normal). Mild (grade 1) left ventricular diastolic dysfunction E'E= 8.94. 
· LA: No atrial septal defect present. · AV: Aortic valve leaflet calcification present. · MV: Mitral valve thickening. Images report reviewed by me: 
CT 10/9/2020 [place] [facility] [place] 10/09/20 CT ABD PELV W CONT Narrative EXAM: CT of the abdomen and pelvis INDICATION: Abdominal pain, greatest in the left lower quadrant with nausea and 
vomiting. COMPARISON: MRI pelvis 10/7/2019. CT left hip 4/17/2019 TECHNIQUE: Axial CT imaging of the abdomen and pelvis was performed with 
intravenous contrast. Multiplanar reformats were generated. One or more dose reduction techniques were used on this CT: automated exposure 
control, adjustment of the mAs and/or kVp according to patient size, and 
iterative reconstruction techniques. The specific techniques used on this CT 
exam have been documented in the patient's electronic medical record. Digital 
Imaging and Communications in Medicine (DICOM) format image data are available 
to nonaffiliated external healthcare facilities or entities on a secure, media 
free, reciprocally searchable basis with patient authorization for at least a 
12-month period after this study. _______________ FINDINGS: 
 
LOWER CHEST: Minor basilar changes of atelectasis. No alveolar consolidation or 
pleural effusion. Normal cardiac size without pericardial effusion. LIVER, BILIARY: Diffuse hepatic hypoattenuation is present with hepatic size 
estimated at approximately 27 cm in craniocaudal span. No discrete liver lesion. No intrahepatic or extra hepatic biliary ductal dilatation. Patient is status 
post cholecystectomy. PANCREAS: Pancreatic enhancement is normal. Mild and diffuse pancreatic ductal 
ectasia is noted. No discrete pancreatic mass lesion noted. SPLEEN: Mildly enlarged at 14 cm. ADRENALS: Normal. 
 
KIDNEYS/URETERS/BLADDER: Symmetric renal enhancement. No bowel obstruction PELVIC ORGANS: Symmetric renal enhancement. Punctate nonobstructing upper pole 
left renal stone. No distal ureteral or urinary bladder stone. Urinary bladder 
unremarkable in CT appearance. VASCULATURE: Diffuse aortobiiliac atherosclerotic vascular calcification is 
present without evidence of aneurysmal dilatation. Main portal vein as well as 
right and left portal venous branches are widely patent. LYMPH NODES: Scattered subcentimeter mesenteric and retroperitoneal lymph nodes are noted. No evidence of abdominal or pelvic adenopathy. GASTROINTESTINAL TRACT: No morphology of bowel obstruction. No free 
intraperitoneal gas. Mild thickening of the proximal ascending colon and cecum 
noted. BONES: No acute or aggressive osseous abnormalities identified. Partial 
visualization healed proximal left femoral fracture status post IM nailing and 
dynamic hip screw placement. OTHER: Small quantity of abdominal/pelvic ascites. Thin subcutaneous linear 
density tracking along the right gluteal fold, likely in keeping previously 
noted fistula tract. 
 
_______________ Impression IMPRESSION: 
 
1. Diffuse hepatic hypoattenuation compatible with steatosis with hepatomegaly, 
mild splenomegaly, and small quantity of abdominal/pelvic ascites. The totality 
of findings compatible with hepatocellular dysfunction and potentially 
associated portal hypertension. > Patent main and proximal portal venous branches.  
  > Mild thickening of the proximal ascending colon and cecum may reflect 
sequela of portal colopathy. Infectious/inflammatory colitis also a possibility 
but thought less likely given the imaging appearance. 2. Likely correlate for small area of perianal fistula noted on prior MRI 
pelvis. 3. Punctate nonobstructing upper pole left renal stone. CXR reviewed by me: 
XR 10[place]13 [place] [facility] [place]20 XR CHEST SNGL V  
 Narrative EXAM: XR CHEST SNGL V 
 
CLINICAL INDICATION/HISTORY: Hypoxia 
-Additional: None COMPARISON: 10/11/2020 TECHNIQUE: Frontal view of the chest 
 
_______________ FINDINGS: 
 
HEART AND MEDIASTINUM: Stable appearing cardiac size and mediastinal contours. LUNGS AND PLEURAL SPACES: Patchy multifocal alveolar opacities are newly present 
on the second examination. No pneumothorax or pleural effusion. BONY THORAX AND SOFT TISSUES: No acute osseous abnormality 
 
_______________  Impression IMPRESSION: 
 Short interval development of patchy multifocal airspace disease favored to 
reflect pulmonary edema without significant pleural effusion.  LEs 10/11/2020 Interpretation Summary · No evidence of deep vein thrombosis in the right lower extremity. · No evidence of deep vein thrombosis in the left lower extremity. IMPRESSION:  
· Acute respiratory failure with hypoxemiaJ 96.01 
· Aspiration pneumoniaJ 69.0 · Acute diastolic congestive heart failureI 50.31 
· Cirrhosis K 74.60 · Colitis K 52.9 · Patient Active Problem List  
Diagnosis Code  Hypocalcemia E83.51  
 Hypomagnesemia E83.42  
 Alcoholism ([facility] [address] F10.20  Cirrhosis ([facility] [address] K74.60  Colitis K52.9  Acute respiratory failure with hypoxemia (HCC) J96.01  
 Aspiration pneumonia ([facility] [address] J69.0  Acute diastolic CHF (congestive heart failure) ([facility]) I50.31  Thrombocytopenia ([facility] [address] D69.6 · Code status: Full code RECOMMENDATIONS:  
Respiratory: Patient currently on BiPAP12/5; FiO2 70%; looks comfortable. Since he is on noninvasive ventilation rule out COVID-19; rapid test planned. Discussed with ICU staff to wear N 95 if patient is on BiPAP. Continue bronchodilator therapy. Wean FiO2 for O2 sats greater than 91%. ABG shows mild metabolic and respiratory acidosis, with hypoxemia. Check lactic acid. Chest x-ray shows bilateral pulmonary infiltrates/opacitiescould be aspiration pneumonia versus acute pulmonary edema. HOB 30 degree elevation all the time. Aggressive pulmonary toileting. Aspiration precautions. Incentive spirometry. CVS: Mild sinus tach; blood pressure stable; Lasix therapy. Recent echo EF 55-60%; no pulmonary hypertension reported. ID: Patient covered for aspiration pneumoniaZosyn and IV vancomycin. Deescalate antibiotic when appropriate. Hematology/Oncology: Watch hemoglobin and platelets. INR 1.8 secondary to chronic liver disease. Platelets lowwatch for bleeding; keep platelets greater than 20 K; consider hematology consultation tomorrow. Renal: Patient has Ward catheter; watch urine output and renal function; replace electrolytes as needed; calcium has been replaced this admission. GI/: Continue follow-up by Dr. Brayan Bartlett; patient has cirrhosis; continue lactuloseammonia 29. Endocrine: Monitor BS. SSI. Neurology: Watch for DTs. Watch for ammonia encephalopathy. Electrolytes: Replace electrolytes per ICU electrolyte replacement protocol. Calcium, phosphate, magnesium replaced this admission. IVF: Albumin every 6 hours. Nutrition: Keep n.p.o. while on BiPAP; patient on oral thiamine and folic acid. Prophylaxis: DVT Prophylaxis: SCDs. GI Prophylaxis: Protonix. Restraints: none Lines/Tubes: PIVs. 
Ward: 10/13(Medically necessary for strict input/output monitoring in critically ill patient, will remove it when not needed. Ward bundle followed). Prognosis seems to be guarded in this patient with chronic liver disease and risk for complications including renal failure, hepatorenal syndrome, hypertension, mechanical ventilation. Will defer respective systems problem management to primary and other respective consultant and follow patient in ICU with primary and other medical team. 
Further recommendations will be based on the patient's response to recommended treatment and results of the investigation ordered. Quality Care: PPI, DVT prophylaxis, HOB elevated, Infection control all reviewed and addressed. Care of plan d/w hospitalist and ICU staff. Discussed with patient. High complexity decision making was performed during the evaluation of this patient at high risk for decompensation with multiple organ involvement. Total critical care time spent rendering care exclusive of procedures/family discussion/coordination of care: 
46 minutes. Name  Relation  Home  Work  Mobile Krys Kanika  [phone] I tried calling about telephone number; left voicemail for wife to call ICU for updates. Cole Truong MD 
10/13/2020

## 2020-10-13 NOTE — PROGRESS NOTES
Bedside shift change report given to Ziyad Welch RN (oncoming nurse) by Alexa Crane RN (offgoing nurse). 2330 HR sustaining at 120's and  pt stated \"I feel anxious\", paged Dr Lizzy Cruz and ordered Ativan 1mg ivp once, place remote Tele and Albumin dose was change to 25mg iv Q6. BMP labs was also ordered. O2 NC at  2L was also started for comfort. 2345 Telemetry was placed and verified, tele box #[id number] runs Sinus Tach. 0135 Pt's HR is still sustaining above 120's, Paged Dr. Lizzy Cruz and odered NS 500ml bolus once. 0350 Pt is having bladder spasm, last urination was at 2300 and only 150ml. Bladder scan was done with result of >531, paged Dr. Lizzy Cruz and ordered to place a almonte catheter for retention. 2391 Almonte catheter was placed and pt tolerated well. Drained 700ml of brown cloudy malodorous urine. 1125 Critical result for platelet was 22, Dr Lizzy Cruz was notified and no orders was given. Visit Vitals /83 (BP 1 Location: Right arm, BP Patient Position: At rest) Pulse (!) 120 Temp 98.9 °F (37.2 °C) Resp 17 Ht 5' 9\" (1.753 m) Wt 64.9 kg (143 lb) SpO2 93% BMI 21.12 kg/m² Dilaudid prn x2. Percocet prn x1. Zofran prn x1. No BM. Bedside shift change report given to Cristian Villalpando RN (oncoming nurse) by [employer] RN (offgoing nurse). Report included the following information SBAR, Kardex, ED Summary, Procedure Summary, Intake/Output, MAR, Recent Results, Cardiac Rhythm ST, Alarm Parameters  and Quality Measures.

## 2020-10-13 NOTE — PROGRESS NOTES
Hospitalist Progress Note Patient: Lyubov Denis MRN: [id number]  CSN: [id number] YOB: 1965  Age: 47 y.o. Sex: male DOA: 10/9/2020 LOS:  LOS: 4 days Called about resp distress, hypoxia for Mr Marshall 
ordered stat CXR which showed pulm edema, and ABG which showed hypoxia and acidosis Will broaden IV abx for possible aspiration as just earlier he had no SOB on my noted exam 
Stress test completed today Called to check on response after lasix, and notified his sats remained low, asked nurse to call RRT Appreciate on call hospitalist's help Ordered more lasix, he is being tx to ICU, intensivist consulted Order stat lactate Ammonia not very elevated Make lasix BID Very ill gentleman with liver disease and cirrhosis, new diagnosis Consider aspiration and pulm edema

## 2020-10-13 NOTE — PROGRESS NOTES
Problem: Mobility Impaired (Adult and Pediatric) Goal: *Acute Goals and Plan of Care (Insert Text) Description: Physical Therapy Goals Initiated 10/13/2020 and to be accomplished within 3-7 day(s) 1. Patient will move from supine to sit and sit to supine  in bed with supervision/set-up. 2.  Patient will transfer from bed to chair and chair to bed with supervision/set-up using the least restrictive device. 3.  Patient will perform sit to stand with supervision/set-up. 4.  Patient will ambulate with supervision/set-up for 300 feet with the least restrictive device. 5.  Patient will ascend/descend 3 stairs with handrail(s) with supervision/set-up. Note: PHYSICAL THERAPY EVALUATION Patient: Grant Mcelroy (53 y.o. male) Date: 10/13/2020 Primary Diagnosis: Hypocalcemia [E83.51] Precautions:   Fall ASSESSMENT : 
Based on the objective data described below, the patient presents with lower extremity weakness, decreased gait quality and endurance, impaired bed mobility and transfers, and overall limitations in functional mobility. Pt appeared fatigued however insistent that he wanted to ambulate in hallway. Pt performed supine to sit with SBA, sit to stand with CG-Myrna. Patient ambulated 120 feet with SBQC, GB applied, CGA with occasional Myrna for steadying. Patient would benefit from skilled inpatient physical therapy to address deficits, progress as tolerated to achieve long term goals and allow safe discharge. Patient will benefit from skilled intervention to address the above impairments. Patients rehabilitation potential is considered to be Good Factors which may influence rehabilitation potential include:  
[]         None noted 
[]         Mental ability/status [x]         Medical condition 
[x]         Home/family situation and support systems 
[x]         Safety awareness 
[]         Pain tolerance/management 
[]         Other: PLAN : 
 Recommendations and Planned Interventions: 
[x]           Bed Mobility Training             []    Neuromuscular Re-Education 
[x]           Transfer Training                   []    Orthotic/Prosthetic Training 
[x]           Gait Training                          []    Modalities [x]           Therapeutic Exercises          []    Edema Management/Control 
[x]           Therapeutic Activities            [x]    Patient and Family Training/Education 
[]           Other (comment): Frequency/Duration: Patient will be followed by physical therapy 1-2 times per day to address goals. Discharge Recommendations: Home Health Further Equipment Recommendations for Discharge: N/A  
 
SUBJECTIVE:  
Patient stated I want to walk.  OBJECTIVE DATA SUMMARY:  
 
Past Medical History:  
Diagnosis Date Diabetes ([facility] [address] Gastroparesis Pancreatitis Past Surgical History:  
Procedure Laterality Date HX CHOLECYSTECTOMY HX HIP FRACTURE TX    
 left hip sx 9.23/2018 Barriers to Learning/Limitations: None Compensate with: Visual Cues, Verbal Cues, and Tactile Cues Prior Level of Function/Home Situation: Independent amb c/SBQC Home Situation Home Environment: Private residence # Steps to Enter: 3 Rails to Enter: Yes Hand Rails : Right One/Two Story Residence: Two story # of Interior Steps: 12 Living Alone: No 
Support Systems: Spouse/Significant Other/Partner Patient Expects to be Discharged to[de-identified] Private residence Current DME Used/Available at Home: Cane, quad Critical Behavior: 
Neurologic State: Alert; Appropriate for age Orientation Level: Oriented X4 Cognition: Appropriate decision making; Appropriate for age attention/concentration; Appropriate safety awareness; Follows commands Psychosocial 
Purposeful Interaction: Yes Pt Identified Daily Priority: Clinical issues (comment) Caritas Process: Nurture loving kindness;Enable kervin/hope;Establish trust;Attend basic human needs;Create healing environment Caring Interventions: Reassure; Therapeutic modalities Reassure: Therapeutic listening;Caring rounds Therapeutic Modalities: Humor; Intentional therapeutic touch Skin Condition/Temp: Dry;Warm 
 Skin Integrity: Scars (comment); Wound (add Wound LDA) Skin Integumentary Skin Color: Appropriate for ethnicity Skin Condition/Temp: Dry;Warm 
Skin Integrity: Scars (comment); Wound (add Wound LDA) Strength:   
Strength: Generally decreased, functional 
Tone & Sensation:  
Tone: Normal 
Sensation: Intact Range Of Motion: 
AROM: Generally decreased, functional 
PROM: Generally decreased, functional 
Functional Mobility: 
Bed Mobility: 
 Supine to Sit: Stand-by assistance; Additional time Sit to Supine: Stand-by assistance; Additional time Transfers: 
Sit to Stand: Contact guard assistance;Minimum assistance Stand to Sit: Contact guard assistance Balance:  
Sitting: Intact Standing: Impaired; With support Standing - Static: Fair Standing - Dynamic : Fair Ambulation/Gait Training: 
Distance (ft): 120 Feet (ft) Assistive Device: Gait belt;Cane, quad Ambulation - Level of Assistance: Contact guard assistance;Minimal assistance Gait Description (WDL): Exceptions to [facility] Gait Abnormalities: Decreased step clearance Base of Support: Narrowed Speed/Ewa: Slow Step Length: Left shortened;Right shortened Pain: 
Pain Scale 1: Numeric (0 - 10) Pain Intensity 1: 5 Pain Location 1: Abdomen Pain Orientation 1: Anterior Pain Description 1: Aching Pain Intervention(s) 1: Medication (see MAR) Activity Tolerance:  
Fair Please refer to the flowsheet for vital signs taken during this treatment. After treatment:  
[]         Patient left in no apparent distress sitting up in chair 
[x]         Patient left in no apparent distress in bed 
[x]         Call bell left within reach [x]         Nursing notified 
[]         Caregiver present []         Bed alarm activated COMMUNICATION/EDUCATION:  
[x]         Fall prevention education was provided and the patient/caregiver indicated understanding. [x]         Patient/family have participated as able in goal setting and plan of care. [x]         Patient/family agree to work toward stated goals and plan of care. []         Patient understands intent and goals of therapy, but is neutral about his/her participation. []         Patient is unable to participate in goal setting and plan of care. Thank you for this referral. 
Richard Guillory Time Calculation: 34 mins Eval Complexity: History: MEDIUM  Complexity : 1-2 comorbidities / personal factors will impact the outcome/ POC Exam:MEDIUM Complexity : 3 Standardized tests and measures addressing body structure, function, activity limitation and / or participation in recreation  Presentation: MEDIUM Complexity : Evolving with changing characteristics  Clinical Decision Making:Medium Complexity    Overall Complexity:MEDIUM

## 2020-10-14 NOTE — PROGRESS NOTES
Acute hypoxemic respiratory failure. Severe ARDS. Ventilator adjusted low tidal volume ventilation. PEEP increased. Ventilator patient has synchrony. Already  on sedation at 24-hour paralysis with Nimbex Follow up Lisa Minor MD

## 2020-10-14 NOTE — PROGRESS NOTES
[facility] Pulmonary, Critical Care, and Sleep Medicine Name: Andi Powers MRN: [id number] : 1965 Hospital: [facility] [place] Date: 10/14/2020  Room: 109/01 [facility] Note Consult requesting physician: Dr. Jennifer Rizzo Reason for Consult: Acute respiratory failure with hypoxemia IMPRESSION:  
· Acute respiratory failure with hypoxia J96.01 
· Aspiration pneumonia - J69.0 · Thrombocytopenia - D69.6 · Coagulopathy - D68.9 · Acute diastolic congestive heart failure  I50.31 
· Cirrhosis  K74.60 · Colitis  K52.9 Patient Active Problem List  
Diagnosis Code  Hypocalcemia E83.51  
 Hypomagnesemia E83.42  
 Alcoholism ([facility] F10.20  Cirrhosis ([facility] K74.60  Colitis K52.9  Acute respiratory failure with hypoxemia (HCC) J96.01  
 Aspiration pneumonia ([address] J69.0  Acute diastolic CHF (congestive heart failure) (HCC) I50.31  Thrombocytopenia ([address] D69.6 · Code status: Full code RECOMMENDATIONS:  
Respiratory: on BiPAP; looks restless, tachypnea. Await final read of CXR Low threshold to get ABG and if needed HFNC vs ventilator support On major clinical or radiological improvement despite diuresis suggesting role of aspiration pneumonia Hgb stable from y'day and hence unlikely DAH Spoke with staff to collect SARS CoV2 PCR; rapid test negative 10/13/20 CXR unlikely COVID19 like. Discussed with ICU staff to wear N95 while patient is on BiPAP. Continue droplet isolation Continue bronchodilator therapy. Wean FiO2 for SPO2 > 91%. HOB 30 degree elevation all the time. Aggressive pulmonary toileting. Aspiration precautions. Incentive spirometry when off of BiPAP 
CVS: sinus tach; hemodynamics stable; may hold Lasix - will reevaluate need later this AM per CXR result 
echo EF 55-60%; no pulmonary hypertension reported. ID: antibiotics: Zosyn and IV vancomycin. Sputum cx if sample available No leukocytosis, afebrile; immunosuppressed status given Imuran use Deescalate antibiotic when appropriate. Hematology/Oncology: Watch hemoglobin, platelets and INR likely 2' to ETOH use, chronic liver disease. Normal S. Cr, mentation stable Platelets monitor for bleeding; keep platelets greater than 50K given concern for dysfunction with liver disease Hematology consultation called with Dr. Alia Oh- await input. Will await recommendation from hematology for blood product transfusions Renal: Patient has Ward catheter; monitor UO, lytes, renal function Replace electrolytes as needed and recheck. GI/: followed by Dr. Dl Thao; patient has likely ETOH cirrhosis Continue lactulose  time when off of BiPAP Monitor daily ammonia Endocrine: Monitor FSBS. SSI. Neurology: monitor for DTs. AAO x 3 Electrolytes: Replace electrolytes per ICU electrolyte replacement protocol. IVF: Albumin every 6 hours. Nutrition: Keep n.p.o. while on BiPAP; may change oral thiamine and folic acid to IV as needed Prophylaxis: DVT Prophylaxis: SCDs. GI Prophylaxis: Protonix. Restraints: none Lines/Tubes: PIV x 3 Ward: 10/13(Medically necessary for strict input/output monitoring in critically ill patient, will remove it when not needed. Ward bundle followed). Prognosis guarded in this patient with MSOF, chronic liver disease and high risk for complications including worsening in respiratory failure, cardiovascular collapse, prolonged hospitalization, nosocomial infection, renal failure, hepatorenal syndrome, death and other. Will defer respective systems problem management to primary and other respective consultant and follow patient in ICU with primary and other medical team. 
Further recommendations will be based on the patient's response to recommended treatment and results of the investigation ordered.  
Quality Care: PPI, DVT prophylaxis, HOB elevated, Infection control all reviewed and addressed. Care of plan d/w hospitalist and ICU RN, RT. Discussed with patient. High complexity decision making was performed during the evaluation of this patient at high risk for decompensation with multiple organ involvement. Total critical care time spent rendering care exclusive of procedures/family discussion/coordination of care: 
39 minutes. Late entry note [7:30 am-8:10 am] Subjective/History of Present Illness:  
Patient is a 47 y.o. male with PMHx significant for chronic pancreatitis, diabetes, gastroparesis, hydroadenitis; history of colitis on Humiranot clear if patient has inflammatory bowel disease. Patient was admitted on 10/9 with nausea and lower abdominal pains. The patient was admitted to the hospital.  Dr. Breann Multani consulted for cirrhosis. Today evening, patient had shortness of breath symptoms and hypoxemia. RRT was done. Chest x-ray shows bilateral pulmonary infiltrates. Patient was placed on BiPAP and moved to the ICU. He is currently getting Lasix injection. Patient denies any vomiting or aspirations recently; he denies any COVID-19 contacts at home. He feels comfortable on BiPAP. No significant cough or productive sputum noted; no chest pain or hemoptysis. Telemetry mild sinus tach. Afebrile; blood pressure stable; overall fluid positive by 5 L this admission. 10/14/20 Patient remained in ICU bed 109 overnight and seen at bedside Patient in [place] [address]; seen with proper PPE measures On BiPAP, frequently moving mask, leaking air, alarming BiPAP Remained tachypneic in low 20- high 30's; SPO2 in high 90's Agrees to keep BiPAP mask in place; c/o dryness of mouth The patient denies cough, chest pain, wheezing or hemoptysis. Hemodynamically stable. Rare nausea, no vomiting or abdominal pain No reported bleeding anywhere; stable hgb Afebrile. NPO. Poor historian. Review of Systems: Limited since patient on BiPAP. Review of systems limited due to patient factors and as above. Name  Relation  Home  Work  Mobile Maryellen Duran  [phone] No Known Allergies Past Medical History:  
Diagnosis Date  Diabetes ([address]  Gastroparesis  Pancreatitis Past Surgical History:  
Procedure Laterality Date  HX CHOLECYSTECTOMY  HX HIP FRACTURE TX    
 left hip sx 9.23/2018 Social History Tobacco Use  Smoking status: Current Every Day Smoker  Smokeless tobacco: Never Used Substance Use Topics  Alcohol use: Yes History reviewed. No pertinent family history. Prior to Admission medications Medication Sig Start Date End Date Taking? Authorizing Provider  
adalimumab (Humira Pen) 40 mg/0.8 mL injection pen 40 mg by SubCUTAneous route every seven (7) days. Yes Provider, Historical  
lidocaine (LIDODERM) 5 % 2 Patches by TransDERmal route every twenty-four (24) hours. Apply patch to the affected area for 12 hours a day and remove for 12 hours a day. One patch is applied to left hip. One patch is applied to right shoulder blade. Yes Provider, Historical  
loratadine (Claritin) 10 mg tablet Take 10 mg by mouth daily. Indications: inflammation of the nose due to an allergy   Yes Provider, Historical  
aspirin delayed-release 81 mg tablet Take 1 Tab by mouth daily. Yes Racheal, MD Meño  
insulin glargine (Lantus Solostar U-100 Insulin) 100 unit/mL (3 mL) inpn 5 Units by SubCUTAneous route daily. Yes Meño Springer MD  
DULoxetine (CYMBALTA) 60 mg capsule Take 1 Cap by mouth as needed. 1/6/19   Meño Springer MD  
glyBURIDE-metFORMIN (GLUCOVANCE) 2.5-500 mg per tablet Take 1 Tab by mouth two (2) times a day. Meño Springer MD  
methocarbamoL (ROBAXIN) 500 mg tablet Take 2 Tabs by mouth four (4) times daily. Meño Springer MD  
traZODone (DESYREL) 50 mg tablet Take 50 mg by mouth nightly.     Meño Springer MD  
 HYDROmorphone (DILAUDID) 2 mg tablet Take 1 Tab by mouth every four (4) hours as needed for Pain. Max Daily Amount: 12 mg. Indications: Severe Pain 18   Lashonda Beltrán MD  
ondansetron (ZOFRAN ODT) 4 mg disintegrating tablet Take 1-2 tablets every 6-8 hours as needed for nausea and vomiting. 18   Lashonda Beltrán MD  
 
Current Facility-Administered Medications Medication Dose Route Frequency  albumin human 25% (BUMINATE) solution 25 g  25 g IntraVENous Q6H  
 thiamine mononitrate (B-1) tablet 100 mg  100 mg Oral DAILY  folic acid (FOLVITE) tablet 1 mg  1 mg Oral DAILY  ferrous sulfate tablet 325 mg  1 Tab Oral DAILY WITH BREAKFAST  furosemide (LASIX) injection 40 mg  40 mg IntraVENous BID  piperacillin-tazobactam (ZOSYN) 3.375 g in 0.9% sodium chloride (MBP/ADV) 100 mL MBP  3.375 g IntraVENous Q8H  
 vancomycin (VANCOCIN) 1,000 mg in 0.9% sodium chloride 250 mL (VIAL-MATE)  1,000 mg IntraVENous Q12H  Vancomycin - Rx to dose and monitor  1 Each Other Rx Dosing/Monitoring  pantoprazole (PROTONIX) 40 mg in 0.9% sodium chloride 10 mL injection  40 mg IntraVENous Q24H  
 lactulose (CHRONULAC) 10 gram/15 mL solution 30 mL  20 g Oral BID  lidocaine 4 % patch 2 Patch  2 Patch TransDERmal Q24H  
 lipase-protease-amylase (ZENPEP 5,000) capsule 1 Cap  1 Cap Oral TID WITH MEALS  metoclopramide HCl (REGLAN) injection 5 mg  5 mg IntraVENous Q6H  
 nicotine (NICODERM CQ) 21 mg/24 hr patch 1 Patch  1 Patch TransDERmal DAILY  insulin lispro (HUMALOG) injection   SubCUTAneous AC&HS Objective:  
Vital Signs:   
Blood pressure 111/82, pulse (!) 109, temperature 97.7 °F (36.5 °C), resp. rate (!) 32, height 5' 9\" (1.753 m), weight 64.9 kg (143 lb), SpO2 95 %. Body mass index is 21.12 kg/m². O2 Device: BIPAP  
O2 Flow Rate (L/min): 4 l/min Temp (24hrs), Av.8 °F (36.6 °C), Min:97.7 °F (36.5 °C), Max:98.3 °F (36.8 °C) Intake/Output: Last shift:      No intake/output data recorded. Last 3 shifts: 10/12 1901 - 10/14 0700 In: 850 [I.V.:850] Out: 7550 [[id number]] Intake/Output Summary (Last 24 hours) at 10/14/2020 4291 Last data filed at 10/14/2020 0400 Gross per 24 hour Intake 100 ml Output 6700 ml Net -6600 ml Last 3 Recorded Weights in this Encounter 10/09/20 2234 10/12/20 1045 10/12/20 1046 Weight: 65.2 kg (143 lb 12.8 oz) 64.9 kg (143 lb) 64.9 kg (143 lb) Physical Exam:  
General: in mild respiratory distress and acyanotic, alert and oriented times 3, appears older than stated age, chronically ill appearing, on BiPAP HEENT: PERRL, fundi benign, bipap mask in place Neck: No abnormally enlarged lymph nodes or thyroid, supple Chest: normal 
Lungs: moderate air entry, rhonchi scattered bilaterally, normal percussion bilaterally, no tenderness/ rash Heart: Regular rate and rhythm, S1S2 present or without murmur or extra heart sounds Abdomen: non distended, bowel sounds normoactive, tympanic, abdomen is soft without significant tenderness, masses, organomegaly or guarding Extremity: negative for edema, cyanosis, clubbing Neuro: alert, oriented x3, moves all extremities well, no involuntary movements, exam limitations with BiPAP Skin: Skin color, texture, turgor fair. No rashes or lesions Data:  
   
Recent Results (from the past 24 hour(s)) NUCLEAR CARDIAC STRESS TEST Collection Time: 10/13/20 11:23 AM  
Result Value Ref Range Target  bpm  
 Exercise duration time 00:00:49 Stress Base Systolic  mmHg Stress Base Diastolic  mmHg Post peak  BPM  
 Baseline  BPM  
 Estimated workload 1.0 METS Percent HR 75 % Stress Rate Pressure Product 18,750 BPM*mmHg GLUCOSE, POC Collection Time: 10/13/20 11:48 AM  
Result Value Ref Range Glucose (POC) 185 (H) 70 - 110 mg/dL GLUCOSE, POC Collection Time: 10/13/20  3:50 PM  
Result Value Ref Range Glucose (POC) 192 (H) 70 - 110 mg/dL AMMONIA Collection Time: 10/13/20  4:40 PM  
Result Value Ref Range Ammonia 34 (H) 11 - 32 UMOL/L  
CBC WITH AUTOMATED DIFF Collection Time: 10/13/20  4:40 PM  
Result Value Ref Range WBC 10.7 4.6 - 13.2 K/uL  
 RBC 3.46 (L) 4.70 - 5.50 M/uL  
 HGB 10.7 (L) 13.0 - 16.0 g/dL HCT 32.9 (L) 36.0 - 48.0 % MCV 95.1 74.0 - 97.0 FL  
 MCH 30.9 24.0 - 34.0 PG  
 MCHC 32.5 31.0 - 37.0 g/dL  
 RDW 15.6 (H) 11.6 - 14.5 % PLATELET 29 (LL) 459 - 420 K/uL NEUTROPHILS 86 (H) 40 - 73 % LYMPHOCYTES 9 (L) 21 - 52 % MONOCYTES 5 3 - 10 % EOSINOPHILS 0 0 - 5 % BASOPHILS 0 0 - 2 %  
 ABS. NEUTROPHILS 9.2 (H) 1.8 - 8.0 K/UL  
 ABS. LYMPHOCYTES 0.9 0.9 - 3.6 K/UL  
 ABS. MONOCYTES 0.6 0.05 - 1.2 K/UL  
 ABS. EOSINOPHILS 0.0 0.0 - 0.4 K/UL  
 ABS. BASOPHILS 0.0 0.0 - 0.1 K/UL  
 DF AUTOMATED METABOLIC PANEL, BASIC Collection Time: 10/13/20  4:40 PM  
Result Value Ref Range Sodium 142 136 - 145 mmol/L Potassium 4.1 3.5 - 5.5 mmol/L Chloride 109 100 - 111 mmol/L  
 CO2 22 21 - 32 mmol/L Anion gap 11 3.0 - 18 mmol/L Glucose 203 (H) 74 - 99 mg/dL BUN 4 (L) 7.0 - 18 MG/DL Creatinine 0.91 0.6 - 1.3 MG/DL  
 BUN/Creatinine ratio 4 (L) 12 - 20 GFR est AA >60 >60 ml/min/1.73m2 GFR est non-AA >60 >60 ml/min/1.73m2 Calcium 7.8 (L) 8.5 - 10.1 MG/DL  
POC G3 Collection Time: 10/13/20  4:46 PM  
Result Value Ref Range Device: Non rebreather Flow rate (POC) 15 L/M  
 FIO2 (POC) 100 % pH (POC) 7.20 (LL) 7.35 - 7.45    
 pCO2 (POC) 48.8 (H) 35.0 - 45.0 MMHG  
 pO2 (POC) 52 (L) 80 - 100 MMHG  
 HCO3 (POC) 19.2 (L) 22 - 26 MMOL/L  
 sO2 (POC) 78 (L) 92 - 97 % Base deficit (POC) 9 mmol/L Allens test (POC) YES Site RIGHT RADIAL Specimen type (POC) ARTERIAL Performed by Niurka Larkin CARDIAC PANEL,(CK, CKMB & TROPONIN) Collection Time: 10/13/20  5:57 PM  
Result Value Ref Range CK - MB 1.7 <3.6 ng/ml CK-MB Index 2.5 0.0 - 4.0 % CK 69 39 - 308 U/L Troponin-I, QT <0.02 0.0 - 0.045 NG/ML  
GLUCOSE, POC Collection Time: 10/13/20  6:30 PM  
Result Value Ref Range Glucose (POC) 209 (H) 70 - 110 mg/dL SARS-COV-2 Collection Time: 10/13/20  7:15 PM  
Result Value Ref Range SARS-CoV-2 PENDING Specimen source Nasopharyngeal    
 COVID-19 rapid test Not detected NOTD Specimen type NP Swab LACTIC ACID Collection Time: 10/13/20  8:10 PM  
Result Value Ref Range Lactic acid 3.4 (HH) 0.4 - 2.0 MMOL/L  
POC G3 Collection Time: 10/13/20  9:09 PM  
Result Value Ref Range Device: BIPAP    
 FIO2 (POC) 70 % pH (POC) 7.39 7.35 - 7.45    
 pCO2 (POC) 37.8 35.0 - 45.0 MMHG  
 pO2 (POC) 55 (L) 80 - 100 MMHG  
 HCO3 (POC) 23.1 22 - 26 MMOL/L  
 sO2 (POC) 88 (L) 92 - 97 % Base deficit (POC) 2 mmol/L  
 PEEP/CPAP (POC) 5 cmH2O  
 PIP (POC) 12 Allens test (POC) YES Total resp. rate 25 Site LEFT RADIAL Specimen type (POC) ARTERIAL Performed by Jalaine Corporal Spontaneous timed YES    
GLUCOSE, POC Collection Time: 10/13/20 10:14 PM  
Result Value Ref Range Glucose (POC) 197 (H) 70 - 110 mg/dL CARDIAC PANEL,(CK, CKMB & TROPONIN) Collection Time: 10/14/20  1:30 AM  
Result Value Ref Range CK - MB <1.0 <3.6 ng/ml CK-MB Index  0.0 - 4.0 % CALCULATION NOT PERFORMED WHEN RESULT IS BELOW LINEAR LIMIT  
 CK 39 39 - 308 U/L Troponin-I, QT <0.02 0.0 - 0.045 NG/ML  
LACTIC ACID Collection Time: 10/14/20  1:30 AM  
Result Value Ref Range Lactic acid 2.2 (HH) 0.4 - 2.0 MMOL/L  
CALCIUM, IONIZED Collection Time: 10/14/20  4:30 AM  
Result Value Ref Range Ionized Calcium 0.98 (L) 1.12 - 1.32 MMOL/L  
MAGNESIUM Collection Time: 10/14/20  4:38 AM  
Result Value Ref Range Magnesium 1.3 (L) 1.6 - 2.6 mg/dL PHOSPHORUS Collection Time: 10/14/20  4:38 AM  
Result Value Ref Range  Phosphorus 2.1 (L) 2.5 - 4.9 MG/DL  
 PROTHROMBIN TIME + INR Collection Time: 10/14/20  4:38 AM  
Result Value Ref Range Prothrombin time 25.1 (H) 11.5 - 15.2 sec INR 2.3 (H) 0.8 - 1.2 METABOLIC PANEL, COMPREHENSIVE Collection Time: 10/14/20  4:38 AM  
Result Value Ref Range Sodium 143 136 - 145 mmol/L Potassium 3.5 3.5 - 5.5 mmol/L Chloride 105 100 - 111 mmol/L  
 CO2 30 21 - 32 mmol/L Anion gap 8 3.0 - 18 mmol/L Glucose 141 (H) 74 - 99 mg/dL BUN 4 (L) 7.0 - 18 MG/DL Creatinine 0.90 0.6 - 1.3 MG/DL  
 BUN/Creatinine ratio 4 (L) 12 - 20 GFR est AA >60 >60 ml/min/1.73m2 GFR est non-AA >60 >60 ml/min/1.73m2 Calcium 7.6 (L) 8.5 - 10.1 MG/DL Bilirubin, total 5.1 (H) 0.2 - 1.0 MG/DL  
 ALT (SGPT) 31 16 - 61 U/L  
 AST (SGOT) 68 (H) 10 - 38 U/L Alk. phosphatase 48 45 - 117 U/L Protein, total 5.8 (L) 6.4 - 8.2 g/dL Albumin 3.6 3.4 - 5.0 g/dL Globulin 2.2 2.0 - 4.0 g/dL A-G Ratio 1.6 0.8 - 1.7    
CBC WITH AUTOMATED DIFF Collection Time: 10/14/20  4:38 AM  
Result Value Ref Range WBC 8.0 4.6 - 13.2 K/uL  
 RBC 3.44 (L) 4.70 - 5.50 M/uL  
 HGB 10.6 (L) 13.0 - 16.0 g/dL HCT 32.2 (L) 36.0 - 48.0 % MCV 93.6 74.0 - 97.0 FL  
 MCH 30.8 24.0 - 34.0 PG  
 MCHC 32.9 31.0 - 37.0 g/dL  
 RDW 15.6 (H) 11.6 - 14.5 % PLATELET 23 (LL) 857 - 420 K/uL NEUTROPHILS 74 (H) 40 - 73 % LYMPHOCYTES 17 (L) 21 - 52 % MONOCYTES 8 3 - 10 % EOSINOPHILS 1 0 - 5 % BASOPHILS 0 0 - 2 %  
 ABS. NEUTROPHILS 5.9 1.8 - 8.0 K/UL  
 ABS. LYMPHOCYTES 1.4 0.9 - 3.6 K/UL  
 ABS. MONOCYTES 0.6 0.05 - 1.2 K/UL  
 ABS. EOSINOPHILS 0.1 0.0 - 0.4 K/UL  
 ABS. BASOPHILS 0.0 0.0 - 0.1 K/UL  
 DF AUTOMATED    
LACTIC ACID Collection Time: 10/14/20  5:58 AM  
Result Value Ref Range Lactic acid 2.3 (HH) 0.4 - 2.0 MMOL/L Chemistry Recent Labs 10/14/20 
0438 10/13/20 
1640 10/13/20 
0348 10/12/20 
0400 * 203* 123* 145*  142 143 143  
K 3.5 4.1 4.2 4.0  
 109 110 108 CO2 30 22 25 25 BUN 4* 4* 3* 6*  
CREA 0.90 0.91 0.89 0.86 CA 7.6* 7.8* 8.0* 7.6*  
MG 1.3*  --  1.5* 1.6 PHOS 2.1*  --  2.3* 1.3* AGAP 8 11 8 10 BUCR 4* 4* 3* 7* AP 48  --  73 66  
TP 5.8*  --  6.3* 5.8* ALB 3.6  --  3.2* 3.0*  
GLOB 2.2  --  3.1 2.8 AGRAT 1.6  --  1.0 1.1 Lactic Acid Lactic acid Date Value Ref Range Status 10/14/2020 2.3 (HH) 0.4 - 2.0 MMOL/L Final  
  Comment:  
  CALLED TO AND CORRECTLY REPEATED BY: 
Peace Petty RN, ICU ON 10/14/2020 AT 0645 TO J Recent Labs 10/14/20 
0558 10/14/20 
0130 10/13/20 
2010 LAC 2.3* 2.2* 3.4* Liver Enzymes Protein, total  
Date Value Ref Range Status 10/14/2020 5.8 (L) 6.4 - 8.2 g/dL Final  
 
Albumin Date Value Ref Range Status 10/14/2020 3.6 3.4 - 5.0 g/dL Final  
 
Globulin Date Value Ref Range Status 10/14/2020 2.2 2.0 - 4.0 g/dL Final  
 
A-G Ratio Date Value Ref Range Status 10/14/2020 1.6 0.8 - 1.7   Final  
 
Alk. phosphatase Date Value Ref Range Status 10/14/2020 48 45 - 117 U/L Final  
 
Recent Labs 10/14/20 
0438 10/13/20 
0348 10/12/20 
0400 TP 5.8* 6.3* 5.8* ALB 3.6 3.2* 3.0*  
GLOB 2.2 3.1 2.8 AGRAT 1.6 1.0 1.1 AP 48 73 66 CBC w/Diff Recent Labs 10/14/20 
0438 10/13/20 
1640 10/13/20 
0348 WBC 8.0 10.7 11.5  
RBC 3.44* 3.46* 3.70* HGB 10.6* 10.7* 11.6* HCT 32.2* 32.9* 34.6* PLT 23* 29* 22* GRANS 74* 86* 78* LYMPH 17* 9* 13* EOS 1 0 1 Cardiac Enzymes Lab Results Component Value Date/Time CPK 39 10/14/2020 01:30 AM  
 CPK 69 10/13/2020 05:57 PM  
 CKMB <1.0 10/14/2020 01:30 AM  
 CKMB 1.7 10/13/2020 05:57 PM  
 CKND1  10/14/2020 01:30 AM  
  CALCULATION NOT PERFORMED WHEN RESULT IS BELOW LINEAR LIMIT  
 CKND1 2.5 10/13/2020 05:57 PM  
 TROIQ <0.02 10/14/2020 01:30 AM  
 TROIQ <0.02 10/13/2020 05:57 PM  
  
 
BNP No results found for: BNP, BNPP, XBNPT Coagulation Recent Labs 10/14/20 
0438 10/13/20 
0348 10/12/20 
0406 PTP 25.1* 20.4* 20.2* INR 2.3* 1.8* 1.8* Thyroid  No results found for: T4, T3U, TSH, TSHEXT, TSHEXT No results found for: T4  
 
Urinalysis Lab Results Component Value Date/Time Color DARK YELLOW 10/11/2020 01:03 AM  
 Appearance CLEAR 10/11/2020 01:03 AM  
 Specific gravity 1.021 10/11/2020 01:03 AM  
 pH (UA) 5.5 10/11/2020 01:03 AM  
 Protein Negative 10/11/2020 01:03 AM  
 Glucose Negative 10/11/2020 01:03 AM  
 Ketone Negative 10/11/2020 01:03 AM  
 Bilirubin SMALL (A) 10/11/2020 01:03 AM  
 Urobilinogen 1.0 10/11/2020 01:03 AM  
 Nitrites Negative 10/11/2020 01:03 AM  
 Leukocyte Esterase Negative 10/11/2020 01:03 AM  
  
 
Culture data during this hospitalization. All Micro Results None ECHO 10/12/20 Interpretation Summary Result status: Final result  · LV: Estimated LVEF is 55 - 60%. Normal cavity size, wall thickness and systolic function (ejection fraction normal). Mild (grade 1) left ventricular diastolic dysfunction E'E= 8.94. 
· LA: No atrial septal defect present. · AV: Aortic valve leaflet calcification present. · MV: Mitral valve thickening. Images report reviewed by me: 
CT 10/9/2020 Results from [facility] [place] Encounter encounter on 10/09/20 CT ABD PELV W CONT Narrative EXAM: CT of the abdomen and pelvis INDICATION: Abdominal pain, greatest in the left lower quadrant with nausea and 
vomiting. COMPARISON: MRI pelvis 10/7/2019. CT left hip 4/17/2019 TECHNIQUE: Axial CT imaging of the abdomen and pelvis was performed with 
intravenous contrast. Multiplanar reformats were generated. One or more dose reduction techniques were used on this CT: automated exposure 
control, adjustment of the mAs and/or kVp according to patient size, and 
iterative reconstruction techniques. The specific techniques used on this CT 
exam have been documented in the patient's electronic medical record.   Digital 
 Imaging and Communications in Medicine (DICOM) format image data are available 
to nonaffiliated external healthcare facilities or entities on a secure, media 
free, reciprocally searchable basis with patient authorization for at least a 
12-month period after this study. _______________ FINDINGS: 
 
LOWER CHEST: Minor basilar changes of atelectasis. No alveolar consolidation or 
pleural effusion. Normal cardiac size without pericardial effusion. LIVER, BILIARY: Diffuse hepatic hypoattenuation is present with hepatic size 
estimated at approximately 27 cm in craniocaudal span. No discrete liver lesion. No intrahepatic or extra hepatic biliary ductal dilatation. Patient is status 
post cholecystectomy. PANCREAS: Pancreatic enhancement is normal. Mild and diffuse pancreatic ductal 
ectasia is noted. No discrete pancreatic mass lesion noted. SPLEEN: Mildly enlarged at 14 cm. ADRENALS: Normal. 
 
KIDNEYS/URETERS/BLADDER: Symmetric renal enhancement. No bowel obstruction PELVIC ORGANS: Symmetric renal enhancement. Punctate nonobstructing upper pole 
left renal stone. No distal ureteral or urinary bladder stone. Urinary bladder 
unremarkable in CT appearance. VASCULATURE: Diffuse aortobiiliac atherosclerotic vascular calcification is 
present without evidence of aneurysmal dilatation. Main portal vein as well as 
right and left portal venous branches are widely patent. LYMPH NODES: Scattered subcentimeter mesenteric and retroperitoneal lymph nodes 
are noted. No evidence of abdominal or pelvic adenopathy. GASTROINTESTINAL TRACT: No morphology of bowel obstruction. No free 
intraperitoneal gas. Mild thickening of the proximal ascending colon and cecum 
noted. BONES: No acute or aggressive osseous abnormalities identified. Partial 
visualization healed proximal left femoral fracture status post IM nailing and 
dynamic hip screw placement. OTHER: Small quantity of abdominal/pelvic ascites. Thin subcutaneous linear 
density tracking along the right gluteal fold, likely in keeping previously 
noted fistula tract. 
 
_______________ Impression IMPRESSION: 
 
1. Diffuse hepatic hypoattenuation compatible with steatosis with hepatomegaly, 
mild splenomegaly, and small quantity of abdominal/pelvic ascites. The totality 
of findings compatible with hepatocellular dysfunction and potentially 
associated portal hypertension. > Patent main and proximal portal venous branches.  
  > Mild thickening of the proximal ascending colon and cecum may reflect 
sequela of portal colopathy. Infectious/inflammatory colitis also a possibility 
but thought less likely given the imaging appearance. 2. Likely correlate for small area of perianal fistula noted on prior MRI 
pelvis. 3. Punctate nonobstructing upper pole left renal stone. CXR reviewed by me: CXR port 10/14/20 preliminary Persistent bl pulmonary infiltrates appears to be unchanged from prior day study XR 10/13 Results from [facility] [place] Encounter encounter on 10/09/20 XR CHEST SNGL V  
 Narrative EXAM: XR CHEST SNGL V 
 
CLINICAL INDICATION/HISTORY: Hypoxia 
-Additional: None COMPARISON: 10/11/2020 TECHNIQUE: Frontal view of the chest 
 
_______________ FINDINGS: 
 
HEART AND MEDIASTINUM: Stable appearing cardiac size and mediastinal contours. LUNGS AND PLEURAL SPACES: Patchy multifocal alveolar opacities are newly present 
on the second examination. No pneumothorax or pleural effusion. BONY THORAX AND SOFT TISSUES: No acute osseous abnormality 
 
_______________ Impression IMPRESSION: 
 
Short interval development of patchy multifocal airspace disease favored to 
reflect pulmonary edema without significant pleural effusion. US LEs 10/11/2020 Interpretation Summary · No evidence of deep vein thrombosis in the right lower extremity. · No evidence of deep vein thrombosis in the left lower extremity. Mónica Walker MD 
10/14/2020

## 2020-10-14 NOTE — PROGRESS NOTES
Hospitalist Progress Note Patient: Bobbi Coley MRN: [id number]  CSN: [id number] YOB: 1965  Age: 47 y.o. Sex: male DOA: 10/9/2020 LOS:  LOS: 5 days Chief Complaint: Ac resp failure Assessment/Plan 48 yo male admitted with weakness, cirrhosis changes, low electrolytes Acute resp failure developed 10/13, moved to ICU on bipap, continued hypoxia and changes of ARDS, and thus now intubated this am 
 
Ac resp failure, hypoxic-send a second covid test-rapid was neg Empiric IV abx-zosyn and vanco 
Stress test was neg for ischemia, echo showed nl EF 
  
Cirrhosis likely due to etoh abuse-PV thrombosis on US today, defer tx if needed to hepatology Alcohol abuse, off 2-3 weeks, continue lactulose, albumin-give banana bag daily gently Severe thrombocytopenia-no bleeding, transfuse for less than 20K or bleeding Hypophosphatemia-trated, lyte protocol, ICU Hypomagnesemia-ICU protocol Hypocalcemia-ICU protocol Colitis-empiric IV abx will cover Diabetes-a1c less leopoldo 6%, hyperglycemia likely stress response Coagulopathy-follow, watch for bleeding, due to liver disease SCD for DVT prophylaxis Vent mgmt per intensivist 
Full code status SUP-protonix 
  
Disposition : 
Patient Active Problem List  
Diagnosis Code  Hypocalcemia E83.51  
 Hypomagnesemia E83.42  
 Alcoholism ([facility] [address] F10.20  Cirrhosis ([facility] [address] K74.60  Colitis K52.9  Acute respiratory failure with hypoxemia (HCC) J96.01  
 Aspiration pneumonia ([facility] [address] J69.0  Acute diastolic CHF (congestive heart failure) (HCC) I50.31  Thrombocytopenia ([facility] [address] D69.6 Subjective: 
He c/o dry mouth on bipap this am on my rounds and feeling pain in back Review of systems: 
 
Constitutional: denies fevers, chills Cardiovascular: denies chest pain, palpitations Gastrointestinal: denies nausea, vomiting, diarrhea Vital signs/Intake and Output: 
Visit Vitals /82 Pulse (!) 141 Temp 97.7 °F (36.5 °C) Resp 30 Ht 5' 9\" (1.753 m) Wt 64.9 kg (143 lb) SpO2 (!) 84% BMI 21.12 kg/m² Current Shift:  No intake/output data recorded. Last three shifts:  10/12 1901 - 10/14 0700 In: 850 [I.V.:850] Out: 7550 [ENRPS:[id number]] Exam: 
 
General: debilitated weak ill appearing Wm, on bipap when seen Head/Neck: NCAT, supple, No masses, No lymphadenopathy CVS:Regular rate and rhythm, no M/R/G, S1/S2 heard, no thrill Lungs:Clear to auscultation bilaterally, no wheezes, rhonchi, or rales Abdomen: Soft, Nontender, No distention, Normal Bowel sounds, No hepatomegaly Extremities: No C/C/E, pulses palpable 2+ Neuro:grossly normal , follows commands Psych:appropriate Labs: Results:  
   
Chemistry Recent Labs 10/14/20 
0438 10/13/20 
1640 10/13/20 
0348 10/12/20 
0400 * 203* 123* 145*  142 143 143  
K 3.5 4.1 4.2 4.0  
 109 110 108 CO2 30 22 25 25 BUN 4* 4* 3* 6*  
CREA 0.90 0.91 0.89 0.86 CA 7.6* 7.8* 8.0* 7.6* AGAP 8 11 8 10 BUCR 4* 4* 3* 7* AP 48  --  73 66  
TP 5.8*  --  6.3* 5.8* ALB 3.6  --  3.2* 3.0*  
GLOB 2.2  --  3.1 2.8 AGRAT 1.6  --  1.0 1.1  
  
CBC w/Diff Recent Labs 10/14/20 
0438 10/13/20 
1640 10/13/20 
0348 WBC 8.0 10.7 11.5  
RBC 3.44* 3.46* 3.70* HGB 10.6* 10.7* 11.6* HCT 32.2* 32.9* 34.6* PLT 23* 29* 22* GRANS 74* 86* 78* LYMPH 17* 9* 13* EOS 1 0 1 Cardiac Enzymes Recent Labs 10/14/20 
1000 10/14/20 
0130 CPK 37* 39 CKND1 CALCULATION NOT PERFORMED WHEN RESULT IS BELOW LINEAR LIMIT CALCULATION NOT PERFORMED WHEN RESULT IS BELOW LINEAR LIMIT Coagulation Recent Labs 10/14/20 
1710 10/13/20 
4626 PTP 25.1* 20.4* INR 2.3* 1.8* Lipid Panel No results found for: CHOL, CHOLPOCT, CHOLX, CHLST, CHOLV, 136961, HDL, HDLP, LDL, LDLC, DLDLP, 651668, VLDLC, VLDL, TGLX, TRIGL, TRIGP, TGLPOCT, CHHD, CHHDX  
BNP No results for input(s): BNPP in the last 72 hours. Liver Enzymes Recent Labs 10/14/20 
8000 TP 5.8* ALB 3.6 AP 48 Thyroid Studies No results found for: T4, T3U, TSH, TSHEXT Procedures/imaging: see electronic medical records for all procedures/Xrays and details which were not copied into this note but were reviewed prior to creation of Plan Carol Torres MD

## 2020-10-14 NOTE — PROGRESS NOTES
Problem: Mobility Impaired (Adult and Pediatric) Goal: *Acute Goals and Plan of Care (Insert Text) Description: Physical Therapy Goals Initiated 10/13/2020 and to be accomplished within 3-7 day(s) 1. Patient will move from supine to sit and sit to supine  in bed with supervision/set-up. 2.  Patient will transfer from bed to chair and chair to bed with supervision/set-up using the least restrictive device. 3.  Patient will perform sit to stand with supervision/set-up. 4.  Patient will ambulate with supervision/set-up for 300 feet with the least restrictive device. 5.  Patient will ascend/descend 3 stairs with handrail(s) with supervision/set-up. 10/14/2020  PT treatment not completed due to: 
[] Off Unit for testing/procedure 
[] Pain 
[] Eating 
[] Patient too lethargic 
[] Nausea/vomiting 
[] Dialysis treatment in progress  
[x] Other:Acute resp failure developed this am and pt transferred to ICU on bipap. Pt with continued hypoxia, ARDS and now intubated. Central line placed this afternoon (1620). Will hold PT today and f/up accordingly tomorrow.  
Charity Smith, PT

## 2020-10-14 NOTE — PROGRESS NOTES
Comprehensive Nutrition Assessment Type and Reason for Visit: Initial, NPO/clear liquid Nutrition Recommendations/Plan: EN: start tube feeds when clinically possible with recc below 10/14/20 1331 Enteral Nutrition Feeding Route Nasogastric EN Formula Osmolite 1.2 w/ goal of 65 mL/hr  
Schedule Continuous Feeding Regimen Start @ 10 ml/hr, trickle feeds Water Flushes will defer to MD  
Current EN & Flush Order Provides Propofol @ 19.5 ml/hr provides 515 kcals, 57 g fat. Osmolite @ 65 ml/hr provides 1716 kcals, 79 g protein, 225 g CHO, 1173 ml H2O, meets 100% RDI Goal EN & Flush Order Provides 2231 kcals, 79 g protein, 225 g CHO, 1173 ml H2O, meets 100% RDI Nutrition Assessment:  Hx diabete, gastroparesis, pancreatitis, hydroadenitis. Pt was presented with nausea and abdominal pain. Admitted w/ cirrohosis, thrombocytopenia, hypomagnesemia, hypocalcemia, colitis, acute respiratory failure with hypoxemia now intubated in ICU, acute CHF. Hx of ETOH use, and covid rule-out. Malnutrition Assessment: 
Malnutrition Status:  Severe malnutrition Context:  Chronic illness Findings of the 6 clinical characteristics of malnutrition:  
Energy Intake:  Unable to assess Weight Loss:  1 - 5% over 1 month Body Fat Loss:  7 - Severe body fat loss, Orbital, Buccal region Muscle Mass Loss:  7 - Severe muscle mass loss, Temples (temporalis) Fluid Accumulation:  Unable to assess,   
 Strength:  Not performed Estimated Daily Nutrient Needs: 
Energy (kcal):  4621(60-10 kcals/kg) Protein (g):  65-78(1-1.2g/kg) Fluid (ml/day):  2002 Nutrition Related Findings:  Lab: glucose 141, phosphorus 2.1, magnesium 1.3, calcium 7.6. Med: ferrous sulfate, folic acid, lasix, humalog, lactulose, zenpep, reglan, zofran, prontonix, thiamine menonitrate, propofol. No BM yet. LLE 2+ nonpitting RLE 2+ nonpitting. Wounds:   
(Small opening on left flank presented on admission.) Current Nutrition Therapies: DIET NPO Anthropometric Measures: 
Height:  5' 9\" (175.3 cm) Current Body Wt:  64.9 kg (143 lb 1.3 oz) Admission Body Wt:  139 lb 15.9 oz   
Usual Body Wt:  145 lb 1 oz(9/25/20) Ideal Body Wt:  160 lbs:  89.4 % Adjusted Body Weight:   ; Weight Adjustment for: No adjustment Adjusted BMI:      
BMI Category:  Normal weight (BMI 18.5-24. 9) Nutrition Diagnosis:  
Inadequate oral intake related to impaired respiratory function as evidenced by NPO or clear liquid status due to medical condition Nutrition Interventions:  
Food and/or Nutrient Delivery: Start tube feeding, Continue NPO Nutrition Education and Counseling: No recommendations at this time Coordination of Nutrition Care: Continued inpatient monitoring, Interdisciplinary rounds Goals: 
Start tube feeds when clinically possible within the next 1-2 days Nutrition Monitoring and Evaluation:  
Behavioral-Environmental Outcomes:   
Food/Nutrient Intake Outcomes: Enteral nutrition intake/tolerance Physical Signs/Symptoms Outcomes: Biochemical data, Skin, Weight, GI status, Nausea/vomiting, Fluid status or edema Discharge Planning: Too soon to determine Electronically signed by Glade Osgood on 10/14/2020 at 1:36 PM

## 2020-10-14 NOTE — ANESTHESIA PROCEDURE NOTES
Emergent Intubation Performed by: Steve Lyons, CRNA Authorized by: Juan Diego Fox MD  
 
Emergent Intubation: Location:  ICU Date/Time:  10/14/2020 9:25 AM 
Indications:  Impending respiratory failure Spontaneous Ventilation: present Level of Consciousness: awake Preoxygenated: Yes Airway Documentation: Airway:  ETT - Cuffed Technique:  Rapid sequence (video laryngoscopy) Advanced Technique:  Luu Insertion Site:  Oral 
Blade Type:  Declan Blade Size:  3 ETT size (mm):  8.0 ETT Line [place]:  Lips ETT Insertion depth (cm):  24 Placement verified by: auscultation, EtCO2 and BBS Attempts:  1 Difficult airway: No   
VSS after intubation.

## 2020-10-14 NOTE — CONSULTS
Phone: [phone] Paging : [phone] Hematology/Oncology Consult Note Patient: Cindy Perez MRN: [id number]  [facility]: [id number] YOB: 1965  Age: 47 y.o. Sex: male DOA: 10/9/2020 LOS:  LOS: 5 days REASON FOR CONSULTATION:  
 
Thrombocytopenia and coagulopathy, likely due to liver disease ASSESSMENT:  
 
· Hem/Onc Problems: (1) thrombocyopenia, could be due to liver disease or ITP, (2) Coagulopathydue to liver disease, may also has component of DIC (3) Portal vein thrombosis · Reason for Admission: Progressive weakness poor appetite · Other Active Problems: · Immune suppressive state with humira · Colitis · Respiratory failure with ARDS picture, intubated 10/14/2020 · Stress negative for ischemia · ETOH abuse · Ascites and portal hypertnesion Active Problems: 
Hospital Problems  Date Reviewed: 10/9/2020 Codes Class Noted POA Acute respiratory failure with hypoxemia [facility]) ICD-10-CM: J96.01 
ICD-9-CM: 518.81  10/13/2020 Unknown Aspiration pneumonia ([facility] ICD-10-CM: J69.0 ICD-9-CM: 507.0  10/13/2020 Unknown Acute diastolic CHF (congestive heart failure) (HCC) ICD-10-CM: I50.31 ICD-9-CM: 428.31, 428.0  10/13/2020 Unknown Thrombocytopenia ([facility] ICD-10-CM: D69.6 ICD-9-CM: 287.5  10/13/2020 Unknown * (Principal) Hypocalcemia ICD-10-CM: E83.51 
ICD-9-CM: 275.41  10/9/2020 Unknown Hypomagnesemia ICD-10-CM: X17.57 
ICD-9-CM: 275.2  10/9/2020 Unknown Alcoholism ([facility] ICD-10-CM: H50.20 ICD-9-CM: 303.90  10/9/2020 Unknown Cirrhosis ([facility] ICD-10-CM: K74.60 ICD-9-CM: 571.5  10/9/2020 Unknown Colitis ICD-10-CM: K52.9 ICD-9-CM: 558.9  10/9/2020 Unknown RECOMMENDATIONS:  
· Continue supportive care as doing · I would give him dexamathesone 40 mg daily x4 to see if that improves his platelet count for possible component of ITP · Transfuse platelet for < 20 or clinical bleeding · If clinical bleeding, may also give Amicar to improve platelet function · Monitor DIC panel and LFT, if fibrinogen is lower, will give cryo · Patient is already on Vit K, continue · May also give FFP to correct INR as needed · For his portal thrombosis, he is not a candidate for anticoagulation anyway given severe thrombocytopenia · Continue antibiotics · Ruling out COVID as doing - negative x2 already HPI:  
 
Patient intubated sedated; history per chart review. 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 He presented to ER with decrease appetite nausea and lower abdominal pain and cramping that is been progressive since last Monday and was subsequently admitted. He has had progressive respiratory failure and was intubated today. COVID negative x2. CT showed liver disease. Lab test showed progressive thrombocytopenia and coagulopathy. Past Medical History:  
Diagnosis Date  Diabetes ([address]  Gastroparesis  Pancreatitis Past Surgical History:  
Procedure Laterality Date  HX CHOLECYSTECTOMY  HX HIP FRACTURE TX    
 left hip sx 9.23/2018 History reviewed. No pertinent family history. Social History Socioeconomic History  Marital status:  Spouse name: Not on file  Number of children: Not on file  Years of education: Not on file  Highest education level: Not on file Tobacco Use  Smoking status: Current Every Day Smoker  Smokeless tobacco: Never Used Substance and Sexual Activity  Alcohol use: Yes  Drug use: No  
 
 
Current Facility-Administered Medications Medication Dose Route Frequency  midazolam in normal saline (VERSED) 1 mg/mL infusion  1-5 mg/hr IntraVENous TITRATE  fentaNYL (PF) 900 mcg/30 ml infusion soln  0-200 mcg/hr IntraVENous TITRATE  fentaNYL citrate (PF) injection  mcg   mcg IntraVENous Q4H PRN  
 LORazepam (ATIVAN) injection 1 mg  1 mg IntraVENous Q2H PRN  
 dexamethasone (DECADRON) 4 mg/mL injection 6 mg  6 mg IntraVENous Q24H  piperacillin-tazobactam (ZOSYN) 4.5 g in 0.9% sodium chloride (MBP/ADV) 100 mL MBP  4.5 g IntraVENous Q8H  
 cisatracurium (NIMBEX) 100 mg in 0.9% sodium chloride 100 mL infusion  0-10 mcg/kg/min IntraVENous TITRATE  thiamine mononitrate (B-1) tablet 100 mg  100 mg Oral DAILY  0.9% sodium chloride 1,000 mL with mvi, adult no. 4 with vit K 10 mL, thiamine 869 mg, folic acid 1 mg infusion   IntraVENous DAILY  albumin human 25% (BUMINATE) solution 25 g  25 g IntraVENous TID  NOREPINephrine (LEVOPHED) 8 mg in 0.9% NS 250ml infusion  2-16 mcg/min IntraVENous TITRATE  metoclopramide HCl (REGLAN) injection 5 mg  5 mg IntraVENous Q6H PRN  
 [START ON 10/15/2020] dexamethasone (DECADRON) 10 mg/mL injection 40 mg  40 mg IntraVENous DAILY  [Held by provider] furosemide (LASIX) injection 40 mg  40 mg IntraVENous BID  vancomycin (VANCOCIN) 1,000 mg in 0.9% sodium chloride 250 mL (VIAL-MATE)  1,000 mg IntraVENous Q12H  Vancomycin - Rx to dose and monitor  1 Each Other Rx Dosing/Monitoring  pantoprazole (PROTONIX) 40 mg in 0.9% sodium chloride 10 mL injection  40 mg IntraVENous Q24H  
 HYDROmorphone (PF) (DILAUDID) injection 1 mg  1 mg IntraVENous Q3H PRN  
 oxyCODONE-acetaminophen (PERCOCET) 5-325 mg per tablet 1-2 Tab  1-2 Tab Oral Q6H PRN  
 lactulose (CHRONULAC) 10 gram/15 mL solution 30 mL  20 g Oral BID  lidocaine 4 % patch 2 Patch  2 Patch TransDERmal Q24H  
 lipase-protease-amylase (ZENPEP 5,000) capsule 1 Cap  1 Cap Oral TID WITH MEALS  ondansetron (ZOFRAN) injection 4 mg  4 mg IntraVENous Q6H PRN  
 nicotine (NICODERM CQ) 21 mg/24 hr patch 1 Patch  1 Patch TransDERmal DAILY  insulin lispro (HUMALOG) injection   SubCUTAneous AC&HS  
  glucose chewable tablet 16 g  4 Tab Oral PRN  
 glucagon (GLUCAGEN) injection 1 mg  1 mg IntraMUSCular PRN  
 dextrose 10% infusion 125-250 mL  125-250 mL IntraVENous PRN Prior to Admission medications Medication Sig Start Date End Date Taking? Authorizing Provider  
adalimumab (Humira Pen) 40 mg/0.8 mL injection pen 40 mg by SubCUTAneous route every seven (7) days. Yes Provider, Historical  
lidocaine (LIDODERM) 5 % 2 Patches by TransDERmal route every twenty-four (24) hours. Apply patch to the affected area for 12 hours a day and remove for 12 hours a day. One patch is applied to left hip. One patch is applied to right shoulder blade. Yes Provider, Historical  
loratadine (Claritin) 10 mg tablet Take 10 mg by mouth daily. Indications: inflammation of the nose due to an allergy   Yes Provider, Historical  
aspirin delayed-release 81 mg tablet Take 1 Tab by mouth daily. Yes Racheal, MD Meño  
insulin glargine (Lantus Solostar U-100 Insulin) 100 unit/mL (3 mL) inpn 5 Units by SubCUTAneous route daily. Yes Racheal, MD Meño  
DULoxetine (CYMBALTA) 60 mg capsule Take 1 Cap by mouth as needed. 1/6/19   Meño Springer MD  
glyBURIDE-metFORMIN (GLUCOVANCE) 2.5-500 mg per tablet Take 1 Tab by mouth two (2) times a day. Meño Springer MD  
methocarbamoL (ROBAXIN) 500 mg tablet Take 2 Tabs by mouth four (4) times daily. Meño Springer MD  
traZODone (DESYREL) 50 mg tablet Take 50 mg by mouth nightly. Meño Springer MD  
HYDROmorphone (DILAUDID) 2 mg tablet Take 1 Tab by mouth every four (4) hours as needed for Pain. Max Daily Amount: 12 mg. Indications: Severe Pain 9/26/18   Lashonda Beltrán MD  
ondansetron (ZOFRAN ODT) 4 mg disintegrating tablet Take 1-2 tablets every 6-8 hours as needed for nausea and vomiting. 9/26/18   Lashonda Beltrán MD  
 
 
No Known Allergies ROS:  
 
 
Not possible Physical Exam: VITAL SIGNS:  
Patient Vitals for the past 24 hrs: BP Temp Pulse Resp SpO2 Height 10/14/20 1930 109/79  (!) 107 16 99 %   
10/14/20 1915 118/77  (!) 107 16 99 %   
10/14/20 1900 115/81  (!) 107 16 99 %   
10/14/20 1845 114/80       
10/14/20 1830 112/74  (!) 104 16 99 %   
10/14/20 1815 110/76  (!) 104 16 99 %   
10/14/20 1800 109/75  (!) 104 16 99 %   
10/14/20 1745 104/68  (!) 102 16 99 %   
10/14/20 1730 112/74  (!) 102 16 99 %   
10/14/20 1715 110/75  100 14 98 %   
10/14/20 1700 108/73  99 18 94 %   
10/14/20 1645 110/79  98 20 98 %   
10/14/20 1630 106/75  96 23 98 %   
10/14/20 1615 105/76  98 (!) 31 92 %   
10/14/20 1600 105/75  99 22 90 %   
10/14/20 1545 103/67  (!) 102 (!) 34 98 %   
10/14/20 1543   99 21 98 %   
10/14/20 1530 94/69  97 16 98 %   
10/14/20 1515 99/69  99 25 98 %   
10/14/20 1500 97/73  (!) 103 (!) 33 97 %   
10/14/20 1455  (P) 98 °F (36.7 °C)      
10/14/20 1445 100/68  (!) 105 (!) 31 98 %   
10/14/20 1430 96/68  (!) 105 (!) 32 98 %   
10/14/20 1415 (!) 89/68  (!) 106 (!) 31 98 %   
10/14/20 1400 96/69  (!) 108 24 97 %   
10/14/20 1345 98/67  (!) 110 (!) 36 97 %   
10/14/20 1330 99/68  (!) 109 26 97 %   
10/14/20 1315 100/73  (!) 110 28 97 %   
10/14/20 1300 91/69  (!) 110 28 97 %   
10/14/20 1245 96/69  (!) 108 30 97 %   
10/14/20 1230 99/70  (!) 110 24 96 %   
10/14/20 1215 100/72  (!) 112 (!) 33 94 %   
10/14/20 1200 103/74  (!) 114 29 94 %   
10/14/20 1145 100/72  (!) 111 24 96 %   
10/14/20 1130 124/86  (!) 118 25 93 %   
10/14/20 1115 104/72  (!) 116 23 93 %   
10/14/20 1100 100/73  (!) 116 24 98 %   
10/14/20 1045 (!) 86/64  (!) 114 19 99 %   
10/14/20 1030 (!) 88/66  (!) 119 21 100 %   
10/14/20 1015 (!) 145/106  (!) 129 (!) 31 (!) 88 %   
10/14/20 1000 (!) 167/127  (!) 134 (!) 33 97 %   
10/14/20 0945   (!) 105 18 91 %   
10/14/20 0930   (!) 115 (!) 32 95 %   
10/14/20 0915   (!) 120  92 %   
 10/14/20 0900 (!) 148/92  (!) 117  91 %   
10/14/20 0820      5' 9\" (1.753 m)  
10/14/20 0756     95 %   
10/14/20 0600 111/82 97.7 °F (36.5 °C) (!) 109 (!) 32 93 %   
10/14/20 0500 121/81 97.7 °F (36.5 °C) (!) 113 (!) 31 97 %   
10/14/20 0400 110/70 97.7 °F (36.5 °C) (!) 111 22 94 %   
10/14/20 0300 119/85  (!) 111 23 97 %   
10/14/20 0245   (!) 115 24 95 %   
10/14/20 0200   (!) 105 21 92 %   
10/14/20 0141     95 %   
10/14/20 0130   (!) 110 19 94 %   
10/14/20 0100 108/84  (!) 109 14 95 %   
10/14/20 0000  97.7 °F (36.5 °C)      
10/13/20 2230   (!) 107 19 96 %   
10/13/20 2215   (!) 108 24 97 %   
10/13/20 2200 104/77  (!) 106 23 99 %   
10/13/20 2145   (!) 106 22 97 %   
10/13/20 2130   (!) 106 25 97 %   
10/13/20 2115   (!) 107 25 98 %   
10/13/20 2110     92 %   
10/13/20 2100 127/88  (!) 104 24 97 %   
10/13/20 2045   (!) 110 26 94 %   
10/13/20 2030   (!) 110 25 95 %   
10/13/20 2015   (!) 107 23 95 %   
10/13/20 2000 112/80  (!) 104 20 96 %   
10/13/20 1945   (!) 104 19 95 %   
 
GENERAL: intubated sedated Detailed PE not performed Ancillary Studies:  
 
Bloodwork: 
Recent Results (from the past 24 hour(s)) LACTIC ACID Collection Time: 10/13/20  8:10 PM  
Result Value Ref Range Lactic acid 3.4 (HH) 0.4 - 2.0 MMOL/L  
POC G3 Collection Time: 10/13/20  9:09 PM  
Result Value Ref Range Device: BIPAP    
 FIO2 (POC) 70 % pH (POC) 7.39 7.35 - 7.45    
 pCO2 (POC) 37.8 35.0 - 45.0 MMHG  
 pO2 (POC) 55 (L) 80 - 100 MMHG  
 HCO3 (POC) 23.1 22 - 26 MMOL/L  
 sO2 (POC) 88 (L) 92 - 97 % Base deficit (POC) 2 mmol/L  
 PEEP/CPAP (POC) 5 cmH2O  
 PIP (POC) 12 Allens test (POC) YES Total resp. rate 25 Site LEFT RADIAL Specimen type (POC) ARTERIAL Performed by Sara Mann timed YES    
GLUCOSE, POC Collection Time: 10/13/20 10:14 PM  
Result Value Ref Range Glucose (POC) 197 (H) 70 - 110 mg/dL CARDIAC PANEL,(CK, CKMB & TROPONIN) Collection Time: 10/14/20  1:30 AM  
Result Value Ref Range CK - MB <1.0 <3.6 ng/ml CK-MB Index  0.0 - 4.0 % CALCULATION NOT PERFORMED WHEN RESULT IS BELOW LINEAR LIMIT  
 CK 39 39 - 308 U/L Troponin-I, QT <0.02 0.0 - 0.045 NG/ML  
LACTIC ACID Collection Time: 10/14/20  1:30 AM  
Result Value Ref Range Lactic acid 2.2 (HH) 0.4 - 2.0 MMOL/L  
CALCIUM, IONIZED Collection Time: 10/14/20  4:30 AM  
Result Value Ref Range Ionized Calcium 0.98 (L) 1.12 - 1.32 MMOL/L  
MAGNESIUM Collection Time: 10/14/20  4:38 AM  
Result Value Ref Range Magnesium 1.3 (L) 1.6 - 2.6 mg/dL PHOSPHORUS Collection Time: 10/14/20  4:38 AM  
Result Value Ref Range Phosphorus 2.1 (L) 2.5 - 4.9 MG/DL PROTHROMBIN TIME + INR Collection Time: 10/14/20  4:38 AM  
Result Value Ref Range Prothrombin time 25.1 (H) 11.5 - 15.2 sec INR 2.3 (H) 0.8 - 1.2 METABOLIC PANEL, COMPREHENSIVE Collection Time: 10/14/20  4:38 AM  
Result Value Ref Range Sodium 143 136 - 145 mmol/L Potassium 3.5 3.5 - 5.5 mmol/L Chloride 105 100 - 111 mmol/L  
 CO2 30 21 - 32 mmol/L Anion gap 8 3.0 - 18 mmol/L Glucose 141 (H) 74 - 99 mg/dL BUN 4 (L) 7.0 - 18 MG/DL Creatinine 0.90 0.6 - 1.3 MG/DL  
 BUN/Creatinine ratio 4 (L) 12 - 20 GFR est AA >60 >60 ml/min/1.73m2 GFR est non-AA >60 >60 ml/min/1.73m2 Calcium 7.6 (L) 8.5 - 10.1 MG/DL Bilirubin, total 5.1 (H) 0.2 - 1.0 MG/DL  
 ALT (SGPT) 31 16 - 61 U/L  
 AST (SGOT) 68 (H) 10 - 38 U/L Alk. phosphatase 48 45 - 117 U/L Protein, total 5.8 (L) 6.4 - 8.2 g/dL Albumin 3.6 3.4 - 5.0 g/dL Globulin 2.2 2.0 - 4.0 g/dL A-G Ratio 1.6 0.8 - 1.7    
CBC WITH AUTOMATED DIFF Collection Time: 10/14/20  4:38 AM  
Result Value Ref Range WBC 8.0 4.6 - 13.2 K/uL  
 RBC 3.44 (L) 4.70 - 5.50 M/uL  
 HGB 10.6 (L) 13.0 - 16.0 g/dL HCT 32.2 (L) 36.0 - 48.0 % MCV 93.6 74.0 - 97.0 FL  
 MCH 30.8 24.0 - 34.0 PG  
 MCHC 32.9 31.0 - 37.0 g/dL  
 RDW 15.6 (H) 11.6 - 14.5 % PLATELET 23 (LL) 282 - 420 K/uL NEUTROPHILS 74 (H) 40 - 73 % LYMPHOCYTES 17 (L) 21 - 52 % MONOCYTES 8 3 - 10 % EOSINOPHILS 1 0 - 5 % BASOPHILS 0 0 - 2 %  
 ABS. NEUTROPHILS 5.9 1.8 - 8.0 K/UL  
 ABS. LYMPHOCYTES 1.4 0.9 - 3.6 K/UL  
 ABS. MONOCYTES 0.6 0.05 - 1.2 K/UL  
 ABS. EOSINOPHILS 0.1 0.0 - 0.4 K/UL  
 ABS. BASOPHILS 0.0 0.0 - 0.1 K/UL  
 DF AUTOMATED    
LACTIC ACID Collection Time: 10/14/20  5:58 AM  
Result Value Ref Range Lactic acid 2.3 (HH) 0.4 - 2.0 MMOL/L  
POC G3 Collection Time: 10/14/20  8:51 AM  
Result Value Ref Range Device: BIPAP    
 FIO2 (POC) 80 % pH (POC) 7.46 (H) 7.35 - 7.45    
 pCO2 (POC) 37.2 35.0 - 45.0 MMHG  
 pO2 (POC) 45 (LL) 80 - 100 MMHG  
 HCO3 (POC) 26.3 (H) 22 - 26 MMOL/L  
 sO2 (POC) 83 (L) 92 - 97 % Base excess (POC) 2 mmol/L  
 PEEP/CPAP (POC) 5 cmH2O  
 PIP (POC) 12 Allens test (POC) YES Total resp. rate 44 Site RIGHT RADIAL Specimen type (POC) ARTERIAL Performed by Lennox King Spontaneous timed YES    
PLATELETS, ALLOCATE Collection Time: 10/14/20  9:00 AM  
Result Value Ref Range CALLED TO:    
  1 UNIT READY TO [facility] ICU BY CAM ON 10/14/20 AT 1905. Unit number [id number] Blood component type PLTPH LR,1 Unit division 00 Status of unit ALLOCATED   
PLASMA, ALLOCATE Collection Time: 10/14/20  9:30 AM  
Result Value Ref Range Unit number [id number] Blood component type FP 24h,Thaw Unit division 00 Status of unit ISSUED Unit number [id number] Blood component type FP 24h,Thaw1 Unit division 00 Status of unit ISSUED CARDIAC PANEL,(CK, CKMB & TROPONIN) Collection Time: 10/14/20 10:00 AM  
Result Value Ref Range CK - MB <1.0 <3.6 ng/ml CK-MB Index  0.0 - 4.0 % CALCULATION NOT PERFORMED WHEN RESULT IS BELOW LINEAR LIMIT CK 37 (L) 39 - 308 U/L Troponin-I, QT <0.02 0.0 - 0.045 NG/ML  
AMYLASE Collection Time: 10/14/20 10:00 AM  
Result Value Ref Range Amylase 40 25 - 115 U/L  
LIPASE Collection Time: 10/14/20 10:00 AM  
Result Value Ref Range Lipase <10 (L) 73 - 393 U/L  
TYPE & SCREEN Collection Time: 10/14/20 10:00 AM  
Result Value Ref Range Crossmatch Expiration 10/17/2020 ABO/Rh(D) A POSITIVE Antibody screen NEG   
POC G3 Collection Time: 10/14/20 11:26 AM  
Result Value Ref Range Device: VENT    
 FIO2 (POC) 100 % pH (POC) 7.34 (L) 7.35 - 7.45    
 pCO2 (POC) 51.0 (H) 35.0 - 45.0 MMHG  
 pO2 (POC) 69 (L) 80 - 100 MMHG  
 HCO3 (POC) 27.6 (H) 22 - 26 MMOL/L  
 sO2 (POC) 92 92 - 97 % Base excess (POC) 2 mmol/L Mode ASSIST CONTROL Tidal volume 420 ml Set Rate 18 bpm  
 PEEP/CPAP (POC) 12 cmH2O Allens test (POC) YES Inspiratory Time 0.9 sec Total resp. rate 22 Site RIGHT RADIAL Specimen type (POC) ARTERIAL Performed by Bella Arellano Volume control plus YES    
GLUCOSE, POC Collection Time: 10/14/20  2:50 PM  
Result Value Ref Range Glucose (POC) 183 (H) 70 - 110 mg/dL GLUCOSE, POC Collection Time: 10/14/20  4:03 PM  
Result Value Ref Range Glucose (POC) 178 (H) 70 - 110 mg/dL D DIMER Collection Time: 10/14/20  5:05 PM  
Result Value Ref Range D DIMER 7.84 (H) <0.46 ug/ml(FEU) FIBRINOGEN Collection Time: 10/14/20  5:05 PM  
Result Value Ref Range Fibrinogen 147 (L) 210 - 451 mg/dL LACTIC ACID Collection Time: 10/14/20  5:05 PM  
Result Value Ref Range Lactic acid 1.8 0.4 - 2.0 MMOL/L  
RENAL FUNCTION PANEL Collection Time: 10/14/20  5:05 PM  
Result Value Ref Range Sodium 144 136 - 145 mmol/L Potassium 3.4 (L) 3.5 - 5.5 mmol/L Chloride 106 100 - 111 mmol/L  
 CO2 29 21 - 32 mmol/L Anion gap 9 3.0 - 18 mmol/L Glucose 165 (H) 74 - 99 mg/dL  BUN 6 (L) 7.0 - 18 MG/DL  
 Creatinine 0.88 0.6 - 1.3 MG/DL  
 BUN/Creatinine ratio 7 (L) 12 - 20 GFR est AA >60 >60 ml/min/1.73m2 GFR est non-AA >60 >60 ml/min/1.73m2 Calcium 7.5 (L) 8.5 - 10.1 MG/DL Phosphorus 2.3 (L) 2.5 - 4.9 MG/DL Albumin 3.5 3.4 - 5.0 g/dL MAGNESIUM Collection Time: 10/14/20  5:05 PM  
Result Value Ref Range Magnesium 1.2 (L) 1.6 - 2.6 mg/dL AMMONIA Collection Time: 10/14/20  5:05 PM  
Result Value Ref Range Ammonia 50 (H) 11 - 32 UMOL/L Radiology: Xr Chest Sngl V Result Date: 10/13/2020 EXAM: XR CHEST SNGL V CLINICAL INDICATION/HISTORY: Hypoxia -Additional: None COMPARISON: 10/11/2020 TECHNIQUE: Frontal view of the chest _______________ FINDINGS: HEART AND MEDIASTINUM: Stable appearing cardiac size and mediastinal contours. LUNGS AND PLEURAL SPACES: Patchy multifocal alveolar opacities are newly present on the second examination. No pneumothorax or pleural effusion. BONY THORAX AND SOFT TISSUES: No acute osseous abnormality _______________ IMPRESSION: Short interval development of patchy multifocal airspace disease favored to reflect pulmonary edema without significant pleural effusion. Xr Shoulder Lt Ap/lat Min 2 V Result Date: 9/25/2020 EXAM: XR SHOULDER LT AP/LAT MIN 2 V CLINICAL INDICATION/HISTORY: Left shoulder pain after fall -Additional: None COMPARISON: None TECHNIQUE: 3 views of the left shoulder _______________ FINDINGS: BONES: Osseous limited is normal. No fracture demonstrated. Mild degenerative changes across the acromioclavicular joint noted. Glenohumeral joint not profiled. SOFT TISSUES: Included left lung and left chest wall clear _______________ IMPRESSION: Mild degenerative changes as described without superimposed acute radiographic abnormality. Xr Hip Lt W Or Wo Pelv 2-3 Vws Result Date: 9/25/2020 EXAM: LEFT HIP RADIOGRAPHS CLINICAL INDICATION/HISTORY: Fall with left hip pain -Additional: None COMPARISON: No prior imaging available for review. TECHNIQUE: AP pelvis and frog-leg lateral view of left hip. _______________ FINDINGS: BONES: Evidence of prior long intramedullary yadira fixation and proximal and distal hip screw for left hip fracture fixation. There is retraction of the lesser trochanter of the femur, with corticated margins suggesting a chronic avulsion related to the initial fracture presentation. No superimposed acute femoral fracture identified. Mild bilateral hip joint osteoarthritis. SOFT TISSUES: Unremarkable. _______________ IMPRESSION: 1. Reduced and internally fixated proximal left femoral fracture with likely chronic avulsion of the lesser trochanter the left femur. Comparison prior imaging is recommended. No acute displaced fracture demonstrated. Mild bilateral hip joint osteoarthritis. Ct Abd Pelv W Cont Result Date: 10/9/2020 EXAM: CT of the abdomen and pelvis INDICATION: Abdominal pain, greatest in the left lower quadrant with nausea and vomiting. COMPARISON: MRI pelvis 10/7/2019. CT left hip 4/17/2019 TECHNIQUE: Axial CT imaging of the abdomen and pelvis was performed with intravenous contrast. Multiplanar reformats were generated. One or more dose reduction techniques were used on this CT: automated exposure control, adjustment of the mAs and/or kVp according to patient size, and iterative reconstruction techniques. The specific techniques used on this CT exam have been documented in the patient's electronic medical record. Digital Imaging and Communications in Medicine (DICOM) format image data are available to nonaffiliated external healthcare facilities or entities on a secure, media free, reciprocally searchable basis with patient authorization for at least a 12-month period after this study. _______________ FINDINGS: LOWER CHEST: Minor basilar changes of atelectasis.  No alveolar consolidation or pleural effusion. Normal cardiac size without pericardial effusion. LIVER, BILIARY: Diffuse hepatic hypoattenuation is present with hepatic size estimated at approximately 27 cm in craniocaudal span. No discrete liver lesion. No intrahepatic or extra hepatic biliary ductal dilatation. Patient is status post cholecystectomy. PANCREAS: Pancreatic enhancement is normal. Mild and diffuse pancreatic ductal ectasia is noted. No discrete pancreatic mass lesion noted. SPLEEN: Mildly enlarged at 14 cm. ADRENALS: Normal. KIDNEYS/URETERS/BLADDER: Symmetric renal enhancement. No bowel obstruction PELVIC ORGANS: Symmetric renal enhancement. Punctate nonobstructing upper pole left renal stone. No distal ureteral or urinary bladder stone. Urinary bladder unremarkable in CT appearance. VASCULATURE: Diffuse aortobiiliac atherosclerotic vascular calcification is present without evidence of aneurysmal dilatation. Main portal vein as well as right and left portal venous branches are widely patent. LYMPH NODES: Scattered subcentimeter mesenteric and retroperitoneal lymph nodes are noted. No evidence of abdominal or pelvic adenopathy. GASTROINTESTINAL TRACT: No morphology of bowel obstruction. No free intraperitoneal gas. Mild thickening of the proximal ascending colon and cecum noted. BONES: No acute or aggressive osseous abnormalities identified. Partial visualization healed proximal left femoral fracture status post IM nailing and dynamic hip screw placement. OTHER: Small quantity of abdominal/pelvic ascites. Thin subcutaneous linear density tracking along the right gluteal fold, likely in keeping previously noted fistula tract. _______________ IMPRESSION: 1. Diffuse hepatic hypoattenuation compatible with steatosis with hepatomegaly, mild splenomegaly, and small quantity of abdominal/pelvic ascites. The totality of findings compatible with hepatocellular dysfunction and potentially associated portal hypertension. > Patent main and proximal portal venous branches.   > Mild thickening of the proximal ascending colon and cecum may reflect sequela of portal colopathy. Infectious/inflammatory colitis also a possibility but thought less likely given the imaging appearance. 2. Likely correlate for small area of perianal fistula noted on prior MRI pelvis. 3. Punctate nonobstructing upper pole left renal stone. [id number] [facility] Addendum Date: 10/13/2020 Addendum: Impression 1 discussed with Dr. Greg Canseco at 10:47 AM on same day. Result Date: 10/13/2020 Right upper quadrant abdominal ultrasound INDICATION: Hepatomegaly. TECHNIQUE: Real-time sonography in multiple planes of the abdomen was performed with image documentation. Grayscale, color flow Doppler imaging, and velocity spectral waveform analysis of the portal vein was performed (duplex imaging). COMPARISON: None. _______________ FINDINGS: LIVER: The liver is diffusely increased in echogenicity, without focal mass. Liver measures at least 23.1 cm sagittal. Color and spectral flow analysis of the portal vein shows lack of flow and dilated measuring 1.5 cm. There appears periportal collaterals. Decrease flow in the hepatic vein. BILIARY SYSTEM: No intrahepatic biliary dilatation. Common bile duct is normal in caliber measuring 5-6 mm. GALLBLADDER: Cholecystectomy. RIGHT KIDNEY: 11.9 cm in length. No hydronephrosis or renal mass. No visible calculi. PANCREAS: Obscured by overlying bowel gas. IVC: Visualized portions are unremarkable in appearance. OTHER: There is perihepatic ascites and recanalized umbilical vein. _______________ IMPRESSION: 1. Nonspecific increased hepatic echotexture suggesting intrinsic liver disease. Dilated portal vein, recanalized umbilical vein and perihepatic ascites.  There is lack of flow within the portal vein, suggestive of thrombosis, with periportal collaterals and decreased flows in the hepatic vein. However, portal and hepatic veins patent on recent CT abdomen 10/09/2020. Multiple attempts to contact Dr. Chula Altman with this findings, awaiting callback. 2. No discrete hepatic mass identified. 3. Cholecystectomy. 4. Pancreas obscured by bowel gas. Xr Chest [facility] Result Date: 10/14/2020 EXAM: XR CHEST PORT CLINICAL INDICATION/HISTORY: ET tube placement -Additional: None COMPARISON: 10/14/2020 TECHNIQUE: Portable frontal view of the chest _______________ FINDINGS: SUPPORT DEVICES: Endotracheal tube 4 cm above the billie. Nasogastric tube below the diaphragm, tip projecting over the stomach. HEART AND MEDIASTINUM: Stable appearing cardiac size and mediastinal contours. LUNGS AND PLEURAL SPACES: Multifocal interstitial and airspace disease. Overall similar appearance to prior examination. No evidence of pneumothorax or definite pleural effusion. BONY THORAX AND SOFT TISSUES: Unremarkable. _______________ IMPRESSION: 1. Support devices in stable/appropriate position. 2. Extensive bilateral interstitial and airspace disease similar to prior. Xr Chest [facility] Result Date: 10/14/2020 EXAM: One-view chest CLINICAL HISTORY: Dyspnea, COMPARISON: None FINDINGS: Frontal view of the chest demonstrate extensive bilateral airspace disease mildly worsened. Cardiac silhouette is normal in size and contour. No acute bony or soft tissue abnormality. IMPRESSION: Extensive bilateral nonspecific airspace disease appears worsened since prior examination. Xr Chest [facility] Result Date: 10/11/2020 EXAM: CHEST RADIOGRAPH CLINICAL INDICATION/HISTORY: Shortness of breath -Additional: None COMPARISON: October 9, 2020 TECHNIQUE: Portable frontal view of the chest _______________ FINDINGS: SUPPORT DEVICES: None. HEART AND MEDIASTINUM: No appreciable cardiomegaly. Remaining mediastinal contours within normal limits.  LUNGS AND PLEURAL SPACES: Lung volumes are hypoinflated. There are linear opacities at both lung bases and blunting of the costophrenic sulci. BONY THORAX AND SOFT TISSUES: Unremarkable. _______________ IMPRESSION: Bibasilar opacities that likely represent a combination of atelectasis and/or small pleural effusions Xr Chest [facility] Result Date: 10/9/2020 EXAM: XR CHEST PORT CLINICAL INDICATION/HISTORY: abnormal ekg -Additional: None COMPARISON: None TECHNIQUE: Portable frontal view of the chest _______________ FINDINGS: SUPPORT DEVICES: None. HEART AND MEDIASTINUM: Cardiomediastinal silhouette within normal limits. LUNGS AND PLEURAL SPACES: No dense consolidation, large effusion or pneumothorax. _______________ IMPRESSION: No acute cardiopulmonary abnormality. Thanks for the consult. We will follow! Cam Mary MD, PhD, [address] Phone: [phone] Pager: [phone]

## 2020-10-14 NOTE — PROCEDURES
[facility] Pul[facility], Critical Care, and Sleep Medicine Central Line Procedure with Ultrasound Guidance Indication: Need for vasopressors Risks, benefits, alternatives explained and consent obtained. Patient positioned in Trendelenburg. Timeout called with RN and confirmed patient ID, procedure, site, allergy, consent etc.  
 
Central line Bundle: 
Full sterile barrier precautions used. 7-Step Sterility Protocol followed. (cap, mask sterile gown, sterile gloves, large sterile sheet, hand hygiene, 2% chlorhexidine for cutaneous antisepsis) 5 mL 1% Lidocaine placed at insertion site. Using ultrasound guidance, Right femoral cannulated x 1 attempt(s) utilizing the modified Seldinger technique. no difficulty. Position of wire confirmed in vein using US before dilating. Wire was removed. Good blood return. Catheter secured & Biopatch applied. Sterile Tegaderm placed. patient is on PEEP 14  100 % fio2 and coagulopathy high risk for internal  jugular opted  for femoral for patient safety Emilia Miller MD  
10/14/2020 4:20 PM

## 2020-10-14 NOTE — PROGRESS NOTES
Chart reviewed and noted that pt is now in ICU with acute hypoxemic resp failure, COvid rule out. Cm following progress and will assist as needed. Care Management Interventions PCP Verified by CM: Yes Last Visit to PCP: 10/08/20 Transition of Care Consult (CM Consult): Discharge Planning Current Support Network: Lives with Spouse, Own Home Confirm Follow Up Transport: Self The Plan for Transition of Care is Related to the Following Treatment Goals : home when medically stable The Patient and/or Patient Representative was Provided with a Choice of Provider and Agrees with the Discharge Plan?: Yes Name of the Patient Representative Who was Provided with a Choice of Provider and Agrees with the Discharge Plan: patient Discharge Location Discharge Placement: Home

## 2020-10-14 NOTE — PROGRESS NOTES
0700  Bedside, Verbal and Written shift change report given to Brandin Tran (oncoming nurse) by Amor Vivar, RN (offgoing nurse). Report included the following information SBAR, Kardex, Intake/Output and Recent Results. 0830  Dr. Sparkle Owen at bed side assessing pt.     
 
0915  Pt co n/v. See Francis Thompson at bedside. Wants intubation. Anethesia paged overhead. 1030  Pt intubated for respiratory distress 200  Dr. Emir Gonzalez at bedside assessing pt. Orders received. Witt Angélica placement by Dr. Emir Gonzalez. 1715  Lab draw per order. Pt had large formed BM, hygiene provided. 1901  Blood bank reports Platelets ready for . 1900  Bedside, Verbal and Written shift change report given to Brandin Tran (oncoming nurse) by PHILIPPE Meraz (offgoing nurse). Report included the following information SBAR, Kardex, Intake/Output and Recent Results.

## 2020-10-14 NOTE — DIABETES MGMT
GLYCEMIC CONTROL PROGRESS NOTE: 
 
- discussed in rounds, known h/o T2DM, basal/oral home regimen - BG out of target range non-ICU:< 180 mg/dl, requiring consistent corrective coverage - TDD = 8 units - Humalog Normal Insulin Sensitivity Corrective Coverage 
- NPO while on Bi-pap Recent Glucose Results:  
Lab Results Component Value Date/Time  (H) 10/14/2020 04:38 AM  
  (H) 10/13/2020 04:40 PM  
 GLUCPOC 197 (H) 10/13/2020 10:14 PM  
 GLUCPOC 209 (H) 10/13/2020 06:30 PM  
 GLUCPOC 192 (H) 10/13/2020 03:50 PM  
 
 
 
Vicky Amos MS, RN, CDE Glycemic Control Team 
[phone] Pager [phone] (M-TH 8:00-4:30P) *After Hours pager [phone]

## 2020-10-14 NOTE — CONSULTS
[facility] [address] [facility] Kristin Lavelle Marroquin MD, [facility] MD Monet Mars, PA-HUSEYIN Coe, [facility] Catalina Capone, [facility]   Raul Baltazar, FNP-C Pushpa Galvez, [facility] Jonatan Van [facility] [address] 
  at [address], [facility] Vini Fairbanks  22. 
  [phone] FAX: [address] 
  at [facility] 
  [address], [address] [facility], [address] 
  [phone] FAX: [phone] HEPATOLOGY PROGRESS NOTE The patient is a 47year old  male with out previous knowledge of liver disease. He says he used to consume alcohol fairly heavily several years ago but cut back to only 1-2 a few days per week 2 years ago. He notes progressive weakness and lower extremity swelling for the past few months and has no alcohol for 2-3 weeks. He notes problem concentrating on work and increased forgetfullness. Events of past few days noted. He is now intubated in ICU most likely as a consequence of aspiration pneumonia. PLT count remains in the 20s. INR has increased probably as a result of DIC Good news is renal function remains normal and ammonia remains low. ASSESSMENT AND PLAN: 
Cirrhosis The diagnosis of cirrhosis is based upon laboratory studies Cirrhosis of unclear etiology. This is likely to be due to chronic alcohol use. Serology for other causes of chronic liver disease are all negative. The CTP is 11. Child class C. The MELD score is 20. 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 Cardiac portion of liver transplant evaluation testing completed before this acute event. once he 
ECHO was nroaml. 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 This is based upon a history of consuming significant alcohol on a daily basis for many years. The patient has been abstinent from alcohol since 9/2020. I do not suspect he has alcoholic hepatitis. He has been abstinent from alcohol for 2-3 weeks and he has not been drinking excessively for several months prior to that. Suspect the changes to his labs are all due to chronic liver disease and cirrhosis The DF is right at 28. Even if this was alcoholic hepatitis it is mild and does not require steroids. Acute kidney injury The patient had RODERICK on admission with SCr 1.58 RODERICK is secondary to the effects of cirrhosis Scr has come down to normal and has remained normal desite acute pulmonary decompensation and likely sepsis. Can stop IV albumin. Lower extremity edema He had significant edema on admission. Edema has resolved with IV albumin which has been stopped Screening for Esophageal varices The patient has not had an EGD to screen for varices. I do not want to do EGD now because of the very low PLT count. Will given PLT growth factor and perform EGD as OP after PLT is up and it would be safer for banding and bx if any lesions found. Hepatic encephalopathy Overt HE has not developed to date. 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 Will start lactulose 30 cc BID. Anemia This is due to multifactorial causes including portal hypertension with chronic GI blood loss, bone marrow suppression secondary to malnutrition and alcohol. FE panel was normal 
Will need EGD to assess for UGI blood loss once extubated and before hospital DC. Coagulopathy This is secondary to cirrhosis, and DIC, There is no evidence of bleeding. No need for FFP at this time. The INR should improve with treatment of infection. Thrombocytopenia This is secondary to cirrhosis. However the value of 25K is much lower than we typically see with cirrhosis and will check anti-PLT antibodies. There is no evidence of overt bleeding. No treatment is required. The platelet count is under 50K. The use of a platelet growth factor to raise the platelet count and avoid platelet transfusions would be indicated if the patient requires a medical or surgical procedure. Screening for Hepatocellular Carcinoma [facility] 75. screening has not been not been performed A non-contrasted CT is not sufficient for [facility] 75. screening. Will get US duplex of liver in AM. PHYSICAL EXAMINATION: 
VS: per nursing note General:  Ill appearing. Intubated. Eyes:  Sclera anicteric. ENT:  No oral lesions. Thyroid normal. 
Nodes:  No adenopathy. Skin:  Spider angiomata. No jaundice. Respiratory:  Decreased BS. Cardiovascular:  Regular heart rate. Abdomen:  Soft non-tender, Hepatomegally with liver 5 fingers below the right costal margin. No obvious ascites. Extremities:  No lower extremity edema. Neurologic:  Sedated. LABORATORY: 
Results for Jo Ann Grubbs (MRN [id number]) as of 10/14/2020 20:45 Ref. Range 10/13/2020 03:48 10/14/2020 04:38 WBC Latest Ref Range: 4.6 - 13.2 K/uL 11.5 8.0 HGB Latest Ref Range: 13.0 - 16.0 g/dL 11.6 (L) 10.6 (L) PLATELET Latest Ref Range: 135 - 420 K/uL 22 (LL) 23 (LL) INR Latest Ref Range: 0.8 - 1.2   1.8 (H) 2.3 (H) Sodium Latest Ref Range: 136 - 145 mmol/L 143 143 Potassium Latest Ref Range: 3.5 - 5.5 mmol/L 4.2 3.5 Chloride Latest Ref Range: 100 - 111 mmol/L 110 105 CO2 Latest Ref Range: 21 - 32 mmol/L 25 30 Glucose Latest Ref Range: 74 - 99 mg/dL 123 (H) 141 (H) BUN Latest Ref Range: 7.0 - 18 MG/DL 3 (L) 4 (L) Creatinine Latest Ref Range: 0.6 - 1.3 MG/DL 0.89 0.90 Bilirubin, total Latest Ref Range: 0.2 - 1.0 MG/DL 5.5 (H) 5.1 (H) Albumin Latest Ref Range: 3.4 - 5.0 g/dL 3.2 (L) 3.6 ALT Latest Ref Range: 16 - 61 U/L 43 31 AST Latest Ref Range: 10 - 38 U/L 86 (H) 68 (H) Alk. phosphatase Latest Ref Range: 45 - 117 U/L 73 48 Ammonia Latest Ref Range: 11 - 32 UMOL/L 34 (H) 50 (H) RADIOLOGY: 
10/2020. CT scan abdomen without IV contrast.  Changes consistent with fatty liver and hepatomegaly. Splenomegaly. No ascites. Darion Cruz MD 
[address] [facility], [address] [facility], [address] 
[phone] [address] [place] [address]

## 2020-10-14 NOTE — PROGRESS NOTES
Addendum CC min 15 min [8:45 am-9:30 am] Called wife Leah Velazquez at her listed number [phone]. LVM to call back. Patient taking off BiPAP and ABG showed Acute hypoxic respiratory failure Tried HF NC but patient kept taking that off with SPO2 in 70's Spoke with patient and agreed to keep BiPAP while arrange for intubation to optimize oxygenation Anesthesia has been notified Plt and FFP have been ordered Bharat Anderson MD  
 
Addendum CC min 10 min [10-10:30 am] Patient intubated with ET tube size 8 successfully by anesthesia No major ET secretions or bloody secretions Placed on PEEP 12 cwp and FiO2 100% with SPO2 94-97% Patient breaking through sedation on Versed and Fentanyl drip requiring to start Propofol drip Monitor BP closely; stable SBP > 90 mm Hg so far. Hold Lasix Dr. Jennifer Barker will take over for the rest of the day today. CXR post-intubation followed- ET tube in position and infiltrates  bl unchanged. Pattern unlikely COVID19 related Patient on broad spectrum antibiotics and spoke with pharmacy to adjust dosing for any aspiration pneumonia. Dr. Jennifer Barker ordered steroids for now Wife called back and reported being in dialysis herself; requested to have her call back. Bharat Anderson MD  
 
Addendum 12:20 pm 
Called and spoke with wife as she requested Updated on patient's critical condition and guarded prognosis Wife reports being aware of his chronic multiple conditions and daily drinking habits Recently she fell and had hip fracture when patient stressed out She does not recall names of patient's PCP and other consultant Discussed with wife that patient is at increased risk for cardiovascular instability, arrhythmia, ventilator dependent respiratory failure, nosocomial infections, prolonged hospitalization, need for long term care facility placement, death and other with MSOF. She verbalized an understanding. Patient remained full code.  Provided ICU phone number for her to call for daily updates.  
 
Milly Isaacs MD

## 2020-10-14 NOTE — PROGRESS NOTES
Problem: Pressure Injury - Risk of 
Goal: *Prevention of pressure injury Description: Document Trav Scale and appropriate interventions in the flowsheet. Outcome: Progressing Towards Goal 
Note: Pressure Injury Interventions: Activity Interventions: Pressure redistribution bed/mattress(bed type), Increase time out of bed Mobility Interventions: Pressure redistribution bed/mattress (bed type) Nutrition Interventions: Discuss nutritional consult with provider Friction and Shear Interventions: Foam dressings/transparent film/skin sealants Problem: Falls - Risk of 
Goal: *Absence of Falls Description: Document Silvio Parada Fall Risk and appropriate interventions in the flowsheet. Outcome: Progressing Towards Goal 
Note: Fall Risk Interventions: 
Mobility Interventions: Communicate number of staff needed for ambulation/transfer Medication Interventions: Patient to call before getting OOB, Teach patient to arise slowly Elimination Interventions: Call light in reach History of Falls Interventions: Room close to nurse's station

## 2020-10-14 NOTE — PROGRESS NOTES
ICU on-call follow-up. Patient remains intensive care unit critically ill. 
59-year-old male with multiple medical problems including diabetes, chronic pancreatitis, EtOH use, immunosuppression on Humira for colitis possible inflammatory bowel disease, patient was admitted with abdominal pain nausea and vomiting dehydration possible sepsis. His respiratory status worsened over last 24 hours bilateral pulmonary infiltrate pneumonia versus congestive heart failure combination of above. Patient also had some nausea and vomiting. Aspiration events. Was intubated initially on the BiPAP. Unfortunately today in the morning became more hypoxic poorly responsive and that he was intubated for hypoxemic respiratory failure. Patient requires upgrade in ventilatory care today. Due to severe ARDS. Also ventilation patient of synchrony has been adjusted improvement with sedation. Unfortunately remains in 100% FiO2 and PEEP 14 at this point I am going to add paralysis with Nimbex. Blood pressure is borderline multisystem organ failure required triple-lumen catheter placement after coagulation improved patient has severe coagulopathy. Pulse 109 Respiratory rate 28 Blood pressure 111/82. Saturation 94 on 100% FiO2 PEEP 14. Tidal volume 420. Intubated sedated Ill appearing Pupils 2mm reactive Mild  jaundice Neck supple No lymphadenopathy S1S2 regular no m/g/h Lung moderate airway entry, no wheezing no rhlaes Abdomen soft distended bs positive No edema 
skin warm well perfused No rash A/P Acute hypoxemic respiratory failure currently ARDS. Multifactorial aspiration pneumonia/fluid overloaded /liver failure ARDS protocol RR 16  Fio2 100 PEEP I increased from 12 to 14 Now saturation 93 % ABG followed For ventilator synchrony add paralysis for 24 hrs  and then wean to off Most likely related to progression of aspiration pneumonia. COVID-19 test pending. Preliminary was negative continue isolation until final test. 
Respiratory culture. Continue Versed and fentanyl. Antibiotics adjusted. Albumin's liver failure. Lasix prn Blood pressure support with Levophed if needed we will add vasopressin. Coagulopathy significant given vitamin K and FFP Platelets pending. Monitoring hemoglobin and platelets hematology called. Prognosis guarded. Wife updated. Carlo Reyes MD 
Cc time 35 minutes

## 2020-10-15 NOTE — PROGRESS NOTES
Hospitalist Progress Note Patient: Clarke Conte MRN: [id number]  [facility]: [id number] YOB: 1965  Age: 47 y.o. Sex: male DOA: 10/9/2020 LOS:  LOS: 6 days Chief Complaint: 
 
 
 
 
Assessment/Plan 46 yo male admitted with weakness, cirrhosis changes, low electrolytes 
  
Acute resp failure developed 10/13, moved to ICU on bipap, continued hypoxia and changes of ARDS, and intubated 10/14 
  
Ac resp failure, hypoxic-send a second covid test-NOT DONE-rapid was neg Aspiration pneumonia Empiric IV abx-zosyn and vanco 
Stress test was neg for ischemia, echo showed nl EF 
  
Cirrhosis likely due to etoh abuse-PV thrombosis on US today, no AC with low platelets Alcohol abuse, off 2-3 weeks, continue lactulose, albumin-give banana bag daily gently Severe thrombocytopenia-no bleeding, transfused plt yesterday plus plasma Hypophosphatemia-protocol, ICU Hypomagnesemia-ICU protocol Hypocalcemia-ICU protocol Colitis-empiric IV abx will cover Diabetes-a1c less than 6%, hyperglycemia likely stress response Coagulopathy-plasma given 
  
SCD for DVT prophylaxis Vent mgmt per intensivist 
Continue IV abx Full code status Daily labs Disposition : 
Patient Active Problem List  
Diagnosis Code  Hypocalcemia E83.51  
 Hypomagnesemia E83.42  
 Alcoholism ([facility] F10.20  Cirrhosis ([facility] K74.60  Colitis K52.9  Acute respiratory failure with hypoxemia (HCC) J96.01  
 Aspiration pneumonia ([facility] J69.0  Acute diastolic CHF (congestive heart failure) (HCC) I50.31  Thrombocytopenia ([facility] D69.6 Subjective: 
 
covid #2 not back, see order still in order section PPE donned Patient sedate on vent Review of systems: UTO Vital signs/Intake and Output: 
Visit Vitals BP 93/64 Pulse 66 Temp 97.7 °F (36.5 °C) Resp 22 Ht 5' 9\" (1.753 m) Wt 64.9 kg (143 lb) SpO2 96% BMI 21.12 kg/m² Current Shift:  10/15 0701 - 10/15 1900 In: 350 [I.V.:350] Out: 750 [Urine:750] Last three shifts:  10/13 1901 - 10/15 0700 In: 4349.6 [I.V.:3708.2] Out: 6800 [Urine:6800] Exam: 
 
General: ill frail cachectic WM, intubated, sedate CVS:Regular rate and rhythm, no M/R/G, S1/S2 heard, no thrill Lungs:Clear to auscultation bilaterally, no wheezes, rhonchi, or rales Abdomen: Soft, Nontender, No distention, Normal Bowel sounds, No hepatomegaly Extremities: No C/C/E, pulses palpable 2+ Skin:not fully examined as supine, intubated Labs: Results:  
   
Chemistry Recent Labs 10/15/20 
0400 10/14/20 
1705 10/14/20 
0438  10/13/20 
0373 * 165* 141*   < > 123*  144 143   < > 143 K 3.9 3.4* 3.5   < > 4.2  106 105   < > 110 CO2 28 29 30   < > 25 BUN 9 6* 4*   < > 3* CREA 0.96 0.88 0.90   < > 0.89 CA 8.0* 7.5* 7.6*   < > 8.0* AGAP 11 9 8   < > 8  
BUCR 9* 7* 4*   < > 3* AP 39*  --  48  --  73  
TP 5.5*  --  5.8*  --  6.3* ALB 3.6 3.5 3.6  --  3.2*  
GLOB 1.9*  --  2.2  --  3.1 AGRAT 1.9*  --  1.6  --  1.0  
 < > = values in this interval not displayed. CBC w/Diff Recent Labs 10/15/20 
0400 10/14/20 
0438 10/13/20 
1640 WBC 4.4* 8.0 10.7 RBC 2.37* 3.44* 3.46* HGB 7.5* 10.6* 10.7* HCT 23.1* 32.2* 32.9*  
PLT 42* 23* 29* GRANS 85* 74* 86* LYMPH 10* 17* 9* EOS 0 1 0 Cardiac Enzymes Recent Labs 10/14/20 
1000 10/14/20 
0130 CPK 37* 39 CKND1 CALCULATION NOT PERFORMED WHEN RESULT IS BELOW LINEAR LIMIT CALCULATION NOT PERFORMED WHEN RESULT IS BELOW LINEAR LIMIT Coagulation Recent Labs 10/15/20 
0400 10/14/20 
0438 PTP 25.1* 25.1* INR 2.3* 2.3* Lipid Panel No results found for: CHOL, CHOLPOCT, CHOLX, CHLST, CHOLV, 037734, HDL, HDLP, LDL, LDLC, DLDLP, 915729, VLDLC, VLDL, TGLX, TRIGL, TRIGP, TGLPOCT, CHHD, CHHDX  
BNP No results for input(s): BNPP in the last 72 hours. Liver Enzymes Recent Labs 10/15/20 
0400 TP 5.5* ALB 3.6 AP 39*  
  
 Thyroid Studies No results found for: T4, T3U, TSH, TSHEXT Procedures/imaging: see electronic medical records for all procedures/Xrays and details which were not copied into this note but were reviewed prior to creation of Plan Yessica Montgomery MD

## 2020-10-15 NOTE — DIABETES MGMT
GLYCEMIC CONTROL PROGRESS NOTE: 
 
- discussed in rounds, known h/o T2DM, basal/oral home regimen - Decadron 40 mg Q 24 hours, steroid associated hyperglycemia - BG out of target range ICU: 140-180 mg/dl, trending up - Humalog Normal Insulin Sensitivity Corrective Coverage, recommend continue 
- NPO while on Bi-pap Recent Glucose Results:  
Lab Results Component Value Date/Time  (H) 10/15/2020 04:00 AM  
  (H) 10/14/2020 05:05 PM  
 GLUCPOC 244 (H) 10/15/2020 06:56 AM  
 GLUCPOC 204 (H) 10/15/2020 12:41 AM  
 GLUCPOC 178 (H) 10/14/2020 04:03 PM  
 
 
 
Tamia Jimenez MS, RN, CDE Glycemic Control Team 
[phone] Pager [phone] (M-TH 8:00-4:30P) *After Hours pager [phone]

## 2020-10-15 NOTE — PROGRESS NOTES
0700  Bedside, Verbal and Written shift change report given to PHILIPPE Quinn  (oncoming nurse) by Aden Bailey (offgoing nurse). Report included the following information SBAR, Kardex, Intake/Output and Recent Results. 0800  Pt vented with sedation, paralyzed, almonte, tele monitored, NG clamped, IV fluids via right femoral triple lumen central line. 1000  Mrs. Marshall called in for update, SBAR given. Questions answered. 1200  No change in pt status at this time. 1400  Pt Hrt down into mid 50, Sats 88%. Dr. Pedro Woodson called, SBAR given, orders received. Increase FiO2 up to 70%. Hold fentanyle gtt. 1900  Bedside, Verbal and Written shift change report given to Markie Romero (oncoming nurse) by PHILIPPE Quinn  (offgoing nurse). Report included the following information SBAR, Kardex, Intake/Output and Recent Results.

## 2020-10-15 NOTE — PROGRESS NOTES
10/14/20 2121 Patient Observations Pulse (Heart Rate) (!) 106 Resp Rate 22  
O2 Sat (%) 97 % Vent Settings FIO2 (%) 70 % (decreased) SpO2/FIO2 Ratio 138.57  
CMV Rate Set 22 
(increased) Back-Up Rate 22 PEEP/VENT (cm H2O) 12 cm H20 
(decreased) I:E Ratio 1:2.0 Insp Time (sec) 0.9 sec Changes made post abg results.

## 2020-10-15 NOTE — PROGRESS NOTES
1815  TRANSFER - IN REPORT: 
 
Verbal report received from Tele RN (name) on Roxanna Marsh  being received from [employer] (unit) for change in patient condition(Resp distress) Report consisted of patients Situation, Background, Assessment and  
Recommendations(SBAR). Information from the following report(s) SBAR, Kardex, Intake/Output and Recent Results was reviewed with the receiving nurse. Opportunity for questions and clarification was provided. Assessment completed upon patients arrival to unit and care assumed. 1815  Pt aox 4 following commands, denies pain at this time, placed on Bipap, tele monitored, almonte. Pt has wound to left lower back, sacral area. Dressings present, clean dry intact. Pt needs wound care consult. 1900  Bedside, Verbal and Written shift change report given to MABEL Arnett RN (oncoming nurse) by PHILIPPE Cool (offgoing nurse). Report included the following information SBAR, Kardex, Intake/Output and Recent Results.

## 2020-10-15 NOTE — PROGRESS NOTES
Problem: Mobility Impaired (Adult and Pediatric) Goal: *Acute Goals and Plan of Care (Insert Text) Description: Physical Therapy Goals Initiated 10/13/2020 and to be accomplished within 3-7 day(s) 1. Patient will move from supine to sit and sit to supine  in bed with supervision/set-up. 2.  Patient will transfer from bed to chair and chair to bed with supervision/set-up using the least restrictive device. 3.  Patient will perform sit to stand with supervision/set-up. 4.  Patient will ambulate with supervision/set-up for 300 feet with the least restrictive device. 5.  Patient will ascend/descend 3 stairs with handrail(s) with supervision/set-up. Outcome: Not Met Pt remains Intubated and sedated. Per Pulmo, pt will not be appropriate in near future. PT will now sign of of Mr. Marshall's case. Please reorder PT, when pt is able to resume mobility and activity.  
 
Thank you, 
Genet De Souza, PTA

## 2020-10-15 NOTE — PROGRESS NOTES
[facility] Pulmonary, Critical Care, and Sleep Medicine Name: Justin Golden MRN: [id number] : 1965 Hospital: [facility] [place] Date: 10/15/2020  Room: [address] [place] [address] Note Consult requesting physician: Dr. Marcio Meraz Reason for Consult: Acute respiratory failure with hypoxemia IMPRESSION:  
· Acute respiratory failure with hypoxia J96.01 
· Aspiration pneumonia - J69.0 · ARDS - J80 · Thrombocytopenia, concern for ITP vs liver disease - D69.6 · Coagulopathy, multifactorial including any DIC - D68.9 · Acute diastolic congestive heart failure  I50.31 
· Cirrhosis  K74.60 · Hyperbilirubinemia - E80.6 · Portal vein thrombosis - L51 
· Colitis  K52.9 Patient Active Problem List  
Diagnosis Code  Hypocalcemia, mild E83.51  
 Hypomagnesemia, resolved E83.42  
 Alcoholism ([address] F10.20  Cirrhosis ([address] K74.60  Colitis K52.9  Acute respiratory failure with hypoxemia (HCC) J96.01  
 Aspiration pneumonia ([address] J69.0  Acute diastolic CHF (congestive heart failure) (HCC) I50.31  Thrombocytopenia ([address] D69.6 · Code status: Full code RECOMMENDATIONS:  
Respiratory: Mech. Ventilated patients- aim to keep peak plateau pressure less than or equal to 30cm H2O. Titrate FiO2 for goal SPO2> 91% Monitor ABG and CXR daily VAP prevention bundle and sedation bundle followed, head of the bed at 30' all times Daily sedation holiday and assessment for weaning with SBT as tolerated - not a candidate today Sputum culture per ET tube follow Continue bronchodilators as needed, pulmonary hygiene care Steroids for suspected ITP Overall FiO2 needs stable/improving and no worsening in CXR hence unlikely DAH in the background of drop in Hgb; no hemoptysis or major ET secretions On major clinical or radiological improvement despite diuresis suggesting role of aspiration pneumonia Await SARS CoV2 PCR; rapid test negative 10/13/20. Continue droplet isolation CXR unlikely COVID19 like CVS: sinus bradycardia; lower sedation and monitor HR 
hemodynamics stable - not on vasopressor since y'day noon Received 3.7 L of fluid overall since y'day AM 
echo EF 55-60%; no pulmonary hypertension reported. ID: antibiotics: Zosyn and IV vancomycin. Sputum cx per ET tube follow No leukocytosis, afebrile; immunosuppressed status given biological use [Humira] Deescalate antibiotic when appropriate. Hematology/Oncology: hgb drop likely dilutional as not required vasopressor, no worsening in CXR Repeat hemoglobin today; tx for goal Hgb > 7 Transfuse cryoppt as per hematology Continue Dexamethasone 40 mg x 4 days Appreciate hematology input Monitor Hgb, platelets, INR, fibrinogen FDP daily Transfuse Cryoppt for fibrinogen < 150 in future as needed Platelets monitor for bleeding; keep platelets greater than 20K per Dr. Jesica Nick May transfuse FFP for INR as needed Continue Vit K in IVF daily May use Amicar in case of bleeding for plt dysfunction Normal S. Cr, mentation stable prior to intubation Not a candidate for anticoagulation for PVT on US liver Renal: Phosphorus and calcium replaced per JOAN Pop today and recheck this afternoon Patient has Ward catheter; monitor UO, lytes, renal function GI/: monitor LFT, ammonia daily  
followed by Dr. Vivien Saeed; patient has likely ETOH cirrhosis Continue lactulose through OGT Continue Albumin daily Q6 Will evaluate of trickle feeds if possible in AM 
 
Endocrine: Elevated FSBS. SSI as needed. Neurology: monitor for DTs. AAO x 3. Continue AM multivitamin in IVF x 1 L daily as tolerated Continue paralytic today and may attempt to wean tomorrow as tolerated; monitor TOF Better ventilator synchrony and oxygenation with paralytic Electrolytes: Replace electrolytes per ICU electrolyte replacement protocol. IVF: Albumin every 6 hours, multivitamin in IVF x 1 L daily Nutrition: Keep n.p.o. Prophylaxis: DVT Prophylaxis: SCDs. GI Prophylaxis: Protonix. Restraints: none - on paralytic Lines/Tubes: [employer], Ward and Vent Bundles will be Followed, Vent Day 2  
ETT: 10/14/20. OGT/NGT: 10/14/20. Central line: 10/14/20 RT femoral CVC (site examined, no erythema, induration, discharge or sign of infection. Dressing intact. Medically necessary, will remove it when not needed. Central line bundle followed). PIV x 2 Ward: 10/13(Medically necessary for strict input/output monitoring in critically ill patient, will remove it when not needed. Ward bundle followed). Prognosis guarded in this patient with MSOF, chronic liver disease and high risk for complications including worsening in respiratory failure, cardiovascular collapse, prolonged hospitalization, nosocomial infection, renal failure, hepatorenal syndrome, death and other. Will defer respective systems problem management to primary and other respective consultant and follow patient in ICU with primary and other medical team. 
Further recommendations will be based on the patient's response to recommended treatment and results of the investigation ordered. Quality Care: PPI, DVT prophylaxis, HOB elevated, Infection control all reviewed and addressed. Care of plan d/w hospitalist and ICU RN, RT. Discussed with patient's wife, palliative care, Dr. Shannan Weeks. High complexity decision making was performed during the evaluation of this patient at high risk for decompensation with multiple organ involvement. Total critical care time spent rendering care exclusive of procedures/family discussion/coordination of care: 
39 minutes.   
 
 
Subjective/History of Present Illness:  
Patient is a 47 y.o. male with PMHx significant for chronic pancreatitis, diabetes, gastroparesis, hidradenitis; history of colitis on Humiranot clear if patient has inflammatory bowel disease. Patient was admitted on 10/9 with nausea and lower abdominal pains. The patient was admitted to the hospital.  Dr. Kody Dobbins consulted for cirrhosis. Today evening, patient had shortness of breath symptoms and hypoxemia. RRT was done. Chest x-ray shows bilateral pulmonary infiltrates. Patient was placed on BiPAP and moved to the ICU. He is currently getting Lasix injection. Patient denies any vomiting or aspirations recently; he denies any COVID-19 contacts at home. He feels comfortable on BiPAP. No significant cough or productive sputum noted; no chest pain or hemoptysis. Telemetry mild sinus tach. Afebrile; blood pressure stable; overall fluid positive by 5 L this admission. 10/15/20 Patient remained in ICU bed 109 overnight and seen at bedside Patient in [facility]; seen with proper PPE measures On ventilator, fio2 adjusted for SPO2 on monitor Improved PEEP needs per ABG this AM. CXR unchanged Sedated, on paralytic, comfortable looking Hemodynamically stable - didn't require vasopressor beyond noon y'day No reported bleeding anywhere; drop in hgb - dilutional 
Afebrile. NPO. Review of Systems:  
Review of systems not obtained due to patient factors. Name  Relation  Home  Work  Mobile Jr Ibrahim  [phone] No Known Allergies Past Medical History:  
Diagnosis Date  Diabetes ([facility]  Gastroparesis  Pancreatitis Past Surgical History:  
Procedure Laterality Date  HX CHOLECYSTECTOMY  HX HIP FRACTURE TX    
 left hip sx 9.23/2018 Social History Tobacco Use  Smoking status: Current Every Day Smoker  Smokeless tobacco: Never Used Substance Use Topics  Alcohol use: Yes History reviewed. No pertinent family history. Prior to Admission medications Medication Sig Start Date End Date Taking? Authorizing Provider adalimumab (Humira Pen) 40 mg/0.8 mL injection pen 40 mg by SubCUTAneous route every seven (7) days. Yes Provider, Historical  
lidocaine (LIDODERM) 5 % 2 Patches by TransDERmal route every twenty-four (24) hours. Apply patch to the affected area for 12 hours a day and remove for 12 hours a day. One patch is applied to left hip. One patch is applied to right shoulder blade. Yes Provider, Historical  
loratadine (Claritin) 10 mg tablet Take 10 mg by mouth daily. Indications: inflammation of the nose due to an allergy   Yes Provider, Historical  
aspirin delayed-release 81 mg tablet Take 1 Tab by mouth daily. Yes Other, MD Meño  
insulin glargine (Lantus Solostar U-100 Insulin) 100 unit/mL (3 mL) inpn 5 Units by SubCUTAneous route daily. Yes Racheal, MD Meño  
DULoxetine (CYMBALTA) 60 mg capsule Take 1 Cap by mouth as needed. 1/6/19   Meño Springer MD  
glyBURIDE-metFORMIN (GLUCOVANCE) 2.5-500 mg per tablet Take 1 Tab by mouth two (2) times a day. OtherMeño MD  
methocarbamoL (ROBAXIN) 500 mg tablet Take 2 Tabs by mouth four (4) times daily. Mñeo Springer MD  
traZODone (DESYREL) 50 mg tablet Take 50 mg by mouth nightly. Meño Springer MD  
HYDROmorphone (DILAUDID) 2 mg tablet Take 1 Tab by mouth every four (4) hours as needed for Pain. Max Daily Amount: 12 mg. Indications: Severe Pain 9/26/18   Lee Ann Baird MD  
ondansetron (ZOFRAN ODT) 4 mg disintegrating tablet Take 1-2 tablets every 6-8 hours as needed for nausea and vomiting. 9/26/18   Lee Ann Baird MD  
 
Current Facility-Administered Medications Medication Dose Route Frequency  midazolam in normal saline (VERSED) 1 mg/mL infusion  1-5 mg/hr IntraVENous TITRATE  fentaNYL (PF) 900 mcg/30 ml infusion soln  0-200 mcg/hr IntraVENous TITRATE  piperacillin-tazobactam (ZOSYN) 4.5 g in 0.9% sodium chloride (MBP/ADV) 100 mL MBP  4.5 g IntraVENous Q8H  
 cisatracurium (NIMBEX) 100 mg in 0.9% sodium chloride 100 mL infusion 0-10 mcg/kg/min IntraVENous TITRATE  
 0.9% sodium chloride 1,000 mL with mvi, adult no. 4 with vit K 10 mL, thiamine 332 mg, folic acid 1 mg infusion   IntraVENous DAILY  albumin human 25% (BUMINATE) solution 25 g  25 g IntraVENous TID  NOREPINephrine (LEVOPHED) 8 mg in 0.9% NS 250ml infusion  2-16 mcg/min IntraVENous TITRATE  dexamethasone (PF) (DECADRON) 10 mg/mL injection 40 mg  40 mg IntraVENous Q24H  
 insulin lispro (HUMALOG) injection   SubCUTAneous Q6H  
 [Held by provider] furosemide (LASIX) injection 40 mg  40 mg IntraVENous BID  vancomycin (VANCOCIN) 1,000 mg in 0.9% sodium chloride 250 mL (VIAL-MATE)  1,000 mg IntraVENous Q12H  Vancomycin - Rx to dose and monitor  1 Each Other Rx Dosing/Monitoring  pantoprazole (PROTONIX) 40 mg in 0.9% sodium chloride 10 mL injection  40 mg IntraVENous Q24H  
 lactulose (CHRONULAC) 10 gram/15 mL solution 30 mL  20 g Oral BID  lidocaine 4 % patch 2 Patch  2 Patch TransDERmal Q24H Objective:  
Vital Signs:   
Blood pressure 93/64, pulse 66, temperature 97.7 °F (36.5 °C), resp. rate 22, height 5' 9\" (1.753 m), weight 64.9 kg (143 lb), SpO2 96 %. Body mass index is 21.12 kg/m². O2 Device: Ventilator O2 Flow Rate (L/min): 4 l/min Temp (24hrs), Av.5 °F (36.4 °C), Min:96.8 °F (36 °C), Max:98.2 °F (36.8 °C) Intake/Output:  
Last shift:      10/15 0701 - 10/15 1900 In: 350 [I.V.:350] Out: 750 [Urine:750] Last 3 shifts: 10/13 1901 - 10/15 07 In: 4349.6 [I.V.:3708.2] Out: 6800 [Urine:6800] Intake/Output Summary (Last 24 hours) at 10/15/2020 1403 Last data filed at 10/15/2020 1303 Gross per 24 hour Intake 4349.64 ml Output 1750 ml Net 2599.64 ml Last 3 Recorded Weights in this Encounter 10/09/20 2234 10/12/20 1045 10/12/20 1046 Weight: 65.2 kg (143 lb 12.8 oz) 64.9 kg (143 lb) 64.9 kg (143 lb) Physical Exam:  
General: sedated, on paralytic, in no respiratory distress and acyanotic, appears older than stated age, chronically ill appearing, on ventilator HEENT: PERRL, fundi benign, orally intubated, no bleeding Neck: No abnormally enlarged lymph nodes or thyroid, supple Chest: normal 
Lungs: moderate air entry, rhonchi scattered bilaterally, normal percussion bilaterally, no tenderness/ rash Heart: Regular rate and rhythm, S1S2 present or without murmur or extra heart sounds Abdomen: non distended, bowel sounds normoactive, tympanic, soft without significant tenderness, masses, organomegaly or guarding Extremity: negative for edema, cyanosis, clubbing Neuro: sedated, on paralytic, no involuntary movements, exam limitations Skin: Skin color, texture, turgor fair. No rashes or lesions Data:  
   
Recent Results (from the past 24 hour(s)) GLUCOSE, POC Collection Time: 10/14/20  2:50 PM  
Result Value Ref Range Glucose (POC) 183 (H) 70 - 110 mg/dL CULTURE, RESPIRATORY/SPUTUM/BRONCH W GRAM STAIN Collection Time: 10/14/20  2:53 PM  
 Specimen: Sputum,ET Suction Result Value Ref Range Special Requests: NO SPECIAL REQUESTS    
 GRAM STAIN RARE WBCS SEEN    
 GRAM STAIN RARE EPITHELIAL CELLS SEEN    
 GRAM STAIN NO ORGANISMS SEEN Culture result: LIGHT APPARENT YEAST SO FAR (A) GLUCOSE, POC Collection Time: 10/14/20  4:03 PM  
Result Value Ref Range Glucose (POC) 178 (H) 70 - 110 mg/dL D DIMER Collection Time: 10/14/20  5:05 PM  
Result Value Ref Range D DIMER 7.84 (H) <0.46 ug/ml(FEU) FIBRINOGEN Collection Time: 10/14/20  5:05 PM  
Result Value Ref Range Fibrinogen 147 (L) 210 - 451 mg/dL LACTIC ACID Collection Time: 10/14/20  5:05 PM  
Result Value Ref Range Lactic acid 1.8 0.4 - 2.0 MMOL/L  
RENAL FUNCTION PANEL Collection Time: 10/14/20  5:05 PM  
Result Value Ref Range Sodium 144 136 - 145 mmol/L Potassium 3.4 (L) 3.5 - 5.5 mmol/L Chloride 106 100 - 111 mmol/L  
 CO2 29 21 - 32 mmol/L  Anion gap 9 3.0 - 18 mmol/L  
 Glucose 165 (H) 74 - 99 mg/dL BUN 6 (L) 7.0 - 18 MG/DL Creatinine 0.88 0.6 - 1.3 MG/DL  
 BUN/Creatinine ratio 7 (L) 12 - 20 GFR est AA >60 >60 ml/min/1.73m2 GFR est non-AA >60 >60 ml/min/1.73m2 Calcium 7.5 (L) 8.5 - 10.1 MG/DL Phosphorus 2.3 (L) 2.5 - 4.9 MG/DL Albumin 3.5 3.4 - 5.0 g/dL MAGNESIUM Collection Time: 10/14/20  5:05 PM  
Result Value Ref Range Magnesium 1.2 (L) 1.6 - 2.6 mg/dL AMMONIA Collection Time: 10/14/20  5:05 PM  
Result Value Ref Range Ammonia 50 (H) 11 - 32 UMOL/L  
CALCIUM, IONIZED Collection Time: 10/14/20  9:00 PM  
Result Value Ref Range Ionized Calcium 1.00 (L) 1.12 - 1.32 MMOL/L  
POC G3 Collection Time: 10/14/20  9:17 PM  
Result Value Ref Range Device: VENT    
 FIO2 (POC) 1.0 %  
 pH (POC) 7.20 (LL) 7.35 - 7.45    
 pCO2 (POC) 72.7 (H) 35.0 - 45.0 MMHG  
 pO2 (POC) 228 (H) 80 - 100 MMHG  
 HCO3 (POC) 28.6 (H) 22 - 26 MMOL/L  
 sO2 (POC) 100 (H) 92 - 97 % Base excess (POC) 1 mmol/L Mode ASSIST CONTROL Tidal volume 420 ml Set Rate 16 bpm  
 PEEP/CPAP (POC) 14 cmH2O  
 PIP (POC) 32 Allens test (POC) N/A Inspiratory Time 0.9 sec Total resp. rate 16 Site RIGHT RADIAL Specimen type (POC) ARTERIAL Performed by Jay Olivera Volume control plus YES    
GLUCOSE, POC Collection Time: 10/15/20 12:41 AM  
Result Value Ref Range Glucose (POC) 204 (H) 70 - 110 mg/dL MAGNESIUM Collection Time: 10/15/20  4:00 AM  
Result Value Ref Range Magnesium 2.4 1.6 - 2.6 mg/dL PHOSPHORUS Collection Time: 10/15/20  4:00 AM  
Result Value Ref Range Phosphorus 2.4 (L) 2.5 - 4.9 MG/DL  
CALCIUM, IONIZED Collection Time: 10/15/20  4:00 AM  
Result Value Ref Range Ionized Calcium 1.05 (L) 1.12 - 1.32 MMOL/L  
PROTHROMBIN TIME + INR Collection Time: 10/15/20  4:00 AM  
Result Value Ref Range Prothrombin time 25.1 (H) 11.5 - 15.2 sec INR 2.3 (H) 0.8 - 1.2 CBC WITH AUTOMATED DIFF Collection Time: 10/15/20  4:00 AM  
Result Value Ref Range WBC 4.4 (L) 4.6 - 13.2 K/uL  
 RBC 2.37 (L) 4.70 - 5.50 M/uL HGB 7.5 (L) 13.0 - 16.0 g/dL HCT 23.1 (L) 36.0 - 48.0 % MCV 97.5 (H) 74.0 - 97.0 FL  
 MCH 31.6 24.0 - 34.0 PG  
 MCHC 32.5 31.0 - 37.0 g/dL  
 RDW 16.1 (H) 11.6 - 14.5 % PLATELET 42 (L) 390 - 420 K/uL MPV 11.9 (H) 9.2 - 11.8 FL  
 NEUTROPHILS 85 (H) 40 - 73 % LYMPHOCYTES 10 (L) 21 - 52 % MONOCYTES 5 3 - 10 % EOSINOPHILS 0 0 - 5 % BASOPHILS 0 0 - 2 %  
 ABS. NEUTROPHILS 3.7 1.8 - 8.0 K/UL  
 ABS. LYMPHOCYTES 0.4 (L) 0.9 - 3.6 K/UL  
 ABS. MONOCYTES 0.2 0.05 - 1.2 K/UL  
 ABS. EOSINOPHILS 0.0 0.0 - 0.4 K/UL  
 ABS. BASOPHILS 0.0 0.0 - 0.1 K/UL  
 DF AUTOMATED METABOLIC PANEL, COMPREHENSIVE Collection Time: 10/15/20  4:00 AM  
Result Value Ref Range Sodium 145 136 - 145 mmol/L Potassium 3.9 3.5 - 5.5 mmol/L Chloride 106 100 - 111 mmol/L  
 CO2 28 21 - 32 mmol/L Anion gap 11 3.0 - 18 mmol/L Glucose 200 (H) 74 - 99 mg/dL BUN 9 7.0 - 18 MG/DL Creatinine 0.96 0.6 - 1.3 MG/DL  
 BUN/Creatinine ratio 9 (L) 12 - 20 GFR est AA >60 >60 ml/min/1.73m2 GFR est non-AA >60 >60 ml/min/1.73m2 Calcium 8.0 (L) 8.5 - 10.1 MG/DL Bilirubin, total 4.1 (H) 0.2 - 1.0 MG/DL  
 ALT (SGPT) 20 16 - 61 U/L  
 AST (SGOT) 43 (H) 10 - 38 U/L Alk. phosphatase 39 (L) 45 - 117 U/L Protein, total 5.5 (L) 6.4 - 8.2 g/dL Albumin 3.6 3.4 - 5.0 g/dL Globulin 1.9 (L) 2.0 - 4.0 g/dL A-G Ratio 1.9 (H) 0.8 - 1.7 AMMONIA Collection Time: 10/15/20  4:00 AM  
Result Value Ref Range Ammonia 33 (H) 11 - 32 UMOL/L  
AMYLASE Collection Time: 10/15/20  4:00 AM  
Result Value Ref Range Amylase 28 25 - 115 U/L  
LIPASE Collection Time: 10/15/20  4:00 AM  
Result Value Ref Range Lipase 10 (L) 73 - 393 U/L  
POC G3 Collection Time: 10/15/20  5:18 AM  
Result Value Ref Range Device: VENT    
 FIO2 (POC) 0.7 % pH (POC) 7.30 (L) 7.35 - 7.45    
 pCO2 (POC) 48.4 (H) 35.0 - 45.0 MMHG  
 pO2 (POC) 119 (H) 80 - 100 MMHG  
 HCO3 (POC) 23.8 22 - 26 MMOL/L  
 sO2 (POC) 98 (H) 92 - 97 % Base deficit (POC) 3 mmol/L Mode ASSIST CONTROL Tidal volume 420 ml Set Rate 22 bpm  
 PEEP/CPAP (POC) 12 cmH2O  
 PIP (POC) 30 Allens test (POC) N/A Inspiratory Time 0.9 sec Total resp. rate 22 Site RIGHT RADIAL Specimen type (POC) ARTERIAL Performed by Milla Gage Volume control plus YES    
GLUCOSE, POC Collection Time: 10/15/20  6:56 AM  
Result Value Ref Range Glucose (POC) 244 (H) 70 - 110 mg/dL GLUCOSE, POC Collection Time: 10/15/20 12:52 PM  
Result Value Ref Range Glucose (POC) 223 (H) 70 - 110 mg/dL Chemistry Recent Labs 10/15/20 
0400 10/14/20 
1705 10/14/20 
0438  10/13/20 
2977 * 165* 141*   < > 123*  144 143   < > 143 K 3.9 3.4* 3.5   < > 4.2  106 105   < > 110 CO2 28 29 30   < > 25 BUN 9 6* 4*   < > 3* CREA 0.96 0.88 0.90   < > 0.89 CA 8.0* 7.5* 7.6*   < > 8.0*  
MG 2.4 1.2* 1.3*  --  1.5* PHOS 2.4* 2.3* 2.1*  --  2.3* AGAP 11 9 8   < > 8  
BUCR 9* 7* 4*   < > 3* AP 39*  --  48  --  73  
TP 5.5*  --  5.8*  --  6.3* ALB 3.6 3.5 3.6  --  3.2*  
GLOB 1.9*  --  2.2  --  3.1 AGRAT 1.9*  --  1.6  --  1.0  
 < > = values in this interval not displayed. Lactic Acid Lactic acid Date Value Ref Range Status 10/14/2020 1.8 0.4 - 2.0 MMOL/L Final  
 
Recent Labs 10/14/20 
1705 10/14/20 
0558 10/14/20 
0130 LAC 1.8 2.3* 2.2* Liver Enzymes Protein, total  
Date Value Ref Range Status 10/15/2020 5.5 (L) 6.4 - 8.2 g/dL Final  
 
Albumin Date Value Ref Range Status 10/15/2020 3.6 3.4 - 5.0 g/dL Final  
 
Globulin Date Value Ref Range Status 10/15/2020 1.9 (L) 2.0 - 4.0 g/dL Final  
 
A-G Ratio Date Value Ref Range Status 10/15/2020 1.9 (H) 0.8 - 1.7   Final  
 
Alk. phosphatase Date Value Ref Range Status 10/15/2020 39 (L) 45 - 117 U/L Final  
 
Recent Labs 10/15/20 
0400 10/14/20 
1705 10/14/20 
0438 10/13/20 
3387 TP 5.5*  --  5.8* 6.3* ALB 3.6 3.5 3.6 3.2*  
GLOB 1.9*  --  2.2 3.1 AGRAT 1.9*  --  1.6 1.0 AP 39*  --  48 73 CBC w/Diff Recent Labs 10/15/20 
0400 10/14/20 
0438 10/13/20 
1640 WBC 4.4* 8.0 10.7 RBC 2.37* 3.44* 3.46* HGB 7.5* 10.6* 10.7* HCT 23.1* 32.2* 32.9*  
PLT 42* 23* 29* GRANS 85* 74* 86* LYMPH 10* 17* 9* EOS 0 1 0 Cardiac Enzymes No results found for: CPK, CK, CKMMB, CKMB, RCK3, CKMBT, CKNDX, CKND1, LYDIA, TROPT, TROIQ, LIANNA, TROPT, TNIPOC, BNP, BNPP  
 
BNP No results found for: BNP, BNPP, XBNPT Coagulation Recent Labs 10/15/20 
0400 10/14/20 
0438 10/13/20 
0348 PTP 25.1* 25.1* 20.4* INR 2.3* 2.3* 1.8* Thyroid  No results found for: T4, T3U, TSH, TSHEXT, TSHEXT No results found for: T4  
 
Urinalysis Lab Results Component Value Date/Time Color DARK YELLOW 10/11/2020 01:03 AM  
 Appearance CLEAR 10/11/2020 01:03 AM  
 Specific gravity 1.021 10/11/2020 01:03 AM  
 pH (UA) 5.5 10/11/2020 01:03 AM  
 Protein Negative 10/11/2020 01:03 AM  
 Glucose Negative 10/11/2020 01:03 AM  
 Ketone Negative 10/11/2020 01:03 AM  
 Bilirubin SMALL (A) 10/11/2020 01:03 AM  
 Urobilinogen 1.0 10/11/2020 01:03 AM  
 Nitrites Negative 10/11/2020 01:03 AM  
 Leukocyte Esterase Negative 10/11/2020 01:03 AM  
  
 
Culture data during this hospitalization. All Micro Results Procedure Component Value Units Date/Time CULTURE, RESPIRATORY/SPUTUM/BRONCH [place] STAIN [[id number]]  (Abnormal) Collected:  10/14/20 1453 Order Status:  Completed Specimen:  Sputum,ET Suction Updated:  10/15/20 1346 Special Requests: NO SPECIAL REQUESTS     
  GRAM STAIN RARE WBCS SEEN     
      
  RARE EPITHELIAL CELLS SEEN  
     
   NO ORGANISMS SEEN   Culture result:    
  LIGHT APPARENT YEAST SO FAR  
     
 CULTURE, BLOOD [[id number]] Order Status:  Sent Specimen:  Blood CULTURE, BLOOD [[id number]] Order Status:  Sent Specimen:  Blood ECHO 10/12/20 Interpretation Summary Result status: Final result  · LV: Estimated LVEF is 55 - 60%. Normal cavity size, wall thickness and systolic function (ejection fraction normal). Mild (grade 1) left ventricular diastolic dysfunction E'E= 8.94. 
· LA: No atrial septal defect present. · AV: Aortic valve leaflet calcification present. · MV: Mitral valve thickening. Images report reviewed by me: 
CT 10/9/2020 [place] [facility] [place] 10/09/20 CT ABD PELV W CONT Narrative EXAM: CT of the abdomen and pelvis INDICATION: Abdominal pain, greatest in the left lower quadrant with nausea and 
vomiting. COMPARISON: MRI pelvis 10/7/2019. CT left hip 4/17/2019 TECHNIQUE: Axial CT imaging of the abdomen and pelvis was performed with 
intravenous contrast. Multiplanar reformats were generated. One or more dose reduction techniques were used on this CT: automated exposure 
control, adjustment of the mAs and/or kVp according to patient size, and 
iterative reconstruction techniques. The specific techniques used on this CT 
exam have been documented in the patient's electronic medical record. Digital 
Imaging and Communications in Medicine (DICOM) format image data are available 
to nonaffiliated external healthcare facilities or entities on a secure, media 
free, reciprocally searchable basis with patient authorization for at least a 
12-month period after this study. _______________ FINDINGS: 
 
LOWER CHEST: Minor basilar changes of atelectasis. No alveolar consolidation or 
pleural effusion. Normal cardiac size without pericardial effusion. LIVER, BILIARY: Diffuse hepatic hypoattenuation is present with hepatic size 
estimated at approximately 27 cm in craniocaudal span. No discrete liver lesion. No intrahepatic or extra hepatic biliary ductal dilatation. Patient is status 
post cholecystectomy. PANCREAS: Pancreatic enhancement is normal. Mild and diffuse pancreatic ductal 
ectasia is noted. No discrete pancreatic mass lesion noted. SPLEEN: Mildly enlarged at 14 cm. ADRENALS: Normal. 
 
KIDNEYS/URETERS/BLADDER: Symmetric renal enhancement. No bowel obstruction PELVIC ORGANS: Symmetric renal enhancement. Punctate nonobstructing upper pole 
left renal stone. No distal ureteral or urinary bladder stone. Urinary bladder 
unremarkable in CT appearance. VASCULATURE: Diffuse aortobiiliac atherosclerotic vascular calcification is 
present without evidence of aneurysmal dilatation. Main portal vein as well as 
right and left portal venous branches are widely patent. LYMPH NODES: Scattered subcentimeter mesenteric and retroperitoneal lymph nodes 
are noted. No evidence of abdominal or pelvic adenopathy. GASTROINTESTINAL TRACT: No morphology of bowel obstruction. No free 
intraperitoneal gas. Mild thickening of the proximal ascending colon and cecum 
noted. BONES: No acute or aggressive osseous abnormalities identified. Partial 
visualization healed proximal left femoral fracture status post IM nailing and 
dynamic hip screw placement. OTHER: Small quantity of abdominal/pelvic ascites. Thin subcutaneous linear 
density tracking along the right gluteal fold, likely in keeping previously 
noted fistula tract. 
 
_______________ Impression IMPRESSION: 
 
1. Diffuse hepatic hypoattenuation compatible with steatosis with hepatomegaly, 
mild splenomegaly, and small quantity of abdominal/pelvic ascites. The totality 
of findings compatible with hepatocellular dysfunction and potentially 
associated portal hypertension. > Patent main and proximal portal venous branches.  
  > Mild thickening of the proximal ascending colon and cecum may reflect sequela of portal colopathy. Infectious/inflammatory colitis also a possibility 
but thought less likely given the imaging appearance. 2. Likely correlate for small area of perianal fistula noted on prior MRI 
pelvis. 3. Punctate nonobstructing upper pole left renal stone. XR chest 10/15/20 XR CHEST PORT  
CLINICAL INDICATION/HISTORY: Respiratory failure, bilateral airspace disease 
-Additional: None  
COMPARISON: 10/14/2020  
TECHNIQUE: Portable frontal view of the chest  
_______________  
FINDINGS:  
SUPPORT DEVICES: Endotracheal tube and nasogastric tube project in stable position.  HEART AND MEDIASTINUM: Stable appearing cardiac size and mediastinal contours.  LUNGS AND PLEURAL SPACES: Interstitial and airspace disease appearing similar to 
prior study. No evidence of pneumothorax or definite pleural effusion.  
BONY THORAX AND SOFT TISSUES: Unremarkable.  
_______________  
IMPRESSION IMPRESSION:  
Support devices in stable/appropriate position.  
Bilateral interstitial and airspace disease appearing overall similar to prior study. XR 10/14/20 Results from Hospital Encounter encounter on 10/09/20 XR CHEST PORT Narrative EXAM: XR CHEST PORT 
 
CLINICAL INDICATION/HISTORY: ET tube placement 
-Additional: None COMPARISON: 10/14/2020 TECHNIQUE: Portable frontal view of the chest 
 
_______________ FINDINGS: 
 
SUPPORT DEVICES: Endotracheal tube 4 cm above the billie. Nasogastric tube below 
the diaphragm, tip projecting over the stomach. HEART AND MEDIASTINUM: Stable appearing cardiac size and mediastinal contours. LUNGS AND PLEURAL SPACES: Multifocal interstitial and airspace disease. Overall 
similar appearance to prior examination. No evidence of pneumothorax or definite 
pleural effusion. BONY THORAX AND SOFT TISSUES: Unremarkable. 
 
_______________ Impression IMPRESSION: 
 
 
1. Support devices in stable/appropriate position. 2. Extensive bilateral interstitial and airspace disease similar to prior. US LEs 10/11/2020 Interpretation Summary · No evidence of deep vein thrombosis in the right lower extremity. · No evidence of deep vein thrombosis in the left lower extremity. US Abd 10/13/20 IMPRESSION:  
1. Nonspecific increased hepatic echotexture suggesting intrinsic liver disease. Dilated portal vein, recanalized umbilical vein and perihepatic ascites. There 
is lack of flow within the portal vein, suggestive of thrombosis, with 
periportal collaterals and decreased flows in the hepatic vein. However, portal 
and hepatic veins patent on recent CT abdomen 10/09/2020. Multiple attempts to 
contact Dr. Shantal Arriaga with this findings, awaiting callback.  
2. No discrete hepatic mass identified.  
3. Cholecystectomy.  
4. Pancreas obscured by bowel gas. Tate Tovar MD 
10/15/2020

## 2020-10-16 NOTE — PROGRESS NOTES
Hospitalist Progress Note Patient: Daniel Murray MRN: [id number]  [facility]: [id number] YOB: 1965  Age: 47 y.o. Sex: male DOA: 10/9/2020 LOS:  LOS: 7 days Chief Complaint: Ac resp failure Assessment/Plan 48 yo male admitted with weakness, cirrhosis changes, low electrolytes 
  
Acute resp failure developed 10/13, moved to ICU on bipap, continued hypoxia and changes of ARDS, and intubated 10/14 
  
Ac resp failure, hypoxic-await final on second covid test 
Aspiration pneumonia Empiric IV abx-zosyn and vanco 
Stress test was neg for ischemia, echo showed nl EF 
  
Cirrhosis likely due to etoh abuse-PV thrombosis on US today, no AC with low platelets Alcohol abuse, off 2-3 weeks, continue lactulose, albumin-give banana bag daily gently Severe thrombocytopenia-no bleeding, transfused plt 10/14, heme following Hypophosphatemia-protocol, ICU Hypomagnesemia-ICU protocol Hypocalcemia-ICU protocol Colitis-empiric IV abx will cover Diabetes-a1c less than 6%, hyperglycemia likely stress response Coagulopathy-plasma given, follow INR 
  
SCD for DVT prophylaxis Vent mgmt per intensivist 
Continue IV abx No major changes today Continue IV PPI Full code status 
 
prog guarded Disposition : 
Patient Active Problem List  
Diagnosis Code  Hypocalcemia E83.51  
 Hypomagnesemia E83.42  
 Alcoholism ([facility] F10.20  Cirrhosis ([facility] K74.60  Colitis K52.9  Acute respiratory failure with hypoxemia (HCC) J96.01  
 Aspiration pneumonia ([facility] J69.0  Acute diastolic CHF (congestive heart failure) (HCC) I50.31  Thrombocytopenia ([facility] D69.6 Subjective: No acute issues per nursing Review of systems: UTO-pt sedate on vent Vital signs/Intake and Output: 
Visit Vitals /80 Pulse (!) 102 Temp 97.6 °F (36.4 °C) Resp 22 Ht 5' 9\" (1.753 m) Wt 64.9 kg (143 lb) SpO2 93% BMI 21.12 kg/m² Current Shift:  10/16 0701 - 10/16 1900 In: 240 [P.O.:240] Out: 125 [Urine:125] Last three shifts:  10/14 1901 - 10/16 0700 In: 4912.2 [I.V.:4558.8] Out: 1200 [Urine:1200] Exam: 
 
General: ill debilitated appearing Wm, thin, frail, sedate on vent Head/Neck: NCAT, supple, No masses, No lymphadenopathy CVS:Regular rate and rhythm, no M/R/G, S1/S2 heard, no thrill Lungs:Clear to auscultation bilaterally, no wheezes, rhonchi, or rales Abdomen: Soft, Nontender, No distention, Normal Bowel sounds, No hepatomegaly Extremities: No C/C/E, pulses palpable 2+ Neuro:sedate Labs: Results:  
   
Chemistry Recent Labs 10/16/20 
0400 10/15/20 
1600 10/15/20 
0400 10/14/20 
1705 10/14/20 
0438 *  --  200* 165* 141* *  --  145 144 143  
K 3.9 3.6 3.9 3.4* 3.5   --  106 106 105 CO2 27  --  28 29 30 BUN 17  --  9 6* 4*  
CREA 0.95  --  0.96 0.88 0.90  
CA 8.2*  --  8.0* 7.5* 7.6* AGAP 9  --  11 9 8 BUCR 18  --  9* 7* 4* AP 44*  --  39*  --  48  
TP 5.5*  --  5.5*  --  5.8* ALB 3.7  --  3.6 3.5 3.6 GLOB 1.8*  --  1.9*  --  2.2 AGRAT 2.1*  --  1.9*  --  1.6 CBC w/Diff Recent Labs 10/16/20 
0400 10/15/20 
1600 10/15/20 
0400 10/14/20 
0438 WBC 6.5  --  4.4* 8.0  
RBC 2.57*  --  2.37* 3.44* HGB 8.2* 7.7* 7.5* 10.6* HCT 24.9* 23.2* 23.1* 32.2*  
PLT 46*  --  42* 23* GRANS 82*  --  85* 74* LYMPH 9*  --  10* 17* EOS 0  --  0 1 Cardiac Enzymes Recent Labs 10/14/20 
1000 10/14/20 
0130 CPK 37* 39 CKND1 CALCULATION NOT PERFORMED WHEN RESULT IS BELOW LINEAR LIMIT CALCULATION NOT PERFORMED WHEN RESULT IS BELOW LINEAR LIMIT Coagulation Recent Labs 10/16/20 
0400 10/15/20 
0400 PTP 22.8* 25.1* INR 2.1* 2.3* Lipid Panel No results found for: CHOL, CHOLPOCT, CHOLX, CHLST, CHOLV, 843705, HDL, HDLP, LDL, LDLC, DLDLP, 664768, VLDLC, VLDL, TGLX, TRIGL, TRIGP, TGLPOCT, CHHD, CHHDX  
BNP No results for input(s): BNPP in the last 72 hours. Liver Enzymes Recent Labs 10/16/20 
0400 TP 5.5* ALB 3.7 AP 44* Thyroid Studies No results found for: T4, T3U, TSH, TSHEXT Procedures/imaging: see electronic medical records for all procedures/Xrays and details which were not copied into this note but were reviewed prior to creation of Plan Lazara Hatch MD

## 2020-10-16 NOTE — PROGRESS NOTES
Comprehensive Nutrition Assessment Type and Reason for Visit: Reassess, NPO/clear liquid Nutrition Recommendations/Plan: EN: start trickle feeds of 1.2 @ 5ml/hr Nutrition Assessment:  Hx diabete, gastroparesis, pancreatitis, hydroadenitis. Pt was presented with nausea and abdominal pain. Admitted w/ cirrohosis, thrombocytopenia, hypomagnesemia, hypocalcemia, colitis, acute respiratory failure with hypoxemia. remains intubated in ICU, acute CHF. Hx of ETOH use, and covid rule-out. Estimated Daily Nutrient Needs: 
Energy (kcal):  2002 Protein (g):  65-78 Fluid (ml/day):  2002 Nutrition Related Findings:  (P) Lab: glucose 199, phos 2.3, Ca 8.2, Na 147. Med: sodium chloride w/ MVI, fentanyl, humalog, reglan, protonix, potassium sodium phosphate packets,. trace edema. Start tickle tube feeds Wounds:   
(Small opening on left flank presented on admission.) Current Nutrition Therapies: DIET NPO 
DIET TUBE FEEDING Anthropometric Measures: 
Height:  5' 9\" (175.3 cm) Current Body Wt:  64.9 kg (143 lb 1.3 oz) Admission Body Wt:  139 lb 15.9 oz   
Usual Body Wt:  145 lb 1 oz(9/25/20) Ideal Body Wt:  160 lbs:  89.4 % Adjusted Body Weight:   ; Weight Adjustment for: No adjustment Adjusted BMI:      
BMI Category:  Normal weight (BMI 18.5-24. 9) Nutrition Diagnosis:  
Inadequate oral intake related to impaired respiratory function as evidenced by NPO or clear liquid status due to medical condition Nutrition Interventions:  
Food and/or Nutrient Delivery: Start tube feeding Nutrition Education and Counseling: No recommendations at this time Coordination of Nutrition Care: Continued inpatient monitoring, Interdisciplinary rounds Goals: 
Start tube feeds when clinically possible within the next 2-3 days Nutrition Monitoring and Evaluation:  
Behavioral-Environmental Outcomes:   
Food/Nutrient Intake Outcomes: Enteral nutrition intake/tolerance Physical Signs/Symptoms Outcomes: Biochemical data, Weight, Skin, Nausea/vomiting, GI status, Fluid status or edema Discharge Planning: Too soon to determine Electronically signed by Andrew Holley on 10/16/2020 at 1:20 PM

## 2020-10-16 NOTE — WOUND CARE
Pt seen by wound care for consult for \"open/reddened areas on lower back and sacrum\". Patient with DTI to coccyx area and left heel. Protective borders applied to sacrum and left heel. Patient turned and repositioned to left side. Heel floated off of bed. Wound care will continue to follow patient during this hospitalization. Skin Care & Pressure Relief Recommendations Minimize layers of linen Pads under patient to optimize support surface Turn/reposition approximately every 2 hours Pillow wedges Manage incontinence Promote continence; Skin Protective lotion/cream to buttocks and sacrum daily and as needed with incontinence care Offload heels pillows

## 2020-10-16 NOTE — PROGRESS NOTES
@2000 pt taken over sedated and paralyzed in bed on ventilator via et tube. Fla cc 0 ,vital signs fluctuates,OG tube remains clamped. Ward in situ draining imer urine. Nimbex and Versed drips in progress. Assessment done and charted in relevant flow sheets . Nursing management continues. @2232 pt HR and respiration elevated SPO2 dropped Dilaudid administered as prescribed with good effect . Pt became calm and O2  Went back into 90's. @0000 pt reassessed no changes care continues. @ 0400 reassessment continues no changes , pt continues to be monitored. @1134 Bedside and Verbal shift change report given to Hailee Rivas (oncoming nurse) by Glendy Gamboa (offgoing nurse). Report included the following information SBAR, Kardex, Intake/Output, MAR, Recent Results, Med Rec Status and Alarm Parameters .

## 2020-10-16 NOTE — CONSULTS
Palliative Medicine Consult [facility]: [phone] ([phone]) LINDSEY DIEZ [facility]: [phone] [facility]: [phone] Patient Name: Foster Roberts YOB: 1965 Date of Initial Consult: 10/16/2020 Reason for Consult: care decisions Requesting Provider: Parris Jennings MD 
Primary Care Physician: Catrachito Perez NP 
  
 SUMMARY:  
Foster Roberts is a 47 y.o. male with a past history of chronic pancreatitis, T2DM, gastroparesis, hydroadenitis and fractured hip who was admitted on 10/9/2020 from home with a diagnosis of colitis and cirrhosis. Current medical issues leading to Palliative Medicine involvement include: liver cirrhosis, respiratory failure and goals of care. PALLIATIVE DIAGNOSES:  
1. Advanced care decisions 2. Colitis 3. Cirrhosis 4. Acute respiratory failure PLAN:  
1. Advanced care decisions: Palliative care team including LORENA Givens and this NP observed patient at bedside in ICU. He is currently sedated and intubated. Telephone call placed to wife, Violeta Galloway. She states they have been  12-13 years and he has two daughters from another relationship. His daughter, Sukhwinder Jacques, lives locally and his other daughter lives in [place]. He has a step daughter who lives in [place], [place]. Wife states that he does not have an AMD or living will. They have not had previous conversations regarding CPR or intubation. She stated she did feel that Paris Barclay would want intubation if there was a chance for recovery. Introduced discussion of CPR for cardiopulmonary arrest. Discussed Don's immunocompromised system, sedentary lifestyle and his ability to endure along with his body's tolerance to aggressive treatments. Encouraged future conversations between her and Paris Barclay to discuss goals of care as she states she has multiple medical issues including ESRD on HD.  Offered to allow patient visitation with her  today but she asked if she could come tomorrow at lunchtime. GOALS OF CARE: FULL CODE with FULL INTERVENTIONS. 2. Colitis: presented to ED with c/o abdominal pain and nausea. Has history of pancreatitis and gastroparesis. Lipase wnl. CT abdomen shows mild thickening of the proximal ascending colon and cecum may reflect sequela of portal colopathy. Infectious/inflammatory colitis also a possibility; diffuse hepatic hypoattenuation compatible with steatosis with hepatomegaly, mild splenomegaly, and small quantity of abdominal/pelvic ascites. Primary team managing. 3. Cirrhosis: New diagnosis. History of heavy alcohol in the past; presently only 1-2 per week. CT abd/pelvis as in #2. INR 1.8, Tbili 4.1, MELD is 20. Hepatology consulted and is in process of work up for liver transplant. 4. Acute respiratory failure: CXR on admission with patchy multifocal airspace disease favored to reflect pulmonary edema without significant pleural effusion. Became acutely hypoxic not responsive to BiPap and was intubated on 10/14/2020. Now D3 ventilator support. 5. Initial consult note routed to primary continuity provider 6. Communicated plan of care with: Palliative IDT 
 
GOALS OF CARE: 
Patient/Health Care Proxy Stated Goals: Prolong life TREATMENT PREFERENCES:  
Code Status: Full Code Advance Care Planning: 
Advance Care Planning 10/9/2020 Confirm Advance Directive None Patient Would Like to Complete Advance Directive Yes Medical Interventions: Full interventions Other: As far as possible, the palliative care team has discussed with patient/health care proxy about goals of care/treatment preferences for patient. HISTORY:  
 
History obtained from: chart CHIEF COMPLAINT: non-responsive HPI/SUBJECTIVE: The patient is:  
[] Verbal and participatory [x] Non-participatory due to: Mechanical intubation Clinical Pain Assessment (nonverbal scale for nonverbal patients): Clinical Pain Assessment Severity: 0 Activity (Movement): Lying quietly, normal position Duration: for how long has pt been experiencing pain (e.g., 2 days, 1 month, years) Frequency: how often pain is an issue (e.g., several times per day, once every few days, constant) FUNCTIONAL ASSESSMENT:  
 
Palliative Performance Scale (PPS): PPS: 10 
 
ECOG 
ECOG Status : Completely disabled PSYCHOSOCIAL/SPIRITUAL SCREENING:  
  
Any spiritual / Zoroastrian concerns: not assessed 
[] Yes /  [] No 
 
Caregiver Burnout: 
[] Yes /  [] No /  [x] No Caregiver Present Anticipatory grief assessment: not assessed 
[] Normal  / [] Maladaptive REVIEW OF SYSTEMS:  
 
Positive and pertinent negative findings in ROS are noted above in HPI. The following systems were [] reviewed / [x] unable to be reviewed as noted in HPI Other findings are noted below. Systems: constitutional, ears/nose/mouth/throat, respiratory, gastrointestinal, genitourinary, musculoskeletal, integumentary, neurologic, psychiatric, endocrine. Positive findings noted below. Modified ESAS Completed by: provider Pain: 0 Stool Occurrence(s): 0 PHYSICAL EXAM:  
 
Wt Readings from Last 3 Encounters:  
10/12/20 64.9 kg (143 lb)  
09/25/20 65.8 kg (145 lb) 10/07/19 77.1 kg (170 lb) Blood pressure 125/85, pulse (!) 104, temperature 97.6 °F (36.4 °C), resp. rate 22, height 5' 9\" (1.753 m), weight 64.9 kg (143 lb), SpO2 (!) 89 %. Pain: 
Pain Scale 1: Adult Nonverbal Pain Scale Pain Intensity 1: 0 Pain Location 1: Generalized Pain Orientation 1: (P) Lower Pain Description 1: Aching Pain Intervention(s) 1: Medication (see MAR) Constitutional: Frail gentleman, appears older than stated age, in no apparent distress. Eyes: closed ENMT: no nasal discharge, dry mucous membranes Cardiovascular: distal pulses intact Respiratory: breathingunlabored, intubated Gastrointestinal: soft non-tender Musculoskeletal: no deformity, no tenderness to palpation Skin: warm, dry Neurologic: not following commands or moving extremities HISTORY:  
 
Principal Problem: Hypocalcemia (10/9/2020) Active Problems: Hypomagnesemia (10/9/2020) Alcoholism ([address] (10/9/2020) Cirrhosis ([address] (10/9/2020) Colitis (10/9/2020) Acute respiratory failure with hypoxemia ([address] (10/13/2020) Aspiration pneumonia ([address] (10/13/2020) Acute diastolic CHF (congestive heart failure) ([address] (10/13/2020) Thrombocytopenia ([address] (10/13/2020) Past Medical History:  
Diagnosis Date  Diabetes ([address]  Gastroparesis  Pancreatitis Past Surgical History:  
Procedure Laterality Date  HX CHOLECYSTECTOMY  HX HIP FRACTURE TX    
 left hip sx 9.23/2018 History reviewed. No pertinent family history. History reviewed, no pertinent family history. Social History Tobacco Use  Smoking status: Current Every Day Smoker  Smokeless tobacco: Never Used Substance Use Topics  Alcohol use: Yes No Known Allergies Current Facility-Administered Medications Medication Dose Route Frequency  furosemide (LASIX) injection 20 mg  20 mg IntraVENous Q12H  
 lactulose (CHRONULAC) 10 gram/15 mL solution 15 mL  10 g Oral BID  midazolam in normal saline (VERSED) 1 mg/mL infusion  1-5 mg/hr IntraVENous TITRATE  fentaNYL (PF) 900 mcg/30 ml infusion soln  0-200 mcg/hr IntraVENous TITRATE  fentaNYL citrate (PF) injection  mcg   mcg IntraVENous Q4H PRN  
 LORazepam (ATIVAN) injection 1 mg  1 mg IntraVENous Q2H PRN  piperacillin-tazobactam (ZOSYN) 4.5 g in 0.9% sodium chloride (MBP/ADV) 100 mL MBP  4.5 g IntraVENous Q8H  
 cisatracurium (NIMBEX) 100 mg in 0.9% sodium chloride 100 mL infusion  0-10 mcg/kg/min IntraVENous TITRATE  
 0.9% sodium chloride 1,000 mL with mvi, adult no. 4 with vit K 10 mL, thiamine 190 mg, folic acid 1 mg infusion   IntraVENous DAILY  albumin human 25% (BUMINATE) solution 25 g  25 g IntraVENous TID  metoclopramide HCl (REGLAN) injection 5 mg  5 mg IntraVENous Q6H PRN  
 dexamethasone (PF) (DECADRON) 10 mg/mL injection 40 mg  40 mg IntraVENous Q24H  
 insulin lispro (HUMALOG) injection   SubCUTAneous Q6H  
 ELECTROLYTE REPLACEMENT PROTOCOL - Potassium Standard Dosing   1 Each Other PRN  
 ELECTROLYTE REPLACEMENT PROTOCOL - Magnesium   1 Each Other PRN  
 ELECTROLYTE REPLACEMENT PROTOCOL - Phosphorus  Standard Dosing  1 Each Other PRN  
 ELECTROLYTE REPLACEMENT PROTOCOL - Calcium   1 Each Other PRN  
 vancomycin (VANCOCIN) 1,000 mg in 0.9% sodium chloride 250 mL (VIAL-MATE)  1,000 mg IntraVENous Q12H  Vancomycin - Rx to dose and monitor  1 Each Other Rx Dosing/Monitoring  pantoprazole (PROTONIX) 40 mg in 0.9% sodium chloride 10 mL injection  40 mg IntraVENous Q24H  
 HYDROmorphone (PF) (DILAUDID) injection 1 mg  1 mg IntraVENous Q3H PRN  
 oxyCODONE-acetaminophen (PERCOCET) 5-325 mg per tablet 1-2 Tab  1-2 Tab Oral Q6H PRN  
 lidocaine 4 % patch 2 Patch  2 Patch TransDERmal Q24H  
 ondansetron (ZOFRAN) injection 4 mg  4 mg IntraVENous Q6H PRN  
 glucose chewable tablet 16 g  4 Tab Oral PRN  
 glucagon (GLUCAGEN) injection 1 mg  1 mg IntraMUSCular PRN  
 dextrose 10% infusion 125-250 mL  125-250 mL IntraVENous PRN  
 
 
 LAB AND IMAGING FINDINGS:  
 
Lab Results Component Value Date/Time WBC 6.5 10/16/2020 04:00 AM  
 HGB 8.2 (L) 10/16/2020 04:00 AM  
 PLATELET 46 (L) 02/37/3768 04:00 AM  
 
Lab Results Component Value Date/Time  Sodium 147 (H) 10/16/2020 04:00 AM  
 Potassium 3.9 10/16/2020 04:00 AM  
 Chloride 111 10/16/2020 04:00 AM  
 CO2 27 10/16/2020 04:00 AM  
 BUN 17 10/16/2020 04:00 AM  
 Creatinine 0.95 10/16/2020 04:00 AM  
 Calcium 8.2 (L) 10/16/2020 04:00 AM  
 Magnesium 2.1 10/16/2020 04:00 AM  
 Phosphorus 2.3 (L) 10/16/2020 04:00 AM  
  
 Lab Results Component Value Date/Time Alk. phosphatase 44 (L) 10/16/2020 04:00 AM  
 Protein, total 5.5 (L) 10/16/2020 04:00 AM  
 Albumin 3.7 10/16/2020 04:00 AM  
 Globulin 1.8 (L) 10/16/2020 04:00 AM  
 
Lab Results Component Value Date/Time INR 2.1 (H) 10/16/2020 04:00 AM  
 Prothrombin time 22.8 (H) 10/16/2020 04:00 AM  
  
Lab Results Component Value Date/Time Iron 45 (L) 10/13/2020 03:48 AM  
 TIBC 69 (L) 10/13/2020 03:48 AM  
 Iron % saturation 65 (H) 10/13/2020 03:48 AM  
 Ferritin 227 10/13/2020 03:48 AM  
  
No results found for: PH, PCO2, PO2 No components found for: [place] Lab Results Component Value Date/Time CK 37 (L) 10/14/2020 10:00 AM  
 CK - MB <1.0 10/14/2020 10:00 AM  
  
 
   
 
Total time: 70 minutes Counseling / coordination time, spent as noted above > 50% counseling / coordination: 50 minutes with patient and wife. Prolonged service was provided for  []30 min   []75 min in face to face time in the presence of the patient, spent as noted above. Time Start:  
Time End:  
Note: this can only be billed with 86613 (initial) or 92174 (follow up). If multiple start / stop times, list each separately.

## 2020-10-16 NOTE — PROGRESS NOTES
9001-  Bedside and Verbal shift change report given to Donald Ames RN (oncoming nurse) by Abiola Johansen RN (offgoing nurse). Report included the following information SBAR, Kardex, MAR and Recent Results. 1200- Will attempt to wean patient off nimbex. 1400- Patient oxygen saturation in mid 80's. Increased in heart rate, respirations, and blood pressure. Fentanyl gtt restarted and nimbex back to 3mcg. 1910- Bedside and Verbal shift change report given to Denise Carvalho (oncoming nurse) by Donald Ames RN (offgoing nurse). Report included the following information SBAR, Kardex, MAR and Recent Results.

## 2020-10-16 NOTE — PROGRESS NOTES
Earlier today despite addition of Fentanyl and continuation of Versed, an attempt at weaning off of Nimbex caused ventilator asynchrony with desaturation into 70-80's. RR and HR increased significantly. Patient was placed back on Nimbex with improved oxygenation to 95-96%, HR < 100. Patient appears comfortable. Tomorrow may try Propofol if hemodynamics permit in the place of Fentanyl if attempting weaning off Nimbex.  
 
Maria T Bills MD

## 2020-10-16 NOTE — PROGRESS NOTES
Phone: [phone] Paging : [phone] Hematology / Oncology Progress Note Admit Date: 10/9/2020 Assessment:  
 
· Hem/Onc Issues:  (1) thrombocyopenia, could be due to liver disease or ITP, (2) Coagulopathydue to liver disease, may also has component of DIC (3) Portal vein thrombosis · Reasons for Admission: Progressive weakness poor appetite · Other Active Issues: ? Immune suppressive state with humira ? Colitis ? Respiratory failure with ARDS picture, intubated 10/14/2020 ? Stress negative for ischemia 
? ETOH abuse ? Ascites and portal hypertension ? Aspiration pneumonia, sepsis ? Ruled out COVID as doing - negative x2 already Principal Problem: Hypocalcemia (10/9/2020) Active Problems: Hypomagnesemia (10/9/2020) Alcoholism ([address] (10/9/2020) Cirrhosis ([address] (10/9/2020) Colitis (10/9/2020) Acute respiratory failure with hypoxemia ([address] (10/13/2020) Aspiration pneumonia ([address] (10/13/2020) Acute diastolic CHF (congestive heart failure) ([address] (10/13/2020) Thrombocytopenia ([address] (10/13/2020) Plan:  
 
· He overall is stable, no clinical bleeding. Received platelet transfusion and FFP on 10/14/2020. · Continue supportive care as doing · On dexamathesone 40 mg daily x4 to cover possible component of ITP (he does have large platelets). · Transfuse platelet for < 20 or clinical bleeding · If clinical bleeding, may also give Amicar to improve platelet function · Monitor DIC panel and LFT, if fibrinogen is lower than 100, will give cryo · Patient is already on Vit K, continue · May also give FFP to correct INR as needed · For his portal thrombosis, he is not a candidate for anticoagulation anyway given severe thrombocytopenia · Continue antibiotics as doing Subjectives:  
 
Overall stable, remain intubated. No active bleeding per RN report. Objectives: VITAL SIGNS:  
Patient Vitals for the past 24 hrs: BP Temp Pulse Resp SpO2  
10/16/20 0806   75 22 97 % 10/16/20 0800 103/67  71 22 96 % 10/16/20 0730 108/74  76 22 95 % 10/16/20 0715 109/74  76 22 94 % 10/16/20 0700 114/73  80 22 94 % 10/16/20 0645 110/75  80 22 94 % 10/16/20 0630 112/77  79 22 94 % 10/16/20 0615 113/75  81 22 94 % 10/16/20 0600 115/77  83 22 94 % 10/16/20 0545 116/78  84 22 96 % 10/16/20 0530 113/79  83 22 96 % 10/16/20 0522   78 22 96 % 10/16/20 0515 112/72  77 22 96 % 10/16/20 0500 108/72  75 22 96 % 10/16/20 0445 106/71  73 22 95 % 10/16/20 0430 104/71  72 22 96 % 10/16/20 0415 107/73  72 22 95 % 10/16/20 0400 104/72  72 22 96 % 10/16/20 0345 105/72  71 22 96 % 10/16/20 0330 108/74  75 22 96 % 10/16/20 0315 107/73  75 22 96 % 10/16/20 0300 105/70  73 22 96 % 10/16/20 0245 105/72  74 22 96 % 10/16/20 0230 108/73  75 22 96 % 10/16/20 0215 107/74  75 22 96 % 10/16/20 0200 109/73  75 22 96 % 10/16/20 0145 108/73  75 22 96 % 10/16/20 0130 108/73  75 22 95 % 10/16/20 0115 105/73  74 22 95 % 10/16/20 0100 106/72  75 22 95 % 10/16/20 0045 107/72  78 22 95 % 10/16/20 0015 108/72  82 22 94 % 10/16/20 0000 107/72  82 22 94 % 10/15/20 2345 108/73  85 22 93 % 10/15/20 2330 109/74  88 22 93 % 10/15/20 2329   87 22 93 % 10/15/20 2315 113/75  92 22 93 % 10/15/20 2314   92 22 93 % 10/15/20 2313   92 22 93 % 10/15/20 2312   93 22 93 % 10/15/20 2311   93 22 93 % 10/15/20 2310   94 22 93 % 10/15/20 2309   94 22 93 % 10/15/20 2308   94 22 92 % 10/15/20 2307   94 22 92 % 10/15/20 2306   95 22 92 % 10/15/20 2305   96 22 92 % 10/15/20 2304   97 22 92 % 10/15/20 2303   96 22 92 % 10/15/20 2302   97 22 92 % 10/15/20 2301   98 22 92 % 10/15/20 2300 115/77  99 22 92 % 10/15/20 2259   98 22 92 % 10/15/20 2258   99 22 92 % 10/15/20 2257   100 22 92 % 10/15/20 2256   (!) 101 22 92 % 10/15/20 2255   100 22 92 % 10/15/20 2254   (!) 101 22 92 % 10/15/20 2253   (!) 102 22 92 % 10/15/20 2252   (!) 102 22 92 % 10/15/20 2251   (!) 103 22 92 % 10/15/20 2250   (!) 103 22 91 % 10/15/20 2249   (!) 105 22 91 % 10/15/20 2248   (!) 105 22 91 % 10/15/20 2247   (!) 105 22 91 % 10/15/20 2246   (!) 106 22 91 % 10/15/20 2245 125/86  (!) 107 22 91 % 10/15/20 2244   (!) 108 22 91 % 10/15/20 2243   (!) 109 22 91 % 10/15/20 2242   (!) 110 22 91 % 10/15/20 2241   (!) 110 22 91 % 10/15/20 2240   (!) 110 22 91 % 10/15/20 2239   (!) 112 22 92 % 10/15/20 2238   (!) 113 22 93 % 10/15/20 2237   (!) 117 22 94 % 10/15/20 2236   (!) 116 22 96 % 10/15/20 2235   (!) 123 22 94 % 10/15/20 2234   (!) 124 22 92 % 10/15/20 2233   (!) 123 22 92 % 10/15/20 2232   (!) 125 22 93 % 10/15/20 2231   (!) 125 22 94 % 10/15/20 2229   (!) 121 22 97 % 10/15/20 2228   (!) 122 22 96 % 10/15/20 2227   (!) 116 22 98 % 10/15/20 2226   (!) 121 22 96 % 10/15/20 2225   (!) 124 22 93 % 10/15/20 2224   (!) 124 22 94 % 10/15/20 2223   (!) 125 22 95 % 10/15/20 2222   (!) 114 22 97 % 10/15/20 2221   (!) 109 22 100 % 10/15/20 2220   (!) 101 22   
10/15/20 2219   (!) 103 22   
10/15/20 2218   (!) 105 22   
10/15/20 2217   (!) 105 22   
10/15/20 2216   (!) 108 22   
10/15/20 2215 (!) 141/109  (!) 111 22   
10/15/20 2214   (!) 115 22   
10/15/20 2213   (!) 112 22   
10/15/20 2200 124/84  84 22   
10/15/20 2145 125/82  80 22   
10/15/20 2130 119/81  73 22   
10/15/20 2115 115/81  69 22   
10/15/20 2100 115/81  68 22   
10/15/20 2045 104/70  (!) 56 22   
10/15/20 2030 104/72  (!) 56 22   
10/15/20 2015 104/70  (!) 55 22   
10/15/20 2000 104/71  (!) 55 22 99 % 10/15/20 1945 103/71  (!) 56 22 99 % 10/15/20 1930 102/70  (!) 55 22 97 % 10/15/20 1915 104/69  (!) 56 22  10/15/20 1900 103/68  (!) 55 22 100 % 10/15/20 1830 100/69  (!) 55 22   
10/15/20 1800 100/68  (!) 55 22   
10/15/20 1730 98/67  (!) 55 22 99 % 10/15/20 1700 99/68  (!) 56 22 99 % 10/15/20 1630 97/68  (!) 57 22 99 % 10/15/20 1600 97/67  (!) 59 22 97 % 10/15/20 1530 97/67  60 22 95 % 10/15/20 1523   (!) 59 22 95 % 10/15/20 1501  97.6 °F (36.4 °C)     
10/15/20 1500 100/69  65 22 95 % 10/15/20 1430 99/65  (!) 58 22 96 % 10/15/20 1400 97/66  (!) 56 22 98 % 10/15/20 1330 96/66  (!) 57 22   
10/15/20 1300 95/66  (!) 59 22   
10/15/20 1254  97.7 °F (36.5 °C)     
10/15/20 1230 94/65  67 22   
10/15/20 1200 92/66  62 22   
10/15/20 1130 93/63  61 22   
10/15/20 1100 94/65  66 22   
10/15/20 1053   66 22 96 % 10/15/20 1030 93/64  68 22   
10/15/20 1000 95/64  67 22   
10/15/20 0930 91/62  72 22   
10/15/20 0900 93/67  73 22   
10/15/20 0830 97/68  75 22   
10/15/20 0824   75 22 93 % GENERAL: intubated and sedated Medications:   
 
Current Medications: 
 
Current Facility-Administered Medications Medication Dose Route Frequency  potassium, sodium phosphates (NEUTRA-PHOS) packet 2 Packet  2 Packet Oral Q2H  
 midazolam in normal saline (VERSED) 1 mg/mL infusion  1-5 mg/hr IntraVENous TITRATE  fentaNYL (PF) 900 mcg/30 ml infusion soln  0-200 mcg/hr IntraVENous TITRATE  fentaNYL citrate (PF) injection  mcg   mcg IntraVENous Q4H PRN  
 LORazepam (ATIVAN) injection 1 mg  1 mg IntraVENous Q2H PRN  piperacillin-tazobactam (ZOSYN) 4.5 g in 0.9% sodium chloride (MBP/ADV) 100 mL MBP  4.5 g IntraVENous Q8H  
 cisatracurium (NIMBEX) 100 mg in 0.9% sodium chloride 100 mL infusion  0-10 mcg/kg/min IntraVENous TITRATE  
 0.9% sodium chloride 1,000 mL with mvi, adult no. 4 with vit K 10 mL, thiamine 663 mg, folic acid 1 mg infusion   IntraVENous DAILY  albumin human 25% (BUMINATE) solution 25 g  25 g IntraVENous TID  NOREPINephrine (LEVOPHED) 8 mg in 0.9% NS 250ml infusion  2-16 mcg/min IntraVENous TITRATE  metoclopramide HCl (REGLAN) injection 5 mg  5 mg IntraVENous Q6H PRN  
 dexamethasone (PF) (DECADRON) 10 mg/mL injection 40 mg  40 mg IntraVENous Q24H  
 insulin lispro (HUMALOG) injection   SubCUTAneous Q6H  
 ELECTROLYTE REPLACEMENT PROTOCOL - Potassium Standard Dosing   1 Each Other PRN  
 ELECTROLYTE REPLACEMENT PROTOCOL - Magnesium   1 Each Other PRN  
 ELECTROLYTE REPLACEMENT PROTOCOL - Phosphorus  Standard Dosing  1 Each Other PRN  
 ELECTROLYTE REPLACEMENT PROTOCOL - Calcium   1 Each Other PRN  
 vancomycin (VANCOCIN) 1,000 mg in 0.9% sodium chloride 250 mL (VIAL-MATE)  1,000 mg IntraVENous Q12H  Vancomycin - Rx to dose and monitor  1 Each Other Rx Dosing/Monitoring  pantoprazole (PROTONIX) 40 mg in 0.9% sodium chloride 10 mL injection  40 mg IntraVENous Q24H  
 HYDROmorphone (PF) (DILAUDID) injection 1 mg  1 mg IntraVENous Q3H PRN  
 oxyCODONE-acetaminophen (PERCOCET) 5-325 mg per tablet 1-2 Tab  1-2 Tab Oral Q6H PRN  
 lactulose (CHRONULAC) 10 gram/15 mL solution 30 mL  20 g Oral BID  lidocaine 4 % patch 2 Patch  2 Patch TransDERmal Q24H  
 ondansetron (ZOFRAN) injection 4 mg  4 mg IntraVENous Q6H PRN  
 glucose chewable tablet 16 g  4 Tab Oral PRN  
 glucagon (GLUCAGEN) injection 1 mg  1 mg IntraMUSCular PRN  
 dextrose 10% infusion 125-250 mL  125-250 mL IntraVENous PRN Allergies: 
 
No Known Allergies Ancillary Study Results:  
  
Laboratory: 
 
Recent Results (from the past 24 hour(s)) GLUCOSE, POC Collection Time: 10/15/20 12:52 PM  
Result Value Ref Range Glucose (POC) 223 (H) 70 - 110 mg/dL CRYOPRECIPITATE, ALLOCATE Collection Time: 10/15/20  2:15 PM  
Result Value Ref Range CALLED TO:    
  1 UNIT OF CRYO READY TO AMENDA RN ICU  [place] 10/15/20 [facility] Unit number [id number] Blood component type CRYO,5U Thaw Unit division 00 Status of unit TRANSFUSED   
VANCOMYCIN, TROUGH Collection Time: 10/15/20  4:00 PM  
Result Value Ref Range Vancomycin,trough 26.4 (HH) 10.0 - 20.0 ug/mL HGB & HCT Collection Time: 10/15/20  4:00 PM  
Result Value Ref Range HGB 7.7 (L) 13.0 - 16.0 g/dL HCT 23.2 (L) 36.0 - 48.0 % POTASSIUM Collection Time: 10/15/20  4:00 PM  
Result Value Ref Range Potassium 3.6 3.5 - 5.5 mmol/L MAGNESIUM Collection Time: 10/15/20  4:00 PM  
Result Value Ref Range Magnesium 2.1 1.6 - 2.6 mg/dL PHOSPHORUS Collection Time: 10/15/20  4:00 PM  
Result Value Ref Range Phosphorus 1.8 (L) 2.5 - 4.9 MG/DL  
GLUCOSE, POC Collection Time: 10/15/20  4:17 PM  
Result Value Ref Range Glucose (POC) 194 (H) 70 - 110 mg/dL CALCIUM, IONIZED Collection Time: 10/15/20  6:20 PM  
Result Value Ref Range Ionized Calcium 1.13 1.12 - 1.32 MMOL/L  
SARS-COV-2 Collection Time: 10/15/20 10:00 PM  
Result Value Ref Range SARS-CoV-2 PENDING Specimen source Nasopharyngeal    
 Specimen type NP Swab Estanisla Opal Collection Time: 10/16/20 12:45 AM  
Result Value Ref Range Vancomycin,trough 19.8 10.0 - 20.0 ug/mL Reported dose date [id number] Reported dose time: 51 [phone] 00 Reported dose: 1000 MG UNITS  
GLUCOSE, POC Collection Time: 10/16/20 12:50 AM  
Result Value Ref Range Glucose (POC) 272 (H) 70 - 110 mg/dL MAGNESIUM Collection Time: 10/16/20  4:00 AM  
Result Value Ref Range Magnesium 2.1 1.6 - 2.6 mg/dL PHOSPHORUS Collection Time: 10/16/20  4:00 AM  
Result Value Ref Range Phosphorus 2.3 (L) 2.5 - 4.9 MG/DL  
CALCIUM, IONIZED Collection Time: 10/16/20  4:00 AM  
Result Value Ref Range Ionized Calcium 1.12 1.12 - 1.32 MMOL/L  
PROTHROMBIN TIME + INR Collection Time: 10/16/20  4:00 AM  
Result Value Ref Range Prothrombin time 22.8 (H) 11.5 - 15.2 sec INR 2.1 (H) 0.8 - 1.2 METABOLIC PANEL, COMPREHENSIVE  
 Collection Time: 10/16/20  4:00 AM  
Result Value Ref Range Sodium 147 (H) 136 - 145 mmol/L Potassium 3.9 3.5 - 5.5 mmol/L Chloride 111 100 - 111 mmol/L  
 CO2 27 21 - 32 mmol/L Anion gap 9 3.0 - 18 mmol/L Glucose 199 (H) 74 - 99 mg/dL BUN 17 7.0 - 18 MG/DL Creatinine 0.95 0.6 - 1.3 MG/DL  
 BUN/Creatinine ratio 18 12 - 20 GFR est AA >60 >60 ml/min/1.73m2 GFR est non-AA >60 >60 ml/min/1.73m2 Calcium 8.2 (L) 8.5 - 10.1 MG/DL Bilirubin, total 3.7 (H) 0.2 - 1.0 MG/DL  
 ALT (SGPT) 24 16 - 61 U/L  
 AST (SGOT) 54 (H) 10 - 38 U/L Alk. phosphatase 44 (L) 45 - 117 U/L Protein, total 5.5 (L) 6.4 - 8.2 g/dL Albumin 3.7 3.4 - 5.0 g/dL Globulin 1.8 (L) 2.0 - 4.0 g/dL A-G Ratio 2.1 (H) 0.8 - 1.7 AMMONIA Collection Time: 10/16/20  4:00 AM  
Result Value Ref Range Ammonia 21 11 - 32 UMOL/L  
CBC WITH AUTOMATED DIFF Collection Time: 10/16/20  4:00 AM  
Result Value Ref Range WBC 6.5 4.6 - 13.2 K/uL  
 RBC 2.57 (L) 4.70 - 5.50 M/uL HGB 8.2 (L) 13.0 - 16.0 g/dL HCT 24.9 (L) 36.0 - 48.0 % MCV 96.9 74.0 - 97.0 FL  
 MCH 31.9 24.0 - 34.0 PG  
 MCHC 32.9 31.0 - 37.0 g/dL  
 RDW 16.6 (H) 11.6 - 14.5 % PLATELET 46 (L) 073 - 420 K/uL NEUTROPHILS 82 (H) 40 - 73 % LYMPHOCYTES 9 (L) 21 - 52 % MONOCYTES 9 3 - 10 % EOSINOPHILS 0 0 - 5 % BASOPHILS 0 0 - 2 %  
 ABS. NEUTROPHILS 5.3 1.8 - 8.0 K/UL  
 ABS. LYMPHOCYTES 0.6 (L) 0.9 - 3.6 K/UL  
 ABS. MONOCYTES 0.6 0.05 - 1.2 K/UL  
 ABS. EOSINOPHILS 0.0 0.0 - 0.4 K/UL  
 ABS. BASOPHILS 0.0 0.0 - 0.1 K/UL  
 DF AUTOMATED FIBRINOGEN Collection Time: 10/16/20  4:00 AM  
Result Value Ref Range Fibrinogen 127 (L) 210 - 451 mg/dL GLUCOSE, POC Collection Time: 10/16/20  5:20 AM  
Result Value Ref Range Glucose (POC) 223 (H) 70 - 110 mg/dL POC G3 Collection Time: 10/16/20  5:20 AM  
Result Value Ref Range Device: VENT    
 FIO2 (POC) 0.7 %  pH (POC) 7.32 (L) 7.35 - 7.45    
 pCO2 (POC) 47.3 (H) 35.0 - 45.0 MMHG  
 pO2 (POC) 75 (L) 80 - 100 MMHG  
 HCO3 (POC) 24.4 22 - 26 MMOL/L  
 sO2 (POC) 94 92 - 97 % Base deficit (POC) 2 mmol/L Mode ASSIST CONTROL Tidal volume 420 ml Set Rate 22 bpm  
 PEEP/CPAP (POC) 10 cmH2O Allens test (POC) N/A Inspiratory Time 0.85 sec Total resp. rate 22 Site RIGHT RADIAL Specimen type (POC) ARTERIAL Performed by Meliton Oneal Volume control plus YES Radiology: Xr Chest Sngl V Result Date: 10/13/2020 EXAM: XR CHEST SNGL V CLINICAL INDICATION/HISTORY: Hypoxia -Additional: None COMPARISON: 10/11/2020 TECHNIQUE: Frontal view of the chest _______________ FINDINGS: HEART AND MEDIASTINUM: Stable appearing cardiac size and mediastinal contours. LUNGS AND PLEURAL SPACES: Patchy multifocal alveolar opacities are newly present on the second examination. No pneumothorax or pleural effusion. BONY THORAX AND SOFT TISSUES: No acute osseous abnormality _______________ IMPRESSION: Short interval development of patchy multifocal airspace disease favored to reflect pulmonary edema without significant pleural effusion. Xr Shoulder Lt Ap/lat Min 2 V Result Date: 9/25/2020 EXAM: XR SHOULDER LT AP/LAT MIN 2 V CLINICAL INDICATION/HISTORY: Left shoulder pain after fall -Additional: None COMPARISON: None TECHNIQUE: 3 views of the left shoulder _______________ FINDINGS: BONES: Osseous limited is normal. No fracture demonstrated. Mild degenerative changes across the acromioclavicular joint noted. Glenohumeral joint not profiled. SOFT TISSUES: Included left lung and left chest wall clear _______________ IMPRESSION: Mild degenerative changes as described without superimposed acute radiographic abnormality. Xr Hip Lt W Or Wo Pelv 2-3 Vws Result Date: 9/25/2020 EXAM: LEFT HIP RADIOGRAPHS CLINICAL INDICATION/HISTORY: Fall with left hip pain -Additional: None COMPARISON: No prior imaging available for review. TECHNIQUE: AP pelvis and frog-leg lateral view of left hip. _______________ FINDINGS: BONES: Evidence of prior long intramedullary yadira fixation and proximal and distal hip screw for left hip fracture fixation. There is retraction of the lesser trochanter of the femur, with corticated margins suggesting a chronic avulsion related to the initial fracture presentation. No superimposed acute femoral fracture identified. Mild bilateral hip joint osteoarthritis. SOFT TISSUES: Unremarkable. _______________ IMPRESSION: 1. Reduced and internally fixated proximal left femoral fracture with likely chronic avulsion of the lesser trochanter the left femur. Comparison prior imaging is recommended. No acute displaced fracture demonstrated. Mild bilateral hip joint osteoarthritis. Ct Abd Pelv W Cont Result Date: 10/9/2020 EXAM: CT of the abdomen and pelvis INDICATION: Abdominal pain, greatest in the left lower quadrant with nausea and vomiting. COMPARISON: MRI pelvis 10/7/2019. CT left hip 4/17/2019 TECHNIQUE: Axial CT imaging of the abdomen and pelvis was performed with intravenous contrast. Multiplanar reformats were generated. One or more dose reduction techniques were used on this CT: automated exposure control, adjustment of the mAs and/or kVp according to patient size, and iterative reconstruction techniques. The specific techniques used on this CT exam have been documented in the patient's electronic medical record. Digital Imaging and Communications in Medicine (DICOM) format image data are available to nonaffiliated external healthcare facilities or entities on a secure, media free, reciprocally searchable basis with patient authorization for at least a 12-month period after this study. _______________ FINDINGS: LOWER CHEST: Minor basilar changes of atelectasis.  No alveolar consolidation or pleural effusion. Normal cardiac size without pericardial effusion. LIVER, BILIARY: Diffuse hepatic hypoattenuation is present with hepatic size estimated at approximately 27 cm in craniocaudal span. No discrete liver lesion. No intrahepatic or extra hepatic biliary ductal dilatation. Patient is status post cholecystectomy. PANCREAS: Pancreatic enhancement is normal. Mild and diffuse pancreatic ductal ectasia is noted. No discrete pancreatic mass lesion noted. SPLEEN: Mildly enlarged at 14 cm. ADRENALS: Normal. KIDNEYS/URETERS/BLADDER: Symmetric renal enhancement. No bowel obstruction PELVIC ORGANS: Symmetric renal enhancement. Punctate nonobstructing upper pole left renal stone. No distal ureteral or urinary bladder stone. Urinary bladder unremarkable in CT appearance. VASCULATURE: Diffuse aortobiiliac atherosclerotic vascular calcification is present without evidence of aneurysmal dilatation. Main portal vein as well as right and left portal venous branches are widely patent. LYMPH NODES: Scattered subcentimeter mesenteric and retroperitoneal lymph nodes are noted. No evidence of abdominal or pelvic adenopathy. GASTROINTESTINAL TRACT: No morphology of bowel obstruction. No free intraperitoneal gas. Mild thickening of the proximal ascending colon and cecum noted. BONES: No acute or aggressive osseous abnormalities identified. Partial visualization healed proximal left femoral fracture status post IM nailing and dynamic hip screw placement. OTHER: Small quantity of abdominal/pelvic ascites. Thin subcutaneous linear density tracking along the right gluteal fold, likely in keeping previously noted fistula tract. _______________ IMPRESSION: 1. Diffuse hepatic hypoattenuation compatible with steatosis with hepatomegaly, mild splenomegaly, and small quantity of abdominal/pelvic ascites. The totality of findings compatible with hepatocellular dysfunction and potentially associated portal hypertension. > Patent main and proximal portal venous branches.   > Mild thickening of the proximal ascending colon and cecum may reflect sequela of portal colopathy. Infectious/inflammatory colitis also a possibility but thought less likely given the imaging appearance. 2. Likely correlate for small area of perianal fistula noted on prior MRI pelvis. 3. Punctate nonobstructing upper pole left renal stone. [id number] Addendum Date: 10/13/2020 Addendum: Impression 1 discussed with Dr. Melissa Vyas at 10:47 AM on same day. Result Date: 10/13/2020 Right upper quadrant abdominal ultrasound INDICATION: Hepatomegaly. TECHNIQUE: Real-time sonography in multiple planes of the abdomen was performed with image documentation. Grayscale, color flow Doppler imaging, and velocity spectral waveform analysis of the portal vein was performed (duplex imaging). COMPARISON: None. _______________ FINDINGS: LIVER: The liver is diffusely increased in echogenicity, without focal mass. Liver measures at least 23.1 cm sagittal. Color and spectral flow analysis of the portal vein shows lack of flow and dilated measuring 1.5 cm. There appears periportal collaterals. Decrease flow in the hepatic vein. BILIARY SYSTEM: No intrahepatic biliary dilatation. Common bile duct is normal in caliber measuring 5-6 mm. GALLBLADDER: Cholecystectomy. RIGHT KIDNEY: 11.9 cm in length. No hydronephrosis or renal mass. No visible calculi. PANCREAS: Obscured by overlying bowel gas. IVC: Visualized portions are unremarkable in appearance. OTHER: There is perihepatic ascites and recanalized umbilical vein. _______________ IMPRESSION: 1. Nonspecific increased hepatic echotexture suggesting intrinsic liver disease. Dilated portal vein, recanalized umbilical vein and perihepatic ascites.  There is lack of flow within the portal vein, suggestive of thrombosis, with periportal collaterals and decreased flows in the hepatic vein. However, portal and hepatic veins patent on recent CT abdomen 10/09/2020. Multiple attempts to contact Dr. Leoncio Ocampo with this findings, awaiting callback. 2. No discrete hepatic mass identified. 3. Cholecystectomy. 4. Pancreas obscured by bowel gas. Xr Chest [address] Result Date: 10/15/2020 EXAM: XR CHEST PORT CLINICAL INDICATION/HISTORY: Respiratory failure, bilateral airspace disease -Additional: None COMPARISON: 10/14/2020 TECHNIQUE: Portable frontal view of the chest _______________ FINDINGS: SUPPORT DEVICES: Endotracheal tube and nasogastric tube project in stable position. HEART AND MEDIASTINUM: Stable appearing cardiac size and mediastinal contours. LUNGS AND PLEURAL SPACES: Interstitial and airspace disease appearing similar to prior study. No evidence of pneumothorax or definite pleural effusion. BONY THORAX AND SOFT TISSUES: Unremarkable. _______________ IMPRESSION: Support devices in stable/appropriate position. Bilateral interstitial and airspace disease appearing overall similar to prior study. Xr Chest [address] Result Date: 10/14/2020 EXAM: XR CHEST PORT CLINICAL INDICATION/HISTORY: ET tube placement -Additional: None COMPARISON: 10/14/2020 TECHNIQUE: Portable frontal view of the chest _______________ FINDINGS: SUPPORT DEVICES: Endotracheal tube 4 cm above the billie. Nasogastric tube below the diaphragm, tip projecting over the stomach. HEART AND MEDIASTINUM: Stable appearing cardiac size and mediastinal contours. LUNGS AND PLEURAL SPACES: Multifocal interstitial and airspace disease. Overall similar appearance to prior examination. No evidence of pneumothorax or definite pleural effusion. BONY THORAX AND SOFT TISSUES: Unremarkable. _______________ IMPRESSION: 1. Support devices in stable/appropriate position. 2. Extensive bilateral interstitial and airspace disease similar to prior. Xr Chest [address] Result Date: 10/14/2020 EXAM: One-view chest CLINICAL HISTORY: Dyspnea, COMPARISON: None FINDINGS: Frontal view of the chest demonstrate extensive bilateral airspace disease mildly worsened. Cardiac silhouette is normal in size and contour. No acute bony or soft tissue abnormality. IMPRESSION: Extensive bilateral nonspecific airspace disease appears worsened since prior examination. Xr Chest [facility] Result Date: 10/11/2020 EXAM: CHEST RADIOGRAPH CLINICAL INDICATION/HISTORY: Shortness of breath -Additional: None COMPARISON: October 9, 2020 TECHNIQUE: Portable frontal view of the chest _______________ FINDINGS: SUPPORT DEVICES: None. HEART AND MEDIASTINUM: No appreciable cardiomegaly. Remaining mediastinal contours within normal limits. LUNGS AND PLEURAL SPACES: Lung volumes are hypoinflated. There are linear opacities at both lung bases and blunting of the costophrenic sulci. BONY THORAX AND SOFT TISSUES: Unremarkable. _______________ IMPRESSION: Bibasilar opacities that likely represent a combination of atelectasis and/or small pleural effusions Xr Chest [facility] Result Date: 10/9/2020 EXAM: XR CHEST PORT CLINICAL INDICATION/HISTORY: abnormal ekg -Additional: None COMPARISON: None TECHNIQUE: Portable frontal view of the chest _______________ FINDINGS: SUPPORT DEVICES: None. HEART AND MEDIASTINUM: Cardiomediastinal silhouette within normal limits. LUNGS AND PLEURAL SPACES: No dense consolidation, large effusion or pneumothorax. _______________ IMPRESSION: No acute cardiopulmonary abnormality. Titi Villareal. Taqueria Felix MD, PhD, May[facility] Wells Phone: [phone]: [phone]

## 2020-10-16 NOTE — PROGRESS NOTES
Pulmonary Specialists Pulmonary, Critical Care, and Sleep Medicine Name: Kathalene Bence MRN: [id number] : 1965 Hospital: [facility] [place] Date: 10/16/2020  Room: 109/01 [facility] Note Consult requesting physician: Dr. Berto Schultz Reason for Consult: Acute respiratory failure with hypoxemia IMPRESSION:  
· Acute respiratory failure with hypoxia J96.01 
· Aspiration pneumonia - J69.0 · ARDS - J80 · Thrombocytopenia, concern for ITP vs liver disease - D69.6 · Coagulopathy, multifactorial including DIC - D68.9 · Acute diastolic congestive heart failure  I50.31 
· Cirrhosis  K74.60 · Hyperbilirubinemia - E80.6 · Portal vein thrombosis - Z74 
· Colitis  K52.9 Patient Active Problem List  
Diagnosis Code  Hypocalcemia, resolved E83.51  
 Hypomagnesemia, resolved E83.42  
 Alcoholism ([address] F10.20  Cirrhosis ([address] K74.60  Colitis K52.9  Acute respiratory failure with hypoxemia (HCC) J96.01  
 Aspiration pneumonia ([address] J69.0  Acute diastolic CHF (congestive heart failure) (HCC) I50.31  Thrombocytopenia ([address] D69.6 · Code status: Full code RECOMMENDATIONS:  
Respiratory: Mech. Ventilated patients- aim to keep peak plateau pressure less than or equal to 30cm H2O. Titrate FiO2 for goal SPO2> 91% Monitor ABG and CXR daily - will allow permissive hypercapnia to assist with airway pressure goals VAP prevention bundle and sedation bundle followed, head of the bed at 30' all times Daily sedation holiday and assessment for weaning with SBT as tolerated - not a candidate today Continue bronchodilators as needed, pulmonary hygiene care Steroids for suspected ITP - may taper after 4 days of course Overall FiO2 needs stable/improving and no worsening in CXR hence unlikely DAH in the background of drop in Hgb; no hemoptysis on ET secretions On major clinical or radiological improvement despite diuresis suggesting role of aspiration pneumonia SARS CoV2 PCR negative x 2 per staff and 3rd is sent last night by staff; rapid test negative 10/13/20. CXR unlikely COVID19 like CVS: sinus bradycardia resolved with Fentanyl off and monitor HR 
hemodynamics stable - not on vasopressor Start low dose Lasix 20 mg q12 to assist with conservative volume status for ventilator liberation strategy  
echo EF 55-60%; no pulmonary hypertension reported. ID: antibiotics: Zosyn and IV vancomycin. Sputum cx per ET tube - normal sukhi final 
No leukocytosis, afebrile; immunosuppressed status given biological use [Humira] Deescalate antibiotic when appropriate. Hematology/Oncology: hgb stable and prior drop likely dilutional as not required vasopressor, no worsening in CXR 
tx for goal Hgb > 7 Transfuse cryoppt for fibrinogen < 100 as per hematology; received 5 units of Cryo on 10/15/20 Continue Dexamethasone 40 mg x 4 days then taper as tolerated or per direction of hematology following Monitor Hgb, platelets, INR, fibrinogen FDP daily Platelets stable and monitor for bleeding; keep platelets > 57F per Dr. Dharmesh Samuel May transfuse FFP for INR as needed Continue Vit K in IVF daily May use Amicar in case of bleeding for plt dysfunction - confirmed with pharmacy- available at [facility] Normal S. Cr, mentation stable prior to intubation Renal: lytes being replaced and recheck Patient has Ward catheter; monitor UO, lytes, renal function Start free H2O at 100 mL q4 hrs GI/: monitor LFT, ammonia daily  
followed by Dr. Marko Graham; patient has likely ETOH cirrhosis Reduce lactulose to 10 gm through OGT Continue Albumin daily Q6 Start trickle feeds as tolerated with free H2O Not a candidate for anticoagulation for Portal Vein Thrombosis on US liver Endocrine: Elevated FSBS. SSI as needed. Neurology: will try to wean off paralytic as tolerated Restart Fentanyl at low dose to assist - RN aware May place back if not able to tolerate for agitations, ventilator asynchrony and monitor TOF 
monitor for DTs. AAO x 3. Continue AM multivitamin in IVF x 1 L daily as tolerated Better ventilator synchrony and oxygenation with paralytic Electrolytes: Replace electrolytes per ICU electrolyte replacement protocol. IVF: Albumin every 6 hours, multivitamin in IVF x 1 L daily Nutrition: Keep n.p.o. Prophylaxis: DVT Prophylaxis: SCDs. GI Prophylaxis: Protonix. Restraints: none - on paralytic Lines/Tubes: [employer], Ward and Vent Bundles will be Followed, Vent Day 3  
ETT: 10/14/20. OGT/NGT: 10/14/20. Central line: 10/14/20 RT femoral CVC (site examined, no erythema, induration, discharge or sign of infection. Dressing intact. Medically necessary, will remove it when not needed. Central line bundle followed). PIV x 2 Ward: 10/13(Medically necessary for strict input/output monitoring in critically ill patient, will remove it when not needed. Ward bundle followed). Prognosis guarded in this patient with MSOF, chronic liver disease and discussed on multiple occasions with wife regarding high risk for complications including worsening in respiratory failure, cardiovascular collapse, prolonged hospitalization, nosocomial infection, renal failure, hepatorenal syndrome, death, other. Will defer respective systems problem management to primary and other respective consultant and follow patient in ICU with primary and other medical team. 
Further recommendations will be based on the patient's response to recommended treatment and results of the investigation ordered. Quality Care: PPI, DVT prophylaxis, HOB elevated, Infection control all reviewed and addressed. Care of plan d/w hospitalist and ICU RN, RT. Discussed with patient's wife, palliative care.  
 
High complexity decision making was performed during the evaluation of this patient at high risk for decompensation with multiple organ involvement. Total critical care time spent rendering care exclusive of procedures/family discussion/coordination of care: 
39 minutes. Subjective/History of Present Illness:  
Patient is a 47 y.o. male with PMHx significant for chronic pancreatitis, diabetes, gastroparesis, hidradenitis; history of colitis on Humiranot clear if patient has inflammatory bowel disease. Patient was admitted on 10/9 with nausea and lower abdominal pains. The patient was admitted to the hospital.  Dr. Angela Saini consulted for cirrhosis. Today evening, patient had shortness of breath symptoms and hypoxemia. RRT was done. Chest x-ray shows bilateral pulmonary infiltrates. Patient was placed on BiPAP and moved to the ICU. He is currently getting Lasix injection. Patient denies any vomiting or aspirations recently; he denies any COVID-19 contacts at home. He feels comfortable on BiPAP. No significant cough or productive sputum noted; no chest pain or hemoptysis. Telemetry mild sinus tach. Afebrile; blood pressure stable; overall fluid positive by 5 L this admission. 10/16/20 Patient remained in ICU bed 109 overnight and seen at bedside On ventilator, fio2 and PEEP stable per ABG and SPO2 on monitor Sedated, on paralytic, comfortable looking Hemodynamically stable - not on any vasopressor HR improved off of fentanyl and mild tachycardia No reported bleeding anywhere; stable hgb Afebrile. UO good. NPO. Review of Systems:  
Review of systems not obtained due to patient factors. Name  Relation  Home  Work  [place] [employer] [facility] [facility] [employer]  [phone] No Known Allergies Past Medical History:  
Diagnosis Date  Diabetes ([facility]  Gastroparesis  Pancreatitis Past Surgical History:  
Procedure Laterality Date  HX CHOLECYSTECTOMY  HX HIP FRACTURE TX    
 left hip sx 9.23/2018 Social History Tobacco Use  Smoking status: Current Every Day Smoker  Smokeless tobacco: Never Used Substance Use Topics  Alcohol use: Yes History reviewed. No pertinent family history. Prior to Admission medications Medication Sig Start Date End Date Taking? Authorizing Provider  
adalimumab (Humira Pen) 40 mg/0.8 mL injection pen 40 mg by SubCUTAneous route every seven (7) days. Yes Provider, Historical  
lidocaine (LIDODERM) 5 % 2 Patches by TransDERmal route every twenty-four (24) hours. Apply patch to the affected area for 12 hours a day and remove for 12 hours a day. One patch is applied to left hip. One patch is applied to right shoulder blade. Yes Provider, Historical  
loratadine (Claritin) 10 mg tablet Take 10 mg by mouth daily. Indications: inflammation of the nose due to an allergy   Yes Provider, Historical  
aspirin delayed-release 81 mg tablet Take 1 Tab by mouth daily. Yes Racheal, MD Meño  
insulin glargine (Lantus Solostar U-100 Insulin) 100 unit/mL (3 mL) inpn 5 Units by SubCUTAneous route daily. Yes Racheal, MD Meño  
DULoxetine (CYMBALTA) 60 mg capsule Take 1 Cap by mouth as needed. 1/6/19   Meño Springer MD  
glyBURIDE-metFORMIN (GLUCOVANCE) 2.5-500 mg per tablet Take 1 Tab by mouth two (2) times a day. Meño Springer MD  
methocarbamoL (ROBAXIN) 500 mg tablet Take 2 Tabs by mouth four (4) times daily. Meño Springer MD  
traZODone (DESYREL) 50 mg tablet Take 50 mg by mouth nightly. Meño Springer MD  
HYDROmorphone (DILAUDID) 2 mg tablet Take 1 Tab by mouth every four (4) hours as needed for Pain. Max Daily Amount: 12 mg. Indications: Severe Pain 9/26/18   Rajeev Guzmán MD  
ondansetron (ZOFRAN ODT) 4 mg disintegrating tablet Take 1-2 tablets every 6-8 hours as needed for nausea and vomiting. 9/26/18   Rajeev Guzmán MD  
 
Current Facility-Administered Medications Medication Dose Route Frequency  midazolam in normal saline (VERSED) 1 mg/mL infusion  1-5 mg/hr IntraVENous TITRATE  fentaNYL (PF) 900 mcg/30 ml infusion soln  0-200 mcg/hr IntraVENous TITRATE  piperacillin-tazobactam (ZOSYN) 4.5 g in 0.9% sodium chloride (MBP/ADV) 100 mL MBP  4.5 g IntraVENous Q8H  
 cisatracurium (NIMBEX) 100 mg in 0.9% sodium chloride 100 mL infusion  0-10 mcg/kg/min IntraVENous TITRATE  
 0.9% sodium chloride 1,000 mL with mvi, adult no. 4 with vit K 10 mL, thiamine 376 mg, folic acid 1 mg infusion   IntraVENous DAILY  albumin human 25% (BUMINATE) solution 25 g  25 g IntraVENous TID  NOREPINephrine (LEVOPHED) 8 mg in 0.9% NS 250ml infusion  2-16 mcg/min IntraVENous TITRATE  dexamethasone (PF) (DECADRON) 10 mg/mL injection 40 mg  40 mg IntraVENous Q24H  
 insulin lispro (HUMALOG) injection   SubCUTAneous Q6H  
 vancomycin (VANCOCIN) 1,000 mg in 0.9% sodium chloride 250 mL (VIAL-MATE)  1,000 mg IntraVENous Q12H  Vancomycin - Rx to dose and monitor  1 Each Other Rx Dosing/Monitoring  pantoprazole (PROTONIX) 40 mg in 0.9% sodium chloride 10 mL injection  40 mg IntraVENous Q24H  
 lactulose (CHRONULAC) 10 gram/15 mL solution 30 mL  20 g Oral BID  lidocaine 4 % patch 2 Patch  2 Patch TransDERmal Q24H Objective:  
Vital Signs:   
Blood pressure 124/82, pulse (!) 106, temperature 97.6 °F (36.4 °C), resp. rate 22, height 5' 9\" (1.753 m), weight 64.9 kg (143 lb), SpO2 91 %. Body mass index is 21.12 kg/m². O2 Device: Ventilator, Endotracheal tube O2 Flow Rate (L/min): 4 l/min Temp (24hrs), Av.7 °F (36.5 °C), Min:97.6 °F (36.4 °C), Max:97.7 °F (36.5 °C) Intake/Output:  
Last shift:      10/16 0701 - 10/16 1900 In: 240 [P.O.:240] Out: 125 [Urine:125] Last 3 shifts: 10/14 1901 - 10/16 0700 In: 4912.2 [I.V.:4558.8] Out: 1200 [Urine:1200] Intake/Output Summary (Last 24 hours) at 10/16/2020 1253 Last data filed at 10/16/2020 8328 Gross per 24 hour Intake 2883.43 ml Output 1325 ml Net 1558.43 ml  
  
 
 Last 3 Recorded Weights in this Encounter 10/09/20 2231 10/12/20 1045 10/12/20 1046 Weight: 65.2 kg (143 lb 12.8 oz) 64.9 kg (143 lb) 64.9 kg (143 lb) Physical Exam:  
General: sedated, on paralytic, in no respiratory distress, appears older than stated age, chronically ill appearing, on ventilator HEENT: PERRL, orally intubated, no bleeding Neck: No abnormally enlarged lymph nodes or thyroid, supple Chest: normal 
Lungs: moderate air entry, rhonchi scattered bilaterally, normal percussion bilaterally, no tenderness/ rash Heart: Regular rate and rhythm, S1S2 present or without murmur or extra heart sounds Abdomen: non distended, bowel sounds normoactive, tympanic, soft without significant tenderness, masses, organomegaly or guarding Extremity: trace bl UE > LE edema; no cyanosis, clubbing Neuro: sedated, on paralytic, no involuntary movements, exam limitations Skin: Skin color, texture, turgor fair. No rashes or lesions Data:  
   
Recent Results (from the past 24 hour(s)) CRYOPRECIPITATE, ALLOCATE Collection Time: 10/15/20  2:15 PM  
Result Value Ref Range CALLED TO:    
  1 UNIT OF CRYO READY TO AMENDA RN ICU  [place] 10/15/20 [facility] Unit number [id number] Blood component type CRYO,5U Thaw Unit division 00 Status of unit TRANSFUSED   
VANCOMYCIN, TROUGH Collection Time: 10/15/20  4:00 PM  
Result Value Ref Range Vancomycin,trough 26.4 (HH) 10.0 - 20.0 ug/mL HGB & HCT Collection Time: 10/15/20  4:00 PM  
Result Value Ref Range HGB 7.7 (L) 13.0 - 16.0 g/dL HCT 23.2 (L) 36.0 - 48.0 % POTASSIUM Collection Time: 10/15/20  4:00 PM  
Result Value Ref Range Potassium 3.6 3.5 - 5.5 mmol/L MAGNESIUM Collection Time: 10/15/20  4:00 PM  
Result Value Ref Range Magnesium 2.1 1.6 - 2.6 mg/dL PHOSPHORUS Collection Time: 10/15/20  4:00 PM  
Result Value Ref Range  Phosphorus 1.8 (L) 2.5 - 4.9 MG/DL  
GLUCOSE, POC  
 Collection Time: 10/15/20  4:17 PM  
Result Value Ref Range Glucose (POC) 194 (H) 70 - 110 mg/dL CALCIUM, IONIZED Collection Time: 10/15/20  6:20 PM  
Result Value Ref Range Ionized Calcium 1.13 1.12 - 1.32 MMOL/L  
SARS-COV-2 Collection Time: 10/15/20 10:00 PM  
Result Value Ref Range SARS-CoV-2 PENDING Specimen source Nasopharyngeal    
 Specimen type NP Swab Arnett Finely Collection Time: 10/16/20 12:45 AM  
Result Value Ref Range Vancomycin,trough 19.8 10.0 - 20.0 ug/mL Reported dose date [id number] Reported dose time: 51 856 43 00 Reported dose: 1000 MG UNITS  
GLUCOSE, POC Collection Time: 10/16/20 12:50 AM  
Result Value Ref Range Glucose (POC) 272 (H) 70 - 110 mg/dL MAGNESIUM Collection Time: 10/16/20  4:00 AM  
Result Value Ref Range Magnesium 2.1 1.6 - 2.6 mg/dL PHOSPHORUS Collection Time: 10/16/20  4:00 AM  
Result Value Ref Range Phosphorus 2.3 (L) 2.5 - 4.9 MG/DL  
CALCIUM, IONIZED Collection Time: 10/16/20  4:00 AM  
Result Value Ref Range Ionized Calcium 1.12 1.12 - 1.32 MMOL/L  
PROTHROMBIN TIME + INR Collection Time: 10/16/20  4:00 AM  
Result Value Ref Range Prothrombin time 22.8 (H) 11.5 - 15.2 sec INR 2.1 (H) 0.8 - 1.2 METABOLIC PANEL, COMPREHENSIVE Collection Time: 10/16/20  4:00 AM  
Result Value Ref Range Sodium 147 (H) 136 - 145 mmol/L Potassium 3.9 3.5 - 5.5 mmol/L Chloride 111 100 - 111 mmol/L  
 CO2 27 21 - 32 mmol/L Anion gap 9 3.0 - 18 mmol/L Glucose 199 (H) 74 - 99 mg/dL BUN 17 7.0 - 18 MG/DL Creatinine 0.95 0.6 - 1.3 MG/DL  
 BUN/Creatinine ratio 18 12 - 20 GFR est AA >60 >60 ml/min/1.73m2 GFR est non-AA >60 >60 ml/min/1.73m2 Calcium 8.2 (L) 8.5 - 10.1 MG/DL Bilirubin, total 3.7 (H) 0.2 - 1.0 MG/DL  
 ALT (SGPT) 24 16 - 61 U/L  
 AST (SGOT) 54 (H) 10 - 38 U/L Alk. phosphatase 44 (L) 45 - 117 U/L Protein, total 5.5 (L) 6.4 - 8.2 g/dL Albumin 3.7 3.4 - 5.0 g/dL Globulin 1.8 (L) 2.0 - 4.0 g/dL A-G Ratio 2.1 (H) 0.8 - 1.7 AMMONIA Collection Time: 10/16/20  4:00 AM  
Result Value Ref Range Ammonia 21 11 - 32 UMOL/L  
CBC WITH AUTOMATED DIFF Collection Time: 10/16/20  4:00 AM  
Result Value Ref Range WBC 6.5 4.6 - 13.2 K/uL  
 RBC 2.57 (L) 4.70 - 5.50 M/uL HGB 8.2 (L) 13.0 - 16.0 g/dL HCT 24.9 (L) 36.0 - 48.0 % MCV 96.9 74.0 - 97.0 FL  
 MCH 31.9 24.0 - 34.0 PG  
 MCHC 32.9 31.0 - 37.0 g/dL  
 RDW 16.6 (H) 11.6 - 14.5 % PLATELET 46 (L) 160 - 420 K/uL NEUTROPHILS 82 (H) 40 - 73 % LYMPHOCYTES 9 (L) 21 - 52 % MONOCYTES 9 3 - 10 % EOSINOPHILS 0 0 - 5 % BASOPHILS 0 0 - 2 %  
 ABS. NEUTROPHILS 5.3 1.8 - 8.0 K/UL  
 ABS. LYMPHOCYTES 0.6 (L) 0.9 - 3.6 K/UL  
 ABS. MONOCYTES 0.6 0.05 - 1.2 K/UL  
 ABS. EOSINOPHILS 0.0 0.0 - 0.4 K/UL  
 ABS. BASOPHILS 0.0 0.0 - 0.1 K/UL  
 DF AUTOMATED FIBRINOGEN Collection Time: 10/16/20  4:00 AM  
Result Value Ref Range Fibrinogen 127 (L) 210 - 451 mg/dL GLUCOSE, POC Collection Time: 10/16/20  5:20 AM  
Result Value Ref Range Glucose (POC) 223 (H) 70 - 110 mg/dL POC G3 Collection Time: 10/16/20  5:20 AM  
Result Value Ref Range Device: VENT    
 FIO2 (POC) 0.7 % pH (POC) 7.32 (L) 7.35 - 7.45    
 pCO2 (POC) 47.3 (H) 35.0 - 45.0 MMHG  
 pO2 (POC) 75 (L) 80 - 100 MMHG  
 HCO3 (POC) 24.4 22 - 26 MMOL/L  
 sO2 (POC) 94 92 - 97 % Base deficit (POC) 2 mmol/L Mode ASSIST CONTROL Tidal volume 420 ml Set Rate 22 bpm  
 PEEP/CPAP (POC) 10 cmH2O Allens test (POC) N/A Inspiratory Time 0.85 sec Total resp. rate 22 Site RIGHT RADIAL Specimen type (POC) ARTERIAL Performed by Renuka Graff Volume control plus YES    
GLUCOSE, POC Collection Time: 10/16/20 11:43 AM  
Result Value Ref Range Glucose (POC) 230 (H) 70 - 110 mg/dL Chemistry Recent Labs 10/16/20 
0400 10/15/20 
1600 10/15/20 0400 10/14/20 
1705 10/14/20 
0438 *  --  200* 165* 141* *  --  145 144 143  
K 3.9 3.6 3.9 3.4* 3.5   --  106 106 105 CO2 27  --  28 29 30 BUN 17  --  9 6* 4*  
CREA 0.95  --  0.96 0.88 0.90  
CA 8.2*  --  8.0* 7.5* 7.6*  
MG 2.1 2.1 2.4 1.2* 1.3*  
PHOS 2.3* 1.8* 2.4* 2.3* 2.1* AGAP 9  --  11 9 8 BUCR 18  --  9* 7* 4* AP 44*  --  39*  --  48  
TP 5.5*  --  5.5*  --  5.8* ALB 3.7  --  3.6 3.5 3.6 GLOB 1.8*  --  1.9*  --  2.2 AGRAT 2.1*  --  1.9*  --  1.6 Lactic Acid Lactic acid Date Value Ref Range Status 10/14/2020 1.8 0.4 - 2.0 MMOL/L Final  
 
Recent Labs 10/14/20 
1705 10/14/20 
0558 10/14/20 
0130 LAC 1.8 2.3* 2.2* Liver Enzymes Protein, total  
Date Value Ref Range Status 10/16/2020 5.5 (L) 6.4 - 8.2 g/dL Final  
 
Albumin Date Value Ref Range Status 10/16/2020 3.7 3.4 - 5.0 g/dL Final  
 
Globulin Date Value Ref Range Status 10/16/2020 1.8 (L) 2.0 - 4.0 g/dL Final  
 
A-G Ratio Date Value Ref Range Status 10/16/2020 2.1 (H) 0.8 - 1.7   Final  
 
Alk. phosphatase Date Value Ref Range Status 10/16/2020 44 (L) 45 - 117 U/L Final  
 
Recent Labs 10/16/20 
0400 10/15/20 
0400 10/14/20 
1705 10/14/20 
1670 TP 5.5* 5.5*  --  5.8* ALB 3.7 3.6 3.5 3.6 GLOB 1.8* 1.9*  --  2.2 AGRAT 2.1* 1.9*  --  1.6 AP 44* 39*  --  48  
  
 
CBC w/Diff Recent Labs 10/16/20 
0400 10/15/20 
1600 10/15/20 
0400 10/14/20 
0438 WBC 6.5  --  4.4* 8.0  
RBC 2.57*  --  2.37* 3.44* HGB 8.2* 7.7* 7.5* 10.6* HCT 24.9* 23.2* 23.1* 32.2*  
PLT 46*  --  42* 23* GRANS 82*  --  85* 74* LYMPH 9*  --  10* 17* EOS 0  --  0 1 Cardiac Enzymes No results found for: CPK, CK, CKMMB, CKMB, RCK3, CKMBT, CKNDX, CKND1, LYDIA, TROPT, TROIQ, LIANNA, TROPT, TNIPOC, BNP, BNPP  
 
BNP No results found for: BNP, BNPP, XBNPT Coagulation Recent Labs 10/16/20 
0400 10/15/20 
0400 10/14/20 
0438 PTP 22.8* 25.1* 25.1* INR 2.1* 2.3* 2.3*  
   
 
 Thyroid  No results found for: T4, T3U, TSH, TSHEXT, TSHEXT No results found for: T4  
 
Urinalysis Lab Results Component Value Date/Time Color DARK YELLOW 10/11/2020 01:03 AM  
 Appearance CLEAR 10/11/2020 01:03 AM  
 Specific gravity 1.021 10/11/2020 01:03 AM  
 pH (UA) 5.5 10/11/2020 01:03 AM  
 Protein Negative 10/11/2020 01:03 AM  
 Glucose Negative 10/11/2020 01:03 AM  
 Ketone Negative 10/11/2020 01:03 AM  
 Bilirubin SMALL (A) 10/11/2020 01:03 AM  
 Urobilinogen 1.0 10/11/2020 01:03 AM  
 Nitrites Negative 10/11/2020 01:03 AM  
 Leukocyte Esterase Negative 10/11/2020 01:03 AM  
  
 
Culture data during this hospitalization. All Micro Results Procedure Component Value Units Date/Time CULTURE, RESPIRATORY/SPUTUM/BRONCH Liz Speaks STAIN [[id number]]  (Abnormal) Collected:  10/14/20 1453 Order Status:  Completed Specimen:  Sputum,ET Suction Updated:  10/16/20 9317 Special Requests: NO SPECIAL REQUESTS     
  GRAM STAIN RARE WBCS SEEN     
      
  RARE EPITHELIAL CELLS SEEN  
     
   NO ORGANISMS SEEN Culture result:    
  LIGHT YEAST, (APPARENT CANDIDA ALBICANS) NO NORMAL RESPIRATORY QUINCY ISOLATED  
     
 CULTURE, BLOOD [[id number]] Order Status:  Sent Specimen:  Blood CULTURE, BLOOD [[id number]] Order Status:  Sent Specimen:  Blood ECHO 10/12/20 Interpretation Summary Result status: Final result  · LV: Estimated LVEF is 55 - 60%. Normal cavity size, wall thickness and systolic function (ejection fraction normal). Mild (grade 1) left ventricular diastolic dysfunction E'E= 8.94. 
· LA: No atrial septal defect present. · AV: Aortic valve leaflet calcification present. · MV: Mitral valve thickening. Images report reviewed by me: 
CT 10/9/2020 Results from [facility] [place] Encounter encounter on 10/09/20 CT ABD PELV W CONT Narrative EXAM: CT of the abdomen and pelvis INDICATION: Abdominal pain, greatest in the left lower quadrant with nausea and 
vomiting. COMPARISON: MRI pelvis 10/7/2019. CT left hip 4/17/2019 TECHNIQUE: Axial CT imaging of the abdomen and pelvis was performed with 
intravenous contrast. Multiplanar reformats were generated. One or more dose reduction techniques were used on this CT: automated exposure 
control, adjustment of the mAs and/or kVp according to patient size, and 
iterative reconstruction techniques. The specific techniques used on this CT 
exam have been documented in the patient's electronic medical record. Digital 
Imaging and Communications in Medicine (DICOM) format image data are available 
to nonaffiliated external healthcare facilities or entities on a secure, media 
free, reciprocally searchable basis with patient authorization for at least a 
12-month period after this study. _______________ FINDINGS: 
 
LOWER CHEST: Minor basilar changes of atelectasis. No alveolar consolidation or 
pleural effusion. Normal cardiac size without pericardial effusion. LIVER, BILIARY: Diffuse hepatic hypoattenuation is present with hepatic size 
estimated at approximately 27 cm in craniocaudal span. No discrete liver lesion. No intrahepatic or extra hepatic biliary ductal dilatation. Patient is status 
post cholecystectomy. PANCREAS: Pancreatic enhancement is normal. Mild and diffuse pancreatic ductal 
ectasia is noted. No discrete pancreatic mass lesion noted. SPLEEN: Mildly enlarged at 14 cm. ADRENALS: Normal. 
 
KIDNEYS/URETERS/BLADDER: Symmetric renal enhancement. No bowel obstruction PELVIC ORGANS: Symmetric renal enhancement. Punctate nonobstructing upper pole 
left renal stone. No distal ureteral or urinary bladder stone. Urinary bladder 
unremarkable in CT appearance. VASCULATURE: Diffuse aortobiiliac atherosclerotic vascular calcification is 
present without evidence of aneurysmal dilatation. Main portal vein as well as 
right and left portal venous branches are widely patent. LYMPH NODES: Scattered subcentimeter mesenteric and retroperitoneal lymph nodes 
are noted. No evidence of abdominal or pelvic adenopathy. GASTROINTESTINAL TRACT: No morphology of bowel obstruction. No free 
intraperitoneal gas. Mild thickening of the proximal ascending colon and cecum 
noted. BONES: No acute or aggressive osseous abnormalities identified. Partial 
visualization healed proximal left femoral fracture status post IM nailing and 
dynamic hip screw placement. OTHER: Small quantity of abdominal/pelvic ascites. Thin subcutaneous linear 
density tracking along the right gluteal fold, likely in keeping previously 
noted fistula tract. 
 
_______________ Impression IMPRESSION: 
 
1. Diffuse hepatic hypoattenuation compatible with steatosis with hepatomegaly, 
mild splenomegaly, and small quantity of abdominal/pelvic ascites. The totality 
of findings compatible with hepatocellular dysfunction and potentially 
associated portal hypertension. > Patent main and proximal portal venous branches.  
  > Mild thickening of the proximal ascending colon and cecum may reflect 
sequela of portal colopathy. Infectious/inflammatory colitis also a possibility 
but thought less likely given the imaging appearance. 2. Likely correlate for small area of perianal fistula noted on prior MRI 
pelvis. 3. Punctate nonobstructing upper pole left renal stone. XR chest 10/15/20 XR CHEST PORT  
CLINICAL INDICATION/HISTORY: Respiratory failure, bilateral airspace disease 
-Additional: None  
COMPARISON: 10/14/2020  
TECHNIQUE: Portable frontal view of the chest  
_______________  
FINDINGS:  
SUPPORT DEVICES: Endotracheal tube and nasogastric tube project in stable position.  HEART AND MEDIASTINUM: Stable appearing cardiac size and mediastinal contours.  LUNGS AND PLEURAL SPACES: Interstitial and airspace disease appearing similar to 
 prior study. No evidence of pneumothorax or definite pleural effusion.  
BONY THORAX AND SOFT TISSUES: Unremarkable.  
_______________  
IMPRESSION IMPRESSION:  
Support devices in stable/appropriate position.  
Bilateral interstitial and airspace disease appearing overall similar to prior study. XR 10/14/20 Results from Hospital Encounter encounter on 10/09/20 XR CHEST PORT Narrative EXAM: XR CHEST PORT 
 
CLINICAL INDICATION/HISTORY: ET tube placement 
-Additional: None COMPARISON: 10/14/2020 TECHNIQUE: Portable frontal view of the chest 
 
_______________ FINDINGS: 
 
SUPPORT DEVICES: Endotracheal tube 4 cm above the billie. Nasogastric tube below 
the diaphragm, tip projecting over the stomach. HEART AND MEDIASTINUM: Stable appearing cardiac size and mediastinal contours. LUNGS AND PLEURAL SPACES: Multifocal interstitial and airspace disease. Overall 
similar appearance to prior examination. No evidence of pneumothorax or definite 
pleural effusion. BONY THORAX AND SOFT TISSUES: Unremarkable. 
 
_______________ Impression IMPRESSION: 
 
 
1. Support devices in stable/appropriate position. 2. Extensive bilateral interstitial and airspace disease similar to prior. US LEs 10/11/2020 Interpretation Summary · No evidence of deep vein thrombosis in the right lower extremity. · No evidence of deep vein thrombosis in the left lower extremity. US Abd 10/13/20 IMPRESSION:  
1. Nonspecific increased hepatic echotexture suggesting intrinsic liver disease. Dilated portal vein, recanalized umbilical vein and perihepatic ascites. There 
is lack of flow within the portal vein, suggestive of thrombosis, with 
periportal collaterals and decreased flows in the hepatic vein. However, portal 
and hepatic veins patent on recent CT abdomen 10/09/2020.  Multiple attempts to 
contact Dr. Gallo Crum with this findings, awaiting callback.  
2. No discrete hepatic mass identified.  
 3. Cholecystectomy.  
4. Pancreas obscured by bowel gas. Donna Silva MD 
10/16/2020

## 2020-10-16 NOTE — PROGRESS NOTES
Physician Progress Note Raymond Younger 
CSN #:                  [id number] :                       1965 ADMIT DATE:       10/9/2020 9:19 AM 
DISCH DATE: 
RESPONDING 
PROVIDER #:        Greyson Moya MD 
 
 
 
 
QUERY TEXT: 
 
Dear Hospitalist, Pt admitted with hypocalcemia. Noted documentation of Severe malnutrition on 10/14/20 by Domenica Feliciano Registered Dietitian consultant. If possible, please document in progress notes and discharge summary if you are evaluating and /or treating any of the following: The medical record reflects the following: 
 
Risk Factors: Alcoholism, Cirrhosis, colitis, Chronic illness, currently intubated Clinical Indicators: 
> Weight Loss:  1 - 5% over 1 month 
> Severe body fat loss, Orbital, Buccal region 
> Severe muscle mass loss, Temples 
> Inadequate oral intake related to impaired respiratory function as evidenced by NPO or clear liquid status due to medical condition 
> BMI: 21.12 kg/m 2 Treatment: Receiving  RD evaluate and treat,  start tube feedings, electrolyte protocol Thank you, Kristian English, RN, BSN, [address] [place] [address] 
[phone] Options provided: 
-- Severe Protein calorie malnutrition mild 
-- Other - I will add my own diagnosis -- Disagree - Not applicable / Not valid -- Disagree - Clinically unable to determine / Unknown 
-- Refer to Clinical Documentation Reviewer PROVIDER RESPONSE TEXT: 
 
This patient has severe protein calorie malnutrition. Query created by: Jasmin Rabago on 10/16/2020 11:27 AM 
 
 
QUERY TEXT: 
 
Dear MYA PIMENTEL, Pt admitted with Hypocalcemia. Pt noted to have likely sepsis by Natalie Almanzar MD on 10/14/20,  possible sepsis on 10/14/20 by Dr. Desean Gomez and ? Aspiration pneumonia, sepsis noted 10/16 by Joya Cross MD. If possible, please document in the progress notes and discharge summary if you are evaluating and /or treating any of the following: The medical record reflects the following: 
 
Risk Factors: Alcoholism, Cirrhosis, Aspiration pneumonia, Thrombocytopenia Clinical Indicators: 
> WBC 4.4 on10/15/20 
> Lactic acid 2.3, 2.2, 3.4 
> Ammonia 50, 33 
> respiratory culture 10/14/20 LIGHT YEAST, (APPARENT CANDIDA ALBICANS) 
> Hr 55, 56, 59, 114, 125, 123, 124, 129, 134 /109, 167/127, 145/106, 88/66, 86/64  RR 22, 31, 33, 36 Treatment: Receiving IV NS, Electrolyte protocol, Vancomycin Thank you, Bentley Arthur RN, BSN, [address] [place] [address] 
[phone] Options provided: 
-- Sepsis, present on admission 
-- Sepsis, now resolved -- Sepsis, not present on admission, 
-- No Sepsis, pneumonia  only 
-- Sepsis was ruled out 
-- Other - I will add my own diagnosis -- Disagree - Not applicable / Not valid -- Disagree - Clinically unable to determine / Unknown 
-- Refer to Clinical Documentation Reviewer PROVIDER RESPONSE TEXT: 
 
This patient has sepsis that was not present on admission.  
 
Query created by: Violet Burgos on 10/16/2020 12:31 PM 
 
 
Electronically signed by:  Dean Sosa MD 10/16/2020 3:34 PM

## 2020-10-16 NOTE — PROGRESS NOTES
Vancomycin level 26.4 @ 1600  trough level was ordered for 1330   level drawn after infusion ( ~ 3 hrs after infusion started)   trough level is ordered for 10/16/2020 @ 0030  disregard previous entry

## 2020-10-16 NOTE — PROGRESS NOTES
Palliative Medicine [facility] [phone] [facility] [phone] Palliative team, NP Marybeth Kennedy and this MSW, saw patient at bedside. Mr. Yoselyn Renteria is currently intubated and sedated unable to communicate or engage in conversation. Called and spoke with wife Joce Muñoz at [phone]. When Anjel answered the phone she sounded winded and out of breath. Questioned if she was feeling ok, she stated \"I'm fine, I was just talking with my Pops\". Introduced palliative team for support and additional resource. Anjel shared her understanding of her  current medical conditions, Cindy Rivas has double pneumonia, has liver disease and needs liver transplant\". We also mentioned and discussed his respiratory failure needing intubation. She didn't seem to understand how each problem effects the other and her 's overall poor prognosis. Anjel mentioned she herself have numerous medical problems to include dialysis and recently was d/c from rehab after a hip replacement. She shared patient also had hip replacement two years ago in September. NP discussed and reviewed medical conditions and the severity of her 's situation. Questioned if patient every discussed or completed advanced care planning. Anjel shared not to her knowledge, but knows her  would want treatment if there was a possibility of improvement. NP introduced and provided education on CPR and prolonged intubation in context of chronic illness. Highly encouraged Anjel to think about her 's wishes along with her own. Anjel shared they have been  about 13 years. Patient has two daughters of his own, Vonda Crew lives local and second daughter lives in [place]. Maria Del Carmen's daughter lives in [place], [place]. Anjel is planning on visiting tomorrow afternoon around 1200. Provided her with unit number to coordinate visitation time. No changes to goals of care at this time. Patient remains FULL code and FULL interventions. Palliative will continue to follow along to assist with goals of care. Thank you for the opportunity to assist in the care of . Ana Laura Mooney. LORENA Rodriguez, [facility], [place], operated by [facility] Palliative [facility]

## 2020-10-16 NOTE — PROGRESS NOTES
Chart reviewed and attended IDR's , pt remain on vent and will remain in ICU over weekend while on vent , weekend on call cm will be available for immediate needs thru [facility] .

## 2020-10-17 NOTE — PROGRESS NOTES
[facility] Pulmonary, Critical Care, and Sleep Medicine Name: Janak Kinney MRN: [id number] : 1965 Hospital: [facility] [place] Date: 10/17/2020  Room: [address] [place] [address] Note Consult requesting physician: Dr. Kayley Ashby Reason for Consult: Acute respiratory failure with hypoxemia Subjective/History of Present Illness:  
Patient is a 47 y.o. male with PMHx significant for chronic pancreatitis, diabetes, gastroparesis, hidradenitis; history of colitis on Humiranot clear if patient has inflammatory bowel disease. Patient was admitted on 10/9 with nausea and lower abdominal pains. The patient was admitted to the hospital.  Dr. Naveen Massey consulted for cirrhosis. Today evening, patient had shortness of breath symptoms and hypoxemia. RRT was done. Chest x-ray shows bilateral pulmonary infiltrates. Patient was placed on BiPAP and moved to the ICU. He is currently getting Lasix injection. Patient denies any vomiting or aspirations recently; he denies any COVID-19 contacts at home. He feels comfortable on BiPAP. No significant cough or productive sputum noted; no chest pain or hemoptysis. Telemetry mild sinus tach. Afebrile; blood pressure stable; overall fluid positive by 5 L this admission. 10/17/20 Chart reviewed; discussed with Dr. Odilon Gaston during signout. Patient remains in ICU; on ventilator support. He is in ARDS with O2 desaturation; hence he is sedated and paralyzed. Afebrile; blood pressure stable; urine output 1.9 L yesterday. Telemetrysinus rhythm. Review of Systems:  
Review of systems not obtained due to patient factors. No Known Allergies Past Medical History:  
Diagnosis Date  Diabetes ([facility]  Gastroparesis  Pancreatitis Past Surgical History:  
Procedure Laterality Date  HX CHOLECYSTECTOMY  HX HIP FRACTURE TX    
 left hip sx  Social History Tobacco Use  Smoking status: Current Every Day Smoker  Smokeless tobacco: Never Used Substance Use Topics  Alcohol use: Yes History reviewed. No pertinent family history. Prior to Admission medications Medication Sig Start Date End Date Taking? Authorizing Provider  
adalimumab (Humira Pen) 40 mg/0.8 mL injection pen 40 mg by SubCUTAneous route every seven (7) days. Yes Provider, Historical  
lidocaine (LIDODERM) 5 % 2 Patches by TransDERmal route every twenty-four (24) hours. Apply patch to the affected area for 12 hours a day and remove for 12 hours a day. One patch is applied to left hip. One patch is applied to right shoulder blade. Yes Provider, Historical  
loratadine (Claritin) 10 mg tablet Take 10 mg by mouth daily. Indications: inflammation of the nose due to an allergy   Yes Provider, Historical  
aspirin delayed-release 81 mg tablet Take 1 Tab by mouth daily. Yes Racheal, MD Meño  
insulin glargine (Lantus Solostar U-100 Insulin) 100 unit/mL (3 mL) inpn 5 Units by SubCUTAneous route daily. Yes Meño Springer MD  
DULoxetine (CYMBALTA) 60 mg capsule Take 1 Cap by mouth as needed. 1/6/19   Meño Springer MD  
glyBURIDE-metFORMIN (GLUCOVANCE) 2.5-500 mg per tablet Take 1 Tab by mouth two (2) times a day. Meño Springer MD  
methocarbamoL (ROBAXIN) 500 mg tablet Take 2 Tabs by mouth four (4) times daily. Meño Springer MD  
traZODone (DESYREL) 50 mg tablet Take 50 mg by mouth nightly. Meño Springer MD  
HYDROmorphone (DILAUDID) 2 mg tablet Take 1 Tab by mouth every four (4) hours as needed for Pain. Max Daily Amount: 12 mg. Indications: Severe Pain 9/26/18   Charisse Carrillo MD  
ondansetron (ZOFRAN ODT) 4 mg disintegrating tablet Take 1-2 tablets every 6-8 hours as needed for nausea and vomiting. 9/26/18   Charisse Carrillo MD  
 
Current Facility-Administered Medications Medication Dose Route Frequency  furosemide (LASIX) injection 20 mg  20 mg IntraVENous Q12H  
 lactulose (CHRONULAC) 10 gram/15 mL solution 15 mL  10 g Oral BID  midazolam in normal saline (VERSED) 1 mg/mL infusion  1-5 mg/hr IntraVENous TITRATE  fentaNYL (PF) 900 mcg/30 ml infusion soln  0-200 mcg/hr IntraVENous TITRATE  piperacillin-tazobactam (ZOSYN) 4.5 g in 0.9% sodium chloride (MBP/ADV) 100 mL MBP  4.5 g IntraVENous Q8H  
 cisatracurium (NIMBEX) 100 mg in 0.9% sodium chloride 100 mL infusion  0-10 mcg/kg/min IntraVENous TITRATE  
 0.9% sodium chloride 1,000 mL with mvi, adult no. 4 with vit K 10 mL, thiamine 351 mg, folic acid 1 mg infusion   IntraVENous DAILY  albumin human 25% (BUMINATE) solution 25 g  25 g IntraVENous TID  dexamethasone (PF) (DECADRON) 10 mg/mL injection 40 mg  40 mg IntraVENous Q24H  
 insulin lispro (HUMALOG) injection   SubCUTAneous Q6H  
 vancomycin (VANCOCIN) 1,000 mg in 0.9% sodium chloride 250 mL (VIAL-MATE)  1,000 mg IntraVENous Q12H  Vancomycin - Rx to dose and monitor  1 Each Other Rx Dosing/Monitoring  pantoprazole (PROTONIX) 40 mg in 0.9% sodium chloride 10 mL injection  40 mg IntraVENous Q24H  
 lidocaine 4 % patch 2 Patch  2 Patch TransDERmal Q24H Objective:  
Vital Signs:   
Blood pressure 119/76, pulse 82, temperature 98.8 °F (37.1 °C), resp. rate 22, height 5' 9\" (1.753 m), weight 64.9 kg (143 lb), SpO2 94 %. Body mass index is 21.12 kg/m². O2 Device: Ventilator, Endotracheal tube O2 Flow Rate (L/min): 4 l/min Temp (24hrs), Av.7 °F (37.1 °C), Min:98.4 °F (36.9 °C), Max:98.9 °F (37.2 °C) Intake/Output:  
Last shift:      No intake/output data recorded. Last 3 shifts: 10/15 1901 - 10/17 0700 In: 3449.7 [P.O.:240; I.V.:3209.7] Out: 2400 [Urine:2400] Intake/Output Summary (Last 24 hours) at 10/17/2020 1027 Last data filed at 10/17/2020 4484 Gross per 24 hour Intake 1116.41 ml Output 1825 ml Net -708. 59 ml  
  
 
 Last 3 Recorded Weights in this Encounter 10/09/20 2233 10/12/20 1045 10/12/20 1046 Weight: 65.2 kg (143 lb 12.8 oz) 64.9 kg (143 lb) 64.9 kg (143 lb) Physical Exam:  
Patient on vent support; sedated and paralyzed; FiO2 60%; PEEP 10; O2 sats 94%; acyanotic HEENT: pupils not dilated, reactive, no scleral jaundice, moist oral mucosa, no nasal drainage; neck supple Neck: No adenopathy or thyroid swelling CVS: S1S2 no murmurs; JVD not elevated RS: Mod air entry bilaterally, decreased BS at bases, bilateral crackles, no wheezes; symmetrical breath sounds Abd: soft, non tender, no hepatosplenomegaly, no abd distension, no guarding or rigidity, bowel sounds heard Neuro: Intubated, sedated and paralyzed; limited exam  
Extrm: no leg edema or swelling or clubbing Skin: no rash Lymphatic: no cervical or supraclavicular adenopathy Right femoral central lineno bleeding or hematoma discharge. Data:  
   
Recent Results (from the past 24 hour(s)) GLUCOSE, POC Collection Time: 10/16/20 11:43 AM  
Result Value Ref Range Glucose (POC) 230 (H) 70 - 110 mg/dL POTASSIUM Collection Time: 10/16/20  2:00 PM  
Result Value Ref Range Potassium 4.2 3.5 - 5.5 mmol/L PHOSPHORUS Collection Time: 10/16/20  2:00 PM  
Result Value Ref Range Phosphorus 2.8 2.5 - 4.9 MG/DL  
GLUCOSE, POC Collection Time: 10/16/20  6:06 PM  
Result Value Ref Range Glucose (POC) 265 (H) 70 - 110 mg/dL GLUCOSE, POC Collection Time: 10/17/20  1:03 AM  
Result Value Ref Range Glucose (POC) 285 (H) 70 - 110 mg/dL MAGNESIUM Collection Time: 10/17/20  3:45 AM  
Result Value Ref Range Magnesium 1.7 1.6 - 2.6 mg/dL PHOSPHORUS Collection Time: 10/17/20  3:45 AM  
Result Value Ref Range Phosphorus 3.1 2.5 - 4.9 MG/DL  
CALCIUM, IONIZED Collection Time: 10/17/20  3:45 AM  
Result Value Ref Range  Ionized Calcium 1.06 (L) 1.12 - 1.32 MMOL/L  
PROTHROMBIN TIME + INR  
 Collection Time: 10/17/20  3:45 AM  
Result Value Ref Range Prothrombin time 25.4 (H) 11.5 - 15.2 sec INR 2.4 (H) 0.8 - 1.2 AMMONIA Collection Time: 10/17/20  3:45 AM  
Result Value Ref Range Ammonia 21 11 - 32 UMOL/L  
CBC WITH AUTOMATED DIFF Collection Time: 10/17/20  3:45 AM  
Result Value Ref Range WBC 6.6 4.6 - 13.2 K/uL  
 RBC 2.60 (L) 4.70 - 5.50 M/uL HGB 8.2 (L) 13.0 - 16.0 g/dL HCT 24.9 (L) 36.0 - 48.0 % MCV 95.8 74.0 - 97.0 FL  
 MCH 31.5 24.0 - 34.0 PG  
 MCHC 32.9 31.0 - 37.0 g/dL  
 RDW 16.7 (H) 11.6 - 14.5 % PLATELET 53 (L) 562 - 420 K/uL NEUTROPHILS 83 (H) 40 - 73 % LYMPHOCYTES 8 (L) 21 - 52 % MONOCYTES 9 3 - 10 % EOSINOPHILS 0 0 - 5 % BASOPHILS 0 0 - 2 %  
 ABS. NEUTROPHILS 5.5 1.8 - 8.0 K/UL  
 ABS. LYMPHOCYTES 0.5 (L) 0.9 - 3.6 K/UL  
 ABS. MONOCYTES 0.6 0.05 - 1.2 K/UL  
 ABS. EOSINOPHILS 0.0 0.0 - 0.4 K/UL  
 ABS. BASOPHILS 0.0 0.0 - 0.1 K/UL PLATELET COMMENTS DECREASED PLATELETS    
 RBC COMMENTS ANISOCYTOSIS 1+ 
    
 RBC COMMENTS TARGET CELLS 1+ RBC COMMENTS HYPOCHROMIA 1+ 
    
 DF AUTOMATED FIBRINOGEN Collection Time: 10/17/20  3:45 AM  
Result Value Ref Range Fibrinogen 75 (L) 210 - 451 mg/dL METABOLIC PANEL, COMPREHENSIVE Collection Time: 10/17/20  3:45 AM  
Result Value Ref Range Sodium 151 (H) 136 - 145 mmol/L Potassium 3.7 3.5 - 5.5 mmol/L Chloride 112 (H) 100 - 111 mmol/L  
 CO2 28 21 - 32 mmol/L Anion gap 11 3.0 - 18 mmol/L Glucose 251 (H) 74 - 99 mg/dL BUN 22 (H) 7.0 - 18 MG/DL Creatinine 1.20 0.6 - 1.3 MG/DL  
 BUN/Creatinine ratio 18 12 - 20 GFR est AA >60 >60 ml/min/1.73m2 GFR est non-AA >60 >60 ml/min/1.73m2 Calcium 8.2 (L) 8.5 - 10.1 MG/DL Bilirubin, total 3.2 (H) 0.2 - 1.0 MG/DL  
 ALT (SGPT) 36 16 - 61 U/L  
 AST (SGOT) 72 (H) 10 - 38 U/L Alk. phosphatase 59 45 - 117 U/L Protein, total 5.6 (L) 6.4 - 8.2 g/dL Albumin 3.8 3.4 - 5.0 g/dL Globulin 1.8 (L) 2.0 - 4.0 g/dL A-G Ratio 2.1 (H) 0.8 - 1.7 POC G3 Collection Time: 10/17/20  4:41 AM  
Result Value Ref Range Device: VENT    
 FIO2 (POC) 0.7 % pH (POC) 7.43 7.35 - 7.45    
 pCO2 (POC) 40.7 35.0 - 45.0 MMHG  
 pO2 (POC) 109 (H) 80 - 100 MMHG  
 HCO3 (POC) 26.7 (H) 22 - 26 MMOL/L  
 sO2 (POC) 98 (H) 92 - 97 % Base excess (POC) 2 mmol/L Mode ASSIST CONTROL Tidal volume 420 ml Set Rate 22 bpm  
 PEEP/CPAP (POC) 10 cmH2O  
 PIP (POC) 26 Allens test (POC) N/A Inspiratory Time 0.85 sec Total resp. rate 22 Site RIGHT RADIAL Patient temp. 98.4 Specimen type (POC) ARTERIAL Performed by Peterson Gonzalez Volume control plus YES    
GLUCOSE, POC Collection Time: 10/17/20  5:36 AM  
Result Value Ref Range Glucose (POC) 289 (H) 70 - 110 mg/dL Chemistry Recent Labs 10/17/20 
0345 10/16/20 
1400 10/16/20 
0400 10/15/20 
1600 10/15/20 
0400 *  --  199*  --  200* *  --  147*  --  145  
K 3.7 4.2 3.9 3.6 3.9 *  --  111  --  106 CO2 28  --  27  --  28  
BUN 22*  --  17  --  9  
CREA 1.20  --  0.95  --  0.96  
CA 8.2*  --  8.2*  --  8.0*  
MG 1.7  --  2.1 2.1 2.4 PHOS 3.1 2.8 2.3* 1.8* 2.4* AGAP 11  --  9  --  11  
BUCR 18  --  18  --  9* AP 59  --  44*  --  39* TP 5.6*  --  5.5*  --  5.5* ALB 3.8  --  3.7  --  3.6 GLOB 1.8*  --  1.8*  --  1.9* AGRAT 2.1*  --  2.1*  --  1.9* Lactic Acid Lactic acid Date Value Ref Range Status 10/14/2020 1.8 0.4 - 2.0 MMOL/L Final  
 
Recent Labs 10/14/20 
1705 LAC 1.8 Liver Enzymes Protein, total  
Date Value Ref Range Status 10/17/2020 5.6 (L) 6.4 - 8.2 g/dL Final  
 
Albumin Date Value Ref Range Status 10/17/2020 3.8 3.4 - 5.0 g/dL Final  
 
Globulin Date Value Ref Range Status 10/17/2020 1.8 (L) 2.0 - 4.0 g/dL Final  
 
A-G Ratio Date Value Ref Range Status 10/17/2020 2.1 (H) 0.8 - 1.7   Final  
 
Alk. phosphatase Date Value Ref Range Status 10/17/2020 59 45 - 117 U/L Final  
 
Recent Labs 10/17/20 
0345 10/16/20 
0400 10/15/20 
0400 TP 5.6* 5.5* 5.5* ALB 3.8 3.7 3.6 GLOB 1.8* 1.8* 1.9* AGRAT 2.1* 2.1* 1.9* AP 59 44* 39* CBC w/Diff Recent Labs 10/17/20 
0345 10/16/20 
0400 10/15/20 
1600 10/15/20 
0400 WBC 6.6 6.5  --  4.4*  
RBC 2.60* 2.57*  --  2.37* HGB 8.2* 8.2* 7.7* 7.5* HCT 24.9* 24.9* 23.2* 23.1*  
PLT 53* 46*  --  42* GRANS 83* 82*  --  85* LYMPH 8* 9*  --  10* EOS 0 0  --  0 Cardiac Enzymes No results found for: CPK, CK, CKMMB, CKMB, RCK3, CKMBT, CKNDX, CKND1, LYDIA, TROPT, TROIQ, LIANNA, TROPT, TNIPOC, BNP, BNPP  
 
BNP No results found for: BNP, BNPP, XBNPT Coagulation Recent Labs 10/17/20 
0345 10/16/20 
0400 10/15/20 
0400 PTP 25.4* 22.8* 25.1* INR 2.4* 2.1* 2.3* Thyroid  No results found for: T4, T3U, TSH, TSHEXT, TSHEXT No results found for: T4  
 
Urinalysis Lab Results Component Value Date/Time Color DARK YELLOW 10/11/2020 01:03 AM  
 Appearance CLEAR 10/11/2020 01:03 AM  
 Specific gravity 1.021 10/11/2020 01:03 AM  
 pH (UA) 5.5 10/11/2020 01:03 AM  
 Protein Negative 10/11/2020 01:03 AM  
 Glucose Negative 10/11/2020 01:03 AM  
 Ketone Negative 10/11/2020 01:03 AM  
 Bilirubin SMALL (A) 10/11/2020 01:03 AM  
 Urobilinogen 1.0 10/11/2020 01:03 AM  
 Nitrites Negative 10/11/2020 01:03 AM  
 Leukocyte Esterase Negative 10/11/2020 01:03 AM  
  
 
Culture data during this hospitalization. All Micro Results Procedure Component Value Units Date/Time CULTURE, RESPIRATORY/SPUTUM/BRONCH Gala Van STAIN [[id number]]  (Abnormal) Collected:  10/14/20 1453 Order Status:  Completed Specimen:  Sputum,ET Suction Updated:  10/16/20 4346 Special Requests: NO SPECIAL REQUESTS     
  GRAM STAIN RARE WBCS SEEN     
      
  RARE EPITHELIAL CELLS SEEN  
     
   NO ORGANISMS SEEN Culture result: LIGHT YEAST, (APPARENT CANDIDA ALBICANS) NO NORMAL RESPIRATORY QUINCY ISOLATED  
     
 CULTURE, BLOOD [[id number]] Order Status:  Sent Specimen:  Blood CULTURE, BLOOD [[id number]] Order Status:  Sent Specimen:  Blood ECHO 10/12/20 Interpretation Summary Result status: Final result  · LV: Estimated LVEF is 55 - 60%. Normal cavity size, wall thickness and systolic function (ejection fraction normal). Mild (grade 1) left ventricular diastolic dysfunction E'E= 8.94. 
· LA: No atrial septal defect present. · AV: Aortic valve leaflet calcification present. · MV: Mitral valve thickening. Images report reviewed by me: 
CT 10/9/2020 Results from [facility] Encounter encounter on 10/09/20 CT ABD PELV W CONT Narrative EXAM: CT of the abdomen and pelvis INDICATION: Abdominal pain, greatest in the left lower quadrant with nausea and 
vomiting. COMPARISON: MRI pelvis 10/7/2019. CT left hip 4/17/2019 TECHNIQUE: Axial CT imaging of the abdomen and pelvis was performed with 
intravenous contrast. Multiplanar reformats were generated. One or more dose reduction techniques were used on this CT: automated exposure 
control, adjustment of the mAs and/or kVp according to patient size, and 
iterative reconstruction techniques. The specific techniques used on this CT 
exam have been documented in the patient's electronic medical record. Digital 
Imaging and Communications in Medicine (DICOM) format image data are available 
to nonaffiliated external healthcare facilities or entities on a secure, media 
free, reciprocally searchable basis with patient authorization for at least a 
12-month period after this study. _______________ FINDINGS: 
 
LOWER CHEST: Minor basilar changes of atelectasis. No alveolar consolidation or 
pleural effusion. Normal cardiac size without pericardial effusion.  
 
LIVER, BILIARY: Diffuse hepatic hypoattenuation is present with hepatic size 
estimated at approximately 27 cm in craniocaudal span. No discrete liver lesion. No intrahepatic or extra hepatic biliary ductal dilatation. Patient is status 
post cholecystectomy. PANCREAS: Pancreatic enhancement is normal. Mild and diffuse pancreatic ductal 
ectasia is noted. No discrete pancreatic mass lesion noted. SPLEEN: Mildly enlarged at 14 cm. ADRENALS: Normal. 
 
KIDNEYS/URETERS/BLADDER: Symmetric renal enhancement. No bowel obstruction PELVIC ORGANS: Symmetric renal enhancement. Punctate nonobstructing upper pole 
left renal stone. No distal ureteral or urinary bladder stone. Urinary bladder 
unremarkable in CT appearance. VASCULATURE: Diffuse aortobiiliac atherosclerotic vascular calcification is 
present without evidence of aneurysmal dilatation. Main portal vein as well as 
right and left portal venous branches are widely patent. LYMPH NODES: Scattered subcentimeter mesenteric and retroperitoneal lymph nodes 
are noted. No evidence of abdominal or pelvic adenopathy. GASTROINTESTINAL TRACT: No morphology of bowel obstruction. No free 
intraperitoneal gas. Mild thickening of the proximal ascending colon and cecum 
noted. BONES: No acute or aggressive osseous abnormalities identified. Partial 
visualization healed proximal left femoral fracture status post IM nailing and 
dynamic hip screw placement. OTHER: Small quantity of abdominal/pelvic ascites. Thin subcutaneous linear 
density tracking along the right gluteal fold, likely in keeping previously 
noted fistula tract. 
 
_______________ Impression IMPRESSION: 
 
1. Diffuse hepatic hypoattenuation compatible with steatosis with hepatomegaly, 
mild splenomegaly, and small quantity of abdominal/pelvic ascites. The totality 
of findings compatible with hepatocellular dysfunction and potentially 
associated portal hypertension. > Patent main and proximal portal venous branches. > Mild thickening of the proximal ascending colon and cecum may reflect 
sequela of portal colopathy. Infectious/inflammatory colitis also a possibility 
but thought less likely given the imaging appearance. 2. Likely correlate for small area of perianal fistula noted on prior MRI 
pelvis. 3. Punctate nonobstructing upper pole left renal stone. XR 10/15/20 IMPRESSION: 
Support devices in stable/appropriate position. Bilateral interstitial and airspace disease appearing overall similar to prior 
study. US LEs 10/11/2020 Interpretation Summary · No evidence of deep vein thrombosis in the right lower extremity. · No evidence of deep vein thrombosis in the left lower extremity. US Abd 10/13/20 IMPRESSION:  
1. Nonspecific increased hepatic echotexture suggesting intrinsic liver disease. Dilated portal vein, recanalized umbilical vein and perihepatic ascites. There 
is lack of flow within the portal vein, suggestive of thrombosis, with 
periportal collaterals and decreased flows in the hepatic vein. However, portal 
and hepatic veins patent on recent CT abdomen 10/09/2020. Multiple attempts to 
contact Dr. Sherice Abdalla with this findings, awaiting callback.  
2. No discrete hepatic mass identified.  
3. Cholecystectomy.  
4. Pancreas obscured by bowel gas. IMPRESSION:  
· Acute respiratory failure with hypoxia J96.01 
· Aspiration pneumonia - J69.0 · ARDS - J80 · Thrombocytopenia, concern for ITP vs liver disease - D69.6 · Coagulopathy, multifactorial including DIC - D68.9 · Acute diastolic congestive heart failure  I50.31 
· Cirrhosis  K74.60 · Hyperbilirubinemia - E80.6 · Portal vein thrombosis - T66 
· Colitis  K52.9 Patient Active Problem List  
Diagnosis Code  Hypocalcemia, resolved E83.51  
 Hypomagnesemia, resolved E83.42  
 Alcoholism ([address] F10.20  Cirrhosis ([address] K74.60  Colitis K52.9  Acute respiratory failure with hypoxemia (HCC) J96.01  
  Aspiration pneumonia ([facility] [address] J69.0  Acute diastolic CHF (congestive heart failure) ([facility]) I50.31  Thrombocytopenia ([facility] [address] D69.6 · Code status: Full code RECOMMENDATIONS:  
Respiratory: Continue ventilator support; patient sedated and paralyzed for ventilator synchrony since he is in ARDS and risk for oxygen desaturations. Keep oxygen saturation greater than 91%; keep plateau pressure less than 30. Sedationmidazolam and fentanyl infusions. Paralyticcisatracurium; follow train-of-four. Lung volume protection strategy; tolerate permissive hypercapnia; tolerate PaO2 greater than 55/O2 sats greater than 88%. Ventilator and sedation bundles; VAP prevention bundle and sedation bundle followed, head of the bed at 30' all times Discussed with ICU RN; patient gets agitated when comes of cisatracurium drip; would plan on weaning off cisatracurium drip tomorrow and see patient response. Chest x-ray today shows persistent bilateral pulmonary infiltrates/opacities; radiological picture consistent with aspiration pneumonia leading to ARDS. SARS CoV2 PCR negative x 2 per staff and 3rd is sent 10/15 by staff; rapid test negative 10/13/20. CVS: History of sinus bradycardia with fentanyl; monitor closely while on sedation. Blood pressure stable, EF 55 to 60%. No pulmonary hypertension reported on echo. ID: Broad-spectrum antibiotics; on Zosyn and vancomycin; follow cultures; narrow antibiotics when appropriate. History of immunosuppression state given biological use [Humira]. Hematology/Oncology: Watch hemoglobin and platelets; platelets [id number] compared to admission; INR 2.4 due to cirrhosis; fibrinogen 75 todaytransfuse cryoprecipitate 5 units due to risk for bleeding while on ventilator support. Hematology on case. Dexamethasone 40 mg every 24 hours for possible ITP. Monitor Hgb, platelets, INR, fibrinogen FDP daily; keep platelets greater than 20 K; keep fibrinogen greater than 100. Renal: Monitor urine output and renal function; replace electrolytes as needed. Sodium elevatedhold Lasix. Increased free water 200 mL every 4 hours via earlgastric tube. GI/: monitor LFT, ammonia daily21 today. Dr. Breann Multani from hepatology on case; patient stated alcoholic liver disease with cirrhosis; on lactulose and albumin therapy. Continue hepatology follow-up. Ward catheter in placemonitor urine output. Tube feeding while patient on paralytic. Endocrine: Monitor blood sugars; sliding scale insulin while on steroids. Neurology: Plan to wean off paralytic from tomorrow. Thiamine and folic acid. Electrolytes: Replace electrolytes per ICU electrolyte replacement protocol. IVF: Albumin and free water. Nutrition: Trophic tube feeding Prophylaxis: DVT Prophylaxis: SCDs. GI Prophylaxis: Protonix. Restraints: none - on paralytic Lines/Tubes: [employer], Ward and Vent Bundles will be Followed, Vent Day 4. ETT: 10/14/20. OGT/NGT: 10/14/20. Central line: 10/14/20 RT femoral CVC (site examined, no erythema, induration, discharge or sign of infection. Dressing intact. Medically necessary, will remove it when not needed. Central line bundle followed). PIV x 2 Ward: 10/13(Medically necessary for strict input/output monitoring in critically ill patient, will remove it when not needed. Ward bundle followed). Prognosis appears to be poor with multiorgan failure, bleeding complications, ventilator dependence, renal failure, hepatorenal syndrome, mortality. Will defer respective systems problem management to primary and other respective consultant and follow patient in ICU with primary and other medical team. 
Further recommendations will be based on the patient's response to recommended treatment and results of the investigation ordered. Quality Care: PPI, DVT prophylaxis, HOB elevated, Infection control all reviewed and addressed. Care of plan d/w ICU RN, RT. Palliative care on case. High complexity decision making was performed during the evaluation of this patient at high risk for decompensation with multiple organ involvement. Total critical care time spent rendering care exclusive of procedures/family discussion/coordination of care: 
42 minutes. Pavan Rodriguez MD 
10/17/2020

## 2020-10-17 NOTE — PROGRESS NOTES
Nutrition Note Pt is on osmolite 1.2 @ 5ml/hr due to being sedated and paralyzed; will monitor closely and recc at least 24-48hours on trickle feeds as pt is high risk for refeeding syndrome Electronically signed by Aurea Watson on 10/17/2020 at 11:24 AM

## 2020-10-17 NOTE — PROGRESS NOTES
Pulm/CC Name  Relation  Home Prabha Finn  [phone] Left message on patient's wife voicemail; advised to call ICU for updates.  
 
Polo Denis MD

## 2020-10-17 NOTE — PROGRESS NOTES
2000 pt taken over sedated and paralyzed in bed on ventilator via et tube. Fla cc 0 ,vital signs fluctuates,OG tube infusing tube feeds. Ward in situ draining imer urine. Nimbex and Versed drips in progress. Assessment done and charted in relevant flow sheets . Nursing management continues. @0000 Pt reassessed no changes observation continues. @0156  Pt had toan BM , incontinent care administered. Nursing management continues. @0400 reassessment completed no changes pt continues to be monitored. @1595 Bedside and Verbal shift change report given to Vy Husain (oncoming nurse) by Mckayla Arnett (offgoing nurse). Report included the following information SBAR, Kardex, Intake/Output, MAR, Recent Results, Med Rec Status and Alarm Parameters .

## 2020-10-17 NOTE — PROGRESS NOTES
Hospitalist Progress Note Patient: Dominik Bear MRN: [id number]  CSN: [id number] YOB: 1965  Age: 47 y.o. Sex: male DOA: 10/9/2020 LOS:  LOS: 8 days Assessment and Plan: 
 
Principal Problem: Hypocalcemia (10/9/2020) Active Problems: Hypomagnesemia (10/9/2020) Alcoholism ([address] (10/9/2020) Cirrhosis ([address] (10/9/2020) Colitis (10/9/2020) Acute respiratory failure with hypoxemia ([address] (10/13/2020) Aspiration pneumonia ([address] (10/13/2020) Acute diastolic CHF (congestive heart failure) ([address] (10/13/2020) Thrombocytopenia ([address] (10/13/2020) Acute respiratory failure:  Managed by pulmonary. In ARDS. On antibiotics  COVID negative Cirrhosis: with portal emelina thrombosis. Thrombocytopenia.seen by incolgy. On dexemethesone for ItP component 
cryp given for fibrinogen <100 Electrolyte problems: on replacement Need to meet with wife RE pallitive care Chief complaint:  46 yo male admitted with weakness, cirrhosis changes, low electrolytes Subjective: 
 
Sedated and intubated Review of systems: 
 
General: No fevers or chills. Cardiovascular: No chest pain or pressure. No palpitations. Pulmonary: No shortness of breath. Gastrointestinal: No nausea, vomiting. Objective: 
 
Vital signs/Intake and Output: 
Visit Vitals /78 Pulse 92 Temp 98.9 °F (37.2 °C) Resp 22 Ht 5' 9\" (1.753 m) Wt 64.9 kg (143 lb) SpO2 93% BMI 21.12 kg/m² Current Shift:  10/17 0701 - 10/17 1900 In: -  
Out: 078 [YTEYT:449] Last three shifts:  10/15 1901 - 10/17 0700 In: 3449.7 [P.O.:240; I.V.:3209.7] Out: 2400 [Urine:2400] Physical Exam: 
General: NAD, AAOx3. Non-toxic. HEENT: NC/AT. PERRLA, EOMI.  MMM. Lungs: Nml inspection. CTA B/L. No wheezing, rales or rhonchi. Heart:  S1S2 RRR,  PMI mid 5th IC space. No M/RG. Abdomen: Soft, NT/ND.  BS+. No peritoneal signs. Extremities: No C/C/E. Psych:   Nml affect. Neurologic:  2-12 intact. Strength 5/5 throughout. Sensation symmetrical. 
 
 
 
 
Labs: Results:  
   
Chemistry Recent Labs 10/17/20 
1000 10/17/20 
0345 10/16/20 
1400 10/16/20 
0400  10/15/20 
0400 GLU  --  251*  --  199*  --  200* NA  --  151*  --  147*  --  145  
K 3.9 3.7 4.2 3.9   < > 3.9 CL  --  112*  --  111  --  106 CO2  --  28  --  27  --  28 BUN  --  22*  --  17  --  9  
CREA  --  1.20  --  0.95  --  0.96  
CA  --  8.2*  --  8.2*  --  8.0* AGAP  --  11  --  9  --  11  
BUCR  --  18  --  18  --  9* AP  --  59  --  44*  --  39* TP  --  5.6*  --  5.5*  --  5.5* ALB  --  3.8  --  3.7  --  3.6 GLOB  --  1.8*  --  1.8*  --  1.9* AGRAT  --  2.1*  --  2.1*  --  1.9*  
 < > = values in this interval not displayed. CBC w/Diff Recent Labs 10/17/20 
0345 10/16/20 
0400 10/15/20 
1600 10/15/20 
0400 WBC 6.6 6.5  --  4.4*  
RBC 2.60* 2.57*  --  2.37* HGB 8.2* 8.2* 7.7* 7.5* HCT 24.9* 24.9* 23.2* 23.1*  
PLT 53* 46*  --  42* GRANS 83* 82*  --  85* LYMPH 8* 9*  --  10* EOS 0 0  --  0 Cardiac Enzymes No results for input(s): CPK, CKND1, LYDIA in the last 72 hours. No lab exists for component: Vida Cedeno Coagulation Recent Labs 10/17/20 
0345 10/16/20 
0400 PTP 25.4* 22.8* INR 2.4* 2.1* Lipid Panel No results found for: CHOL, CHOLPOCT, CHOLX, CHLST, CHOLV, [id number], HDL, HDLP, LDL, LDLC, DLDLP, 661310, VLDLC, VLDL, TGLX, TRIGL, TRIGP, TGLPOCT, CHHD, CHHDX  
BNP No results for input(s): BNPP in the last 72 hours. Liver Enzymes Recent Labs 10/17/20 
0345  
TP 5.6* ALB 3.8 AP 59 Thyroid Studies No results found for: T4, T3U, TSH, TSHEXT Procedures/imaging: see electronic medical records for all procedures/Xrays and details which were not copied into this note but were reviewed prior to creation of Plan

## 2020-10-18 NOTE — PROGRESS NOTES
Hospitalist Progress Note Patient: Nicola Jacobs MRN: [id number]  CSN: [id number] YOB: 1965  Age: 47 y.o. Sex: male DOA: 10/9/2020 LOS:  LOS: 9 days Chief Complaint: Ac resp failure Assessment/Plan 46 yo male admitted with weakness, cirrhosis changes, low electrolytes 
  
Acute resp failure developed 10/13, moved to ICU on bipap, continued hypoxia and changes of ARDS, and intubated 10/14 Remains intubated and sedate this morning BG are elevated, he is now on tube feeding 
  
Ac resp failure, hypoxic-covid negative Aspiration pneumonia-on zosyn, vanc Advanced cirrhosis with low platelets, coagulopathy, anemia, severe wekaness and debility Acute baker CHF stabilized Stress test was neg for ischemia, echo showed nl EF 
  
Cirrhosis likely due to etoh abuse-PV thrombosis on US today, no AC with low platelets, abd enlarging, may require paracentesis soon Alcohol abuse, off 2-3 weeks, continue lactulose, albumin Severe thrombocytopenia-no bleeding, transfused plt 10/14, heme following Hypocalcemia-ICU protocol Diabetes-a1c less than 6%, hyperglycemia likely stress response, add lantus daily and every 6 hours reg insulin to manage Coagulopathy-plasma given, follow INR Critically ill Full code Disposition : 
Patient Active Problem List  
Diagnosis Code  Hypocalcemia E83.51  
 Hypomagnesemia E83.42  
 Alcoholism ([facility] F10.20  Cirrhosis ([facility] K74.60  Colitis K52.9  Acute respiratory failure with hypoxemia (HCC) J96.01  
 Aspiration pneumonia ([facility] J69.0  Acute diastolic CHF (congestive heart failure) (HCC) I50.31  Thrombocytopenia ([facility] D69.6 Subjective: No new issues BG elevated Still on vent, sedate Review of systems: 
 
UTO, sedated Vital signs/Intake and Output: 
Visit Vitals /84 Pulse (!) 55 Temp 98.4 °F (36.9 °C) Resp 18 Ht 5' 9\" (1.753 m) Wt 64.9 kg (143 lb) SpO2 97% BMI 21.12 kg/m² Current Shift:  No intake/output data recorded. Last three shifts:  10/16 1901 - 10/18 0700 In: 2195 [I.V.:760] Out: Adenike Mcwilliams [[id number]] Exam: 
 
General: ill appearing Wm intubated, sedate Head/Neck: NCAT, supple, No masses, No lymphadenopathy CVS:Regular rate and rhythm, no M/R/G, S1/S2 heard, no thrill Lungs:Clear to auscultation bilaterally, no wheezes, rhonchi, or rales Abdomen: Soft, Nontender, mild distention, diminished Bowel sounds, No hepatomegaly Extremities: 1 plus edema Skin:normal texture and turgor, no rashes, no lesions Neuro:sedate Labs: Results:  
   
Chemistry Recent Labs 10/18/20 
0345 10/17/20 
2300 10/17/20 
1600  10/17/20 
0345  10/16/20 
0400 *  --   --   --  251*  --  199* *  --   --   --  151*  --  147* K 4.1 3.8 3.5   < > 3.7   < > 3.9   --   --   --  112*  --  111 CO2 31  --   --   --  28  --  27 BUN 22*  --   --   --  22*  --  17  
CREA 1.07  --   --   --  1.20  --  0.95  
CA 8.9  --   --   --  8.2*  --  8.2* AGAP 8  --   --   --  11  --  9  
BUCR 21*  --   --   --  18  --  18  
AP 54  --   --   --  59  --  44*  
TP 5.7*  --   --   --  5.6*  --  5.5* ALB 3.9  --   --   --  3.8  --  3.7 GLOB 1.8*  --   --   --  1.8*  --  1.8* AGRAT 2.2*  --   --   --  2.1*  --  2.1*  
 < > = values in this interval not displayed. CBC w/Diff Recent Labs 10/18/20 
0345 10/17/20 
0345 10/16/20 
0400 WBC 5.6 6.6 6.5  
RBC 2.45* 2.60* 2.57* HGB 7.8* 8.2* 8.2* HCT 23.3* 24.9* 24.9*  
PLT 47* 53* 46* GRANS 82* 83* 82* LYMPH 8* 8* 9* EOS 0 0 0 Cardiac Enzymes No results for input(s): CPK, CKND1, LYDIA in the last 72 hours. No lab exists for component: Verlie Dopp Coagulation Recent Labs 10/18/20 
0345 10/17/20 
0345 PTP 25.1* 25.4* INR 2.3* 2.4* Lipid Panel No results found for: CHOL, CHOLPOCT, CHOLX, [address], CHOLV, [id number], HDL, HDLP, LDL, LDLC, DLDLP, 538170, VLDLC, VLDL, TGLX, TRIGL, TRIGP, TGLPOCT, CHHD, CHHDX  
BNP No results for input(s): BNPP in the last 72 hours. Liver Enzymes Recent Labs 10/18/20 
0345  
TP 5.7* ALB 3.9 AP 54 Thyroid Studies No results found for: T4, T3U, TSH, TSHEXT Procedures/imaging: see electronic medical records for all procedures/Xrays and details which were not copied into this note but were reviewed prior to creation of Plan Michaelle Benitez MD

## 2020-10-18 NOTE — PROGRESS NOTES
Patient Name: Justin Golden Age: 47 y.o. Sex:  male YOB: 1965 Medical Record Number: [id number] Antimicrobial  Vancomycin Indication Aspiration Pneumonia (empiric) Dose / Interval  
 
 
 
 
Current Antimicrobial Therapy (168h ago, onward) Ordered     Start Stop 10/18/20 0207  vancomycin (VANCOCIN) 1,250 mg in 0.9% sodium chloride 250 mL (VIAL-MATE)  1,250 mg,   IntraVENous,   EVERY 18 HOURS    
 10/18/20 0900 --  
 10/14/20 1026  piperacillin-tazobactam (ZOSYN) 4.5 g in 0.9% sodium chloride (MBP/ADV) 100 mL MBP  4.5 g,   IntraVENous,   EVERY 8 HOURS    
 10/14/20 1400 -- Last Level (if applicable) Lab Results Component Value Date/Time Reported dose: 1000 MG 10/16/2020 12:45 AM  
 Reported dose time: 1303 10/16/2020 12:45 AM  
 Reported dose date [id number] 10/16/2020 12:45 AM  
 
Vancomycin Lab Results Component Value Date/Time Vancomycin,trough 23.7 ([place]) 10/18/2020 12:38 AM  
  
Gentamicin No results found for: GENP, GENT, GENR] Tobramycin No results found for: TOBP, TOBT, TOBR Amikacin No results found for: TED Merida Robyn Graven Cultures (7 most recent) GRAM STAIN Date Value Ref Range Status 10/14/2020 RARE WBCS SEEN   Final  
10/14/2020 RARE EPITHELIAL CELLS SEEN   Final  
10/14/2020 NO ORGANISMS SEEN   Final  
 
Culture result:  
Date Value Ref Range Status 10/14/2020 LIGHT YEAST, (APPARENT CANDIDA ALBICANS) (A)   Final  
10/14/2020 NO NORMAL RESPIRATORY QUINCY ISOLATED   Final  
  
Renal Overview (72 hr) Recent Labs 10/17/20 
0345 10/16/20 
0400 10/15/20 
0400 BUN 22* 17 9 CREA 1.20 0.95 0.96 CrCl (Current): Estimated Creatinine Clearance: 64.6 mL/min (based on SCr of 1.2 mg/dL). Lactic Acid (Sepsis Criteria: >2.0 mmol/L) Lab Results Component Value Date/Time  Lactic acid 1.8 10/14/2020 05:05 PM  
 Lactic acid 2.3 (HH) 10/14/2020 05:58 AM  
 Lactic acid 2.2 (HH) 10/14/2020 01:30 AM  
  
Procalcitonin (0.10-0.49 NG/ML) No results found for: PCT Hepatic Overview (72 hr) Recent Labs 10/17/20 
0345 10/16/20 
0400 10/15/20 
0400 ALT 36 24 20 AP 59 44* 39* Temp (24-hr Max) Temp (24hrs), Av.9 °F (37.2 °C), Min:97.9 °F (36.6 °C), Max:99.3 °F (37.4 °C) Hematology Recent Labs 10/17/20 
0345 10/16/20 
0400 10/15/20 
1600 10/15/20 
0400 WBC 6.6 6.5  --  4.4* HGB 8.2* 8.2* 7.7* 7.5* HCT 24.9* 24.9* 23.2* 23.1*  
PLT 53* 46*  --  42* GRANS 83* 82*  --  85* ANEU 5.5 5.3  --  3.7 DOT  6 Notes  Vancomycin trough level = 23.7  New regimen based on trough as follows:  
 Estimated Pharmacokinetic Parameters (based on one level) Vd: 45 L (0.7 L/kg) Vanessa: 0.057 hr-1 (T1/2 = 12.2 hrs) Dosing Recommendations Vancomycin dose: 1250 mg IV Q18hrs (infused over 1 hr) Estimated peak: 41.7 mcg/mL Estimated trough: 15.8 mcg/mL Estimated AUC:RONIT: 643 mcg*hr/mL (assumed RONIT 1 mcg/mL) A/P: 
1. Recommend vancomycin 1250 mg IV Q18hrs (19 mg/kg) starting timing of next dose based on vanessa(~8 hrs) vanessa = 0.58 hrpower-1 2. Consider a vancomycin trough level prior to the 4th dose. 3. Please monitor renal function (urine output, BUN/SCr). Dose adjustments may be necessary with a significant change in renal function. Génesis Robledo, [facility] Clinical Pharmacist 
[phone]

## 2020-10-18 NOTE — PROGRESS NOTES
Phone: [phone] Paging : [phone] Hematology / Oncology Progress Note Admit Date: 10/9/2020 Assessment:  
 
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 Immune suppressive state with humira ? Colitis ? Respiratory failure with ARDS picture, intubated 10/14/2020 ? Stress negative for ischemia 
? ETOH abuse ? Ascites and portal hypertension ? Aspiration pneumonia, sepsis ? Ruled out COVID as doing - negative x2 already Principal Problem: Hypocalcemia (10/9/2020) Active Problems: Hypomagnesemia (10/9/2020) Alcoholism ([address] (10/9/2020) Cirrhosis ([address] (10/9/2020) Colitis (10/9/2020) Acute respiratory failure with hypoxemia ([address] (10/13/2020) Aspiration pneumonia ([address] (10/13/2020) Acute diastolic CHF (congestive heart failure) ([address] (10/13/2020) Thrombocytopenia ([address] (10/13/2020) Plan:  
 
· He overall is stable, no clinical bleeding. Received platelet transfusion and FFP on 10/14/2020. · Continue supportive care as doing · Thrombocytopenia with underlying alcoholic cirrhosis- On dexamathesone 40 mg IV daily x4 days, day #4 of 4 today, 10/18/2020. Possible component of ITP (he does have large platelets). Plts stable 47 K today. · Transfuse platelet for < 20 or clinical bleeding · If clinical bleeding, may also give Amicar to improve platelet function · Monitor DIC panel and LFT, if fibrinogen is lower than 100,  Rx cryoprecipitate 10 bags given yesterday for fibrinogen of 75, now 125. · Patient has received Vit K, continue · May also give FFP to correct INR . INR was 2.4.  S/p 2 units FFP on 10/17/2020, now 2.3. 
· For his portal thrombosis, he is not a candidate for anticoagulation anyway given severe thrombocytopenia · Respiratory failure, on vent. Continue antibiotics (Zosyn/Vancomycin) as doing · Severe malnutrition Subjectives: Intubated (on 10/14/2020), on vent. Sedated. No active bleeding  
+ Ward Nimbex, Versed and Fentanyl drips. Objectives: VITAL SIGNS:  
Patient Vitals for the past 24 hrs: 
 BP Temp Pulse Resp SpO2  
10/18/20 0747   62 18 97 % 10/18/20 0746   76 19 98 % 10/18/20 0745 (!) 142/90   18 98 % 10/18/20 0741   60 18 98 % 10/18/20 0740   78 18 98 % 10/18/20 0739   82 18 95 % 10/18/20 0737     98 % 10/18/20 0736    18 93 % 10/18/20 0735    18 93 % 10/18/20 0734    18 93 % 10/18/20 0733    18 93 % 10/18/20 0732    18 93 % 10/18/20 0731   61 18 93 % 10/18/20 0730 137/88   18 93 % 10/18/20 0729    18 93 % 10/18/20 0728   62 18 93 % 10/18/20 0727   60 18 93 % 10/18/20 0726   60 18 93 % 10/18/20 0725   62 18 93 % 10/18/20 0724   60 18 93 % 10/18/20 0723   60 18 93 % 10/18/20 0722   60 18 93 % 10/18/20 0721   61 18 93 % 10/18/20 0720   61 18 93 % 10/18/20 0719  98.4 °F (36.9 °C) 61 18 93 % 10/18/20 0718   61 18 93 % 10/18/20 0717   62 18 93 % 10/18/20 0716   60 18 93 % 10/18/20 0715 (!) 141/91      
10/18/20 0714   62 18 93 % 10/18/20 0713   61 18 93 % 10/18/20 0712   62 18 93 % 10/18/20 0711   61 18 93 % 10/18/20 0710   63 18 93 % 10/18/20 0709   62 18 93 % 10/18/20 0708   61 18 93 % 10/18/20 0707   63 18 93 % 10/18/20 0706   64 18 93 % 10/18/20 0705   65 18 93 % 10/18/20 0704   66 18 93 % 10/18/20 0703   66 18 93 % 10/18/20 0702   69 18 93 % 10/18/20 0701   68 18 92 % 10/18/20 0700 (!) 143/95  69 18 92 % 10/18/20 0659   70 18 92 % 10/18/20 0658   71 18 92 % 10/18/20 0657   78 18 92 % 10/18/20 0656   78 18 92 % 10/18/20 0655   79 18 92 % 10/18/20 0654   80 18 92 % 10/18/20 0653   84 18 92 % 10/18/20 0652   84 18 92 % 10/18/20 0651   84 18 92 % 10/18/20 0649   85 18 92 % 10/18/20 0648   87 18 92 % 10/18/20 0647   89 18 92 % 10/18/20 0646   89 18 92 % 10/18/20 0645 137/89  91 18 92 % 10/18/20 0644   92 18 93 % 10/18/20 0643   91 18 93 % 10/18/20 0642   87 18 94 % 10/18/20 0641   91 18 94 % 10/18/20 0640    19 94 % 10/18/20 0639    19 94 % 10/18/20 0638    20 94 % 10/18/20 0637    18 94 % 10/18/20 0636    20 94 % 10/18/20 0635    18 95 % 10/18/20 0634    17 96 % 10/18/20 0633    19   
10/18/20 0632    21 95 % 10/18/20 0631    18 95 % 10/18/20 0629    22 95 % 10/18/20 0628    20 94 % 10/18/20 0627   64 18 95 % 10/18/20 0626   96 21 96 % 10/18/20 0625    22 96 % 10/18/20 0624     92 % 10/18/20 0623   95 22 90 % 10/18/20 0622   69 18 94 % 10/18/20 0621   97 23   
10/18/20 0620   66 21   
10/18/20 0617   86 17 95 % 10/18/20 0616   63 18 95 % 10/18/20 0615 125/86  64 18 95 % 10/18/20 0613   69 18 95 % 10/18/20 0612   73 18 96 % 10/18/20 0611   62 18 96 % 10/18/20 0610   65 18 97 % 10/18/20 0609    18 96 % 10/18/20 0603   91 20 95 % 10/18/20 0602    18 96 % 10/18/20 0601    18 97 % 10/18/20 0600 120/79   18 97 % 10/18/20 0559   61 18 97 % 10/18/20 0558   61 18 97 % 10/18/20 0545 122/83   18 97 % 10/18/20 0539   60 18 96 % 10/18/20 0531   61 18 97 % 10/18/20 0530 124/84  (!) 55 18 97 % 10/18/20 0528    18 96 % 10/18/20 0527   61 18 96 % 10/18/20 0526   60 18 96 % 10/18/20 0525    18 96 % 10/18/20 0524    18 96 % 10/18/20 0523    18 96 % 10/18/20 0522    18 96 % 10/18/20 0521   60 18 96 % 10/18/20 0520   60 18 96 % 10/18/20 0519   62 18 96 % 10/18/20 0518   60 18 96 % 10/18/20 0517   63 18 96 % 10/18/20 0516    18 95 % 10/18/20 0515 120/75   18 95 % 10/18/20 0514    18 95 % 10/18/20 0513   62 18 95 % 10/18/20 0512   62 18 95 % 10/18/20 0509   63 18 94 % 10/18/20 0507   63 22 94 % 10/18/20 0506   64 16 94 % 10/18/20 0505   65 22 93 % 10/18/20 0504   66 22 93 % 10/18/20 0503    22 93 % 10/18/20 0502   65 22 93 % 10/18/20 0501   64 22 93 % 10/18/20 0500 123/79  65 22 93 % 10/18/20 0459   65 22 94 % 10/18/20 0458   67 21 94 % 10/18/20 0457   70 22 95 % 10/18/20 0456   67 22 94 % 10/18/20 0455   67 22 94 % 10/18/20 0454   86 23 94 % 10/18/20 0453   61 22 96 % 10/18/20 0452   63 22 95 % 10/18/20 0451   64 22 94 % 10/18/20 0450    22 95 % 10/18/20 0449    22 95 % 10/18/20 0448    22 95 % 10/18/20 0447   60 22 95 % 10/18/20 0446   62 22 95 % 10/18/20 0445 119/76  (!) 54 22 95 % 10/18/20 0444    20 95 % 10/18/20 0443   61 22 95 % 10/18/20 0442   62 22 95 % 10/18/20 0441    22 95 % 10/18/20 0440    22 95 % 10/18/20 0439   62 22 95 % 10/18/20 0438   63 22 95 % 10/18/20 0437   61 22 95 % 10/18/20 0436    22 94 % 10/18/20 0435    22 95 % 10/18/20 0434    22 95 % 10/18/20 0433   62 22 95 % 10/18/20 0432    22 95 % 10/18/20 0431    22 95 % 10/18/20 0430 117/76  (!) 58 22 94 % 10/18/20 0429   61 22 95 % 10/18/20 0428    22 95 % 10/18/20 0427    22 95 % 10/18/20 0426    22 95 % 10/18/20 0425    22 95 % 10/18/20 0424    22 95 % 10/18/20 0423   60 22 95 % 10/18/20 0422   62 22 95 % 10/18/20 0421   64 21 94 % 10/18/20 0420   61 22 95 % 10/18/20 0419   61 22 95 % 10/18/20 0418    22 95 % 10/18/20 0417    22 95 % 10/18/20 0416   65 22 95 % 10/18/20 0415 119/73  (!) 55 22 95 % 10/18/20 0414   63 22 95 % 10/18/20 0412    22 95 % 10/18/20 0411    22 95 % 10/18/20 0410    22 95 % 10/18/20 0409    22 95 % 10/18/20 0408    22 95 % 10/18/20 0407    22 95 % 10/18/20 0406   70 22 95 % 10/18/20 0405    22 95 % 10/18/20 0404   60 22 94 % 10/18/20 0403    22 94 % 10/18/20 0402    22 95 % 10/18/20 0401    22 95 % 10/18/20 0400 120/82  62 22 95 % 10/18/20 0359    22 95 % 10/18/20 0358    22 95 % 10/18/20 0357    22 95 % 10/18/20 0356    22 95 % 10/18/20 0355  97.7 °F (36.5 °C)  22 95 % 10/18/20 0354    22 95 % 10/18/20 0353    22 95 % 10/18/20 0352    22 95 % 10/18/20 0351    22 95 % 10/18/20 0350   61 22 95 % 10/18/20 0349    22 95 % 10/18/20 0348   60 22 95 % 10/18/20 0347    22 95 % 10/18/20 0346    22 95 % 10/18/20 0345 119/77  (!) 54 22 95 % 10/18/20 0344    22 95 % 10/18/20 0343   61 22 95 % 10/18/20 0342   60 22 95 % 10/18/20 0341    22 95 % 10/18/20 0340    22 95 % 10/18/20 0339    22 95 % 10/18/20 0338    22 95 % 10/18/20 0337    22 95 % 10/18/20 0336   61 22 95 % 10/18/20 0335   61 22 95 % 10/18/20 0334   62 22 95 % 10/18/20 0333    22 95 % 10/18/20 0332   62 22 95 % 10/18/20 0331    22 94 % 10/18/20 0330 120/78  60 22 95 % 10/18/20 0329   61 22 95 % 10/18/20 0328    22 95 % 10/18/20 0327    22 94 % 10/18/20 0326    22 95 % 10/18/20 0325    22 95 % 10/18/20 0324    22 95 % 10/18/20 0323    22 94 % 10/18/20 0322    22 95 % 10/18/20 0321   60 22 95 % 10/18/20 0320    22 94 % 10/18/20 0319   60 22 95 % 10/18/20 0318   63 22 95 % 10/18/20 0315 116/76  (!) 56 22 95 % 10/18/20 0314    22 95 % 10/18/20 0313    22 94 % 10/18/20 0312    22 95 % 10/18/20 0311    22 95 % 10/18/20 0310   61 22 95 % 10/18/20 0309   62 22 95 % 10/18/20 0308    22 95 % 10/18/20 0307   63 22 95 % 10/18/20 0306   63 22 95 % 10/18/20 0305   62 22 95 % 10/18/20 0304   60 22 95 % 10/18/20 0303   60 22 95 % 10/18/20 0302   62 22 95 % 10/18/20 0301   61 22 95 % 10/18/20 0300 117/74      
10/18/20 0259    22 95 % 10/18/20 0258   61 22 95 % 10/18/20 0257    22 95 % 10/18/20 0256    22 95 % 10/18/20 0255   60 22 95 % 10/18/20 0254   60 22 95 % 10/18/20 0253    22 95 % 10/18/20 0252   60 22 94 % 10/18/20 0251    22 94 % 10/18/20 0250   62 22 94 % 10/18/20 0249    22 94 % 10/18/20 0248   62 22 94 % 10/18/20 0247   63 22 93 % 10/18/20 0246   61 22 93 % 10/18/20 0245 119/79  62 22 93 % 10/18/20 0244    22 93 % 10/18/20 0243   64 22 93 % 10/18/20 0242    22 93 % 10/18/20 0241   61 22 93 % 10/18/20 0240   61 22 93 % 10/18/20 0239   62 22 93 % 10/18/20 0238   64 22 93 % 10/18/20 0237   60 22 94 % 10/18/20 0236   61 22 94 % 10/18/20 0235    22 94 % 10/18/20 0234    22 94 % 10/18/20 0233   62 22 93 % 10/18/20 0232    22 93 % 10/18/20 0231   60 22 92 % 10/18/20 0230 120/77  62 22 92 % 10/18/20 0229   64 22 92 % 10/18/20 0228    22 92 % 10/18/20 0227   65 22 93 % 10/18/20 0226   64 22 93 % 10/18/20 0225   63 22 93 % 10/18/20 0224   63 22 93 % 10/18/20 0223   65 22 93 % 10/18/20 0222    22 92 % 10/18/20 0221   60 22 93 % 10/18/20 0220   63 22 93 % 10/18/20 0219   64 22 93 % 10/18/20 0218   63 22 93 % 10/18/20 0217    22 93 % 10/18/20 0216   62 22 93 % 10/18/20 0215 117/79  61 22 93 % 10/18/20 0214   62 22 93 % 10/18/20 0213   63 22 93 % 10/18/20 0212   60 22 93 % 10/18/20 0211    22 93 % 10/18/20 0210   63 22 93 % 10/18/20 0209   64 22 93 % 10/18/20 0208   62 22 93 % 10/18/20 0207   64 22 93 % 10/18/20 0206   61 22 93 % 10/18/20 0205   64 22 93 % 10/18/20 0204   63 22 93 % 10/18/20 0203   60 22 93 % 10/18/20 0202   64 22 93 % 10/18/20 0201   61 22 93 % 10/18/20 0200 119/75  60 22 93 % 10/18/20 0159   60 22 93 % 10/18/20 0158   63 22 93 % 10/18/20 0157   63 22 93 % 10/18/20 0156   62 22 92 % 10/18/20 0155   63 22 92 % 10/18/20 0154   63 22 92 % 10/18/20 0153   63 22 92 % 10/18/20 0152   64 22 92 % 10/18/20 0151   64 22 93 % 10/18/20 0150   64 22 93 % 10/18/20 0149   64 22 93 % 10/18/20 0148   64 22 93 % 10/18/20 0147   65 22 93 % 10/18/20 0146   61 22 93 % 10/18/20 0145 119/75  63 22 93 % 10/18/20 0144   64 22 93 % 10/18/20 0143   64 22 93 % 10/18/20 0142   64 22 93 % 10/18/20 0141   64 22 93 % 10/18/20 0140   64 22 93 % 10/18/20 0139   64 22 92 % 10/18/20 0138   65 22 93 % 10/18/20 0137   67 22 92 % 10/18/20 0136   64 22 92 % 10/18/20 0135   65 22 92 % 10/18/20 0134   65 22 92 % 10/18/20 0133   67 22 92 % 10/18/20 0132   66 22 93 % 10/18/20 0131   65 22 93 % 10/18/20 0130 117/75  65 22 93 % 10/18/20 0129   66 22 93 % 10/18/20 0128   67 22 91 % 10/18/20 0127   68 19 91 % 10/18/20 0126   72 20 90 % 10/18/20 0125   67 22 90 % 10/18/20 0124   66 22 90 % 10/18/20 0123   68 22 90 % 10/18/20 0122   68 22 90 % 10/18/20 0121   67 22 90 % 10/18/20 0120   68 22 90 % 10/18/20 0119   73 20 90 % 10/18/20 0118   68 22 90 % 10/18/20 0117   68 22 90 % 10/18/20 0116   70 22 90 % 10/18/20 0115 118/74  69 22 90 % 10/18/20 0114   70 22 90 % 10/18/20 0113   70 22 90 % 10/18/20 0112   70 22 90 % 10/18/20 0111   70 22 91 % 10/18/20 0110   70 22 91 % 10/18/20 0109   70 22 91 % 10/18/20 0108   70 22 92 % 10/18/20 0107   70 22 92 % 10/18/20 0106   70 22 93 % 10/18/20 0105   71 21 94 % 10/18/20 0104   79 21 96 % 10/18/20 0103   97 13 96 % 10/18/20 0102   72 22 92 % 10/18/20 0101   73 22 92 % 10/18/20 0100 117/73  73 22 92 % 10/18/20 0059   73 22 92 % 10/18/20 0058   75 22 92 % 10/18/20 0057   77 22 92 % 10/18/20 0056   75 22 92 % 10/18/20 0055   76 22 92 % 10/18/20 0054   77 22 92 % 10/18/20 0053   82 22 92 % 10/18/20 0052   83 22 92 % 10/18/20 0051   84 22 92 % 10/18/20 0050   86 22 92 % 10/18/20 0049   86 22 92 % 10/18/20 0047   90 22 92 % 10/18/20 0046   90 22 92 % 10/18/20 0045 113/78  92 22 92 % 10/18/20 0044   92 22 92 % 10/18/20 0043   93 22 92 % 10/18/20 0042 119/77  97 22 93 % 10/18/20 0041   95 22 93 % 10/17/20 2309  97.9 °F (36.6 °C)     
10/17/20 2300 (!) 141/91  73 22 93 % 10/17/20 2259   80 22 93 % 10/17/20 2258    22 93 % 10/17/20 2257    22 93 % 10/17/20 2256   62 22 93 % 10/17/20 2255    22 93 % 10/17/20 2254    22 93 % 10/17/20 2253    22 93 % 10/17/20 2252    22 93 % 10/17/20 2251   63 22   
10/17/20 2250   60 22   
10/17/20 2249   63 22   
10/17/20 2248   63 22   
10/17/20 2247   63 22   
10/17/20 2246   65 22   
10/17/20 2245 125/78  (!) 58 22 93 % 10/17/20 2244    22   
10/17/20 2243    22   
10/17/20 2242   61 22   
10/17/20 2241   65 22   
10/17/20 2239   63 22   
10/17/20 2230 119/84  60 22 93 % 10/17/20 2200 124/83  63 22 94 % 10/17/20 2130 122/77  65 22 94 % 10/17/20 2100 135/86  69 21 97 % 10/17/20 2030 125/85  67 22 93 % 10/17/20 2000 118/78  65 22 93 % 10/17/20 1948   68 22 93 % 10/17/20 1930 123/78  68 22 93 % 10/17/20 1900 130/84  68 22 93 % 10/17/20 1830 124/82  70 22 93 % 10/17/20 1800 125/80 98.9 °F (37.2 °C) 68 22 93 % 10/17/20 1738 123/79 99.2 °F (37.3 °C) 77 22 99 % 10/17/20 1730 123/79  70 22 93 % 10/17/20 1700 118/74  76 22 92 % 10/17/20 1630 120/75  73 22 94 % 10/17/20 1623 116/74 99.1 °F (37.3 °C) 73 22 94 % 10/17/20 1600 116/72  73 22 95 % 10/17/20 1559  99.1 °F (37.3 °C)     
10/17/20 1530 121/75  79 22 95 % 10/17/20 1500 117/75  79 22 94 % 10/17/20 1449 117/73 99.2 °F (37.3 °C) 79 22 95 % 10/17/20 1347 117/73 99.3 °F (37.4 °C) 80 22 94 % 10/17/20 1321 102/74 99.3 °F (37.4 °C) 78 22 94 % 10/17/20 1300 119/71  80 22 94 % 10/17/20 1230 114/73  87 22 93 % 10/17/20 1200 116/78  92 22 93 % 10/17/20 1130 137/84  (!) 119 24 94 % 10/17/20 1127   (!) 110 22 93 % 10/17/20 1119  98.9 °F (37.2 °C)     
10/17/20 1100 (!) 157/115  (!) 112 23 96 % 10/17/20 1030 122/78  79 22 94 % GENERAL: intubated and sedated Medications:   
 
Current Medications: 
 
Current Facility-Administered Medications Medication Dose Route Frequency  vancomycin (VANCOCIN) 1,250 mg in 0.9% sodium chloride 250 mL (VIAL-MATE)  1,250 mg IntraVENous Q18H  
 insulin glargine (LANTUS) injection 10 Units  10 Units SubCUTAneous DAILY  insulin regular (NOVOLIN R, HUMULIN R) injection 5 Units  5 Units SubCUTAneous Q6H  
 0.9% sodium chloride infusion 250 mL  250 mL IntraVENous PRN  thiamine mononitrate (B-1) tablet 100 mg  100 mg Oral DAILY  folic acid (FOLVITE) tablet 1 mg  1 mg Oral DAILY  0.9% sodium chloride infusion 250 mL  250 mL IntraVENous PRN  
 0.9% sodium chloride infusion 250 mL  250 mL IntraVENous PRN  
 lactulose (CHRONULAC) 10 gram/15 mL solution 15 mL  10 g Oral BID  midazolam in normal saline (VERSED) 1 mg/mL infusion  1-5 mg/hr IntraVENous TITRATE  fentaNYL (PF) 900 mcg/30 ml infusion soln  0-200 mcg/hr IntraVENous TITRATE  fentaNYL citrate (PF) injection  mcg   mcg IntraVENous Q4H PRN  
 LORazepam (ATIVAN) injection 1 mg  1 mg IntraVENous Q2H PRN  piperacillin-tazobactam (ZOSYN) 4.5 g in 0.9% sodium chloride (MBP/ADV) 100 mL MBP  4.5 g IntraVENous Q8H  
  cisatracurium (NIMBEX) 100 mg in 0.9% sodium chloride 100 mL infusion  0-10 mcg/kg/min IntraVENous TITRATE  albumin human 25% (BUMINATE) solution 25 g  25 g IntraVENous TID  metoclopramide HCl (REGLAN) injection 5 mg  5 mg IntraVENous Q6H PRN  
 insulin lispro (HUMALOG) injection   SubCUTAneous Q6H  
 ELECTROLYTE REPLACEMENT PROTOCOL - Potassium Standard Dosing   1 Each Other PRN  
 ELECTROLYTE REPLACEMENT PROTOCOL - Magnesium   1 Each Other PRN  
 ELECTROLYTE REPLACEMENT PROTOCOL - Phosphorus  Standard Dosing  1 Each Other PRN  
 ELECTROLYTE REPLACEMENT PROTOCOL - Calcium   1 Each Other PRN  Vancomycin - Rx to dose and monitor  1 Each Other Rx Dosing/Monitoring  pantoprazole (PROTONIX) 40 mg in 0.9% sodium chloride 10 mL injection  40 mg IntraVENous Q24H  
 HYDROmorphone (PF) (DILAUDID) injection 1 mg  1 mg IntraVENous Q3H PRN  
 oxyCODONE-acetaminophen (PERCOCET) 5-325 mg per tablet 1-2 Tab  1-2 Tab Oral Q6H PRN  
 lidocaine 4 % patch 2 Patch  2 Patch TransDERmal Q24H  
 ondansetron (ZOFRAN) injection 4 mg  4 mg IntraVENous Q6H PRN  
 glucose chewable tablet 16 g  4 Tab Oral PRN  
 glucagon (GLUCAGEN) injection 1 mg  1 mg IntraMUSCular PRN  
 dextrose 10% infusion 125-250 mL  125-250 mL IntraVENous PRN Allergies: 
 
No Known Allergies Ancillary Study Results:  
  
Laboratory: 
 
Recent Results (from the past 24 hour(s)) PLASMA, ALLOCATE Collection Time: 10/17/20 11:00 AM  
Result Value Ref Range Unit number [id number] Blood component type FP 24h, Thaw Unit division 00 Status of unit TRANSFUSED Unit number [id number] Blood component type FP 24h,Thaw Unit division 00 Status of unit TRANSFUSED   
CRYOPRECIPITATE, ALLOCATE Collection Time: 10/17/20 11:15 AM  
Result Value Ref Range CALLED TO: Kaya Phelan RN ICU ON 10/17/2020 1316 BY [facility] Unit number [id number] Blood component type CRYO,5U Thaw Unit division 00 Status of unit TRANSFUSED Unit number [id number] Blood component type CRYO,5U Thaw Unit division 00 Status of unit TRANSFUSED   
GLUCOSE, POC Collection Time: 10/17/20 11:18 AM  
Result Value Ref Range Glucose (POC) 317 (H) 70 - 110 mg/dL POTASSIUM Collection Time: 10/17/20  4:00 PM  
Result Value Ref Range Potassium 3.5 3.5 - 5.5 mmol/L  
GLUCOSE, POC Collection Time: 10/17/20  5:57 PM  
Result Value Ref Range Glucose (POC) 312 (H) 70 - 110 mg/dL POTASSIUM Collection Time: 10/17/20 11:00 PM  
Result Value Ref Range Potassium 3.8 3.5 - 5.5 mmol/L  
GLUCOSE, POC Collection Time: 10/17/20 11:17 PM  
Result Value Ref Range Glucose (POC) 270 (H) 70 - 110 mg/dL [place] Collection Time: 10/18/20 12:38 AM  
Result Value Ref Range Vancomycin,trough 23.7 (HH) 10.0 - 20.0 ug/mL MAGNESIUM Collection Time: 10/18/20  3:45 AM  
Result Value Ref Range Magnesium 1.8 1.6 - 2.6 mg/dL PHOSPHORUS Collection Time: 10/18/20  3:45 AM  
Result Value Ref Range Phosphorus 2.5 2.5 - 4.9 MG/DL  
CALCIUM, IONIZED Collection Time: 10/18/20  3:45 AM  
Result Value Ref Range Ionized Calcium 1.06 (L) 1.12 - 1.32 MMOL/L  
PROTHROMBIN TIME + INR Collection Time: 10/18/20  3:45 AM  
Result Value Ref Range Prothrombin time 25.1 (H) 11.5 - 15.2 sec INR 2.3 (H) 0.8 - 1.2 AMMONIA Collection Time: 10/18/20  3:45 AM  
Result Value Ref Range Ammonia 35 (H) 11 - 32 UMOL/L  
FIBRINOGEN Collection Time: 10/18/20  3:45 AM  
Result Value Ref Range Fibrinogen 129 (L) 210 - 451 mg/dL METABOLIC PANEL, COMPREHENSIVE Collection Time: 10/18/20  3:45 AM  
Result Value Ref Range Sodium 148 (H) 136 - 145 mmol/L Potassium 4.1 3.5 - 5.5 mmol/L Chloride 109 100 - 111 mmol/L  
 CO2 31 21 - 32 mmol/L Anion gap 8 3.0 - 18 mmol/L Glucose 301 (H) 74 - 99 mg/dL BUN 22 (H) 7.0 - 18 MG/DL  Creatinine 1.07 0.6 - 1.3 MG/DL  
 BUN/Creatinine ratio 21 (H) 12 - 20 GFR est AA >60 >60 ml/min/1.73m2 GFR est non-AA >60 >60 ml/min/1.73m2 Calcium 8.9 8.5 - 10.1 MG/DL Bilirubin, total 3.3 (H) 0.2 - 1.0 MG/DL  
 ALT (SGPT) 38 16 - 61 U/L  
 AST (SGOT) 63 (H) 10 - 38 U/L Alk. phosphatase 54 45 - 117 U/L Protein, total 5.7 (L) 6.4 - 8.2 g/dL Albumin 3.9 3.4 - 5.0 g/dL Globulin 1.8 (L) 2.0 - 4.0 g/dL A-G Ratio 2.2 (H) 0.8 - 1.7    
CBC WITH AUTOMATED DIFF Collection Time: 10/18/20  3:45 AM  
Result Value Ref Range WBC 5.6 4.6 - 13.2 K/uL  
 RBC 2.45 (L) 4.70 - 5.50 M/uL HGB 7.8 (L) 13.0 - 16.0 g/dL HCT 23.3 (L) 36.0 - 48.0 % MCV 95.1 74.0 - 97.0 FL  
 MCH 31.8 24.0 - 34.0 PG  
 MCHC 33.5 31.0 - 37.0 g/dL  
 RDW 16.7 (H) 11.6 - 14.5 % PLATELET 47 (L) 284 - 420 K/uL NEUTROPHILS 82 (H) 40 - 73 % LYMPHOCYTES 8 (L) 21 - 52 % MONOCYTES 10 3 - 10 % EOSINOPHILS 0 0 - 5 % BASOPHILS 0 0 - 2 %  
 ABS. NEUTROPHILS 4.6 1.8 - 8.0 K/UL  
 ABS. LYMPHOCYTES 0.5 (L) 0.9 - 3.6 K/UL  
 ABS. MONOCYTES 0.5 0.05 - 1.2 K/UL  
 ABS. EOSINOPHILS 0.0 0.0 - 0.4 K/UL  
 ABS. BASOPHILS 0.0 0.0 - 0.1 K/UL  
 DF AUTOMATED    
POC VENOUS BLOOD GAS Collection Time: 10/18/20  5:06 AM  
Result Value Ref Range Device: VENT    
 FIO2 (POC) 0.6 %  
 pH, venous (POC) 7.53 (HH) 7.32 - 7.42    
 pCO2, venous (POC) 34.5 (L) 41 - 51 MMHG  
 pO2, venous (POC) 60 (H) 25 - 40 mmHg HCO3, venous (POC) 28.6 (H) 23.0 - 28.0 MMOL/L  
 sO2, venous (POC) 93 (H) 65 - 88 % Base excess, venous (POC) 6 mmol/L Mode ASSIST CONTROL Tidal volume 420 ml Set Rate 22 bpm  
 PEEP/CPAP (POC) 10 cmH2O  
 PIP (POC) 27 Allens test (POC) N/A Inspiratory Time 1.09 sec Total resp. rate 22 Site RIGHT RADIAL Patient temp. 98.7 Specimen type (POC) VENOUS BLOOD Performed by Kesha Clinton Volume control plus YES    
GLUCOSE, POC Collection Time: 10/18/20  5:13 AM  
Result Value Ref Range Glucose (POC) 332 (H) 70 - 110 mg/dL GLUCOSE, POC Collection Time: 10/18/20  5:16 AM  
Result Value Ref Range Glucose (POC) 395 (H) 70 - 110 mg/dL Radiology: Xr Chest Sngl V Result Date: 10/13/2020 EXAM: XR CHEST SNGL V CLINICAL INDICATION/HISTORY: Hypoxia -Additional: None COMPARISON: 10/11/2020 TECHNIQUE: Frontal view of the chest _______________ FINDINGS: HEART AND MEDIASTINUM: Stable appearing cardiac size and mediastinal contours. LUNGS AND PLEURAL SPACES: Patchy multifocal alveolar opacities are newly present on the second examination. No pneumothorax or pleural effusion. BONY THORAX AND SOFT TISSUES: No acute osseous abnormality _______________ IMPRESSION: Short interval development of patchy multifocal airspace disease favored to reflect pulmonary edema without significant pleural effusion. Xr Shoulder Lt Ap/lat Min 2 V Result Date: 9/25/2020 EXAM: XR SHOULDER LT AP/LAT MIN 2 V CLINICAL INDICATION/HISTORY: Left shoulder pain after fall -Additional: None COMPARISON: None TECHNIQUE: 3 views of the left shoulder _______________ FINDINGS: BONES: Osseous limited is normal. No fracture demonstrated. Mild degenerative changes across the acromioclavicular joint noted. Glenohumeral joint not profiled. SOFT TISSUES: Included left lung and left chest wall clear _______________ IMPRESSION: Mild degenerative changes as described without superimposed acute radiographic abnormality. Xr Hip Lt W Or Wo Pelv 2-3 Vws Result Date: 9/25/2020 EXAM: LEFT HIP RADIOGRAPHS CLINICAL INDICATION/HISTORY: Fall with left hip pain -Additional: None COMPARISON: No prior imaging available for review. TECHNIQUE: AP pelvis and frog-leg lateral view of left hip. _______________ FINDINGS: BONES: Evidence of prior long intramedullary yaidra fixation and proximal and distal hip screw for left hip fracture fixation.  There is retraction of the lesser trochanter of the femur, with corticated margins suggesting a chronic avulsion related to the initial fracture presentation. No superimposed acute femoral fracture identified. Mild bilateral hip joint osteoarthritis. SOFT TISSUES: Unremarkable. _______________ IMPRESSION: 1. Reduced and internally fixated proximal left femoral fracture with likely chronic avulsion of the lesser trochanter the left femur. Comparison prior imaging is recommended. No acute displaced fracture demonstrated. Mild bilateral hip joint osteoarthritis. Ct Abd Pelv W Cont Result Date: 10/9/2020 EXAM: CT of the abdomen and pelvis INDICATION: Abdominal pain, greatest in the left lower quadrant with nausea and vomiting. COMPARISON: MRI pelvis 10/7/2019. CT left hip 4/17/2019 TECHNIQUE: Axial CT imaging of the abdomen and pelvis was performed with intravenous contrast. Multiplanar reformats were generated. One or more dose reduction techniques were used on this CT: automated exposure control, adjustment of the mAs and/or kVp according to patient size, and iterative reconstruction techniques. The specific techniques used on this CT exam have been documented in the patient's electronic medical record. Digital Imaging and Communications in Medicine (DICOM) format image data are available to nonaffiliated external healthcare facilities or entities on a secure, media free, reciprocally searchable basis with patient authorization for at least a 12-month period after this study. _______________ FINDINGS: LOWER CHEST: Minor basilar changes of atelectasis. No alveolar consolidation or pleural effusion. Normal cardiac size without pericardial effusion. LIVER, BILIARY: Diffuse hepatic hypoattenuation is present with hepatic size estimated at approximately 27 cm in craniocaudal span. No discrete liver lesion. No intrahepatic or extra hepatic biliary ductal dilatation. Patient is status post cholecystectomy.  PANCREAS: Pancreatic enhancement is normal. Mild and diffuse pancreatic ductal ectasia is noted. No discrete pancreatic mass lesion noted. SPLEEN: Mildly enlarged at 14 cm. ADRENALS: Normal. KIDNEYS/URETERS/BLADDER: Symmetric renal enhancement. No bowel obstruction PELVIC ORGANS: Symmetric renal enhancement. Punctate nonobstructing upper pole left renal stone. No distal ureteral or urinary bladder stone. Urinary bladder unremarkable in CT appearance. VASCULATURE: Diffuse aortobiiliac atherosclerotic vascular calcification is present without evidence of aneurysmal dilatation. Main portal vein as well as right and left portal venous branches are widely patent. LYMPH NODES: Scattered subcentimeter mesenteric and retroperitoneal lymph nodes are noted. No evidence of abdominal or pelvic adenopathy. GASTROINTESTINAL TRACT: No morphology of bowel obstruction. No free intraperitoneal gas. Mild thickening of the proximal ascending colon and cecum noted. BONES: No acute or aggressive osseous abnormalities identified. Partial visualization healed proximal left femoral fracture status post IM nailing and dynamic hip screw placement. OTHER: Small quantity of abdominal/pelvic ascites. Thin subcutaneous linear density tracking along the right gluteal fold, likely in keeping previously noted fistula tract. _______________ IMPRESSION: 1. Diffuse hepatic hypoattenuation compatible with steatosis with hepatomegaly, mild splenomegaly, and small quantity of abdominal/pelvic ascites. The totality of findings compatible with hepatocellular dysfunction and potentially associated portal hypertension. > Patent main and proximal portal venous branches.   > Mild thickening of the proximal ascending colon and cecum may reflect sequela of portal colopathy. Infectious/inflammatory colitis also a possibility but thought less likely given the imaging appearance. 2. Likely correlate for small area of perianal fistula noted on prior MRI pelvis.  3. Punctate nonobstructing upper pole left renal stone. [id number] Addendum Date: 10/13/2020 Addendum: Impression 1 discussed with Dr. Elton Michael at 10:47 AM on same day. Result Date: 10/13/2020 Right upper quadrant abdominal ultrasound INDICATION: Hepatomegaly. TECHNIQUE: Real-time sonography in multiple planes of the abdomen was performed with image documentation. Grayscale, color flow Doppler imaging, and velocity spectral waveform analysis of the portal vein was performed (duplex imaging). COMPARISON: None. _______________ FINDINGS: LIVER: The liver is diffusely increased in echogenicity, without focal mass. Liver measures at least 23.1 cm sagittal. Color and spectral flow analysis of the portal vein shows lack of flow and dilated measuring 1.5 cm. There appears periportal collaterals. Decrease flow in the hepatic vein. BILIARY SYSTEM: No intrahepatic biliary dilatation. Common bile duct is normal in caliber measuring 5-6 mm. GALLBLADDER: Cholecystectomy. RIGHT KIDNEY: 11.9 cm in length. No hydronephrosis or renal mass. No visible calculi. PANCREAS: Obscured by overlying bowel gas. IVC: Visualized portions are unremarkable in appearance. OTHER: There is perihepatic ascites and recanalized umbilical vein. _______________ IMPRESSION: 1. Nonspecific increased hepatic echotexture suggesting intrinsic liver disease. Dilated portal vein, recanalized umbilical vein and perihepatic ascites. There is lack of flow within the portal vein, suggestive of thrombosis, with periportal collaterals and decreased flows in the hepatic vein. However, portal and hepatic veins patent on recent CT abdomen 10/09/2020. Multiple attempts to contact Dr. Elton Michael with this findings, awaiting callback. 2. No discrete hepatic mass identified. 3. Cholecystectomy. 4. Pancreas obscured by bowel gas. Xr Chest [facility] Result Date: 10/15/2020 EXAM: XR CHEST PORT CLINICAL INDICATION/HISTORY: Respiratory failure, bilateral airspace disease -Additional: None COMPARISON: 10/14/2020 TECHNIQUE: Portable frontal view of the chest _______________ FINDINGS: SUPPORT DEVICES: Endotracheal tube and nasogastric tube project in stable position. HEART AND MEDIASTINUM: Stable appearing cardiac size and mediastinal contours. LUNGS AND PLEURAL SPACES: Interstitial and airspace disease appearing similar to prior study. No evidence of pneumothorax or definite pleural effusion. BONY THORAX AND SOFT TISSUES: Unremarkable. _______________ IMPRESSION: Support devices in stable/appropriate position. Bilateral interstitial and airspace disease appearing overall similar to prior study. Xr Chest [facility] Result Date: 10/14/2020 EXAM: XR CHEST PORT CLINICAL INDICATION/HISTORY: ET tube placement -Additional: None COMPARISON: 10/14/2020 TECHNIQUE: Portable frontal view of the chest _______________ FINDINGS: SUPPORT DEVICES: Endotracheal tube 4 cm above the billie. Nasogastric tube below the diaphragm, tip projecting over the stomach. HEART AND MEDIASTINUM: Stable appearing cardiac size and mediastinal contours. LUNGS AND PLEURAL SPACES: Multifocal interstitial and airspace disease. Overall similar appearance to prior examination. No evidence of pneumothorax or definite pleural effusion. BONY THORAX AND SOFT TISSUES: Unremarkable. _______________ IMPRESSION: 1. Support devices in stable/appropriate position. 2. Extensive bilateral interstitial and airspace disease similar to prior. Xr Chest [facility] Result Date: 10/14/2020 EXAM: One-view chest CLINICAL HISTORY: Dyspnea, COMPARISON: None FINDINGS: Frontal view of the chest demonstrate extensive bilateral airspace disease mildly worsened. Cardiac silhouette is normal in size and contour. No acute bony or soft tissue abnormality. IMPRESSION: Extensive bilateral nonspecific airspace disease appears worsened since prior examination. Xr Chest [facility] Result Date: 10/11/2020 EXAM: CHEST RADIOGRAPH CLINICAL INDICATION/HISTORY: Shortness of breath -Additional: None COMPARISON: October 9, 2020 TECHNIQUE: Portable frontal view of the chest _______________ FINDINGS: SUPPORT DEVICES: None. HEART AND MEDIASTINUM: No appreciable cardiomegaly. Remaining mediastinal contours within normal limits. LUNGS AND PLEURAL SPACES: Lung volumes are hypoinflated. There are linear opacities at both lung bases and blunting of the costophrenic sulci. BONY THORAX AND SOFT TISSUES: Unremarkable. _______________ IMPRESSION: Bibasilar opacities that likely represent a combination of atelectasis and/or small pleural effusions Xr Chest [facility] Result Date: 10/9/2020 EXAM: XR CHEST PORT CLINICAL INDICATION/HISTORY: abnormal ekg -Additional: None COMPARISON: None TECHNIQUE: Portable frontal view of the chest _______________ FINDINGS: SUPPORT DEVICES: None. HEART AND MEDIASTINUM: Cardiomediastinal silhouette within normal limits. LUNGS AND PLEURAL SPACES: No dense consolidation, large effusion or pneumothorax. _______________ IMPRESSION: No acute cardiopulmonary abnormality. Garret Osler. Emir Maurice MD 
Phone: [phone] Pager: [phone]

## 2020-10-18 NOTE — PROGRESS NOTES
Please note these blood gas results are ARTERIAL, not venous. Results for Joan Guzman (MRN [id number]) as of 10/18/2020 05:31 Ref. Range 10/18/2020 05:06 FIO2 (POC) Latest Units: % 0.6 Patient temp. Latest Units:   98.7 Specimen type (POC) Latest Units:   VENOUS BLOOD  
pH, venous (POC) Latest Ref Range: 7.32 - 7.42   7.53 (HH)  
pCO2, venous (POC) Latest Ref Range: 41 - 51 MMHG 34.5 (L)  
pO2, venous (POC) Latest Ref Range: 25 - 40 mmHg 60 (H) HCO3, venous (POC) Latest Ref Range: 23.0 - 28.0 MMOL/L 28.6 (H)  
sO2, venous (POC) Latest Ref Range: 65 - 88 % 93 (H) Base excess, venous (POC) Latest Units: mmol/L 6 Set Rate Latest Units: bpm 22 Site Latest Units:   RIGHT RADIAL Device: Latest Units:   VENT Total resp. rate Latest Units:   22 Mode Latest Units:   ASSIST CONTROL Tidal volume Latest Units: ml 420 Volume control plus Latest Units:   YES  
PIP (POC) Latest Units:   27  
PEEP/CPAP (POC) Latest Units: cmH2O 10 Allens test (POC) Latest Units:   N/A Inspiratory Time Latest Units: sec 1.09

## 2020-10-18 NOTE — PROGRESS NOTES
[facility] Pulmonary, Critical Care, and Sleep Medicine Name: Bianca Plummer MRN: [id number] : 1965 Hospital: [facility] [place] Date: 10/18/2020  Room: [address] [place] [address] Note Consult requesting physician: Dr. Shane Philip Reason for Consult: Acute respiratory failure with hypoxemia Subjective/History of Present Illness:  
Patient is a 47 y.o. male with PMHx significant for chronic pancreatitis, diabetes, gastroparesis, hidradenitis; history of colitis on Humiranot clear if patient has inflammatory bowel disease. Patient was admitted on 10/9 with nausea and lower abdominal pains. The patient was admitted to the hospital.  Dr. Nigel Tate consulted for cirrhosis. Today evening, patient had shortness of breath symptoms and hypoxemia. RRT was done. Chest x-ray shows bilateral pulmonary infiltrates. Patient was placed on BiPAP and moved to the ICU. He is currently getting Lasix injection. Patient denies any vomiting or aspirations recently; he denies any COVID-19 contacts at home. He feels comfortable on BiPAP. No significant cough or productive sputum noted; no chest pain or hemoptysis. Telemetry mild sinus tach. Afebrile; blood pressure stable; overall fluid positive by 5 L this admission. 10/18/20 Patient remains in ICU; on ventilator support. Patient remains sedated and paralyzed; PEEP 10, FiO2 50%. No acute issues overnight. Afebrile; blood pressure stable; O2 sats 94%-91%; telemetry sinus rhythm. Urine output 2.3 L yesterday. Review of Systems:  
Review of systems not obtained due to patient factors. No Known Allergies Past Medical History:  
Diagnosis Date  Diabetes ([address]  Gastroparesis  Pancreatitis Past Surgical History:  
Procedure Laterality Date  HX CHOLECYSTECTOMY  HX HIP FRACTURE TX    
 left hip sx  Social History Tobacco Use  
  Smoking status: Current Every Day Smoker  Smokeless tobacco: Never Used Substance Use Topics  Alcohol use: Yes History reviewed. No pertinent family history. Prior to Admission medications Medication Sig Start Date End Date Taking? Authorizing Provider  
adalimumab (Humira Pen) 40 mg/0.8 mL injection pen 40 mg by SubCUTAneous route every seven (7) days. Yes Provider, Historical  
lidocaine (LIDODERM) 5 % 2 Patches by TransDERmal route every twenty-four (24) hours. Apply patch to the affected area for 12 hours a day and remove for 12 hours a day. One patch is applied to left hip. One patch is applied to right shoulder blade. Yes Provider, Historical  
loratadine (Claritin) 10 mg tablet Take 10 mg by mouth daily. Indications: inflammation of the nose due to an allergy   Yes Provider, Historical  
aspirin delayed-release 81 mg tablet Take 1 Tab by mouth daily. Yes Racheal, MD Meño  
insulin glargine (Lantus Solostar U-100 Insulin) 100 unit/mL (3 mL) inpn 5 Units by SubCUTAneous route daily. Yes Racheal, MD Meño  
DULoxetine (CYMBALTA) 60 mg capsule Take 1 Cap by mouth as needed. 1/6/19   Meño Springer MD  
glyBURIDE-metFORMIN (GLUCOVANCE) 2.5-500 mg per tablet Take 1 Tab by mouth two (2) times a day. Meño Springer MD  
methocarbamoL (ROBAXIN) 500 mg tablet Take 2 Tabs by mouth four (4) times daily. Meño Springer MD  
traZODone (DESYREL) 50 mg tablet Take 50 mg by mouth nightly. Meño Springer MD  
HYDROmorphone (DILAUDID) 2 mg tablet Take 1 Tab by mouth every four (4) hours as needed for Pain. Max Daily Amount: 12 mg. Indications: Severe Pain 9/26/18   Nichol Menjivar MD  
ondansetron (ZOFRAN ODT) 4 mg disintegrating tablet Take 1-2 tablets every 6-8 hours as needed for nausea and vomiting. 9/26/18   Nichol Menjivar MD  
 
Current Facility-Administered Medications Medication Dose Route Frequency  vancomycin (VANCOCIN) 1,250 mg in 0.9% sodium chloride 250 mL (VIAL-MATE)  1,250 mg IntraVENous Q18H  
 insulin glargine (LANTUS) injection 10 Units  10 Units SubCUTAneous DAILY  insulin regular (NOVOLIN R, HUMULIN R) injection 5 Units  5 Units SubCUTAneous Q6H  
 thiamine mononitrate (B-1) tablet 100 mg  100 mg Oral DAILY  folic acid (FOLVITE) tablet 1 mg  1 mg Oral DAILY  lactulose (CHRONULAC) 10 gram/15 mL solution 15 mL  10 g Oral BID  midazolam in normal saline (VERSED) 1 mg/mL infusion  1-5 mg/hr IntraVENous TITRATE  fentaNYL (PF) 900 mcg/30 ml infusion soln  0-200 mcg/hr IntraVENous TITRATE  piperacillin-tazobactam (ZOSYN) 4.5 g in 0.9% sodium chloride (MBP/ADV) 100 mL MBP  4.5 g IntraVENous Q8H  
 cisatracurium (NIMBEX) 100 mg in 0.9% sodium chloride 100 mL infusion  0-10 mcg/kg/min IntraVENous TITRATE  albumin human 25% (BUMINATE) solution 25 g  25 g IntraVENous TID  insulin lispro (HUMALOG) injection   SubCUTAneous Q6H  Vancomycin - Rx to dose and monitor  1 Each Other Rx Dosing/Monitoring  pantoprazole (PROTONIX) 40 mg in 0.9% sodium chloride 10 mL injection  40 mg IntraVENous Q24H  
 lidocaine 4 % patch 2 Patch  2 Patch TransDERmal Q24H Objective:  
Vital Signs:   
Blood pressure 125/82, pulse (!) 59, temperature 98.4 °F (36.9 °C), resp. rate 18, height 5' 9\" (1.753 m), weight 64.9 kg (143 lb), SpO2 94 %. Body mass index is 21.12 kg/m². O2 Device: Ventilator, Endotracheal tube O2 Flow Rate (L/min): 4 l/min Temp (24hrs), Av.8 °F (37.1 °C), Min:97.7 °F (36.5 °C), Max:99.3 °F (37.4 °C) Intake/Output:  
Last shift:      No intake/output data recorded. Last 3 shifts: 10/16 1901 - 10/18 0700 In: 2387.8 [I.V.:952.8] Out: Angy Boogie [XPBSQ:4252] Intake/Output Summary (Last 24 hours) at 10/18/2020 1055 Last data filed at 10/18/2020 2802 Gross per 24 hour Intake 2387.79 ml Output 2400 ml Net -12.21 ml Last 3 Recorded Weights in this Encounter 10/09/20 2234 10/12/20 1045 10/12/20 1046 Weight: 65.2 kg (143 lb 12.8 oz) 64.9 kg (143 lb) 64.9 kg (143 lb) Physical Exam:  
Patient remains on vent support; sedated and paralyzed; FiO2 50%; PEEP 10; acyanotic HEENT: pupils not dilated, reactive, no scleral jaundice, moist oral mucosa Orogastric and orotracheal tubesno bleeding noted. Neck: No adenopathy or thyroid swelling CVS: Heart sounds normal; no murmur; JVD not elevated; no chest heaving RS: Symmetrical breath sounds; moderate air entry bilateral; decreased breath sounds at bases; no wheezes; ventilator synchrony noted Abd: soft, not distended, no hepatosplenomegaly, no abd distension, no guarding or rigidity, bowel sounds heard Neuro: Intubated, sedated and paralyzed; limited exam  
Extrm: no leg edema or swelling or clubbing Skin: no rash Lymphatic: no cervical or supraclavicular adenopathy. Right femoral central lineno bleeding or hematoma discharge. Data:  
   
Recent Results (from the past 12 hour(s)) [place] [facility] Collection Time: 10/18/20 12:38 AM  
Result Value Ref Range Vancomycin,trough 23.7 (HH) 10.0 - 20.0 ug/mL MAGNESIUM Collection Time: 10/18/20  3:45 AM  
Result Value Ref Range Magnesium 1.8 1.6 - 2.6 mg/dL PHOSPHORUS Collection Time: 10/18/20  3:45 AM  
Result Value Ref Range Phosphorus 2.5 2.5 - 4.9 MG/DL  
CALCIUM, IONIZED Collection Time: 10/18/20  3:45 AM  
Result Value Ref Range Ionized Calcium 1.06 (L) 1.12 - 1.32 MMOL/L  
PROTHROMBIN TIME + INR Collection Time: 10/18/20  3:45 AM  
Result Value Ref Range Prothrombin time 25.1 (H) 11.5 - 15.2 sec INR 2.3 (H) 0.8 - 1.2 AMMONIA Collection Time: 10/18/20  3:45 AM  
Result Value Ref Range Ammonia 35 (H) 11 - 32 UMOL/L  
FIBRINOGEN Collection Time: 10/18/20  3:45 AM  
Result Value Ref Range Fibrinogen 129 (L) 210 - 451 mg/dL METABOLIC PANEL, COMPREHENSIVE Collection Time: 10/18/20  3:45 AM  
Result Value Ref Range Sodium 148 (H) 136 - 145 mmol/L Potassium 4.1 3.5 - 5.5 mmol/L Chloride 109 100 - 111 mmol/L  
 CO2 31 21 - 32 mmol/L Anion gap 8 3.0 - 18 mmol/L Glucose 301 (H) 74 - 99 mg/dL BUN 22 (H) 7.0 - 18 MG/DL Creatinine 1.07 0.6 - 1.3 MG/DL  
 BUN/Creatinine ratio 21 (H) 12 - 20 GFR est AA >60 >60 ml/min/1.73m2 GFR est non-AA >60 >60 ml/min/1.73m2 Calcium 8.9 8.5 - 10.1 MG/DL Bilirubin, total 3.3 (H) 0.2 - 1.0 MG/DL  
 ALT (SGPT) 38 16 - 61 U/L  
 AST (SGOT) 63 (H) 10 - 38 U/L Alk. phosphatase 54 45 - 117 U/L Protein, total 5.7 (L) 6.4 - 8.2 g/dL Albumin 3.9 3.4 - 5.0 g/dL Globulin 1.8 (L) 2.0 - 4.0 g/dL A-G Ratio 2.2 (H) 0.8 - 1.7    
CBC WITH AUTOMATED DIFF Collection Time: 10/18/20  3:45 AM  
Result Value Ref Range WBC 5.6 4.6 - 13.2 K/uL  
 RBC 2.45 (L) 4.70 - 5.50 M/uL HGB 7.8 (L) 13.0 - 16.0 g/dL HCT 23.3 (L) 36.0 - 48.0 % MCV 95.1 74.0 - 97.0 FL  
 MCH 31.8 24.0 - 34.0 PG  
 MCHC 33.5 31.0 - 37.0 g/dL  
 RDW 16.7 (H) 11.6 - 14.5 % PLATELET 47 (L) 429 - 420 K/uL NEUTROPHILS 82 (H) 40 - 73 % LYMPHOCYTES 8 (L) 21 - 52 % MONOCYTES 10 3 - 10 % EOSINOPHILS 0 0 - 5 % BASOPHILS 0 0 - 2 %  
 ABS. NEUTROPHILS 4.6 1.8 - 8.0 K/UL  
 ABS. LYMPHOCYTES 0.5 (L) 0.9 - 3.6 K/UL  
 ABS. MONOCYTES 0.5 0.05 - 1.2 K/UL  
 ABS. EOSINOPHILS 0.0 0.0 - 0.4 K/UL  
 ABS. BASOPHILS 0.0 0.0 - 0.1 K/UL  
 DF AUTOMATED    
POC VENOUS BLOOD GAS Collection Time: 10/18/20  5:06 AM  
Result Value Ref Range Device: VENT    
 FIO2 (POC) 0.6 %  
 pH, venous (POC) 7.53 (HH) 7.32 - 7.42    
 pCO2, venous (POC) 34.5 (L) 41 - 51 MMHG  
 pO2, venous (POC) 60 (H) 25 - 40 mmHg HCO3, venous (POC) 28.6 (H) 23.0 - 28.0 MMOL/L  
 sO2, venous (POC) 93 (H) 65 - 88 % Base excess, venous (POC) 6 mmol/L Mode ASSIST CONTROL Tidal volume 420 ml Set Rate 22 bpm  
 PEEP/CPAP (POC) 10 cmH2O  
 PIP (POC) 27 Allens test (POC) N/A Inspiratory Time 1.09 sec Total resp. rate 22 Site RIGHT RADIAL Patient temp. 98.7 Specimen type (POC) VENOUS BLOOD Performed by Desiree Gates Volume control plus YES    
GLUCOSE, POC Collection Time: 10/18/20  5:13 AM  
Result Value Ref Range Glucose (POC) 332 (H) 70 - 110 mg/dL GLUCOSE, POC Collection Time: 10/18/20  5:16 AM  
Result Value Ref Range Glucose (POC) 395 (H) 70 - 110 mg/dL GLUCOSE, POC Collection Time: 10/18/20 11:28 AM  
Result Value Ref Range Glucose (POC) 367 (H) 70 - 110 mg/dL Chemistry Recent Labs 10/18/20 
0345 10/17/20 
2300 10/17/20 
1600 10/17/20 
1000 10/17/20 
0345 10/16/20 
1400 10/16/20 
0400 *  --   --   --  251*  --  199* *  --   --   --  151*  --  147* K 4.1 3.8 3.5 3.9 3.7 4.2 3.9   --   --   --  112*  --  111 CO2 31  --   --   --  28  --  27 BUN 22*  --   --   --  22*  --  17  
CREA 1.07  --   --   --  1.20  --  0.95  
CA 8.9  --   --   --  8.2*  --  8.2* MG 1.8  --   --  1.9 1.7  --  2.1 PHOS 2.5  --   --   --  3.1 2.8 2.3* AGAP 8  --   --   --  11  --  9  
BUCR 21*  --   --   --  18  --  18  
AP 54  --   --   --  59  --  44*  
TP 5.7*  --   --   --  5.6*  --  5.5* ALB 3.9  --   --   --  3.8  --  3.7 GLOB 1.8*  --   --   --  1.8*  --  1.8* AGRAT 2.2*  --   --   --  2.1*  --  2.1* Lactic Acid Lactic acid Date Value Ref Range Status 10/14/2020 1.8 0.4 - 2.0 MMOL/L Final  
 
No results for input(s): LAC in the last 72 hours. Liver Enzymes Protein, total  
Date Value Ref Range Status 10/18/2020 5.7 (L) 6.4 - 8.2 g/dL Final  
 
Albumin Date Value Ref Range Status 10/18/2020 3.9 3.4 - 5.0 g/dL Final  
 
Globulin Date Value Ref Range Status 10/18/2020 1.8 (L) 2.0 - 4.0 g/dL Final  
 
A-G Ratio Date Value Ref Range Status 10/18/2020 2.2 (H) 0.8 - 1.7   Final  
 
Alk. phosphatase Date Value Ref Range Status 10/18/2020 54 45 - 117 U/L Final  
 
Recent Labs 10/18/20 
0345 10/17/20 0345 10/16/20 
0400 TP 5.7* 5.6* 5.5* ALB 3.9 3.8 3.7 GLOB 1.8* 1.8* 1.8* AGRAT 2.2* 2.1* 2.1* AP 54 59 44* CBC w/Diff Recent Labs 10/18/20 
0345 10/17/20 
0345 10/16/20 
0400 WBC 5.6 6.6 6.5  
RBC 2.45* 2.60* 2.57* HGB 7.8* 8.2* 8.2* HCT 23.3* 24.9* 24.9*  
PLT 47* 53* 46* GRANS 82* 83* 82* LYMPH 8* 8* 9* EOS 0 0 0 Cardiac Enzymes No results found for: CPK, CK, CKMMB, CKMB, RCK3, CKMBT, CKNDX, CKND1, LYDIA, TROPT, TROIQ, LIANNA, TROPT, TNIPOC, BNP, BNPP  
 
BNP No results found for: BNP, BNPP, XBNPT Coagulation Recent Labs 10/18/20 
0345 10/17/20 
0345 10/16/20 
0400 PTP 25.1* 25.4* 22.8* INR 2.3* 2.4* 2.1* Thyroid  No results found for: T4, T3U, TSH, TSHEXT, TSHEXT No results found for: T4  
 
Urinalysis Lab Results Component Value Date/Time Color DARK YELLOW 10/11/2020 01:03 AM  
 Appearance CLEAR 10/11/2020 01:03 AM  
 Specific gravity 1.021 10/11/2020 01:03 AM  
 pH (UA) 5.5 10/11/2020 01:03 AM  
 Protein Negative 10/11/2020 01:03 AM  
 Glucose Negative 10/11/2020 01:03 AM  
 Ketone Negative 10/11/2020 01:03 AM  
 Bilirubin SMALL (A) 10/11/2020 01:03 AM  
 Urobilinogen 1.0 10/11/2020 01:03 AM  
 Nitrites Negative 10/11/2020 01:03 AM  
 Leukocyte Esterase Negative 10/11/2020 01:03 AM  
  
 
Culture data during this hospitalization. All Micro Results Procedure Component Value Units Date/Time CULTURE, RESPIRATORY/SPUTUM/BRONCH Alvira Finner STAIN [[id number]]  (Abnormal) Collected:  10/14/20 1453 Order Status:  Completed Specimen:  Sputum,ET Suction Updated:  10/16/20 9217 Special Requests: NO SPECIAL REQUESTS     
  GRAM STAIN RARE WBCS SEEN     
      
  RARE EPITHELIAL CELLS SEEN  
     
   NO ORGANISMS SEEN Culture result:    
  LIGHT YEAST, (APPARENT CANDIDA ALBICANS) NO NORMAL RESPIRATORY QUINCY ISOLATED  
     
 CULTURE, BLOOD [[id number]] Collected:  10/14/20 1530 Order Status:  Canceled Specimen:  Blood CULTURE, BLOOD [[id number]] Collected:  10/14/20 1530 Order Status:  Canceled Specimen:  Blood ECHO 10/12/20 Interpretation Summary Result status: Final result  · LV: Estimated LVEF is 55 - 60%. Normal cavity size, wall thickness and systolic function (ejection fraction normal). Mild (grade 1) left ventricular diastolic dysfunction E'E= 8.94. 
· LA: No atrial septal defect present. · AV: Aortic valve leaflet calcification present. · MV: Mitral valve thickening. Images report reviewed by me: 
CT 10/9/2020 [place] [facility] [place] encounter on 10/09/20 CT ABD PELV W CONT Narrative EXAM: CT of the abdomen and pelvis INDICATION: Abdominal pain, greatest in the left lower quadrant with nausea and 
vomiting. COMPARISON: MRI pelvis 10/7/2019. CT left hip 4/17/2019 TECHNIQUE: Axial CT imaging of the abdomen and pelvis was performed with 
intravenous contrast. Multiplanar reformats were generated. One or more dose reduction techniques were used on this CT: automated exposure 
control, adjustment of the mAs and/or kVp according to patient size, and 
iterative reconstruction techniques. The specific techniques used on this CT 
exam have been documented in the patient's electronic medical record. Digital 
Imaging and Communications in Medicine (DICOM) format image data are available 
to nonaffiliated external healthcare facilities or entities on a secure, media 
free, reciprocally searchable basis with patient authorization for at least a 
12-month period after this study. _______________ FINDINGS: 
 
LOWER CHEST: Minor basilar changes of atelectasis. No alveolar consolidation or 
pleural effusion. Normal cardiac size without pericardial effusion. LIVER, BILIARY: Diffuse hepatic hypoattenuation is present with hepatic size 
estimated at approximately 27 cm in craniocaudal span. No discrete liver lesion. No intrahepatic or extra hepatic biliary ductal dilatation. Patient is status 
post cholecystectomy. PANCREAS: Pancreatic enhancement is normal. Mild and diffuse pancreatic ductal 
ectasia is noted. No discrete pancreatic mass lesion noted. SPLEEN: Mildly enlarged at 14 cm. ADRENALS: Normal. 
 
KIDNEYS/URETERS/BLADDER: Symmetric renal enhancement. No bowel obstruction PELVIC ORGANS: Symmetric renal enhancement. Punctate nonobstructing upper pole 
left renal stone. No distal ureteral or urinary bladder stone. Urinary bladder 
unremarkable in CT appearance. VASCULATURE: Diffuse aortobiiliac atherosclerotic vascular calcification is 
present without evidence of aneurysmal dilatation. Main portal vein as well as 
right and left portal venous branches are widely patent. LYMPH NODES: Scattered subcentimeter mesenteric and retroperitoneal lymph nodes 
are noted. No evidence of abdominal or pelvic adenopathy. GASTROINTESTINAL TRACT: No morphology of bowel obstruction. No free 
intraperitoneal gas. Mild thickening of the proximal ascending colon and cecum 
noted. BONES: No acute or aggressive osseous abnormalities identified. Partial 
visualization healed proximal left femoral fracture status post IM nailing and 
dynamic hip screw placement. OTHER: Small quantity of abdominal/pelvic ascites. Thin subcutaneous linear 
density tracking along the right gluteal fold, likely in keeping previously 
noted fistula tract. 
 
_______________ Impression IMPRESSION: 
 
1. Diffuse hepatic hypoattenuation compatible with steatosis with hepatomegaly, 
mild splenomegaly, and small quantity of abdominal/pelvic ascites. The totality 
of findings compatible with hepatocellular dysfunction and potentially 
associated portal hypertension. > Patent main and proximal portal venous branches.  
  > Mild thickening of the proximal ascending colon and cecum may reflect sequela of portal colopathy. Infectious/inflammatory colitis also a possibility 
but thought less likely given the imaging appearance. 2. Likely correlate for small area of perianal fistula noted on prior MRI 
pelvis. 3. Punctate nonobstructing upper pole left renal stone. XR 10/17/20 CXR  
COMPARISON: 10/15/2020.  TECHNIQUE: Portable frontal view of the chest. Patient is rotated to the right. FINDINGS:  
SUPPORT DEVICES: Endotracheal tube distal tip projects at 2.5 cm. Enteric tube distal tip is not visualized. .  HEART AND MEDIASTINUM: No appreciable cardiomegaly. Remaining mediastinal contours are stable.  LUNGS AND PLEURAL SPACES: Bilateral lung opacities are unchanged. No evidence for pneumothorax. Bilateral pleural effusions.  
BONY THORAX AND SOFT TISSUES: Unremarkable. IMPRESSION:  
1. Stable bilateral lung opacities. 2. Stable supporting tubes. Kathy Boop US LEs 10/11/2020 Interpretation Summary · No evidence of deep vein thrombosis in the right lower extremity. · No evidence of deep vein thrombosis in the left lower extremity. US Abd 10/13/20 IMPRESSION:  
1. Nonspecific increased hepatic echotexture suggesting intrinsic liver disease. Dilated portal vein, recanalized umbilical vein and perihepatic ascites. There 
is lack of flow within the portal vein, suggestive of thrombosis, with 
periportal collaterals and decreased flows in the hepatic vein. However, portal 
and hepatic veins patent on recent CT abdomen 10/09/2020. Multiple attempts to 
contact Dr. Clement Cortes with this findings, awaiting callback.  
2. No discrete hepatic mass identified.  
3. Cholecystectomy.  
4. Pancreas obscured by bowel gas. IMPRESSION:  
· Acute respiratory failure with hypoxia J96.01 
· Aspiration pneumonia - J69.0 · ARDS - J80 · Thrombocytopenia, concern for ITP vs liver disease - D69.6 · Coagulopathy, multifactorial including DIC - D68.9 · Acute diastolic congestive heart failure  I50.31 
· Cirrhosis  K74.60 · Hyperbilirubinemia - E80.6 · Portal vein thrombosis - S93 
· Colitis  K52.9 Patient Active Problem List  
Diagnosis Code  Hypocalcemia, resolved E83.51  
 Hypomagnesemia, resolved E83.42  
 Alcoholism ([address] F10.20  Cirrhosis ([facility] K74.60  Colitis K52.9  Acute respiratory failure with hypoxemia ([facility]) J96.01  
 Aspiration pneumonia ([address] J69.0  Acute diastolic CHF (congestive heart failure) ([facility]) I50.31  Thrombocytopenia ([address] D69.6 · Code status: Full code RECOMMENDATIONS:  
Respiratory: Continue ventilator support; start weaning paralytic drip today Patient currently sedated and paralyzed for ventilator synchronyARDS; once paralytic drip being weaned, watch for oxygen desaturations. Keep oxygen saturation greater than 88%; tolerate PaO2 greater than 55; lung volume protection strategy; tolerate permissive hypercapnia; keep plateau pressure less than 30. Sedationmidazolam and fentanyl infusions. Paralyticcisatracurium; follow train-of-fourstart weaning today. Ventilator and sedation bundles; VAP prevention bundle and sedation bundle followed, head of the bed at 30' all times Chest x-ray reviewedtoday's film rotated; persistent bilateral pulmonary infiltrates and opacities; stable endotracheal tube position; radiological picture consistent with aspiration pneumonia leading to ARDS. SARS CoV2 PCR negative x 2 negative CVS: No further sinus bradycardia; close monitoring while on fentanyl sedation. Stable hemodynamics; currently not on pressor; EF 55 to 60%. No pulmonary hypertension reported on echo. ID: Continue broad-spectrum antibioticsZosyn and vancomycin; follow cultures, and de-escalate antibiotic when appropriate; noted history of immunosuppression state given biological use [Humira].  
Hematology/Oncology: Stable hemoglobin and platelets; keep hemoglobin greater than 7 g/dL; keep platelets greater than 20; INR coagulopathy due to cirrhosis; keep fibrinogen greater than 100; status post cryoprecipitate yesterday; appreciate hematology review daily. Dexamethasone 40 mg every 24 hours for possible ITP. Renal: Stable urine output and renal function; replace electrolytes as needed. Monitor urine output and renal function; replace electrolytes as needed. Sodium improving; Lasix on hold; free water via earlgastric tube. GI/: monitor LFT, ammonia EQOWE95 today; Dr. Chrissie Au from hepatology on case; alcoholic liver disease with cirrhosis; on lactulose and albumin therapy. Continue hepatology follow-up. Ward catheter in placemonitor urine output. Trophic tube feeding while patient on paralytic. Endocrine: Monitor blood sugars; sliding scale insulin while on steroids; patient started on Lantus and Humalog insulin today due to elevated blood sugars. Neurology: Plan to wean off paralytic from today. Thiamine and folic acid. Electrolytes: Replace electrolytes per ICU electrolyte replacement protocol. IVF: Albumin and free water. Nutrition: Trophic tube feedingpatient tolerating. Prophylaxis: DVT Prophylaxis: SCDs. GI Prophylaxis: Protonix. Restraints: none - on paralytic Lines/Tubes: [address], Ward and Vent Bundles will be Followed, Vent Day 5. ETT: 10/14/20. OGT/NGT: 10/14/20. Central line: 10/14/20 RT femoral CVC (site examined, no erythema, induration, discharge or sign of infection. Dressing intact. Medically necessary, will remove it when not needed. Central line bundle followed); plan for PICC line placement tomorrow. Ward: 10/13(Medically necessary for strict input/output monitoring in critically ill patient, will remove it when not needed. Ward bundle followed). Prognosis appears to be poor with multiorgan failure, bleeding complications, ventilator dependence, renal failure, hepatorenal syndrome, mortality. Will defer respective systems problem management to primary and other respective consultant and follow patient in ICU with primary and other medical team. 
Further recommendations will be based on the patient's response to recommended treatment and results of the investigation ordered. Quality Care: PPI, DVT prophylaxis, HOB elevated, Infection control all reviewed and addressed. Care of plan d/w ICU RN, RT. Palliative care on case; will need to review with family regarding goals of care; based on family discussion, may need to consider early trach considering patient's comorbidities. High complexity decision making was performed during the evaluation of this patient at high risk for decompensation with multiple organ involvement. Total critical care time spent rendering care exclusive of procedures/family discussion/coordination of care: 
38 minutes. Cole Truong MD 
10/18/2020

## 2020-10-18 NOTE — PROGRESS NOTES
0700- Bedside and Verbal shift change report given to Martin Otto RN (oncoming nurse) by Jaquelin Workman RN (offgoing nurse). Report included the following information SBAR, Kardex, MAR and Recent Results. 0800- shift assessment complete. Patient remains on sedation and paralytic. 1030- Fentanyl gtt stopped. Patient's HR 47. 
 
1223- Began weaning off nimbex gtt. 1359- Nimbex gtt stopped. 1544- Patient's fentanyl restarted and versed gtt increased for agitation. 1900- Bedside and Verbal shift change report given to Denise Carvalho (oncoming nurse) by Martin Otto RN (offgoing nurse). Report included the following information SBAR, Kardex, MAR and Recent Results.

## 2020-10-18 NOTE — PROGRESS NOTES
@2000 pt taken over sedated and paralyzed in bed on ventilator via et tube. Fla cc 0 ,vital signs fluctuates,OG tube infusing tube feeds. Ward in situ draining imer urine. Nimbex and Versed drips in progress. Assessment done and charted in appropriate flow sheets . Nursing management continues. @ 0000 reassessment completed no new changes . Nursing care continues. @0400 pt reassessed no changes pt continues to be monitored 
  
 
@0730 Bedside and Verbal shift change report given to Isaak Kinsey (oncoming nurse) by Adiel Wesley (offgoing nurse). Report included the following information SBAR, Kardex, Intake/Output, MAR, Recent Results, Med Rec Status and Alarm Parameters .

## 2020-10-19 NOTE — PROGRESS NOTES
[facility] Pulmonary, Critical Care, and Sleep Medicine Name: Maryellen nAdrade MRN: [id number] : 1965 Hospital: [facility] [place] Date: 10/19/2020  Room: [address] Note Consult requesting physician: Dr. Meng Jean Baptiste Reason for Consult: Acute respiratory failure with hypoxemia Subjective/History of Present Illness:  
Patient is a 47 y.o. male with PMHx significant for chronic pancreatitis, diabetes, gastroparesis, hidradenitis; history of colitis on Humiranot clear if patient has inflammatory bowel disease. Patient was admitted on 10/9 with nausea and lower abdominal pains. The patient was admitted to the hospital.  Dr. Rand Mortimer consulted for cirrhosis. Today evening, patient had shortness of breath symptoms and hypoxemia. RRT was done. Chest x-ray shows bilateral pulmonary infiltrates. Patient was placed on BiPAP and moved to the ICU. He is currently getting Lasix injection. Patient denies any vomiting or aspirations recently; he denies any COVID-19 contacts at home. He feels comfortable on BiPAP. No significant cough or productive sputum noted; no chest pain or hemoptysis. Telemetry mild sinus tach. Afebrile; blood pressure stable; overall fluid positive by 5 L this admission. 10/19/20 Patient remains in ICU; on ventilator support. Day 6 of intubation. Patient sedated only with midazolam 2 mg/h; off paralytic yesterday. PEEP 10; FiO2 down to 40%. No acute issues overnight. Patient remains afebrile; sinus rhythm; blood pressure stable. Urine output 1.3 L yesterday. Review of Systems:  
Review of systems not obtained due to patient factors. No Known Allergies Past Medical History:  
Diagnosis Date  Diabetes ([facility] [address]  Gastroparesis  Pancreatitis Past Surgical History:  
Procedure Laterality Date  HX CHOLECYSTECTOMY  [address]    
 left hip sx 9.23/2018 Social History Tobacco Use  Smoking status: Current Every Day Smoker  Smokeless tobacco: Never Used Substance Use Topics  Alcohol use: Yes History reviewed. No pertinent family history. Prior to Admission medications Medication Sig Start Date End Date Taking? Authorizing Provider  
adalimumab (Humira Pen) 40 mg/0.8 mL injection pen 40 mg by SubCUTAneous route every seven (7) days. Yes Provider, Historical  
lidocaine (LIDODERM) 5 % 2 Patches by TransDERmal route every twenty-four (24) hours. Apply patch to the affected area for 12 hours a day and remove for 12 hours a day. One patch is applied to left hip. One patch is applied to right shoulder blade. Yes Provider, Historical  
loratadine (Claritin) 10 mg tablet Take 10 mg by mouth daily. Indications: inflammation of the nose due to an allergy   Yes Provider, Historical  
aspirin delayed-release 81 mg tablet Take 1 Tab by mouth daily. Yes Racheal, MD Meño  
insulin glargine (Lantus Solostar U-100 Insulin) 100 unit/mL (3 mL) inpn 5 Units by SubCUTAneous route daily. Yes Racheal, MD Meño  
DULoxetine (CYMBALTA) 60 mg capsule Take 1 Cap by mouth as needed. 1/6/19   Meño Springer MD  
glyBURIDE-metFORMIN (GLUCOVANCE) 2.5-500 mg per tablet Take 1 Tab by mouth two (2) times a day. Meño Springer MD  
methocarbamoL (ROBAXIN) 500 mg tablet Take 2 Tabs by mouth four (4) times daily. Meño Springer MD  
traZODone (DESYREL) 50 mg tablet Take 50 mg by mouth nightly. Meño Springer MD  
HYDROmorphone (DILAUDID) 2 mg tablet Take 1 Tab by mouth every four (4) hours as needed for Pain. Max Daily Amount: 12 mg. Indications: Severe Pain 9/26/18   Stephani Cassidy MD  
ondansetron (ZOFRAN ODT) 4 mg disintegrating tablet Take 1-2 tablets every 6-8 hours as needed for nausea and vomiting. 9/26/18   Stephani Cassidy MD  
 
Current Facility-Administered Medications Medication Dose Route Frequency  potassium, sodium phosphates (NEUTRA-PHOS) packet 2 Packet  2 Packet Oral Q2H  
 vancomycin (VANCOCIN) 1,250 mg in 0.9% sodium chloride 250 mL (VIAL-MATE)  1,250 mg IntraVENous Q18H  
 insulin glargine (LANTUS) injection 10 Units  10 Units SubCUTAneous DAILY  insulin regular (NOVOLIN R, HUMULIN R) injection 5 Units  5 Units SubCUTAneous Q6H  
 thiamine mononitrate (B-1) tablet 100 mg  100 mg Oral DAILY  folic acid (FOLVITE) tablet 1 mg  1 mg Oral DAILY  lactulose (CHRONULAC) 10 gram/15 mL solution 15 mL  10 g Oral BID  midazolam in normal saline (VERSED) 1 mg/mL infusion  1-5 mg/hr IntraVENous TITRATE  fentaNYL (PF) 900 mcg/30 ml infusion soln  0-200 mcg/hr IntraVENous TITRATE  piperacillin-tazobactam (ZOSYN) 4.5 g in 0.9% sodium chloride (MBP/ADV) 100 mL MBP  4.5 g IntraVENous Q8H  
 cisatracurium (NIMBEX) 100 mg in 0.9% sodium chloride 100 mL infusion  0-10 mcg/kg/min IntraVENous TITRATE  albumin human 25% (BUMINATE) solution 25 g  25 g IntraVENous TID  insulin lispro (HUMALOG) injection   SubCUTAneous Q6H  Vancomycin - Rx to dose and monitor  1 Each Other Rx Dosing/Monitoring  pantoprazole (PROTONIX) 40 mg in 0.9% sodium chloride 10 mL injection  40 mg IntraVENous Q24H  
 lidocaine 4 % patch 2 Patch  2 Patch TransDERmal Q24H Objective:  
Vital Signs:   
Blood pressure 128/84, pulse 72, temperature 97.7 °F (36.5 °C), resp. rate 12, height 5' 9\" (1.753 m), weight 64.9 kg (143 lb), SpO2 96 %. Body mass index is 21.12 kg/m². O2 Device: Ventilator, Endotracheal tube O2 Flow Rate (L/min): 4 l/min Temp (24hrs), Av.7 °F (36.5 °C), Min:97.6 °F (36.4 °C), Max:97.9 °F (36.6 °C) Intake/Output:  
Last shift:      No intake/output data recorded. Last 3 shifts: 10/17 1901 - 10/19 0700 In: 2201.2 [I.V.:877.2] Out:  [Urine:2100] Intake/Output Summary (Last 24 hours) at 10/19/2020 1760 Last data filed at 10/19/2020 3988 Gross per 24 hour Intake 1346.39 ml Output 1300 ml Net 46.39 ml Last 3 Recorded Weights in this Encounter 10/09/20 2234 10/12/20 1045 10/12/20 1046 Weight: 65.2 kg (143 lb 12.8 oz) 64.9 kg (143 lb) 64.9 kg (143 lb) Physical Exam:  
Patient on vent support; off paralytic; remains mildly sedated; acyanotic HEENT: pupils not dilated, reactive, no scleral jaundice, moist oral mucosa Orogastric and orotracheal tubesno bleeding noted. Neck: No adenopathy or thyroid swelling CVS: Normal heart sounds; S1-S2 heard; no murmur; JVD not visible; no chest heaving RS: Moderate air entry bilateral; symmetrical breath sounds; decreased breath sounds at bases with crackles; no wheezes heard; ventilator synchrony present Abd: soft, no distention or tenderness or guarding or rigidity; bowel sounds heard; no hepatosplenomegaly Neuro: Intubated, sedated; off paralytic; patient arousable, but becomes tachypneic; limited exam  
Extrm: no leg edema or swelling or clubbing Skin: no rash Lymphatic: no cervical or supraclavicular adenopathy. Right femoral central lineno bleeding or hematoma discharge. Data:  
   
Recent Results (from the past 12 hour(s)) GLUCOSE, POC Collection Time: 10/18/20 11:47 PM  
Result Value Ref Range Glucose (POC) 302 (H) 70 - 110 mg/dL AMMONIA Collection Time: 10/19/20  4:30 AM  
Result Value Ref Range Ammonia 32 11 - 32 UMOL/L  
MAGNESIUM Collection Time: 10/19/20  4:50 AM  
Result Value Ref Range Magnesium 1.9 1.6 - 2.6 mg/dL PHOSPHORUS Collection Time: 10/19/20  4:50 AM  
Result Value Ref Range Phosphorus 2.3 (L) 2.5 - 4.9 MG/DL  
CALCIUM, IONIZED Collection Time: 10/19/20  4:50 AM  
Result Value Ref Range Ionized Calcium 1.13 1.12 - 1.32 MMOL/L  
PROTHROMBIN TIME + INR Collection Time: 10/19/20  4:50 AM  
Result Value Ref Range Prothrombin time 26.3 (H) 11.5 - 15.2 sec INR 2.5 (H) 0.8 - 1.2 FIBRINOGEN  Collection Time: 10/19/20  4:50 AM  
 Result Value Ref Range Fibrinogen 85 (L) 210 - 451 mg/dL CBC WITH AUTOMATED DIFF Collection Time: 10/19/20  4:50 AM  
Result Value Ref Range WBC 7.4 4.6 - 13.2 K/uL  
 RBC 2.48 (L) 4.70 - 5.50 M/uL HGB 7.9 (L) 13.0 - 16.0 g/dL HCT 23.5 (L) 36.0 - 48.0 % MCV 94.8 74.0 - 97.0 FL  
 MCH 31.9 24.0 - 34.0 PG  
 MCHC 33.6 31.0 - 37.0 g/dL  
 RDW 17.2 (H) 11.6 - 14.5 % PLATELET 50 (L) 922 - 420 K/uL NEUTROPHILS 81 (H) 42 - 75 % LYMPHOCYTES 9 (L) 20 - 51 % MONOCYTES 10 (H) 2 - 9 % EOSINOPHILS 0 0 - 5 % BASOPHILS 0 0 - 3 % PLATELET COMMENTS DECREASED PLATELETS    
 RBC COMMENTS ANISOCYTOSIS 1+ 
    
 RBC COMMENTS TARGET CELLS 1+ RBC COMMENTS HYPOCHROMIA 1+ 
    
 DF MANUAL    
 ABS. NEUTROPHILS 6.0 1.8 - 8.0 K/UL  
 ABS. LYMPHOCYTES 0.7 (L) 0.8 - 3.5 K/UL  
 ABS. MONOCYTES 0.7 0 - 1.0 K/UL  
 ABS. EOSINOPHILS 0.0 0.0 - 0.4 K/UL  
 ABS. BASOPHILS 0.0 0.0 - 0.1 K/UL METABOLIC PANEL, COMPREHENSIVE Collection Time: 10/19/20  4:50 AM  
Result Value Ref Range Sodium 146 (H) 136 - 145 mmol/L Potassium 4.0 3.5 - 5.5 mmol/L Chloride 109 100 - 111 mmol/L  
 CO2 33 (H) 21 - 32 mmol/L Anion gap 4 3.0 - 18 mmol/L Glucose 215 (H) 74 - 99 mg/dL BUN 22 (H) 7.0 - 18 MG/DL Creatinine 0.91 0.6 - 1.3 MG/DL  
 BUN/Creatinine ratio 24 (H) 12 - 20 GFR est AA >60 >60 ml/min/1.73m2 GFR est non-AA >60 >60 ml/min/1.73m2 Calcium 9.1 8.5 - 10.1 MG/DL Bilirubin, total 3.2 (H) 0.2 - 1.0 MG/DL  
 ALT (SGPT) 44 16 - 61 U/L  
 AST (SGOT) 68 (H) 10 - 38 U/L Alk. phosphatase 46 45 - 117 U/L Protein, total 5.4 (L) 6.4 - 8.2 g/dL Albumin 3.7 3.4 - 5.0 g/dL Globulin 1.7 (L) 2.0 - 4.0 g/dL A-G Ratio 2.2 (H) 0.8 - 1.7 POC G3 Collection Time: 10/19/20  5:03 AM  
Result Value Ref Range Device: VENT    
 FIO2 (POC) 0.50 % pH (POC) 7.51 (H) 7.35 - 7.45    
 pCO2 (POC) 38.4 35.0 - 45.0 MMHG  
 pO2 (POC) 117 (H) 80 - 100 MMHG HCO3 (POC) 31.0 (H) 22 - 26 MMOL/L  
 sO2 (POC) 99 (H) 92 - 97 % Base excess (POC) 8 mmol/L Mode ASSIST CONTROL Tidal volume 420 ml Set Rate 18 bpm  
 PEEP/CPAP (POC) 10 cmH2O  
 PIP (POC) 26 Allens test (POC) N/A Inspiratory Time 1.09 sec Total resp. rate 32 Site RIGHT RADIAL Patient temp. 97.7 Specimen type (POC) ARTERIAL Performed by Allison Cleveland Volume control plus YES    
GLUCOSE, POC Collection Time: 10/19/20  5:44 AM  
Result Value Ref Range Glucose (POC) 229 (H) 70 - 110 mg/dL Chemistry Recent Labs 10/19/20 
0450 10/18/20 
0345 10/17/20 
2300  10/17/20 
1000 10/17/20 
0345 * 301*  --   --   --  251* * 148*  --   --   --  151*  
K 4.0 4.1 3.8   < > 3.9 3.7  109  --   --   --  112* CO2 33* 31  --   --   --  28  
BUN 22* 22*  --   --   --  22* CREA 0.91 1.07  --   --   --  1.20 CA 9.1 8.9  --   --   --  8.2* MG 1.9 1.8  --   --  1.9 1.7 PHOS 2.3* 2.5  --   --   --  3.1 AGAP 4 8  --   --   --  11  
BUCR 24* 21*  --   --   --  18  
AP 46 54  --   --   --  59  
TP 5.4* 5.7*  --   --   --  5.6* ALB 3.7 3.9  --   --   --  3.8 GLOB 1.7* 1.8*  --   --   --  1.8* AGRAT 2.2* 2.2*  --   --   --  2.1*  
 < > = values in this interval not displayed. Lactic Acid Lactic acid Date Value Ref Range Status 10/14/2020 1.8 0.4 - 2.0 MMOL/L Final  
 
No results for input(s): LAC in the last 72 hours. Liver Enzymes Protein, total  
Date Value Ref Range Status 10/19/2020 5.4 (L) 6.4 - 8.2 g/dL Final  
 
Albumin Date Value Ref Range Status 10/19/2020 3.7 3.4 - 5.0 g/dL Final  
 
Globulin Date Value Ref Range Status 10/19/2020 1.7 (L) 2.0 - 4.0 g/dL Final  
 
A-G Ratio Date Value Ref Range Status 10/19/2020 2.2 (H) 0.8 - 1.7   Final  
 
Alk. phosphatase Date Value Ref Range Status 10/19/2020 46 45 - 117 U/L Final  
 
Recent Labs 10/19/20 
0450 10/18/20 
0345 10/17/20 
0345 TP 5.4* 5.7* 5.6* ALB 3.7 3.9 3.8 GLOB 1.7* 1.8* 1.8* AGRAT 2.2* 2.2* 2.1* AP 46 54 59 CBC w/Diff Recent Labs 10/19/20 
0450 10/18/20 
0345 10/17/20 
0345 WBC 7.4 5.6 6.6  
RBC 2.48* 2.45* 2.60* HGB 7.9* 7.8* 8.2* HCT 23.5* 23.3* 24.9*  
PLT 50* 47* 53* GRANS 81* 82* 83* LYMPH 9* 8* 8*  
EOS 0 0 0 Cardiac Enzymes No results found for: CPK, CK, CKMMB, CKMB, RCK3, CKMBT, CKNDX, CKND1, LYDIA, TROPT, TROIQ, LIANNA, TROPT, TNIPOC, BNP, BNPP  
 
BNP No results found for: BNP, BNPP, XBNPT Coagulation Recent Labs 10/19/20 
0450 10/18/20 
0345 10/17/20 
0345 PTP 26.3* 25.1* 25.4* INR 2.5* 2.3* 2.4* Thyroid  No results found for: T4, T3U, TSH, TSHEXT, TSHEXT No results found for: T4  
 
Urinalysis Lab Results Component Value Date/Time Color DARK YELLOW 10/11/2020 01:03 AM  
 Appearance CLEAR 10/11/2020 01:03 AM  
 Specific gravity 1.021 10/11/2020 01:03 AM  
 pH (UA) 5.5 10/11/2020 01:03 AM  
 Protein Negative 10/11/2020 01:03 AM  
 Glucose Negative 10/11/2020 01:03 AM  
 Ketone Negative 10/11/2020 01:03 AM  
 Bilirubin SMALL (A) 10/11/2020 01:03 AM  
 Urobilinogen 1.0 10/11/2020 01:03 AM  
 Nitrites Negative 10/11/2020 01:03 AM  
 Leukocyte Esterase Negative 10/11/2020 01:03 AM  
  
 
Culture data during this hospitalization. All Micro Results Procedure Component Value Units Date/Time CULTURE, RESPIRATORY/SPUTUM/BRONCH Sage Delude STAIN [[id number]]  (Abnormal) Collected:  10/14/20 1453 Order Status:  Completed Specimen:  Sputum,ET Suction Updated:  10/16/20 5197 Special Requests: NO SPECIAL REQUESTS     
  GRAM STAIN RARE WBCS SEEN     
      
  RARE EPITHELIAL CELLS SEEN  
     
   NO ORGANISMS SEEN Culture result:    
  LIGHT YEAST, (APPARENT CANDIDA ALBICANS) NO NORMAL RESPIRATORY QUINCY ISOLATED  
     
 CULTURE, BLOOD [[id number]] Collected:  10/14/20 1530 Order Status:  Canceled Specimen:  Blood CULTURE, BLOOD [[id number]] Collected:  10/14/20 1530 Order Status:  Canceled Specimen:  Blood ECHO 10/12/20 Interpretation Summary Result status: Final result  · LV: Estimated LVEF is 55 - 60%. Normal cavity size, wall thickness and systolic function (ejection fraction normal). Mild (grade 1) left ventricular diastolic dysfunction E'E= 8.94. 
· LA: No atrial septal defect present. · AV: Aortic valve leaflet calcification present. · MV: Mitral valve thickening. Images report reviewed by me: 
CT 10/9/2020 Results from [facility] Encounter encounter on 10/09/20 CT ABD PELV W CONT Narrative EXAM: CT of the abdomen and pelvis INDICATION: Abdominal pain, greatest in the left lower quadrant with nausea and 
vomiting. COMPARISON: MRI pelvis 10/7/2019. CT left hip 4/17/2019 TECHNIQUE: Axial CT imaging of the abdomen and pelvis was performed with 
intravenous contrast. Multiplanar reformats were generated. One or more dose reduction techniques were used on this CT: automated exposure 
control, adjustment of the mAs and/or kVp according to patient size, and 
iterative reconstruction techniques. The specific techniques used on this CT 
exam have been documented in the patient's electronic medical record. Digital 
Imaging and Communications in Medicine (DICOM) format image data are available 
to nonaffiliated external healthcare facilities or entities on a secure, media 
free, reciprocally searchable basis with patient authorization for at least a 
12-month period after this study. _______________ FINDINGS: 
 
LOWER CHEST: Minor basilar changes of atelectasis. No alveolar consolidation or 
pleural effusion. Normal cardiac size without pericardial effusion. LIVER, BILIARY: Diffuse hepatic hypoattenuation is present with hepatic size 
estimated at approximately 27 cm in craniocaudal span. No discrete liver lesion. No intrahepatic or extra hepatic biliary ductal dilatation. Patient is status 
post cholecystectomy. PANCREAS: Pancreatic enhancement is normal. Mild and diffuse pancreatic ductal 
ectasia is noted. No discrete pancreatic mass lesion noted. SPLEEN: Mildly enlarged at 14 cm. ADRENALS: Normal. 
 
KIDNEYS/URETERS/BLADDER: Symmetric renal enhancement. No bowel obstruction PELVIC ORGANS: Symmetric renal enhancement. Punctate nonobstructing upper pole 
left renal stone. No distal ureteral or urinary bladder stone. Urinary bladder 
unremarkable in CT appearance. VASCULATURE: Diffuse aortobiiliac atherosclerotic vascular calcification is 
present without evidence of aneurysmal dilatation. Main portal vein as well as 
right and left portal venous branches are widely patent. LYMPH NODES: Scattered subcentimeter mesenteric and retroperitoneal lymph nodes 
are noted. No evidence of abdominal or pelvic adenopathy. GASTROINTESTINAL TRACT: No morphology of bowel obstruction. No free 
intraperitoneal gas. Mild thickening of the proximal ascending colon and cecum 
noted. BONES: No acute or aggressive osseous abnormalities identified. Partial 
visualization healed proximal left femoral fracture status post IM nailing and 
dynamic hip screw placement. OTHER: Small quantity of abdominal/pelvic ascites. Thin subcutaneous linear 
density tracking along the right gluteal fold, likely in keeping previously 
noted fistula tract. 
 
_______________ Impression IMPRESSION: 
 
1. Diffuse hepatic hypoattenuation compatible with steatosis with hepatomegaly, 
mild splenomegaly, and small quantity of abdominal/pelvic ascites. The totality 
of findings compatible with hepatocellular dysfunction and potentially 
associated portal hypertension. > Patent main and proximal portal venous branches.  
  > Mild thickening of the proximal ascending colon and cecum may reflect sequela of portal colopathy. Infectious/inflammatory colitis also a possibility 
but thought less likely given the imaging appearance. 2. Likely correlate for small area of perianal fistula noted on prior MRI 
pelvis. 3. Punctate nonobstructing upper pole left renal stone. CXR 10/19/20 COMPARISON: Single view chest 10/18/2020 and 10/17/2020 TECHNIQUE: Portable frontal view of the chest was obtained.   
FINDINGS:  
SUPPORT DEVICES: Endotracheal tube and NG tube are present in good position.  HEART AND MEDIASTINUM: Probably normal  for AP technique.  LUNGS AND PLEURAL SPACES: Stable elevation right hemidiaphragm. Definite improvement right lower lobe infiltrate with persistent interstitial changes bilaterally. No effusion or pneumothorax.    
BONY THORAX AND SOFT TISSUES: No acute osseous abnormality. IMPRESSION:  
Support lines and tubes in good position.  
Improved right lower lobe infiltrate with stable interstitial changes which may be chronic. Derril Anastasiya US LEs 10/11/2020 Interpretation Summary · No evidence of deep vein thrombosis in the right lower extremity. · No evidence of deep vein thrombosis in the left lower extremity. US Abd 10/13/20 IMPRESSION:  
1. Nonspecific increased hepatic echotexture suggesting intrinsic liver disease. Dilated portal vein, recanalized umbilical vein and perihepatic ascites. There 
is lack of flow within the portal vein, suggestive of thrombosis, with 
periportal collaterals and decreased flows in the hepatic vein. However, portal 
and hepatic veins patent on recent CT abdomen 10/09/2020. Multiple attempts to 
contact Dr. Gallo Crum with this findings, awaiting callback.  
2. No discrete hepatic mass identified.  
3. Cholecystectomy.  
4. Pancreas obscured by bowel gas. IMPRESSION:  
· Acute respiratory failure with hypoxia J96.01 
· Aspiration pneumonia - J69.0 · ARDS - J80 · Thrombocytopenia, concern for ITP vs liver disease - D69.6 · Coagulopathy, multifactorial including DIC - D68.9 · Acute diastolic congestive heart failure  I50.31 
· Cirrhosis  K74.60 · Hyperbilirubinemia - E80.6 · Portal vein thrombosis - S13 
· Colitis  K52.9 Patient Active Problem List  
Diagnosis Code  Hypocalcemia, resolved E83.51  
 Hypomagnesemia, resolved E83.42  
 Alcoholism ([facility] [address] F10.20  [facility] K74.60  Colitis K52.9  Acute respiratory failure with hypoxemia ([facility]) J96.01  
 Aspiration pneumonia ([facility] [address] J69.0  Acute diastolic CHF (congestive heart failure) ([facility]) I50.31  Th[facility]ocyt[facility] D69.6 · · Code status: Full code RECOMMENDATIONS:  
Respiratory: Patient seems to be improving; chest x-ray shows improved infiltrates today; PaO2/FiO2 ratio greater than 200; FiO2 down to 40%; PEEP decreased to 8. Patient off paralytic drip; currently sedated with Versed 2 mg/h; easily arousable, and becomes tachypneic when stimulated. Continue ventilator and sedation bundles; VAP precautions. Keep oxygen saturation greater than 88%; tolerate PaO2 greater than 55; lung volume protection strategy; tolerate permissive hypercapnia; keep plateau pressure less than 30. Sedationmidazolam infusion; prn fentanyl. SARS CoV2 PCR negative x 2 negative CVS: Stable hemodynamics; no further sinus bradycardia; fentanyl changed to as needed; EF 55 to 60%; patient currently not needing pressor; no pulmonary hypertension reported on echo. ID: Follow cultures; continue Zosyn; stopped IV vancomycin. History of immunosuppression state given biological use [Humira]. Hematology/Oncology: Hemoglobin 7.9stable; platelets 50 K; INR 2.5, fibrinogen 85; plan for 4 units of FFP and 5 units cryoprecipitate; keep fibrinogen greater than 100; patient needs PICC line today. S/p Dexamethasone 40 mg daily for possible ITP; appreciate hematology review. Renal: Stable renal function and urine output; monitor; replace electrolytes as needed. Sodium stable146; continue free water via earlgastric tube. GI/: monitor LFT, ammonia [id number] today; Dr. Leoncio Ocampo from hepatology on case; alcoholic liver disease with cirrhosis; changed to lactulose daily; continue albumin therapy. Ward catheter in placemonitor urine output. Trophic tube feeding while patient on paralytic. Endocrine: Monitor blood sugars; Lantus and sliding scale insulin; patient off steroids; discussed with diabetic nurse during rounds. Neurology: Sedated; off paralytic since 10/18. Thiamine and folic acid. Electrolytes: Replace electrolytes per ICU electrolyte replacement protocol. IVF: Albumin and free water. Nutrition: Trophic tube feedingpatient tolerating; okay to increase as tolerated. Prophylaxis: DVT Prophylaxis: SCDs. GI Prophylaxis: Protonix. Restraints: Soft wrist restraintsmedically necessary to avoid pulling lines, tubes and patient safety Lines/Tubes: [employer], Ward and Vent Bundles will be Followed. ETT: 10/14/20. OGT/NGT: 10/14/20. Central line: 10/14/20 RT femoral CVC (site examined, no erythema, induration, discharge or sign of infection. Dressing intact. Medically necessary, will remove it when not needed. Central line bundle followed); plan for PICC line placement tomorrow. Ward: 10/13(Medically necessary for strict input/output monitoring in critically ill patient, will remove it when not needed. Ward bundle followed). Overall prognosis seems to be poor. Palliative care on case. Patient at risk of multiorgan failure, bleeding complications, ventilator dependence, renal failure, hepatorenal syndrome, mortality. Will defer respective systems problem management to primary and other respective consultant and follow patient in ICU with primary and other medical team. 
Further recommendations will be based on the patient's response to recommended treatment and results of the investigation ordered. Quality Care: PPI, DVT prophylaxis, HOB elevated, Infection control all reviewed and addressed. Care of plan d/w ICU RN, RT MDR. High complexity decision making was performed during the evaluation of this patient at high risk for decompensation with multiple organ involvement. Total critical care time spent rendering care exclusive of procedures/family discussion/coordination of care: 
37 minutes. Polo Denis MD 
10/19/2020

## 2020-10-19 NOTE — PROGRESS NOTES
Pulm/CC Name  Relation  Violet Leonel Lafleur  [phone] Left message on patient's wife voicemail today; advised to call ICU to discuss with me for updates. Addendum 2:37 PMpatient's wife called back; discussed; updated patient's medical condition; critically ill; underlying cirrhosis of liver and blood abnormalities; discussed about improvement in his oxygenation; patient high risk for ventilator dependence; wife understands and plans to discuss with family.  
 
Heather Vizcaino MD

## 2020-10-19 NOTE — PROGRESS NOTES
Phone: [phone] Paging : [phone] Hematology / Oncology Progress Note Admit Date: 10/9/2020 Assessment:  
 
Principal Problem: Hypocalcemia (10/9/2020) Active Problems: Hypomagnesemia (10/9/2020) Alcoholism ([facility] (10/9/2020) Cirrhosis ([facility] (10/9/2020) Colitis (10/9/2020) Acute respiratory failure with hypoxemia ([facility] (10/13/2020) Aspiration pneumonia ([facility] (10/13/2020) Acute diastolic CHF (congestive heart failure) ([facility] (10/13/2020) Thrombocytopenia ([facility] (10/13/2020) Plan: 1. Thrombocytopenia - completed 4 days of decadron. Blood susgars should improve off seroids. Has splenomegaly with portal Htn 2. plt at 52 yest - no active bleeding. Try to keep above 20 
3. Coagulopathy due to liver failure and cirrhosis. Received FFP and cry yest.  Follow coags. Goal fibrinogen greater than 100. Inr 3 or less, 3.hemoglobin stable 7.8 yesterday 4. DIC and liver failure with inability to make factor 5. Portal vein thrombosis -  No anticoagulaltion due to thrombocytopeia and dysfibrinogenemia 6.immune suppression with humira for colitis 7. ARDS on narinder 8. Alcohol abuse 9. Aspiration - zosyn and vanc 10. DT prophylaxis 11. Sedated with midazolam and cistracurium Subjective:  
 
Some movement,  Sedated on venet Objective:  
 
Patient Vitals for the past 24 hrs: 
 BP Temp Pulse Resp SpO2  
10/19/20 0400 116/79  63 18 97 % 10/19/20 0345 116/80  64 18 97 % 10/19/20 0330 112/79  66 18 97 % 10/19/20 0315 126/82  72 18 97 % 10/19/20 0300 113/77  67 18 95 % 10/19/20 0230 118/77  82 18 95 % 10/19/20 0220 (!) 175/125  (!) 137 21 98 % 10/19/20 0200 (!) 161/99  70 18 99 % 10/19/20 0145 (!) 146/97  73 18 98 % 10/19/20 0130 122/81  60 18 96 % 10/19/20 0115 130/86  63 18 97 % 10/19/20 0100 122/77  (!) 58 18 96 % 10/19/20 0045 124/82  (!) 55 18 96 % 10/19/20 0037   (!) 56 18 96 % 10/19/20 0031   (!) 54 18 96 % 10/19/20 0030 123/81      
10/19/20 0029   (!) 54 18 96 % 10/19/20 0015 123/79  (!) 55 18 96 % 10/19/20 0000 125/82  (!) 56 18 95 % 10/18/20 2345 120/80  (!) 57 18 96 % 10/18/20 2330 121/80  (!) 55 18 95 % 10/18/20 2327   (!) 56 18 95 % 10/18/20 2315 122/78  (!) 57 18 95 % 10/18/20 2300 122/78  60 18 95 % 10/18/20 2245 118/80  63 18 95 % 10/18/20 2230 118/80  65 18 95 % 10/18/20 2215 117/80  70 18 95 % 10/18/20 2200 121/80  81 18 95 % 10/18/20 2140   76 18 95 % 10/18/20 2130 134/88  89 18 94 % 10/18/20 2100 (!) 142/92  72 18 96 % 10/18/20 2045 123/85  64 18 95 % 10/18/20 2030 126/85  65 18 95 % 10/18/20 2015 129/83  71 18 95 % 10/18/20 2000 (!) 148/92  87 18 96 % 10/18/20 1945 (!) 145/89  66 18 96 % 10/18/20 1915 123/81  (!) 53 18 95 % 10/18/20 1900 132/85  (!) 55 18 96 % 10/18/20 1800 123/80  (!) 52 18 94 % 10/18/20 1700 118/82  (!) 56 18 95 % 10/18/20 1657  97.7 °F (36.5 °C)     
10/18/20 1600 123/84 97.9 °F (36.6 °C) 87 18 93 % 10/18/20 1512   (!) 104 24 96 % 10/18/20 1500 122/81  (!) 49 18 94 % 10/18/20 1400 120/82  (!) 54 18 93 % 10/18/20 1330 118/79  (!) 55 18 93 % 10/18/20 1300 116/79  (!) 49 18 93 % 10/18/20 1230 121/83  (!) 47 18 94 % 10/18/20 1200 122/82  (!) 50 18 92 % 10/18/20 1149   (!) 49 18 93 % 10/18/20 1130 120/81  (!) 49 18 91 % 10/18/20 1100 125/82 97.6 °F (36.4 °C) (!) 52 18 93 % 10/18/20 1030 125/82  (!) 59 18 94 % 10/18/20 1000 125/80  76 18 94 % 10/18/20 0930 (!) 173/120  (!) 119 18   
10/18/20 0900 (!) 165/106  (!) 104 20   
10/18/20 0830 (!) 162/102  87 19   
10/18/20 0800 132/86  (!) 55 18 97 % 10/18/20 0747   62 18 97 % 10/18/20 0746   76 19 98 % 10/18/20 0745 (!) 142/90   18 98 % 10/18/20 0741   60 18 98 % 10/18/20 0740   78 18 98 % 10/18/20 0739   82 18 95 % 10/18/20 0737     98 % 10/18/20 0736    18 93 % 10/18/20 0735    18 93 % 10/18/20 0734    18 93 % 10/18/20 0733    18 93 % 10/18/20 0732    18 93 % 10/18/20 0731   61 18 93 % 10/18/20 0730 137/88   18 93 % 10/18/20 0729    18 93 % 10/18/20 0728   62 18 93 % 10/18/20 0727   60 18 93 % 10/18/20 0726   60 18 93 % 10/18/20 0725   62 18 93 % 10/18/20 0724   60 18 93 % 10/18/20 0723   60 18 93 % 10/18/20 0722   60 18 93 % 10/18/20 0721   61 18 93 % 10/18/20 0720   61 18 93 % 10/18/20 0719  98.4 °F (36.9 °C) 61 18 93 % 10/18/20 0718   61 18 93 % 10/18/20 0717   62 18 93 % 10/18/20 0716   60 18 93 % 10/18/20 0715 (!) 141/91      
10/18/20 0714   62 18 93 % 10/18/20 0713   61 18 93 % 10/18/20 0712   62 18 93 % 10/18/20 0711   61 18 93 % 10/18/20 0710   63 18 93 % 10/18/20 0709   62 18 93 % 10/18/20 0708   61 18 93 % 10/18/20 0707   63 18 93 % 10/18/20 0706   64 18 93 % 10/18/20 0705   65 18 93 % 10/18/20 0704   66 18 93 % 10/18/20 0703   66 18 93 % 10/18/20 0702   69 18 93 % 10/18/20 0701   68 18 92 % 10/18/20 0700 (!) 143/95  69 18 92 % 10/18/20 0659   70 18 92 % 10/18/20 0658   71 18 92 % 10/18/20 0657   78 18 92 % 10/18/20 0656   78 18 92 % 10/18/20 0655   79 18 92 % 10/18/20 0654   80 18 92 % 10/18/20 0653   84 18 92 % 10/18/20 0652   84 18 92 % 10/18/20 0651   84 18 92 % 10/18/20 0649   85 18 92 % 10/18/20 0648   87 18 92 % 10/18/20 0647   89 18 92 % 10/18/20 0646   89 18 92 % 10/18/20 0645 137/89  91 18 92 % 10/18/20 0644   92 18 93 % 10/18/20 0643   91 18 93 % 10/18/20 0642   87 18 94 % 10/18/20 0641   91 18 94 % 10/18/20 0640    19 94 % 10/18/20 0639    19 94 % 10/18/20 0638    20 94 % 10/18/20 0637    18 94 % 10/18/20 0636    20 94 % 10/18/20 0635    18 95 % 10/18/20 0634    17 96 % 10/18/20 0633    19   
10/18/20 0632    21 95 % 10/18/20 0631    18 95 % 10/18/20 0629    22 95 % 10/18/20 0628    20 94 % 10/18/20 0627   64 18 95 % 10/18/20 0626   96 21 96 % 10/18/20 0625    22 96 % 10/18/20 0624     92 % 10/18/20 0623   95 22 90 % 10/18/20 0622   69 18 94 % 10/18/20 0621   97 23   
10/18/20 0620   66 21   
10/18/20 0617   86 17 95 % 10/18/20 0616   63 18 95 % 10/18/20 0615 125/86  64 18 95 % 10/18/20 0613   69 18 95 % 10/18/20 0612   73 18 96 % 10/18/20 0611   62 18 96 % 10/18/20 0610   65 18 97 % 10/18/20 0609    18 96 % 10/18/20 0603   91 20 95 % 10/18/20 0602    18 96 % 10/18/20 0601    18 97 % 10/18/20 0600 120/79   18 97 % 10/18/20 0559   61 18 97 % 10/18/20 0558   61 18 97 % 10/18/20 0545 122/83   18 97 % 10/18/20 0539   60 18 96 % 10/18/20 0531   61 18 97 % 10/18/20 0530 124/84  (!) 55 18 97 % 10/18/20 0528    18 96 % 10/18/20 0527   61 18 96 % 10/18/20 0526   60 18 96 % 10/18/20 0525    18 96 % 10/18/20 0524    18 96 % 10/18/20 0523    18 96 % 10/18/20 0522    18 96 % 10/18/20 0521   60 18 96 % 10/18/20 0520   60 18 96 % 10/18/20 0519   62 18 96 % 10/18/20 0518   60 18 96 % 10/18/20 0517   63 18 96 % 10/18/20 0516    18 95 % 10/18/20 0515 120/75   18 95 % 10/18/20 0514    18 95 % 10/18/20 0513   62 18 95 % 10/18/20 0512   62 18 95 % 10/18/20 0509   63 18 94 % 10/18/20 0507   63 22 94 % 10/18/20 0506   64 16 94 % 10/18/20 0505   65 22 93 % 10/18/20 0504   66 22 93 % 10/18/20 0503    22 93 % 10/18/20 0502   65 22 93 % 10/18/20 0501   64 22 93 % 10/18/20 0500 123/79  65 22 93 % 10/18/20 0459   65 22 94 % 10/18/20 0458   67 21 94 % 10/18/20 0457   70 22 95 % 10/18/20 0456   67 22 94 % 10/18/20 0455   67 22 94 % 10/18/20 0454   86 23 94 % 10/18/20 0453   61 22 96 % 10/18/20 0452   63 22 95 % Intake/Output Summary (Last 24 hours) at 10/19/2020 0451 Last data filed at 10/18/2020 1850 Gross per 24 hour Intake 1516.85 ml Output 1500 ml Net 16.85 ml Temp (24hrs), Av.9 °F (36.6 °C), Min:97.6 °F (36.4 °C), Max:98.4 °F (36.9 °C) ROS: unobtainable Exam: 
General:  no distress, appears stated age, sedated on vent HE ENT: Head: Normocephalic, no lesions, without obvious abnormality. Nose: Normal external nose, mucus membranes and septum. Neck / Thyroid: Supple, no masses, nodes, nodules or enlargement. Lymphatic:  no lymphadenopathy Lungs: clear to auscultation bilaterally Cardiovascular:  regular heart rate, no murmurs, no JVD Pulses: 2+ and symmetric Abdomen: Soft, non-tender and without masses or organomegaly and ascites Musculoskeletal: no joint tenderness, deformity or swelling Extremities: peripheral pulses normal, no pedal edema, no clubbing or cyanosis Skin:  no rash or abnormalities Neurologic: sedated with some spontaneous movemen Recent Results (from the past 24 hour(s)) POC VENOUS BLOOD GAS Collection Time: 10/18/20  5:06 AM  
Result Value Ref Range Device: VENT    
 FIO2 (POC) 0.6 %  
 pH, venous (POC) 7.53 (HH) 7.32 - 7.42    
 pCO2, venous (POC) 34.5 (L) 41 - 51 MMHG  
 pO2, venous (POC) 60 (H) 25 - 40 mmHg HCO3, venous (POC) 28.6 (H) 23.0 - 28.0 MMOL/L  
 sO2, venous (POC) 93 (H) 65 - 88 % Base excess, venous (POC) 6 mmol/L Mode ASSIST CONTROL Tidal volume 420 ml Set Rate 22 bpm  
 PEEP/CPAP (POC) 10 cmH2O  
 PIP (POC) 27 Allens test (POC) N/A Inspiratory Time 1.09 sec Total resp. rate 22 Site RIGHT RADIAL Patient temp. 98.7 Specimen type (POC) VENOUS BLOOD Performed by Lizbet Needs Volume control plus YES    
GLUCOSE, POC Collection Time: 10/18/20  5:13 AM  
Result Value Ref Range Glucose (POC) 332 (H) 70 - 110 mg/dL GLUCOSE, POC Collection Time: 10/18/20  5:16 AM  
Result Value Ref Range Glucose (POC) 395 (H) 70 - 110 mg/dL GLUCOSE, POC Collection Time: 10/18/20 11:28 AM  
Result Value Ref Range Glucose (POC) 367 (H) 70 - 110 mg/dL CALCIUM, IONIZED Collection Time: 10/18/20 11:30 AM  
Result Value Ref Range Ionized Calcium 1.11 (L) 1.12 - 1.32 MMOL/L  
GLUCOSE, POC Collection Time: 10/18/20  5:41 PM  
Result Value Ref Range Glucose (POC) 325 (H) 70 - 110 mg/dL GLUCOSE, POC Collection Time: 10/18/20 11:47 PM  
Result Value Ref Range Glucose (POC) 302 (H) 70 - 110 mg/dL Current Facility-Administered Medications Medication Dose Route Frequency Provider Last Rate Last Dose  vancomycin (VANCOCIN) 1,250 mg in 0.9% sodium chloride 250 mL (VIAL-MATE)  1,250 mg IntraVENous Q18H Dony Bull MD   1,250 mg at 10/19/20 2170  insulin glargine (LANTUS) injection 10 Units  10 Units SubCUTAneous DAILY Dony Bull MD   10 Units at 10/18/20 0949  
 insulin regular (NOVOLIN R, HUMULIN R) injection 5 Units  5 Units SubCUTAneous Q6H Dony Bull MD   5 Units at 10/19/20 0015  
 0.9% sodium chloride infusion 250 mL  250 mL IntraVENous PRN Tree Ortiz MD      
 thiamine mononitrate (B-1) tablet 100 mg  100 mg Oral DAILY Tree Ortiz MD   100 mg at 51/28/19 8946  
 folic acid (FOLVITE) tablet 1 mg  1 mg Oral DAILY Tree Otriz MD   1 mg at 10/18/20 0949  
 0.9% sodium chloride infusion 250 mL  250 mL IntraVENous PRN Markus Graham MD      
 0.9% sodium chloride infusion 250 mL  250 mL IntraVENous PRN Markus Graham MD      
 lactulose (CHRONULAC) 10 gram/15 mL solution 15 mL  10 g Oral BID Heidi Buerger D, MD   15 mL at 10/18/20 2127  
 midazolam in normal saline (VERSED) 1 mg/mL infusion  1-5 mg/hr IntraVENous TITRATE Heidi Buerger D, MD 2 mL/hr at 10/18/20 1809 2 mg/hr at 10/18/20 1805  fentaNYL (PF) 900 mcg/30 ml infusion soln  0-200 mcg/hr IntraVENous TITRATE Joe WHITE MD 0.8 mL/hr at 10/18/20 1544 25 mcg/hr at 10/18/20 1544  fentaNYL citrate (PF) injection  mcg   mcg IntraVENous Q4H PRN Kirt Adams MD      
 LORazepam (ATIVAN) injection 1 mg  1 mg IntraVENous Q2H PRN Kirt Adams MD      
 piperacillin-tazobactam (ZOSYN) 4.5 g in 0.9% sodium chloride (MBP/ADV) 100 mL MBP  4.5 g IntraVENous Q8H Joe WHITE MD 25 mL/hr at 10/18/20 2126 4.5 g at 10/18/20 2126  cisatracurium (NIMBEX) 100 mg in 0.9% sodium chloride 100 mL infusion  0-10 mcg/kg/min IntraVENous TITRATE Ailene Shone, MD   Stopped at 10/18/20 1359  albumin human 25% (BUMINATE) solution 25 g  25 g IntraVENous TID Ailene Shone, MD   25 g at 10/18/20 2126  
 metoclopramide HCl (REGLAN) injection 5 mg  5 mg IntraVENous Q6H PRN Ailene Shone, MD      
 insulin lispro (HUMALOG) injection   SubCUTAneous Q6H Justin Mosher MD   12 Units at 10/19/20 0996  ELECTROLYTE REPLACEMENT PROTOCOL - Potassium Standard Dosing   1 Each Other PRN Ailene Shone, MD      
 ELECTROLYTE REPLACEMENT PROTOCOL - Magnesium   1 Each Other PRN Ailene Shone, MD      
 ELECTROLYTE REPLACEMENT PROTOCOL - Phosphorus  Standard Dosing  1 Each Other PRN Ailene Shone, MD      
 ELECTROLYTE REPLACEMENT PROTOCOL - Calcium   1 Each Other PRN Ailene Shone, MD      
 Vancomycin - Rx to dose and monitor  1 Each Other Rx Dosing/Monitoring Deirdre Sood MD      
 pantoprazole (PROTONIX) 40 mg in 0.9% sodium chloride 10 mL injection  40 mg IntraVENous Q24H Justin Mosher MD   40 mg at 10/18/20 2126  
 HYDROmorphone (PF) (DILAUDID) injection 1 mg  1 mg IntraVENous Q3H PRN David Tomas MD   1 mg at 10/19/20 0218  
 oxyCODONE-acetaminophen (PERCOCET) 5-325 mg per tablet 1-2 Tab  1-2 Tab Oral Q6H PRN David Tomas MD   1 Tab at 10/13/20 1126  lidocaine 4 % patch 2 Patch  2 Patch TransDERmal Q24H Bert Tomas MD   2 Patch at 10/18/20 2128  ondansetron (ZOFRAN) injection 4 mg  4 mg IntraVENous Q6H PRN Bert Tomas MD   4 mg at 10/13/20 2139  
 glucose chewable tablet 16 g  4 Tab Oral PRN Bert Tomas MD      
 glucagon (GLUCAGEN) injection 1 mg  1 mg IntraMUSCular PRN Bert Tomas MD      
 dextrose 10% infusion 125-250 mL  125-250 mL IntraVENous PRN Bert Tomas MD Mort Lindau, MD

## 2020-10-19 NOTE — PROGRESS NOTES
@2000 pt taken over sedated and paralyzed in bed on ventilator via et tube. Fla cc 0 ,vital signs fluctuates,OG tube infusing tube feeds. Ward in situ draining imer urine. Versed drip  in progress and soft restraints to bilateral wrist. Assessment done and charted in appropriate flow sheets . Nursing management continues. @ 2127 pt awake tossing head from side to side HR elevated O2 dropped in to the 80's BP elevated . Dilaudid administered as prescribed with good effect pt now appears pain free. @0000 pt reassessed no new changes observation continues. @1958 pt became agitated vital signs abnormally high. Appears to be in pain dilaudid given as ordered . Pt continues to be monitored. @0400 reassessment completed no new developments care continues. @0536 meds administered due to pt pain score care continues. @9041 Bedside and Verbal shift change report given to Kole1 Sydni Montes (oncoming nurse) by Andrew Vieira (offgoing nurse). Report included the following information SBAR, Kardex, Intake/Output, MAR, Recent Results, Med Rec Status and Alarm Parameters .

## 2020-10-19 NOTE — PROGRESS NOTES
Comprehensive Nutrition Assessment Type and Reason for Visit: Reassess, NPO/clear liquid Nutrition Recommendations/Plan: EN: advance tube feeds to 10-20ml/hrs Nutrition Assessment:  Hx diabete, gastroparesis, pancreatitis, hydroadenitis. Pt was presented with nausea and abdominal pain. Admitted w/ cirrohosis, thrombocytopenia, hypomagnesemia, hypocalcemia, colitis, acute respiratory failure with hypoxemia. remains intubated in ICU, acute CHF. Hx of ETOH use. Estimated Daily Nutrient Needs: 
Energy (kcal):  2002 Protein (g):  65-78 Fluid (ml/day):  2002 Nutrition Related Findings:  (P) Lab: Na 146, glucose 215, Phos 2.3. Med: fentanyl, humalog, reglan, protonix, folic acid, lactulose, thiamine mononitrate. trace edema. No BM. Advance feeds to 10-20 ml/hr, per rounds; will place order. Wounds:   
Open wounds Current Nutrition Therapies: DIET NPO 
DIET TUBE FEEDING Anthropometric Measures: 
Height:  5' 9\" (175.3 cm) Current Body Wt:  64.9 kg (143 lb 1.3 oz) Admission Body Wt:  139 lb Usual Body Wt:  145 lb(9/25/20) Ideal Body Wt:  160 lbs:  89.4 % Adjusted Body Weight:   ; Weight Adjustment for: No adjustment Adjusted BMI:      
BMI Category:  Normal weight (BMI 18.5-24. 9) Nutrition Diagnosis:  
Inadequate oral intake related to impaired respiratory function as evidenced by NPO or clear liquid status due to medical condition, nutrition support-enteral nutrition Nutrition Interventions:  
Food and/or Nutrient Delivery: Continue tube feeding Nutrition Education and Counseling: No recommendations at this time Coordination of Nutrition Care: Continued inpatient monitoring, Interdisciplinary rounds Goals: 
Continue tube feeds within the next 3-4 days Nutrition Monitoring and Evaluation:  
Behavioral-Environmental Outcomes:   
Food/Nutrient Intake Outcomes: Enteral nutrition intake/tolerance Physical Signs/Symptoms Outcomes: Biochemical data, Skin, Weight, GI status, Nausea/vomiting, Fluid status or edema Discharge Planning: Too soon to determine Electronically signed by Mary Up on 10/19/2020 at 3:06 PM

## 2020-10-19 NOTE — PROGRESS NOTES
Spoke with Grayson FRANCO about PICC request, RAD concerned for INR of 2.5. Will await intensivist plan.

## 2020-10-19 NOTE — PROGRESS NOTES
[address] [facility] Osbaldo Saucedo MD, DOMINIK Cancino MD Tyson Camps, PA-C Carmon Browns, [facility] Catalina Capone, [facility]   Christiano Jelani, [facility] Adalbertojoe Maldonado, [facility] Jonatan [facility] [address] 
  at [facility] 
  [address] [facility], [address] [facility], [place]  [address]. 
  [phone] FAX: [address] [place] [address] Avenue 
  [facility] 
  [address], [address] [facility] [place] [facility], [address] 
  [phone] FAX: [phone] HEPATOLOGY PROGRESS NOTE The patient is a 47year old  male without previous knowledge of liver disease. He says he used to consume alcohol fairly heavily several years ago but cut back to only 1-2 a few days per week 2 years ago. He notes progressive weakness and lower extremity swelling for the past few months and has no alcohol for 2-3 weeks. He notes problem concentrating on work and increased forgetfullness. Events of past 5 days since I was last at THE [facility] noted. He developed aspiration pneumonia was transferred to ICU and intubated. He remains intubated in ICU on PEEP and 50% FIO2 He is off presser support with stable BP Has good urine output. And Scr normal 
PLTs up to the 40-50 range INR has been stable in the 2-2.5 range as a result of DIC Ammonia remains low in the 30s. He has no ascites or edema. ASSESSMENT AND PLAN: 
Cirrhosis The diagnosis of cirrhosis is based upon laboratory studies Cirrhosis is likely to be due to chronic alcohol use. Serology for other causes of chronic liver disease are all negative. The CTP is 11. Child class C. The MELD score is 21. 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   
 
Cardiac portion of liver transplant evaluation testing completed before this acute event. ECHO was normal.   
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 This is based upon a history of consuming significant alcohol on a daily basis for many years. The patient has been abstinent from alcohol since 9/2020. I do not suspect he has alcoholic hepatitis. He has been abstinent from alcohol for 2-3 weeks and he has not been drinking excessively for several months prior to that. Suspect the changes to his labs are all due to chronic liver disease and cirrhosis The DF is right at 28. Even if this was alcoholic hepatitis it is mild and does not require steroids. Screening for Esophageal varices The patient has not had an EGD to screen for varices. I do not want to do EGD now because of the very low PLT count. Will give PLT growth factor and perform EGD as OP after PLT is up and it would be safer for banding and bx if any lesions found. Hepatic encephalopathy Overt HE has not developed to date. Because of AMS he was placed on lactulose BID He can remain on this Anemia This is due to multifactorial causes including portal hypertension with chronic GI blood loss, bone marrow suppression secondary to malnutrition and alcohol. FE panel was normal 
HB has drifted down from 10 to 8s over past week. No evidence of GI bleeding. Will need EGD to assess for UGI blood loss once extubated and before hospital DC. Coagulopathy This is secondary to cirrhosis, and DIC, He has recieved several units of cryo over the past few days. INR remains stable in the range of 2.3-2.5 Thrombocytopenia This is secondary to cirrhosis and DIC Was treated with high dose decedron when PLT was in the 20. PLTs now in the 40-50 range Screening for Hepatocellular Carcinoma US shows cirrhosis and no liver mass. Aspiration pneumonia On IV ABX Intubated PHYSICAL EXAMINATION: 
VS: per nursing note General:  Ill appearing. Intubated. Eyes:  Sclera anicteric. ENT:  No oral lesions. Thyroid normal. 
Nodes:  No adenopathy. Skin:  Spider angiomata. No jaundice. Respiratory:  Intubated with junky BS Cardiovascular:  Regular heart rate. Abdomen:  Soft non-tender, Hepatomegally with liver 5 fingers below the right costal margin. No obvious ascites. Extremities:  No lower extremity edema. Neurologic:  Sedated. LABORATORY: 
Results for Johanna Renae (MRN [id number]) as of 10/19/2020 07:26 Ref. Range 10/18/2020 03:45 10/19/2020 04:50 WBC Latest Ref Range: 4.6 - 13.2 K/uL 5.6 7.4 HGB Latest Ref Range: 13.0 - 16.0 g/dL 7.8 (L) 7.9 (L) PLATELET Latest Ref Range: 135 - 420 K/uL 47 (L) 50 (L) INR Latest Ref Range: 0.8 - 1.2   2.3 (H) 2.5 (H) Sodium Latest Ref Range: 136 - 145 mmol/L 148 (H) 146 (H) Potassium Latest Ref Range: 3.5 - 5.5 mmol/L 4.1 4.0 Chloride Latest Ref Range: 100 - 111 mmol/L 109 109 CO2 Latest Ref Range: 21 - 32 mmol/L 31 33 (H) Glucose Latest Ref Range: 74 - 99 mg/dL 301 (H) 215 (H) BUN Latest Ref Range: 7.0 - 18 MG/DL 22 (H) 22 (H) Creatinine Latest Ref Range: 0.6 - 1.3 MG/DL 1.07 0.91 Bilirubin, total Latest Ref Range: 0.2 - 1.0 MG/DL 3.3 (H) 3.2 (H) Albumin Latest Ref Range: 3.4 - 5.0 g/dL 3.9 3.7 ALT Latest Ref Range: 16 - 61 U/L 38 44 AST Latest Ref Range: 10 - 38 U/L 63 (H) 68 (H) Alk. phosphatase Latest Ref Range: 45 - 117 U/L 54 46 Ammonia Latest Ref Range: 11 - 32 UMOL/L 35 (H) 32  
 
 
RADIOLOGY: 
10/2020. CT scan abdomen without IV contrast.  Changes consistent with fatty liver and hepatomegaly. Splenomegaly. No ascites. Darion Cruz MD 
[address] [facility], [address] [facility], [address] 
[phone] [address] [place] [address]

## 2020-10-19 NOTE — PROGRESS NOTES
Chart reviewed and noted that pt remains in ICU at this time on vent. Care Mgmt continuing to follow and will remain available for assistance. No immediate needs identified at this time Care Management Interventions PCP Verified by CM: Yes Last Visit to PCP: 10/08/20 Transition of Care Consult (CM Consult): Discharge Planning Current Support Network: Lives with Spouse, Own Home Confirm Follow Up Transport: Self The Plan for Transition of Care is Related to the Following Treatment Goals : home when medically stable The Patient and/or Patient Representative was Provided with a Choice of Provider and Agrees with the Discharge Plan?: Yes Name of the Patient Representative Who was Provided with a Choice of Provider and Agrees with the Discharge Plan: patient Discharge Location Discharge Placement: Home

## 2020-10-19 NOTE — PROGRESS NOTES
Hospitalist Progress Note Patient: Shameka Macedo MRN: [id number]  CSN: [id number] YOB: 1965  Age: 47 y.o. Sex: male DOA: 10/9/2020 LOS:  LOS: 10 days Assessment/Plan Patient Active Problem List  
Diagnosis Code  Hypocalcemia E83.51  
 Hypomagnesemia E83.42  
 Alcoholism ([facility] [address] F10.20  Cirrhosis ([facility] [address] K74.60  Colitis K52.9  Acute respiratory failure with hypoxemia (HCC) J96.01  
 Aspiration pneumonia ([facility] [address] J69.0  Acute diastolic CHF (congestive heart failure) ([facility]) I50.31  Thrombocytopenia [facility]) D69.6  
  
 
 
 
48 yo male admitted with weakness, cirrhosis changes, low electrolytes. He developed aspiration pneumonia and transferred to ICU. Orally intubated, arousable. CRITICAL CARE PLAN Resp - See vent orders, VAP bundle. HOB>30 degrees. Bronchodilators. Follow sputum cx. Daily CXR. Aspiration pneumonia ARDS 
 
ID - resp cultures with normal respiratory sukhi. ANTIBIOTICS zosyn. CVS - Monitor HD. Acute diastolic CHF -  
Echo with EF of 55-60% Heme/onc - Follow H&H, plts. INR. 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 Renal - Trend BUN, Cr, follow I/O, almonte in place. Check and replace Mg, K, phos. Endocrine -   
DM - on lantus and SSI. Neuro/ Pain/ Sedation - midazolam. 
 
GI - NPO for now. OG tube, tube feeding. Alcoholic cirrhosis - 
Hepatology following MELD score 21, CTP 11, Child class C. Prophylaxis - DVT: no heparin products due to thrombocytopenia, GI: protonix [id number] 
35 minutes of critical care time spent in the direct evaluation and treatment of this high risk patient.  The reason for providing this level of medical care for this critically ill patient was due a critical illness that impaired one or more vital organ systems such that there was a high probability of imminent or life threatening deterioration in the patients condition. This care involved high complexity decision making to assess, manipulate, and support vital system functions, to treat this degreee vital organ system failure and to prevent further life threatening deterioration of the patients condition. Disposition : TBD Physical Exam: 
General: As above   
HEENT: NC, Atraumatic. PERRLA, anicteric sclerae. Lungs: CTA Bilaterally. No Wheezing/Rhonchi/Rales. Heart:  S1 S2,  No murmur, No Rubs, No Gallops Abdomen: Soft, Non distended, Non tender.  +Bowel sounds, Extremities: No c/c/e Vital signs/Intake and Output: 
Visit Vitals BP (!) 175/119 Pulse 91 Temp 97.7 °F (36.5 °C) Resp 16 Ht 5' 9\" (1.753 m) Wt 64.9 kg (143 lb) SpO2 94% BMI 21.12 kg/m² Current Shift:  No intake/output data recorded. Last three shifts:  10/17 1901 - 10/19 0700 In: 2201.2 [I.V.:877.2] Out: 2100 [Urine:2100] Labs: Results:  
   
Chemistry Recent Labs 10/19/20 
0450 10/18/20 
0345 10/17/20 
2300  10/17/20 
0345 * 301*  --   --  251* * 148*  --   --  151*  
K 4.0 4.1 3.8   < > 3.7  109  --   --  112* CO2 33* 31  --   --  28  
BUN 22* 22*  --   --  22* CREA 0.91 1.07  --   --  1.20 CA 9.1 8.9  --   --  8.2* AGAP 4 8  --   --  11  
BUCR 24* 21*  --   --  18  
AP 46 54  --   --  59  
TP 5.4* 5.7*  --   --  5.6* ALB 3.7 3.9  --   --  3.8 GLOB 1.7* 1.8*  --   --  1.8* AGRAT 2.2* 2.2*  --   --  2.1*  
 < > = values in this interval not displayed. CBC w/Diff Recent Labs 10/19/20 
0450 10/18/20 
0345 10/17/20 
0345 WBC 7.4 5.6 6.6  
RBC 2.48* 2.45* 2.60* HGB 7.9* 7.8* 8.2* HCT 23.5* 23.3* 24.9*  
PLT 50* 47* 53* GRANS 81* 82* 83* LYMPH 9* 8* 8*  
EOS 0 0 0 Cardiac Enzymes No results for input(s): CPK, CKND1, LYDIA in the last 72 hours. No lab exists for component: Navdeep Nicholson Coagulation Recent Labs 10/19/20 
0450 10/18/20 
0345 PTP 26.3* 25.1* INR 2.5* 2.3* Lipid Panel No results found for: CHOL, CHOLPOCT, CHOLX, CHLST, CHOLV, 452475, HDL, HDLP, LDL, LDLC, DLDLP, 817386, VLDLC, VLDL, TGLX, TRIGL, TRIGP, TGLPOCT, CHHD, CHHDX  
BNP No results for input(s): BNPP in the last 72 hours. Liver Enzymes Recent Labs 10/19/20 
0450 TP 5.4* ALB 3.7 AP 46 Thyroid Studies No results found for: T4, T3U, TSH, TSHEXT Procedures/imaging: see electronic medical records for all procedures/Xrays and details which were not copied into this note but were reviewed prior to creation of Plan

## 2020-10-20 NOTE — PROGRESS NOTES
@2000 pt taken over sedated and paralyzed in bed on ventilator via et tube. Fla cc 0 ,vital signs fluctuates,OG tube infusing tube feeds. Ward in situ draining imer urine. Versed drip  in progress and soft restraints to bilateral wrist.   Lt arm PICC dressing dry and intact. Assessment done and charted in appropriate flow sheets . Nursing management continues. @2206 pt pain score elevated Dilaudid 1 mg administered as prescribed observation continues. @0000 pt reassessed no changes continues to be monitored. . 
 
 
@0400 reassessment completed care continues. @0800 Bedside and Verbal shift change report given to Grace Box (oncoming nurse) by Mendel Booth (offgoing nurse). Report included the following information SBAR, Kardex, Intake/Output, MAR, Recent Results, Med Rec Status and Alarm Parameters .

## 2020-10-20 NOTE — PROGRESS NOTES
10/20/20 1157 Vent Settings FIO2 (%) 35 % SpO2/FIO2 Ratio 277.14  
CMV Rate Set 10 Back-Up Rate 10 Vt Set (ml) 420 ml PEEP/VENT (cm H2O) 6 cm H20  
I:E Ratio 1:3. Insp Time (sec) 1 sec Insp Rise Time % 50 % Flow Trigger 3 Patient was on STB (PS or %) x4 hours and tolerated well.

## 2020-10-20 NOTE — PROGRESS NOTES
Hospitalist Progress Note Patient: Clarke Conte MRN: [id number]  CSN: [id number] YOB: 1965  Age: 47 y.o. Sex: male DOA: 10/9/2020 LOS:  LOS: 11 days Chief Complaint: Ac resp failure Assessment/Plan Acute resp failure-SBT this am 
Asp pneumonia-zosyn IV 
thrombocyopenia-stable, no bleeding, has been transfused Coagulopathy-stable, received cryo Anemia-stable Hyperglycemia, diabetes-on lantus and SSI 
HTN and tachycardia-metoprolol started IV Weakness and debility Cirrhosis-as per Dr Devorah Brandon Hx of etoh abuse Acute diastolic CHF-stabilized, appears euvolemic Colitis (humira as outpt) Supportive care 
albumin IV protonix Complete IV abx course SBT as tolerated Tube feeding support Follow blood counts Prognosis guarded Disposition : 
Patient Active Problem List  
Diagnosis Code  Hypocalcemia E83.51  
 Hypomagnesemia E83.42  
 Alcoholism ([facility] [address] F10.20  Cirrhosis ([facility] [address] K74.60  Colitis K52.9  Acute respiratory failure with hypoxemia (HCC) J96.01  
 Aspiration pneumonia ([facility] [address] J69.0  Acute diastolic CHF (congestive heart failure) ([facility]) I50.31  Thrombocytopenia ([facility] [address] D69.6 Subjective: 
 
Unable to give ROS On vent No acute issues from nurse HR and BP up, metoprolol ordered Review of systems: As above Vital signs/Intake and Output: 
Visit Vitals /83 Pulse 86 Temp 98.1 °F (36.7 °C) Resp 14 Ht 5' 9\" (1.753 m) Wt 64.9 kg (143 lb) SpO2 99% BMI 21.12 kg/m² Current Shift:  No intake/output data recorded. Last three shifts:  10/18 1901 - 10/20 0700 In: 2900.5 [I.V.:992.3] Out: 2250 [Urine:2250] Exam: 
 
General: ill debilitated Wm on vent, moves when stimulated Head/Neck: NCAT, supple, No masses, No lymphadenopathy CVS:tachy, no M/R/G, S1/S2 heard, no thrill Lungs:Clear to auscultation bilaterally, no wheezes, rhonchi, or rales Abdomen: Soft, Nontender, No distention, Normal Bowel sounds, No hepatomegaly Extremities: No C/C/E, pulses palpable 2+ Neuro:sedated Labs: Results:  
   
Chemistry Recent Labs 10/20/20 
0425 10/19/20 
2000 10/19/20 
0450 10/18/20 
0345 *  --  215* 301*   --  146* 148* K 4.3 3.6 4.0 4.1   --  109 109 CO2 35*  --  33* 31 BUN 19*  --  22* 22* CREA 0.84  --  0.91 1.07  
CA 9.1  --  9.1 8.9 AGAP 4  --  4 8 BUCR 23*  --  24* 21* AP 51  --  46 54  
TP 5.6*  --  5.4* 5.7* ALB 3.8  --  3.7 3.9 GLOB 1.8*  --  1.7* 1.8* AGRAT 2.1*  --  2.2* 2.2*  
  
CBC w/Diff Recent Labs 10/20/20 
0425 10/19/20 
0450 10/18/20 
0345 WBC 8.0 7.4 5.6  
RBC 2.72* 2.48* 2.45* HGB 8.7* 7.9* 7.8* HCT 25.7* 23.5* 23.3*  
PLT 53* 50* 47* GRANS 74 81* 82* LYMPH 21 9* 8*  
EOS 0 0 0 Cardiac Enzymes No results for input(s): CPK, CKND1, LYDIA in the last 72 hours. No lab exists for component: Genny Plunkett Coagulation Recent Labs 10/20/20 
0425 10/19/20 
0450 PTP 22.9* 26.3* INR 2.1* 2.5* Lipid Panel No results found for: CHOL, CHOLPOCT, CHOLX, CHLST, CHOLV, 137238, HDL, HDLP, LDL, LDLC, DLDLP, 968186, VLDLC, VLDL, TGLX, TRIGL, TRIGP, TGLPOCT, CHHD, CHHDX  
BNP No results for input(s): BNPP in the last 72 hours. Liver Enzymes Recent Labs 10/20/20 
0425 TP 5.6* ALB 3.8 AP 51 Thyroid Studies No results found for: T4, T3U, TSH, TSHEXT Procedures/imaging: see electronic medical records for all procedures/Xrays and details which were not copied into this note but were reviewed prior to creation of Plan Valente Reyna MD

## 2020-10-20 NOTE — DIABETES MGMT
GLYCEMIC CONTROL PROGRESS NOTE: 
 
- discussed in rounds, known h/o T2DM, basal/oral home regimen - BG out of target range ICU: 140-180 mg/dl, trending up requiring consistent coverage - TDD = (10 Lantus + 30 Regular + 26 - Humalog Very Insulin Resistant Corrective Coverage) - TF 15 cc/hour 
- recommend adjustment to IP regimen (orders entered with permission with Dr. Reynold Gambino) *Lantus 12 units daily - pt may benefit from adjustment to scheduled dose of insulin Recent Glucose Results:  
Lab Results Component Value Date/Time  (H) 10/20/2020 04:25 AM  
 GLUCPOC 182 (H) 10/20/2020 05:34 AM  
 GLUCPOC 143 (H) 10/19/2020 11:44 PM  
 GLUCPOC 188 (H) 10/19/2020 06:32 PM  
 
 
 
Nkechi Rocha MS, RN, CDE Glycemic Control Team 
[phone] Pager [phone] (M-TH 8:00-4:30P) *After Hours pager [phone]

## 2020-10-20 NOTE — PROGRESS NOTES
Palliative Medicine [phone] Palliative received vm from ICU indicating wife considering compassionate extubation and will be visiting tomorrow with patients daughter. Called and spoke with wife Clydia Epley at [phone]. She shared had spoken with physician yesterday about needing to think about removing him from ventilator. She has spoken with both of patients daughters and the daughter living in [facility] [address] will be flying in this evening. Clydia Epley shared local daughter doesn't want to visit or be present for any decisions. Clydia Epley shared the plan is for she and daughter to visit tomorrow 10/21 after her dialysis to discuss next steps with physician. She had no set time due to her dialysis but will call to inform when coming. Offered to be present for support tomorrow and Clydia Epley agreed to have palliative MSW at bedside for support. She was tearful while on phone but stated her \"Pop\" is there with her for emotional support. Palliative will be available to family for support during this difficulty time. Thank you for the opportunity to assist in the care of Mr. Loulou Monte. Johnson Anne, [place], [facility] Palliative Medicine

## 2020-10-20 NOTE — PROGRESS NOTES
Pulm/CC Name  Relation  Home  Work  Mobile Teri Marie  [phone] Called and updated wife; she is coming tomorrow with her daughter to discuss about goals of therapy, and discuss about compassionate extubation. Palliative care on case.  
 
Tanisha Vyas MD

## 2020-10-20 NOTE — PROGRESS NOTES
1645-received report from Korin Vyas RN included SBAR MAR and Kardex. Shift Summary-2 units FFP given. Femoral Central line removed. Site clean dry soft and intact. No sign of hematoma. Bedside and Verbal shift change report given to Jonathan Baum RN (oncoming nurse) by Ivana Aguilar RN (offgoing nurse). Report included the following information SBAR, Kardex and MAR.

## 2020-10-20 NOTE — PROGRESS NOTES
Chart reviewed and noted pt remains in ICU and will be compassionately extubated sometime tomorrow when family arrives from out of town. CM remains available at this time. Care Management Interventions PCP Verified by CM: Yes Last Visit to PCP: 10/08/20 Transition of Care Consult (CM Consult): Discharge Planning Current Support Network: Lives with Spouse, Own Home Confirm Follow Up Transport: Self The Plan for Transition of Care is Related to the Following Treatment Goals : home when medically stable The Patient and/or Patient Representative was Provided with a Choice of Provider and Agrees with the Discharge Plan?: Yes Name of the Patient Representative Who was Provided with a Choice of Provider and Agrees with the Discharge Plan: patient Discharge Location Discharge Placement: Home

## 2020-10-20 NOTE — PROGRESS NOTES
Phone: [phone] Paging : [phone] Hematology / Oncology Progress Note Admit Date: 10/9/2020 Assessment:  
 
Principal Problem: Hypocalcemia (10/9/2020) Active Problems: Hypomagnesemia (10/9/2020) Alcoholism ([facility] [address] (10/9/2020) Cirrhosis ([facility] [address] (10/9/2020) Colitis (10/9/2020) Acute respiratory failure with hypoxemia ([facility] [address] (10/13/2020) Aspiration pneumonia ([facility] [address] (10/13/2020) Acute diastolic CHF (congestive heart failure) ([facility] [address] (10/13/2020) Thrombocytopenia ([facility] [address] (10/13/2020) Plan: 1. plts stable at 50K 2. No active bleeding, Hgb7.9 3. Fibrinogen low at 85 due to liver failure yesterday. No bleeding. Got 2 unit of plasma and 5 bags of cryo 4. Nh4 32 
5. ARDS with aspiration pneumonia - zosyn and vanc per pharmacy 6. Remains sedated on vent - midazolam no longer paralyzed 7. DM on insulin 8. portal vein thrombosis -  Not on anticoagulation due to coagulopathy 9. Palliative care talking to family 10. DT prophylaxis 11. Immune suppression with hx of humira for colitis 12. Continue supportive care Subjective:  
 
Sedated on vent. No bleeding   Doing poorly. Spontaneous turning his head Objective:  
 
Patient Vitals for the past 24 hrs: 
 BP Temp Pulse Resp SpO2 Height 10/20/20 0054   76 14 99 %   
10/20/20 0015   78 14 99 %   
10/20/20 0000 118/78 98.3 °F (36.8 °C) 77 14 99 %   
10/19/20 2345   75 14 100 %   
10/19/20 2330 116/77  78 14 99 %   
10/19/20 2315   82 13 99 %   
10/19/20 2300 112/74  84 14 99 %   
10/19/20 2245   87 14 99 %   
10/19/20 2230 114/74  95 14 97 %   
10/19/20 2215   (!) 102 16 99 %   
10/19/20 2200 (!) 175/130  (!) 139 18 100 %   
10/19/20 2145   98 14 98 %   
10/19/20 2057   83 14 100 %   
10/19/20 2045   79 13 99 %   
10/19/20 2030 118/76  84 13 98 %   
10/19/20 2015   86 13 98 %   
10/19/20 2000 117/74 98.4 °F (36.9 °C) 82 15 99 %   
 10/19/20 1945   85 13 97 %   
10/19/20 1930 117/76  85 14 96 %   
10/19/20 1915   73 14 97 %   
10/19/20 1900 124/83  92 14 96 %   
10/19/20 1730 119/81  75 15 98 %   
10/19/20 1700 124/80  79 13 97 %   
10/19/20 1630 128/83  88 13 95 %   
10/19/20 1613   89 14 96 %   
10/19/20 1600 (!) 181/131       
10/19/20 1500 (!) 179/120  (!) 108 14 100 %   
10/19/20 1430 (!) 178/120  (!) 128 24 97 %   
10/19/20 1400 (!) 136/91  72 14 95 %   
10/19/20 1354  98.8 °F (37.1 °C)      
10/19/20 1330 128/87  67 13 95 %   
10/19/20 1319      5' 9\" (1.753 m)  
10/19/20 1300 128/85  70 13 95 %   
10/19/20 1238  98.9 °F (37.2 °C)      
10/19/20 1230 124/84 98.9 °F (37.2 °C) 67 13 93 %   
10/19/20 1200 123/80  66 13 96 %   
10/19/20 1130 122/80  66 13 96 %   
10/19/20 1100 128/79  74 13 93 %   
10/19/20 1056   91 16 94 %   
10/19/20 1042 (!) 175/119  85 14 96 %   
10/19/20 1015 119/79  75 14 91 %   
10/19/20 1000 120/82  76 14 91 %   
10/19/20 0945 119/83  86 14 92 %   
10/19/20 0936 125/84  88 14 93 %   
10/19/20 0815 126/81  71 14 96 %   
10/19/20 0800 128/84  72 12 96 %   
10/19/20 0745 130/85  76 14 95 %   
10/19/20 0734   76 14 96 %   
10/19/20 0730 117/80  69 18 94 %   
10/19/20 0715 119/79  70 18 93 %   
10/19/20 0700 118/79  71 18 94 %   
10/19/20 0645 117/81  72 18 (!) 89 %   
10/19/20 0630 124/81  81 18 (!) 88 %   
10/19/20 0615 120/76  80 18 90 %   
10/19/20 0600 (!) 145/91  93 19 92 %   
10/19/20 0530 (!) 186/130  (!) 143 21 93 %   
10/19/20 0501   (!) 121 26 92 %   
10/19/20 0445 120/81  60 18 97 %   
10/19/20 0430 (!) 134/91  62 18 98 %  Intake/Output Summary (Last 24 hours) at 10/20/2020 0424 Last data filed at 10/20/2020 0028 Gross per 24 hour Intake 2900.53 ml Output 1825 ml Net 1075.53 ml Temp (24hrs), Av.7 °F (37.1 °C), Min:98.3 °F (36.8 °C), Max:98.9 °F (37.2 °C) ROS: not obtainable Exam: 
General:  no distress, appears stated age, sedated on vent HE ENT: Head: Normocephalic, no lesions, without obvious abnormality. Nose: Normal external nose, mucus membranes and septum. Neck / Thyroid: Supple, no masses, nodes, nodules or enlargement. Lymphatic:  no lymphadenopathy Lungs: clear to auscultation bilaterally Cardiovascular:  regular heart rate, no murmurs, no JVD Pulses: 2+ and symmetric Abdomen: Soft, non-tender and without masses or organomegaly and ascites Musculoskeletal: no joint tenderness, deformity or swelling Extremities: peripheral pulses normal, no pedal edema, no clubbing or cyanosis Skin:  no rash or abnormalities Neurologic: sedated with some spontaneous movemen No bleeding Recent Results (from the past 24 hour(s)) AMMONIA Collection Time: 10/19/20  4:30 AM  
Result Value Ref Range Ammonia 32 11 - 32 UMOL/L  
MAGNESIUM Collection Time: 10/19/20  4:50 AM  
Result Value Ref Range Magnesium 1.9 1.6 - 2.6 mg/dL PHOSPHORUS Collection Time: 10/19/20  4:50 AM  
Result Value Ref Range Phosphorus 2.3 (L) 2.5 - 4.9 MG/DL  
CALCIUM, IONIZED Collection Time: 10/19/20  4:50 AM  
Result Value Ref Range Ionized Calcium 1.13 1.12 - 1.32 MMOL/L  
PROTHROMBIN TIME + INR Collection Time: 10/19/20  4:50 AM  
Result Value Ref Range Prothrombin time 26.3 (H) 11.5 - 15.2 sec INR 2.5 (H) 0.8 - 1.2 FIBRINOGEN Collection Time: 10/19/20  4:50 AM  
Result Value Ref Range Fibrinogen 85 (L) 210 - 451 mg/dL CBC WITH AUTOMATED DIFF Collection Time: 10/19/20  4:50 AM  
Result Value Ref Range WBC 7.4 4.6 - 13.2 K/uL  
 RBC 2.48 (L) 4.70 - 5.50 M/uL HGB 7.9 (L) 13.0 - 16.0 g/dL HCT 23.5 (L) 36.0 - 48.0 % MCV 94.8 74.0 - 97.0 FL  
 MCH 31.9 24.0 - 34.0 PG  
 MCHC 33.6 31.0 - 37.0 g/dL  
 RDW 17.2 (H) 11.6 - 14.5 % PLATELET 50 (L) 821 - 420 K/uL NEUTROPHILS 81 (H) 42 - 75 % LYMPHOCYTES 9 (L) 20 - 51 % MONOCYTES 10 (H) 2 - 9 % EOSINOPHILS 0 0 - 5 % BASOPHILS 0 0 - 3 % PLATELET COMMENTS DECREASED PLATELETS    
 RBC COMMENTS ANISOCYTOSIS 1+ 
    
 RBC COMMENTS TARGET CELLS 1+ RBC COMMENTS HYPOCHROMIA 1+ 
    
 DF MANUAL    
 ABS. NEUTROPHILS 6.0 1.8 - 8.0 K/UL  
 ABS. LYMPHOCYTES 0.7 (L) 0.8 - 3.5 K/UL  
 ABS. MONOCYTES 0.7 0 - 1.0 K/UL  
 ABS. EOSINOPHILS 0.0 0.0 - 0.4 K/UL  
 ABS. BASOPHILS 0.0 0.0 - 0.1 K/UL METABOLIC PANEL, COMPREHENSIVE Collection Time: 10/19/20  4:50 AM  
Result Value Ref Range Sodium 146 (H) 136 - 145 mmol/L Potassium 4.0 3.5 - 5.5 mmol/L Chloride 109 100 - 111 mmol/L  
 CO2 33 (H) 21 - 32 mmol/L Anion gap 4 3.0 - 18 mmol/L Glucose 215 (H) 74 - 99 mg/dL BUN 22 (H) 7.0 - 18 MG/DL Creatinine 0.91 0.6 - 1.3 MG/DL  
 BUN/Creatinine ratio 24 (H) 12 - 20 GFR est AA >60 >60 ml/min/1.73m2 GFR est non-AA >60 >60 ml/min/1.73m2 Calcium 9.1 8.5 - 10.1 MG/DL Bilirubin, total 3.2 (H) 0.2 - 1.0 MG/DL  
 ALT (SGPT) 44 16 - 61 U/L  
 AST (SGOT) 68 (H) 10 - 38 U/L Alk. phosphatase 46 45 - 117 U/L Protein, total 5.4 (L) 6.4 - 8.2 g/dL Albumin 3.7 3.4 - 5.0 g/dL Globulin 1.7 (L) 2.0 - 4.0 g/dL A-G Ratio 2.2 (H) 0.8 - 1.7 POC G3 Collection Time: 10/19/20  5:03 AM  
Result Value Ref Range Device: VENT    
 FIO2 (POC) 0.50 % pH (POC) 7.51 (H) 7.35 - 7.45    
 pCO2 (POC) 38.4 35.0 - 45.0 MMHG  
 pO2 (POC) 117 (H) 80 - 100 MMHG  
 HCO3 (POC) 31.0 (H) 22 - 26 MMOL/L  
 sO2 (POC) 99 (H) 92 - 97 % Base excess (POC) 8 mmol/L Mode ASSIST CONTROL Tidal volume 420 ml Set Rate 18 bpm  
 PEEP/CPAP (POC) 10 cmH2O  
 PIP (POC) 26 Allens test (POC) N/A Inspiratory Time 1.09 sec Total resp. rate 32 Site RIGHT RADIAL Patient temp. 97.7 Specimen type (POC) ARTERIAL Performed by Andrew Camarena Volume control plus YES    
GLUCOSE, POC Collection Time: 10/19/20  5:44 AM  
Result Value Ref Range Glucose (POC) 229 (H) 70 - 110 mg/dL CRYOPRECIPITATE, ALLOCATE Collection Time: 10/19/20  9:30 AM  
Result Value Ref Range CALLED TO: STDNEY AT 6730 ON 10/19/20 LAD Unit number [id number] Blood component type CRYO,5U Thaw Unit division 00 Status of unit ISSUED   
PLASMA, ALLOCATE Collection Time: 10/19/20 10:45 AM  
Result Value Ref Range CALLED TO: 1 UNIT READY TO ICU BY CAM ON 10/19/20 AT 1408. Unit number [id number] Blood component type FP 24h, Thaw Unit division 00 Status of unit ISSUED Unit number [id number] Blood component type FP 24h,Thaw2 Unit division 00 Status of unit ISSUED   
GLUCOSE, POC Collection Time: 10/19/20  2:26 PM  
Result Value Ref Range Glucose (POC) 254 (H) 70 - 110 mg/dL GLUCOSE, POC Collection Time: 10/19/20  6:32 PM  
Result Value Ref Range Glucose (POC) 188 (H) 70 - 110 mg/dL POTASSIUM Collection Time: 10/19/20  8:00 PM  
Result Value Ref Range Potassium 3.6 3.5 - 5.5 mmol/L PHOSPHORUS Collection Time: 10/19/20  8:00 PM  
Result Value Ref Range Phosphorus 3.3 2.5 - 4.9 MG/DL  
CALCIUM, IONIZED Collection Time: 10/19/20  8:00 PM  
Result Value Ref Range Ionized Calcium 1.12 1.12 - 1.32 MMOL/L  
GLUCOSE, POC Collection Time: 10/19/20 11:44 PM  
Result Value Ref Range Glucose (POC) 143 (H) 70 - 110 mg/dL Current Facility-Administered Medications Medication Dose Route Frequency Provider Last Rate Last Dose  
 0.9% sodium chloride infusion 250 mL  250 mL IntraVENous PRN Jamila Talley MD      
 fentaNYL citrate (PF) injection 50 mcg  50 mcg IntraVENous Q4H PRN Jamila Talley MD      
 insulin glargine (LANTUS) injection 14 Units  14 Units SubCUTAneous DAILY Jamila Talley MD      
 insulin regular (Ferdie Lied R, HUMULIN R) injection 10 Units  10 Units SubCUTAneous Q6H Jamila Talley MD   10 Units at 10/20/20 0007  0.9% sodium chloride infusion 250 mL  250 mL IntraVENous PRN Cheng Trivedi MD      
 heparin (porcine) 100 unit/mL injection 500 Units  500 Units InterCATHeter Q8H PRN Ingrid Tomas MD   500 Units at 10/19/20 1539  
 0.9% sodium chloride infusion 250 mL  250 mL IntraVENous PRN Cheng Trivedi MD      
 thiamine mononitrate (B-1) tablet 100 mg  100 mg Oral DAILY Cheng Trivedi MD   100 mg at 10/19/20 5279  folic acid (FOLVITE) tablet 1 mg  1 mg Oral DAILY Cheng Trivedi MD   1 mg at 10/19/20 6687  lactulose (CHRONULAC) 10 gram/15 mL solution 15 mL  10 g Oral BID Susi WHITE MD   15 mL at 10/19/20 2147  
 midazolam in normal saline (VERSED) 1 mg/mL infusion  1-5 mg/hr IntraVENous TITRATE Susi WHITE MD 2 mL/hr at 10/19/20 0749 2 mg/hr at 10/19/20 2785  LORazepam (ATIVAN) injection 1 mg  1 mg IntraVENous Q2H PRN Susi WHITE MD      
 piperacillin-tazobactam (ZOSYN) 4.5 g in 0.9% sodium chloride (MBP/ADV) 100 mL MBP  4.5 g IntraVENous Q8H Susi WHITE MD 25 mL/hr at 10/19/20 2159 4.5 g at 10/19/20 2159  albumin human 25% (BUMINATE) solution 25 g  25 g IntraVENous TID Shelbie Benites MD   25 g at 10/19/20 2146  metoclopramide HCl (REGLAN) injection 5 mg  5 mg IntraVENous Q6H PRN Shelbie Benites MD      
 insulin lispro (HUMALOG) injection   SubCUTAneous Q6H Cheng Trivedi MD   Stopped at 10/20/20 0000  ELECTROLYTE REPLACEMENT PROTOCOL - Potassium Standard Dosing   1 Each Other PRN Shelbie Benites MD      
 ELECTROLYTE REPLACEMENT PROTOCOL - Magnesium   1 Each Other PRN Shelbie Benites MD      
 ELECTROLYTE REPLACEMENT PROTOCOL - Phosphorus  Standard Dosing  1 Each Other PRN Shelbie Benites MD      
 ELECTROLYTE REPLACEMENT PROTOCOL - Calcium   1 Each Other PRN Shelbie Benites MD      
 pantoprazole (PROTONIX) 40 mg in 0.9% sodium chloride 10 mL injection 40 mg IntraVENous Q24H Rolando Perez MD   40 mg at 10/19/20 2147  
 HYDROmorphone (PF) (DILAUDID) injection 1 mg  1 mg IntraVENous Q3H PRN Landon Tomas MD   1 mg at 10/20/20 2762  oxyCODONE-acetaminophen (PERCOCET) 5-325 mg per tablet 1-2 Tab  1-2 Tab Oral Q6H PRN Landon Tomas MD   1 Tab at 10/13/20 0261  lidocaine 4 % patch 2 Patch  2 Patch TransDERmal Q24H Landon Tomas MD   2 Patch at 10/19/20 2146  ondansetron (ZOFRAN) injection 4 mg  4 mg IntraVENous Q6H PRN Landon Tomas MD   4 mg at 10/13/20 2139  
 glucose chewable tablet 16 g  4 Tab Oral PRN Landon Tomas MD      
 glucagon (GLUCAGEN) injection 1 mg  1 mg IntraMUSCular PRN Landon Tomas MD      
 dextrose 10% infusion 125-250 mL  125-250 mL IntraVENous PRN Landon Tomas MD      
 45 minutes of ICU time with coplex decision making and multi-organ failure.   
 
Steven Guzmán MD

## 2020-10-20 NOTE — PROGRESS NOTES
[facility]ists Pulmonary, Critical Care, and Sleep Medicine Name: Clarke Conte MRN: [id number] : 1965 Hospital: [facility] [place] Date: 10/20/2020  Room: 109/[address] [place] [address] Note Consult requesting physician: Dr. Shannon Powers Reason for Consult: Acute respiratory failure with hypoxemia Subjective/History of Present Illness:  
Patient is a 47 y.o. male with PMHx significant for chronic pancreatitis, diabetes, gastroparesis, hidradenitis; history of colitis on Humiranot clear if patient has inflammatory bowel disease. Patient was admitted on 10/9 with nausea and lower abdominal pains. The patient was admitted to the hospital.  Dr. Cayetano White consulted for cirrhosis. Today evening, patient had shortness of breath symptoms and hypoxemia. RRT was done. Chest x-ray shows bilateral pulmonary infiltrates. Patient was placed on BiPAP and moved to the ICU. He is currently getting Lasix injection. Patient denies any vomiting or aspirations recently; he denies any COVID-19 contacts at home. He feels comfortable on BiPAP. No significant cough or productive sputum noted; no chest pain or hemoptysis. Telemetry mild sinus tach. Afebrile; blood pressure stable; overall fluid positive by 5 L this admission. 10/20/20 Patient remains in ICU; on ventilator support. Day 7 of intubation. Patient mildly sedated with midazolam 2 mg/h; no acute issues overnight. Status post left arm PICC line yesterday. PEEP 6 FiO2 40%. No significant respiratory secretions. Afebrile; blood pressure elevated; mild sinus tach; not needing pressor. Urine output 1.6 L yesterday. Review of Systems:  
Review of systems not obtained due to patient factors. No Known Allergies Past Medical History:  
Diagnosis Date  Diabetes ([address]  Gastroparesis  Pancreatitis Past Surgical History:  
Procedure Laterality Date  HX CHOLECYSTECTOMY  HX HIP FRACTURE TX    
 left hip sx 9.23/2018 Social History Tobacco Use  Smoking status: Current Every Day Smoker  Smokeless tobacco: Never Used Substance Use Topics  Alcohol use: Yes History reviewed. No pertinent family history. Prior to Admission medications Medication Sig Start Date End Date Taking? Authorizing Provider  
adalimumab (Humira Pen) 40 mg/0.8 mL injection pen 40 mg by SubCUTAneous route every seven (7) days. Yes Provider, Historical  
lidocaine (LIDODERM) 5 % 2 Patches by TransDERmal route every twenty-four (24) hours. Apply patch to the affected area for 12 hours a day and remove for 12 hours a day. One patch is applied to left hip. One patch is applied to right shoulder blade. Yes Provider, Historical  
loratadine (Claritin) 10 mg tablet Take 10 mg by mouth daily. Indications: inflammation of the nose due to an allergy   Yes Provider, Historical  
aspirin delayed-release 81 mg tablet Take 1 Tab by mouth daily. Yes Racheal, MD Meño  
insulin glargine (Lantus Solostar U-100 Insulin) 100 unit/mL (3 mL) inpn 5 Units by SubCUTAneous route daily. Yes Racheal, MD Meño  
DULoxetine (CYMBALTA) 60 mg capsule Take 1 Cap by mouth as needed. 1/6/19   Meño Springer MD  
glyBURIDE-metFORMIN (GLUCOVANCE) 2.5-500 mg per tablet Take 1 Tab by mouth two (2) times a day. Meño Springer MD  
methocarbamoL (ROBAXIN) 500 mg tablet Take 2 Tabs by mouth four (4) times daily. Meño Springer MD  
traZODone (DESYREL) 50 mg tablet Take 50 mg by mouth nightly. Meño Springer MD  
HYDROmorphone (DILAUDID) 2 mg tablet Take 1 Tab by mouth every four (4) hours as needed for Pain. Max Daily Amount: 12 mg. Indications: Severe Pain 9/26/18   Charisse Carrillo MD  
ondansetron (ZOFRAN ODT) 4 mg disintegrating tablet Take 1-2 tablets every 6-8 hours as needed for nausea and vomiting. 9/26/18   Charisse Carrillo MD  
 
Current Facility-Administered Medications Medication Dose Route Frequency  insulin glargine (LANTUS) injection 12 Units  12 Units SubCUTAneous DAILY  metoprolol (LOPRESSOR) injection 2.5 mg  2.5 mg IntraVENous Q6H  
 insulin regular (NOVOLIN R, HUMULIN R) injection 10 Units  10 Units SubCUTAneous Q6H  
 thiamine mononitrate (B-1) tablet 100 mg  100 mg Oral DAILY  folic acid (FOLVITE) tablet 1 mg  1 mg Oral DAILY  lactulose (CHRONULAC) 10 gram/15 mL solution 15 mL  10 g Oral BID  midazolam in normal saline (VERSED) 1 mg/mL infusion  1-5 mg/hr IntraVENous TITRATE  piperacillin-tazobactam (ZOSYN) 4.5 g in 0.9% sodium chloride (MBP/ADV) 100 mL MBP  4.5 g IntraVENous Q8H  
 albumin human 25% (BUMINATE) solution 25 g  25 g IntraVENous TID  insulin lispro (HUMALOG) injection   SubCUTAneous Q6H  
 pantoprazole (PROTONIX) 40 mg in 0.9% sodium chloride 10 mL injection  40 mg IntraVENous Q24H  
 lidocaine 4 % patch 2 Patch  2 Patch TransDERmal Q24H Objective:  
Vital Signs:   
Blood pressure 126/83, pulse (!) 138, temperature 97.9 °F (36.6 °C), resp. rate 17, height 5' 9\" (1.753 m), weight 64.9 kg (143 lb), SpO2 99 %. Body mass index is 21.12 kg/m². O2 Device: Ventilator, Endotracheal tube O2 Flow Rate (L/min): 4 l/min Temp (24hrs), Av.5 °F (36.9 °C), Min:97.9 °F (36.6 °C), Max:98.9 °F (37.2 °C) Intake/Output:  
Last shift:      No intake/output data recorded. Last 3 shifts: 10/18 1901 - 10/20 0700 In: 2900.5 [I.V.:992.3] Out: 2250 [Urine:2250] Intake/Output Summary (Last 24 hours) at 10/20/2020 4543 Last data filed at 10/20/2020 7444 Gross per 24 hour Intake 1557.2 ml Output 1650 ml Net -92.8 ml Last 3 Recorded Weights in this Encounter 10/09/20 2234 10/12/20 1045 10/12/20 1046 Weight: 65.2 kg (143 lb 12.8 oz) 64.9 kg (143 lb) 64.9 kg (143 lb) ABG:   
Lab Results Component Value Date/Time  PHI 7.53 (H) 10/20/2020 05:30 AM  
 PCO2I 40.9 10/20/2020 05:30 AM  
 PO2I 175 (H) 10/20/2020 05:30 AM  
 HCO3I 33.9 (H) 10/20/2020 05:30 AM  
 FIO2I 0.50 10/20/2020 05:30 AM  
 
Ventilator Mode: Assist control, VC+ Respiratory Rate Back-Up Rate: 14 Insp Time (sec): 1.09 sec I:E Ratio: 1:2.06 Ventilator Volumes Vt Set (ml): 420 ml Vt Exhaled (Machine Breath) (ml): 518 ml Vt Spont (ml): 487 ml Ve Observed (l/min): 10.1 l/min Ventilator Pressures PIP Observed (cm H2O): 20 cm H2O Plateau Pressure (cm H2O): 14 cm H2O 
MAP (cm H2O): 12 PEEP/VENT (cm H2O): 5 cm H20 Auto PEEP Observed (cm H2O): 0.3 cm H2O Physical Exam:  
Patient remains on vent support; mildly sedated; acyanotic; FiO2 40%; PEEP 6 HEENT: pupils not dilated, reactive, mild bilateral scleral jaundice, moist oral mucosa Orogastric and orotracheal tubesno bloody secretions. Neck: No adenopathy or thyroid swelling CVS: S1-S2; normal heart sounds; no murmur; JVD not elevated; no chest heaving. RS: Symmetrical breath sounds; moderate air entry bilateral; no wheezes or crackles; ventilator synchrony present Abd: soft, not distended, not tender, no guarding or rigidity; bowel sounds heard; no hepatosplenomegaly Neuro: Patient remains intubated, mildly sedated; not waking up; occasionally becomes tachycardic and tachypneic; limited exam  
Extrm: no leg edema or swelling or clubbing Skin: no rash Lymphatic: no cervical or supraclavicular adenopathy. Right femoral central lineno bleeding or hematoma discharge. Left arm PICC lineno bleeding or hematoma; mild swelling of left arm. Data:  
   
Recent Results (from the past 12 hour(s)) GLUCOSE, POC Collection Time: 10/19/20 11:44 PM  
Result Value Ref Range Glucose (POC) 143 (H) 70 - 110 mg/dL MAGNESIUM Collection Time: 10/20/20  4:25 AM  
Result Value Ref Range Magnesium 1.9 1.6 - 2.6 mg/dL PHOSPHORUS Collection Time: 10/20/20  4:25 AM  
Result Value Ref Range  Phosphorus 3.6 2.5 - 4.9 MG/DL  
CALCIUM, IONIZED  
 Collection Time: 10/20/20  4:25 AM  
Result Value Ref Range Ionized Calcium 1.11 (L) 1.12 - 1.32 MMOL/L  
PROTHROMBIN TIME + INR Collection Time: 10/20/20  4:25 AM  
Result Value Ref Range Prothrombin time 22.9 (H) 11.5 - 15.2 sec INR 2.1 (H) 0.8 - 1.2 AMMONIA Collection Time: 10/20/20  4:25 AM  
Result Value Ref Range Ammonia 34 (H) 11 - 32 UMOL/L  
FIBRINOGEN Collection Time: 10/20/20  4:25 AM  
Result Value Ref Range Fibrinogen 149 (L) 210 - 451 mg/dL METABOLIC PANEL, COMPREHENSIVE Collection Time: 10/20/20  4:25 AM  
Result Value Ref Range Sodium 144 136 - 145 mmol/L Potassium 4.3 3.5 - 5.5 mmol/L Chloride 105 100 - 111 mmol/L  
 CO2 35 (H) 21 - 32 mmol/L Anion gap 4 3.0 - 18 mmol/L Glucose 162 (H) 74 - 99 mg/dL BUN 19 (H) 7.0 - 18 MG/DL Creatinine 0.84 0.6 - 1.3 MG/DL  
 BUN/Creatinine ratio 23 (H) 12 - 20 GFR est AA >60 >60 ml/min/1.73m2 GFR est non-AA >60 >60 ml/min/1.73m2 Calcium 9.1 8.5 - 10.1 MG/DL Bilirubin, total 3.3 (H) 0.2 - 1.0 MG/DL  
 ALT (SGPT) 52 16 - 61 U/L  
 AST (SGOT) 73 (H) 10 - 38 U/L Alk. phosphatase 51 45 - 117 U/L Protein, total 5.6 (L) 6.4 - 8.2 g/dL Albumin 3.8 3.4 - 5.0 g/dL Globulin 1.8 (L) 2.0 - 4.0 g/dL A-G Ratio 2.1 (H) 0.8 - 1.7    
CBC WITH AUTOMATED DIFF Collection Time: 10/20/20  4:25 AM  
Result Value Ref Range WBC 8.0 4.6 - 13.2 K/uL  
 RBC 2.72 (L) 4.70 - 5.50 M/uL HGB 8.7 (L) 13.0 - 16.0 g/dL HCT 25.7 (L) 36.0 - 48.0 % MCV 94.5 74.0 - 97.0 FL  
 MCH 32.0 24.0 - 34.0 PG  
 MCHC 33.9 31.0 - 37.0 g/dL  
 RDW 18.0 (H) 11.6 - 14.5 % PLATELET 53 (L) 853 - 420 K/uL NEUTROPHILS 74 42 - 75 % BAND NEUTROPHILS 1 0 - 5 % LYMPHOCYTES 21 20 - 51 % MONOCYTES 4 2 - 9 % EOSINOPHILS 0 0 - 5 % BASOPHILS 0 0 - 3 %  
 ABS. NEUTROPHILS 5.9 1.8 - 8.0 K/UL  
 ABS. LYMPHOCYTES 1.7 0.8 - 3.5 K/UL  
 ABS. MONOCYTES 0.3 0 - 1.0 K/UL  
 ABS. EOSINOPHILS 0.0 0.0 - 0.4 K/UL ABS. BASOPHILS 0.0 0.0 - 0.1 K/UL PLATELET COMMENTS LARGE PLATELETS    
 RBC COMMENTS ANISOCYTOSIS 1+ 
    
 DF MANUAL    
POC G3 Collection Time: 10/20/20  5:30 AM  
Result Value Ref Range Device: VENT    
 FIO2 (POC) 0.50 % pH (POC) 7.53 (H) 7.35 - 7.45    
 pCO2 (POC) 40.9 35.0 - 45.0 MMHG  
 pO2 (POC) 175 (H) 80 - 100 MMHG  
 HCO3 (POC) 33.9 (H) 22 - 26 MMOL/L  
 sO2 (POC) 100 (H) 92 - 97 % Base excess (POC) 11 mmol/L Mode ASSIST CONTROL Tidal volume 420 ml Set Rate 14 bpm  
 PEEP/CPAP (POC) 8 cmH2O  
 PIP (POC) 18 Allens test (POC) N/A Inspiratory Time 1.09 sec Total resp. rate 14 Site RIGHT RADIAL Patient temp. 98.1 Specimen type (POC) ARTERIAL Performed by Watson De La Cruz Volume control plus YES    
GLUCOSE, POC Collection Time: 10/20/20  5:34 AM  
Result Value Ref Range Glucose (POC) 182 (H) 70 - 110 mg/dL Chemistry Recent Labs 10/20/20 
0425 10/19/20 
2000 10/19/20 
0450 10/18/20 
0345 *  --  215* 301*   --  146* 148* K 4.3 3.6 4.0 4.1   --  109 109 CO2 35*  --  33* 31 BUN 19*  --  22* 22* CREA 0.84  --  0.91 1.07  
CA 9.1  --  9.1 8.9 MG 1.9  --  1.9 1.8 PHOS 3.6 3.3 2.3* 2.5 AGAP 4  --  4 8 BUCR 23*  --  24* 21* AP 51  --  46 54  
TP 5.6*  --  5.4* 5.7* ALB 3.8  --  3.7 3.9 GLOB 1.8*  --  1.7* 1.8* AGRAT 2.1*  --  2.2* 2.2* Lactic Acid Lactic acid Date Value Ref Range Status 10/14/2020 1.8 0.4 - 2.0 MMOL/L Final  
 
No results for input(s): LAC in the last 72 hours. Liver Enzymes Protein, total  
Date Value Ref Range Status 10/20/2020 5.6 (L) 6.4 - 8.2 g/dL Final  
 
Albumin Date Value Ref Range Status 10/20/2020 3.8 3.4 - 5.0 g/dL Final  
 
Globulin Date Value Ref Range Status 10/20/2020 1.8 (L) 2.0 - 4.0 g/dL Final  
 
A-G Ratio Date Value Ref Range Status 10/20/2020 2.1 (H) 0.8 - 1.7   Final  
 
Alk. phosphatase Date Value Ref Range Status 10/20/2020 51 45 - 117 U/L Final  
 
Recent Labs 10/20/20 
0425 10/19/20 
0450 10/18/20 
0345  
TP 5.6* 5.4* 5.7* ALB 3.8 3.7 3.9 GLOB 1.8* 1.7* 1.8* AGRAT 2.1* 2.2* 2.2* AP 51 46 54 CBC w/Diff Recent Labs 10/20/20 
0425 10/19/20 
0450 10/18/20 
0345 WBC 8.0 7.4 5.6  
RBC 2.72* 2.48* 2.45* HGB 8.7* 7.9* 7.8* HCT 25.7* 23.5* 23.3*  
PLT 53* 50* 47* GRANS 74 81* 82* LYMPH 21 9* 8*  
EOS 0 0 0 Cardiac Enzymes No results found for: CPK, CK, CKMMB, CKMB, RCK3, CKMBT, CKNDX, CKND1, LYDIA, TROPT, TROIQ, LIANNA, TROPT, TNIPOC, BNP, BNPP  
 
BNP No results found for: BNP, BNPP, XBNPT Coagulation Recent Labs 10/20/20 
0425 10/19/20 
0450 10/18/20 
0345 PTP 22.9* 26.3* 25.1* INR 2.1* 2.5* 2.3* Thyroid  No results found for: T4, T3U, TSH, TSHEXT, TSHEXT No results found for: T4  
 
Urinalysis Lab Results Component Value Date/Time Color DARK YELLOW 10/11/2020 01:03 AM  
 Appearance CLEAR 10/11/2020 01:03 AM  
 Specific gravity 1.021 10/11/2020 01:03 AM  
 pH (UA) 5.5 10/11/2020 01:03 AM  
 Protein Negative 10/11/2020 01:03 AM  
 Glucose Negative 10/11/2020 01:03 AM  
 Ketone Negative 10/11/2020 01:03 AM  
 Bilirubin SMALL (A) 10/11/2020 01:03 AM  
 Urobilinogen 1.0 10/11/2020 01:03 AM  
 Nitrites Negative 10/11/2020 01:03 AM  
 Leukocyte Esterase Negative 10/11/2020 01:03 AM  
  
 
Culture data during this hospitalization. All Micro Results Procedure Component Value Units Date/Time CULTURE, RESPIRATORY/SPUTUM/BRONCH Sophronia Yamil STAIN [[id number]]  (Abnormal) Collected:  10/14/20 8484 Order Status:  Completed Specimen:  Sputum,ET Suction Updated:  10/16/20 6780 Special Requests: NO SPECIAL REQUESTS     
  GRAM STAIN RARE WBCS SEEN     
      
  RARE EPITHELIAL CELLS SEEN  
     
   NO ORGANISMS SEEN Culture result:    
  LIGHT YEAST, (APPARENT CANDIDA ALBICANS) NO NORMAL RESPIRATORY QUINCY ISOLATED CULTURE, BLOOD [[id number]] Collected:  10/14/20 1530 Order Status:  Canceled Specimen:  Blood CULTURE, BLOOD [[id number]] Collected:  10/14/20 1530 Order Status:  Canceled Specimen:  Blood ECHO 10/12/20 Interpretation Summary Result status: Final result  · LV: Estimated LVEF is 55 - 60%. Normal cavity size, wall thickness and systolic function (ejection fraction normal). Mild (grade 1) left ventricular diastolic dysfunction E'E= 8.94. 
· LA: No atrial septal defect present. · AV: Aortic valve leaflet calcification present. · MV: Mitral valve thickening. Images report reviewed by me: 
CT 10/9/2020 Results from [facility] Encounter encounter on 10/09/20 CT ABD PELV W CONT Narrative EXAM: CT of the abdomen and pelvis INDICATION: Abdominal pain, greatest in the left lower quadrant with nausea and 
vomiting. COMPARISON: MRI pelvis 10/7/2019. CT left hip 4/17/2019 TECHNIQUE: Axial CT imaging of the abdomen and pelvis was performed with 
intravenous contrast. Multiplanar reformats were generated. One or more dose reduction techniques were used on this CT: automated exposure 
control, adjustment of the mAs and/or kVp according to patient size, and 
iterative reconstruction techniques. The specific techniques used on this CT 
exam have been documented in the patient's electronic medical record. Digital 
Imaging and Communications in Medicine (DICOM) format image data are available 
to nonaffiliated external healthcare facilities or entities on a secure, media 
free, reciprocally searchable basis with patient authorization for at least a 
12-month period after this study. _______________ FINDINGS: 
 
LOWER CHEST: Minor basilar changes of atelectasis. No alveolar consolidation or 
pleural effusion. Normal cardiac size without pericardial effusion. LIVER, BILIARY: Diffuse hepatic hypoattenuation is present with hepatic size estimated at approximately 27 cm in craniocaudal span. No discrete liver lesion. No intrahepatic or extra hepatic biliary ductal dilatation. Patient is status 
post cholecystectomy. PANCREAS: Pancreatic enhancement is normal. Mild and diffuse pancreatic ductal 
ectasia is noted. No discrete pancreatic mass lesion noted. SPLEEN: Mildly enlarged at 14 cm. ADRENALS: Normal. 
 
KIDNEYS/URETERS/BLADDER: Symmetric renal enhancement. No bowel obstruction PELVIC ORGANS: Symmetric renal enhancement. Punctate nonobstructing upper pole 
left renal stone. No distal ureteral or urinary bladder stone. Urinary bladder 
unremarkable in CT appearance. VASCULATURE: Diffuse aortobiiliac atherosclerotic vascular calcification is 
present without evidence of aneurysmal dilatation. Main portal vein as well as 
right and left portal venous branches are widely patent. LYMPH NODES: Scattered subcentimeter mesenteric and retroperitoneal lymph nodes 
are noted. No evidence of abdominal or pelvic adenopathy. GASTROINTESTINAL TRACT: No morphology of bowel obstruction. No free 
intraperitoneal gas. Mild thickening of the proximal ascending colon and cecum 
noted. BONES: No acute or aggressive osseous abnormalities identified. Partial 
visualization healed proximal left femoral fracture status post IM nailing and 
dynamic hip screw placement. OTHER: Small quantity of abdominal/pelvic ascites. Thin subcutaneous linear 
density tracking along the right gluteal fold, likely in keeping previously 
noted fistula tract. 
 
_______________ Impression IMPRESSION: 
 
1. Diffuse hepatic hypoattenuation compatible with steatosis with hepatomegaly, 
mild splenomegaly, and small quantity of abdominal/pelvic ascites. The totality 
of findings compatible with hepatocellular dysfunction and potentially 
associated portal hypertension. > Patent main and proximal portal venous branches. > Mild thickening of the proximal ascending colon and cecum may reflect 
sequela of portal colopathy. Infectious/inflammatory colitis also a possibility 
but thought less likely given the imaging appearance. 2. Likely correlate for small area of perianal fistula noted on prior MRI 
pelvis. 3. Punctate nonobstructing upper pole left renal stone. CXR 10/19/20 COMPARISON: Single view chest 10/18/2020 and 10/17/2020 TECHNIQUE: Portable frontal view of the chest was obtained.   
FINDINGS:  
SUPPORT DEVICES: Endotracheal tube and NG tube are present in good position.  HEART AND MEDIASTINUM: Probably normal  for AP technique.  LUNGS AND PLEURAL SPACES: Stable elevation right hemidiaphragm. Definite improvement right lower lobe infiltrate with persistent interstitial changes bilaterally. No effusion or pneumothorax.    
BONY THORAX AND SOFT TISSUES: No acute osseous abnormality. IMPRESSION:  
Support lines and tubes in good position.  
Improved right lower lobe infiltrate with stable interstitial changes which may be chronic. Yoel Ku US LEs 10/11/2020 Interpretation Summary · No evidence of deep vein thrombosis in the right lower extremity. · No evidence of deep vein thrombosis in the left lower extremity. US Abd 10/13/20 IMPRESSION:  
1. Nonspecific increased hepatic echotexture suggesting intrinsic liver disease. Dilated portal vein, recanalized umbilical vein and perihepatic ascites. There 
is lack of flow within the portal vein, suggestive of thrombosis, with 
periportal collaterals and decreased flows in the hepatic vein. However, portal 
and hepatic veins patent on recent CT abdomen 10/09/2020. Multiple attempts to 
contact Dr. Reba Fitzgerald with this findings, awaiting callback.  
2. No discrete hepatic mass identified.  
3. Cholecystectomy.  
4. Pancreas obscured by bowel gas. IMPRESSION:  
· Acute respiratory failure with hypoxia J96.01 
· Aspiration pneumonia - J69.0 · ARDS - J80 · Thrombocytopenia, concern for ITP vs liver disease - D69.6 · Coagulopathy, multifactorial including DIC - D68.9 · Acute diastolic congestive heart failure  I50.31 
· Cirrhosis  K74.60 · Hyperbilirubinemia - E80.6 · Portal vein thrombosis - H57 
· Colitis  K52.9 Patient Active Problem List  
Diagnosis Code  Hypocalcemia, resolved E83.51  
 Hypomagnesemia, resolved E83.42  
 [facility] F10.20  Cirrhosis ([facility] K74.60  Colitis K52.9  Acute respiratory failure with hypoxemia (HCC) J96.01  
 Aspiration pneumonia ([facility] J69.0  Acute diastolic CHF (congestive heart failure) ([facility]) I50.31  Thrombocytopenia ([facility] D69.6 · · Code status: Full code RECOMMENDATIONS:  
Respiratory: Plan for weaning trials today. Chest x-ray reviewed todaystable endotracheal tube and orogastric tube position; mild bilateral interstitial infiltrates; bibasilar densities; no focal consolidations. ABG shows mild metabolic alkalosis; continue ventilator support; patient not on Lasix therapy. Stop sedation for weaning trials. Continue ventilator and sedation bundles; VAP precautions. Keep oxygen saturation greater than 88%; tolerate PaO2 greater than 55; lung volume protection strategy; tolerate permissive hypercapnia; keep plateau pressure less than 30. Sedationstopped midazolam infusion; prn Dilaudid and midazolam.  RASS 0 to -1. SARS CoV2 PCR negative x 2 negative CVS: Patient tachycardic and elevated blood pressure; start metoprolol 2.5 mg IV every 6 hours with holding parameters; EF 55 to 60%; no pulmonary hypertension reported on echo. ID: Respiratory cultureslight Candida reported; complete 7 days Zosyn. History of immunosuppression state given biological use [Humira]. Hematology/Oncology: Hemoglobin stable8.7; platelets 63VONVWMH; INR 2.1; fibrinogen 149.  
Patient's femoral line needs to be removed-not done yesterday; plan for 2 units of FFP prior to removal of femoral central line. S/p Dexamethasone 40 mg daily for possible ITP; appreciate hematology review. Renal: Kidney function remains normal; creatinine 0.84; sodium and potassium stable; continue to monitor renal function and urine output; replace electrolytes as needed. Continue free water via earlgastric tube. GI/: monitor LFT, ammonia [id number] appreciate today; reviewed by  [facility], [facility]. Alcoholic liver disease with cirrhosis; ammonia stable; daily bowel movements reported; lactulose daily; continue albumin twice daily. Ward catheter in placemonitor urine output. Advance tube feeding. Endocrine: Blood sugar stable; continue Lantus, regular insulin and sliding scale Humalog. Neurology: Sedated; off paralytic since 10/18. Thiamine and folic acid. Vascular: Check ultrasound left upper extremity to evaluate edema Electrolytes: Replace electrolytes per ICU electrolyte replacement protocol. IVF: Albumin and free water. Nutrition: Tube feedings; advance as tolerated. Prophylaxis: DVT Prophylaxis: SCDs. GI Prophylaxis: Pantoprazole. Restraints: Soft wrist restraintsmedically necessary to avoid pulling lines, tubes and patient safety Lines/Tubes: [employer], Ward and Vent Bundles will be Followed. ETT: 10/14/20. OGT/NGT: 10/14/20. Central line: 10/14/20 RT femoral CVC (site examined, no erythema, induration, discharge or sign of infection. Dressing intact. Medically necessary, will remove it when not needed. Central line bundle followed); discussed with staff to remove today after FFP's. PICC line left arm 10/19medically necessary; follow 1 protocol for daily evaluation and dressing. Ward: 10/13(Medically necessary for strict input/output monitoring in critically ill patient, will remove it when not needed. Ward bundle followed). Prognosis poor; palliative care on case. Patient at risk of multiorgan failure, bleeding complications, ventilator dependence, renal failure, hepatorenal syndrome, mortality. Will defer respective systems problem management to primary and other respective consultant and follow patient in ICU with primary and other medical team. 
Further recommendations will be based on the patient's response to recommended treatment and results of the investigation ordered. Quality Care: PPI, DVT prophylaxis, HOB elevated, Infection control all reviewed and addressed. Care of plan d/w ICU RN, RT. High complexity decision making was performed during the evaluation of this patient at high risk for decompensation with multiple organ involvement. Total critical care time spent rendering care exclusive of procedures/family discussion/coordination of care: 
36 minutes. Arianna Conklin MD 
10/20/2020

## 2020-10-20 NOTE — PROGRESS NOTES
[address] [facility] 137 [facility] Kristin Rafi Drew MD, DOMINIK Salinas MD Corwin Bannister, RASHID Morris, [facility] Catalina MONTOYA Estelita, [facility]   Cee Lafleur, P-C Tracie Spicer, [facility] Jonatan [facility] 136 
  at [address], [address] [place] [address] [address] [facility]. 
  [phone] FAX: [address] [place] [address] Avenue 
  [facility] 
  [address], [address] [facility], [address] 
  [phone] FAX: [phone] HEPATOLOGY PROGRESS NOTE The patient is a 47year old  male without previous knowledge of liver disease. He says he used to consume alcohol fairly heavily several years ago but cut back to only 1-2 a few days per week 2 years ago. He notes progressive weakness and lower extremity swelling for the past few months and has no alcohol for 2-3 weeks. He notes problem concentrating on work and increased forgetfullness. Events of past 5 [facility] He developed aspiration pneumonia was transferred to ICU and intubated. He remains intubated in ICU. FIO2 down to 40% He is off presser support with stable BP and normal pulse. Has good urine output. And Scr normal 
PLTs up to the 40-50 range INR is down to 2.1 after more cryo and FFP yesterday Ammonia remains low in the 30s. He has no ascites or edema. ASSESSMENT AND PLAN: 
Cirrhosis The diagnosis of cirrhosis is based upon laboratory studies Cirrhosis is likely to be due to chronic alcohol use. Serology for other causes of chronic liver disease are all negative. The CTP is 11. Child class C. The MELD score is down to 19 with decline in INR. 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 Cardiac portion of liver transplant evaluation testing completed before this acute event. ECHO was normal.   
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 This is based upon a history of consuming significant alcohol on a daily basis for many years. The patient has been abstinent from alcohol since 9/2020. I do not suspect he has alcoholic hepatitis. He has been abstinent from alcohol for 2-3 weeks and he has not been drinking excessively for several months prior to that. Suspect the changes to his labs are all due to chronic liver disease and cirrhosis The DF is right at 28. Even if this was alcoholic hepatitis it is mild and does not require steroids. Screening for Esophageal varices The patient has not had an EGD to screen for varices. I do not want to do EGD now because of the very low PLT count. Will give PLT growth factor and perform EGD as OP after PLT is up and it would be safer for banding and bx if any lesions found. Hepatic encephalopathy Overt HE has not developed to date. Because of AMS he was placed on lactulose BID He can remain on this Anemia This is due to multifactorial causes including portal hypertension with chronic GI blood loss, bone marrow suppression secondary to malnutrition and alcohol. FE panel was normal 
HB has drifted down from 10 to 8s over past week. No evidence of GI bleeding. Will need EGD to assess for UGI blood loss once extubated and before hospital DC. Coagulopathy This is secondary to cirrhosis, and DIC, He has recieved more cryo and FFP yesterday INR down today to 2.1 Thrombocytopenia This is secondary to cirrhosis and DIC Was treated with high dose decedron when PLT was in the 20. PLTs now in the 40-50 range Screening for Hepatocellular Carcinoma US shows cirrhosis and no liver mass. Aspiration pneumonia On IV ABX Intubated Malnutrition/muscle wasting He has been in ICU for nearly a week with no nutritional support. Suggest placement of dobhoff feeding tube and start tube feedings. PHYSICAL EXAMINATION: 
VS: per nursing note General:  Ill appearing. Intubated. Eyes:  Sclera anicteric. ENT:  No oral lesions. Thyroid normal. 
Nodes:  No adenopathy. Skin:  Spider angiomata. No jaundice. Respiratory:  Intubated with junky BS Cardiovascular:  Regular heart rate. Abdomen:  Soft non-tender, Hepatomegally with liver 5 fingers below the right costal margin. No obvious ascites. Extremities:  No lower extremity edema. Neurologic:  Sedated. LABORATORY: 
Results for Fernando Louise (MRN [id number]) as of 10/20/2020 07:10 Ref. Range 10/18/2020 03:45 10/19/2020 04:50 10/20/2020 04:25 WBC Latest Ref Range: 4.6 - 13.2 K/uL 5.6 7.4 8.0 HGB Latest Ref Range: 13.0 - 16.0 g/dL 7.8 (L) 7.9 (L) 8.7 (L) PLATELET Latest Ref Range: 135 - 420 K/uL 47 (L) 50 (L) 53 (L) INR Latest Ref Range: 0.8 - 1.2   2.3 (H) 2.5 (H) 2.1 (H) Sodium Latest Ref Range: 136 - 145 mmol/L 148 (H) 146 (H) 144 Potassium Latest Ref Range: 3.5 - 5.5 mmol/L 4.1 4.0 4.3 Chloride Latest Ref Range: 100 - 111 mmol/L 109 109 105 CO2 Latest Ref Range: 21 - 32 mmol/L 31 33 (H) 35 (H) Glucose Latest Ref Range: 74 - 99 mg/dL 301 (H) 215 (H) 162 (H) BUN Latest Ref Range: 7.0 - 18 MG/DL 22 (H) 22 (H) 19 (H) Creatinine Latest Ref Range: 0.6 - 1.3 MG/DL 1.07 0.91 0.84 Bilirubin, total Latest Ref Range: 0.2 - 1.0 MG/DL 3.3 (H) 3.2 (H) 3.3 (H) Albumin Latest Ref Range: 3.4 - 5.0 g/dL 3.9 3.7 3.8 ALT Latest Ref Range: 16 - 61 U/L 38 44 52 AST Latest Ref Range: 10 - 38 U/L 63 (H) 68 (H) 73 (H) Alk. phosphatase Latest Ref Range: 45 - 117 U/L 54 46 51 Ammonia Latest Ref Range: 11 - 32 UMOL/L 35 (H) 32 34 (H) RADIOLOGY: 
 10/2020. CT scan abdomen without IV contrast.  Changes consistent with fatty liver and hepatomegaly. Splenomegaly. No ascites. Rinana Zapien MD 
[address] One [facility] [place], [address] 98 Jemma Gilman, [facility] [address] 
[phone] [address] [place] [address]

## 2020-10-20 NOTE — ADT AUTH CERT NOTES
Patient Demographics Patient Name Jose Guadalupe Jimenez  
[id number]  Sex Male    
1965  Address [address]  Phone [phone] (Home) [phone] (Mobile) *Preferred*   
CSN:   
[id number] Admit Date:  Admit Time  Room  Bed Oct 9, 2020   9:19 AM  109 [[id number]]  01 [[id number]] Attending Providers Provider  Pager  From  To Katherine Gonzalez MD   10/09/20  10/09/20 Clem Tomas MD   10/09/20 Emergency Contact(s) Name  Relation  Home  Work  Mobile Leslie Shanika  [phone] Utilization Reviews  
 
   
LOC:Acute Adult-Extended Stay (10/18/2020) by Radha Gonzalez RN  
 
   
Review Entered  Review Status 10/19/2020 13:05  In Primary   
   
Criteria Review REVIEW SUMMARY 
  
Patient: CHRISTINE PEREZ Review Number: [id number] Review Status: In Primary 
  
Condition Specific: Yes 
  
Condition Level Of Care Code: CRITICAL Condition Level Of Care Description: Critical 
  
  
OUTCOMES Outcome Type: Primary 
  
  
  
REVIEW DETAILS 
  
Service Date: 10/18/2020 Admit Date: 10/09/2020 Product: Marky Luna Adult Subset: Extended Stay (Symptom or finding within 24h) 
  (Excludes PO medications unless noted) [X] Select Level of Care, One: 
            [X] CRITICAL, One: 
                [X] Partial responder, not clinically stable for discharge and requires continued stay, >= One: 
                ~--Admin, IQ Admin Admin on 10- 01:05 PM--~ 
                10/18/20 Patient remains in ICU; on ventilator support. Patient remains sedated and paralyzed; PEEP 10, FiO2 50%. No acute issues overnight. Afebrile; blood pressure stable; O2 sats 94%-91%; telemetry sinus rhythm. Urine output 2.3 L yesterday Remains tachycardic 
                versed gtt platelets low, ammonia elevated, glucose elevated. discharge plan TBD following hospital course VITALS:T- 97.6 B/P-122/91 HR-104 RR-24 SATS-96 vent LABS: 
                h/h-7.8/23.3 platelets-47 
                neutro-82 INR-2.3 Na-148 
                BCGPVRI-043 BUN-22  B/C-21 
                ionized audrey-1.06 
                t-bili-3.3 AST-63 
                ammonia-35 
                venous blood gases-PH-7.53, PCO2-34.5, PO2-60,HCO3-93 on vent CXT-ETT stable,[place]. lung opacities stable ORDERS: 
                ICU 
                tele 
                vent ABG 
                CXR 
                HOB elevated 
                versed gtt 
                albumin 25g iv TID 
                folvite 1 mg po qd 
                lantus 14usqqd Nov R,HumR 10u sqq6h 
                lactulose 10g po bid 
                protonix 40 mg iv qd 
                zosyn 4.5g iv q 8h 
                 
                 
                 
                    [X] Mechanical ventilation, discharge planning and, >= One: 
                        [X] Respiratory interventions every 1-2h 
                        ~--Admin, IQ Admin Admin on 10- 12:57 PM--~ 
                        remains on vent support 
                         
                         
                         
  
Version: InterQual® 2019 Saurav Ding  © 2019 [employer] 6199 and/or one of its [place]. All Rights Reserved. CPT only © 2018 American Medical Association.   All Rights Reserved.  
   
LOC:Acute Adult-Extended Stay (10/15/2020) by Rio Mancuso RN  
 
   
 Review Entered  Review Status 10/16/2020 13:21  In Primary   
   
Criteria Review REVIEW SUMMARY 
  
Patient: LEIGHA JENKINSZPGMARTIN Review Number: [id number] Review Status: In Primary 
  
Condition Specific: Yes 
  
  
OUTCOMES Outcome Type: Primary 
  
  
  
REVIEW DETAILS 
  
Service Date: 10/15/2020 Product: Keven Iyer Adult Subset: Extended Stay (Symptom or finding within 24h) 
  (Excludes PO medications unless noted) [X] Select Level of Care, One: 
            [X] CRITICAL, One: 
                [X] Partial responder, not clinically stable for discharge and requires continued stay, >= One: 
                    [X] Mechanical ventilation, discharge planning and, >= One: 
                    ~--Admin, IQ Admin Admin on 10- 01:21 PM--~ 
                    97.7, 96/66, HR 57, RR 22, o2 sat 88, vent Meds: MVI iv, albumin iv 25g tid, nimbex iv drip, decadron iv 40mg, fentanyl iv drip, dilaudid iv 1mg, Humalog sq, lactulose po bid, lidocaine patch, versed iv drip, protonix iv 40mg, zosyn iv 4.5g q8h, vancomycin iv, calcium iv 2g, potassium po 20meq, potassium iv 10meq x 2, neutro phos po x 2, sodium phos 6mmol iv, Labs: WBC 4.4, RBC 2.37, HGb 7.5, HCT 23.1, MCV 97.5, RDW 16.1, platelet 42, neutr 85, lymoh 10, INR 2.3, glucose 200, calcium 8.0, ionized calcium 1.05, phos 2.4, bili 4.1, protein 5.5, globulin 1.9, AST 43, alk phos 39, lipase 10, ammonia 33, pH 7.30, pC02 48., p02 119, s02 98 CHEST R: 
                    Support devices in stable/appropriate position. Bilateral interstitial and airspace disease appearing overall similar to prior 
                    study. [X] FiO2 > 50%(0.50) and > baseline 
  
Version: Latest Medical 2019 Liat White  © 2019 [employer] 6199 and/or one of its [place]. All Rights Reserved. CPT only © 2018 American Medical Association. All Rights Reserved. Additional Notes Hospitalist;   
Acute resp failure developed 10/13, moved to ICU on bipap, continued hypoxia and changes of ARDS, and intubated 10/14   
    
Ac resp failure, hypoxic-send a second covid test-NOT DONE-rapid was neg Aspiration pneumonia Cirrhosis likely due to etoh abuse-PV thrombosis on US today, no AC with low platelets Alcohol abuse, off 2-3 weeks, continue lactulose, albumin-give banana bag daily gently Severe thrombocytopenia-no bleeding, transfused plt yesterday plus plasma Hypophosphatemia-protocol, ICU Pulmoanry; Respiratory: Mech. Ventilated patients- aim to keep peak plateau pressure less than or equal to 30cm H2O. Titrate FiO2 for goal SPO2> 91% Monitor ABG and CXR daily VAP prevention bundle and sedation bundle followed, head of the bed at 30' all times Daily sedation holiday and assessment for weaning with SBT as tolerated - not a candidate today Sputum culture per ET tube follow Continue bronchodilators as needed, pulmonary hygiene care Steroids for suspected ITP Overall FiO2 needs stable/improving and no worsening in CXR hence unlikely DAH in the background of drop in Hgb; no hemoptysis or major ET secretions On major clinical or radiological improvement despite diuresis suggesting role of aspiration pneumonia Await SARS CoV2 PCR; rapid test negative 10/13/20. Continue droplet isolation CXR unlikely COVID19 like   
    
CVS: sinus bradycardia; lower sedation and monitor HR   
hemodynamics stable - not on vasopressor since y'day noon Received 3.7 L of fluid overall since y'day AM   
echo EF 55-60%; no pulmonary hypertension reported.   
    
ID: antibiotics: Zosyn and IV vancomycin. Sputum cx per ET tube follow No leukocytosis, afebrile; immunosuppressed status given biological use [Humira] Deescalate antibiotic when appropriate.   
    
Hematology/Oncology: hgb drop likely dilutional as not required vasopressor, no worsening in CXR Repeat hemoglobin today; tx for goal Hgb > 7 Transfuse cryoppt as per hematology Continue Dexamethasone 40 mg x 4 days Appreciate hematology input Monitor Hgb, platelets, INR, fibrinogen FDP daily Transfuse Cryoppt for fibrinogen < 150 in future as needed Platelets monitor for bleeding; keep platelets greater than 20K per Dr. Susan Harkins May transfuse FFP for INR as needed Continue Vit K in IVF daily May use Amicar in case of bleeding for plt dysfunction Normal S. Cr, mentation stable prior to intubation Not a candidate for anticoagulation for PVT on US liver

## 2020-10-21 NOTE — PROGRESS NOTES
Verbal shift change report given to Lazaro Jerry RN (oncoming nurse) by Beckie Barragan RN (offgoing nurse). Report included the following information SBAR, Kardex and MAR.

## 2020-10-21 NOTE — PROGRESS NOTES
Pulm/CC Discussed with Mrs. Marshall and daughter at bedside; reviewed medical management; patient meets criteria for extubation; reasonably awake and responsive; patient at risk of respiratory failure and aspirations post extubation; patient has underlying chronic liver disease/cirrhosis; discussed about CODE STATUS; they would not want him to be reintubated; reviewed CODE STATUSDNR. If patient has respiratory failure or distress post extubation, family agreeable for comfort care goals. Saintclair Rand from palliative care present. Discussed with RT and RNreviewed plans for planned extubation.  
 
Brandin Umana MD

## 2020-10-21 NOTE — PROGRESS NOTES
Chart reviewed and noted family meeting that patient is being transitioned to comfort care. CM remains availale. Care Management Interventions PCP Verified by CM: Yes Last Visit to PCP: 10/08/20 Transition of Care Consult (CM Consult): Discharge Planning Current Support Network: Lives with Spouse, Own Home Confirm Follow Up Transport: Self The Plan for Transition of Care is Related to the Following Treatment Goals : home when medically stable The Patient and/or Patient Representative was Provided with a Choice of Provider and Agrees with the Discharge Plan?: Yes Name of the Patient Representative Who was Provided with a Choice of Provider and Agrees with the Discharge Plan: patient Discharge Location Discharge Placement: Home

## 2020-10-21 NOTE — PROGRESS NOTES
Hospitalist Progress Note Patient: Tamiko Boudreaux MRN: [id number]  CSN: [id number] YOB: 1965  Age: 47 y.o. Sex: male DOA: 10/9/2020 LOS:  LOS: 12 days Chief Complaint: Ac resp failure Assessment/Plan Acute resp failure Asp penumonia Cirrhosis Acute diastolic CHF Thrombocytopenia Anemia Coagulopathy Hx etoh abuse Colitis, was on humira Hyperglycemia-lantus, SSI Extubating this am 
Inc metoprolol for BP and tachycardia, IV Speech to see for safe swallow plan IV protonix Finishing IV abx course Blood counts stable S/p cryo infusion plasma, platelets infusion, and steroids course, no bleeding seen Remains very ill with prognosis quite guarded Family to come for discussion today Disposition : 
Patient Active Problem List  
Diagnosis Code  Hypocalcemia E83.51  
 Hypomagnesemia E83.42  
 Alcoholism ([facility] F10.20  Cirrhosis ([facility] K74.60  Colitis K52.9  Acute respiratory failure with hypoxemia (HCC) J96.01  
 Aspiration pneumonia ([facility] J69.0  Acute diastolic CHF (congestive heart failure) ([facility]) I50.31  Thrombocytopenia ([facility] D69.6 Subjective: No new events SBT passed, for extubation, off sedatives, follows basic commands Review of systems: UTO as still intubated Vital signs/Intake and Output: 
Visit Vitals BP (!) 160/104 Pulse (!) 119 Temp 98.3 °F (36.8 °C) Resp 13 Ht 5' 9\" (1.753 m) Wt 64.9 kg (143 lb) SpO2 98% BMI 21.12 kg/m² Current Shift:  10/21 0701 - 10/21 1900 In: 576 Out: 2900 [Urine:2900] Last three shifts:  10/19 1901 - 10/21 0700 In: 1167.4 [I.V.:532.4] Out: 7932.5 [Urine:7932.5] Exam: 
 
General: ill appearing Wm, intubated, awake, nad CVS:tachy regular, no M/R/G, S1/S2 heard, no thrill Lungs:Clear to auscultation bilaterally, no wheezes, rhonchi, or rales Abdomen: Soft, Nontender, no notable distention,  Normal Bowel sounds, No ascites Extremities: No C/C/E, pulses palpable 2+ Neuro:grossly normal , follows commands Psych:appropriate Labs: Results:  
   
Chemistry Recent Labs 10/21/20 
1478 10/20/20 
0425 10/19/20 
2000 10/19/20 
0450 * 162*  --  215*  144  --  146*  
K 3.5 4.3 3.6 4.0  
 105  --  109 CO2 32 35*  --  33* BUN 16 19*  --  22* CREA 0.84 0.84  --  0.91  
CA 8.9 9.1  --  9.1 AGAP 8 4  --  4  
BUCR 19 23*  --  24* AP 53 51  --  46  
TP 5.9* 5.6*  --  5.4* ALB 3.9 3.8  --  3.7 GLOB 2.0 1.8*  --  1.7* AGRAT 2.0* 2.1*  --  2.2*  
  
CBC w/Diff Recent Labs 10/21/20 
8813 10/20/20 
0425 10/19/20 
0450 WBC 9.6 8.0 7.4  
RBC 2.73* 2.72* 2.48* HGB 8.8* 8.7* 7.9*  
HCT 25.4* 25.7* 23.5*  
PLT 34* 53* 50* GRANS 82* 74 81* LYMPH 11* 21 9* EOS 0 0 0 Cardiac Enzymes No results for input(s): CPK, CKND1, LYDIA in the last 72 hours. No lab exists for component: Chyna Pilmyriam Coagulation Recent Labs 10/21/20 
0415 10/20/20 
0425 PTP 22.5* 22.9* INR 2.0* 2.1* Lipid Panel No results found for: CHOL, CHOLPOCT, CHOLX, CHLST, CHOLV, 666731, HDL, HDLP, LDL, LDLC, DLDLP, 461291, VLDLC, VLDL, TGLX, TRIGL, TRIGP, TGLPOCT, CHHD, CHHDX  
BNP No results for input(s): BNPP in the last 72 hours. Liver Enzymes Recent Labs 10/21/20 
9074 TP 5.9* ALB 3.9 AP 53 Thyroid Studies No results found for: T4, T3U, TSH, TSHEXT Procedures/imaging: see electronic medical records for all procedures/Xrays and details which were not copied into this note but were reviewed prior to creation of Plan Red Schmid MD

## 2020-10-21 NOTE — PROGRESS NOTES
[facility] Pulmonary, Critical Care, and Sleep Medicine Name: Bianca Plummer MRN: [id number] : 1965 Hospital: [facility] [place] Date: 10/21/2020  Room: 109/[address] [place] [address] Note Consult requesting physician: Dr. Shane Philip Reason for Consult: Acute respiratory failure with hypoxemia Subjective/History of Present Illness:  
Patient is a 47 y.o. male with PMHx significant for chronic pancreatitis, diabetes, gastroparesis, hidradenitis; history of colitis on Humiranot clear if patient has inflammatory bowel disease. Patient was admitted on 10/9 with nausea and lower abdominal pains. The patient was admitted to the hospital.  Dr. Nigel Tate consulted for cirrhosis. Today evening, patient had shortness of breath symptoms and hypoxemia. RRT was done. Chest x-ray shows bilateral pulmonary infiltrates. Patient was placed on BiPAP and moved to the ICU. He is currently getting Lasix injection. Patient denies any vomiting or aspirations recently; he denies any COVID-19 contacts at home. He feels comfortable on BiPAP. No significant cough or productive sputum noted; no chest pain or hemoptysis. Telemetry mild sinus tach. Afebrile; blood pressure stable; overall fluid positive by 5 L this admission. 10/21/20 Patient remains in ICU; on ventilator support. Day 8 of intubation. Patient off midazolam infusion since yesterday; he gets sedation as needed. FiO2 35; PEEP 5 No significant respiratory secretions from endotracheal tube. Patient remains afebrile; blood pressure elevated at times; mild sinus tachycardia. Urine output good Review of Systems:  
Review of systems not obtained due to patient factors. No Known Allergies Past Medical History:  
Diagnosis Date  Diabetes ([address]  Gastroparesis  Pancreatitis Past Surgical History:  
Procedure Laterality Date  HX CHOLECYSTECTOMY  HX HIP FRACTURE TX    
 left hip sx 9.23/2018 Social History Tobacco Use  Smoking status: Current Every Day Smoker  Smokeless tobacco: Never Used Substance Use Topics  Alcohol use: Yes History reviewed. No pertinent family history. Prior to Admission medications Medication Sig Start Date End Date Taking? Authorizing Provider  
adalimumab (Humira Pen) 40 mg/0.8 mL injection pen 40 mg by SubCUTAneous route every seven (7) days. Yes Provider, Historical  
lidocaine (LIDODERM) 5 % 2 Patches by TransDERmal route every twenty-four (24) hours. Apply patch to the affected area for 12 hours a day and remove for 12 hours a day. One patch is applied to left hip. One patch is applied to right shoulder blade. Yes Provider, Historical  
loratadine (Claritin) 10 mg tablet Take 10 mg by mouth daily. Indications: inflammation of the nose due to an allergy   Yes Provider, Historical  
aspirin delayed-release 81 mg tablet Take 1 Tab by mouth daily. Yes Racheal, MD Meño  
insulin glargine (Lantus Solostar U-100 Insulin) 100 unit/mL (3 mL) inpn 5 Units by SubCUTAneous route daily. Yes Racheal, MD Meño  
DULoxetine (CYMBALTA) 60 mg capsule Take 1 Cap by mouth as needed. 1/6/19   Meño Springer MD  
glyBURIDE-metFORMIN (GLUCOVANCE) 2.5-500 mg per tablet Take 1 Tab by mouth two (2) times a day. Meño Springer MD  
methocarbamoL (ROBAXIN) 500 mg tablet Take 2 Tabs by mouth four (4) times daily. Meño Springer MD  
traZODone (DESYREL) 50 mg tablet Take 50 mg by mouth nightly. Meño Springer MD  
HYDROmorphone (DILAUDID) 2 mg tablet Take 1 Tab by mouth every four (4) hours as needed for Pain. Max Daily Amount: 12 mg. Indications: Severe Pain 9/26/18   Jayme Fuentes MD  
ondansetron (ZOFRAN ODT) 4 mg disintegrating tablet Take 1-2 tablets every 6-8 hours as needed for nausea and vomiting. 9/26/18   Jayme Fuentes MD  
 
Current Facility-Administered Medications Medication Dose Route Frequency  insulin glargine (LANTUS) injection 12 Units  12 Units SubCUTAneous DAILY  metoprolol (LOPRESSOR) injection 2.5 mg  2.5 mg IntraVENous Q6H  
 albumin human 25% (BUMINATE) solution 25 g  25 g IntraVENous Q12H  cloNIDine (CATAPRES) 0.1 mg/24 hr patch 1 Patch  1 Patch TransDERmal Q7D  
 insulin regular (NOVOLIN R, HUMULIN R) injection 10 Units  10 Units SubCUTAneous Q6H  
 thiamine mononitrate (B-1) tablet 100 mg  100 mg Oral DAILY  folic acid (FOLVITE) tablet 1 mg  1 mg Oral DAILY  lactulose (CHRONULAC) 10 gram/15 mL solution 15 mL  10 g Oral BID  piperacillin-tazobactam (ZOSYN) 4.5 g in 0.9% sodium chloride (MBP/ADV) 100 mL MBP  4.5 g IntraVENous Q8H  
 insulin lispro (HUMALOG) injection   SubCUTAneous Q6H  
 pantoprazole (PROTONIX) 40 mg in 0.9% sodium chloride 10 mL injection  40 mg IntraVENous Q24H  
 lidocaine 4 % patch 2 Patch  2 Patch TransDERmal Q24H Objective:  
Vital Signs:   
Blood pressure (!) 154/112, pulse (!) 119, temperature 98.3 °F (36.8 °C), resp. rate 16, height 5' 9\" (1.753 m), weight 64.9 kg (143 lb), SpO2 93 %. Body mass index is 21.12 kg/m². O2 Device: Ventilator, Endotracheal tube O2 Flow Rate (L/min): 4 l/min Temp (24hrs), Av.4 °F (37.4 °C), Min:98.3 °F (36.8 °C), Max:102.3 °F (39.1 °C) Intake/Output:  
Last shift:      10/21 0701 - 10/21 1900 In: 719 Out: - Last 3 shifts: 10/19 1901 - 10/21 0700 In: 1167.4 [I.V.:532.4] Out: 7932.5 [Urine:7932.5] Intake/Output Summary (Last 24 hours) at 10/21/2020 9473 Last data filed at 10/21/2020 7478 Gross per 24 hour Intake 1732.43 ml Output 7082.5 ml Net -5350.07 ml Last 3 Recorded Weights in this Encounter 10/09/20 2234 10/12/20 1045 10/12/20 1046 Weight: 65.2 kg (143 lb 12.8 oz) 64.9 kg (143 lb) 64.9 kg (143 lb) ABG:   
Lab Results Component Value Date/Time  PHI 7.55 (H) 10/21/2020 09:01 AM  
 PCO2I 32.3 (L) 10/21/2020 09:01 AM  
 PO2I 69 (L) 10/21/2020 09:01 AM  
 HCO3I 28.5 (H) 10/21/2020 09:01 AM  
 FIO2I 0.35 10/21/2020 09:01 AM  
 
Ventilator Mode: Spontaneous Respiratory Rate Resp Rate Observed: 7 Back-Up Rate: 10 Insp Time (sec): 1 sec I:E Ratio: 1:1.9 Ventilator Volumes Vt Set (ml): 0 ml Vt Exhaled (Machine Breath) (ml): 527 ml Vt Spont (ml): 719 ml Ve Observed (l/min): 11.3 l/min Ventilator Pressures Pressure Support (cm H2O): 7 cm H2O 
PIP Observed (cm H2O): 14 cm H2O Plateau Pressure (cm H2O): 17 cm H2O 
MAP (cm H2O): 8.7 PEEP/VENT (cm H2O): 6 cm H20 Auto PEEP Observed (cm H2O): 6.1 cm H2O Physical Exam:  
Patient remains on vent support; mildly sedated; acyanotic; FiO2 35%; PEEP 6 HEENT: pupils not dilated, reactive, mild bilateral scleral jaundice, moist oral mucosa Orogastric and orotracheal tubesno significant secretions noted Neck: No adenopathy or thyroid swelling CVS: Telemetrysinus tachycardia; S1-S2; no murmurs; JVD not elevated RS: Moderate air entry bilateral; decreased breath sounds at bases; no wheezes or crackles; stable respirations on ventilator Abd: soft, no distention, no guarding or rigidity, no tenderness, bowel sounds heard; no hepatosplenomegaly Neuro: Patient lethargic; arousable, opens eyes, squeezes hands, moving all extremities; limited exam  
Extrm: no leg edema or swelling or clubbing Skin: no rash Lymphatic: no cervical or supraclavicular adenopathy. Right thigh; no central line; no bleeding or hematoma Left arm PICC lineno bleeding or hematoma; mild swelling of left arm. Data:  
   
Recent Results (from the past 12 hour(s)) GLUCOSE, POC Collection Time: 10/20/20 11:39 PM  
Result Value Ref Range Glucose (POC) 215 (H) 70 - 110 mg/dL MAGNESIUM Collection Time: 10/21/20  4:15 AM  
Result Value Ref Range Magnesium 1.9 1.6 - 2.6 mg/dL PHOSPHORUS Collection Time: 10/21/20  4:15 AM  
Result Value Ref Range  Phosphorus 2.6 2.5 - 4.9 MG/DL  
 CALCIUM, IONIZED Collection Time: 10/21/20  4:15 AM  
Result Value Ref Range Ionized Calcium 1.09 (L) 1.12 - 1.32 MMOL/L  
PROTHROMBIN TIME + INR Collection Time: 10/21/20  4:15 AM  
Result Value Ref Range Prothrombin time 22.5 (H) 11.5 - 15.2 sec INR 2.0 (H) 0.8 - 1.2 FIBRINOGEN Collection Time: 10/21/20  4:15 AM  
Result Value Ref Range Fibrinogen 209 (L) 210 - 451 mg/dL CBC WITH AUTOMATED DIFF Collection Time: 10/21/20  4:15 AM  
Result Value Ref Range WBC 9.6 4.6 - 13.2 K/uL  
 RBC 2.73 (L) 4.70 - 5.50 M/uL HGB 8.8 (L) 13.0 - 16.0 g/dL HCT 25.4 (L) 36.0 - 48.0 % MCV 93.0 74.0 - 97.0 FL  
 MCH 32.2 24.0 - 34.0 PG  
 MCHC 34.6 31.0 - 37.0 g/dL  
 RDW 18.6 (H) 11.6 - 14.5 % PLATELET 34 (L) 596 - 420 K/uL NEUTROPHILS 82 (H) 40 - 73 % LYMPHOCYTES 11 (L) 21 - 52 % MONOCYTES 7 3 - 10 % EOSINOPHILS 0 0 - 5 % BASOPHILS 0 0 - 2 %  
 ABS. NEUTROPHILS 7.8 1.8 - 8.0 K/UL  
 ABS. LYMPHOCYTES 1.1 0.8 - 3.5 K/UL  
 ABS. MONOCYTES 0.7 0.05 - 1.2 K/UL  
 ABS. EOSINOPHILS 0.0 0.0 - 0.4 K/UL  
 ABS. BASOPHILS 0.0 0.0 - 0.1 K/UL  
 DF AUTOMATED PLATELET COMMENTS DECREASED PLATELETS    
 RBC COMMENTS ANISOCYTOSIS 1+ 
    
 RBC COMMENTS SPHEROCYTES 1+ RBC COMMENTS TARGET CELLS 
FEW METABOLIC PANEL, COMPREHENSIVE Collection Time: 10/21/20  4:15 AM  
Result Value Ref Range Sodium 142 136 - 145 mmol/L Potassium 3.5 3.5 - 5.5 mmol/L Chloride 102 100 - 111 mmol/L  
 CO2 32 21 - 32 mmol/L Anion gap 8 3.0 - 18 mmol/L Glucose 143 (H) 74 - 99 mg/dL BUN 16 7.0 - 18 MG/DL Creatinine 0.84 0.6 - 1.3 MG/DL  
 BUN/Creatinine ratio 19 12 - 20 GFR est AA >60 >60 ml/min/1.73m2 GFR est non-AA >60 >60 ml/min/1.73m2 Calcium 8.9 8.5 - 10.1 MG/DL Bilirubin, total 4.0 (H) 0.2 - 1.0 MG/DL  
 ALT (SGPT) 49 16 - 61 U/L  
 AST (SGOT) 65 (H) 10 - 38 U/L Alk. phosphatase 53 45 - 117 U/L Protein, total 5.9 (L) 6.4 - 8.2 g/dL Albumin 3.9 3.4 - 5.0 g/dL Globulin 2.0 2.0 - 4.0 g/dL A-G Ratio 2.0 (H) 0.8 - 1.7 GLUCOSE, POC Collection Time: 10/21/20  5:14 AM  
Result Value Ref Range Glucose (POC) 157 (H) 70 - 110 mg/dL POC G3 Collection Time: 10/21/20  9:01 AM  
Result Value Ref Range Device: VENT    
 FIO2 (POC) 0.35 % pH (POC) 7.55 (H) 7.35 - 7.45    
 pCO2 (POC) 32.3 (L) 35.0 - 45.0 MMHG  
 pO2 (POC) 69 (L) 80 - 100 MMHG  
 HCO3 (POC) 28.5 (H) 22 - 26 MMOL/L  
 sO2 (POC) 96 92 - 97 % Base excess (POC) 6 mmol/L Mode CPAP/SPON    
 PEEP/CPAP (POC) 6 cmH2O Pressure support 7 cmH2O Allens test (POC) YES Total resp. rate 18 Site RIGHT RADIAL Patient temp. 98.3 Specimen type (POC) ARTERIAL Performed by Angelo Moriches Chemistry Recent Labs 10/21/20 
5297 10/20/20 
0425 10/19/20 
2000 10/19/20 
0450 * 162*  --  215*  144  --  146*  
K 3.5 4.3 3.6 4.0  
 105  --  109 CO2 32 35*  --  33* BUN 16 19*  --  22* CREA 0.84 0.84  --  0.91  
CA 8.9 9.1  --  9.1 MG 1.9 1.9  --  1.9 PHOS 2.6 3.6 3.3 2.3* AGAP 8 4  --  4  
BUCR 19 23*  --  24* AP 53 51  --  46  
TP 5.9* 5.6*  --  5.4* ALB 3.9 3.8  --  3.7 GLOB 2.0 1.8*  --  1.7* AGRAT 2.0* 2.1*  --  2.2* Lactic Acid Lactic acid Date Value Ref Range Status 10/14/2020 1.8 0.4 - 2.0 MMOL/L Final  
 
No results for input(s): LAC in the last 72 hours. Liver Enzymes Protein, total  
Date Value Ref Range Status 10/21/2020 5.9 (L) 6.4 - 8.2 g/dL Final  
 
Albumin Date Value Ref Range Status 10/21/2020 3.9 3.4 - 5.0 g/dL Final  
 
Globulin Date Value Ref Range Status 10/21/2020 2.0 2.0 - 4.0 g/dL Final  
 
A-G Ratio Date Value Ref Range Status 10/21/2020 2.0 (H) 0.8 - 1.7   Final  
 
Alk. phosphatase Date Value Ref Range Status 10/21/2020 53 45 - 117 U/L Final  
 
Recent Labs 10/21/20 
4922 10/20/20 
0425 10/19/20 
0450 TP 5.9* 5.6* 5.4* ALB 3.9 3.8 3.7 GLOB 2.0 1.8* 1.7* AGRAT 2.0* 2.1* 2.2* AP 53 51 46 CBC w/Diff Recent Labs 10/21/20 
5330 10/20/20 
0425 10/19/20 
0450 WBC 9.6 8.0 7.4  
RBC 2.73* 2.72* 2.48* HGB 8.8* 8.7* 7.9*  
HCT 25.4* 25.7* 23.5*  
PLT 34* 53* 50* GRANS 82* 74 81* LYMPH 11* 21 9* EOS 0 0 0 Cardiac Enzymes No results found for: CPK, CK, CKMMB, CKMB, RCK3, CKMBT, CKNDX, CKND1, LYDIA, TROPT, TROIQ, LIANNA, TROPT, TNIPOC, BNP, BNPP  
 
BNP No results found for: BNP, BNPP, XBNPT Coagulation Recent Labs 10/21/20 
2038 10/20/20 
0425 10/19/20 
0450 PTP 22.5* 22.9* 26.3* INR 2.0* 2.1* 2.5* Thyroid  No results found for: T4, T3U, TSH, TSHEXT, TSHEXT No results found for: T4  
 
Urinalysis Lab Results Component Value Date/Time Color DARK YELLOW 10/11/2020 01:03 AM  
 Appearance CLEAR 10/11/2020 01:03 AM  
 Specific gravity 1.021 10/11/2020 01:03 AM  
 pH (UA) 5.5 10/11/2020 01:03 AM  
 Protein Negative 10/11/2020 01:03 AM  
 Glucose Negative 10/11/2020 01:03 AM  
 Ketone Negative 10/11/2020 01:03 AM  
 Bilirubin SMALL (A) 10/11/2020 01:03 AM  
 Urobilinogen 1.0 10/11/2020 01:03 AM  
 Nitrites Negative 10/11/2020 01:03 AM  
 Leukocyte Esterase Negative 10/11/2020 01:03 AM  
  
 
Culture data during this hospitalization. All Micro Results Procedure Component Value Units Date/Time CULTURE, RESPIRATORY/SPUTUM/BRONCH Sophronia Yamil STAIN [[id number]]  (Abnormal) Collected:  10/14/20 1450 Order Status:  Completed Specimen:  Sputum,ET Suction Updated:  10/16/20 0849 Special Requests: NO SPECIAL REQUESTS     
  GRAM STAIN RARE WBCS SEEN     
      
  RARE EPITHELIAL CELLS SEEN  
     
   NO ORGANISMS SEEN Culture result:    
  LIGHT YEAST, (APPARENT CANDIDA ALBICANS) NO NORMAL RESPIRATORY QUINCY ISOLATED  
     
 CULTURE, BLOOD [[id number]] Collected:  10/14/20 1530 Order Status:  Canceled Specimen:  Blood CULTURE, BLOOD [[id number]] Collected:  10/14/20 1530 Order Status:  Canceled Specimen:  Blood ECHO 10/12/20 Interpretation Summary Result status: Final result  · LV: Estimated LVEF is 55 - 60%. Normal cavity size, wall thickness and systolic function (ejection fraction normal). Mild (grade 1) left ventricular diastolic dysfunction E'E= 8.94. 
· LA: No atrial septal defect present. · AV: Aortic valve leaflet calcification present. · MV: Mitral valve thickening. Images report reviewed by me: 
CT 10/9/2020 Results from [facility] Encounter encounter on 10/09/20 CT ABD PELV W CONT Narrative EXAM: CT of the abdomen and pelvis INDICATION: Abdominal pain, greatest in the left lower quadrant with nausea and 
vomiting. COMPARISON: MRI pelvis 10/7/2019. CT left hip 4/17/2019 TECHNIQUE: Axial CT imaging of the abdomen and pelvis was performed with 
intravenous contrast. Multiplanar reformats were generated. One or more dose reduction techniques were used on this CT: automated exposure 
control, adjustment of the mAs and/or kVp according to patient size, and 
iterative reconstruction techniques. The specific techniques used on this CT 
exam have been documented in the patient's electronic medical record. Digital 
Imaging and Communications in Medicine (DICOM) format image data are available 
to nonaffiliated external healthcare facilities or entities on a secure, media 
free, reciprocally searchable basis with patient authorization for at least a 
12-month period after this study. _______________ FINDINGS: 
 
LOWER CHEST: Minor basilar changes of atelectasis. No alveolar consolidation or 
pleural effusion. Normal cardiac size without pericardial effusion. LIVER, BILIARY: Diffuse hepatic hypoattenuation is present with hepatic size 
estimated at approximately 27 cm in craniocaudal span. No discrete liver lesion. No intrahepatic or extra hepatic biliary ductal dilatation. Patient is status 
post cholecystectomy. PANCREAS: Pancreatic enhancement is normal. Mild and diffuse pancreatic ductal 
ectasia is noted. No discrete pancreatic mass lesion noted. SPLEEN: Mildly enlarged at 14 cm. ADRENALS: Normal. 
 
KIDNEYS/URETERS/BLADDER: Symmetric renal enhancement. No bowel obstruction PELVIC ORGANS: Symmetric renal enhancement. Punctate nonobstructing upper pole 
left renal stone. No distal ureteral or urinary bladder stone. Urinary bladder 
unremarkable in CT appearance. VASCULATURE: Diffuse aortobiiliac atherosclerotic vascular calcification is 
present without evidence of aneurysmal dilatation. Main portal vein as well as 
right and left portal venous branches are widely patent. LYMPH NODES: Scattered subcentimeter mesenteric and retroperitoneal lymph nodes 
are noted. No evidence of abdominal or pelvic adenopathy. GASTROINTESTINAL TRACT: No morphology of bowel obstruction. No free 
intraperitoneal gas. Mild thickening of the proximal ascending colon and cecum 
noted. BONES: No acute or aggressive osseous abnormalities identified. Partial 
visualization healed proximal left femoral fracture status post IM nailing and 
dynamic hip screw placement. OTHER: Small quantity of abdominal/pelvic ascites. Thin subcutaneous linear 
density tracking along the right gluteal fold, likely in keeping previously 
noted fistula tract. 
 
_______________ Impression IMPRESSION: 
 
1. Diffuse hepatic hypoattenuation compatible with steatosis with hepatomegaly, 
mild splenomegaly, and small quantity of abdominal/pelvic ascites. The totality 
of findings compatible with hepatocellular dysfunction and potentially 
associated portal hypertension. > Patent main and proximal portal venous branches.  
  > Mild thickening of the proximal ascending colon and cecum may reflect 
sequela of portal colopathy. Infectious/inflammatory colitis also a possibility 
but thought less likely given the imaging appearance. 2. Likely correlate for small area of perianal fistula noted on prior MRI 
pelvis. 3. Punctate nonobstructing upper pole left renal stone. CXR 10/21/20 COMPARISON: October 20, 2020 TECHNIQUE: Portable chest 
FINDINGS: 
SUPPORT DEVICES: Endotracheal tube tip terminates approximately 3.8 cm above the billie. A nasogastric tube traverses inferiorly below the diaphragm off the field-of-view. HEART AND MEDIASTINUM: Hazy bilateral perihilar interstitial opacities persist with slight improved aeration of the right lung base. More subtle streaky opacity left lung base is similar to the prior exam. 
LUNGS AND PLEURAL SPACES: The lungs are well expanded and clear. No focal consolidation, effusion, or pneumothorax.  
BONY THORAX AND SOFT TISSUES: No acute osseous abnormality. IMPRESSION:  
Support lines and tubes remain in place as above. Improved aeration right lung base with evidence of mild persistent interstitial infiltrate/pulmonary edema and bibasilar atelectasis. US LEs 10/11/2020 Interpretation Summary · No evidence of deep vein thrombosis in the right lower extremity. · No evidence of deep vein thrombosis in the left lower extremity. US Abd 10/13/20 IMPRESSION:  
1. Nonspecific increased hepatic echotexture suggesting intrinsic liver disease. Dilated portal vein, recanalized umbilical vein and perihepatic ascites. There 
is lack of flow within the portal vein, suggestive of thrombosis, with 
periportal collaterals and decreased flows in the hepatic vein. However, portal 
and hepatic veins patent on recent CT abdomen 10/09/2020. Multiple attempts to 
contact Dr. Kaushik Maria with this findings, awaiting callback.  
2. No discrete hepatic mass identified.  
3. Cholecystectomy.  
4. Pancreas obscured by bowel gas. Ultrasound left upper extremity 10/20 Interpretation Summary · No evidence of acute DVT and chronic DVT in the left upper extremity. · Thrombus noted within the left basilic upper arm vein near the insertion site of PICC line. IMPRESSION:  
· Acute respiratory failure with hypoxia J96.01 
· Aspiration pneumonia - J69.0 · ARDS - J80 · Thrombocytopenia, concern for ITP vs liver disease - D69.6 · Coagulopathy, multifactorial including DIC - D68.9 · Acute diastolic congestive heart failure  I50.31 
· Cirrhosis  K74.60 · Hyperbilirubinemia - E80.6 · Portal vein thrombosis - M28 
· Colitis  K52.9 Patient Active Problem List  
Diagnosis Code  Hypocalcemia, resolved E83.51  
 Hypomagnesemia, resolved E83.42  
 Alcoholism ([address] F10.20  Cirrhosis ([facility] [address] K74.60  Colitis K52.9  Acute respiratory failure with hypoxemia (HCC) J96.01  
 Aspiration pneumonia ([address] J69.0  Acute diastolic CHF (congestive heart failure) (HCC) I50.31  Thrombocytopenia ([address] D69.6 · · Code status: Full code RECOMMENDATIONS:  
Respiratory: Day 8 on ventilator support. Patient did spontaneous breathing trials this morning; FiO2 35%; PEEP 5; minute ventilation about 10 L; PaO2/FiO2 ratio about 200 on SBT ABG; RSBI 40s; plan to discuss with wife and trial of extubation; patient has cuff leak as discussed with RT. Chest x-ray reviewed todayoverall improved; mild interstitial changes, and right basal atelectasis. No focal consolidations or worsening pleural effusions. ABG shows mild respiratory alkalosis during SBT. SedationPrn midazolam and fentanyl. Continue ventilator and sedation bundles; VAP precautions. Keep oxygen saturation greater than 88%; tolerate PaO2 greater than 55; lung volume protection strategy; tolerate permissive hypercapnia; keep plateau pressure less than 30. Sedation protocolRASS 0 to -1. SARS CoV2 PCR negative x 2 negative CVS: Continue blood pressure medications; metoprolol 2.5 mg IV every 6; clonidine 0.1 mg patch; Prn labetalol ordered; Lasix 20 mg 1 dose given for possible extubation today. Recent echocardiogram showed EF 55 to 60%; no pulmonary hypertension reported on echo. ID: Respiratory cultureslight Candida reported; complete 7 days Zosyn. History of immunosuppression state given biological use [Humira]. Hematology/Oncology: Hemoglobin remained stable8.8, keep hemoglobin greater than 7 g/dL; platelets [id number]- keep platelets more than 20 K; INR 2.0 from chronic liver disease/cirrhosis; fibrinogen 209maintaining. S/p Dexamethasone 40 mg daily for possible ITP; appreciate hematology review. Renal: Stable renal function; normal creatinine; potassium and calcium replaced. Sodium normal; continue free water via NG tube. GI/: monitor LFT, ammonia stable 34; appreciate follow-up by Dr. Marisela Mckenzie. Alcoholic liver disease with cirrhosis; lactulose twice daily. Ward catheter in placemonitor urine output. Endocrine: Stable blood sugars; patient on Lantus, Novolin and Humalog sliding scale insulin. Adjust insulin requirements based on blood sugars and oral intake. Neurology: Off sedation; seems more responsive; apears to have generalized weaknesspatient at risk of critical illness myopathy. Thiamine and folic acid. Vascular: Ultrasound left upper extremityno acute or chronic DVT; thrombus left basilic vein near the PICC line; patient auto anticoagulated and low platelets from chronic liver disease. Electrolytes: Replace electrolytes per ICU electrolyte replacement protocol. IVF: Albumin and free water. Nutrition: Tube feedings; advance as tolerated. Prophylaxis: DVT Prophylaxis: SCDs. GI Prophylaxis: Pantoprazole. Restraints: Soft wrist restraintsmedically necessary to avoid pulling lines, tubes and patient safety Lines/Tubes: [employer], Ward and Vent Bundles will be Followed. ETT: 10/14/20. OGT/NGT: 10/14/20. Central line: 10/14/20 RT femoral CVC (site examined, no erythema, induration, discharge or sign of infection. Dressing intact.  Medically necessary, will remove it when not needed. Central line bundle followed); discussed with staff to remove today after FFP's. PICC line left arm 10/19medically necessary; follow 1 protocol for daily evaluation and dressing. Ward: 10/13(Medically necessary for strict input/output monitoring in critically ill patient, will remove it when not needed. Ward bundle followed). Prognosis poor; palliative care on case. Patient at risk of multiorgan failure, bleeding complications, ventilator dependence, renal failure, hepatorenal syndrome, mortality. Will defer respective systems problem management to primary and other respective consultant and follow patient in ICU with primary and other medical team. 
Further recommendations will be based on the patient's response to recommended treatment and results of the investigation ordered. Quality Care: PPI, DVT prophylaxis, HOB elevated, Infection control all reviewed and addressed. Care of plan d/w ICU RN, RT MDR. High complexity decision making was performed during the evaluation of this patient at high risk for decompensation with multiple organ involvement. Total critical care time spent rendering care exclusive of procedures/family discussion/coordination of care: 
35 minutes. Polo Denis MD 
10/21/2020

## 2020-10-21 NOTE — PROGRESS NOTES
Patient extubated to Nasal Cannula at 5lpm. Good cough and no stridor or distress noted at this time. Spo2 is 95 - 98% 10/21/20 1218 Oxygen Therapy O2 Device Nasal cannula O2 Flow Rate (L/min) 5 l/min FIO2 (%) 40 %

## 2020-10-21 NOTE — PROGRESS NOTES
Palliative Medicine Formerly [facility] [phone]  [facility] [phone] Patient's wife Arline Buckley and daughter Shaji Villatoro at bedside. Dr. Hunter Fernández reviewed medical management and explained pt meets criteria for medical extubation but at high risk for respiratory failure again. Discussed with his underlying chronic liver disease and debilitated state wouldn't recommend re-intubation for respiratory failure, instead at that time transition to comfort measures. Also discussed risks and burdens of CPR and recommendation for DNR was made. Wife and daughter agreed to recommendations and stated wish for DNR/DNI and if he declines needing re-intubation will transition to comfort measures at that time. Palliative SW provided emotional support to wife and daughter. They had no further questions regarding patient's care. They verbalized viraj to see him alert and able to acknowledge their presence. GOALS of CARE: DNR/DNI Palliative will continue supporting family and discuss further goals of care. Thank you for the opportunity to assist in the care of Mr. Billy Berry. Kelin Shelby, MSW, [facility]

## 2020-10-21 NOTE — PROGRESS NOTES
@2000 pt taken over sedated and paralyzed in bed on ventilator via et tube. Fla cc 0 ,vital signs fluctuates,OG tube infusing tube feeds. Ward in situ draining imer urine. Soft restraints to bilateral wrist.   Lt arm PICC dressing dry and intact. Dressing to rt groin dry and intact no hematoma seen. Assessment done and charted in appropriate flow sheets . Nursing management continues. @0000 Pt reassessed no new developments care continues. @0400 pt care continues pt having periods of restlessness and agitation medication administered as prescribed nursing care continues. [email or web address] Bedside and Verbal shift change report given to Louisa Wilkinson (oncoming nurse) by Tracie Mei (offgoing nurse). Report included the following information SBAR, Kardex, Intake/Output, MAR, Recent Results, Med Rec Status and Alarm Parameters .

## 2020-10-21 NOTE — PROGRESS NOTES
Phone: [phone] Paging : [phone] Hematology / Oncology Progress Note Admit Date: 10/9/2020 Assessment:  
 
Principal Problem: Hypocalcemia (10/9/2020) Active Problems: Hypomagnesemia (10/9/2020) Alcoholism ([facility] (10/9/2020) Cirrhosis ([facility] (10/9/2020) Colitis (10/9/2020) Acute respiratory failure with hypoxemia ([facility] (10/13/2020) Aspiration pneumonia ([facility] (10/13/2020) Acute diastolic CHF (congestive heart failure) ([facility] (10/13/2020) Thrombocytopenia ([facility] (10/13/2020) Plan: 1.aspiration pneumonia. Remains intubated. ARDS. cxr  yest Stable bilateral interstitial pattern, in keeping with the given history of 
ARDS, can also be seen with interstitial edema or chronic interstitial lung 
disease. 
  
Interval progression of right greater than left bibasilar atelectasis, streaky 
pneumonia not completely excluded. Remains on zosyn 2. No active bleeding. Hgb 8.7. 3.liver failure - coagulopathy - 2 unit FFP,  INR remains 2.1, fibrinogen 149, yesterday before plasma 4.remains sedated 5. Continues on zosyn 6. DM on insulin and monitored by staff 7. Portal vein thrombosis - not on anticoagulation due to low plts 8. Immune suppression due to colitis was on humira 9. Palliative care will meet with family later today 10. plts stable not bleeding 11. Albumen now bid 12. osmolite tube feedings at 15 ml 13. Family meeting to discuss goals of care Continue supportive care,  Full code. Subjective:  
 
Sedated on vent. some spontaneous movements. Turning head. No bleeding. Cultures negative Objective:  
 
Patient Vitals for the past 24 hrs: 
 BP Temp Pulse Resp SpO2  
10/21/20 0330 117/80  90 13 93 % 10/21/20 0300 128/82  89 11 96 % 10/21/20 0230 (!) 159/104  (!) 109 16 97 % 10/21/20 0141 (!) 152/102  (!) 107 18 92 % 10/21/20 0000  98.3 °F (36.8 °C)     
10/20/20 2300 (!) 168/98  (!) 108 14 95 % 10/20/20 2251 (!) 158/111  (!) 108 15 94 % 10/20/20 2240 (!) 152/101  (!) 106 15 94 % 10/20/20 2200 (!) 148/94  (!) 101 21 (!) 78 % 10/20/20 2130 126/75  85 18 95 % 10/20/20 2100 121/76  89 16 96 % 10/20/20 2042   100 16 96 % 10/20/20 2030 118/71  96 14 97 % 10/20/20 2000 120/82  (!) 105 15 97 % 10/20/20 1949  (!) 102.3 °F (39.1 °C)     
10/20/20 1930 (!) 149/100  (!) 117 18 98 % 10/20/20 1900 (!) 148/102  (!) 117 19 100 % 10/20/20 1812 (!) 160/111  (!) 126    
10/20/20 1800 (!) 160/111  (!) 123 14 99 % 10/20/20 1730 (!) 164/106  (!) 125 16 96 % 10/20/20 1700 (!) 159/102  (!) 127 17 98 % 10/20/20 1630 (!) 163/102 99.8 °F (37.7 °C) (!) 124 21 98 % 10/20/20 1607   (!) 125 14 97 % 10/20/20 1600 (!) 164/102  (!) 123 18 98 % 10/20/20 1530 (!) 155/105  (!) 127 15 97 % 10/20/20 1526 (!) 164/106 99.2 °F (37.3 °C) (!) 127 14 97 % 10/20/20 1509 (!) 164/106 99.9 °F (37.7 °C) (!) 127 14 97 % 10/20/20 1500 (!) 164/106  (!) 131 17 97 % 10/20/20 1430 (!) 146/86  (!) 125 14 96 % 10/20/20 1400 (!) 161/89  (!) 130 18 96 % 10/20/20 1330 (!) 158/99  (!) 120 11 96 % 10/20/20 1300 (!) 167/108  (!) 129 16 97 % 10/20/20 1240 (!) 156/116 99.2 °F (37.3 °C) (!) 124 14 99 % 10/20/20 1230 (!) 156/116  (!) 128 17 99 % 10/20/20 1220 (!) 178/107 99.2 °F (37.3 °C) (!) 134 21 98 % 10/20/20 1200 (!) 178/107  (!) 136 19 99 % 10/20/20 1157   (!) 122 10 97 % 10/20/20 1130 (!) 179/116  (!) 124 12 96 % 10/20/20 1107  99.7 °F (37.6 °C)     
10/20/20 1101 (!) 171/122      
10/20/20 1100 (!) 171/122  (!) 136 17 97 % 10/20/20 1030 (!) 175/118  (!) 132 17 94 % 10/20/20 1000 (!) 173/101  (!) 137 27 97 % 10/20/20 0930 123/83  (!) 112 (!) 6 95 % 10/20/20 0900 (!) 148/102  (!) 128 (!) 4 97 % 10/20/20 0834   (!) 107 9 98 % 10/20/20 0830 120/80  (!) 108 8 98 % 10/20/20 0802   (!) 138 17 99 % 10/20/20 0800     95 % 10/20/20 0719  97.9 °F (36.6 °C)     
10/20/20 0630 112/71  79 14 97 % 10/20/20 0600 125/79  81 14 97 % 10/20/20 0530 114/75  84 14 99 % 10/20/20 0528   86 14 99 % 10/20/20 0500 126/83  94 14 99 % 10/20/20 0430 118/78  81 14 98 % 10/20/20 0400 112/77 98.1 °F (36.7 °C) 74 14 99 % Intake/Output Summary (Last 24 hours) at 10/21/2020 6681 Last data filed at 10/21/2020 0116 Gross per 24 hour Intake 967.43 ml Output 4982.5 ml Net -4015.07 ml Temp (24hrs), Av.4 °F (37.4 °C), Min:97.9 °F (36.6 °C), Max:102.3 °F (39.1 °C) ROS: unobtainable Exam: 
General:  sedatd on vent, no bleding HEENT: Head: Normocephalic, no lesions, without obvious abnormality. Neck / Thyroid: Supple, no masses, nodes, nodules or enlargement. Lymphatic:  no lymphadenopathy Lungs: clear to auscultation bilaterally, few secretions, on vent Cardiovascular:  regular heart rate, no murmurs, no JVD Pulses: 2+ and symmetric Abdomen: Soft, non-tender and without masses or organomegaly Musculoskeletal: no joint tenderness, deformity or swelling Extremities: peripheral pulses normal, no pedal edema, no clubbing or cyanosis Skin:  no rash or abnormalities Neurologic: sedated , turning head spontaneously Recent Results (from the past 24 hour(s)) MAGNESIUM Collection Time: 10/20/20  4:25 AM  
Result Value Ref Range Magnesium 1.9 1.6 - 2.6 mg/dL PHOSPHORUS Collection Time: 10/20/20  4:25 AM  
Result Value Ref Range Phosphorus 3.6 2.5 - 4.9 MG/DL  
CALCIUM, IONIZED Collection Time: 10/20/20  4:25 AM  
Result Value Ref Range Ionized Calcium 1.11 (L) 1.12 - 1.32 MMOL/L  
PROTHROMBIN TIME + INR Collection Time: 10/20/20  4:25 AM  
Result Value Ref Range Prothrombin time 22.9 (H) 11.5 - 15.2 sec INR 2.1 (H) 0.8 - 1.2 AMMONIA Collection Time: 10/20/20  4:25 AM  
Result Value Ref Range  Ammonia 34 (H) 11 - 32 UMOL/L  
FIBRINOGEN  
 Collection Time: 10/20/20  4:25 AM  
Result Value Ref Range Fibrinogen 149 (L) 210 - 451 mg/dL METABOLIC PANEL, COMPREHENSIVE Collection Time: 10/20/20  4:25 AM  
Result Value Ref Range Sodium 144 136 - 145 mmol/L Potassium 4.3 3.5 - 5.5 mmol/L Chloride 105 100 - 111 mmol/L  
 CO2 35 (H) 21 - 32 mmol/L Anion gap 4 3.0 - 18 mmol/L Glucose 162 (H) 74 - 99 mg/dL BUN 19 (H) 7.0 - 18 MG/DL Creatinine 0.84 0.6 - 1.3 MG/DL  
 BUN/Creatinine ratio 23 (H) 12 - 20 GFR est AA >60 >60 ml/min/1.73m2 GFR est non-AA >60 >60 ml/min/1.73m2 Calcium 9.1 8.5 - 10.1 MG/DL Bilirubin, total 3.3 (H) 0.2 - 1.0 MG/DL  
 ALT (SGPT) 52 16 - 61 U/L  
 AST (SGOT) 73 (H) 10 - 38 U/L Alk. phosphatase 51 45 - 117 U/L Protein, total 5.6 (L) 6.4 - 8.2 g/dL Albumin 3.8 3.4 - 5.0 g/dL Globulin 1.8 (L) 2.0 - 4.0 g/dL A-G Ratio 2.1 (H) 0.8 - 1.7    
CBC WITH AUTOMATED DIFF Collection Time: 10/20/20  4:25 AM  
Result Value Ref Range WBC 8.0 4.6 - 13.2 K/uL  
 RBC 2.72 (L) 4.70 - 5.50 M/uL HGB 8.7 (L) 13.0 - 16.0 g/dL HCT 25.7 (L) 36.0 - 48.0 % MCV 94.5 74.0 - 97.0 FL  
 MCH 32.0 24.0 - 34.0 PG  
 MCHC 33.9 31.0 - 37.0 g/dL  
 RDW 18.0 (H) 11.6 - 14.5 % PLATELET 53 (L) 955 - 420 K/uL NEUTROPHILS 74 42 - 75 % BAND NEUTROPHILS 1 0 - 5 % LYMPHOCYTES 21 20 - 51 % MONOCYTES 4 2 - 9 % EOSINOPHILS 0 0 - 5 % BASOPHILS 0 0 - 3 %  
 ABS. NEUTROPHILS 5.9 1.8 - 8.0 K/UL  
 ABS. LYMPHOCYTES 1.7 0.8 - 3.5 K/UL  
 ABS. MONOCYTES 0.3 0 - 1.0 K/UL  
 ABS. EOSINOPHILS 0.0 0.0 - 0.4 K/UL  
 ABS. BASOPHILS 0.0 0.0 - 0.1 K/UL PLATELET COMMENTS LARGE PLATELETS    
 RBC COMMENTS ANISOCYTOSIS 1+ 
    
 DF MANUAL    
POC G3 Collection Time: 10/20/20  5:30 AM  
Result Value Ref Range Device: VENT    
 FIO2 (POC) 0.50 %  pH (POC) 7.53 (H) 7.35 - 7.45    
 pCO2 (POC) 40.9 35.0 - 45.0 MMHG  
 pO2 (POC) 175 (H) 80 - 100 MMHG  
 HCO3 (POC) 33.9 (H) 22 - 26 MMOL/L  
 sO2 (POC) 100 (H) 92 - 97 % Base excess (POC) 11 mmol/L Mode ASSIST CONTROL Tidal volume 420 ml Set Rate 14 bpm  
 PEEP/CPAP (POC) 8 cmH2O  
 PIP (POC) 18 Allens test (POC) N/A Inspiratory Time 1.09 sec Total resp. rate 14 Site RIGHT RADIAL Patient temp. 98.1 Specimen type (POC) ARTERIAL Performed by [facility] Volume control plus YES    
GLUCOSE, POC Collection Time: 10/20/20  5:34 AM  
Result Value Ref Range Glucose (POC) 182 (H) 70 - 110 mg/dL PLASMA, ALLOCATE Collection Time: 10/20/20  8:30 AM  
Result Value Ref Range CALLED TO:    
  1 UNIT FFP READY TO [place]  ICU AT 3211 10/20/20 [facility] Unit number [id number] Blood component type FP 24h,Thaw1 Unit division 00 Status of unit ISSUED Unit number [id number] Blood component type FP 24h,Thaw2 Unit division 00 Status of unit ISSUED   
TYPE & SCREEN Collection Time: 10/20/20  8:55 AM  
Result Value Ref Range Crossmatch Expiration 10/23/2020 ABO/Rh(D) A POSITIVE Antibody screen NEG   
CALCIUM, IONIZED Collection Time: 10/20/20 10:20 AM  
Result Value Ref Range Ionized Calcium 1.13 1.12 - 1.32 MMOL/L  
GLUCOSE, POC Collection Time: 10/20/20 11:48 AM  
Result Value Ref Range Glucose (POC) 176 (H) 70 - 110 mg/dL GLUCOSE, POC Collection Time: 10/20/20  5:44 PM  
Result Value Ref Range Glucose (POC) 176 (H) 70 - 110 mg/dL GLUCOSE, POC Collection Time: 10/20/20 11:39 PM  
Result Value Ref Range Glucose (POC) 215 (H) 70 - 110 mg/dL Current Facility-Administered Medications Medication Dose Route Frequency Provider Last Rate Last Dose  insulin glargine (LANTUS) injection 12 Units  12 Units SubCUTAneous DAILY Justin Boyle MD   12 Units at 10/20/20 0815  
 metoprolol (LOPRESSOR) injection 2.5 mg  2.5 mg IntraVENous Q6H Justin Boyle MD   2.5 mg at 10/21/20 0305  0.9% sodium chloride infusion 250 mL  250 mL IntraVENous PRN Ander Lorenzana MD      
 albumin human 25% (BUMINATE) solution 25 g  25 g IntraVENous Q12H Ander Lorenzana MD   25 g at 10/20/20 2113  cloNIDine (CATAPRES) 0.1 mg/24 hr patch 1 Patch  1 Patch TransDERmal Q7D Ander Lorenzana MD   1 Patch at 10/20/20 1230  
 midazolam (PF) (VERSED) injection 2 mg  2 mg IntraVENous Q4H PRN Ander Lorenzana MD   2 mg at 10/21/20 0039  
 0.9% sodium chloride infusion 250 mL  250 mL IntraVENous PRN Ander Lorenzana MD      
 insulin regular (Em Curly, HUMULIN R) injection 10 Units  10 Units SubCUTAneous Q6H Ander Lorenzana MD   10 Units at 10/21/20 0038  
 0.9% sodium chloride infusion 250 mL  250 mL IntraVENous PRN Ander Lorenzana MD      
 heparin (porcine) 100 unit/mL injection 500 Units  500 Units InterCATHeter Q8H PRN Gerri Tomas MD   500 Units at 10/19/20 1539  
 0.9% sodium chloride infusion 250 mL  250 mL IntraVENous PRN Ander Lorenzana MD      
 thiamine mononitrate (B-1) tablet 100 mg  100 mg Oral DAILY Ander Lorenzana MD   100 mg at 10/20/20 8236  folic acid (FOLVITE) tablet 1 mg  1 mg Oral DAILY Ander Lorenzana MD   1 mg at 10/20/20 2766  lactulose (CHRONULAC) 10 gram/15 mL solution 15 mL  10 g Oral BID Nettie WHITE MD   15 mL at 10/20/20 2114  piperacillin-tazobactam (ZOSYN) 4.5 g in 0.9% sodium chloride (MBP/ADV) 100 mL MBP  4.5 g IntraVENous Q8H Nettie WHITE MD 25 mL/hr at 10/20/20 2114 4.5 g at 10/20/20 2114  metoclopramide HCl (REGLAN) injection 5 mg  5 mg IntraVENous Q6H PRN Elissa Severance, MD      
 insulin lispro (HUMALOG) injection   SubCUTAneous Q6H Ander Lorenzana MD   4 Units at 10/21/20 0036  
 ELECTROLYTE REPLACEMENT PROTOCOL - Potassium Standard Dosing   1 Each Other PRN Elissa Severance, MD      
 ELECTROLYTE REPLACEMENT PROTOCOL - Magnesium   1 Each Other PRN Ange Green MD      
 ELECTROLYTE REPLACEMENT PROTOCOL - Phosphorus  Standard Dosing  1 Each Other PRN Ange Green MD      
 ELECTROLYTE REPLACEMENT PROTOCOL - Calcium   1 Each Other PRN Ange Green MD      
 pantoprazole (PROTONIX) 40 mg in 0.9% sodium chloride 10 mL injection  40 mg IntraVENous Q24H Yon Kilpatrick MD   40 mg at 10/20/20 2114  
 HYDROmorphone (PF) (DILAUDID) injection 1 mg  1 mg IntraVENous Q3H PRN Carlos Tomas MD   1 mg at 10/21/20 0234  
 oxyCODONE-acetaminophen (PERCOCET) 5-325 mg per tablet 1-2 Tab  1-2 Tab Oral Q6H PRN Carlos Tomas MD   1 Tab at 10/13/20 2351  lidocaine 4 % patch 2 Patch  2 Patch TransDERmal Q24H Carlos Tomas MD   2 Patch at 10/20/20 2113  ondansetron (ZOFRAN) injection 4 mg  4 mg IntraVENous Q6H PRN Carlos Tomas MD   4 mg at 10/13/20 2139  
 glucose chewable tablet 16 g  4 Tab Oral PRN Carlos Tomas MD      
 glucagon (GLUCAGEN) injection 1 mg  1 mg IntraMUSCular PRN Carlos Tomas MD      
 dextrose 10% infusion 125-250 mL  125-250 mL IntraVENous PRCarlos Feng MD Lynette Sabal, MD

## 2020-10-21 NOTE — PROGRESS NOTES
[address] [facility] Kristin Cydney Holcomb MD, Kimmie Prescott, [facility] MD Eufemia Garrido, PAURIEL Martinez, [facility] April S Estelita, [facility]   HELLEN Ledesma-HUSEYIN Trevino, [facility] Jonatan [facility] 136 
  at [address], [address] Vini [place]  22. 
  [phone] FAX: [address] [place] [address] Avenue 
  [facility] 
  [address], [address] [facility], [address] 
  [phone] FAX: [phone] HEPATOLOGY PROGRESS NOTE The patient is a 47year old  male without previous knowledge of liver disease. He says he used to consume alcohol fairly heavily several years ago but cut back to only 1-2 drinks a few days per week 2 years ago. He notes progressive weakness and lower extremity swelling for the past few months and says he has had no alcohol for 2-3 weeks. He notes problem concentrating on work and increased forgetfullness. He was admitted to medical floor, developed aspiration and was transferred to ICU. He remains intubated in ICU. FIO2 down to 35% He is off all sedation He is off presser support with stable BP and normal pulse. Has good urine output. And Scr normal 
PLTs down to the 20s this AM without overt bleeding. INR is down to 2.0 wit only 1 unit FFP yesterday Ammonia remains low in the 30s. He has no ascites or edema. ASSESSMENT AND PLAN: 
Cirrhosis The diagnosis of cirrhosis is based upon laboratory studies Cirrhosis is likely to be due to chronic alcohol use. Serology for other causes of chronic liver disease are all negative. The CTP is 11. Child class C. The MELD score is down to 19 Cardiac portion of liver transplant evaluation testing completed before this acute event.  
ECHO was normal.   
 Lexicon stress was negative for ischemia Will need to recover from this acute event, gain strength and be able to ambulate before he could be considered a candidate for LT. 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 This is based upon a history of consuming significant alcohol on a daily basis for many years. The patient has been abstinent from alcohol since 9/2020. I do not suspect he has alcoholic hepatitis. He has been abstinent from alcohol for 2-3 weeks and he has not been drinking excessively for several months prior to that. Suspect the changes to his labs are all due to chronic liver disease and cirrhosis Screening for Esophageal varices The patient has not had an EGD to screen for varices. I do not want to do EGD now because of the very low PLT count. Will give PLT growth factor and perform EGD as OP after PLT is up and it would be safer for banding and bx if any lesions found. Hepatic encephalopathy Overt HE has not developed to date. Because of AMS he was placed on lactulose BID He can remain on this Anemia This is due to multifactorial causes including portal hypertension with chronic GI blood loss, bone marrow suppression secondary to malnutrition and alcohol. FE panel was normal 
HB has drifted down from 10 to 8s over past week. No evidence of GI bleeding. Will need EGD to assess for UGI blood loss once extubated and before hospital DC. Coagulopathy This is secondary to cirrhosis, and DIC, He has recieved 1 unit FFP yesterday INR down today to 2.0 Thrombocytopenia This is secondary to cirrhosis and DIC Was treated with high dose decedron when PLT was in the 20. PLTs now in the 20s Screening for Hepatocellular Carcinoma US shows cirrhosis and no liver mass. Aspiration pneumonia On IV ABX Intubated and slowly weaning with goo oxygenaton Malnutrition/muscle wasting He is receiving osmolite through NG tube. Suggest we replace this with smaller dobhoff feeding tube. PHYSICAL EXAMINATION: 
VS: per nursing note General:  Ill appearing. Intubated. Eyes:  Sclera anicteric. ENT:  No oral lesions. Thyroid normal. 
Nodes:  No adenopathy. Skin:  Spider angiomata. No jaundice. Respiratory:  Intubated with junky BS Cardiovascular:  Regular heart rate. Abdomen:  Soft non-tender, Hepatomegally with liver 5 fingers below the right costal margin. No obvious ascites. Extremities:  No lower extremity edema. Neurologic:  Sedated. LABORATORY: 
Results for Shavon Rhoades (MRN [id number]) as of 10/21/2020 07:18 Ref. Range 10/19/2020 04:50 10/20/2020 04:25 10/21/2020 04:15 WBC Latest Ref Range: 4.6 - 13.2 K/uL 7.4 8.0 9.6 HGB Latest Ref Range: 13.0 - 16.0 g/dL 7.9 (L) 8.7 (L) 8.8 (L) PLATELET Latest Ref Range: 135 - 420 K/uL 50 (L) 53 (L) 34 (L) INR Latest Ref Range: 0.8 - 1.2   2.5 (H) 2.1 (H) 2.0 (H) Sodium Latest Ref Range: 136 - 145 mmol/L 146 (H) 144 142 Potassium Latest Ref Range: 3.5 - 5.5 mmol/L 4.0 4.3 3.5 Chloride Latest Ref Range: 100 - 111 mmol/L 109 105 102 CO2 Latest Ref Range: 21 - 32 mmol/L 33 (H) 35 (H) 32 Glucose Latest Ref Range: 74 - 99 mg/dL 215 (H) 162 (H) 143 (H) BUN Latest Ref Range: 7.0 - 18 MG/DL 22 (H) 19 (H) 16  
Creatinine Latest Ref Range: 0.6 - 1.3 MG/DL 0.91 0.84 0.84 Bilirubin, total Latest Ref Range: 0.2 - 1.0 MG/DL 3.2 (H) 3.3 (H) 4.0 (H) Albumin Latest Ref Range: 3.4 - 5.0 g/dL 3.7 3.8 3.9 ALT Latest Ref Range: 16 - 61 U/L 44 52 49 AST Latest Ref Range: 10 - 38 U/L 68 (H) 73 (H) 65 (H) Alk. phosphatase Latest Ref Range: 45 - 117 U/L 46 51 53 Ammonia Latest Ref Range: 11 - 32 UMOL/L  34 (H) RADIOLOGY: 
10/2020. CT scan abdomen without IV contrast.  Changes consistent with fatty liver and hepatomegaly. Splenomegaly. No ascites.  
 
 
Nohelia Martin MD 
 [address] [place] [address] [facility], [address] [place], [address] 
[phone] [address] [place] [address]

## 2020-10-21 NOTE — DIABETES MGMT
GLYCEMIC CONTROL PROGRESS NOTE: 
 
- discussed in rounds, known h/o T2DM, basal/oral home regimen - BG out of target range ICU: 140-180 mg/dl, trending up requiring consistent coverage - TDD = 58 (12 Lantus + 40 Regular + 6 - Humalog Very Insulin Resistant Corrective Coverage) - TF 15 cc/hour - pt may benefit from adjustment to scheduled dose of insulin if BG trends up Recent Glucose Results:  
Lab Results Component Value Date/Time  (H) 10/21/2020 04:15 AM  
 GLUCPOC 157 (H) 10/21/2020 05:14 AM  
 GLUCPOC 215 (H) 10/20/2020 11:39 PM  
 GLUCPOC 176 (H) 10/20/2020 05:44 PM  
 
 
 
Glenys Ly MS, RN, CDE Glycemic Control Team 
[phone] Pager [phone] (M-TH 8:00-4:30P) *After Hours pager [phone]

## 2020-10-21 NOTE — PROGRESS NOTES
Bedside and Verbal shift change report given to Iris Payne RN (oncoming nurse) by Emily Cline RN (offgoing nurse). Report included the following information SBAR, Kardex and MAR.

## 2020-10-21 NOTE — PROGRESS NOTES
10/21/20 0757 Patient Observations Pulse (Heart Rate) (!) 105 Resp Rate 20  
O2 Sat (%) 97 % Vent Settings FIO2 (%) 35 % Ventilator Measurements Resp Rate Observed 20 Vt Spont (ml) 719 ml Ve Observed (l/min) 11.3 l/min PIP Observed (cm H2O) 14 cm H2O  
MAP (cm H2O) 8.7 I:E Ratio Actual 1:3.0  
SBT started 08:00am

## 2020-10-22 NOTE — DIABETES MGMT
GLYCEMIC CONTROL PROGRESS NOTE: 
 
- known h/o T2DM, basal/oral home regimen - BG out of target range ICU: 140-180 mg/dl, trending up requiring consistent coverage - TDD = 48 (12 Lantus + 20 Regular + 16 - Humalog Normal Insulin Sensitivity Corrective Coverage) - TF discontinued recommend adjustment to regimen *Lantus 5 units daily *discontinue regular insulin - Recent Glucose Results:  
Lab Results Component Value Date/Time  (H) 10/22/2020 02:08 AM  
 GLUCPOC 188 (H) 10/22/2020 05:14 AM  
 GLUCPOC 205 (H) 10/21/2020 11:09 PM  
 GLUCPOC 232 (H) 10/21/2020 05:34 PM  
 
 
 
Glenys Ly MS, RN, CDE Glycemic Control Team 
[phone] Pager [phone] (M-TH 8:00-4:30P) *After Hours pager [phone]

## 2020-10-22 NOTE — PROGRESS NOTES
Pulm/CC Name  Relation  Home Teodoro Stein  [phone] I have called and updated patient's wife; discussed patient remains lethargic; swallow evaluation is pending; if patient does not improve, or worsens, I have recommended hospice evaluation. Discussed that patient is being moved to medical unit.  
 
Winter Casas MD

## 2020-10-22 NOTE — PROGRESS NOTES
Phone: [phone] Paging : [phone] Hematology / Oncology Progress Note Admit Date: 10/9/2020 Assessment:  
 
Principal Problem: Hypocalcemia (10/9/2020) Active Problems: Hypomagnesemia (10/9/2020) Alcoholism ([facility] [address] (10/9/2020) Cirrhosis ([facility] [address] (10/9/2020) Colitis (10/9/2020) Acute respiratory failure with hypoxemia ([facility] [address] (10/13/2020) Aspiration pneumonia ([facility] [address] (10/13/2020) Acute diastolic CHF (congestive heart failure) ([facility] [address] (10/13/2020) Thrombocytopenia ([facility] [address] (10/13/2020) DNR Plan: 1. Extubated yest- continues on zosyn 2. Opens eyes to voice, does not folow commands, moving legs 3. todays hgb stable at 9.5, plt 34, wbc 10. No active bleeding 4. Fibrinogen 26, Inr 1.9 5. Bili 5.1 6. DM - getting insulin coverage 7. osmolite tube feeds 15 ml 8. Not in pain,  
9. Continue supportive care Subjective:  
 
Has been restless and confused overnight. Cureetly a dNR - comfort care if he deteriorate. Has received ativan at 0130  Still sedated. Objective:  
 
Patient Vitals for the past 24 hrs: 
 BP Temp Pulse Resp SpO2 Weight 10/22/20 0300 (!) 148/93  97 18 95 %   
10/22/20 0200 (!) 145/86  97 21 95 %   
10/22/20 0100 136/82  86 17 91 %   
10/22/20 0000 (!) 142/76 99.7 °F (37.6 °C) 87 20 97 %   
10/21/20 2300 (!) 135/90  (!) 104 18 95 %   
10/21/20 2200 (!) 156/97  (!) 107 22 98 %   
10/21/20 2100 (!) 127/90  (!) 102 12 94 %   
10/21/20 2002 (!) 145/94 99.1 °F (37.3 °C) (!) 107 21 99 %   
10/21/20 2000   (!) 111 18 95 % 71.4 kg (157 lb 6.5 oz) 10/21/20 1900 (!) 137/91  96 17 95 %   
10/21/20 1800 123/77  81 24 100 %   
10/21/20 1730 119/80  95 20 98 %   
10/21/20 1700 118/74  86 16 94 %   
10/21/20 1630 110/80 98.5 °F (36.9 °C) 91 23 97 %   
10/21/20 1600 (!) 129/97  (!) 103 18 100 %   
10/21/20 1530 (!) 137/96  100 22 96 %   
10/21/20 1500 129/86  98 26 91 %   
 10/21/20 1430 (!) 134/91  98 23 96 %   
10/21/20 1400 (!) 134/90  97 26 97 %   
10/21/20 1330 139/89  99 21 96 %   
10/21/20 1300 (!) 146/122  (!) 108 23 98 %   
10/21/20 1230 136/86  100 21 97 %   
10/21/20 1220   97 21 95 %   
10/21/20 1200 (!) 136/97  96 13 94 %   
10/21/20 1130 (!) 164/120  (!) 122 22 97 %   
10/21/20 1123  99 °F (37.2 °C)      
10/21/20 1100 (!) 143/101  (!) 114 16 96 %   
10/21/20 1030 (!) 176/124  (!) 119 17 97 %   
10/21/20 1000 (!) 160/104  (!) 119 13 98 %   
10/21/20 0930 (!) 169/112  (!) 121 15 96 %   
10/21/20 0900 (!) 154/112  (!) 119 16 93 %   
10/21/20 0830 (!) 164/117  (!) 114 17 96 %   
10/21/20 0800 (!) 151/102  (!) 107 19 100 %   
10/21/20 0757   (!) 105 20 97 %   
10/21/20 0730 (!) 155/103  (!) 109 20 93 %   
10/21/20 0722  98.3 °F (36.8 °C)      
10/21/20 0700 (!) 157/102  (!) 113 18 97 %   
10/21/20 0630 (!) 163/94  (!) 112 18 (!) 89 %   
10/21/20 0536   (!) 101 12 96 %   
10/21/20 0530 (!) 150/60  100 18 96 %   
10/21/20 0430 (!) 150/116  100 17 95 %   
10/21/20 0400 137/83 98.4 °F (36.9 °C) 93 20 96 %  Intake/Output Summary (Last 24 hours) at 10/22/2020 0359 Last data filed at 10/22/2020 0000 Gross per 24 hour Intake 1668 ml Output 9875 ml Net -8207 ml Temp (24hrs), Av.8 °F (37.1 °C), Min:98.3 °F (36.8 °C), Max:99.7 °F (37.6 °C) ROS- unobtainable Exam: 
General:  Sedated with ativan HEENT: Head: Normocephalic, no lesions, without obvious abnormality. Jaundiced, Lymphatic:  no lymphadenopathy Lungs: clear to auscultation bilaterally, some fine rales scattered, no mucus Cardiovascular:  regular heart rate, no murmurs, no JVD Pulses: 2+ and symmetric Abdomen: Soft, non-tender and without masses or organomegaly and non-tender Musculoskeletal: no joint tenderness, deformity or swelling Extremities: peripheral pulses normal, no pedal edema, no clubbing or cyanosis Skin:  jaundiced Neurologic: sedated with ativan, was very agitated and disoriented prior to ativan Recent Results (from the past 24 hour(s)) MAGNESIUM Collection Time: 10/21/20  4:15 AM  
Result Value Ref Range Magnesium 1.9 1.6 - 2.6 mg/dL PHOSPHORUS Collection Time: 10/21/20  4:15 AM  
Result Value Ref Range Phosphorus 2.6 2.5 - 4.9 MG/DL  
CALCIUM, IONIZED Collection Time: 10/21/20  4:15 AM  
Result Value Ref Range Ionized Calcium 1.09 (L) 1.12 - 1.32 MMOL/L  
PROTHROMBIN TIME + INR Collection Time: 10/21/20  4:15 AM  
Result Value Ref Range Prothrombin time 22.5 (H) 11.5 - 15.2 sec INR 2.0 (H) 0.8 - 1.2 FIBRINOGEN Collection Time: 10/21/20  4:15 AM  
Result Value Ref Range Fibrinogen 209 (L) 210 - 451 mg/dL CBC WITH AUTOMATED DIFF Collection Time: 10/21/20  4:15 AM  
Result Value Ref Range WBC 9.6 4.6 - 13.2 K/uL  
 RBC 2.73 (L) 4.70 - 5.50 M/uL HGB 8.8 (L) 13.0 - 16.0 g/dL HCT 25.4 (L) 36.0 - 48.0 % MCV 93.0 74.0 - 97.0 FL  
 MCH 32.2 24.0 - 34.0 PG  
 MCHC 34.6 31.0 - 37.0 g/dL  
 RDW 18.6 (H) 11.6 - 14.5 % PLATELET 34 (L) 448 - 420 K/uL NEUTROPHILS 82 (H) 40 - 73 % LYMPHOCYTES 11 (L) 21 - 52 % MONOCYTES 7 3 - 10 % EOSINOPHILS 0 0 - 5 % BASOPHILS 0 0 - 2 %  
 ABS. NEUTROPHILS 7.8 1.8 - 8.0 K/UL  
 ABS. LYMPHOCYTES 1.1 0.8 - 3.5 K/UL  
 ABS. MONOCYTES 0.7 0.05 - 1.2 K/UL  
 ABS. EOSINOPHILS 0.0 0.0 - 0.4 K/UL  
 ABS. BASOPHILS 0.0 0.0 - 0.1 K/UL  
 DF AUTOMATED PLATELET COMMENTS DECREASED PLATELETS    
 RBC COMMENTS ANISOCYTOSIS 1+ 
    
 RBC COMMENTS SPHEROCYTES 1+ RBC COMMENTS TARGET CELLS 
FEW METABOLIC PANEL, COMPREHENSIVE Collection Time: 10/21/20  4:15 AM  
Result Value Ref Range Sodium 142 136 - 145 mmol/L Potassium 3.5 3.5 - 5.5 mmol/L Chloride 102 100 - 111 mmol/L  
 CO2 32 21 - 32 mmol/L Anion gap 8 3.0 - 18 mmol/L Glucose 143 (H) 74 - 99 mg/dL  BUN 16 7.0 - 18 MG/DL  
 Creatinine 0.84 0.6 - 1.3 MG/DL  
 BUN/Creatinine ratio 19 12 - 20 GFR est AA >60 >60 ml/min/1.73m2 GFR est non-AA >60 >60 ml/min/1.73m2 Calcium 8.9 8.5 - 10.1 MG/DL Bilirubin, total 4.0 (H) 0.2 - 1.0 MG/DL  
 ALT (SGPT) 49 16 - 61 U/L  
 AST (SGOT) 65 (H) 10 - 38 U/L Alk. phosphatase 53 45 - 117 U/L Protein, total 5.9 (L) 6.4 - 8.2 g/dL Albumin 3.9 3.4 - 5.0 g/dL Globulin 2.0 2.0 - 4.0 g/dL A-G Ratio 2.0 (H) 0.8 - 1.7 GLUCOSE, POC Collection Time: 10/21/20  5:14 AM  
Result Value Ref Range Glucose (POC) 157 (H) 70 - 110 mg/dL POC G3 Collection Time: 10/21/20  9:01 AM  
Result Value Ref Range Device: VENT    
 FIO2 (POC) 0.35 % pH (POC) 7.55 (H) 7.35 - 7.45    
 pCO2 (POC) 32.3 (L) 35.0 - 45.0 MMHG  
 pO2 (POC) 69 (L) 80 - 100 MMHG  
 HCO3 (POC) 28.5 (H) 22 - 26 MMOL/L  
 sO2 (POC) 96 92 - 97 % Base excess (POC) 6 mmol/L Mode CPAP/SPON    
 PEEP/CPAP (POC) 6 cmH2O Pressure support 7 cmH2O Allens test (POC) YES Total resp. rate 18 Site RIGHT RADIAL Patient temp. 98.3 Specimen type (POC) ARTERIAL Performed by Desmond De Jesus POTASSIUM Collection Time: 10/21/20 10:00 AM  
Result Value Ref Range Potassium 3.7 3.5 - 5.5 mmol/L PHOSPHORUS Collection Time: 10/21/20 10:00 AM  
Result Value Ref Range Phosphorus 2.8 2.5 - 4.9 MG/DL  
CALCIUM, IONIZED Collection Time: 10/21/20 11:00 AM  
Result Value Ref Range Ionized Calcium 1.12 1.12 - 1.32 MMOL/L  
GLUCOSE, POC Collection Time: 10/21/20 11:39 AM  
Result Value Ref Range Glucose (POC) 185 (H) 70 - 110 mg/dL GLUCOSE, POC Collection Time: 10/21/20  5:34 PM  
Result Value Ref Range Glucose (POC) 232 (H) 70 - 110 mg/dL GLUCOSE, POC Collection Time: 10/21/20 11:09 PM  
Result Value Ref Range Glucose (POC) 205 (H) 70 - 110 mg/dL MAGNESIUM Collection Time: 10/22/20  2:08 AM  
Result Value Ref Range Magnesium 1.9 1.6 - 2.6 mg/dL PHOSPHORUS Collection Time: 10/22/20  2:08 AM  
Result Value Ref Range Phosphorus 2.0 (L) 2.5 - 4.9 MG/DL  
CALCIUM, IONIZED Collection Time: 10/22/20  2:08 AM  
Result Value Ref Range Ionized Calcium 1.13 1.12 - 1.32 MMOL/L  
PROTHROMBIN TIME + INR Collection Time: 10/22/20  2:08 AM  
Result Value Ref Range Prothrombin time 20.9 (H) 11.5 - 15.2 sec INR 1.9 (H) 0.8 - 1.2 FIBRINOGEN Collection Time: 10/22/20  2:08 AM  
Result Value Ref Range Fibrinogen 260 210 - 451 mg/dL METABOLIC PANEL, COMPREHENSIVE Collection Time: 10/22/20  2:08 AM  
Result Value Ref Range Sodium 142 136 - 145 mmol/L Potassium 3.2 (L) 3.5 - 5.5 mmol/L Chloride 101 100 - 111 mmol/L  
 CO2 32 21 - 32 mmol/L Anion gap 9 3.0 - 18 mmol/L Glucose 164 (H) 74 - 99 mg/dL BUN 15 7.0 - 18 MG/DL Creatinine 0.87 0.6 - 1.3 MG/DL  
 BUN/Creatinine ratio 17 12 - 20 GFR est AA >60 >60 ml/min/1.73m2 GFR est non-AA >60 >60 ml/min/1.73m2 Calcium 9.3 8.5 - 10.1 MG/DL Bilirubin, total 5.1 (H) 0.2 - 1.0 MG/DL  
 ALT (SGPT) 48 16 - 61 U/L  
 AST (SGOT) 56 (H) 10 - 38 U/L Alk. phosphatase 55 45 - 117 U/L Protein, total 6.5 6.4 - 8.2 g/dL Albumin 4.1 3.4 - 5.0 g/dL Globulin 2.4 2.0 - 4.0 g/dL A-G Ratio 1.7 0.8 - 1.7    
CBC WITH AUTOMATED DIFF Collection Time: 10/22/20  2:08 AM  
Result Value Ref Range WBC 10.8 4.6 - 13.2 K/uL  
 RBC 2.97 (L) 4.70 - 5.50 M/uL HGB 9.5 (L) 13.0 - 16.0 g/dL HCT 27.7 (L) 36.0 - 48.0 % MCV 93.3 74.0 - 97.0 FL  
 MCH 32.0 24.0 - 34.0 PG  
 MCHC 34.3 31.0 - 37.0 g/dL  
 RDW 18.7 (H) 11.6 - 14.5 % PLATELET 34 (L) 088 - 420 K/uL NEUTROPHILS 81 (H) 40 - 73 % LYMPHOCYTES 11 (L) 21 - 52 % MONOCYTES 7 3 - 10 % EOSINOPHILS 1 0 - 5 % BASOPHILS 0 0 - 2 %  
 ABS. NEUTROPHILS 8.7 (H) 1.8 - 8.0 K/UL  
 ABS. LYMPHOCYTES 1.2 0.9 - 3.6 K/UL  
 ABS. MONOCYTES 0.7 0.05 - 1.2 K/UL  
 ABS. EOSINOPHILS 0.1 0.0 - 0.4 K/UL ABS. BASOPHILS 0.0 0.0 - 0.1 K/UL  
 DF AUTOMATED Current Facility-Administered Medications Medication Dose Route Frequency Provider Last Rate Last Dose  potassium bicarb-citric acid (EFFER-K) tablet 20 mEq  20 mEq Oral ONCE Juan C Tomas MD      
 potassium, sodium phosphates (NEUTRA-PHOS) packet 2 Packet  2 Packet Oral Q2H Juan C Tomas MD   2 Packet at 10/22/20 6894  labetaloL (NORMODYNE;TRANDATE) injection 20 mg  20 mg IntraVENous Q6H PRN Jose Luis Gallagher MD      
 metoprolol (LOPRESSOR) injection 5 mg  5 mg IntraVENous Q6H Johanna Corea MD   5 mg at 10/21/20 2317  LORazepam (ATIVAN) injection 1 mg  1 mg IntraVENous Q4H PRN Juan C Tomas MD   1 mg at 10/22/20 1560  insulin glargine (LANTUS) injection 12 Units  12 Units SubCUTAneous DAILY Jose Luis Gallagher MD   12 Units at 10/21/20 1167  
 0.9% sodium chloride infusion 250 mL  250 mL IntraVENous PRN Jose Luis Gallagher MD      
 albumin human 25% (BUMINATE) solution 25 g  25 g IntraVENous Q12H Jose Luis Gallagher MD   Stopped at 10/21/20 2100  cloNIDine (CATAPRES) 0.1 mg/24 hr patch 1 Patch  1 Patch TransDERmal Q7D Jose Luis Gallagher MD   1 Patch at 10/20/20 1230  
 insulin regular (NOVOLIN R, HUMULIN R) injection 10 Units  10 Units SubCUTAneous Q6H Jose Luis Gallagher MD   Stopped at 10/21/20 1200  
 heparin (porcine) 100 unit/mL injection 500 Units  500 Units InterCATHeter Q8H PRN Juan C Tomas MD   500 Units at 10/19/20 1539  thiamine mononitrate (B-1) tablet 100 mg  100 mg Oral DAILY Jose Luis Gallagher MD   100 mg at 10/21/20 3735  folic acid (FOLVITE) tablet 1 mg  1 mg Oral DAILY Jose Luis Gallagher MD   1 mg at 10/21/20 0834  
 lactulose (CHRONULAC) 10 gram/15 mL solution 15 mL  10 g Oral BID Geronimo WHITE MD   15 mL at 10/21/20 2101  piperacillin-tazobactam (ZOSYN) 4.5 g in 0.9% sodium chloride (MBP/ADV) 100 mL MBP  4.5 g IntraVENous Q8H Jill WHITE MD 25 mL/hr at 10/21/20 2101 4.5 g at 10/21/20 2101  
 metoclopramide HCl (REGLAN) injection 5 mg  5 mg IntraVENous Q6H PRN Anne Peters MD      
 insulin lispro (HUMALOG) injection   SubCUTAneous Q6H Fritz Woodall MD   4 Units at 10/21/20 2313  ELECTROLYTE REPLACEMENT PROTOCOL - Potassium Standard Dosing   1 Each Other PRN Anne Peters MD      
 ELECTROLYTE REPLACEMENT PROTOCOL - Magnesium   1 Each Other PRN Anne Peters MD      
 ELECTROLYTE REPLACEMENT PROTOCOL - Phosphorus  Standard Dosing  1 Each Other PRN Anne Peters MD      
 ELECTROLYTE REPLACEMENT PROTOCOL - Calcium   1 Each Other PRN Anne Peters MD      
 pantoprazole (PROTONIX) 40 mg in 0.9% sodium chloride 10 mL injection  40 mg IntraVENous Q24H Fritz Woodall MD   40 mg at 10/21/20 2101  
 HYDROmorphone (PF) (DILAUDID) injection 1 mg  1 mg IntraVENous Q3H PRN Eulalio Tomas MD   1 mg at 10/21/20 7285  lidocaine 4 % patch 2 Patch  2 Patch TransDERmal Q24H Eulalio Tomas MD   2 Patch at 10/21/20 2101  
 ondansetron (ZOFRAN) injection 4 mg  4 mg IntraVENous Q6H PRN Eulalio Tomas MD   4 mg at 10/13/20 2139  
 glucose chewable tablet 16 g  4 Tab Oral PRN Eulalio Tomas MD      
 glucagon (GLUCAGEN) injection 1 mg  1 mg IntraMUSCular PRN Eulalio Tomas MD      
 dextrose 10% infusion 125-250 mL  125-250 mL IntraVENous PRN Eulalio Tomas MD Barclay Mane, MD

## 2020-10-22 NOTE — PROGRESS NOTES
Comprehensive Nutrition Assessment Type and Reason for Visit: Reassess, NPO/clear liquid Nutrition Recommendations/Plan: Diet: advance when clinically possible Nutrition Assessment:  Hx diabete, gastroparesis, pancreatitis, hydroadenitis. Pt was presented with nausea and abdominal pain. Admitted w/ cirrohosis, thrombocytopenia, hypomagnesemia, hypocalcemia, colitis, acute respiratory failure with hypoxemia, acute CHF. Hx of ETOH use. Pt was extubated yesterday. Estimated Daily Nutrient Needs: 
Energy (kcal):  7303-1798([id number]) Protein (g):  71-86(1-1.2g) Fluid (ml/day):  0322-4555 Nutrition Related Findings:  (P) Lab: Glucose 164, K 3.2, phos 2. Med: humalog, reglan, protonix, folic acid, lactulose, thiamine mononitrate, lasix, lantus, lopressor. No BM. Waiting on SLP for evaluation. LUE 1+2+ pitting. Wounds:   
Open wounds Current Nutrition Therapies: DIET NPO Anthropometric Measures: 
· Height:  5' 9\" (175.3 cm) · Current Body Wt:  71.4 kg (157 lb 6.5 oz) · Admission Body Wt:  139 lb · Usual Body Wt:  145 lb(9/25/20) · Ideal Body Wt:  160 lbs:  98.4 % · Adjusted Body Weight:   ; Weight Adjustment for: No adjustment · Adjusted BMI:      
· BMI Category:  Normal weight (BMI 18.5-24. 9) Nutrition Diagnosis:  
· Inadequate oral intake related to impaired respiratory function as evidenced by NPO or clear liquid status due to medical condition Nutrition Interventions:  
Food and/or Nutrient Delivery: Start oral diet Nutrition Education and Counseling: No recommendations at this time Coordination of Nutrition Care: Continued inpatient monitoring, Interdisciplinary rounds Goals: 
Advance diet when clinically possible within the next 1-4 days Nutrition Monitoring and Evaluation:  
Behavioral-Environmental Outcomes:   
Food/Nutrient Intake Outcomes: Diet advancement/tolerance, Food and nutrient intake Physical Signs/Symptoms Outcomes: Biochemical data, Skin, Weight, GI status, Nausea/vomiting Discharge Planning: Too soon to determine Electronically signed by Marylou Zarate on 10/22/2020 at 10:07 AM

## 2020-10-22 NOTE — PROGRESS NOTES
1900 Bedside and Verbal shift change report given to Wood 91Mae (oncoming nurse) by Radha Zuniga RN (offgoing nurse). Report included the following information SBAR, Kardex, ED Summary, Intake/Output, MAR, Recent Results and Cardiac Rhythm NSR/ST PVCs. 1942 Pt restless and confused. Attempted to reorient without success. Follows commands, delayed responses, generalized weakness. Jaundice eyes and skin. SR/ST PVCs on the monitor. Generalized no edema and LUE 1-2+ pitting. 2039 Pt confused and agitated. Attempting to get OOB multiple times. Keeps calling out to nurse for help but patients response is always \"help me get out this bed\" pt stated he was at [facility] [employer]  Attempted to reorientt patient without success. Genoveva PENDLETON made aware see new orders 2042 Pt placed back on 4L NC d/t confusion and  in ABG this a.m.

## 2020-10-22 NOTE — PROGRESS NOTES
[facility]ists Pulmonary, Critical Care, and Sleep Medicine Name: Roxanna Marsh MRN: [id number] : 1965 Hospital: [facility] [place] Date: 10/22/2020  Room: 109/[address] [place] [address] Note Consult requesting physician: Dr. Freddie Santiago Reason for Consult: Acute respiratory failure with hypoxemia Subjective/History of Present Illness:  
Patient is a 47 y.o. male with PMHx significant for chronic pancreatitis, diabetes, gastroparesis, hidradenitis; history of colitis on Humiranot clear if patient has inflammatory bowel disease. Patient was admitted on 10/9 with nausea and lower abdominal pains. The patient was admitted to the hospital.  Dr. Luis Fernando Maria consulted for cirrhosis. Today evening, patient had shortness of breath symptoms and hypoxemia. RRT was done. Chest x-ray shows bilateral pulmonary infiltrates. Patient was placed on BiPAP and moved to the ICU. He is currently getting Lasix injection. Patient denies any vomiting or aspirations recently; he denies any COVID-19 contacts at home. He feels comfortable on BiPAP. No significant cough or productive sputum noted; no chest pain or hemoptysis. Telemetry mild sinus tach. Afebrile; blood pressure stable; overall fluid positive by 5 L this admission. 10/22/20 Patient remains in ICU Extubated yesterday; stable respirations; currently on nasal cannula oxygen. Patient weak, lethargic, follows simple commands. No worsening shortness of breath No vomiting No significant respiratory secretions Afebrile; blood pressure normal; telemetrysinus rhythm. Urine output good. Review of Systems:  
Review of systems not obtained due to patient factors. No Known Allergies Past Medical History:  
Diagnosis Date  Diabetes ([address]  Gastroparesis  Pancreatitis Past Surgical History:  
Procedure Laterality Date  HX CHOLECYSTECTOMY  HX HIP FRACTURE TX    
 left hip sx 9.23/2018 Social History Tobacco Use  Smoking status: Current Every Day Smoker  Smokeless tobacco: Never Used Substance Use Topics  Alcohol use: Yes History reviewed. No pertinent family history. Prior to Admission medications Medication Sig Start Date End Date Taking? Authorizing Provider  
adalimumab (Humira Pen) 40 mg/0.8 mL injection pen 40 mg by SubCUTAneous route every seven (7) days. Yes Provider, Historical  
lidocaine (LIDODERM) 5 % 2 Patches by TransDERmal route every twenty-four (24) hours. Apply patch to the affected area for 12 hours a day and remove for 12 hours a day. One patch is applied to left hip. One patch is applied to right shoulder blade. Yes Provider, Historical  
loratadine (Claritin) 10 mg tablet Take 10 mg by mouth daily. Indications: inflammation of the nose due to an allergy   Yes Provider, Historical  
aspirin delayed-release 81 mg tablet Take 1 Tab by mouth daily. Yes Racheal, MD Meño  
insulin glargine (Lantus Solostar U-100 Insulin) 100 unit/mL (3 mL) inpn 5 Units by SubCUTAneous route daily. Yes Racheal, MD Meño  
DULoxetine (CYMBALTA) 60 mg capsule Take 1 Cap by mouth as needed. 1/6/19   Meño Springer MD  
glyBURIDE-metFORMIN (GLUCOVANCE) 2.5-500 mg per tablet Take 1 Tab by mouth two (2) times a day. Meño Springer MD  
methocarbamoL (ROBAXIN) 500 mg tablet Take 2 Tabs by mouth four (4) times daily. Meño Springer MD  
traZODone (DESYREL) 50 mg tablet Take 50 mg by mouth nightly. Meño Springer MD  
HYDROmorphone (DILAUDID) 2 mg tablet Take 1 Tab by mouth every four (4) hours as needed for Pain. Max Daily Amount: 12 mg. Indications: Severe Pain 9/26/18   Matthew Cotter MD  
ondansetron (ZOFRAN ODT) 4 mg disintegrating tablet Take 1-2 tablets every 6-8 hours as needed for nausea and vomiting. 9/26/18   Matthew Cotter MD  
 
Current Facility-Administered Medications Medication Dose Route Frequency  potassium, sodium phosphates (NEUTRA-PHOS) packet 2 Packet  2 Packet Oral Q2H  
 insulin glargine (LANTUS) injection 5 Units  5 Units SubCUTAneous DAILY  metoprolol (LOPRESSOR) injection 5 mg  5 mg IntraVENous Q6H  
 albumin human 25% (BUMINATE) solution 25 g  25 g IntraVENous Q12H  cloNIDine (CATAPRES) 0.1 mg/24 hr patch 1 Patch  1 Patch TransDERmal Q7D  
 thiamine mononitrate (B-1) tablet 100 mg  100 mg Oral DAILY  folic acid (FOLVITE) tablet 1 mg  1 mg Oral DAILY  lactulose (CHRONULAC) 10 gram/15 mL solution 15 mL  10 g Oral BID  insulin lispro (HUMALOG) injection   SubCUTAneous Q6H  
 pantoprazole (PROTONIX) 40 mg in 0.9% sodium chloride 10 mL injection  40 mg IntraVENous Q24H  
 lidocaine 4 % patch 2 Patch  2 Patch TransDERmal Q24H Objective:  
Vital Signs:   
Blood pressure (!) 138/98, pulse 96, temperature 98.8 °F (37.1 °C), resp. rate 26, height 5' 9\" (1.753 m), weight 71.4 kg (157 lb 6.5 oz), SpO2 96 %. Body mass index is 23.25 kg/m². O2 Device: Nasal cannula O2 Flow Rate (L/min): 4 l/min Temp (24hrs), Av.8 °F (37.1 °C), Min:98.4 °F (36.9 °C), Max:99.7 °F (37.6 °C) Intake/Output:  
Last shift:      10/22 0701 - 10/22 1900 In: -  
[facility] Last 3 shifts: 10/20 1901 - 10/22 0700 In: 2716 [I.V.:600] Out: 07666 [[facility]:[id number]] Intake/Output Summary (Last 24 hours) at 10/22/2020 1127 Last data filed at 10/22/2020 9255 Gross per 24 hour Intake 495 ml Output 7225 ml Net -6730 ml Last 3 Recorded Weights in this Encounter 10/12/20 1045 10/12/20 1046 10/21/20 2000 Weight: 64.9 kg (143 lb) 64.9 kg (143 lb) 71.4 kg (157 lb 6.5 oz) Physical Exam:  
Patient awake, lethargic, weak, confused; nasal cannula oxygen; acyanotic HEENT: pupils not dilated, reactive, bilateral scleral jaundice, dry oral mucosa Neck: No adenopathy or thyroid swelling CVS: Telemetrysinus rhythm; S1 and S2 with no murmur; JVD not elevated RS: Moderate air entry bilateral; decreased breath sounds at bases with some crackles; no wheezes; not tachypneic or in distress Abd: soft, no guarding or rigidity, not distended, nontender, bowel sounds heard; no hepatosplenomegaly Neuro: Lethargic, confused, moving extremities, limited exam  
Extrm: no leg edema or swelling or clubbing Skin: no rash Lymphatic: no cervical or supraclavicular adenopathy. Left arm PICC lineno bleeding or hematoma; mild swelling of left arm. Data:  
   
Recent Results (from the past 12 hour(s)) MAGNESIUM Collection Time: 10/22/20  2:08 AM  
Result Value Ref Range Magnesium 1.9 1.6 - 2.6 mg/dL PHOSPHORUS Collection Time: 10/22/20  2:08 AM  
Result Value Ref Range Phosphorus 2.0 (L) 2.5 - 4.9 MG/DL  
CALCIUM, IONIZED Collection Time: 10/22/20  2:08 AM  
Result Value Ref Range Ionized Calcium 1.13 1.12 - 1.32 MMOL/L  
PROTHROMBIN TIME + INR Collection Time: 10/22/20  2:08 AM  
Result Value Ref Range Prothrombin time 20.9 (H) 11.5 - 15.2 sec INR 1.9 (H) 0.8 - 1.2 FIBRINOGEN Collection Time: 10/22/20  2:08 AM  
Result Value Ref Range Fibrinogen 260 210 - 451 mg/dL METABOLIC PANEL, COMPREHENSIVE Collection Time: 10/22/20  2:08 AM  
Result Value Ref Range Sodium 142 136 - 145 mmol/L Potassium 3.2 (L) 3.5 - 5.5 mmol/L Chloride 101 100 - 111 mmol/L  
 CO2 32 21 - 32 mmol/L Anion gap 9 3.0 - 18 mmol/L Glucose 164 (H) 74 - 99 mg/dL BUN 15 7.0 - 18 MG/DL Creatinine 0.87 0.6 - 1.3 MG/DL  
 BUN/Creatinine ratio 17 12 - 20 GFR est AA >60 >60 ml/min/1.73m2 GFR est non-AA >60 >60 ml/min/1.73m2 Calcium 9.3 8.5 - 10.1 MG/DL Bilirubin, total 5.1 (H) 0.2 - 1.0 MG/DL  
 ALT (SGPT) 48 16 - 61 U/L  
 AST (SGOT) 56 (H) 10 - 38 U/L Alk. phosphatase 55 45 - 117 U/L Protein, total 6.5 6.4 - 8.2 g/dL Albumin 4.1 3.4 - 5.0 g/dL Globulin 2.4 2.0 - 4.0 g/dL A-G Ratio 1.7 0.8 - 1.7    
CBC WITH AUTOMATED DIFF Collection Time: 10/22/20  2:08 AM  
Result Value Ref Range WBC 10.8 4.6 - 13.2 K/uL  
 RBC 2.97 (L) 4.70 - 5.50 M/uL HGB 9.5 (L) 13.0 - 16.0 g/dL HCT 27.7 (L) 36.0 - 48.0 % MCV 93.3 74.0 - 97.0 FL  
 MCH 32.0 24.0 - 34.0 PG  
 MCHC 34.3 31.0 - 37.0 g/dL  
 RDW 18.7 (H) 11.6 - 14.5 % PLATELET 34 (L) 374 - 420 K/uL NEUTROPHILS 81 (H) 40 - 73 % LYMPHOCYTES 11 (L) 21 - 52 % MONOCYTES 7 3 - 10 % EOSINOPHILS 1 0 - 5 % BASOPHILS 0 0 - 2 %  
 ABS. NEUTROPHILS 8.7 (H) 1.8 - 8.0 K/UL  
 ABS. LYMPHOCYTES 1.2 0.9 - 3.6 K/UL  
 ABS. MONOCYTES 0.7 0.05 - 1.2 K/UL  
 ABS. EOSINOPHILS 0.1 0.0 - 0.4 K/UL  
 ABS. BASOPHILS 0.0 0.0 - 0.1 K/UL  
 DF AUTOMATED    
GLUCOSE, POC Collection Time: 10/22/20  5:14 AM  
Result Value Ref Range Glucose (POC) 188 (H) 70 - 110 mg/dL Chemistry Recent Labs 10/22/20 
0208 10/21/20 
1000 10/21/20 
0415 10/20/20 
0425 *  --  143* 162*   --  142 144  
K 3.2* 3.7 3.5 4.3   --  102 105 CO2 32  --  32 35* BUN 15  --  16 19* CREA 0.87  --  0.84 0.84 CA 9.3  --  8.9 9.1 MG 1.9  --  1.9 1.9 PHOS 2.0* 2.8 2.6 3.6 AGAP 9  --  8 4 BUCR 17  --  19 23* AP 55  --  53 51  
TP 6.5  --  5.9* 5.6* ALB 4.1  --  3.9 3.8 GLOB 2.4  --  2.0 1.8* AGRAT 1.7  --  2.0* 2.1* Lactic Acid Lactic acid Date Value Ref Range Status 10/14/2020 1.8 0.4 - 2.0 MMOL/L Final  
 
No results for input(s): LAC in the last 72 hours. Liver Enzymes Protein, total  
Date Value Ref Range Status 10/22/2020 6.5 6.4 - 8.2 g/dL Final  
 
Albumin Date Value Ref Range Status 10/22/2020 4.1 3.4 - 5.0 g/dL Final  
 
Globulin Date Value Ref Range Status 10/22/2020 2.4 2.0 - 4.0 g/dL Final  
 
A-G Ratio Date Value Ref Range Status 10/22/2020 1.7 0.8 - 1.7   Final  
 
Alk. phosphatase Date Value Ref Range Status 10/22/2020 55 45 - 117 U/L Final  
 Recent Labs 10/22/20 
4797 10/21/20 
0056 10/20/20 
0425 TP 6.5 5.9* 5.6* ALB 4.1 3.9 3.8 GLOB 2.4 2.0 1.8* AGRAT 1.7 2.0* 2.1* AP 55 53 51 CBC w/Diff Recent Labs 10/22/20 
3297 10/21/20 
7880 10/20/20 
0425 WBC 10.8 9.6 8.0  
RBC 2.97* 2.73* 2.72* HGB 9.5* 8.8* 8.7* HCT 27.7* 25.4* 25.7*  
PLT 34* 34* 53* GRANS 81* 82* 74  
LYMPH 11* 11* 21 EOS 1 0 0 Coagulation Recent Labs 10/22/20 
2982 10/21/20 
6810 10/20/20 
0425 PTP 20.9* 22.5* 22.9* INR 1.9* 2.0* 2.1* Culture data during this hospitalization. All Micro Results Procedure Component Value Units Date/Time CULTURE, RESPIRATORY/SPUTUM/BRONCH Zamzam Riley STAIN [[id number]]  (Abnormal) Collected:  10/14/20 1453 Order Status:  Completed Specimen:  Sputum,ET Suction Updated:  10/16/20 9614 Special Requests: NO SPECIAL REQUESTS     
  GRAM STAIN RARE WBCS SEEN     
      
  RARE EPITHELIAL CELLS SEEN  
     
   NO ORGANISMS SEEN Culture result:    
  LIGHT YEAST, (APPARENT CANDIDA ALBICANS) NO NORMAL RESPIRATORY QUINCY ISOLATED  
     
 CULTURE, BLOOD [[id number]] Collected:  10/14/20 1530 Order Status:  Canceled Specimen:  Blood CULTURE, BLOOD [[id number]] Collected:  10/14/20 1530 Order Status:  Canceled Specimen:  Blood ECHO 10/12/20 Interpretation Summary Result status: Final result  · LV: Estimated LVEF is 55 - 60%. Normal cavity size, wall thickness and systolic function (ejection fraction normal). Mild (grade 1) left ventricular diastolic dysfunction E'E= 8.94. 
· LA: No atrial septal defect present. · AV: Aortic valve leaflet calcification present. · MV: Mitral valve thickening. Images report reviewed by me: 
CT 10/9/2020 [place] [facility] [place] on 10/09/20 CT ABD PELV W CONT Narrative EXAM: CT of the abdomen and pelvis INDICATION: Abdominal pain, greatest in the left lower quadrant with nausea and 
 vomiting. COMPARISON: MRI pelvis 10/7/2019. CT left hip 4/17/2019 TECHNIQUE: Axial CT imaging of the abdomen and pelvis was performed with 
intravenous contrast. Multiplanar reformats were generated. One or more dose reduction techniques were used on this CT: automated exposure 
control, adjustment of the mAs and/or kVp according to patient size, and 
iterative reconstruction techniques. The specific techniques used on this CT 
exam have been documented in the patient's electronic medical record. Digital 
Imaging and Communications in Medicine (DICOM) format image data are available 
to nonaffiliated external healthcare facilities or entities on a secure, media 
free, reciprocally searchable basis with patient authorization for at least a 
12-month period after this study. _______________ FINDINGS: 
 
LOWER CHEST: Minor basilar changes of atelectasis. No alveolar consolidation or 
pleural effusion. Normal cardiac size without pericardial effusion. LIVER, BILIARY: Diffuse hepatic hypoattenuation is present with hepatic size 
estimated at approximately 27 cm in craniocaudal span. No discrete liver lesion. No intrahepatic or extra hepatic biliary ductal dilatation. Patient is status 
post cholecystectomy. PANCREAS: Pancreatic enhancement is normal. Mild and diffuse pancreatic ductal 
ectasia is noted. No discrete pancreatic mass lesion noted. SPLEEN: Mildly enlarged at 14 cm. ADRENALS: Normal. 
 
KIDNEYS/URETERS/BLADDER: Symmetric renal enhancement. No bowel obstruction PELVIC ORGANS: Symmetric renal enhancement. Punctate nonobstructing upper pole 
left renal stone. No distal ureteral or urinary bladder stone. Urinary bladder 
unremarkable in CT appearance. VASCULATURE: Diffuse aortobiiliac atherosclerotic vascular calcification is 
present without evidence of aneurysmal dilatation. Main portal vein as well as 
right and left portal venous branches are widely patent. LYMPH NODES: Scattered subcentimeter mesenteric and retroperitoneal lymph nodes 
are noted. No evidence of abdominal or pelvic adenopathy. GASTROINTESTINAL TRACT: No morphology of bowel obstruction. No free 
intraperitoneal gas. Mild thickening of the proximal ascending colon and cecum 
noted. BONES: No acute or aggressive osseous abnormalities identified. Partial 
visualization healed proximal left femoral fracture status post IM nailing and 
dynamic hip screw placement. OTHER: Small quantity of abdominal/pelvic ascites. Thin subcutaneous linear 
density tracking along the right gluteal fold, likely in keeping previously 
noted fistula tract. 
 
_______________ Impression IMPRESSION: 
 
1. Diffuse hepatic hypoattenuation compatible with steatosis with hepatomegaly, 
mild splenomegaly, and small quantity of abdominal/pelvic ascites. The totality 
of findings compatible with hepatocellular dysfunction and potentially 
associated portal hypertension. > Patent main and proximal portal venous branches.  
  > Mild thickening of the proximal ascending colon and cecum may reflect 
sequela of portal colopathy. Infectious/inflammatory colitis also a possibility 
but thought less likely given the imaging appearance. 2. Likely correlate for small area of perianal fistula noted on prior MRI 
pelvis. 3. Punctate nonobstructing upper pole left renal stone. CXR 10/22/20 COMPARISON: 10/21/2020 TECHNIQUE: Portable frontal view of the chest 
FINDINGS: 
SUPPORT DEVICES: Left approach PICC line and visualized nasogastric tube both project in stable position.  HEART AND MEDIASTINUM: Stable cardiac size and mediastinal contours.  LUNGS AND PLEURAL SPACES: Similar degree of pulmonary inflation. Linear areas of opacity noted at the left lung base. No pneumothorax or pleural effusion.  
BONY THORAX AND SOFT TISSUES: Unremarkable. IMPRESSION:   
1. Support devices in stable position. 2. Mildly underexpanded lungs and linear areas of linear atelectasis or airspace disease; appearing similar to prior. US LEs 10/11/2020 Interpretation Summary · No evidence of deep vein thrombosis in the right lower extremity. · No evidence of deep vein thrombosis in the left lower extremity. US Abd 10/13/20 IMPRESSION:  
1. Nonspecific increased hepatic echotexture suggesting intrinsic liver disease. Dilated portal vein, recanalized umbilical vein and perihepatic ascites. There 
is lack of flow within the portal vein, suggestive of thrombosis, with 
periportal collaterals and decreased flows in the hepatic vein. However, portal 
and hepatic veins patent on recent CT abdomen 10/09/2020. Multiple attempts to 
contact Dr. Reba Fitzgerald with this findings, awaiting callback.  
2. No discrete hepatic mass identified.  
3. Cholecystectomy.  
4. Pancreas obscured by bowel gas. Ultrasound left upper extremity 10/20 Interpretation Summary · No evidence of acute DVT and chronic DVT in the left upper extremity. · Thrombus noted within the left basilic upper arm vein near the insertion site of PICC line. IMPRESSION:  
· Acute respiratory failure with hypoxia J96.01 
· Aspiration pneumonia - J69.0 · ARDS - J80 · Thrombocytopenia, concern for ITP vs liver disease - D69.6 · Coagulopathy, multifactorial including DIC - D68.9 · Acute diastolic congestive heart failure  I50.31 
· Cirrhosis  K74.60 · Hyperbilirubinemia - E80.6 · Portal vein thrombosis - L80 
· Colitis  K52.9 Patient Active Problem List  
Diagnosis Code  Hypocalcemia, resolved E83.51  
 Hypomagnesemia, resolved E83.42  
 Alcoholism ([address] F10.20  Cirrhosis ([address] K74.60  Colitis K52.9  Acute respiratory failure with hypoxemia (HCC) J96.01  
 Aspiration pneumonia ([address] J69.0  Acute diastolic CHF (congestive heart failure) (HCC) I50.31  Thrombocytopenia ([address] D69.6 · · Code status: Full code RECOMMENDATIONS:  
Respiratory: Stable respirations; on nasal cannula oxygen; aspiration precautions; keep head elevated. Chest x-ray today reviewedstable lung fields; mild left basal atelectasis; no focal consolidations. SARS CoV2 PCR negative x 2 negative CVS: Hypertension medicationsclonidine patch, metoprolol 5 mg IV every 6 hours; continue blood pressure monitoring. Richar Gee Recent echocardiogram showed EF 55 to 60%; no pulmonary hypertension reported on echo. ID: Patient completed 7 days Zosyn. Hematology/Oncology: Watch hemoglobin and platelets; hematology on case; keep hemoglobin greater than 7 g/dL; keep platelets greater than 20 K. S/p Dexamethasone 40 mg daily for possible ITP; appreciate hematology review. Renal: Monitor renal function; replace electrolytesdone today. GI/: Continue lactulose twice daily; patient has NG tube; monitor ammonia. HepatologyDr. Serenity Kernser on case. Endocrine: Monitor blood sugars; poor oral intake since post extubation; Lantus and insulin adjusted. Adjust insulin based on blood sugars and oral intake. Neurology: generalized weaknesspatient has critical illness myopathy; consult PT and OT. Thiamine and folic acid. Vascular: Ultrasound left upper extremityno acute or chronic DVT; thrombus left basilic vein near the PICC line; patient auto anticoagulated and low platelets from chronic liver disease. IVF: Albumin and free water; D5 NS 50 ml/hr Nutrition: SLP eval. 
Prophylaxis: DVT Prophylaxis: SCDs. GI Prophylaxis: Pantoprazole. Restraints: Soft wrist restraintsmedically necessary to avoid pulling lines, tubes and patient safety Lines/Tubes: [employer], Ward and Vent Bundles will be Followed. ETT: 10/14/2010/21. OGT/NGT: 10/14/20. Central line: 10/14/20 RT femoral CVC (site examined, no erythema, induration, discharge or sign of infection. Dressing intact. Medically necessary, will remove it when not needed.  Central line bundle followed); discussed with staff to remove today after FFP's. PICC line left arm 10/19medically necessary; follow 1 protocol for daily evaluation and dressing. Ward: 10/13-DC Ward catheter today Prognosis poor; palliative care on case. Patient at risk of multiorgan failure, bleeding complications, ventilator dependence, renal failure, hepatorenal syndrome, mortality. CODE STATUSDNR; family meeting done yesterday; palliative care on case. Patient okay for telemetry unit; I would sign off; please call if needed. I have called and discussed with patient's wife over the phone; discussed management plans. Will defer respective systems problem management to primary and other respective consultants. Quality Care: PPI, DVT prophylaxis, HOB elevated, Infection control all reviewed and addressed. Care of plan d/w ICU RN, BRUNO. High complexity decision making was performed during the evaluation of this patient at high risk for decompensation with multiple organ involvement. Paty Pollack MD 
10/22/2020

## 2020-10-22 NOTE — PROGRESS NOTES
0715-received report from Judith Pearson RN included SBAR MAR and Kardex. Shift Summary-no chnge in pt status. Bedside and Verbal shift change report given to Matthew Arita (oncoming nurse) by Mame Higgins RN (offgoing nurse). Report included the following information SBAR, Kardex and MAR.

## 2020-10-22 NOTE — WOUND CARE
Follow up by wound care, no changes to previous assessment. Due to network issues no images obtained at this time, will follow up and take pictures once issue is resolved.

## 2020-10-22 NOTE — PROGRESS NOTES
Hospitalist Progress Note Patient: Kwasi Bonilla MRN: [id number]  CSN: [id number] YOB: 1965  Age: 47 y.o. Sex: male DOA: 10/9/2020 LOS:  LOS: 13 days Chief Complaint: Ac resp failure Assessment/Plan 48 yo cirrhosis patient with ac resp failure Extubated yesterday 10/21 Lethargic but in NAD this am 
 
NG tube in place Cirrhosis Asp pneumonia-completed IV abx course Acute diastolic CHF-diuresed Anemia-s/p transfusions, heme has followed his course Coagulopathy Thrombocytopenia-no bleeding Uncontrolled HTN-continue metoprolol IV Alcoholism Awfully poor prognosis for Mr Tonya Lord Nutritional support NG tube DNR status now No escalation in care as per familys requests Disposition : 
Patient Active Problem List  
Diagnosis Code  Hypocalcemia E83.51  
 Hypomagnesemia E83.42  
 Alcoholism ([facility] F10.20  Cirrhosis ([facility] K74.60  Colitis K52.9  Acute respiratory failure with hypoxemia (HCC) J96.01  
 Aspiration pneumonia ([address] J69.0  Acute diastolic CHF (congestive heart failure) ([facility]) I50.31  Thrombocytopenia ([address] D69.6 Subjective: He is awake but falls asleep easily No new issues reported by nurse Has NG tube No resp difficulty Review of systems: UTO due to lethargy Vital signs/Intake and Output: 
Visit Vitals BP 98/74 Pulse 75 Temp 98.8 °F (37.1 °C) Resp 23 Ht 5' 9\" (1.753 m) Wt 71.4 kg (157 lb 6.5 oz) SpO2 100% BMI 23.25 kg/m² Current Shift:  10/22 0701 - 10/22 1900 In: -  
Out: [address] Last three shifts:  10/20 1901 - 10/22 0700 In: 1943 [I.V.:600] Out: 76727 [VUDDR:[id number]] Exam: 
 
General: ill appearing WM, debilitated lethargic but NAD Head/Neck: NCAT, supple, No masses, No lymphadenopathy CVS:Regular rate and rhythm, no M/R/G, S1/S2 heard, no thrill Lungs:Clear to auscultation bilaterally, no wheezes, rhonchi, or rales Abdomen: Soft, Nontender, No distention, Normal Bowel sounds, No hepatomegaly Extremities: No C/C/E, pulses palpable 2+ Neuro:grossly normal  
 
   
   
   
 
Labs: Results:  
   
Chemistry Recent Labs 10/22/20 
0208 10/21/20 
1000 10/21/20 
0415 10/20/20 
0425 *  --  143* 162*   --  142 144  
K 3.2* 3.7 3.5 4.3   --  102 105 CO2 32  --  32 35* BUN 15  --  16 19* CREA 0.87  --  0.84 0.84 CA 9.3  --  8.9 9.1 AGAP 9  --  8 4 BUCR 17  --  19 23* AP 55  --  53 51  
TP 6.5  --  5.9* 5.6* ALB 4.1  --  3.9 3.8 GLOB 2.4  --  2.0 1.8* AGRAT 1.7  --  2.0* 2.1*  
  
CBC w/Diff Recent Labs 10/22/20 
7530 10/21/20 
8690 10/20/20 
0425 WBC 10.8 9.6 8.0  
RBC 2.97* 2.73* 2.72* HGB 9.5* 8.8* 8.7* HCT 27.7* 25.4* 25.7*  
PLT 34* 34* 53* GRANS 81* 82* 74  
LYMPH 11* 11* 21 EOS 1 0 0 Cardiac Enzymes No results for input(s): CPK, CKND1, LYDIA in the last 72 hours. No lab exists for component: Benoit Stovall Coagulation Recent Labs 10/22/20 
3338 10/21/20 
3500 PTP 20.9* 22.5* INR 1.9* 2.0* Lipid Panel No results found for: CHOL, CHOLPOCT, CHOLX, CHLST, CHOLV, 407227, HDL, HDLP, LDL, LDLC, DLDLP, 304792, VLDLC, VLDL, TGLX, TRIGL, TRIGP, TGLPOCT, CHHD, CHHDX  
BNP No results for input(s): BNPP in the last 72 hours. Liver Enzymes Recent Labs 10/22/20 
8439 TP 6.5 ALB 4.1 AP 55 Thyroid Studies No results found for: T4, T3U, TSH, TSHEXT Procedures/imaging: see electronic medical records for all procedures/Xrays and details which were not copied into this note but were reviewed prior to creation of Plan Amarilis Sánchez MD

## 2020-10-23 NOTE — PROGRESS NOTES
Palliative Medicine Consult [facility]: [phone] ([phone]) [facility]: [phone] [facility]: [phone] Patient Name: Radha Tate YOB: 1965 Date of Initial Consult: 10/16/2020; follow up: 10/23/2020 Reason for Consult: care decisions Requesting Provider: Caroline Alejandre MD 
Primary Care Physician: Jonh San NP 
  
 SUMMARY:  
Radha Tate is a 47 y.o. male with a past history of chronic pancreatitis, T2DM, gastroparesis, hydroadenitis and fractured hip who was admitted on 10/9/2020 from home with a diagnosis of colitis and cirrhosis. Current medical issues leading to Palliative Medicine involvement include: liver cirrhosis, respiratory failure and goals of care. PALLIATIVE DIAGNOSES:  
1. Advanced care decisions 2. Colitis 3. Cirrhosis 4. Acute respiratory failure PLAN:  
 
10/23/2020: Telephone follow up with patient's wife, Renee Smalls. She states she is very tired from dialysis this morning. Informed her of the failure of Don to pass the swallow evaluation. Discussed benefits and risks of feeding tubes (NG and PEG) and aspiration. Discussed comfort feeds. Expressed medical staff concerns regarding the ability of Carrie Del Angel to recover from this acute event, need for nutrition to gain strength and ambulate to be eligible for liver transplant. Encouraged Renee Smalls to discuss with her daughters and Carrie Del Angel over the weekend. GOALS OF CARE: DNR/DNI, no re-intubation for respiratory failure. See previous notes shown below:  
 
10/16/2020 1. Advanced care decisions: Palliative care team including Rendell Meckel, MSW and this NP observed patient at bedside in ICU. He is currently sedated and intubated. Telephone call placed to wife, Marshal Shaikh. She states they have been  12-13 years and he has two daughters from another relationship.  His daughter, Dari Hernandez, lives locally and his other daughter lives in [place]. He has a step daughter who lives in [place], [place]. Wife states that he does not have an AMD or living will. They have not had previous conversations regarding CPR or intubation. She stated she did feel that [address] would want intubation if there was a chance for recovery. Introduced discussion of CPR for cardiopulmonary arrest. Discussed Don's immunocompromised system, sedentary lifestyle and his ability to endure along with his body's tolerance to aggressive treatments. Encouraged future conversations between her and [address] to discuss goals of care as she states she has multiple medical issues including ESRD on HD. Offered to allow patient visitation with her  today but she asked if she could come tomorrow at lunchtime. GOALS OF CARE: FULL CODE with FULL INTERVENTIONS. 2. Colitis: presented to ED with c/o abdominal pain and nausea. Has history of pancreatitis and gastroparesis. Lipase wnl. CT abdomen shows mild thickening of the proximal ascending colon and cecum may reflect sequela of portal colopathy. Infectious/inflammatory colitis also a possibility; diffuse hepatic hypoattenuation compatible with steatosis with hepatomegaly, mild splenomegaly, and small quantity of abdominal/pelvic ascites. Primary team managing. 3.  Cirrhosis: New diagnosis. History of heavy alcohol in the past; presently only 1-2 per week. CT abd/pelvis as in #2. INR 1.8, Tbili 4.1, MELD is 20. Hepatology consulted and is in process of work up for liver transplant. 4.  Acute respiratory failure: CXR on admission with patchy multifocal airspace disease favored to reflect pulmonary edema without significant pleural effusion. Became acutely hypoxic not responsive to BiPap and was intubated on 10/14/2020. Now D3 ventilator support. 5.  Initial consult note routed to primary continuity provider 6.   Communicated plan of care with: Palliative IDT 
 
GOALS OF CARE: 
 Patient/Health Care Proxy Stated Goals: Rehabilitation TREATMENT PREFERENCES:  
Code Status: DNR Advance Care Planning: 
Advance Care Planning 10/9/2020 Confirm Advance Directive None Patient Would Like to Complete Advance Directive Yes Medical Interventions: Limited additional interventions Other: As far as possible, the palliative care team has discussed with patient/health care proxy about goals of care/treatment preferences for patient. HISTORY:  
 
History obtained from: chart CHIEF COMPLAINT: fatigue HPI/SUBJECTIVE: The patient is:  
[] Verbal and participatory [x] Non-participatory due to: fatigue Clinical Pain Assessment (nonverbal scale for nonverbal patients): Clinical Pain Assessment Severity: 0 Activity (Movement): Lying quietly, normal position Duration: for how long has pt been experiencing pain (e.g., 2 days, 1 month, years) Frequency: how often pain is an issue (e.g., several times per day, once every few days, constant) FUNCTIONAL ASSESSMENT:  
 
Palliative Performance Scale (PPS): PPS: 20 
 
ECOG 
ECOG Status : Completely disabled PSYCHOSOCIAL/SPIRITUAL SCREENING:  
  
Any spiritual / Scientologist concerns: not assessed 
[] Yes /  [] No 
 
Caregiver Burnout: 
[] Yes /  [] No /  [x] No Caregiver Present Anticipatory grief assessment: not assessed 
[] Normal  / [] Maladaptive REVIEW OF SYSTEMS:  
 
Positive and pertinent negative findings in ROS are noted above in HPI. The following systems were [x] reviewed / [] unable to be reviewed as noted in HPI Other findings are noted below. Systems: constitutional, ears/nose/mouth/throat, respiratory, gastrointestinal, genitourinary, musculoskeletal, integumentary, neurologic, psychiatric, endocrine. Positive findings noted below. Modified ESAS Completed by: provider Pain: 0 Stool Occurrence(s): 0  PHYSICAL EXAM:  
 
 Wt Readings from Last 3 Encounters:  
10/23/20 67.6 kg (149 lb 0.5 oz) 09/25/20 65.8 kg (145 lb) 10/07/19 77.1 kg (170 lb) Blood pressure 98/65, pulse 82, temperature 98.8 °F (37.1 °C), resp. rate 11, height 5' 9\" (1.753 m), weight 67.6 kg (149 lb 0.5 oz), SpO2 100 %. Pain: 
Pain Scale 1: Numeric (0 - 10) Pain Intensity 1: 8 Pain Onset 1: gradual 
Pain Location 1: Abdomen Pain Orientation 1: Upper Pain Description 1: Aching, Constant Pain Intervention(s) 1: Medication (see MAR), Repositioned Constitutional: Frail gentleman, appears older than stated age, in no apparent distress. Eyes: closed ENMT: no nasal discharge, dry mucous membranes Respiratory: breathing mildly labored Musculoskeletal: no deformity, no tenderness to palpation Skin: warm, dry Neurologic: following commands and moving extremities HISTORY:  
 
Principal Problem: Hypocalcemia (10/9/2020) Active Problems: Hypomagnesemia (10/9/2020) Alcoholism ([address] (10/9/2020) Cirrhosis ([address] (10/9/2020) Colitis (10/9/2020) Acute respiratory failure with hypoxemia ([address] (10/13/2020) Aspiration pneumonia ([address] (10/13/2020) Acute diastolic CHF (congestive heart failure) ([address] (10/13/2020) Thrombocytopenia ([address] (10/13/2020) Past Medical History:  
Diagnosis Date  Diabetes ([address]  Gastroparesis  Pancreatitis Past Surgical History:  
Procedure Laterality Date  HX CHOLECYSTECTOMY  HX HIP FRACTURE TX    
 left hip sx 9.23/2018 History reviewed. No pertinent family history. History reviewed, no pertinent family history. Social History Tobacco Use  Smoking status: Current Every Day Smoker  Smokeless tobacco: Never Used Substance Use Topics  Alcohol use: Yes No Known Allergies Current Facility-Administered Medications Medication Dose Route Frequency  metoprolol (LOPRESSOR) injection 2.5 mg  2.5 mg IntraVENous Q6H PRN  
  potassium chloride 10 mEq in 100 ml IVPB  10 mEq IntraVENous Q1H  
 insulin glargine (LANTUS) injection 5 Units  5 Units SubCUTAneous DAILY  dextrose 5% and 0.9% NaCl infusion  50 mL/hr IntraVENous CONTINUOUS  
 LORazepam (ATIVAN) injection 1 mg  1 mg IntraVENous Q4H PRN  
 0.9% sodium chloride infusion 250 mL  250 mL IntraVENous PRN  
 albumin human 25% (BUMINATE) solution 25 g  25 g IntraVENous Q12H  
 heparin (porcine) 100 unit/mL injection 500 Units  500 Units InterCATHeter Q8H PRN  thiamine mononitrate (B-1) tablet 100 mg  100 mg Oral DAILY  folic acid (FOLVITE) tablet 1 mg  1 mg Oral DAILY  lactulose (CHRONULAC) 10 gram/15 mL solution 15 mL  10 g Oral BID  metoclopramide HCl (REGLAN) injection 5 mg  5 mg IntraVENous Q6H PRN  
 insulin lispro (HUMALOG) injection   SubCUTAneous Q6H  
 ELECTROLYTE REPLACEMENT PROTOCOL - Potassium Standard Dosing   1 Each Other PRN  
 ELECTROLYTE REPLACEMENT PROTOCOL - Magnesium   1 Each Other PRN  
 ELECTROLYTE REPLACEMENT PROTOCOL - Phosphorus  Standard Dosing  1 Each Other PRN  
 ELECTROLYTE REPLACEMENT PROTOCOL - Calcium   1 Each Other PRN  pantoprazole (PROTONIX) 40 mg in 0.9% sodium chloride 10 mL injection  40 mg IntraVENous Q24H  
 HYDROmorphone (PF) (DILAUDID) injection 1 mg  1 mg IntraVENous Q3H PRN  
 lidocaine 4 % patch 2 Patch  2 Patch TransDERmal Q24H  
 ondansetron (ZOFRAN) injection 4 mg  4 mg IntraVENous Q6H PRN  
 glucose chewable tablet 16 g  4 Tab Oral PRN  
 glucagon (GLUCAGEN) injection 1 mg  1 mg IntraMUSCular PRN  
 dextrose 10% infusion 125-250 mL  125-250 mL IntraVENous PRN  
 
 
 LAB AND IMAGING FINDINGS:  
 
Lab Results Component Value Date/Time WBC 8.3 10/23/2020 04:16 AM  
 HGB 8.0 (L) 10/23/2020 04:16 AM  
 PLATELET 31 (L) 71/03/4955 04:16 AM  
 
Lab Results Component Value Date/Time  Sodium 144 10/23/2020 04:16 AM  
 Potassium 3.6 10/23/2020 10:40 AM  
 Chloride 105 10/23/2020 04:16 AM  
 CO2 32 10/23/2020 04:16 AM  
 BUN 15 10/23/2020 04:16 AM  
 Creatinine 0.85 10/23/2020 04:16 AM  
 Calcium 8.9 10/23/2020 04:16 AM  
 Magnesium 2.1 10/23/2020 04:16 AM  
 Phosphorus 3.5 10/23/2020 04:16 AM  
  
Lab Results Component Value Date/Time Alk. phosphatase 46 10/23/2020 04:16 AM  
 Protein, total 6.0 (L) 10/23/2020 04:16 AM  
 Albumin 3.8 10/23/2020 04:16 AM  
 Globulin 2.2 10/23/2020 04:16 AM  
 
Lab Results Component Value Date/Time INR 1.8 (H) 10/23/2020 04:16 AM  
 Prothrombin time 20.3 (H) 10/23/2020 04:16 AM  
  
Lab Results Component Value Date/Time Iron 45 (L) 10/13/2020 03:48 AM  
 TIBC 69 (L) 10/13/2020 03:48 AM  
 Iron % saturation 65 (H) 10/13/2020 03:48 AM  
 Ferritin 227 10/13/2020 03:48 AM  
  
No results found for: PH, PCO2, PO2 No components found for: [place] Lab Results Component Value Date/Time CK 37 (L) 10/14/2020 10:00 AM  
 CK - MB <1.0 10/14/2020 10:00 AM  
  
 
   
 
Total time: 15 minutes Counseling / coordination time, spent as noted above > 50% counseling / coordination: 10 minutes with patient and wife. Prolonged service was provided for  []30 min   []75 min in face to face time in the presence of the patient, spent as noted above. Time Start:  
Time End:  
Note: this can only be billed with 01940 (initial) or 00885 (follow up). If multiple start / stop times, list each separately.

## 2020-10-23 NOTE — PROGRESS NOTES
Ward cath removed. DTV at 1141 
 
0700 Bedside and Verbal shift change report given to Nabeel Mccracken RN (oncoming nurse) by Lizzie Ganser RN (offgoing nurse). Report included the following information SBAR, Kardex, ED Summary, Intake/Output, MAR, Recent Results and Cardiac Rhythm NSR .

## 2020-10-23 NOTE — PROGRESS NOTES
Problem: Mobility Impaired (Adult and Pediatric) Goal: *Acute Goals and Plan of Care (Insert Text) Description: Physical Therapy Goals Initiated 10/13/2020, revised 10/23/2020 and to be accomplished within 7 day(s) 1. Patient will move from supine to sit and sit to supine  in bed with minimal assistance. 2.  Patient will transfer from bed to chair and chair to bed with moderate assistance using the least restrictive device. 3.  Patient will perform sit to stand with moderate assistance. 4.  Patient will ambulate with moderate assistance for 15 feet with the least restrictive device. Outcome: Progressing Towards Goal 
 PHYSICAL THERAPY RE-EVALUATION Patient: Grant Mcelroy (53 y.o. male) Date: 10/23/2020 Primary Diagnosis: Hypocalcemia [E83.51] Precautions:   Fall PLOF: ambulated with a quad cane ASSESSMENT : 
Based on the objective data described below, the patient presents with decreased strength, balance and activity tolerance resulting in decreased independence with functional mobility. Since initial evaluation patient has been intubated and was extubated on 10/21/2020. Pt was lethargic on re-evaluation however attempted to help with mobility. Pt performed LE ROM with active assistance and tactile/verbal cues to remain alert. Pt transferred supine to sit with max A and his respiratory rate increased from 15 at rest to 33 when sitting up supported. Pt was unable to slow respiratory rate with verbal instruction so was returned to supine with the head elevated, once in supine his respiratory rate began to return to baseline. Pt was positioned for comfort and nurse was notified. Patient will benefit from skilled intervention to address the above impairments. Patient's rehabilitation potential is considered to be Fair Factors which may influence rehabilitation potential include:  
[]         None noted 
[x]         Mental ability/status [x]         Medical condition []         Home/family situation and support systems 
[]         Safety awareness 
[]         Pain tolerance/management 
[]         Other: PLAN : 
Recommendations and Planned Interventions:  
[x]           Bed Mobility Training             [x]    Neuromuscular Re-Education 
[x]           Transfer Training                   []    Orthotic/Prosthetic Training 
[x]           Gait Training                          []    Modalities [x]           Therapeutic Exercises           []    Edema Management/Control 
[x]           Therapeutic Activities            []    Family Training/Education 
[x]           Patient Education 
[]           Other (comment): Frequency/Duration: Patient will be followed by physical therapy 1-2 times per day/4-7 days per week to address goals. Discharge Recommendations: [facility] Further Equipment Recommendations for Discharge: N/A  
 
SUBJECTIVE:  
Patient stated ok.   when asked to participate in evaluation OBJECTIVE DATA SUMMARY:  
Hospital course since last seen and reason for re-evaluation: see above Past Medical History:  
Diagnosis Date Diabetes ([facility] [address] Gastroparesis Pancreatitis Past Surgical History:  
Procedure Laterality Date HX CHOLECYSTECTOMY HX HIP FRACTURE TX    
 left hip sx 9.23/2018 Barriers to Learning/Limitations: yes;  altered mental status (i.e.Sedation, Confusion) Compensate with: Visual Cues, Verbal Cues, and Tactile Cues Home Situation:  
Home Situation Home Environment: Private residence # Steps to Enter: 3 Rails to Enter: Yes Hand Rails : Right One/Two Story Residence: Two story # of Interior Steps: 12 Living Alone: No 
Support Systems: Spouse/Significant Other/Partner Patient Expects to be Discharged to[de-identified] Private residence Current DME Used/Available at Home: Cane, quad Critical Behavior: 
Neurologic State: Drowsy; Confused Orientation Level: Oriented to person;Disoriented to time;Disoriented to situation;Disoriented to place Cognition: Follows commands Safety/Judgement: Fall prevention Strength:   
Strength: (grossly 2 to 2+/5) Tone & Sensation:  
Tone: Normal 
 Range Of Motion: 
AROM: Grossly decreased, non-functional 
 PROM: Generally decreased, functional 
 Functional Mobility: 
Bed Mobility: 
Rolling: Moderate assistance Supine to Sit: Maximum assistance Sit to Supine: Maximum assistance Balance:  
Sitting - Static: Poor (constant support) Therapeutic Exercises:  
AAROM to B LEs Pain: 
Pain level pre-treatment: 0/10 Pain level post-treatment: 0/10 Pain Intervention(s) : n/a Activity Tolerance:  
poor Please refer to the flowsheet for vital signs taken during this treatment. After treatment:  
[]         Patient left in no apparent distress sitting up in chair 
[x]         Patient left in no apparent distress in bed 
[x]         Call bell left within reach [x]         Nursing notified 
[]         Caregiver present 
[]         Bed alarm activated 
[]         SCDs applied COMMUNICATION/EDUCATION:  
[x]         Role of Physical Therapy in the acute care setting. [x]         Fall prevention education was provided and the patient/caregiver indicated understanding. [x]         Patient/family have participated as able in goal setting and plan of care. [x]         Patient/family agree to work toward stated goals and plan of care. []         Patient understands intent and goals of therapy, but is neutral about his/her participation. []         Patient is unable to participate in goal setting/plan of care: ongoing with therapy staff. 
[]         Other: Thank you for this referral. 
Adriano Carlson, PT Time Calculation: 10 mins

## 2020-10-23 NOTE — PROGRESS NOTES
Nutrition Note Pt was extubated on 10/21/20; SLP evaluated him this morning, but he failed SLP eval; coughed on all liquids per nurse in ICU rounds NG is still in place, but pt was found laying flat concerns w/ high risk for aspiration on tube feed; will monitor closely to make further recommendations Electronically signed by Darshan Brown on 10/23/2020 at 9:40 AM

## 2020-10-23 NOTE — PROGRESS NOTES
Bedside and Verbal shift change report given to Shani Davison RN (oncoming nurse) by Rashel Redding RN (offgoing nurse). Report included the following information SBAR, Kardex, Intake/Output and Recent Results. 0730 ST in to evaluate patient. Stated to keep patient NPO due to patient coughing with all liquids. 0800 Shift assessment completed. Patient resting comfortably,NAD. AOx2, disoriented to time place. Follows commands. 1200 Reassessment completed. Patient has not voided, bladder scan completed, 661 mL urine noted. Encouraged patient to urinate, assisted with urinal.  
 
1300 Patient still has not voided. Refusing cath. 1400 Patient still not able to void. Agreed to almonte, will replace. 1600 Reassessment completed Bedside and Verbal shift change report given to DAVE Leigh RN (oncoming nurse) by Shani Davison RN (offgoing nurse). Report included the following information SBAR, Kardex, Intake/Output and Recent Results.

## 2020-10-23 NOTE — PROGRESS NOTES
Problem: Self Care Deficits Care Plan (Adult) Goal: *Acute Goals and Plan of Care (Insert Text) Description: Initial Occupational Therapy Goals (10/23/2020) Within 7 day(s): 1. Patient will perform grooming seated EOB with  CGA x 5 minutes for increased independence with ADLs. 2. Patient will perform UB dressing with CGA for increased independence with ADLs. 3. Patient will perform LB dressing with min A & A/E PRN for increased independence with ADLs. 4. Patient will perform all aspects of toileting with CGA for increased independence in ADLs 5. Patient will independently apply energy conservation techniques with 1 verbal cue(s) for increased independence with ADLs. 6. Patient will perform bed <> BSC transfer with min A for increased independence with toileting. Outcome: Progressing Towards Goal 
 OCCUPATIONAL THERAPY EVALUATION Patient: Janak Kinney (53 y.o. male) Date: 10/23/2020 Primary Diagnosis: Hypocalcemia [E83.51] Precautions: Fall PLOF: pt ambulated with quad cane PTA, pt lives with spouse, grossly mod I for ADLs ASSESSMENT : 
Based on the objective data described below, the patient presents with decreased activity tolerance, BUE strength, cognition, limiting independence with ADLs. Pt seen in ICU, found supine in bed and drowsy throughout session. Pt eyes open to verbal stimuli. Pt reporting pain in lower back. B  strength weak. B shoulders 2+/5, B elbows/wrists 3-/5. Pt required max A to wash face while long supported sitting on bed with hand over hand assist to RUE; pt R hand dominant. Pt with difficulty keeping eyes open due to drowsiness, deferred EOB activity at this time. Recommend skilled nursing facility placement at d/c as pt grossly mod I PTA. Pt left supine in bed in NAD, above discussed with RN. Education: role of OT in acute care Patient will benefit from skilled intervention to address the above impairments. Patient's rehabilitation potential is considered to be Fair Factors which may influence rehabilitation potential include:  
[]             None noted [x]             Mental ability/status [x]             Medical condition []             Home/family situation and support systems []             Safety awareness []             Pain tolerance/management 
[]             Other: PLAN : 
Recommendations and Planned Interventions:  
[x]               Self Care Training                  [x]      Therapeutic Activities [x]               Functional Mobility Training   [x]      Cognitive Retraining 
[x]               Therapeutic Exercises           [x]      Endurance Activities [x]               Balance Training                    []      Neuromuscular Re-Education []               Visual/Perceptual Training     [x]      Home Safety Training 
[x]               Patient Education                   [x]      Family Training/Education []               Other (comment): Frequency/Duration: Patient will be followed by occupational therapy 1-2 times per day/4-7 days per week to address goals. Discharge Recommendations: [facility] Further Equipment Recommendations for Discharge: To Be Determined (TBD) at next level of care SUBJECTIVE:  
Patient stated i'm okay.  OBJECTIVE DATA SUMMARY:  
 
Past Medical History:  
Diagnosis Date Diabetes ([facility] [address] Gastroparesis Pancreatitis Past Surgical History:  
Procedure Laterality Date HX CHOLECYSTECTOMY HX HIP FRACTURE TX    
 left hip sx 9.23/2018 Barriers to Learning/Limitations: yes;  physical and altered mental status (i.e.Sedation, Confusion) Compensate with: visual, verbal, tactile, kinesthetic cues/model Home Situation: pt grossly mod I for ADLs, ambulated with quad cane, lives with spouse in two story home Home Situation Home Environment: Private residence # Steps to Enter: 3 Rails to Enter: Yes Hand Rails : Right One/Two Story Residence: Two story # of Interior Steps: 12 Living Alone: No 
Support Systems: Spouse/Significant Other/Partner Patient Expects to be Discharged to[de-identified] Private residence Current DME Used/Available at Home: Cane, quad 
[]  Right hand dominant   []  Left hand dominant Cognitive/Behavioral Status: 
Neurologic State: Drowsy Orientation Level: Oriented to person;Disoriented to time;Disoriented to situation;Disoriented to place Cognition: Follows commands Safety/Judgement: Fall prevention Coordination: BUE Coordination: Generally decreased, functional 
Fine Motor Skills-Upper: Left Impaired;Right Impaired Gross Motor Skills-Upper: Left Impaired;Right Impaired Strength: BUE Strength: Grossly decreased, non-functional 
 
Tone & Sensation: BUE Tone: Normal 
 
Range of Motion: BUE 
AROM: Grossly decreased, non-functional 
PROM: Generally decreased, functional 
 
Functional Mobility and Transfers for ADLs: 
Bed Mobility: 
Rolling: Moderate assistance Supine to Sit: Maximum assistance Sit to Supine: Maximum assistance ADL Assessment:  
Feeding: Maximum assistance Oral Facial Hygiene/Grooming: Maximum assistance Bathing: Total assistance Upper Body Dressing: Maximum assistance Lower Body Dressing: Total assistance Toileting: Total assistance ADL Intervention: 
Grooming Grooming Assistance: Maximum assistance Position Performed: Long sitting on bed(long supported sitting) Washing Hands: Maximum assistance Cues: Tactile cues provided;Verbal cues provided Cognitive Retraining Safety/Judgement: Fall prevention Pain: 
Pt unable to provide numerical value, however stated \"my lower back\" when asked if pt experiencing any pain Activity Tolerance:  
Poor. Patient limited by endurance, strength, cognition, pain. Patient unsteady. Please refer to the flowsheet for vital signs taken during this treatment. After treatment: [] Patient left in no apparent distress sitting up in chair 
[x] Patient left in no apparent distress in bed 
[x] Call bell left within reach [x] Nursing notified 
[] Caregiver present 
[] Bed alarm activated COMMUNICATION/EDUCATION:  
[x] Role of Occupational Therapy in the acute care setting 
[x] Home safety education was provided and the patient/caregiver indicated understanding. [x] Patient/family have participated as able in goal setting and plan of care. [x] Patient/family agree to work toward stated goals and plan of care. [] Patient understands intent and goals of therapy, but is neutral about his/her participation. [] Patient is unable to participate in goal setting and plan of care. Thank you for this referral. 
Carla Champion, OTR/L Time Calculation: 10 mins Eval Complexity: History: HIGH Complexity : Extensive review of history including physical, cognitive and psychosocial history ; Examination: HIGH Complexity : 5 or more performance deficits relating to physical, cognitive , or psychosocial skils that result in activity limitations and / or participation restrictions; Decision Making:HIGH Complexity : Patient presents with comorbidities that affect occupational performance. Signifigant modification of tasks or assistance (eg, physical or verbal) with assessment (s) is necessary to enable patient to complete evaluation

## 2020-10-23 NOTE — PROGRESS NOTES
[facility] Pulmonary, Critical Care, and Sleep Medicine Name: Bianca Plummer MRN: [id number] : 1965 Hospital: [facility] [place] Date: 10/23/2020  Room: 109/01 [facility] Note Consult requesting physician: Dr. Shane Philip Reason for Consult: Acute respiratory failure with hypoxemia Subjective/History of Present Illness:  
Patient is a 47 y.o. male with PMHx significant for chronic pancreatitis, diabetes, gastroparesis, hidradenitis; history of colitis on Humiranot clear if patient has inflammatory bowel disease. Patient was admitted on 10/9 with nausea and lower abdominal pains. The patient was admitted to the hospital.  Dr. Nigel Tate consulted for cirrhosis. Today evening, patient had shortness of breath symptoms and hypoxemia. RRT was done. Chest x-ray shows bilateral pulmonary infiltrates. Patient was placed on BiPAP and moved to the ICU. He is currently getting Lasix injection. Patient denies any vomiting or aspirations recently; he denies any COVID-19 contacts at home. He feels comfortable on BiPAP. No significant cough or productive sputum noted; no chest pain or hemoptysis. Telemetry mild sinus tach. Afebrile; blood pressure stable; overall fluid positive by 5 L this admission. 10/23/20 Patient remains in ICU; awaiting bed on telemetry unit. Stable respirations; extubated 10/21. No acute events overnight. SLP eval this morningrecommended n.p.o. due to aspiration risk. No worsening shortness of breath; no chest pain; no vomiting or abdominal pains. Telemetrysinus rhythm; blood pressurelow normal after IV metoprolol this morning. Patient awake, responsive, answers few questions and follows simple commands. Urine output good. Review of Systems: Limited Ears, nose, mouth, throat, and face: No epistaxis, dysphagia + Respiratory: No worsening cough or shortness of breath; on nasal cannula oxygen Cardiovascular: no chest pain or palpitations Gastrointestinal: no abd pain, vomitting or bleeding symptoms Genitourinary: No urinary symptoms; Ward catheter removed last night. Constitutional: No fever or chills No Known Allergies Past Medical History:  
Diagnosis Date  Diabetes ([facility] [address]  Gastroparesis  Pancreatitis Past Surgical History:  
Procedure Laterality Date  HX CHOLECYSTECTOMY  HX HIP FRACTURE TX    
 left hip sx 9.23/2018 Social History Tobacco Use  Smoking status: Current Every Day Smoker  Smokeless tobacco: Never Used Substance Use Topics  Alcohol use: Yes History reviewed. No pertinent family history. Prior to Admission medications Medication Sig Start Date End Date Taking? Authorizing Provider  
adalimumab (Humira Pen) 40 mg/0.8 mL injection pen 40 mg by SubCUTAneous route every seven (7) days. Yes Provider, Historical  
lidocaine (LIDODERM) 5 % 2 Patches by TransDERmal route every twenty-four (24) hours. Apply patch to the affected area for 12 hours a day and remove for 12 hours a day. One patch is applied to left hip. One patch is applied to right shoulder blade. Yes Provider, Historical  
loratadine (Claritin) 10 mg tablet Take 10 mg by mouth daily. Indications: inflammation of the nose due to an allergy   Yes Provider, Historical  
aspirin delayed-release 81 mg tablet Take 1 Tab by mouth daily. Yes Other, MD Meño  
insulin glargine (Lantus Solostar U-100 Insulin) 100 unit/mL (3 mL) inpn 5 Units by SubCUTAneous route daily. Yes Other, MD Meño  
DULoxetine (CYMBALTA) 60 mg capsule Take 1 Cap by mouth as needed. 1/6/19   Other, MD Meño  
glyBURIDE-metFORMIN (GLUCOVANCE) 2.5-500 mg per tablet Take 1 Tab by mouth two (2) times a day.     Racheal, MD Meño  
methocarbamoL (ROBAXIN) 500 mg tablet Take 2 Tabs by mouth four (4) times daily. Meño Springer MD  
traZODone (DESYREL) 50 mg tablet Take 50 mg by mouth nightly. Meño Springer MD  
HYDROmorphone (DILAUDID) 2 mg tablet Take 1 Tab by mouth every four (4) hours as needed for Pain. Max Daily Amount: 12 mg. Indications: Severe Pain 18   Holland Ríos MD  
ondansetron (ZOFRAN ODT) 4 mg disintegrating tablet Take 1-2 tablets every 6-8 hours as needed for nausea and vomiting. 18   Holland Ríos MD  
 
Current Facility-Administered Medications Medication Dose Route Frequency  insulin glargine (LANTUS) injection 5 Units  5 Units SubCUTAneous DAILY  dextrose 5% and 0.9% NaCl infusion  50 mL/hr IntraVENous CONTINUOUS  
 metoprolol (LOPRESSOR) injection 5 mg  5 mg IntraVENous Q6H  
 albumin human 25% (BUMINATE) solution 25 g  25 g IntraVENous Q12H  cloNIDine (CATAPRES) 0.1 mg/24 hr patch 1 Patch  1 Patch TransDERmal Q7D  
 thiamine mononitrate (B-1) tablet 100 mg  100 mg Oral DAILY  folic acid (FOLVITE) tablet 1 mg  1 mg Oral DAILY  lactulose (CHRONULAC) 10 gram/15 mL solution 15 mL  10 g Oral BID  insulin lispro (HUMALOG) injection   SubCUTAneous Q6H  
 pantoprazole (PROTONIX) 40 mg in 0.9% sodium chloride 10 mL injection  40 mg IntraVENous Q24H  
 lidocaine 4 % patch 2 Patch  2 Patch TransDERmal Q24H Objective:  
Vital Signs:   
Blood pressure 98/71, pulse 85, temperature 98.8 °F (37.1 °C), resp. rate 20, height 5' 9\" (1.753 m), weight 67.6 kg (149 lb 0.5 oz), SpO2 97 %. Body mass index is 22.01 kg/m². O2 Device: Nasal cannula O2 Flow Rate (L/min): 3 l/min Temp (24hrs), Av.7 °F (37.1 °C), Min:98 °F (36.7 °C), Max:99 °F (37.2 °C) Intake/Output:  
Last shift:      No intake/output data recorded. Last 3 shifts: 10/21 1901 - 10/23 0700 In: 1195.8 [I.V.:835.8] Out: Reyna Leong [[id number]] Intake/Output Summary (Last 24 hours) at 10/23/2020 1148 Last data filed at 10/23/2020 1829 Gross per 24 hour Intake 800.84 ml Output 1175 ml Net -374.16 ml Last 3 Recorded Weights in this Encounter 10/12/20 1046 10/21/20 2000 10/23/20 0517 Weight: 64.9 kg (143 lb) 71.4 kg (157 lb 6.5 oz) 67.6 kg (149 lb 0.5 oz) Physical Exam:  
Patient awake, responsive, lethargic; on nasal cannula oxygen; acyanotic HEENT: pupils not dilated, reactive, bilateral scleral jaundice, dry oral mucosa Neck: No adenopathy or thyroid swelling CVS: S1-S2; no murmurs; JVD not elevated; telemetrysinus rhythm. RS: Symmetrical breath sounds; moderate air entry bilateral, with decreased breath sounds at the bases; scattered bibasal crackles; no wheezing; not tachypneic or in distress Abd: Soft, no abdominal distention soft, no tenderness or guarding or rigidity; bowel sounds heard; no hepatosplenomegaly Neuro: Awake, responsive, moving extremities, following simple commands; limited exam  
Extrm: no leg edema or swelling or clubbing Skin: no rash Lymphatic: no cervical or supraclavicular adenopathy. Left arm PICC lineno bleeding or hematoma; mild swelling of left arm. Data:  
   
Recent Results (from the past 12 hour(s)) MAGNESIUM Collection Time: 10/23/20  4:16 AM  
Result Value Ref Range Magnesium 2.1 1.6 - 2.6 mg/dL PHOSPHORUS Collection Time: 10/23/20  4:16 AM  
Result Value Ref Range Phosphorus 3.5 2.5 - 4.9 MG/DL  
CALCIUM, IONIZED Collection Time: 10/23/20  4:16 AM  
Result Value Ref Range Ionized Calcium 1.18 1.12 - 1.32 MMOL/L  
PROTHROMBIN TIME + INR Collection Time: 10/23/20  4:16 AM  
Result Value Ref Range Prothrombin time 20.3 (H) 11.5 - 15.2 sec INR 1.8 (H) 0.8 - 1.2 FIBRINOGEN Collection Time: 10/23/20  4:16 AM  
Result Value Ref Range Fibrinogen 226 210 - 451 mg/dL METABOLIC PANEL, COMPREHENSIVE Collection Time: 10/23/20  4:16 AM  
Result Value Ref Range Sodium 144 136 - 145 mmol/L Potassium 3.5 3.5 - 5.5 mmol/L  Chloride 105 100 - 111 mmol/L  
 CO2 32 21 - 32 mmol/L Anion gap 7 3.0 - 18 mmol/L Glucose 188 (H) 74 - 99 mg/dL BUN 15 7.0 - 18 MG/DL Creatinine 0.85 0.6 - 1.3 MG/DL  
 BUN/Creatinine ratio 18 12 - 20 GFR est AA >60 >60 ml/min/1.73m2 GFR est non-AA >60 >60 ml/min/1.73m2 Calcium 8.9 8.5 - 10.1 MG/DL Bilirubin, total 4.3 (H) 0.2 - 1.0 MG/DL  
 ALT (SGPT) 41 16 - 61 U/L  
 AST (SGOT) 46 (H) 10 - 38 U/L Alk. phosphatase 46 45 - 117 U/L Protein, total 6.0 (L) 6.4 - 8.2 g/dL Albumin 3.8 3.4 - 5.0 g/dL Globulin 2.2 2.0 - 4.0 g/dL A-G Ratio 1.7 0.8 - 1.7    
CBC WITH AUTOMATED DIFF Collection Time: 10/23/20  4:16 AM  
Result Value Ref Range WBC 8.3 4.6 - 13.2 K/uL  
 RBC 2.46 (L) 4.70 - 5.50 M/uL HGB 8.0 (L) 13.0 - 16.0 g/dL HCT 23.6 (L) 36.0 - 48.0 % MCV 95.9 74.0 - 97.0 FL  
 MCH 32.5 24.0 - 34.0 PG  
 MCHC 33.9 31.0 - 37.0 g/dL  
 RDW 19.6 (H) 11.6 - 14.5 % PLATELET 31 (L) 694 - 420 K/uL NEUTROPHILS 74 (H) 40 - 73 % LYMPHOCYTES 17 (L) 21 - 52 % MONOCYTES 8 3 - 10 % EOSINOPHILS 1 0 - 5 % BASOPHILS 0 0 - 2 %  
 ABS. NEUTROPHILS 6.1 1.8 - 8.0 K/UL  
 ABS. LYMPHOCYTES 1.4 0.9 - 3.6 K/UL  
 ABS. MONOCYTES 0.6 0.05 - 1.2 K/UL  
 ABS. EOSINOPHILS 0.1 0.0 - 0.4 K/UL  
 ABS. BASOPHILS 0.0 0.0 - 0.1 K/UL  
 DF AUTOMATED    
GLUCOSE, POC Collection Time: 10/23/20  5:16 AM  
Result Value Ref Range Glucose (POC) 206 (H) 70 - 110 mg/dL POTASSIUM Collection Time: 10/23/20 10:40 AM  
Result Value Ref Range Potassium 3.6 3.5 - 5.5 mmol/L  
GLUCOSE, POC Collection Time: 10/23/20 11:40 AM  
Result Value Ref Range Glucose (POC) 244 (H) 70 - 110 mg/dL Chemistry Recent Labs 10/23/20 
1040 10/23/20 
0416 10/22/20 
1600 10/22/20 
0208  10/21/20 
9101 GLU  --  188*  --  164*  --  143* NA  --  144  --  142  --  142  
K 3.6 3.5 3.7 3.2*   < > 3.5 CL  --  105  --  101  --  102 CO2  --  32  --  32  --  32 BUN  --  15  --  15  --  16  
 CREA  --  0.85  --  0.87  --  0.84 CA  --  8.9  --  9.3  --  8.9 MG  --  2.1  --  1.9  --  1.9 PHOS  --  3.5 3.5 2.0*   < > 2.6 AGAP  --  7  --  9  --  8  
BUCR  --  18  --  17  --  19  
AP  --  46  --  55  --  53  
TP  --  6.0*  --  6.5  --  5.9* ALB  --  3.8  --  4.1  --  3.9 GLOB  --  2.2  --  2.4  --  2.0 AGRAT  --  1.7  --  1.7  --  2.0*  
 < > = values in this interval not displayed. Lactic Acid Lactic acid Date Value Ref Range Status 10/14/2020 1.8 0.4 - 2.0 MMOL/L Final  
 
No results for input(s): LAC in the last 72 hours. Liver Enzymes Protein, total  
Date Value Ref Range Status 10/23/2020 6.0 (L) 6.4 - 8.2 g/dL Final  
 
Albumin Date Value Ref Range Status 10/23/2020 3.8 3.4 - 5.0 g/dL Final  
 
Globulin Date Value Ref Range Status 10/23/2020 2.2 2.0 - 4.0 g/dL Final  
 
A-G Ratio Date Value Ref Range Status 10/23/2020 1.7 0.8 - 1.7   Final  
 
Alk. phosphatase Date Value Ref Range Status 10/23/2020 46 45 - 117 U/L Final  
 
Recent Labs 10/23/20 
6741 10/22/20 
7206 10/21/20 
0144 TP 6.0* 6.5 5.9* ALB 3.8 4.1 3.9 GLOB 2.2 2.4 2.0 AGRAT 1.7 1.7 2.0*  
AP 46 55 53 CBC w/Diff Recent Labs 10/23/20 
0873 10/22/20 
0030 10/21/20 
3189 WBC 8.3 10.8 9.6  
RBC 2.46* 2.97* 2.73* HGB 8.0* 9.5* 8.8* HCT 23.6* 27.7* 25.4*  
PLT 31* 34* 34* GRANS 74* 81* 82* LYMPH 17* 11* 11* EOS 1 1 0 Coagulation Recent Labs 10/23/20 
0477 10/22/20 
4701 10/21/20 
4912 PTP 20.3* 20.9* 22.5* INR 1.8* 1.9* 2.0* Culture data during this hospitalization. All Micro Results Procedure Component Value Units Date/Time CULTURE, RESPIRATORY/SPUTUM/BRONCH Liz Speaks STAIN [[id number]]  (Abnormal) Collected:  10/14/20 1453 Order Status:  Completed Specimen:  Sputum,ET Suction Updated:  10/16/20 0053   Special Requests: NO SPECIAL REQUESTS     
  GRAM STAIN RARE WBCS SEEN     
      
  RARE EPITHELIAL CELLS SEEN  
     
 NO ORGANISMS SEEN Culture result:    
  LIGHT YEAST, (APPARENT CANDIDA ALBICANS) NO NORMAL RESPIRATORY QUINCY ISOLATED  
     
 CULTURE, BLOOD [[id number]] Collected:  10/14/20 1530 Order Status:  Canceled Specimen:  Blood CULTURE, BLOOD [[id number]] Collected:  10/14/20 1530 Order Status:  Canceled Specimen:  Blood ECHO 10/12/20 Interpretation Summary Result status: Final result  · LV: Estimated LVEF is 55 - 60%. Normal cavity size, wall thickness and systolic function (ejection fraction normal). Mild (grade 1) left ventricular diastolic dysfunction E'E= 8.94. 
· LA: No atrial septal defect present. · AV: Aortic valve leaflet calcification present. · MV: Mitral valve thickening. Images report reviewed by me: 
CT 10/9/2020 [place] [facility] on 10/09/20 CT ABD PELV W CONT Narrative EXAM: CT of the abdomen and pelvis INDICATION: Abdominal pain, greatest in the left lower quadrant with nausea and 
vomiting. COMPARISON: MRI pelvis 10/7/2019. CT left hip 4/17/2019 TECHNIQUE: Axial CT imaging of the abdomen and pelvis was performed with 
intravenous contrast. Multiplanar reformats were generated. One or more dose reduction techniques were used on this CT: automated exposure 
control, adjustment of the mAs and/or kVp according to patient size, and 
iterative reconstruction techniques. The specific techniques used on this CT 
exam have been documented in the patient's electronic medical record. Digital 
Imaging and Communications in Medicine (DICOM) format image data are available 
to nonaffiliated external healthcare facilities or entities on a secure, media 
free, reciprocally searchable basis with patient authorization for at least a 
12-month period after this study. _______________ FINDINGS: 
 
LOWER CHEST: Minor basilar changes of atelectasis. No alveolar consolidation or pleural effusion. Normal cardiac size without pericardial effusion. LIVER, BILIARY: Diffuse hepatic hypoattenuation is present with hepatic size 
estimated at approximately 27 cm in craniocaudal span. No discrete liver lesion. No intrahepatic or extra hepatic biliary ductal dilatation. Patient is status 
post cholecystectomy. PANCREAS: Pancreatic enhancement is normal. Mild and diffuse pancreatic ductal 
ectasia is noted. No discrete pancreatic mass lesion noted. SPLEEN: Mildly enlarged at 14 cm. ADRENALS: Normal. 
 
KIDNEYS/URETERS/BLADDER: Symmetric renal enhancement. No bowel obstruction PELVIC ORGANS: Symmetric renal enhancement. Punctate nonobstructing upper pole 
left renal stone. No distal ureteral or urinary bladder stone. Urinary bladder 
unremarkable in CT appearance. VASCULATURE: Diffuse aortobiiliac atherosclerotic vascular calcification is 
present without evidence of aneurysmal dilatation. Main portal vein as well as 
right and left portal venous branches are widely patent. LYMPH NODES: Scattered subcentimeter mesenteric and retroperitoneal lymph nodes 
are noted. No evidence of abdominal or pelvic adenopathy. GASTROINTESTINAL TRACT: No morphology of bowel obstruction. No free 
intraperitoneal gas. Mild thickening of the proximal ascending colon and cecum 
noted. BONES: No acute or aggressive osseous abnormalities identified. Partial 
visualization healed proximal left femoral fracture status post IM nailing and 
dynamic hip screw placement. OTHER: Small quantity of abdominal/pelvic ascites. Thin subcutaneous linear 
density tracking along the right gluteal fold, likely in keeping previously 
noted fistula tract. 
 
_______________ Impression IMPRESSION: 
 
1. Diffuse hepatic hypoattenuation compatible with steatosis with hepatomegaly, 
mild splenomegaly, and small quantity of abdominal/pelvic ascites. The totality of findings compatible with hepatocellular dysfunction and potentially 
associated portal hypertension. > Patent main and proximal portal venous branches.  
  > Mild thickening of the proximal ascending colon and cecum may reflect 
sequela of portal colopathy. Infectious/inflammatory colitis also a possibility 
but thought less likely given the imaging appearance. 2. Likely correlate for small area of perianal fistula noted on prior MRI 
pelvis. 3. Punctate nonobstructing upper pole left renal stone. CXR 10/22/20 COMPARISON: 10/21/2020 TECHNIQUE: Portable frontal view of the chest 
FINDINGS: 
SUPPORT DEVICES: Left approach PICC line and visualized nasogastric tube both project in stable position.  HEART AND MEDIASTINUM: Stable cardiac size and mediastinal contours.  LUNGS AND PLEURAL SPACES: Similar degree of pulmonary inflation. Linear areas of opacity noted at the left lung base. No pneumothorax or pleural effusion.  
BONY THORAX AND SOFT TISSUES: Unremarkable. IMPRESSION:   
1. Support devices in stable position. 2. Mildly underexpanded lungs and linear areas of linear atelectasis or airspace disease; appearing similar to prior. US LEs 10/11/2020 Interpretation Summary · No evidence of deep vein thrombosis in the right lower extremity. · No evidence of deep vein thrombosis in the left lower extremity. US Abd 10/13/20 IMPRESSION:  
1. Nonspecific increased hepatic echotexture suggesting intrinsic liver disease. Dilated portal vein, recanalized umbilical vein and perihepatic ascites. There 
is lack of flow within the portal vein, suggestive of thrombosis, with 
periportal collaterals and decreased flows in the hepatic vein. However, portal 
and hepatic veins patent on recent CT abdomen 10/09/2020.  Multiple attempts to 
contact Dr. Chante Lopes with this findings, awaiting callback.  
2. No discrete hepatic mass identified.  
3. Cholecystectomy.  
 4. Pancreas obscured by bowel gas. Ultrasound left upper extremity 10/20 Interpretation Summary · No evidence of acute DVT and chronic DVT in the left upper extremity. · Thrombus noted within the left basilic upper arm vein near the insertion site of PICC line. IMPRESSION:  
· Acute respiratory failure with hypoxia J96.01 
· Aspiration pneumonia - J69.0 · ARDS - J80 · Thrombocytopenia, concern for ITP vs liver disease - D69.6 · Coagulopathy, multifactorial including DIC - D68.9 · Acute diastolic congestive heart failure  I50.31 
· Cirrhosis  K74.60 · Hyperbilirubinemia - E80.6 · Portal vein thrombosis - M34 
· Colitis  K52.9 Patient Active Problem List  
Diagnosis Code  Hypocalcemia, resolved E83.51  
 Hypomagnesemia, resolved E83.42  
 Alcoholism ([facility] [address] F10.20  Cirrhosis ([facility] [address] K74.60  Colitis K52.9  Acute respiratory failure with hypoxemia (HCC) J96.01  
 Aspiration pneumonia ([facility] [address] J69.0  Acute diastolic CHF (congestive heart failure) ([facility]) I50.31  Thrombocytopenia ([facility] [address] D69.6 · · Code status: Full code RECOMMENDATIONS:  
Respiratory: Respirations stable; on nasal cannula oxygen; wean for oxygen saturation greater than 91%; aspiration precautions; keep head elevated. SARS CoV2 PCR negative x 2 negative CVS: DC clonidine patch; change metoprolol IV 2.5 mg IV every 6 hours to Prn 
Recent echocardiogram showed EF 55 to 60%; no pulmonary hypertension reported on echo. ID: Patient completed 7 days Zosyn. Hematology/Oncology: Continue monitoring hemoglobin and platelets; hemoglobin 8.0, platelets 07M; transfusion parameterskeep hemoglobin greater than 7 g/dL; keep platelets greater than 20 K. S/p Dexamethasone 40 mg daily for possible ITP; appreciate hematology input. INR elevated at baseline 1.8 todaydue to chronic liver disease. Renal: Stable renal function; normal creatinine; replace electrolytes/potassium as needed. GI/: Continue lactulose twice daily; patient has NG tube; check ammonia tomorrow. HepatologyDr. Kody Dobbins on case. Endocrine: Continue monitoring blood sugarselevated; poor oral intake since post extubation; Lantus and insulinadjust based on blood sugars and oral intake. Neurology: generalized weaknesspatient has critical illness myopathy; PT and OT have been consulted. Thiamine and folic acid. Vascular: Ultrasound left upper extremityno acute or chronic DVT; thrombus left basilic vein near the PICC line; patient auto anticoagulated and low platelets from chronic liver disease. IVF: Albumin and free water; D5 NS 50 ml/hr; sodium stable Nutrition: SLP evalrecommended n.p.o.; pending palliative care discussion with family. Risk of aspirations with NG tube feeding currently. Prophylaxis: DVT Prophylaxis: SCDs. GI Prophylaxis: Pantoprazole. Restraints: Soft wrist restraintsmedically necessary to avoid pulling lines, tubes and patient safety Lines/Tubes: [employer], Ward and Vent Bundles will be Followed. OGT/NGT: 10/14/20. PICC line left arm 10/19medically necessary; follow 1 protocol for daily evaluation and dressing. Ward: 10/13-removed yesterday night. Prognosis poor; palliative care on case. Patient at risk of multiorgan failure, bleeding complications, ventilator dependence, renal failure, hepatorenal syndrome, mortality. CODE STATUSDNR; family meeting done yesterday; palliative care on case. Patient awaiting telemetry bed; I would sign off once patient out of ICU; please call if needed. Will defer respective systems problem management to primary and other respective consultants. Quality Care: PPI, DVT prophylaxis, HOB elevated, Infection control all reviewed and addressed. Care of plan d/w ICU RN, MDR. High complexity decision making was performed during the evaluation of this patient at high risk for decompensation with multiple organ involvement. Arianna Conklin MD 
10/23/2020

## 2020-10-23 NOTE — PROGRESS NOTES
Problem: Pressure Injury - Risk of 
Goal: *Prevention of pressure injury Description: Document Trav Scale and appropriate interventions in the flowsheet. Outcome: Progressing Towards Goal 
Note: Pressure Injury Interventions: 
Sensory Interventions: Assess changes in LOC, Turn and reposition approx. every two hours (pillows and wedges if needed) Moisture Interventions: Absorbent underpads, Internal/External urinary devices, Moisture barrier Activity Interventions: Pressure redistribution bed/mattress(bed type) Mobility Interventions: Pressure redistribution bed/mattress (bed type), HOB 30 degrees or less Nutrition Interventions: Document food/fluid/supplement intake, Discuss nutritional consult with provider Friction and Shear Interventions: Foam dressings/transparent film/skin sealants, Apply protective barrier, creams and emollients, HOB 30 degrees or less Problem: Patient Education: Go to Patient Education Activity Goal: Patient/Family Education Outcome: Progressing Towards Goal 
  
Problem: Patient Education: Go to Patient Education Activity Goal: Patient/Family Education Outcome: Progressing Towards Goal 
  
Problem: Pain Goal: *Control of Pain Outcome: Progressing Towards Goal 
  
Problem: Pain Goal: *Control of Pain Outcome: Progressing Towards Goal 
  
Problem: Patient Education: Go to Patient Education Activity Goal: Patient/Family Education Outcome: Progressing Towards Goal 
  
PGoal: *Patient maintains clear airway/free of aspiration Outcome: Progressing Towards Goal 
Goal: *Absence of infection signs and symptoms Outcome: Progressing Towards Goal

## 2020-10-23 NOTE — PROGRESS NOTES
Chart reviewed and noted Pt remains in ICU at this time. Pending swallow eval. CM following for needs as we determine goals of care and what interventions are necessary. Do not anticipate dc this weekend unless Hospice is desired. Care Management Interventions PCP Verified by CM: Yes Last Visit to PCP: 10/08/20 Transition of Care Consult (CM Consult): Discharge Planning Current Support Network: Lives with Spouse, Own Home Confirm Follow Up Transport: Self The Plan for Transition of Care is Related to the Following Treatment Goals : home when medically stable The Patient and/or Patient Representative was Provided with a Choice of Provider and Agrees with the Discharge Plan?: Yes Name of the Patient Representative Who was Provided with a Choice of Provider and Agrees with the Discharge Plan: patient Discharge Location Discharge Placement: Home

## 2020-10-23 NOTE — PROGRESS NOTES
Problem: Dysphagia (Adult) Goal: *Acute Goals and Plan of Care (Insert Text) Description: Patient will: 1. Tolerate diet upgrade without overt s/sx of aspiration under SLP supervision. 2. Utilize compensatory swallow strategies of small bite/sip, alternate liquid/solid with min cues in 4/5 trials. 3. Perform oral-motor/laryngeal elevation exercises 10 reps/each to increase oropharyngeal swallow function with min cues. 4. Complete an objective swallow study (i.e., MBSS) to assess swallow integrity, r/o aspiration, and determine of safest LRD, min A. 
 
 
Rec:  
NPO, alternate means nutrition/hydration Strict Aspiration precautions HOB >45 during po intake, remain >30 for 30-45 minutes after po Small bites/sips; alternate liquid/solid, slow feeding rate Oral care TID Meds via IV Outcome: Progressing Towards Goal 
  
SPEECH LANGUAGE PATHOLOGY BEDSIDE SWALLOW EVALUATION Patient: Bharati Hinson (53 y.o. male) Date: 10/23/2020 Primary Diagnosis: Hypocalcemia [E83.51] Precautions: Aspiration  Fall PLOF: NPO 
 
ASSESSMENT : 
Based on the objective data described below, the patient presents with moderate-severe pharyngeal dysphagia. Pt seen for swallow eval. Pt arousable to voice + tactile stim, accepting of eval. Pt minimally verbal during eval. Pt s/p extubation 10/21. Decreased rate, ROM, and strength of oral structures, though following commands for orl motor examination; grossly intact for mastication and deglutition. Pt given PO trials of thin, nectar thick, and honey thick liquid via cup/straw and tandem drinking, and puree texture. Immediate strong cough following thin and nectar thick liquid via cup/straw/tandem drinking. Immediate weak throat clear following honey thick liquid. Multiple swallows noted across trials, including puree. Suspect pharyngeal residue.  
Recommend continue NPO, alternate means nutrition/hydration, meds via IV, strict aspiration precautions, d/w RN. SLP will FU per POC. Patient will benefit from skilled intervention to address the above impairments. Patient's rehabilitation potential is considered to be Fair Factors which may influence rehabilitation potential include:  
[]            None noted [x]            Mental ability/status []            Medical condition [x]            Home/family situation and support systems 
[]            Safety awareness 
[]            Pain tolerance/management []            Other: PLAN : 
Recommendations and Planned Interventions: 
See above. Frequency/Duration: Patient will be followed by speech-language pathology 1-2 times per day/3-5 days per week to address goals. Discharge Recommendations: To Be Determined SUBJECTIVE:  
Patient stated What's going on? . OBJECTIVE:  
 
Past Medical History:  
Diagnosis Date Diabetes ([facility] [address] Gastroparesis Pancreatitis Past Surgical History:  
Procedure Laterality Date HX CHOLECYSTECTOMY HX HIP FRACTURE TX    
 left hip sx 9.23/2018 Home Situation:  
Home Situation Home Environment: Private residence # Steps to Enter: 3 Rails to Enter: Yes Hand Rails : Right One/Two Story Residence: Two story # of Interior Steps: 12 Living Alone: No 
Support Systems: Spouse/Significant Other/Partner Patient Expects to be Discharged to[de-identified] Private residence Current DME Used/Available at Home: Cane, quad Diet prior to admission: unknown Current Diet:  NPO Cognitive and Communication Status: 
Neurologic State: Drowsy, Confused Orientation Level: Oriented to person, Disoriented to time, Disoriented to situation, Disoriented to place Cognition: Follows commands Perception: Appears intact Perseveration: No perseveration noted Safety/Judgement: Fall prevention Oral Assessment: 
Oral Assessment Labial: Decreased rate Dentition: Natural 
Oral Hygiene: adequate Lingual: Decreased rate;Decreased strength Velum: No impairment Mandible: No impairment P.O. Trials: 
Patient Position: HOB 75* Vocal quality prior to P.O.: Low volume Consistency Presented: Thin liquid; Nectar thick liquid;Honey thick liquid;Puree How Presented: SLP-fed/presented;Cup/sip;Straw;Successive swallows;Spoon Bolus Acceptance: No impairment Bolus Formation/Control: No impairment Propulsion: No impairment Oral Residue: None Initiation of Swallow: Delayed (# of seconds) Laryngeal Elevation: Weak;Decreased Aspiration Signs/Symptoms: Strong cough;Clear throat; Watery eyes Pharyngeal Phase Characteristics: Multiple swallows; Suspected pharyngeal residue;Poor endurance;Easily fatigued Effective Modifications: None Cues for Modifications: Maximal 
  
 
Oral Phase Severity: No impairment Pharyngeal Phase Severity : Moderate-severe PAIN: 
Pain level pre-treatment: 0/10 Pain level post-treatment: 0/10 Pain Intervention(s): NA  
Response to intervention: NA After treatment:  
[]            Patient left in no apparent distress sitting up in chair 
[x]            Patient left in no apparent distress in bed 
[]            Call bell left within reach [x]            Nursing notified []            Family present 
[]            Caregiver present 
[]            Bed alarm activated COMMUNICATION/EDUCATION:  
[x]            Aspiration precautions; swallow safety; compensatory techniques. []            Patient/family have participated as able in goal setting and plan of care. []            Patient/family agree to work toward stated goals and plan of care. []            Patient understands intent and goals of therapy; neutral about participation. [x]            Patient unable to participate in goal setting/plan of care; educ ongoing with interdisciplinary staff 
[]         Posted safety precautions in patient's room. Thank you for this referral. 
 
Rajiv Garrison M.S., CCC-SLP Speech-Language Pathologist 
 
 Time Calculation: 17 mins

## 2020-10-23 NOTE — PROGRESS NOTES
Phone: [phone] Paging : [phone] Hematology / Oncology Progress Note Admit Date: 10/9/2020 Assessment:  
 
Principal Problem: Hypocalcemia (10/9/2020) Active Problems: Hypomagnesemia (10/9/2020) Alcoholism ([facility] [address] (10/9/2020) Cirrhosis ([facility] [address] (10/9/2020) Colitis (10/9/2020) Acute respiratory failure with hypoxemia ([facility] [address] (10/13/2020) Aspiration pneumonia ([facility] [address] (10/13/2020) Acute diastolic CHF (congestive heart failure) ([facility] [address] (10/13/2020) Thrombocytopenia ([facility] [address] (10/13/2020) Plan:  
 
1,  Supportive care 2. Remains extubated 3 very sleepy 4. He is comfortable 5. plts stable at 34. 6. dnr Subjective:  
 
Remains lethargic. Opens eyes to voice. Said good morning,  Had some complaints earlier of antonio and abdom pain. Has not received any sedatives in a while. No bleeding Objective:  
 
Patient Vitals for the past 24 hrs: 
 BP Temp Pulse Resp SpO2 Height 10/23/20 0400 98/82  66 13 97 %   
10/23/20 0300 92/64  72 13 97 %   
10/23/20 0203 (!) 98/55  86 19 98 %   
10/23/20 0200   77 12 97 %   
10/23/20 0100 91/74  93 15 99 %   
10/23/20 0038 103/69  80 11 98 %   
10/23/20 0000 (!) 89/61 98.8 °F (37.1 °C) 77 11 99 %   
10/22/20 2300 121/69  97 18 99 %   
10/22/20 2200 102/73  93 19 99 %   
10/22/20 2100 100/72  98 19 99 %   
10/22/20 2000 114/73 98.8 °F (37.1 °C) 98 27 99 %   
10/22/20 1918 112/72  73     
10/22/20 1900 112/72  88 27 98 %   
10/22/20 1800 107/78  87 27 100 %   
10/22/20 1700 99/69  79 23 99 %   
10/22/20 1600 115/80 99 °F (37.2 °C) 85 24 98 %   
10/22/20 1500 (!) 115/94  87 26 98 %   
10/22/20 1400 104/74  92 24 97 %   
10/22/20 1300 96/61  82 24 99 %   
10/22/20 1200 92/63  80 24 (!) 86 %   
10/22/20 1122  98.8 °F (37.1 °C)      
10/22/20 1100 98/74  75 23 100 %   
10/22/20 1000 113/74  88 16    
10/22/20 0900 119/72  93 29 96 %   
 10/22/20 0808      5' 9\" (1.753 m)  
10/22/20 0800 (!) 152/82  98 27 95 %   
10/22/20 0700 (!) 138/98  96 26 96 %   
10/22/20 0656  98.7 °F (37.1 °C)      
10/22/20 0600 (!) 144/87  98 26 94 %   
10/22/20 0500 (!) 150/95  (!) 107 30 95 %  Intake/Output Summary (Last 24 hours) at 10/23/2020 5330 Last data filed at 10/23/2020 1585 Gross per 24 hour Intake 710.84 ml Output 2400 ml Net -1689.16 ml Temp (24hrs), Av.8 °F (37.1 °C), Min:98.7 °F (37.1 °C), Max:99 °F (37.2 °C) Ros: unobtainable Exam: 
General:  cooperative, no distress, appears older than stated age, cachectic, disoriented HEENT: Head: Normocephalic, no lesions, without obvious abnormality. Jaundiced no bleeding Lymphatic:  no lymphadenopathy Lungs: clear to auscultation bilaterally Cardiovascular:  regular heart rate, no murmurs, no JVD Pulses: 2+ and symmetric Abdomen: Soft, non-tender and without masses or organomegaly Musculoskeletal: no joint tenderness, deformity or swelling Extremities: no change Sloan edema Skin:  No bleeding or bruising, jaundiced Neurologic: arouses to voice, some command following, drowsy Recent Results (from the past 24 hour(s)) GLUCOSE, POC Collection Time: 10/22/20  5:14 AM  
Result Value Ref Range Glucose (POC) 188 (H) 70 - 110 mg/dL POTASSIUM Collection Time: 10/22/20  4:00 PM  
Result Value Ref Range Potassium 3.7 3.5 - 5.5 mmol/L PHOSPHORUS Collection Time: 10/22/20  4:00 PM  
Result Value Ref Range Phosphorus 3.5 2.5 - 4.9 MG/DL  
GLUCOSE, POC Collection Time: 10/22/20  6:53 PM  
Result Value Ref Range Glucose (POC) 211 (H) 70 - 110 mg/dL GLUCOSE, POC Collection Time: 10/22/20 11:11 PM  
Result Value Ref Range Glucose (POC) 194 (H) 70 - 110 mg/dL Current Facility-Administered Medications Medication Dose Route Frequency Provider Last Rate Last Dose  insulin glargine (LANTUS) injection 5 Units  5 Units SubCUTAneous DAILY Cheng Trivedi MD   5 Units at 10/22/20 0936  
 dextrose 5% and 0.9% NaCl infusion  50 mL/hr IntraVENous CONTINUOUS Cheng Trivedi MD 50 mL/hr at 10/22/20 1922 50 mL/hr at 10/22/20 [facility]  labetaloL (NORMODYNE;TRANDATE) injection 20 mg  20 mg IntraVENous Q6H PRN Cheng Trivedi MD      
 metoprolol (LOPRESSOR) injection 5 mg  5 mg IntraVENous Q6H Key Patel MD   5 mg at 10/22/20 2317  LORazepam (ATIVAN) injection 1 mg  1 mg IntraVENous Q4H PRN Ingrid Tomas MD   1 mg at 10/22/20 0318  
 0.9% sodium chloride infusion 250 mL  250 mL IntraVENous PRN Cheng Trivedi MD      
 albumin human 25% (BUMINATE) solution 25 g  25 g IntraVENous Q12H Cheng Trivedi MD   25 g at 10/22/20 2014  cloNIDine (CATAPRES) 0.1 mg/24 hr patch 1 Patch  1 Patch TransDERmal Q7D Cheng Trivedi MD   1 Patch at 10/20/20 1230  heparin (porcine) 100 unit/mL injection 500 Units  500 Units InterCATHeter Q8H PRN Ingrid Tomas MD   500 Units at 10/19/20 1539  thiamine mononitrate (B-1) tablet 100 mg  100 mg Oral DAILY Cheng Trivedi MD   100 mg at 42/85/51 5357  
 folic acid (FOLVITE) tablet 1 mg  1 mg Oral DAILY Cheng Trivedi MD   1 mg at 10/22/20 0937  
 lactulose (CHRONULAC) 10 gram/15 mL solution 15 mL  10 g Oral BID Susi WHITE MD   15 mL at 10/22/20 2015  metoclopramide HCl (REGLAN) injection 5 mg  5 mg IntraVENous Q6H PRN Shelbie Benites MD      
 insulin lispro (HUMALOG) injection   SubCUTAneous Q6H Cheng Trivedi MD   3 Units at 10/22/20 2317  ELECTROLYTE REPLACEMENT PROTOCOL - Potassium Standard Dosing   1 Each Other PRN Shelbie Benites MD      
 ELECTROLYTE REPLACEMENT PROTOCOL - Magnesium   1 Each Other PRN Shelbie Benites MD      
 ELECTROLYTE REPLACEMENT PROTOCOL - Phosphorus  Standard Dosing  1 Each Other PRN Valerio Plummer MD      
 ELECTROLYTE REPLACEMENT PROTOCOL - Calcium   1 Each Other PRN Valerio Plummer MD      
 pantoprazole (PROTONIX) 40 mg in 0.9% sodium chloride 10 mL injection  40 mg IntraVENous Q24H Jamila Talley MD   40 mg at 10/22/20 2015  
 HYDROmorphone (PF) (DILAUDID) injection 1 mg  1 mg IntraVENous Q3H PRN Jerad Tomas MD   1 mg at 10/22/20 2323  lidocaine 4 % patch 2 Patch  2 Patch TransDERmal Q24H Jerad Tomas MD   2 Patch at 10/22/20 2015  ondansetron (ZOFRAN) injection 4 mg  4 mg IntraVENous Q6H PRN Jerad Tomas MD   4 mg at 10/13/20 2139  
 glucose chewable tablet 16 g  4 Tab Oral PRN Jerad Tomas MD      
 glucagon (GLUCAGEN) injection 1 mg  1 mg IntraMUSCular PRN Jerad Tomas MD      
 dextrose 10% infusion 125-250 mL  125-250 mL IntraVENous PRN Jerad Tomas MD Vanda Crutch, MD

## 2020-10-23 NOTE — PROGRESS NOTES
Hospitalist Progress Note Patient: Bianca Plummer MRN: [id number]  CSN: [id number] YOB: 1965  Age: 47 y.o. Sex: male DOA: 10/9/2020 LOS:  LOS: 14 days Chief Complaint: Ac resp failure Assessment/Plan 48 yo cirrhosis patient with ac resp failure [id number] Lethargic but does answer questions this am 
 
NG tube in place 
  
Cirrhosis-advanced Asp pneumonia-completed IV abx course Acute diastolic CHF-diuresed Anemia-s/p transfusions, heme has followed his course-no changes for now Coagulopathy Thrombocytopenia-no bleeding Uncontrolled HTN-reduce metoprolol dose Alcoholism-thiamine, nutrition as tolerated, NPO as high asp risk 
  
Prognosis poor DNR Will move to floor soon Disposition : 
Patient Active Problem List  
Diagnosis Code  Hypocalcemia E83.51  
 Hypomagnesemia E83.42  
 Alcoholism ([facility] [address] F10.20  Cirrhosis ([facility] [address] K74.60  Colitis K52.9  Acute respiratory failure with hypoxemia (HCC) J96.01  
 Aspiration pneumonia ([facility] [address] J69.0  Acute diastolic CHF (congestive heart failure) ([facility]) I50.31  Thrombocytopenia ([facility] [address] D69.6 Subjective: 
 
Denies pain now, had epigastric pain earlier Review of systems: 
 
Constitutional: denies fevers, chills Respiratory: denies SOB, cough Cardiovascular: denies chest pain Gastrointestinal: denies nausea Vital signs/Intake and Output: 
Visit Vitals BP 98/71 Pulse 85 Temp 98.8 °F (37.1 °C) Resp 20 Ht 5' 9\" (1.753 m) Wt 67.6 kg (149 lb 0.5 oz) SpO2 97% BMI 22.01 kg/m² Current Shift:  No intake/output data recorded. Last three shifts:  10/21 1901 - 10/23 0700 In: 1195.8 [I.V.:835.8] Out: Hank Rhoda [[id number]] Exam: 
 
General: debilitated frail wf, nad CVS:Regular rate and rhythm, no M/R/G, S1/S2 heard, no thrill Lungs:Clear to auscultation bilaterally, no wheezes, rhonchi, or rales Abdomen: Soft, Nontender, No distention, Normal Bowel sounds, No hepatomegaly Extremities: No C/C/E, pulses palpable 2+ Neuro:grossly normal , follows commands Psych:appropriate Labs: Results:  
   
Chemistry Recent Labs 10/23/20 
1040 10/23/20 
0416 10/22/20 
1600 10/22/20 
0208  10/21/20 
8979 GLU  --  188*  --  164*  --  143* NA  --  144  --  142  --  142  
K 3.6 3.5 3.7 3.2*   < > 3.5 CL  --  105  --  101  --  102 CO2  --  32  --  32  --  32 BUN  --  15  --  15  --  16  
CREA  --  0.85  --  0.87  --  0.84 CA  --  8.9  --  9.3  --  8.9 AGAP  --  7  --  9  --  8  
BUCR  --  18  --  17  --  19  
AP  --  46  --  55  --  53  
TP  --  6.0*  --  6.5  --  5.9* ALB  --  3.8  --  4.1  --  3.9 GLOB  --  2.2  --  2.4  --  2.0 AGRAT  --  1.7  --  1.7  --  2.0*  
 < > = values in this interval not displayed. CBC w/Diff Recent Labs 10/23/20 
7393 10/22/20 
7621 10/21/20 
9413 WBC 8.3 10.8 9.6  
RBC 2.46* 2.97* 2.73* HGB 8.0* 9.5* 8.8* HCT 23.6* 27.7* 25.4*  
PLT 31* 34* 34* GRANS 74* 81* 82* LYMPH 17* 11* 11* EOS 1 1 0 Cardiac Enzymes No results for input(s): CPK, CKND1, LYDIA in the last 72 hours. No lab exists for component: Kartik Jenkins Coagulation Recent Labs 10/23/20 
5929 10/22/20 
0242 PTP 20.3* 20.9* INR 1.8* 1.9* Lipid Panel No results found for: CHOL, CHOLPOCT, CHOLX, CHLST, CHOLV, 241434, HDL, HDLP, LDL, LDLC, DLDLP, 762452, VLDLC, VLDL, TGLX, TRIGL, TRIGP, TGLPOCT, CHHD, CHHDX  
BNP No results for input(s): BNPP in the last 72 hours. Liver Enzymes Recent Labs 10/23/20 
0416  
TP 6.0* ALB 3.8 AP 46 Thyroid Studies No results found for: T4, T3U, TSH, TSHEXT Procedures/imaging: see electronic medical records for all procedures/Xrays and details which were not copied into this note but were reviewed prior to creation of Plan Margo Hernandez MD

## 2020-10-24 NOTE — PROGRESS NOTES
0730 Bedside and Verbal shift change report given to LAVON Arias, JOAN (oncoming nurse) by DEX Martínez RN (offgoing nurse). Report included the following information SBAR, Kardex, Intake/Output, and MAR. Pt in bed, call light in reach. 0848 Came into room, pt had pulled out both PIVs, and NG tube. Will notify MD.  
 
1110 Pt seen by speech, pt will begin on diet, spoke to Dr. Yue Velazquez regarding pt's dilaudid, she gave verbal order to d/c.  
 
1205 Pt in pain, roxicodone given, see MAR. Pt assessed. No signs of acute distress. Pt in bed. Pt took pill whole, single, with sips of water. 1555 Pt assessed. No signs of acute distress. Pt in bed. 1918 Pt in pain, roxicodone given, see MAR. 
 
1945 Bedside and Verbal shift change report given to LAVON Lopez RN (oncoming nurse) by Montserrat Bailey. Kati Arias, JOAN (offgoing nurse). Report included the following information SBAR, Kardex, Intake/Output, and MAR. Pt in bed, call light in reach. Pt ate small amounts of meals, would eat some bites then wanted to take a break for later.

## 2020-10-24 NOTE — PROGRESS NOTES
2230 TRANSFER - OUT REPORT: 
 
Verbal report given to Pennie FRANCO(name) on Bianca Plummer  being transferred to 3N room 346(unit) for routine progression of care Report consisted of patients Situation, Background, Assessment and  
Recommendations(SBAR). Information from the following report(s) SBAR, Kardex, ED Summary, Intake/Output, MAR, Recent Results and Cardiac Rhythm NSR was reviewed with the receiving nurse. Lines: PICC Double Lumen 82/77/70 Left;Basilic (Active) Central Line Being Utilized Yes 10/23/20 2000 Criteria for Appropriate Use Long term IV/antibiotic administration 10/23/20 2000 Site Assessment Clean, dry, & intact 10/23/20 2000 Phlebitis Assessment 0 10/23/20 2000 Infiltration Assessment 0 10/23/20 2000 Date of Last Dressing Change 10/19/20 10/23/20 2000 Dressing Status Clean, dry, & intact 10/23/20 2000 Action Taken Open ports on tubing capped 10/23/20 2000 Dressing Type Disk with Chlorhexadine gluconate (CHG); Transparent 10/23/20 2000 Hub Color/Line Status Red; Infusing;Flushed;Patent 10/23/20 2000 Positive Blood Return (Site #1) No 10/23/20 2000 Hub Color/Line Status Purple;Capped;Flushed;Patent 10/23/20 2000 Positive Blood Return (Site #2) Yes 10/23/20 2000 Alcohol Cap Used Yes 10/23/20 2000 Peripheral IV 10/09/20 Left Antecubital (Active) Site Assessment Clean, dry, & intact 10/23/20 2000 Phlebitis Assessment 0 10/23/20 2000 Infiltration Assessment 0 10/23/20 2000 Dressing Status Clean, dry, & intact 10/23/20 2000 Dressing Type Transparent;Tape 10/23/20 1600 Hub Color/Line Status Pink;Capped 10/23/20 1600 Action Taken Open ports on tubing capped 10/23/20 1600 Alcohol Cap Used Yes 10/23/20 1600 Peripheral IV 10/10/20 Posterior;Right Hand (Active) Site Assessment Clean, dry, & intact 10/23/20 2000 Phlebitis Assessment 0 10/23/20 2000 Infiltration Assessment 0 10/23/20 2000 English Dressing Status Clean, dry, & intact 10/23/20 2000 Dressing Type Tape;Transparent 10/23/20 1600 Hub Color/Line Status Blue;Capped 10/23/20 1600 Action Taken Open ports on tubing capped 10/23/20 1600 Alcohol Cap Used Yes 10/23/20 1600 Peripheral IV 10/21/20 Left Arm (Active) Site Assessment Clean, dry, & intact 10/23/20 2000 Phlebitis Assessment 0 10/23/20 2000 Infiltration Assessment 0 10/23/20 2000 Dressing Status Clean, dry, & intact 10/23/20 2000 Dressing Type Transparent;Tape 10/23/20 1600 Hub Color/Line Status Green;Capped 10/23/20 1600 Action Taken Open ports on tubing capped 10/23/20 1600 Alcohol Cap Used Yes 10/23/20 1600 Opportunity for questions and clarification was provided. Patient transported with: 
 Monitor O2 @ 2 liters Registered Nurse

## 2020-10-24 NOTE — PROGRESS NOTES
Problem: Mobility Impaired (Adult and Pediatric) Goal: *Acute Goals and Plan of Care (Insert Text) Description: Physical Therapy Goals Initiated 10/13/2020, revised 10/23/2020 and to be accomplished within 7 day(s) 1. Patient will move from supine to sit and sit to supine  in bed with minimal assistance. 2.  Patient will transfer from bed to chair and chair to bed with moderate assistance using the least restrictive device. 3.  Patient will perform sit to stand with moderate assistance. 4.  Patient will ambulate with moderate assistance for 15 feet with the least restrictive device. Outcome: Progressing Towards Goal 
 PHYSICAL THERAPY TREATMENT Patient: Justin Golden (53 y.o. male) Date: 10/24/2020 Diagnosis: Hypocalcemia [E83.51] Hypocalcemia Precautions: Fall PLOF: ambulatory with a quad cane, ETOH abuse ASSESSMENT: 
Pt required maxA to sit up EOB. Performed seated and supine ROM strengthening exercises at a very slow pace. Keeps head down with chin resting on chest due to very weak cervical muscles, unable to lift head. MaxA back to supine. TotalA to scoot up to [facility] with bed in Trendelenburg position. Progression toward goals:  
[]      Improving appropriately and progressing toward goals [x]      Improving slowly and progressing toward goals 
[]      Not making progress toward goals and plan of care will be adjusted PLAN: 
Patient continues to benefit from skilled intervention to address the above impairments. Continue treatment per established plan of care. Discharge Recommendations:  Jesu De La Fuente Further Equipment Recommendations for Discharge:  TBD SUBJECTIVE:  
Patient stated I can't lift it.  (referring to head) OBJECTIVE DATA SUMMARY:  
Critical Behavior: 
Neurologic State: Alert, Appropriate for age Orientation Level: Oriented X4 Cognition: Follows commands Safety/Judgement: Good awareness of safety precautions Functional Mobility Training: 
Bed Mobility: 
 
Supine to Sit: Maximum assistance Sit to Supine: Maximum assistance Scooting: Total assistance Balance: 
Sitting: Impaired Sitting - Static: Fair (occasional) Sitting - Dynamic: Fair (occasional) Therapeutic Exercises: (B)LE, cervical 
 
 
EXERCISE Sets Reps Active Active Assist  
Passive Self ROM Comments Ankle Pumps  10  [x] [] [] [] Quad Sets/Glut Sets  10  [x] [] [] [] Hold for 5 secs Hamstring Sets   [] [] [] [] Short Arc Quads   [] [] [] [] Heel Slides   [] [] [] [] Straight Leg Raises   [] [] [] [] Hip Add   [] [] [] [] Hold for 5 secs, w/ pillow squeeze Long Arc Quads  10 [x] [] [] [] Seated Marching   [] [] [] []   
Standing Marching   [] [] [] [] Cervical extension/head presses  10 [x] [] [] []  Isometrics into pillow Pain: 
Pain level pre-treatment: 0/10 Pain level post-treatment: 0/10 Pain Intervention(s): Medication (see MAR); Rest, Ice, Repositioning Response to intervention: Nurse notified, See doc flow Activity Tolerance:  
poor Please refer to the flowsheet for vital signs taken during this treatment. After treatment:  
[] Patient left in no apparent distress sitting up in chair 
[x] Patient left in no apparent distress in bed 
[x] Call bell left within reach [x] Nursing notified 
[] Caregiver present 
[] Bed alarm activated 
[] SCDs applied COMMUNICATION/EDUCATION:  
[x]         Role of Physical Therapy in the acute care setting. [x]         Fall prevention education was provided and the patient/caregiver indicated understanding. [x]         Patient/family have participated as able in working toward goals and plan of care. [x]         Patient/family agree to work toward stated goals and plan of care. []         Patient understands intent and goals of therapy, but is neutral about his/her participation.  
[]         Patient is unable to participate in stated goals/plan of care: ongoing with therapy staff. 
[]         Other: 
 
   
Javier Cutler PTA Time Calculation: 26 mins

## 2020-10-24 NOTE — PROGRESS NOTES
Cbc today  Wbc 8.7 , hgb 8.3  plt 40 no bleeding Remains very lethargic                                                                                                     Counts are stable. No longer requires plasma. Continue supportive care we are available if needed. Will sign off. Continue rehab

## 2020-10-24 NOTE — PROGRESS NOTES
Problem: Dysphagia (Adult) Goal: *Acute Goals and Plan of Care (Insert Text) Description: Patient will: 1. Tolerate diet upgrade without overt s/sx of aspiration under SLP supervision. 2. Utilize compensatory swallow strategies of small bite/sip, alternate liquid/solid with min cues in 4/5 trials. 3. Perform oral-motor/laryngeal elevation exercises 10 reps/each to increase oropharyngeal swallow function with min cues. 4. Complete an objective swallow study (i.e., MBSS) to assess swallow integrity, r/o aspiration, and determine of safest LRD, min A. 
 
 
Rec:  
Mec soft/ground diet, thin liquids Aspiration precautions HOB >45 during po intake, remain >30 for 30-45 minutes after po Small bites/sips; alternate liquid/solid, slow feeding rate Oral care TID Meds crushed in applesauce (RN OK to attempt 1 pill at a time with water & doc for updated recs this week) Outcome: Progressing Towards Goal 
  
SPEECH LANGUAGE PATHOLOGY DYSPHAGIA TREATMENT Patient: Foster Roberts (53 y.o. male) Date: 10/24/2020 Diagnosis: Hypocalcemia [E83.51] Hypocalcemia Precautions: aspiration Fall PLOF:mech soft/thin, feeding assistance ASSESSMENT: 
Pt seen for FU swallow tx per request of RN & MD as pt more alert and awake this date. Pt AOx4, accepting of tx. Pt with increased conversational exchange this date and attentive. Pt self-feeding with SLP assistance PO trials of thin liquid via cup/straw and tandem drinking, puree, and regular textures. Pt with prolonged mastication time with regular texture. No s/sx of aspiration appreciated across consistencies. Oropharyngeal swallow appears WFL. Swallow initiation appears timely, adequate laryngeal elevation noted via palpation, no change in vocal/resp quality appreciated. Recommend initiate [facility] soft/ground texture diet with thin liquids, meds crushed in applesauce.  Pt will benefit  from feeding assistance d/t decreased dexterity with adherence to safe swallow strategies: slow rate of feeding, small bites/sips, alternate solids/liquids. RN OK to attempt pills one at a time with water & document tolerance - d/w RN Ken Harrington and Ashlyn PENDLETON. SLP will continue to follow per POC. Progression toward goals: 
[]         Improving appropriately and progressing toward goals 
[]         Improving slowly and progressing toward goals 
[]         Not making progress toward goals and plan of care will be adjusted PLAN: 
Recommendations and Planned Interventions: 
See above. Patient continues to benefit from skilled intervention to address the above impairments. Continue treatment per established plan of care. Discharge Recommendations: To Be Determined SUBJECTIVE:  
Patient stated I don't remember being here yesterday. OBJECTIVE:  
Cognitive and Communication Status: 
Neurologic State: Alert, Appropriate for age Orientation Level: Oriented X4 Cognition: Follows commands Perception: Appears intact Perseveration: No perseveration noted Safety/Judgement: Good awareness of safety precautions Dysphagia Treatment: 
Oral Assessment: 
Oral Assessment Labial: No impairment Dentition: Natural 
Oral Hygiene: [facility] Lingual: No impairment Velum: No impairment Mandible: No impairment P.O. Trials: 
 Patient Position: [facility] 75* Vocal quality prior to P.O.: No impairment Consistency Presented: Thin liquid, Puree, Solid How Presented: SLP-fed/presented, Cup/sip, Spoon, Straw, Successive swallows Bolus Acceptance: No impairment Bolus Formation/Control: Impaired Type of Impairment: Delayed, Mastication Propulsion: No impairment Oral Residue: None Initiation of Swallow: No impairment Laryngeal Elevation: Functional 
 Aspiration Signs/Symptoms: None Pharyngeal Phase Characteristics: No impairment, issues, or problems Effective Modifications: Alternate liquids/solids, Small sips and bites Cues for Modifications: Minimal 
   
 
 Oral Phase Severity: Mild Pharyngeal Phase Severity : No impairment PAIN: 
Pain level pre-treatment: 0/10 Pain level post-treatment: 0/10 Pain Intervention(s): NA  
Response to intervention: NA After treatment:  
[]              Patient left in no apparent distress sitting up in chair 
[x]              Patient left in no apparent distress in bed 
[x]              Call bell left within reach [x]              Nursing notified 
[]              Family present 
[]              Caregiver present 
[]              Bed alarm activated COMMUNICATION/EDUCATION:  
[x] Aspiration precautions; swallow safety; compensatory techniques []        Patient unable to participate in education; education ongoing with staff 
[]  Posted safety precautions in patient's room. [] Oral-motor/laryngeal strengthening exercises Thank you for this referral. 
 
Donna Camacho M.S., CCC-SLP Speech-Language Pathologist 
 
Time Calculation: 10 mins

## 2020-10-24 NOTE — PROGRESS NOTES
Hospitalist Progress Note Patient: Lizbet Mitchell MRN: [id number]  CSN: [id number] YOB: 1965  Age: 47 y.o. Sex: male DOA: 10/9/2020 LOS:  LOS: 15 days Chief Complaint: 
 
cirrhosis Assessment/Plan 46 yo cirrhosis patient with ac resp failure-resolved [id number]/21 Much more alert and talking this am 
 
Passed swallow test for dysphagia diet 
  
NG tube out 
  
Cirrhosis-advanced Asp pneumonia-completed IV abx course Acute diastolic CHF-diuresed Anemia-s/p transfusions, heme has followed his course-no changes for now Coagulopathy 
hyperglu Thrombocytopenia-no bleeding Uncontrolled HTN-reduce metoprolol dose Alcoholism-thiamine, nutrition as tolerated, NPO as high asp risk Debility and weakness, profound-PT 
  
Prognosis guarded Continue supportive care, may need rehab and skilled care upon d/c as wife has chronic advanced medical illness Disposition : 
Patient Active Problem List  
Diagnosis Code  Hypocalcemia E83.51  
 Hypomagnesemia E83.42  
 Alcoholism ([facility] [address] F10.20  Cirrhosis ([facility] [address] K74.60  Colitis K52.9  Acute respiratory failure with hypoxemia (HCC) J96.01  
 Aspiration pneumonia ([facility] [address] J69.0  Acute diastolic CHF (congestive heart failure) ([facility]) I50.31  Thrombocytopenia ([facility] [address] D69.6 Subjective: 
 
I am hungry Denies abd pain \"sorry I took tube out\" Review of systems: 
 
Constitutional: denies fevers, chills Respiratory: denies SOB,  
Cardiovascular: denies chest pain Gastrointestinal: denies nausea, vomiting, diarrhea Vital signs/Intake and Output: 
Visit Vitals /75 Pulse 86 Temp 97.6 °F (36.4 °C) Resp 14 Ht 5' 9\" (1.753 m) Wt 67.6 kg (149 lb 0.5 oz) SpO2 100% BMI 22.01 kg/m² Current Shift:  10/24 0701 - 10/24 1900 In: -  
Out: 1500 [Urine:1500] Last three shifts:  10/22 1901 - 10/24 0700 In: 2010 [I.V.:1685] Out: 785 [Urine:785] Exam: 
 
General: debilitated Wm, NAd, ox2 
 CVS:Regular rate and rhythm, no M/R/G, S1/S2 heard, no thrill Lungs:Clear to auscultation bilaterally, no wheezes, rhonchi, or rales Abdomen: Soft, Nontender, No distention, Normal Bowel sounds, No hepatomegaly Extremities: No C/C/E, pulses palpable 2+ Skin:less jaundice and sallowness Neuro:grossly normal , follows commands Psych:appropriate Labs: Results:  
   
Chemistry Recent Labs 10/24/20 
0230 10/23/20 
2043 10/23/20 
1040 10/23/20 
0416  10/22/20 
8696 *  --   --  188*  --  164*   --   --  144  --  142  
K 3.7 4.1 3.6 3.5   < > 3.2*  
  --   --  105  --  101 CO2 31  --   --  32  --  32 BUN 13  --   --  15  --  15  
CREA 0.81  --   --  0.85  --  0.87 CA 9.2  --   --  8.9  --  9.3 AGAP 3  --   --  7  --  9  
BUCR 16  --   --  18  --  17  
AP 46  --   --  46  --  55  
TP 6.4  --   --  6.0*  --  6.5 ALB 4.1  --   --  3.8  --  4.1 GLOB 2.3  --   --  2.2  --  2.4 AGRAT 1.8*  --   --  1.7  --  1.7  
 < > = values in this interval not displayed. CBC w/Diff Recent Labs 10/24/20 
0230 10/23/20 
0416 10/22/20 
4772 WBC 8.7 8.3 10.8 RBC 2.54* 2.46* 2.97* HGB 8.3* 8.0* 9.5* HCT 24.9* 23.6* 27.7*  
PLT 40* 31* 34* GRANS 76* 74* 81* LYMPH 15* 17* 11* EOS 1 1 1 Cardiac Enzymes No results for input(s): CPK, CKND1, LYDIA in the last 72 hours. No lab exists for component: Loyce Factor Coagulation Recent Labs 10/24/20 
0230 10/23/20 
0416 PTP 20.3* 20.3* INR 1.8* 1.8* Lipid Panel No results found for: CHOL, CHOLPOCT, CHOLX, CHLST, CHOLV, 055512, HDL, HDLP, LDL, LDLC, DLDLP, 831376, VLDLC, VLDL, TGLX, TRIGL, TRIGP, TGLPOCT, CHHD, CHHDX  
BNP No results for input(s): BNPP in the last 72 hours. Liver Enzymes Recent Labs 10/24/20 
0230 TP 6.4 ALB 4.1 AP 46 Thyroid Studies No results found for: T4, T3U, TSH, TSHEXT Procedures/imaging: see electronic medical records for all procedures/Xrays and details which were not copied into this note but were reviewed prior to creation of Plan Silvia Thorne MD

## 2020-10-24 NOTE — PROGRESS NOTES
Chart reviewed as CM on call. Pt noted to have transferred from ICU to tele. Palliative consult noted. Noted PT/OT recommendations from yesterday for SNF. Will send local SNF referrals for continuity of care. Pt has neg covid from 10/15. CM will follow for transition of care needs and will be available via hospital  on weekends.

## 2020-10-24 NOTE — PROGRESS NOTES
Bedside and Verbal shift change report given to M. Duane Kohler, RN (oncoming nurse) by DEX Martínez RN (offgoing nurse). Report included the following information SBAR, Kardex, Intake/Output, MAR and Recent Results.

## 2020-10-25 NOTE — ROUTINE PROCESS
21  received SBAR from Bridgett Ornelas, [facility] from Telemetry. 1110 patient arrived on floor. Patient alert to self and place. Disoriented to time and situation. Patient states pain 5/10, no request for pain medication at this time. No nausea. Eyes and skin jaundiced. Mepilex on L heel, L flank, and sacrum clean/dry/intact. Ward patent/draining imer urine. [address] given for pain. Reassessed and patient stated relief. Patient had two large BM today. Dressing on sacrum changed. Patient ate roughly half of meals with assistance. No further request for pain medication today. Patient Vitals for the past 12 hrs: 
 Temp Pulse Resp BP SpO2  
10/25/20 1541 98.5 °F (36.9 °C) 100 17 114/78 100 % 10/25/20 1130 98.5 °F (36.9 °C) 88 18 110/73 100 % 10/25/20 0805 98 °F (36.7 °C) 88 20 109/76 100 % Bedside and Verbal shift change report given to Irina Simon RN (oncoming nurse) by Vipul Smith RN (offgoing nurse). Report included the following information SBAR, Kardex, Intake/Output, MAR and Recent Results.

## 2020-10-25 NOTE — PROGRESS NOTES
Problem: Mobility Impaired (Adult and Pediatric) Goal: *Acute Goals and Plan of Care (Insert Text) Description: Physical Therapy Goals Initiated 10/13/2020, revised 10/23/2020 and to be accomplished within 7 day(s) 1. Patient will move from supine to sit and sit to supine  in bed with minimal assistance. 2.  Patient will transfer from bed to chair and chair to bed with moderate assistance using the least restrictive device. 3.  Patient will perform sit to stand with moderate assistance. 4.  Patient will ambulate with moderate assistance for 15 feet with the least restrictive device. Outcome: Progressing Towards Goal 
  
PHYSICAL THERAPY TREATMENT Patient: Bobbi Coley (53 y.o. male) Date: 10/25/2020 Diagnosis: Hypocalcemia [E83.51] Hypocalcemia Precautions: Fall Chart, physical therapy assessment, plan of care and goals were reviewed. ASSESSMENT: 
Pt with similar presentation, but noting slight feeling of strength increase. Pt tolerated 20 min of EOB sitting with varying levels of assist for prop sitting. Brief 2 min sidelying rest break needed after 12 min. During sitting time, pt performs ex listed below. Pt is not progressing quickly enough to avoid placement at D/c. Will continue to work toward goals listed above. Progression toward goals: 
[x]      Improving appropriately and progressing toward goals 
[]      Improving slowly and progressing toward goals 
[]      Not making progress toward goals and plan of care will be adjusted PLAN: 
Patient continues to benefit from skilled intervention to address the above impairments. Continue treatment per established plan of care. Discharge Recommendations:  [address] Further Equipment Recommendations for Discharge:  mechanical lift and wheelchair SUBJECTIVE:  
Patient stated I don't want to quit.  OBJECTIVE DATA SUMMARY:  
Critical Behavior: 
Neurologic State: Alert, Confused Orientation Level: Oriented to person, Oriented to place, Disoriented to situation, Disoriented to time Cognition: Follows commands Safety/Judgement: Good awareness of safety precautions Functional Mobility Training: 
Bed Mobility: 
Rolling: Maximum assistance Supine to Sit: Maximum assistance; Total assistance Sit to Supine: Total assistance Scooting: Total assistance Balance: 
Sitting: Intact Sitting - Static: Fair (occasional); Poor (constant support) Sitting - Dynamic: Poor (constant support) Therapeutic Exercises:  
 
 
EXERCISE Sets Reps Active Active Assist  
Passive Self ROM Comments Ankle Pumps 1 20 [x] [x] [] [] Quad Sets/Glut Sets 1 10 [x] [] [] [] Hamstring Sets 1 5 [x] [] [] [] Short Arc Quads   [] [] [] [] Heel Slides   [] [] [] [] Straight Leg Raises   [] [] [] [] Hip Abd/Add 1 10 [x] [] [] [] Long Arc Quads 1 5 [] [x] [] [] Seated Marching   [] [] [] []   
Standing Marching   [] [] [] []   
   [] [] [] []   
 
 
Pain: 
Pain Scale 1: Numeric (0 - 10) Pain Intensity 1: 5 Pain Location 1: Buttocks Pain Orientation 1: Lower;Mid 
Pain Description 1: Aching Pain Intervention(s) 1: Emotional support;Repositioned Activity Tolerance:  
Fair- 
Please refer to the flowsheet for vital signs taken during this treatment. After treatment:  
[] Patient left in no apparent distress sitting up in chair 
[x] Patient left in no apparent distress in bed 
[x] Call bell left within reach [x] Nursing notified 
[x] Caregiver present 
[] Bed alarm activated Emily Salcedo PTA Time Calculation: 28 mins

## 2020-10-25 NOTE — PROGRESS NOTES
Problem: Pressure Injury - Risk of 
Goal: *Prevention of pressure injury Description: Document Trav Scale and appropriate interventions in the flowsheet. 10/25/2020 0525 by Gene Horton Outcome: Progressing Towards Goal 
Note: Pressure Injury Interventions: 
Sensory Interventions: Assess changes in LOC Moisture Interventions: Absorbent underpads Activity Interventions: Increase time out of bed, Pressure redistribution bed/mattress(bed type) Mobility Interventions: HOB 30 degrees or less, Pressure redistribution bed/mattress (bed type) Nutrition Interventions: Document food/fluid/supplement intake, Discuss nutritional consult with provider, Offer support with meals,snacks and hydration Friction and Shear Interventions: Apply protective barrier, creams and emollients, HOB 30 degrees or less 10/25/2020 0420 by Gene Horton Outcome: Progressing Towards Goal 
Note: Pressure Injury Interventions: 
Sensory Interventions: Assess changes in LOC Moisture Interventions: Absorbent underpads Activity Interventions: Increase time out of bed, Pressure redistribution bed/mattress(bed type) Mobility Interventions: HOB 30 degrees or less, Pressure redistribution bed/mattress (bed type) Nutrition Interventions: Document food/fluid/supplement intake, Discuss nutritional consult with provider, Offer support with meals,snacks and hydration Friction and Shear Interventions: Apply protective barrier, creams and emollients, HOB 30 degrees or less Problem: Patient Education: Go to Patient Education Activity Goal: Patient/Family Education 10/25/2020 0525 by Gene Horton Outcome: Progressing Towards Goal 
10/25/2020 0420 by Gene Horton Outcome: Progressing Towards Goal 
  
Problem: Falls - Risk of 
Goal: *Absence of Falls Description: Document Michelle Miramontes Fall Risk and appropriate interventions in the flowsheet. 10/25/2020 0525 by Gene Horton Outcome: Progressing Towards Goal 
Note: Fall Risk Interventions: 
Mobility Interventions: Assess mobility with egress test, Communicate number of staff needed for ambulation/transfer Mentation Interventions: Adequate sleep, hydration, pain control, Bed/chair exit alarm, Door open when patient unattended, More frequent rounding, Reorient patient, Room close to nurse's station, Update white board Medication Interventions: Bed/chair exit alarm, Patient to call before getting OOB, Teach patient to arise slowly Elimination Interventions: Bed/chair exit alarm, Call light in reach History of Falls Interventions: Bed/chair exit alarm, Door open when patient unattended, Investigate reason for fall, Room close to nurse's station 10/25/2020 0420 by Yoel Klein Outcome: Progressing Towards Goal 
Note: Fall Risk Interventions: 
Mobility Interventions: Assess mobility with egress test, Communicate number of staff needed for ambulation/transfer Mentation Interventions: Adequate sleep, hydration, pain control, Bed/chair exit alarm, Door open when patient unattended, More frequent rounding, Reorient patient, Room close to nurse's station, Update white board Medication Interventions: Bed/chair exit alarm, Patient to call before getting OOB, Teach patient to arise slowly Elimination Interventions: Bed/chair exit alarm, Call light in reach History of Falls Interventions: Bed/chair exit alarm, Door open when patient unattended, Investigate reason for fall, Room close to nurse's station Problem: Patient Education: Go to Patient Education Activity Goal: Patient/Family Education 10/25/2020 0525 by Yoel Klein Outcome: Progressing Towards Goal 
10/25/2020 0420 by Yoel Klein Outcome: Progressing Towards Goal 
  
Problem: Pain Goal: *Control of Pain 10/25/2020 0525 by Yoel Klein Outcome: Progressing Towards Goal 
10/25/2020 0420 by Yoel Klein Outcome: Progressing Towards Goal 
  
 Problem: Patient Education: Go to Patient Education Activity Goal: Patient/Family Education 10/25/2020 0525 by See Barajas Outcome: Progressing Towards Goal 
10/25/2020 0420 by Cassandrae Karl Outcome: Progressing Towards Goal 
  
Problem: Patient Education: Go to Patient Education Activity Goal: Patient/Family Education 10/25/2020 0525 by See Barajas Outcome: Progressing Towards Goal 
10/25/2020 0420 by Cassandrae Karl Outcome: Progressing Towards Goal 
  
Problem: Ventilator Management Goal: *Adequate oxygenation and ventilation 10/25/2020 0525 by See Barajas Outcome: Progressing Towards Goal 
10/25/2020 0420 by Cassandare Karl Outcome: Progressing Towards Goal 
Goal: *Patient maintains clear airway/free of aspiration 10/25/2020 0525 by See Barajas Outcome: Progressing Towards Goal 
10/25/2020 0420 by Cassandrae Karl Outcome: Progressing Towards Goal 
Goal: *Absence of infection signs and symptoms 10/25/2020 0525 by See Barajas Outcome: Progressing Towards Goal 
10/25/2020 0420 by See Barajas Outcome: Progressing Towards Goal 
Goal: *Normal spontaneous ventilation 10/25/2020 0525 by See Barajas Outcome: Progressing Towards Goal 
10/25/2020 0420 by Cassandrae Karl Outcome: Progressing Towards Goal 
  
Problem: Patient Education: Go to Patient Education Activity Goal: Patient/Family Education 10/25/2020 0525 by See Barajas Outcome: Progressing Towards Goal 
10/25/2020 0420 by Cassandrae Karl Outcome: Progressing Towards Goal 
  
Problem: Non-Violent Restraints Goal: *Removal from restraints as soon as assessed to be safe 10/25/2020 0525 by See Barajas Outcome: Progressing Towards Goal 
10/25/2020 0420 by Cassandrae Karl Outcome: Progressing Towards Goal 
Goal: *No harm/injury to patient while restraints in use 10/25/2020 0525 by See Barajas Outcome: Progressing Towards Goal 
10/25/2020 0420 by Cassandrae Karl Outcome: Progressing Towards Goal 
Goal: *Patient's dignity will be maintained 10/25/2020 0525 by Katharina Margarito Outcome: Progressing Towards Goal 
10/25/2020 0420 by Katharina Margarito Outcome: Progressing Towards Goal 
Goal: *Patient Specific Goal (EDIT GOAL, INSERT TEXT) 
10/25/2020 0525 by Katharina Margarito Outcome: Progressing Towards Goal 
10/25/2020 0420 by Katharina Margarito Outcome: Progressing Towards Goal 
Goal: Non-violent Restaints:Standard Interventions 10/25/2020 0525 by Katharina Margarito Outcome: Progressing Towards Goal 
10/25/2020 0420 by Katharina Margarito Outcome: Progressing Towards Goal 
Goal: Non-violent Restraints:Patient Interventions 10/25/2020 0525 by Katharina Margarito Outcome: Progressing Towards Goal 
10/25/2020 0420 by Katharina Margarito Outcome: Progressing Towards Goal 
Goal: Patient/Family Education 10/25/2020 0525 by Katharina Margarito Outcome: Progressing Towards Goal 
10/25/2020 0420 by Katharina Margarito Outcome: Progressing Towards Goal 
  
Problem: Patient Education: Go to Patient Education Activity Goal: Patient/Family Education 10/25/2020 0525 by Katharina Margarito Outcome: Progressing Towards Goal 
10/25/2020 0420 by Katharina Margarito Outcome: Progressing Towards Goal 
  
Problem: Patient Education: Go to Patient Education Activity Goal: Patient/Family Education 10/25/2020 0525 by Katharina Margarito Outcome: Progressing Towards Goal 
10/25/2020 0420 by Katharina Margarito Outcome: Progressing Towards Goal

## 2020-10-25 NOTE — PROGRESS NOTES
Problem: Pressure Injury - Risk of 
Goal: *Prevention of pressure injury Description: Document Trav Scale and appropriate interventions in the flowsheet. Outcome: Progressing Towards Goal 
Note: Pressure Injury Interventions: 
Sensory Interventions: Assess changes in LOC Moisture Interventions: Absorbent underpads Activity Interventions: Increase time out of bed, Pressure redistribution bed/mattress(bed type) Mobility Interventions: HOB 30 degrees or less, Pressure redistribution bed/mattress (bed type) Nutrition Interventions: Document food/fluid/supplement intake, Discuss nutritional consult with provider, Offer support with meals,snacks and hydration Friction and Shear Interventions: Apply protective barrier, creams and emollients, HOB 30 degrees or less Problem: Patient Education: Go to Patient Education Activity Goal: Patient/Family Education Outcome: Progressing Towards Goal 
  
Problem: Falls - Risk of 
Goal: *Absence of Falls Description: Document Michelle Miramontes Fall Risk and appropriate interventions in the flowsheet. Outcome: Progressing Towards Goal 
Note: Fall Risk Interventions: 
Mobility Interventions: Assess mobility with egress test, Communicate number of staff needed for ambulation/transfer Mentation Interventions: Adequate sleep, hydration, pain control, Bed/chair exit alarm, Door open when patient unattended, More frequent rounding, Reorient patient, Room close to nurse's station, Update white board Medication Interventions: Bed/chair exit alarm, Patient to call before getting OOB, Teach patient to arise slowly Elimination Interventions: Bed/chair exit alarm, Call light in reach History of Falls Interventions: Bed/chair exit alarm, Door open when patient unattended, Investigate reason for fall, Room close to nurse's station Problem: Patient Education: Go to Patient Education Activity Goal: Patient/Family Education Outcome: Progressing Towards Goal 
  
Problem: Pain Goal: *Control of Pain Outcome: Progressing Towards Goal 
  
Problem: Patient Education: Go to Patient Education Activity Goal: Patient/Family Education Outcome: Progressing Towards Goal 
  
Problem: Patient Education: Go to Patient Education Activity Goal: Patient/Family Education Outcome: Progressing Towards Goal 
  
Problem: Ventilator Management Goal: *Adequate oxygenation and ventilation Outcome: Progressing Towards Goal 
Goal: *Patient maintains clear airway/free of aspiration Outcome: Progressing Towards Goal 
Goal: *Absence of infection signs and symptoms Outcome: Progressing Towards Goal 
Goal: *Normal spontaneous ventilation Outcome: Progressing Towards Goal 
  
Problem: Patient Education: Go to Patient Education Activity Goal: Patient/Family Education Outcome: Progressing Towards Goal 
  
Problem: Non-Violent Restraints Goal: *Removal from restraints as soon as assessed to be safe Outcome: Progressing Towards Goal 
Goal: *No harm/injury to patient while restraints in use Outcome: Progressing Towards Goal 
Goal: *Patient's dignity will be maintained Outcome: Progressing Towards Goal 
Goal: *Patient Specific Goal (EDIT GOAL, INSERT TEXT) Outcome: Progressing Towards Goal 
Goal: Non-violent Restaints:Standard Interventions Outcome: Progressing Towards Goal 
Goal: Non-violent Restraints:Patient Interventions Outcome: Progressing Towards Goal 
Goal: Patient/Family Education Outcome: Progressing Towards Goal 
  
Problem: Patient Education: Go to Patient Education Activity Goal: Patient/Family Education Outcome: Progressing Towards Goal 
  
Problem: Patient Education: Go to Patient Education Activity Goal: Patient/Family Education Outcome: Progressing Towards Goal

## 2020-10-25 NOTE — PROGRESS NOTES
DC Plan: TBD Chart reviewed as CM on call. Pt noted to have transferred from ICU to tele. Palliative consult noted. PT rec for SNF. Local SNF referrals sent for continuity of care. Pt has neg covid from 10/15. CM attempted to see pt at bedside earlier today to discuss dc plan and pt off unit. Pt has moved to medical unit. CM will follow for transition of care needs and will be available via hospital  on weekends.

## 2020-10-25 NOTE — PROGRESS NOTES
Nutrition Brief: SLP eval [facility] soft ground with thin liquids yesterday. Diet advanced. Will order ensure enlive TID to aid in meeting estimated needs.  
 
Lucinda Hung RDN

## 2020-10-25 NOTE — PROGRESS NOTES
2220- Pt is requesting pain medication and was made aware of time that it could be received. Pt is speaking about things that are not happening such as we can do that before we go on the road. Pt is in bed and resting after medications were given. Pt pain was a 5. View [facility] for more information. Bed is in lowest position and call bell is within reach. 9118- Pt stated he was very anxious and couldn't sleep. PRN Ativan was given and pt seemed to be able to finally rest. We tried non pharmaceutical measures first such as dimming lighting, and encouraging eye closing and deep breathing. Pt began to rest after medication. 2272- Pt is resting with eyes close, even and unlabored breathing. Call bell is in reach and bed is in lowest position. 1050- Pt was drawn via PICC line and labs sent to the lab. Pt's sacral dressing was changed because it was soiled with seroserous fluid. Pt was turned from the right side to the left side. Pt resting comfortably in bed , stating his pain in his back is a 4 but pt was to lethargic to receive any pain medication.

## 2020-10-25 NOTE — ROUTINE PROCESS
Bedside and Verbal shift change report given to Katya Gimenez RN (oncoming nurse) by Rich Loredo RN (offgoing nurse). Report included the following information SBAR, Kardex, Procedure Summary, Intake/Output, MAR, and Cardiac Rhythm NSR .

## 2020-10-25 NOTE — PROGRESS NOTES
0700 Bedside and Verbal shift change report given to LAVON Martinez RN (oncoming nurse) by Nawaf Berry RN (offgoing nurse). Report included the following information SBAR, Kardex, Intake/Output, and MAR. Pt in bed, call light in reach. 8311 Pt assessed. No signs of acute distress. Pt in bed. 1017 TRANSFER - OUT REPORT: 
 
Verbal report given to Lei Azar RN (name) on Maryellen Andrade  being transferred to Medical (unit) for routine progression of care Report consisted of patients Situation, Background, Assessment and  
Recommendations(SBAR). Information from the following report(s) SBAR, Kardex, Intake/Output, MAR and Recent Results was reviewed with the receiving nurse. Lines: PICC Double Lumen 29/62/37 Left;Basilic (Active) Central Line Being Utilized Yes 10/25/20 1122 Criteria for Appropriate Use Long term IV/antibiotic administration 10/25/20 1122 Site Assessment Clean, dry, & intact 10/25/20 1122 Phlebitis Assessment 0 10/25/20 1122 Infiltration Assessment 0 10/25/20 1122 Date of Last Dressing Change 10/19/20 10/25/20 0440 Dressing Status Clean, dry, & intact 10/25/20 1122 Action Taken Open ports on tubing capped 10/25/20 1122 Dressing Type Disk with Chlorhexadine gluconate (CHG); Transparent 10/25/20 1122 Hub Color/Line Status Purple 10/25/20 1122 Positive Blood Return (Site #1) Yes 10/25/20 1122 Hub Color/Line Status Red 10/25/20 1122 Positive Blood Return (Site #2) Yes 10/25/20 1122 Alcohol Cap Used Yes 10/25/20 1122 Opportunity for questions and clarification was provided. Patient transported with: 
 O2 @ 2 liters

## 2020-10-25 NOTE — PROGRESS NOTES
Hospitalist Progress Note Patient: Foster Roberts MRN: [id number]  CSN: [id number] YOB: 1965  Age: 47 y.o. Sex: male DOA: 10/9/2020 LOS:  LOS: 16 days Chief Complaint: 
 
weakness Assessment/Plan Cirrhosis of liver Advanced debility Hypokalemia-Po Kdur 
resp failure due to aspiration pneumonia-resolved, wean from NC as tolerated 
thormbocytopenia Coagulopathy Anemia Metabolic encephalopathy-resolved Malnutrition Diastolic CHF 
 
PO Kdur Diet PO as tolerated 
continue PT Will require SNF on discharge Discussed with patient and wife today Explained that yes hospice should still be a consideration as she asked as his condition is chronic and uncertain whether he will recover to rpior baseline or will have more setbacks In interim recommend if he can get to SNF this is best option to see if he can regain strength and work on his nutrition D/c planning Disposition : 
Patient Active Problem List  
Diagnosis Code  Hypocalcemia E83.51  
 Hypomagnesemia E83.42  
 Alcoholism ([facility] F10.20  Cirrhosis ([facility] K74.60  Colitis K52.9  Acute respiratory failure with hypoxemia (HCC) J96.01  
 Aspiration pneumonia ([facility] J69.0  Acute diastolic CHF (congestive heart failure) (HCC) I50.31  Thrombocytopenia ([facility] D69.6 Subjective: \"I want to know what is going on next\" Wife at bedside Review of systems: 
 
Constitutional: denies fevers, chills Respiratory: denies SOB Cardiovascular: denies chest pain, palpitations Gastrointestinal: denies nausea, vomiting, diarrhea Vital signs/Intake and Output: 
Visit Vitals /76 Pulse 88 Temp 98 °F (36.7 °C) Resp 20 Ht 5' 9\" (1.753 m) Wt 71 kg (156 lb 9.6 oz) SpO2 100% BMI 23.13 kg/m² Current Shift:  No intake/output data recorded. Last three shifts:  10/23 1901 - 10/25 0700 In: 200 [I.V.:200] Out: 3285 [FTOAS:3959] Exam: General: debilitated WM, alert, NAD, OX3 
CVS:Regular rate and rhythm, no M/R/G, S1/S2 heard, no thrill Lungs:Clear to auscultation bilaterally, no wheezes, rhonchi, or rales Abdomen: Soft, Nontender, No distention, Normal Bowel sounds, No hepatomegaly Extremities: No C/C/E, pulses palpable 2+ Neuro:grossly normal , follows commands Psych:appropriate Labs: Results:  
   
Chemistry Recent Labs 10/25/20 
7360 10/24/20 
0230 10/23/20 
2043  10/23/20 
6744 * 188*  --   --  188*  143  --   --  144  
K 3.4* 3.7 4.1   < > 3.5  109  --   --  105 CO2 27 31  --   --  32 BUN 10 13  --   --  15  
CREA 0.62 0.81  --   --  0.85 CA 9.1 9.2  --   --  8.9 AGAP 6 3  --   --  7  
BUCR 16 16  --   --  18  
AP 45 46  --   --  46  
TP 6.3* 6.4  --   --  6.0* ALB 3.9 4.1  --   --  3.8 GLOB 2.4 2.3  --   --  2.2 AGRAT 1.6 1.8*  --   --  1.7  
 < > = values in this interval not displayed. CBC w/Diff Recent Labs 10/25/20 
0445 10/24/20 
0230 10/23/20 
1946 WBC 8.3 8.7 8.3  
RBC 2.43* 2.54* 2.46* HGB 7.9* 8.3* 8.0*  
HCT 23.6* 24.9* 23.6* PLT 43* 40* 31* GRANS 72 76* 74* LYMPH 17* 15* 17* EOS 1 1 1 Cardiac Enzymes No results for input(s): CPK, CKND1, LYDIA in the last 72 hours. No lab exists for component: Proctor Bone Coagulation Recent Labs 10/25/20 
0445 10/24/20 
0230 PTP 20.8* 20.3* INR 1.8* 1.8* Lipid Panel No results found for: CHOL, CHOLPOCT, CHOLX, CHLST, CHOLV, 937908, HDL, HDLP, LDL, LDLC, DLDLP, 900085, VLDLC, VLDL, TGLX, TRIGL, TRIGP, TGLPOCT, CHHD, CHHDX  
BNP No results for input(s): BNPP in the last 72 hours. Liver Enzymes Recent Labs 10/25/20 
0445  
TP 6.3* ALB 3.9 AP 45 Thyroid Studies No results found for: T4, T3U, TSH, TSHEXT Procedures/imaging: see electronic medical records for all procedures/Xrays and details which were not copied into this note but were reviewed prior to creation of Plan Joaquin Cisneros MD

## 2020-10-26 PROBLEM — E43 SEVERE PROTEIN-CALORIE MALNUTRITION (HCC): Status: ACTIVE | Noted: 2020-01-01

## 2020-10-26 NOTE — PROGRESS NOTES
D/C Plan: SNF vs LTACH 
 
CM met with pt and his wife at bedside to discuss transition of care. Pt/family are agreeable to rehab vs LTACH. CM discussed a TSS/UAI screening. Pt/family have indicated they do not have resources and may have to apply for Medicaid to assist with transition of care. CM contacted [employer] to facilitate a LTC medicaid application. Pt will have a LTSS/UAI screening completed prior to discharge.   CM to continue to follow and assist.

## 2020-10-26 NOTE — PROGRESS NOTES
Palliative Medicine [facility] [phone] [facility] [phone] Palliative f/u with patient at bedside. Mr. Sandro Scott was AAOx3 sitting up in bed. He stated feeling \"tired\" today. He stated not having much memory of what had happened while he was in ICU on ventilator. He's looking forward to being d/c to a rehab facility then hopefully home. Mr. Quinn Donohue wasn't up for long conversation so we ended it so he could rest.  
 
Called and spoke with Mrs. Ricardo ([phone]) who was actually visiting at bedside. She had numerous questions about  d/c to rehab. Informed her CM will be discussing the options with her and pt soon. Mrs. Quinn Donohue stated she's much better now that  has improved. While we were on the phone CM Pooja Jean Baptiste entered pt. Room, so we ended conversation for them to discuss rehab. Now that patient is oriented will need to discuss his wishes around code status and completing paperwork before d/c. Palliative will f/u at later time to complete possible POST or Durable DNR. Thank you for the opportunity to assist in the care of Mr. Quinn Donohue. Edmonia Felty, LORENA, [facility] Center[facility] Palliative Medicine

## 2020-10-26 NOTE — PROGRESS NOTES
Problem: Pressure Injury - Risk of 
Goal: *Prevention of pressure injury Description: Document Trav Scale and appropriate interventions in the flowsheet. Outcome: Progressing Towards Goal 
Note: Pressure Injury Interventions: 
Sensory Interventions: Assess changes in LOC, Float heels, Keep linens dry and wrinkle-free, Minimize linen layers, Pressure redistribution bed/mattress (bed type) Moisture Interventions: Absorbent underpads, Limit adult briefs, Maintain skin hydration (lotion/cream), Minimize layers Activity Interventions: Pressure redistribution bed/mattress(bed type) Mobility Interventions: HOB 30 degrees or less, Pressure redistribution bed/mattress (bed type) Nutrition Interventions: Document food/fluid/supplement intake Friction and Shear Interventions: Apply protective barrier, creams and emollients, Lift sheet, Minimize layers Problem: Patient Education: Go to Patient Education Activity Goal: Patient/Family Education Outcome: Progressing Towards Goal 
  
Problem: Falls - Risk of 
Goal: *Absence of Falls Description: Document Silvio Parada Fall Risk and appropriate interventions in the flowsheet. Outcome: Progressing Towards Goal 
Note: Fall Risk Interventions: 
Mobility Interventions: Bed/chair exit alarm, PT Consult for mobility concerns, Assess mobility with egress test 
 
Mentation Interventions: Adequate sleep, hydration, pain control, More frequent rounding, Reorient patient Medication Interventions: Bed/chair exit alarm, Evaluate medications/consider consulting pharmacy Elimination Interventions: Bed/chair exit alarm, Call light in reach, Toileting schedule/hourly rounds History of Falls Interventions: Bed/chair exit alarm, Consult care management for discharge planning, Evaluate medications/consider consulting pharmacy, Room close to nurse's station Problem: Patient Education: Go to Patient Education Activity Goal: Patient/Family Education Outcome: Progressing Towards Goal 
  
Problem: Pain Goal: *Control of Pain Outcome: Progressing Towards Goal 
  
Problem: Patient Education: Go to Patient Education Activity Goal: Patient/Family Education Outcome: Progressing Towards Goal 
  
Problem: Patient Education: Go to Patient Education Activity Goal: Patient/Family Education Outcome: Progressing Towards Goal 
  
 
Problem: Patient Education: Go to Patient Education Activity Goal: Patient/Family Education Outcome: Progressing Towards Goal 
  
Problem: Patient Education: Go to Patient Education Activity Goal: Patient/Family Education Outcome: Progressing Towards Goal 
  
Problem: Patient Education: Go to Patient Education Activity Goal: Patient/Family Education Outcome: Progressing Towards Goal

## 2020-10-26 NOTE — PROGRESS NOTES
[address] [facility] 137 [facility] Kristin Meagan Herr MD, ZAIN HowellSLD MD Berna Castro, PA-HUSEYIN Kiser, [facility] Catalina MONTOYA Estelita, [facility]   Miguel Colon, [facility] Colette Leger, [facility] Jonatankeyonna MarinelliMountain View [facility] 136 
  at [address], [address] [place] [address], Vini  22. 
  [phone] FAX: [address] [place] [address] Avenue 
  [facility] 
  [address], [address] [facility], [address] 
  [phone] FAX: [phone] HEPATOLOGY PROGRESS NOTE The patient is a 47year old  male without previous knowledge of liver disease. He says he used to consume alcohol fairly heavily several years ago but cut back to only 1-2 drinks a few days per week 2 years ago. He notes progressive weakness and lower extremity swelling for the past few months and says he has had no alcohol for 2-3 weeks. He notes problem concentrating on work and increased forgetfullness. He was admitted to medical floor, developed aspiration and was transferred to ICU. Events since I last saw him 4 days ago reviewed. He has made great progress. He is now out of ICU on medical floor. Alert, oriented. Wants to go to rehab if possible to gain strength faster and because his wife recently had hip replcemnt and rehabing herself. Only issue is nutritional support. He needs as many Ensures as he can drink every ASSESSMENT AND PLAN: 
Cirrhosis The diagnosis of cirrhosis is based upon laboratory studies Cirrhosis is likely to be due to chronic alcohol use. Serology for other causes of chronic liver disease are all negative. The CTP is down to 7. Child class B. The MELD score is down to 17 Cardiac portion of liver transplant evaluation testing completed before this acute event. ECHO was normal.   
Lexicon stress was negative for ischemia Will need to address nutrition and ambulation before we can wrap up LT evaluation. Alcohol liver disease The diagnosis is based upon a history of consuming significant alcohol on a daily basis for many years. The patient has been abstinent from alcohol since 9/2020. He will need to enter ETOH counseling after he finishes rehab. Screening for Esophageal varices The patient has not had an EGD to screen for varices. I do not want to do EGD now because of the very low PLT count. Will give PLT growth factor and perform EGD as OP after PLT is up and it would be safer for banding and bx if any lesions found. Hepatic encephalopathy Overt HE has not developed to date. Because of AMS he was placed on lactulose BID He can remain on this for now at current dose of QD Anemia This is due to multifactorial causes including portal hypertension with chronic GI blood loss, bone marrow suppression secondary to malnutrition and alcohol. FE panel was normal 
HB has drifted down from 10 to 8s over past week. Will perform EGD to screen for varices Tuesday or Wednesday prior to DC. Coagulopathy This has improved with treatment of infection and resolution of DIC INR down today to 1.8 Thrombocytopenia This has improved with treatment of infection and resolution of DIC 
PLT count now up from 20s to 50s. Screening for Hepatocellular Carcinoma US shows cirrhosis and no liver mass. Malnutrition/muscle wasting He needs maximal nutrition to deal with severe muscle wasting He needs to have as many Ensure as he can possible drink Needs OT and PT to help with ADL and ambulation Will need to transfer to rehab for about 2 weeks before going home PHYSICAL EXAMINATION: 
VS: per nursing note General: No acute distress. Eyes: Sclera anicteric. ENT: No oral lesions. Thyroid normal. 
Nodes: No adenopathy. Skin: No spider angiomata. No jaundice. No palmar erythema. Respiratory: Lungs clear to auscultation. Cardiovascular: Regular heart rate. No murmurs. No JVD. Abdomen: Soft non-tender, liver size normal to percussion/palpation. Spleen not palpable. No obvious ascites. Extremities: No edema. Muscle wasting. Neurologic: Alert and oriented. Cranial nerves grossly intact. No asterixis. LABORATORY: 
Results for Xiao Greenberg (MRN [id number]) as of 10/26/2020 07:25 Ref. Range 10/24/2020 02:30 10/25/2020 04:45 10/26/2020 00:09 WBC Latest Ref Range: 4.6 - 13.2 K/uL 8.7 8.3 9.4 HGB Latest Ref Range: 13.0 - 16.0 g/dL 8.3 (L) 7.9 (L) 8.3 (L) PLATELET Latest Ref Range: 135 - 420 K/uL 40 (L) 43 (L) 52 (L) INR Latest Ref Range: 0.8 - 1.2   1.8 (H) 1.8 (H) 1.8 (H) Sodium Latest Ref Range: 136 - 145 mmol/L 143 141 140 Potassium Latest Ref Range: 3.5 - 5.5 mmol/L 3.7 3.4 (L) 3.6 Chloride Latest Ref Range: 100 - 111 mmol/L 109 108 107 CO2 Latest Ref Range: 21 - 32 mmol/L 31 27 25 Glucose Latest Ref Range: 74 - 99 mg/dL 188 (H) 153 (H) 316 (H) BUN Latest Ref Range: 7.0 - 18 MG/DL 13 10 12 Creatinine Latest Ref Range: 0.6 - 1.3 MG/DL 0.81 0.62 0.80 Bilirubin, total Latest Ref Range: 0.2 - 1.0 MG/DL 3.8 (H) 3.6 (H) 3.0 (H) Albumin Latest Ref Range: 3.4 - 5.0 g/dL 4.1 3.9 4.0 ALT Latest Ref Range: 16 - 61 U/L 43 40 41 AST Latest Ref Range: 10 - 38 U/L 56 (H) 46 (H) 42 (H) Alk. phosphatase Latest Ref Range: 45 - 117 U/L 46 45 68 Ammonia Latest Ref Range: 11 - 32 UMOL/L 34 (H) RADIOLOGY: 
10/2020. CT scan abdomen without IV contrast.  Changes consistent with fatty liver and hepatomegaly. Splenomegaly. No ascites. Ino Hansen MD 
[address] [facility] [address] [facility], [address] [place], [facility] [address] 
[phone] [address] [place] [address]

## 2020-10-26 NOTE — PROGRESS NOTES
Problem: Self Care Deficits Care Plan (Adult) Goal: *Acute Goals and Plan of Care (Insert Text) Description: Initial Occupational Therapy Goals (10/23/2020) Within 7 day(s): 1. Patient will perform grooming seated EOB with  CGA x 5 minutes for increased independence with ADLs. 2. Patient will perform UB dressing with CGA for increased independence with ADLs. 3. Patient will perform LB dressing with min A & A/E PRN for increased independence with ADLs. 4. Patient will perform all aspects of toileting with CGA for increased independence in ADLs 5. Patient will independently apply energy conservation techniques with 1 verbal cue(s) for increased independence with ADLs. 6. Patient will perform bed <> BSC transfer with min A for increased independence with toileting. Outcome: Progressing Towards Goal 
  
OCCUPATIONAL THERAPY TREATMENT Patient: Nicola Jacobs (53 y.o. male) Date: 10/26/2020 Diagnosis: Hypocalcemia [E83.51] Hypocalcemia Precautions: Aspiration, Fall PLOF: Patient was mod I; working; lives w/ spouse Chart, occupational therapy assessment, plan of care, and goals were reviewed. ASSESSMENT: 
Pt severely limited in ADLs d/t poor UE strength for hand to face/mouth. Pt found reclined at <30 degrees attempting to feed self w/ maximal spillage. Pt positioned in near upright position and limited R>L for hand to mouth. Able to hold cup in L hand and drink mod I; pt fatigued quickly requiring assist w/ feeding. Pt has an appetite, but c/o decreased PO intake d/t fatigue and spillage. White board updated for pt to be fed. RN/Marla aware. Pt maxA for attempt to pull self into sitting in attempts for repositioning. Pt c/o \"tailbone\" pain w/ reported \"HS\" that causes abscess and pain. (RN aware; pt reported taking Humira for tx) Progression toward goals: 
[]          Improving appropriately and progressing toward goals [x]          Improving slowly and progressing toward goals []          Not making progress toward goals and plan of care will be adjusted PLAN: 
Patient continues to benefit from skilled intervention to address the above impairments. Continue treatment per established plan of care. Discharge Recommendations:  [place] vs Palliative care pending progress and prognosis Further Equipment Recommendations for Discharge: To Be Determined (TBD) at next level of care SUBJECTIVE:  
Patient stated I hate wearing my food, but I want to try and do it myself.  OBJECTIVE DATA SUMMARY:  
Cognitive/Behavioral Status: 
Neurologic State: Alert, Eyes open spontaneously, Appropriate for age Orientation Level: Oriented X4 Cognition: Appropriate decision making, Appropriate for age attention/concentration, Appropriate safety awareness, Follows commands Safety/Judgement: Good awareness of safety precautions Functional Mobility and Transfers for ADLs: 
 Bed Mobility: 
Supine to Sit: Maximum assistance Balance: 
Sitting: Impaired ADL Intervention: 
Feeding Container Management: Total assistance (dependent) Cutting Food: Total assistance (dependent) Utensil Management: Moderate assistance Food to Mouth: Moderate assistance Drink to Mouth: Contact guard assistance Cues: Tactile cues provided;Verbal cues provided;Visual cues provided;Physical assistance Grooming Washing Hands: Set-up(wipes) Brushing/Combing Hair: Total assistance (dependent) Cognitive Retraining Problem Solving: Inductive reason; Identifying the task; Identifying the problem;General alternative solution;Deductive reason; Awareness of environment Executive Functions: Executing cognitive plans;Managing time Organizing/Sequencing: Breaking task down;Prioritizing Pain: 
Pain level pre-treatment: 5/10 Pain level post-treatment: 5/10 Pain Intervention(s): Medication administered by RN (see MAR); Rest, Ice, Repositioning Response to intervention: Nurse notified, See doc flow sheet Please refer to the flowsheet for vital signs taken during this treatment. After treatment:  
[]  Patient left in no apparent distress sitting up in chair 
[x]  Patient left in no apparent distress in bed 
[x]  Call bell left within reach [x]  Nursing notified 
[]  Caregiver present 
[]  Bed alarm activated COMMUNICATION/EDUCATION:  
[x] Role of Occupational Therapy in the acute care setting 
[x] Home safety education was provided and the patient/caregiver indicated understanding. [x] Patient/family have participated as able in working towards goals and plan of care. [x] Patient/family agree to work toward stated goals and plan of care. [] Patient understands intent and goals of therapy, but is neutral about his/her participation. [] Patient is unable to participate in goal setting and plan of care. Thank you for this referral. 
Germaine Joy, OTR/L, CSRS, CDCS, CFPS Time Calculation: 54 mins

## 2020-10-26 NOTE — PROGRESS NOTES
4236- Bedside and Verbal shift change report given to Haylee Rod RN (oncoming nurse) by Kalpesh Robles RN (offgoing nurse). Report included the following information SBAR, Kardex, Intake/Output and MAR. 
 
0791- Dr. Melissa Vyas stated to make sure patient has nutrition consult and that he has Ensure shakes at the bedside to increase calorie intake. I verbalized understanding. 4534- Patient is eating breakfast with the help of OT. Patient is drinking ensure shake. [address] to bring up extra ensure shakes. They stated they would bring him three. [phone]- Spoke to Dr. Natanael Bauer. She approved nutrition consult. 1300- Discontinued almonte catheter. Changed brief. Kandice care complete. Gown changed. Patient had smear bowel movement. 1523- Patient voiding with control. CNA helped to empty urinal.  
 
1645- Changed brief again. Kandice care complete. Changed mepilex on coccyx. See flowsheets for remainder of shift details. Patient hade one smear bowel movement. Bedside and Verbal shift change report given to VAMSI Bingham RN (oncoming nurse) by Haylee Rod RN (offgoing nurse). Report included the following information SBAR, Kardex, Intake/Output and MAR.

## 2020-10-26 NOTE — DIABETES MGMT
GLYCEMIC CONTROL PROGRESS NOTE: 
 
- known h/o T2DM, basal/oral home regimen - BG out of target range ICU: 140-180 mg/dl, trending up requiring consistent coverage - TDD = 24 units - Humalog Very Insulin Resistant Corrective Coverage - per RN pt with good PO intake 
- both FBG & PPG out of target range recommend initiate insulin regimen *Lantus 8 units daily *Humalog 4 units qac - Recent Glucose Results:  
Lab Results Component Value Date/Time  (H) 10/26/2020 12:09 AM  
 GLUCPOC 211 (H) 10/26/2020 05:48 AM  
 GLUCPOC 312 (H) 10/26/2020 01:08 AM  
 GLUCPOC 375 (H) 10/25/2020 06:06 PM  
 
 
 
Bridger Lemus MS, RN, CDE Glycemic Control Team 
[phone] Pager [phone] (M-TH 8:00-4:30P) *After Hours pager [phone]

## 2020-10-26 NOTE — PROGRESS NOTES
Hospitalist Progress Note Patient: Martha Waters MRN: [id number]  CSN: [id number] YOB: 1965  Age: 47 y.o. Sex: male DOA: 10/9/2020 LOS:  LOS: 17 days Chief Complaint: 
 
cirrhosis Assessment/Plan Cirrhosis of liver, etoh use Advanced debility 
hyperglycemia-add basal insulin plus premeal lispro, SSI PRN Hypokalemia-improved 
resp failure due to aspiration pneumonia-resolved, wean from NC as tolerated 
thrombocytopenia-stable 52K Coagulopathy-INR 1.8 Anemia Metabolic encephalopathy-resolved Malnutrition-ensures, and nutrition consult Diastolic CHF-compensated EGD planned tues or wed Discharge to SNF by Wednesday D/c almonte Continue OT and OT Disposition : 
Patient Active Problem List  
Diagnosis Code  Hypocalcemia E83.51  
 Hypomagnesemia E83.42  
 Alcoholism ([address] F10.20  Cirrhosis ([address] K74.60  Colitis K52.9  Acute respiratory failure with hypoxemia (HCC) J96.01  
 Aspiration pneumonia ([address] J69.0  Acute diastolic CHF (congestive heart failure) (HCC) I50.31  Thrombocytopenia ([address] D69.6  Severe protein-calorie malnutrition ([address] E43 Subjective: He is ok this am 
Nurse in room Denies SOB or cough No issues eating with assistance Review of systems: 
 
Constitutional: denies fevers, chills Respiratory: denies SOB, cough Cardiovascular: denies chest pain Gastrointestinal: denies nausea, vomiting, diarrhea Vital signs/Intake and Output: 
Visit Vitals /79 (BP 1 Location: Right arm, BP Patient Position: At rest) Pulse 93 Temp 98.4 °F (36.9 °C) Resp 18 Ht 5' 9\" (1.753 m) Wt 73.4 kg (161 lb 13.1 oz) SpO2 100% BMI 23.90 kg/m² Current Shift:  10/26 0701 - 10/26 1900 In: 100 [P.O.:100] Out: 375 [Urine:375] Last three shifts:  10/24 1901 - 10/26 0700 In: 240 [P.O.:240] Out: 2925 [Urine:2925] Exam: 
 
General: debilitated Wm, NAD OX3 CVS:Regular rate and rhythm, no M/R/G, S1/S2 heard, no thrill Lungs:Clear to auscultation bilaterally, no wheezes, rhonchi, or rales Abdomen: Soft, Nontender, No distention, Normal Bowel sounds, No hepatomegaly Extremities: 1 plus edema LE Neuro:grossly normal , follows commands Psych:appropriate Labs: Results:  
   
Chemistry Recent Labs 10/26/20 
0009 10/25/20 
0445 10/24/20 
0230 * 153* 188*  141 143  
K 3.6 3.4* 3.7  108 109 CO2 25 27 31 BUN 12 10 13 CREA 0.80 0.62 0.81 CA 9.1 9.1 9.2 AGAP 8 6 3 BUCR 15 16 16 AP 68 45 46  
TP 6.6 6.3* 6.4 ALB 4.0 3.9 4.1 GLOB 2.6 2.4 2.3 AGRAT 1.5 1.6 1.8* CBC w/Diff Recent Labs 10/26/20 
0009 10/25/20 
0445 10/24/20 
0230 WBC 9.4 8.3 8.7  
RBC 2.54* 2.43* 2.54* HGB 8.3* 7.9* 8.3* HCT 24.5* 23.6* 24.9*  
PLT 52* 43* 40* GRANS 76* 72 76* LYMPH 14* 17* 15* EOS 0 1 1 Cardiac Enzymes No results for input(s): CPK, CKND1, LYDIA in the last 72 hours. No lab exists for component: Joshua Claude Coagulation Recent Labs 10/26/20 
0009 10/25/20 
0445 PTP 20.4* 20.8* INR 1.8* 1.8* Lipid Panel No results found for: CHOL, CHOLPOCT, CHOLX, CHLST, CHOLV, 113249, HDL, HDLP, LDL, LDLC, DLDLP, 848204, VLDLC, VLDL, TGLX, TRIGL, TRIGP, TGLPOCT, CHHD, CHHDX  
BNP No results for input(s): BNPP in the last 72 hours. Liver Enzymes Recent Labs 10/26/20 
0009 TP 6.6 ALB 4.0 AP 68 Thyroid Studies No results found for: T4, T3U, TSH, TSHEXT, TSHEXT Procedures/imaging: see electronic medical records for all procedures/Xrays and details which were not copied into this note but were reviewed prior to creation of Plan Jonathan Fernandez MD

## 2020-10-26 NOTE — PROGRESS NOTES
Problem: Pressure Injury - Risk of 
Goal: *Prevention of pressure injury Description: Document Trav Scale and appropriate interventions in the flowsheet. Outcome: Progressing Towards Goal 
Note: Pressure Injury Interventions: 
Sensory Interventions: Assess changes in LOC, Keep linens dry and wrinkle-free, Minimize linen layers, Pressure redistribution bed/mattress (bed type) Moisture Interventions: Absorbent underpads, Apply protective barrier, creams and emollients, Check for incontinence Q2 hours and as needed, Internal/External urinary devices, Minimize layers Activity Interventions: Pressure redistribution bed/mattress(bed type), PT/OT evaluation Mobility Interventions: Pressure redistribution bed/mattress (bed type), PT/OT evaluation Nutrition Interventions: Document food/fluid/supplement intake, Discuss nutritional consult with provider Friction and Shear Interventions: Apply protective barrier, creams and emollients, Lift sheet, Minimize layers Problem: Falls - Risk of 
Goal: *Absence of Falls Description: Document Manuel Cerna Fall Risk and appropriate interventions in the flowsheet. Outcome: Progressing Towards Goal 
Note: Fall Risk Interventions: 
Mobility Interventions: Bed/chair exit alarm, Patient to call before getting OOB, OT consult for ADLs, PT Consult for mobility concerns Mentation Interventions: Adequate sleep, hydration, pain control, Bed/chair exit alarm, Reorient patient Medication Interventions: Patient to call before getting OOB, Bed/chair exit alarm, Teach patient to arise slowly Elimination Interventions: Bed/chair exit alarm, Call light in reach History of Falls Interventions: Bed/chair exit alarm, Room close to nurse's station, Consult care management for discharge planning Problem: Pain Goal: *Control of Pain Outcome: Progressing Towards Goal

## 2020-10-26 NOTE — PROGRESS NOTES
Shift summary: Pt is alert but confused, on bed rest with bed alarm on. Incontinent of bowel prior to shift change. Aspiration risk and a feeder with dysphagia diet. BS every 6 hrs, 6 units of Insulin given this am per SSI. Mepilex to sacrum for redness. Mepilex to L flank also. Feet elevated on pillow, repositioned in bed with pillows for support. Blood drawn for labs via PICC line on NCIK. On 2 li O2 with Sats at 100%. Plan for SNF or Hospice 
 
2640 Bedside and Verbal shift change report given to Alexa Crane RN (oncoming nurse) by Bert Joy RN 
 (offgoing nurse). Report included the following information SBAR, Kardex, Intake/Output, MAR and Recent Results.

## 2020-10-26 NOTE — PROGRESS NOTES
Comprehensive Nutrition Assessment Type and Reason for Visit: Liz Hernandez Nutrition Recommendations/Plan: Supplement: magic cup Other: continue w/POC. Nutrition Assessment:  Hx diabete, gastroparesis, pancreatitis, hydroadenitis, ETOH use. Pt was presented with nausea and abdominal pain. Admitted w/ cirrohosis, thrombocytopenia, hypomagnesemia, hypocalcemia, colitis, acute respiratory failure with hypoxemia, acute CHF. Estimated Daily Nutrient Needs: 
Energy (kcal):  3514-3287(66-58GYSKM) Protein (g):  (1.3-1.5g) Fluid (ml/day):  2360-9262 Nutrition Related Findings:  (P) Lab: glucose 316. Med: humalog, lactulose, reglan, protonix, KCl, thiamine mononitrate. +BM10/26/20-loose. Pt reported a better appetite than yesterday. Appetite was poor PTA. Pt reported enjoying iCeutica ice. Wounds:   
Open wounds, Pressure injury Current Nutrition Therapies: DIET DYSPHAGIA MECH ALTERED (NDD2) DIET NUTRITIONAL SUPPLEMENTS Lunch, Breakfast, Dinner, AM Snack, PM Snack, HS Snack; Ensure [employer] Anthropometric Measures: 
· Height:  5' 9\" (175.3 cm) · Current Body Wt:  73.4 kg (161 lb 13.1 oz) · Admission Body Wt:  139 lb · Usual Body Wt:  145 lb(9/25/20) · Ideal Body Wt:  160 lbs:  101.1 % · Adjusted Body Weight:   ; Weight Adjustment for: No adjustment · Adjusted BMI:      
· BMI Category:  Normal weight (BMI 18.5-24. 9) Nutrition Diagnosis:  
· Inadequate oral intake related to inadequate protein-energy intake as evidenced by wounds Nutrition Interventions:  
Food and/or Nutrient Delivery: (P) Continue current diet, Continue oral nutrition supplement Nutrition Education and Counseling: No recommendations at this time Coordination of Nutrition Care: Continued inpatient monitoring, Interdisciplinary rounds Goals: 
Encourage PO intake >50% at most meals within the next 3-4 days Nutrition Monitoring and Evaluation:  
Behavioral-Environmental Outcomes: Food/Nutrient Intake Outcomes: Food and nutrient intake, Supplement intake Physical Signs/Symptoms Outcomes: Biochemical data, Skin, Weight, GI status, Nausea/vomiting Discharge Planning: Too soon to determine Electronically signed by Evan Morrison on 10/26/2020 at 1:02 PM

## 2020-10-27 NOTE — PROGRESS NOTES
Bedside and Verbal shift change report given to [address] (oncoming nurse) by Bhavik Dillon (offgoing nurse). Report included the following information SBAR, Kardex, Intake/Output, MAR and Recent Results. Shift Summary-  
Patient Vitals for the past 12 hrs: 
 Temp Pulse Resp BP SpO2  
10/27/20 0310 97.6 °F (36.4 °C) 97 16 109/74 100 % 10/26/20 2350 99.2 °F (37.3 °C) (!) 101 16 118/73 100 % 10/26/20 1926 99.4 °F (37.4 °C) 99 16 116/78 100 % Pt A&Ox4, lungs clear/diminished on RA. Pt ate 25% of dinner and drank minimal amount of ensure and few bites of pudding. Total urine ouput overnight 625 mL w/ unmeasurable occirence x1. Pt given PRN roxicodone x2 for pain rated 5/10 to sacrum Bedside and Verbal shift change report given to Pierre Simon (oncoming nurse) by [address] (offgoing nurse). Report included the following information SBAR, Kardex, Intake/Output, MAR and Recent Results.

## 2020-10-27 NOTE — ACP (ADVANCE CARE PLANNING)
Patient completed AMD naming his wife Nathalia Hill primary healthcare agent then daughter Franco Pierre secondary agent. He stated wishes for no life prolonging measures if death is imminent wants a two week period if in vegetative state. He wishes to be FULL code and FULL aggressive care. He's hopeful to become a liver transplant candidate. D/c plan is for Rehab. Code status: FULL 
 
MPOA: Nathalia Hill (wife) [phone] 2nd MPOA: Franco Pierre (daughter) [phone]

## 2020-10-27 NOTE — PROGRESS NOTES
[address] [facility] 137 [facility] Germaine Gallagher MD, DOMINIK John MD  
 
 [facility], PA-C Narinder Lopez, [facility] Catalina Capone, [facility]   Humzalena Arshad, FNP-HUSEYIN Edward De Leon, [facility] Jonatan Perrin [address] 
  at [address], [address] Vini [place]  22. 
  [phone] FAX: [address] [place] [address] Avenue 
  [facility] 
  [address], [address] [place], [address] 
  [phone] FAX: [phone] HEPATOLOGY PROGRESS NOTE The patient is a 47year old  male without previous knowledge of liver disease. He says he used to consume alcohol fairly heavily several years ago but cut back to only 1-2 drinks a few days per week 2 years ago. He notes progressive weakness and lower extremity swelling for the past few months and says he has had no alcohol for 2-3 weeks. He notes problem concentrating on work and increased forgetfullness. He was admitted to medical floor, developed aspiration and was transferred to ICU. Alert, oriented. Eating well. Still weak,  Motivated to work with PT and wants to go to rehab Understands he will need liver transplant and we will wrap up eval after he completes rehab Continues to need a lot of calories. I will do EGD in AM.  Please keep NPO post MN tonight. ASSESSMENT AND PLAN: 
Cirrhosis The diagnosis of cirrhosis is based upon laboratory studies Cirrhosis is likely to be due to chronic alcohol use. Serology for other causes of chronic liver disease are all negative. The CTP is down to 7. Child class B. The MELD score is down to 17 Cardiac portion of liver transplant evaluation testing completed before this acute event. ECHO was normal.   
Lexicon stress was negative for ischemia Will need to address nutrition and ambulation before we can wrap up LT evaluation. Alcohol liver disease The diagnosis is based upon a history of consuming significant alcohol on a daily basis for many years. The patient has been abstinent from alcohol since 9/2020. He will need to enter ETOH counseling after he finishes rehab. Screening for Esophageal varices The patient has not had an EGD to screen for varices. I do not want to do EGD now because of the very low PLT count. Will give PLT growth factor and perform EGD as OP after PLT is up and it would be safer for banding and bx if any lesions found. Hepatic encephalopathy Overt HE has not developed to date. Because of AMS he was placed on lactulose BID He can remain on this for now at current dose of QD Anemia This is due to multifactorial causes including portal hypertension with chronic GI blood loss, bone marrow suppression secondary to malnutrition and alcohol. FE panel was normal 
HB has drifted down from 10 to 8s over past week. Will perform EGD to screen for varices Tuesday or Wednesday prior to DC. Coagulopathy This has improved with treatment of infection and resolution of DIC INR down today to 1.8 Thrombocytopenia This has improved with treatment of infection and resolution of DIC 
PLT count now up from 20s to 50s. Screening for Hepatocellular Carcinoma US shows cirrhosis and no liver mass. Malnutrition/muscle wasting He needs maximal nutrition to deal with severe muscle wasting He needs to have as many Ensure as he can possible drink Needs OT and PT to help with ADL and ambulation Will need to transfer to rehab for about 2 weeks before going home PHYSICAL EXAMINATION: 
VS: per nursing note General: No acute distress. Eyes: Sclera anicteric. ENT: No oral lesions. Thyroid normal. 
Nodes: No adenopathy. Skin: No spider angiomata. No jaundice. No palmar erythema. Respiratory: Lungs clear to auscultation. Cardiovascular: Regular heart rate. No murmurs. No JVD. Abdomen: Soft non-tender, liver size normal to percussion/palpation. Spleen not palpable. No obvious ascites. Extremities: No edema. Muscle wasting. Neurologic: Alert and oriented. Cranial nerves grossly intact. No asterixis. LABORATORY: 
Results for Octavia Oro (MRN [id number]) as of 10/27/2020 09:43 Ref. Range 10/25/2020 04:45 10/26/2020 00:09 10/27/2020 02:00 WBC Latest Ref Range: 4.6 - 13.2 K/uL 8.3 9.4 HGB Latest Ref Range: 13.0 - 16.0 g/dL 7.9 (L) 8.3 (L) PLATELET Latest Ref Range: 135 - 420 K/uL 43 (L) 52 (L) INR Latest Ref Range: 0.8 - 1.2   1.8 (H) 1.8 (H) 1.9 (H) Sodium Latest Ref Range: 136 - 145 mmol/L 141 140 137 Potassium Latest Ref Range: 3.5 - 5.5 mmol/L 3.4 (L) 3.6 3.8 Chloride Latest Ref Range: 100 - 111 mmol/L 108 107 102 CO2 Latest Ref Range: 21 - 32 mmol/L 27 25 26 Glucose Latest Ref Range: 74 - 99 mg/dL 153 (H) 316 (H) 187 (H) BUN Latest Ref Range: 7.0 - 18 MG/DL 10 12 11 Creatinine Latest Ref Range: 0.6 - 1.3 MG/DL 0.62 0.80 0.77 Bilirubin, total Latest Ref Range: 0.2 - 1.0 MG/DL 3.6 (H) 3.0 (H) 3.2 (H) Albumin Latest Ref Range: 3.4 - 5.0 g/dL 3.9 4.0 4.0 ALT Latest Ref Range: 16 - 61 U/L 40 41 43 AST Latest Ref Range: 10 - 38 U/L 46 (H) 42 (H) 44 (H) Alk. phosphatase Latest Ref Range: 45 - 117 U/L 45 68 61 RADIOLOGY: 
10/2020. CT scan abdomen without IV contrast.  Changes consistent with fatty liver and hepatomegaly. Splenomegaly. No ascites. Tesfaye Walker MD 
[address] [address] [facility] [place], [address] [place], [address] [phone] [address] [place] [address]

## 2020-10-27 NOTE — PROGRESS NOTES
Problem: Self Care Deficits Care Plan (Adult) Goal: *Acute Goals and Plan of Care (Insert Text) Description: Initial Occupational Therapy Goals (10/23/2020) Within 7 day(s): 1. Patient will perform grooming seated EOB with  CGA x 5 minutes for increased independence with ADLs. 2. Patient will perform UB dressing with CGA for increased independence with ADLs. 3. Patient will perform LB dressing with min A & A/E PRN for increased independence with ADLs. 4. Patient will perform all aspects of toileting with CGA for increased independence in ADLs 5. Patient will independently apply energy conservation techniques with 1 verbal cue(s) for increased independence with ADLs. 6. Patient will perform bed <> BSC transfer with min A for increased independence with toileting. Outcome: Progressing Towards Goal 
 OCCUPATIONAL THERAPY TREATMENT Patient: Lyubov Denis (53 y.o. male) Date: 10/27/2020 Diagnosis: Hypocalcemia [E83.51] Hypocalcemia Procedure(s) (LRB): ESOPHAGOGASTRODUODENOSCOPY (EGD) (N/A) Precautions: Fall Chart, occupational therapy assessment, plan of care, and goals were reviewed. ASSESSMENT: 
Based on the objective data described below, the patient presents with decreased ROM, strength, balance, activity tolerance, and safety limiting his independence with ADL's and transfers. Pt received in supine with his LE's hanging off of the bed. Pt said that he was trying to sit on the EOB. Pt rated his pain level a 4/10 at rest. Pt did supine to sit with Mod A and extra time to the right side of the bed with HOB elevated. Pt did 3 sit to stand transfers from the EOB with frontal assist from OT and Mod A. Pt was able to stand at the bedside ~20 seconds each time. Pt was fearful of standing initially due to decreased strength but was very willing to try with OT. Pt wasn't able to take any side steps with OT due to fatigue. Pt scooted to the top of the bed x 4 scoots with Mod-Min A and extra time. Pt requested to return back to bed at the end of the session due to fatigue. Pt needed Mod A and extra time to get back into bed. Pt left resting in bed with call light in reach and all needs met. Bed alarm activated. Pt asked OT to call his family at the end of the session. Pt left talking on the phone. Pt was very motivated during the session and worked hard during the session. OT feels that pt would benefit from IPR at [place]. Progression toward goals: 
[]          Improving appropriately and progressing toward goals [x]          Improving slowly and progressing toward goals 
[]          Not making progress toward goals and plan of care will be adjusted PLAN: 
Patient continues to benefit from skilled intervention to address the above impairments. Continue treatment per established plan of care. Discharge Recommendations:  Inpatient Rehab Further Equipment Recommendations for Discharge:  TBD SUBJECTIVE:  
Patient stated I can't believe how weak I am. OBJECTIVE DATA SUMMARY:  
Cognitive/Behavioral Status: 
Neurologic State: Alert Orientation Level: Oriented to person, Oriented to place, Oriented to situation Cognition: Follows commands Safety/Judgement: Awareness of environment Functional Mobility and Transfers for ADLs: 
 Bed Mobility: 
Supine to Sit: Moderate assistance Sit to Supine: Moderate assistance Scooting: Moderate assistance;Minimum assistance Transfers: 
Sit to Stand: Moderate assistance Balance: 
Sitting: Intact Sitting - Static: Good (unsupported) Sitting - Dynamic: Good (unsupported) Standing: Impaired; With support Standing - Static: Fair ADL Intervention: 
 Cognitive Retraining Safety/Judgement: Awareness of environment Pain: 
Pain level pre-treatment: 4/10 Pain level post-treatment: 4/10 Pain Intervention(s): Medication administered by RN (see MAR); Rest, Ice, Repositioning Response to intervention: Nurse notified, See doc flow sheet Activity Tolerance:   
Poor to fair. Pt was able to sit on the EOB ~3 minutes and stand ~20 seconds x 2-3 times. Please refer to the flowsheet for vital signs taken during this treatment. After treatment:  
[]  Patient left in no apparent distress sitting up in chair 
[x]  Patient left in no apparent distress in bed 
[x]  Call bell left within reach [x]  Nursing notified 
[]  Caregiver present [x]  Bed alarm activated COMMUNICATION/EDUCATION:  
[x] Role of Occupational Therapy in the acute care setting 
[x] Home safety education was provided and the patient/caregiver indicated understanding. [x] Patient/family have participated as able in working towards goals and plan of care. [x] Patient/family agree to work toward stated goals and plan of care. [] Patient understands intent and goals of therapy, but is neutral about his/her participation. [] Patient is unable to participate in goal setting and plan of care. Thank you for this referral. 
Nila Larry OTR/L MOT Time Calculation: 23 mins

## 2020-10-27 NOTE — DIABETES MGMT
GLYCEMIC CONTROL PROGRESS NOTE: 
 
- discussed in rounds, known h/o T2DM, basal/oral home regimen - BG out of target range ICU: 140-180 mg/dl, trending up requiring consistent coverage - TDD = 51 units - Humalog Very Insulin Resistant Corrective Coverage - per RN pt with good PO intake 
- both FBG & PPG out of target range basal/bolus regimen initiated per Hospitalist, monitor trends for adjustments - Recent Glucose Results:  
Lab Results Component Value Date/Time  (H) 10/27/2020 02:00 AM  
 GLUCPOC 353 (H) 10/27/2020 08:19 AM  
 GLUCPOC 311 (H) 10/27/2020 07:28 AM  
 GLUCPOC 252 (H) 10/26/2020 09:08 PM  
 
 
 
Matt Shabazz MS, RN, CDE Glycemic Control Team 
[phone] Pager [phone] (M-TH 8:00-4:30P) *After Hours pager [phone]

## 2020-10-27 NOTE — PROGRESS NOTES
Problem: Mobility Impaired (Adult and Pediatric) Goal: *Acute Goals and Plan of Care (Insert Text) Description: Physical Therapy Goals Initiated 10/13/2020, revised 10/23/2020 and to be accomplished within 7 day(s) 1. Patient will move from supine to sit and sit to supine  in bed with minimal assistance. 2.  Patient will transfer from bed to chair and chair to bed with moderate assistance using the least restrictive device. 3.  Patient will perform sit to stand with moderate assistance. 4.  Patient will ambulate with moderate assistance for 15 feet with the least restrictive device. Outcome: Progressing Towards Goal 
 PHYSICAL THERAPY TREATMENT Patient: Matt Salazar (53 y.o. male) Date: 10/26/2020 Diagnosis: Hypocalcemia [E83.51] Hypocalcemia Precautions: Aspiration, Fall Chart, physical therapy assessment, plan of care and goals were reviewed. ASSESSMENT: 
Pt present with improved C-spine control and moving head in head. This translated to improved posture in sitting. 27 min of continuous sitting, with exercise performance. LE strength is also gradually returning, although my enough for standing trial. Placement is needed to have pt  a standing frame for safety. Pt is aware of deficits and is putting great effort toward increase, but advancement to amb will take weeks. Progression toward goals: 
[]      Improving appropriately and progressing toward goals 
[]      Improving slowly and progressing toward goals 
[]      Not making progress toward goals and plan of care will be adjusted PLAN: 
Patient continues to benefit from skilled intervention to address the above impairments. Continue treatment per established plan of care. Discharge Recommendations:  LTAC or Rehab Further Equipment Recommendations for Discharge:  hospital bed, mechanical lift, and wheelchair SUBJECTIVE:  
Patient stated I'm ready to try.  OBJECTIVE DATA SUMMARY:  
Critical Behavior: Neurologic State: Alert, Appropriate for age, Eyes open spontaneously Orientation Level: Oriented X4 Cognition: Follows commands Safety/Judgement: Good awareness of safety precautions Functional Mobility Training: 
Bed Mobility: 
Rolling: (P) Moderate assistance;Maximum assistance Supine to Sit: (P) Maximum assistance Sit to Supine: (P) Maximum assistance Balance: 
Sitting: (P) Impaired Sitting - Static: (P) Fair (occasional) Neuro Re-Education: 
Seated pertubation in all directions Therapeutic Exercises:  
 
 
EXERCISE Sets Reps Active Active Assist  
Passive Self ROM Comments Ankle Pumps 1 30 [x] [] [] [] Quad Sets/Glut Sets 1 20 [x] [] [] [] Hamstring Sets   [] [] [] [] Short Arc Quads 2 5 [x] [] [] [] Heel Slides 1 5 [x] [] [] [] Straight Leg Raises   [] [] [] [] Hip Abd/Add 2 5 [x] [] [] [] Long Arc Quads 2 5 [x] [] [] [] Seated Marching 2 5 [x] [] [] []   
Standing Marching   [] [] [] []   
   [] [] [] []   
  
Pain: 
Pain Scale 1: Numeric (0 - 10) Pain Intensity 1: 4 Pain out: 4 Pain Location 1: Coccyx Pain Orientation 1: Posterior Pain Description 1: Constant Activity Tolerance:  
Fair Please refer to the flowsheet for vital signs taken during this treatment. After treatment:  
[] Patient left in no apparent distress sitting up in chair 
[x] Patient left in no apparent distress in bed 
[x] Call bell left within reach [x] Nursing notified 
[] Caregiver present 
[] Bed alarm activated Nasima Caceres PTA Time Calculation: 42 mins

## 2020-10-27 NOTE — PROGRESS NOTES
PENDING INSURANCE AUTHORIZATION 
 
D/C Plan: [facility] Pt has been accepted to [address] [place] [address]. Clinical information was also sent to [facility] and pt has been declined. Request has been made for [address] [place] [address] to initiate insurance auth. CM met with pt and his wife earlier today and they are aware and in agreement with plan transfer to [address] [place] [address] pending insurance auth. CM to continue to follow and assist. 
 
Transition of Care Plan:  
 
Communication to Patient/Family: Met with patient and family, and they are agreeable to the transition plan. The Plan for Transition of Care is related to the following treatment goals: SNF The Patient and/or patient representative was provided with a choice of provider and agrees  
with the discharge plan. Yes [x] No [] Freedom of choice list was provided with basic dialogue that supports the patient's individualized plan of care/goals and shares the quality data associated with the providers. Yes [x] No [] SNF/Rehab Transition: 
Patient has been accepted to [facility] at [address] [facility], [address] [place] [address] for SNF and meets criteria for admission. Patient will transported by medical transport and expected to leave once medically stable and insurance Tigist Cooper has been obtained. Communication to SNF/Rehab: 
Bedside RN,  has been notified to update the transition plan to the facility and call report [phone]. Discharge information has been updated on the AVS and communicated through [facility] or [facility]. Discharge instructions to be fax'd to facility at [phone] Please include all hard scripts for controlled substances, med rec and dc summary, and AVS in packet. Please medicate for pain prior to dc if possible and needed to help offset delay when patient first arrives to facility. Reviewed and confirmed with facility, [facility] can manage the patient care needs for the following:  
 
[place] with (X) only those applicable: Medication: 
[]Medications are available at the facility []IV Antibiotics []Controlled Substance  hard copies available sent. []Weekly Labs Equipment: 
[]CPAP/BiPAP []Wound Vacuum []Ward or Urinary Device []PICC/Central Line []Nebulizer []Ventilator Treatment: 
[]Isolation (for MRSA, VRE, etc.) []Surgical Drain Management []Tracheostomy Care 
[]Dressing Changes []Dialysis with transportation []PEG Care []Oxygen []Daily Weights for Heart Failure Dietary: 
[]Any diet limitations []Tube Feedings []Total Parenteral Management (TPN) Financial Resources: 
[]Medicaid Application Completed [x]UAI Completed and copy given to pt/family. []A screening has previously been completed. []Level II Completed 
 
[] Private pay individual who will not become  
financially eligible for Medicaid within 6 months from admission to a [address] [place] [address] facility. [] Individual refused to have screening conducted. []Medicaid Application Completed []The screening denied because it was determined individual did not need/did not qualify for nursing facility level of care. [] Out of state residents seeking direct admission to a [address] facility. [] Individuals who are inpatients of an out of state hospital, or in state or out of state veterans/ hospital and seek direct admission to a [address] facility [] Individuals who are pateints or residents of a state owned/operated facility that is licensed by [employer] ([employer]) and seek direct admission to [address] facility [] A screening not required for enrollment in [facility] services as set out in 12 VAC 30- [] [facility]) staff shall perform screenings of the [facility] clients. Advanced Care Plan: 
[]Surrogate Decision Maker of Care 
[]POA []Communicated Code Status and copy sent. Other:  
   
 
 
 
Care Management Interventions PCP Verified by CM:  Yes 
 Last Visit to PCP: 10/08/20 Mode of Transport at Discharge: BLS Transition of Care Consult (CM Consult): SNF Partner SNF: Yes Health Maintenance Reviewed: Yes Physical Therapy Consult: Yes Occupational Therapy Consult: Yes Current Support Network: Lives with Spouse Confirm Follow Up Transport: Family The Plan for Transition of Care is Related to the Following Treatment Goals : Vicki Hancock The Patient and/or Patient Representative was Provided with a Choice of Provider and Agrees with the Discharge Plan?: Yes Name of the Patient Representative Who was Provided with a Choice of Provider and Agrees with the Discharge Plan: pt/sife Freedom of Choice List was Provided with Basic Dialogue that Supports the Patient's Individualized Plan of Care/Goals, Treatment Preferences and Shares the Quality Data Associated with the Providers?: Yes Discharge Location Discharge Placement: Skilled nursing facility([facility])

## 2020-10-27 NOTE — PROGRESS NOTES
Bedside and Verbal shift change report given to [address] (oncoming nurse) by Emma Dillard (offgoing nurse). Report included the following information SBAR, Kardex, Intake/Output, MAR and Recent Results. Shift Summary-  
Patient Vitals for the past 12 hrs: 
 Temp Pulse Resp BP SpO2  
10/28/20 0408 98.3 °F (36.8 °C) 93 16 103/77 100 % 10/27/20 2312 97.7 °F (36.5 °C) (!) 101 18 96/70 99 % 10/27/20 2019 99.5 °F (37.5 °C) 95 18 98/66 100 % Pt A&Ox4, lungs clear/diminished on RA. Pt ate 50% of dinner and drank minimal amount of Glucerna, was NPO after midnight. Total urine ouput overnight 725 mL w/ unmeasurable occirence x2. Bedside and Verbal shift change report given to PHILIPPE Morataya (oncoming nurse) by [address] (offgoing nurse). Report included the following information SBAR, Kardex, Intake/Output, MAR and Recent Results.

## 2020-10-27 NOTE — PROGRESS NOTES
Problem: Mobility Impaired (Adult and Pediatric) Goal: *Acute Goals and Plan of Care (Insert Text) Description: Physical Therapy Goals Initiated 10/13/2020, revised 10/23/2020 and to be accomplished within 7 day(s) 1. Patient will move from supine to sit and sit to supine  in bed with minimal assistance. 2.  Patient will transfer from bed to chair and chair to bed with moderate assistance using the least restrictive device. 3.  Patient will perform sit to stand with moderate assistance. 4.  Patient will ambulate with moderate assistance for 15 feet with the least restrictive device. Outcome: Not Progressing Towards Goal 
 Pt refused PT due to: 
[x]  Fatigued [x]  Conversing with wive (while sitting EOB) []  Pain 
[]  Pt lethargic 
[]  Off Unit Will f/u later, as pt's schedule allows. Thank you.  
Festus Mott, PTA

## 2020-10-27 NOTE — ADT AUTH CERT NOTES
Patient Demographics Patient Name Miriam Adams  
[id number]  Sex Male    
1965  Address [address]  Phone [phone] (Home) [phone] (Mobile) *Preferred*   
CSN:   
[id number] Admit Date:  Admit Time  Room  Bed Oct 9, 2020   9:19 AM  313 [[id number]]  01 [[id number]] Attending Providers Provider  Pager  From  To Jennifer Crane MD   10/09/20  10/09/20 Avis Tomas MD   10/09/20 Emergency Contact(s) Name  Relation  Home  Work  Mobile Jr Ibrahim  [phone] Utilization Reviews  
 
   
LOC:Acute Adult-Extended Stay (10/24/2020) by Kari Orta RN  
 
   
Review Entered  Review Status 10/26/2020 14:13  In Primary   
   
Criteria Review REVIEW SUMMARY 
  
Patient: SIM HASSAN Review Number: [id number] Review Status: In Primary 
  
Condition Specific: Yes 
  
  
OUTCOMES Outcome Type: Primary 
  
  
  
REVIEW DETAILS 
  
Service Date: 10/24/2020 Product: [facility] Adult Subset: Extended Stay (Symptom or finding within 24h) 
  (Excludes PO medications unless noted) 
  
Version: InterQual® 2019 [employer]  © 2019 [employer] and/or one of its [place]. All Rights Reserved. CPT only © 2018 American Medical Association. All Rights Reserved. Additional Notes 97.9, 115/76, HR 90, RR 16, o2 sat 100, NC 2L Meds: lidocaine patch, roxicidone po 5mg x2, MVI iv, albumin iv 25g, D5 NS 50 ml.hr, lantus sq, Humalog sq q6h, protonix iv 40mg Labs: RBC 2.54, HGb 8.3, HCT 24.9, MCV 98.0, RDW 20.0, Platelet 43, Lymoh 17, INR 1.8, potassium 3.4, glucose 153, bili 3.6, albumin 6.3, AST 46   
  
46 yo cirrhosis patient with ac resp failure-resolved [id number]   
    
Much more alert and talking this am   
    
Passed swallow test for dysphagia diet   
    
NG tube out   
    
Cirrhosis-advanced Asp pneumonia-completed IV abx course Acute diastolic CHF-diuresed Anemia-s/p transfusions, heme has followed his course-no changes for now Coagulopathy   
hyperglu Thrombocytopenia-no bleeding Uncontrolled HTN-reduce metoprolol dose Alcoholism-thiamine, nutrition as tolerated, NPO as high asp risk   
    
Debility and weakness, profound-PT   
    
Prognosis guarded   
    
Continue supportive care, may need rehab and skilled care upon d/c as wife has chronic advanced medical illness   
  
   
LOC:Acute Adult-Extended Stay (10/21/2020) by Randi Perea RN  
 
   
Review Entered  Review Status 10/21/2020 13:08  In Primary   
   
Criteria Review REVIEW SUMMARY 
  
Patient: HIGINIO FLORIAN Review Number: [id number] Review Status: In Primary 
  
Condition Specific: Yes 
  
  
OUTCOMES Outcome Type: Primary 
  
  
  
REVIEW DETAILS 
  
Service Date: 10/21/2020 Product: [facility] Adult Subset: Extended Stay (Symptom or finding within 24h) 
  (Excludes PO medications unless noted) [X] Select Level of Care, One: 
            [X] INTERMEDIATE, One: 
                [X] Partial responder, not clinically stable for discharge and requires continued stay, >= One: 
                    [X] Oxygen >= 40%(0.40) and, >= One: 
                    ~--Admin, IQ Admin Admin on 10- 01:08 PM--~ 
                    99.0, 176/124, , RR 15, o2 sat 96, NC 5L Meds: albumin iv 25g q12h, clonidine patch, folic acid po, dilaudid iv 1mg, lantus sq, humalog sq, Novolin sq, lactulose po bid, lopressor iv 5mg q6h, protonix iv 40mg, zosyn iv 4.5g q8h, effer k po 20meq, thiamine po, calcium iv 2g, Lasix iv 20mg, effer k po 40meq, neutral-phos po, lopressor iv 2.5mg x 2, versed iv 2mg x 2,                     Labs: ionized calcium 1.09, RBC 2.73, HGb 8.8, HCT 25.4, RDw 18.6, Platelet 34, Neutro 82, Lymoh 11, INR 2.0, glucose 143, bili 4.0, protein 5.9, AST 65, pH 7.55, pC02 32.3, p02 69, HC03 28.5 Chest xr Support lines and tubes remain in place as above. Improved aeration right lung 
                    base with evidence of mild persistent interstitial infiltrate/pulmonary edema 
                    and bibasilar atelectasis. [X] Oxygen therapy adjustment at least 3x/24h and oximetry 
  
Version: InterQual® 2019 [facility]  © 2019 [employer] and/or one of its [place]. All Rights Reserved. CPT only © 2018 American Medical Association. All Rights Reserved. Additional Notes Hepat: PLTs down to the 20s this AM without overt bleeding.     
INR is down to 2.0 wit only 1 unit FFP yesterday Will need to recover from this acute event, gain strength and be able to ambulate before he could be considered a candidate for LT Malnutrition/muscle wasting He is receiving osmolite through NG tube.     
Suggest we replace this with smaller dobhoff feeding tube. Pulmonary:   
Patient remains in ICU; on ventilator support. Day 8 of intubation. Patient off midazolam infusion since yesterday; he gets sedation as needed. FiO2 35; PEEP 5 No significant respiratory secretions from endotracheal tube. Patient remains afebrile; blood pressure elevated at times; mild sinus tachycardia. Urine output good     
Respiratory: Day 8 on ventilator support. Patient did spontaneous breathing trials this morning; FiO2 35%; PEEP 5; minute ventilation about 10 L; PaO2/FiO2 ratio about 200 on SBT ABG; RSBI 40s; plan to discuss with wife and trial of extubation; patient has cuff leak as discussed with RT. Chest x-ray reviewed today-overall improved; mild interstitial changes, and right basal atelectasis.  No focal consolidations or worsening pleural effusions. ABG shows mild respiratory alkalosis during SBT. Sedation-Prn midazolam and fentanyl. CVS: Continue blood pressure medications; metoprolol 2.5 mg IV every 6; clonidine 0.1 mg patch; Prn labetalol ordered; Lasix 20 mg 1 dose given for possible extubation today. Recent echocardiogram showed EF 55 to 60%; no pulmonary hypertension reported on echo. ID: Respiratory cultures-light Candida reported; complete 7 days Zosyn. History of immunosuppression state given biological use [Humira]. Hematology/Oncology: Hemoglobin remained stable-8.8, keep hemoglobin greater than 7 g/dL; platelets TRWO-33D- keep platelets more than 20 K; INR 2.0 from chronic liver disease/cirrhosis; fibrinogen 209-maintaining. Discussed with Mrs. Marshall and daughter at bedside; reviewed medical management; patient meets criteria for extubation; reasonably awake and responsive; patient at risk of respiratory failure and aspirations post extubation; patient has underlying chronic liver disease/cirrhosis; discussed about CODE STATUS; they would not want him to be reintubated; reviewed CODE STATUS-DNR.  If patient has respiratory failure or distress post extubation, family agreeable for comfort care goals. Saintclair Rand from palliative care present. Hospitalist:   
    
Acute resp failure Asp penumonia Cirrhosis Acute diastolic CHF Thrombocytopenia Anemia Coagulopathy Hx etoh abuse Colitis, was on humira Hyperglycemia-lantus, SSI   
    
Extubating this am   
Inc metoprolol for BP and tachycardia, IV Speech to see for safe swallow plan IV protonix Finishing IV abx course   
    
Blood counts stable   
    
S/p cryo infusion plasma, platelets infusion, and steroids course, no bleeding seen   
    
Remains very ill with prognosis quite guarded   
    
  
ONC:   
 1.aspiration pneumonia.  Remains intubated. ARDS.  cxr  yest Stable bilateral interstitial pattern, in keeping with the given history of   
ARDS, can also be seen with interstitial edema or chronic interstitial lung   
disease.   
    
Interval progression of right greater than left bibasilar atelectasis, streaky   
pneumonia not completely excluded.  Remains on zosyn 2. No active bleeding. Hgb 8.7.     
3.liver failure - coagulopathy - 2 unit FFP,  INR remains 2.1, fibrinogen 149, yesterday before plasma 4.remains sedated 5. Continues on zosyn 6. DM on insulin and monitored by staff 7. Portal vein thrombosis - not on anticoagulation due to low plts 8. Immune suppression due to colitis was on humira 9. Palliative care will meet with family later today 10.  plts stable not bleeding 11. Albumen now bid

## 2020-10-27 NOTE — PROGRESS NOTES
Palliative Medicine Consult DR. [facility]: [phone] [facility]: [phone] [facility]: [phone] Patient Name: Nicola Jacobs YOB: 1965 Date of Initial Consult: 10/16/2020; follow up: 10/27/2020 Reason for Consult: care decisions Requesting Provider: Shen Bella MD 
Primary Care Physician: Valente Zhao NP 
  
 SUMMARY:  
Nicola Jacobs is a 47 y.o. male with a past history of chronic pancreatitis, T2DM, gastroparesis, hydroadenitis and fractured hip who was admitted on 10/9/2020 from home with a diagnosis of colitis and cirrhosis. Current medical issues leading to Palliative Medicine involvement include: liver cirrhosis, respiratory failure and goals of care. 10/27/2020: Palliative care team including LORENA Dobbins and this NP met with patient at bedside. He is alert and oriented X 4 and able to make own healthcare decisions today. Patient admits to pain in his lower back, stomach and forehead. Claims he was able to eat most of his breakfast of eggs and potatoes. Currently has some nausea but no shortness of breath. PALLIATIVE DIAGNOSES:  
1. Advanced care decisions 2. Colitis 3. Cirrhosis 4. Acute respiratory failure PLAN:  
 
10/27/2020: Follow up meeting with patient s/p extubation. Wife, uKlwinder Quiles is also present at the bedside today for the meeting. Discussed completion of the AMD and patient agreed. Patient named his wife, Kulwinder Quiles as primary MPOA and his daughter, Cici Powers as backup surrogate decision maker. Discussed the long road to transplant and the need to be an active participant requiring endurance. Discussed the benefits and risks of CPR in the event of cardiopulmonary arrest and intubation for respiratory distress.  Patient stated he needed to think about it but at this time he wished to receive chest compressions, shocks and intubation for cardiac arrest and is okay with re-intubation if required. Dr. Pat Walton and RN [place] notified of change in code status. GOALS OF CARE: FULL CODE with FULL INTERVENTIONS. Plans on pursuing evaluation for liver transplant. COPY of the AMD is placed on chart. See previous notes shown below:  
 
10/23/2020: Telephone follow up with patient's wife, Lew Flores. She states she is very tired from dialysis this morning. Informed her of the failure of Romulo to pass the swallow evaluation. Discussed benefits and risks of feeding tubes (NG and PEG) and aspiration. Discussed comfort feeds. Expressed medical staff concerns regarding the ability of Armond Lovell to recover from this acute event, need for nutrition to gain strength and ambulate to be eligible for liver transplant. Encouraged Lew Flores to discuss with her daughters and Armond Lovell over the weekend. GOALS OF CARE: DNR/DNI, no re-intubation for respiratory failure. 10/16/2020 1. Advanced care decisions: Palliative care team including LORENA Milian and this NP observed patient at bedside in ICU. He is currently sedated and intubated. Telephone call placed to wife, Alison Mitchell. She states they have been  12-13 years and he has two daughters from another relationship. His daughter, Martina Castillo, lives locally and his other daughter lives in [place]. He has a step daughter who lives in [place], [place]. Wife states that he does not have an AMD or living will. They have not had previous conversations regarding CPR or intubation. She stated she did feel that Armond Lovell would want intubation if there was a chance for recovery. Introduced discussion of CPR for cardiopulmonary arrest. Discussed Romulo's immunocompromised system, sedentary lifestyle and his ability to endure along with his body's tolerance to aggressive treatments.  Encouraged future conversations between her and Armond Lovell to discuss goals of care as she states she has multiple medical issues including ESRD on HD. Offered to allow patient visitation with her  today but she asked if she could come tomorrow at lunchtime. GOALS OF CARE: FULL CODE with FULL INTERVENTIONS. 2. Colitis: presented to ED with c/o abdominal pain and nausea. Has history of pancreatitis and gastroparesis. Lipase wnl. CT abdomen shows mild thickening of the proximal ascending colon and cecum may reflect sequela of portal colopathy. Infectious/inflammatory colitis also a possibility; diffuse hepatic hypoattenuation compatible with steatosis with hepatomegaly, mild splenomegaly, and small quantity of abdominal/pelvic ascites. Primary team managing. 3.  Cirrhosis: New diagnosis. History of heavy alcohol in the past; presently only 1-2 per week. CT abd/pelvis as in #2. INR 1.8, Tbili 4.1, MELD is 20. Hepatology consulted and is in process of work up for liver transplant. 4.  Acute respiratory failure: CXR on admission with patchy multifocal airspace disease favored to reflect pulmonary edema without significant pleural effusion. Became acutely hypoxic not responsive to BiPap and was intubated on 10/14/2020. Now D3 ventilator support. 5.  Initial consult note routed to primary continuity provider 6. Communicated plan of care with: Palliative IDT 
 
GOALS OF CARE: 
Patient/Health Care Proxy Stated Goals: Rehabilitation TREATMENT PREFERENCES:  
Code Status: Full Code Advance Care Planning: 
Advance Care Planning 10/9/2020 Confirm Advance Directive None Patient Would Like to Complete Advance Directive Yes Medical Interventions: Full interventions Other: As far as possible, the palliative care team has discussed with patient/health care proxy about goals of care/treatment preferences for patient. HISTORY:  
 
History obtained from: chart CHIEF COMPLAINT: back pain HPI/SUBJECTIVE: The patient is:  
[x] Verbal and participatory [] Non-participatory due to: 
See summary Clinical Pain Assessment (nonverbal scale for nonverbal patients): Clinical Pain Assessment Severity: 3 Activity (Movement): Lying quietly, normal position Duration: for how long has pt been experiencing pain (e.g., 2 days, 1 month, years) Frequency: how often pain is an issue (e.g., several times per day, once every few days, constant) FUNCTIONAL ASSESSMENT:  
 
Palliative Performance Scale (PPS): PPS: 50 ECOG 
ECOG Status : Limited self-care PSYCHOSOCIAL/SPIRITUAL SCREENING:  
  
Any spiritual / Hoahaoism concerns: not assessed 
[] Yes /  [] No 
 
Caregiver Burnout: 
[] Yes /  [] No /  [x] No Caregiver Present Anticipatory grief assessment: not assessed 
[] Normal  / [] Maladaptive REVIEW OF SYSTEMS:  
 
Positive and pertinent negative findings in ROS are noted above in HPI. The following systems were [x] reviewed / [] unable to be reviewed as noted in HPI Other findings are noted below. Systems: constitutional, ears/nose/mouth/throat, respiratory, gastrointestinal, genitourinary, musculoskeletal, integumentary, neurologic, psychiatric, endocrine. Positive findings noted below. Modified ESAS Completed by: provider Pain: 3 Nausea: 3 Dyspnea: 0 Stool Occurrence(s): (P) 1  
 
 
 PHYSICAL EXAM:  
 
Wt Readings from Last 3 Encounters:  
10/26/20 70.2 kg (154 lb 11.2 oz)  
09/25/20 65.8 kg (145 lb) 10/07/19 77.1 kg (170 lb) Blood pressure 99/71, pulse 99, temperature 98.1 °F (36.7 °C), resp. rate 16, height 5' 9\" (1.753 m), weight 70.2 kg (154 lb 11.2 oz), SpO2 100 %. Pain: 
Pain Scale 1: Numeric (0 - 10) Pain Intensity 1: 6 Pain Onset 1: gradual 
Pain Location 1: Coccyx Pain Orientation 1: Posterior Pain Description 1: Constant Pain Intervention(s) 1: Medication (see MAR) Constitutional: Frail gentleman, appears older than stated age, in no apparent distress. Eyes: pupils equal, icteric ENMT: no nasal discharge, dry mucous membranes Respiratory: breathing unlabored Musculoskeletal: no deformity, no tenderness to palpation Skin: warm, dry Neurologic: following commands and moving extremities HISTORY:  
 
Principal Problem: Hypocalcemia (10/9/2020) Active Problems: Hypomagnesemia (10/9/2020) Alcoholism ([address] (10/9/2020) Cirrhosis ([address] (10/9/2020) Colitis (10/9/2020) Acute respiratory failure with hypoxemia ([address] (10/13/2020) Aspiration pneumonia ([address] (10/13/2020) Acute diastolic CHF (congestive heart failure) ([address] (10/13/2020) Thrombocytopenia ([address] (10/13/2020) Severe protein-calorie malnutrition ([address] (10/26/2020) Past Medical History:  
Diagnosis Date  Diabetes ([address]  Gastroparesis  Pancreatitis Past Surgical History:  
Procedure Laterality Date  HX CHOLECYSTECTOMY  HX HIP FRACTURE TX    
 left hip sx 9.23/2018 History reviewed. No pertinent family history. History reviewed, no pertinent family history. Social History Tobacco Use  Smoking status: Current Every Day Smoker  Smokeless tobacco: Never Used Substance Use Topics  Alcohol use: Yes No Known Allergies Current Facility-Administered Medications Medication Dose Route Frequency  insulin lispro (HUMALOG) injection 6 Units  6 Units SubCUTAneous TIDAC  
 [START ON 10/28/2020] insulin glargine (LANTUS) injection 14 Units  14 Units SubCUTAneous DAILY  insulin lispro (HUMALOG) injection   SubCUTAneous AC&HS  pantoprazole (PROTONIX) tablet 40 mg  40 mg Oral ACB  potassium chloride (K-DUR, KLOR-CON) SR tablet 20 mEq  20 mEq Oral DAILY  thiamine mononitrate (B-1) tablet 100 mg  100 mg Oral DAILY  oxyCODONE IR (ROXICODONE) tablet 5 mg  5 mg Oral Q6H PRN  
 lactulose (CHRONULAC) 10 gram/15 mL solution 45 mL  30 g Oral DAILY  metoprolol (LOPRESSOR) injection 2.5 mg  2.5 mg IntraVENous Q6H PRN  
 LORazepam (ATIVAN) injection 1 mg  1 mg IntraVENous Q4H PRN  
  0.9% sodium chloride infusion 250 mL  250 mL IntraVENous PRN  
 heparin (porcine) 100 unit/mL injection 500 Units  500 Units InterCATHeter Q8H PRN  
 metoclopramide HCl (REGLAN) injection 5 mg  5 mg IntraVENous Q6H PRN  
 lidocaine 4 % patch 2 Patch  2 Patch TransDERmal Q24H  
 ondansetron (ZOFRAN) injection 4 mg  4 mg IntraVENous Q6H PRN  
 glucose chewable tablet 16 g  4 Tab Oral PRN  
 glucagon (GLUCAGEN) injection 1 mg  1 mg IntraMUSCular PRN  
 dextrose 10% infusion 125-250 mL  125-250 mL IntraVENous PRN  
 
 
 LAB AND IMAGING FINDINGS:  
 
Lab Results Component Value Date/Time WBC 9.4 10/26/2020 12:09 AM  
 HGB 8.3 (L) 10/26/2020 12:09 AM  
 PLATELET 52 (L) 31/04/2812 12:09 AM  
 
Lab Results Component Value Date/Time Sodium 137 10/27/2020 02:00 AM  
 Potassium 3.8 10/27/2020 02:00 AM  
 Chloride 102 10/27/2020 02:00 AM  
 CO2 26 10/27/2020 02:00 AM  
 BUN 11 10/27/2020 02:00 AM  
 Creatinine 0.77 10/27/2020 02:00 AM  
 Calcium 9.4 10/27/2020 02:00 AM  
 Magnesium 1.8 10/27/2020 02:00 AM  
 Phosphorus 2.0 (L) 10/24/2020 02:30 AM  
  
Lab Results Component Value Date/Time Alk. phosphatase 61 10/27/2020 02:00 AM  
 Protein, total 6.8 10/27/2020 02:00 AM  
 Albumin 4.0 10/27/2020 02:00 AM  
 Globulin 2.8 10/27/2020 02:00 AM  
 
Lab Results Component Value Date/Time INR 1.9 (H) 10/27/2020 02:00 AM  
 Prothrombin time 21.0 (H) 10/27/2020 02:00 AM  
  
Lab Results Component Value Date/Time Iron 45 (L) 10/13/2020 03:48 AM  
 TIBC 69 (L) 10/13/2020 03:48 AM  
 Iron % saturation 65 (H) 10/13/2020 03:48 AM  
 Ferritin 227 10/13/2020 03:48 AM  
  
No results found for: PH, PCO2, PO2 No components found for: [place] Lab Results Component Value Date/Time CK 37 (L) 10/14/2020 10:00 AM  
 CK - MB <1.0 10/14/2020 10:00 AM  
  
 
   
 
Total time: 35 minutes Counseling / coordination time, spent as noted above > 50% counseling / coordination: 25 minutes with patient and wife. Prolonged service was provided for  []30 min   []75 min in face to face time in the presence of the patient, spent as noted above. Time Start:  
Time End:  
Note: this can only be billed with 50592 (initial) or 15596 (follow up). If multiple start / stop times, list each separately.

## 2020-10-27 NOTE — PROGRESS NOTES
Problem: Dysphagia (Adult) Goal: *Acute Goals and Plan of Care (Insert Text) Description: Patient will: 1. Tolerate diet upgrade without overt s/sx of aspiration under SLP supervision. 2. Utilize compensatory swallow strategies of small bite/sip, alternate liquid/solid with min cues in 4/5 trials. 3. Perform oral-motor/laryngeal elevation exercises 10 reps/each to increase oropharyngeal swallow function with min cues. 4. Complete an objective swallow study (i.e., MBSS) to assess swallow integrity, r/o aspiration, and determine of safest LRD, min A. 
 
Rec:  
Regular diet with thin liquids Aspiration precautions HOB >45 during po intake, remain >30 for 30-45 minutes after po Small bites/sips; alternate liquid/solid, slow feeding rate Oral care TID Meds per pt preference Outcome: Progressing Towards Goal 
 
SPEECH LANGUAGE PATHOLOGY DYSPHAGIA TREATMENT Patient: Debbie Roque (53 y.o. male) Date: 10/27/2020 Diagnosis: Hypocalcemia [E83.51] Precautions: Aspiration, Fall PLOF: As per H&P   
 
ASSESSMENT: 
Pt seen for follow up dysphagia treatment, alert and oriented x4. Pt denying dysphagia other than with large Potassium pill which he reports nurses have been dissolving in juice for him. Pt tolerating consecutive swallows of thin liquid + straw with timely swallow initiation, adequate laryngeal elevation to palpation and no overt s/sx aspiration. Pt demo mildly delayed but thorough mastication of regular solid trials with no overt distress s/sx. Able to utilize liquid wash to clear min lingual spread post swallow. Recommend diet advancement to regular, thin liquids with meds per pt preference. Educated with regard to comp strategy of large pills in applesauce/pudding for increased lubrication prn with pt verbalizing understanding. Will follow x1-2 visits to ensure tolerance of diet advancement. D/w RN. Progression toward goals: [x]         Improving appropriately and progressing toward goals 
[]         Improving slowly and progressing toward goals 
[]         Not making progress toward goals and plan of care will be adjusted PLAN: 
Recommendations and Planned Interventions: 
Patient continues to benefit from skilled intervention to address the above impairments. Continue treatment per established plan of care. Discharge Recommendations: To Be Determined SUBJECTIVE:  
Patient stated I. OBJECTIVE:  
Cognitive and Communication Status: 
Neurologic State: Alert, Appropriate for age, Eyes open spontaneously Orientation Level: Oriented X4 Cognition: Follows commands Perception: Appears intact Perseveration: No perseveration noted Safety/Judgement: Good awareness of safety precautions Dysphagia Treatment: 
Oral Assessment: 
Oral Assessment Labial: No impairment Dentition: Natural 
Oral Hygiene: [facility] Lingual: No impairment Velum: No impairment Mandible: No impairment P.O. Trials: 
 Patient Position: [facility] Vocal quality prior to P.O.: No impairment Consistency Presented: Thin liquid, Solid How Presented: Self-fed/presented, Cup/sip, Spoon, Straw, Successive swallows Bolus Acceptance: No impairment Bolus Formation/Control: Impaired Type of Impairment: Mastication, Delayed(min) Propulsion: No impairment Oral Residue: Less than 10% of bolus, Lingual 
 Initiation of Swallow: No impairment Laryngeal Elevation: Functional 
 Aspiration Signs/Symptoms: None Pharyngeal Phase Characteristics: No impairment, issues, or problems Effective Modifications: Small sips and bites, Alternate liquids/solids Cues for Modifications: Minimal 
 Oral Phase Severity: Mild Pharyngeal Phase Severity : No impairment PAIN: 
Start of Tx: not reported End of Tx: not reported After treatment:  
[]              Patient left in no apparent distress sitting up in chair [x]              Patient left in no apparent distress in bed 
[x]              Call bell left within reach [x]              Nursing notified 
[]              Family present 
[]              Caregiver present 
[]              Bed alarm activated COMMUNICATION/EDUCATION:  
[x] Aspiration precautions; swallow safety; compensatory techniques [x]        Patient/family able to participate in training and education  
[]  Patient unable to participate in training and education, education ongoing with staff  
[] Patient understands goals and intent of therapy; neutral about participation Deysi Duque M.S., CCC-SLP Speech-Language Pathologist

## 2020-10-27 NOTE — PROGRESS NOTES
Hospitalist Progress Note Patient: Debbie Roque MRN: [id number]  CSN: [id number] YOB: 1965  Age: 47 y.o. Sex: male DOA: 10/9/2020 LOS:  LOS: 18 days Chief Complaint: 
 
cirrhosis Assessment/Plan Cirrhosis of liver, etoh use Advanced debility 
hyperglycemia-adjusted up laantus and lispro as hyperglycemic Hypokalemia-improved 
resp failure due to aspiration pneumonia-resolved, wean from NC as tolerated 
thrombocytopenia-stable Coagulopathy-stable Anemia Metabolic encephalopathy-resolved Malnutrition-ensures/glucernas Diastolic CHF-compensated Continue PO nutrition Continue PT Discussed with Dr Clement Cortes, he will need liver transplant formal eval when rehab done Will need EGD in am, then can possibly d/c to SNF if arranged in afternoon Disposition : 
Patient Active Problem List  
Diagnosis Code  Hypocalcemia E83.51  
 Hypomagnesemia E83.42  
 Alcoholism ([address] F10.20  Cirrhosis ([address] K74.60  Colitis K52.9  Acute respiratory failure with hypoxemia (HCC) J96.01  
 Aspiration pneumonia ([address] J69.0  Acute diastolic CHF (congestive heart failure) (HCC) I50.31  Thrombocytopenia ([address] D69.6  Severe protein-calorie malnutrition ([address] E43 Subjective: 
 
denies abd pain No new issues reported He is alert and talking, worried about his wife at home Review of systems: 
 
Constitutional: denies fevers Respiratory: denies SOB Cardiovascular: denies chest pain Gastrointestinal: denies nausea, vomiting, diarrhea Vital signs/Intake and Output: 
Visit Vitals BP 99/71 (BP 1 Location: Right arm, BP Patient Position: Sitting) Pulse 99 Temp 98.1 °F (36.7 °C) Resp 16 Ht 5' 9\" (1.753 m) Wt 70.2 kg (154 lb 11.2 oz) SpO2 100% BMI 22.85 kg/m² Current Shift:  10/27 0701 - 10/27 1900 In: 240 [P.O.:240] Out: 350 [Urine:350] Last three shifts:  10/25 1901 - 10/27 0700 In: 1410 [P.O.:1410] Out: 2800 [Urine:2800] Exam: 
 
General: Weak debilitated Wm, NAD, jaundice resolved CVS:Regular rate and rhythm, no M/R/G, S1/S2 heard, no thrill Lungs:Clear to auscultation bilaterally, no wheezes, rhonchi, or rales Abdomen: Soft, Nontender, No distention, Normal Bowel sounds, No hepatomegaly Extremities: No C/C/E, pulses palpable 2+ Neuro:grossly normal , follows commands Psych:appropriate Labs: Results:  
   
Chemistry Recent Labs 10/27/20 
0200 10/26/20 
0009 10/25/20 
0445 * 316* 153*  140 141  
K 3.8 3.6 3.4*  
 107 108 CO2 26 25 27 BUN 11 12 10 CREA 0.77 0.80 0.62  
CA 9.4 9.1 9.1 AGAP 9 8 6 BUCR 14 15 16 AP 61 68 45  
TP 6.8 6.6 6.3* ALB 4.0 4.0 3.9 GLOB 2.8 2.6 2.4 AGRAT 1.4 1.5 1.6  
  
CBC w/Diff Recent Labs 10/26/20 
0009 10/25/20 
0445 WBC 9.4 8.3  
RBC 2.54* 2.43* HGB 8.3* 7.9*  
HCT 24.5* 23.6*  
PLT 52* 43* GRANS 76* 72 LYMPH 14* 17* EOS 0 1 Cardiac Enzymes No results for input(s): CPK, CKND1, LYDIA in the last 72 hours. No lab exists for component: Read Slight Coagulation Recent Labs 10/27/20 
0200 10/26/20 
0009 PTP 21.0* 20.4* INR 1.9* 1.8* Lipid Panel No results found for: CHOL, CHOLPOCT, CHOLX, CHLST, CHOLV, 462509, HDL, HDLP, LDL, LDLC, DLDLP, 400356, VLDLC, VLDL, TGLX, TRIGL, TRIGP, TGLPOCT, CHHD, CHHDX  
BNP No results for input(s): BNPP in the last 72 hours. Liver Enzymes Recent Labs 10/27/20 
0200 TP 6.8 ALB 4.0 AP 61 Thyroid Studies No results found for: T4, T3U, TSH, TSHEXT, TSHEXT Procedures/imaging: see electronic medical records for all procedures/Xrays and details which were not copied into this note but were reviewed prior to creation of Plan Jyotsna Sparks MD

## 2020-10-27 NOTE — PROGRESS NOTES
Nutrition Note Supplement: d/c magic cup as pt is not eating them, pt likes Ensure; does have a stock pile beginning to form, but ok with it he is order per MD request; his blood glucose levels are beginning to become a problem-will change to Glucerna for supplements-if pt does not like them will change back to Ensure, but at this time attempting to manage blood glucose levels better spoke w/ hospitalist, nurse, and glycemic control team to help make plan Electronically signed by Zully Pete on 10/27/2020 at 9:11 AM

## 2020-10-28 NOTE — PROGRESS NOTES
0730 Received bedside shift report from off going nurse Tresa Heart, RN. Report included the following information SBAR, Kardex, Intake/Output, MAR and Recent Results. [id number] Pt off floor to Endo. 
 
1700 Left message at dermatologist office to inquire about a prescription for his absesses. ..need to verify. Dr. Armendariz Public [phone]. He asked me to call. Passing on to next shift as office is closed. 1940 Gave bedside shift report to oncoming nurse Arnav Gilmore RN. Report included the following information SBAR, Kardex, Intake/Output, MAR and Recent Results.

## 2020-10-28 NOTE — PROGRESS NOTES
Problem: Dysphagia (Adult) Goal: *Acute Goals and Plan of Care (Insert Text) Description: Patient will: 1. Tolerate diet upgrade without overt s/sx of aspiration under SLP supervision. 2. Utilize compensatory swallow strategies of small bite/sip, alternate liquid/solid with min cues in 4/5 trials. 3. Perform oral-motor/laryngeal elevation exercises 10 reps/each to increase oropharyngeal swallow function with min cues. 4. Complete an objective swallow study (i.e., MBSS) to assess swallow integrity, r/o aspiration, and determine of safest LRD, min A. 
 
Rec:  
Regular diet with thin liquids Aspiration precautions HOB >45 during po intake, remain >30 for 30-45 minutes after po Small bites/sips; alternate liquid/solid, slow feeding rate Oral care TID Meds per pt preference Outcome: Resolved/Met SPEECH LANGUAGE PATHOLOGY DYSPHAGIA TREATMENT & DISCHARGE Patient: Lynn Brenner (53 y.o. male) Date: 10/28/2020 Diagnosis: Hypocalcemia [E83.51] Hypocalcemia Procedure(s) (LRB): ESOPHAGOGASTRODUODENOSCOPY (EGD) (N/A) Day of Surgery Precautions:  aspiration, Fall PLOF: regular/thin, self feeding ASSESSMENT: 
Pt seen for FU swallow tx. Pt AOx4. Pt and RN endorse tolerating current regular diet with thin liquids, dissolving potatssium and vitamins per pt preference and taking large pills with pudding/applesauce. Functional communication. Intelligibility >90%. Cognitive-linguistic function appears intact. Pt self-feeding PO trials of thin liquid via straw and tandem drinking, puree, mixed, and regular textures. No s/sx of aspiration appreciated across consistencies. Oropharyngeal swallow appears WFL. Swallow appears timely, adequate laryngeal elevation noted via palpation, no change in vocal/resp quality appreciated. Recommend regular texture diet with thin liquids, meds per pt preference. 0 formal ST needs for dysphagia indicated at this time.  Pt educated on and verbalized understanding of findings, recs, and POC; d/w RN. SLP will sign off at this time. Please re-consult should further concerns arise. Progression toward goals: 
[x]         Improving appropriately - goals met/approximated 
[]         Not making progress/Not appropriate - will d/c POC PLAN: 
Recommendations and Planned Interventions: 
Maximum therapeutic gains met; safest, least restrictive diet achieved. D/C ST intervention at this time. Discharge Recommendations: To Be Determined SUBJECTIVE:  
Patient stated I'll take the help. OBJECTIVE:  
Cognitive and Communication Status: 
Neurologic State: Alert, Appropriate for age Orientation Level: Oriented X4 Cognition: Follows commands Perception: Appears intact Perseveration: No perseveration noted Safety/Judgement: Good awareness of safety precautions, Insight into deficits Dysphagia Treatment: 
Oral Assessment: 
Oral Assessment Labial: No impairment Dentition: Natural 
Oral Hygiene: [facility] Lingual: No impairment Velum: No impairment Mandible: No impairment P.O. Trials: 
 Patient Position: HOB 75* Vocal quality prior to P.O.: No impairment Consistency Presented: Thin liquid, Puree, Solid, Mixed consistency How Presented: Self-fed/presented, Straw, Successive swallows, Spoon Bolus Acceptance: No impairment Bolus Formation/Control: No impairment Type of Impairment: Mastication, Delayed(min) Propulsion: No impairment Oral Residue: None Initiation of Swallow: No impairment Laryngeal Elevation: Functional 
 Aspiration Signs/Symptoms: None Pharyngeal Phase Characteristics: No impairment, issues, or problems Effective Modifications: Alternate liquids/solids, Small sips and bites Cues for Modifications: None Oral Phase Severity: No impairment Pharyngeal Phase Severity : No impairment PAIN: 
Pain level pre-treatment: 0/10 Pain level post-treatment: 0/10 After treatment: []              Patient left in no apparent distress sitting up in chair 
[x]              Patient left in no apparent distress in bed 
[]              Call bell left within reach [x]              Nursing notified 
[]              Caregiver present 
[]              Bed alarm activated COMMUNICATION/EDUCATION:  
[x] Aspiration precautions; swallow safety; compensatory techniques []        Patient unable to participate in education; education ongoing with staff 
[]  Posted safety precautions in patient's room. [] Oral-motor/laryngeal strengthening exercises Thank you for this referral. 
 
Tea Chung M.S., CCC-SLP Speech-Language Pathologist 
 
Time Calculation: 14 mins

## 2020-10-28 NOTE — PROGRESS NOTES
Hospitalist Progress Note Patient: Nae Bella MRN: [id number]  CSN: [id number] YOB: 1965  Age: 47 y.o. Sex: male DOA: 10/9/2020 LOS:  LOS: 19 days Assessment/Plan Patient Active Problem List  
Diagnosis Code  Hypocalcemia E83.51  
 Hypomagnesemia E83.42  
 Alcoholism ([facility] F10.20  Cirrhosis ([facility] K74.60  Colitis K52.9  Acute respiratory failure with hypoxemia (HCC) J96.01  
 Aspiration pneumonia ([facility] J69.0  Acute diastolic CHF (congestive heart failure) ([facility]) I50.31  Thrombocytopenia ([facility] D69.6  Severe protein-calorie malnutrition ([facility] E42  
  
 
 
 
46 yo male admitted with weakness, cirrhosis changes, low electrolytes. Resp - No acute respiratory issues ID - resp cultures with normal respiratory sukhi. ANTIBIOTICS - completed zosyn. CVS - Monitor HD. Acute diastolic CHF -  
Echo with EF of 55-60% Heme/onc - Follow H&H, plts. INR. 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 Renal - Trend BUN, Cr, follow I/O, almonte in place. Check and replace Mg, K, phos. Endocrine -   
DM - on lantus and SSI. Neuro/ Pain/ Sedation - midazolam. 
 
GI - . Alcoholic cirrhosis - 
Hepatology following S/p EGD with no findings of varices Prophylaxis - DVT: no heparin products due to thrombocytopenia, GI: protonix Disposition : to Rehab Physical Exam: 
General: As above   
HEENT: NC, Atraumatic. PERRLA, anicteric sclerae. Lungs: CTA Bilaterally. No Wheezing/Rhonchi/Rales. Heart:  S1 S2,  No murmur, No Rubs, No Gallops Abdomen: Soft, Non distended, Non tender.  +Bowel sounds, Extremities: No c/c/e Vital signs/Intake and Output: 
Visit Vitals /75 (BP 1 Location: Right arm, BP Patient Position: At rest) Pulse 99 Temp 99.1 °F (37.3 °C) Resp 20 Ht 5' 9\" (1.753 m) Wt 63.5 kg (139 lb 15.9 oz) SpO2 100% BMI 20.67 kg/m² Current Shift:  10/28 0701 - 10/28 1900 In: 120 [P.O.:120] Out: - Last three shifts:  10/26 1901 - 10/28 0700 In: 9928 [P.O.:1550] Out: 2050 [Urine:2050] Labs: Results:  
   
Chemistry Recent Labs 10/28/20 
0410 10/27/20 
0200 10/26/20 
0009 * 187* 316*  137 140  
K 3.8 3.8 3.6  102 107 CO2 25 26 25 BUN 10 11 12 CREA 0.65 0.77 0.80 CA 9.5 9.4 9.1 AGAP 11 9 8 BUCR 15 14 15 AP 59 61 68 TP 6.8 6.8 6.6 ALB 3.9 4.0 4.0  
GLOB 2.9 2.8 2.6 AGRAT 1.3 1.4 1.5  
  
CBC w/Diff Recent Labs 10/28/20 
0410 10/26/20 
0009 WBC 7.8 9.4  
RBC 2.53* 2.54* HGB 8.3* 8.3* HCT 24.8* 24.5*  
PLT 74* 52* GRANS  --  76* LYMPH  --  14* EOS  --  0 Cardiac Enzymes No results for input(s): CPK, CKND1, LYDIA in the last 72 hours. No lab exists for component: Rickford Messenger Coagulation Recent Labs 10/28/20 
0410 10/27/20 
0200 PTP 21.4* 21.0* INR 1.9* 1.9* Lipid Panel No results found for: CHOL, CHOLPOCT, CHOLX, CHLST, CHOLV, 869463, HDL, HDLP, LDL, LDLC, DLDLP, 508057, VLDLC, VLDL, TGLX, TRIGL, TRIGP, TGLPOCT, CHHD, CHHDX  
BNP No results for input(s): BNPP in the last 72 hours. Liver Enzymes Recent Labs 10/28/20 
0410 TP 6.8 ALB 3.9 AP 59 Thyroid Studies No results found for: T4, T3U, TSH, TSHEXT, TSHEXT Procedures/imaging: see electronic medical records for all procedures/Xrays and details which were not copied into this note but were reviewed prior to creation of Plan

## 2020-10-28 NOTE — PROGRESS NOTES
Problem: Mobility Impaired (Adult and Pediatric) Goal: *Acute Goals and Plan of Care (Insert Text) Description: Physical Therapy Goals Initiated 10/13/2020, revised 10/23/2020 and to be accomplished within 7 day(s) 1. Patient will move from supine to sit and sit to supine  in bed with minimal assistance. 2.  Patient will transfer from bed to chair and chair to bed with moderate assistance using the least restrictive device. 3.  Patient will perform sit to stand with moderate assistance. 4.  Patient will ambulate with moderate assistance for 15 feet with the least restrictive device. Outcome: Progressing Towards Goal 
 PHYSICAL THERAPY TREATMENT Patient: Matt Salazar (53 y.o. male) Date: 10/28/2020 Diagnosis: Hypocalcemia [E83.51] Hypocalcemia Procedure(s) (LRB): ESOPHAGOGASTRODUODENOSCOPY (EGD) (N/A) Day of Surgery Precautions: Fall PLOF: independent, ETOH abuse, ambulatory with quad cane ASSESSMENT: 
Myrna to perform log roll, modA to sit up EOB. Pt able to maintain sitting balance with (B) UE support. 3 attempts for sit to stand with bed elevated with each attempt, pt unable to stand this session. Performed seated LE and cervical ROM strengthening exercises. ModA back to bed, HOB at 30 degrees and pillow under R hip to wt shift off of coccyx. Progression toward goals:  
[]      Improving appropriately and progressing toward goals [x]      Improving very slowly and progressing toward goals 
[]      Not making progress toward goals and plan of care will be adjusted PLAN: 
Patient continues to benefit from skilled intervention to address the above impairments. Continue treatment per established plan of care. Discharge Recommendations:  LTC Further Equipment Recommendations for Discharge:  TBD by next level of care SUBJECTIVE:  
Patient stated I want to change my position.  OBJECTIVE DATA SUMMARY:  
Critical Behavior: 
Neurologic State: Alert Orientation Level: Oriented X4 Cognition: Follows commands, Decreased attention/concentration Safety/Judgement: Awareness of environment Functional Mobility Training: 
Bed Mobility: 
Rolling: Minimum assistance; Additional time Supine to Sit: Moderate assistance; Additional time Sit to Supine: Moderate assistance; Additional time Scooting: Maximum assistance Transfers: 
Sit to Stand: Moderate assistance(bed elevated) Stand to Sit: Minimum assistance Balance: 
Sitting: Intact; With support Standing: (unable to stand) Therapeutic Exercises:  
 
 
 
EXERCISE Sets Reps Active Active Assist  
Passive Self ROM Comments Ankle Pumps    [] [] [] [] Quad Sets/Glut Sets    [] [] [] [] Hold for 5 secs Hamstring Sets   [] [] [] [] Short Arc Quads   [] [] [] [] Heel Slides   [] [] [] [] Straight Leg Raises   [] [] [] [] Hip Add   [] [] [] [] Hold for 5 secs, w/ pillow squeeze Long Arc Quads  10 [x] [] [] [] Shoulder retraction  5 [x] [] [] [] Holding head in neutral   
Cervical extension  5 [x] [] [] [] Holding head in neutral  
Tricep push up  5 [x] [] [] [] Sitting EOB Pain: 
Pain level pre-treatment: 0/10 Pain level post-treatment: 0/10 Pain Intervention(s): Medication (see MAR); Rest, Ice, Repositioning Response to intervention: Nurse notified, See doc flow Activity Tolerance:  
Fair-/poor Please refer to the flowsheet for vital signs taken during this treatment. After treatment:  
[] Patient left in no apparent distress sitting up in chair 
[x] Patient left in no apparent distress in bed 
[x] Call bell left within reach 
[] Nursing notified 
[] Caregiver present 
[] Bed alarm activated 
[] SCDs applied COMMUNICATION/EDUCATION:  
[x]         Role of Physical Therapy in the acute care setting. [x]         Fall prevention education was provided and the patient/caregiver indicated understanding.  
[x]         Patient/family have participated as able in working toward goals and plan of care. [x]         Patient/family agree to work toward stated goals and plan of care. []         Patient understands intent and goals of therapy, but is neutral about his/her participation. []         Patient is unable to participate in stated goals/plan of care: ongoing with therapy staff. 
[]         Other: 
 
   
Devon Lazar PTA Time Calculation: 24 mins

## 2020-10-28 NOTE — DIABETES MGMT
GLYCEMIC CONTROL PROGRESS NOTE: 
 
- discussed in rounds, known h/o T2DM, basal/oral home regimen - BG out of target range ICU: 140-180 mg/dl, trending up requiring consistent coverage - TDD = 55 units - Humalog Very Insulin Resistant Corrective Coverage - per RN pt with good PO intake 
- both FBG & PPG out of target range recommend adjustment to basal/bolus regimen *Lantus 16 daily *Humalog 10 units qac - Recent Glucose Results:  
Lab Results Component Value Date/Time  (H) 10/28/2020 04:10 AM  
 GLUCPOC 201 (H) 10/28/2020 07:01 AM  
 GLUCPOC 102 10/27/2020 09:08 PM  
 GLUCPOC 191 (H) 10/27/2020 04:22 PM  
 
 
 
Kevin Levine MS, RN, CDE Glycemic Control Team 
[phone] Pager [phone] (M-TH 8:00-4:30P) *After Hours pager [phone]

## 2020-10-28 NOTE — PROCEDURES
[facility] 137 [facility] Faiza Rebolledo MD, Pearlean Cogan, [facility] Atkins MD, MPH Calvin Santana, PAURIEL Fortune, [facility] Catalina Capone, [facility]   Radha Villalba, FNP-C Cathryn Finch, [facility] Jonatan Van [facility] 136 
  at [address], [facility] Vini Fairbanks  22. 
  [phone] FAX: [address] [place] [address] 
  [address], [address] 98 [facility], [address] 
  [phone] FAX: [phone] UPPER ENDOSCOPY PROCEDURE NOTE Matt Stark 1965 INDICATION: Cirrhosis. Screening for esophageal varices with variceal ligation if  indicated. : Faiza Rebolledo MD 
 
SURGICAL ASSISTANT:  None PROSTHETIC DEVISES, TISSUE GRAFTS, ORGAN TRANSPLANTS:  Not applicable ANESTHESIA/SEDATION: Versed 4 mg and Fentanyl 100 mcg PROCEDURE DESCRIPTION: 
[employer] consent was obtained from the patient for the procedure. All risks and benefits of the procedure explained. The procedure was performed in the endoscopy suite. The patient was laying on a stretcher and moved to the left lateral decubitus position prior to administration of sedation. Sedation with Versed and Fentanyl were administered prior to initiating the procedure and during the procedure to the doses indicated above. The endoscope was inserted into the mouth and advanced under direct vision to the second portion of the duodenum. Careful inspection of upper gastrointestinal tract was made as the endoscope was inserted and withdrawn. Retroflexion of the endoscope to view of the cardia of the stomach was performed. The findings and interventions are described below. FINDINGS: 
Esophagus:   
Normal.   
 
Stomach:  
Normal 
No portal hypertensive gastropathy No gastric varices identified. Duodenum:  
Normal bulb and second portion INTERVENTION:  
None COMPLICATIONS: None. The patient tolerated the procedure well. EBL: Negligible. RECOMMENDATIONS: 
Transfer back to room when stable. May resume previous diet. Stop measuring HB Q6H MD Jonatan De [facility] [address] [place] [address], [address] Vini Hernández  22. 
[phone] [address] [place] [address]

## 2020-10-28 NOTE — PERIOP NOTES
Report called to [employer] on [address] [place], Fentanyl 100 mcg and Versed 4 mg given during the procedure, VSS, O2 sats 98% on RA, patient responds to verbal commands

## 2020-10-28 NOTE — PROGRESS NOTES
Problem: Self Care Deficits Care Plan (Adult) Goal: *Acute Goals and Plan of Care (Insert Text) Description: Initial Occupational Therapy Goals (10/23/2020) Within 7 day(s): 1. Patient will perform grooming seated EOB with  CGA x 5 minutes for increased independence with ADLs. 2. Patient will perform UB dressing with CGA for increased independence with ADLs. 3. Patient will perform LB dressing with min A & A/E PRN for increased independence with ADLs. 4. Patient will perform all aspects of toileting with CGA for increased independence in ADLs 5. Patient will independently apply energy conservation techniques with 1 verbal cue(s) for increased independence with ADLs. 6. Patient will perform bed <> BSC transfer with min A for increased independence with toileting. Outcome: Progressing Towards Goal 
 OCCUPATIONAL THERAPY TREATMENT Patient: Bianca Plummer (53 y.o. male) Date: 10/28/2020 Diagnosis: Hypocalcemia [E83.51] Hypocalcemia Procedure(s) (LRB): ESOPHAGOGASTRODUODENOSCOPY (EGD) (N/A) Day of Surgery Precautions: Fall Chart, occupational therapy assessment, plan of care, and goals were reviewed. ASSESSMENT: 
Pt found supine in bed resting, pt opening eyes to verbal stimulus and agreeable to therapy. Pt very lethargic at first, however motivated to participate and became more alert during bed mobility. Pt requesting to wash face, pt able to complete task using LUE long sitting on bed with setup/SBA. Pt required additional time and frequent rest breaks to complete all functional mobility. Pt limited by pain in coccyx during bed mobility, pt reporting 7/10. Pt sat up to EOB with mod A for trunk support. Pt able to perform STS with mod A/bed elevated/vc, pt only able to tolerate ~10 seconds standing before sitting back down. Pt reports increase of back pain when standing, and feeling very weak. Pt attempted to scoot to [facility], required mod A to complete.  Pt returned to supine with min A for BLE support. Educated pt on independent pressure relief in bed, assisted with positioning to minimize coccyx pain. Pt required mod A to scoot up towards [facility] in supine. Pt left with call bell/phone within reach. Progression toward goals: 
[]          Improving appropriately and progressing toward goals [x]          Improving slowly and progressing toward goals 
[]          Not making progress toward goals and plan of care will be adjusted PLAN: 
Patient continues to benefit from skilled intervention to address the above impairments. Continue treatment per established plan of care. Discharge Recommendations:  Jesu De La Fuente Further Equipment Recommendations for Discharge: To Be Determined (TBD) at next level of care SUBJECTIVE:  
Patient stated Eduardo Cesar in bed is easier but I'm not going to get any better like that.  OBJECTIVE DATA SUMMARY:  
Cognitive/Behavioral Status: 
Neurologic State: Alert Orientation Level: Oriented X4 Cognition: Follows commands, Decreased attention/concentration Safety/Judgement: Awareness of environment Functional Mobility and Transfers for ADLs: 
 Bed Mobility: 
Supine to Sit: Moderate assistance; Additional time(for trunk support) Sit to Supine: Minimum assistance; Additional time Scooting: Moderate assistance Transfers: 
Sit to Stand: Moderate assistance(bed elevated) Balance: 
Sitting: Intact Standing: Impaired; With support ADL Intervention: 
Grooming Grooming Assistance: Set-up Position Performed: Long sitting on bed Washing Face: Set-up; Stand-by assistance(LUE) Cognitive Retraining Safety/Judgement: Awareness of environment Pain: 
Pain level pre-treatment: 7/10 Pain level post-treatment: 7/10 Pain Intervention(s): Medication administered by RN (see MAR); Rest, Ice, Repositioning Response to intervention: Nurse notified, See doc flow sheet Activity Tolerance: Poor. Pt able to stand ~10 seconds. Pt limited by strength, endurance, ROM, balance, pain Please refer to the flowsheet for vital signs taken during this treatment. After treatment:  
[]  Patient left in no apparent distress sitting up in chair 
[x]  Patient left in no apparent distress in bed 
[x]  Call bell left within reach [x]  Nursing notified 
[]  Caregiver present 
[]  Bed alarm activated COMMUNICATION/EDUCATION:  
[x] Role of Occupational Therapy in the acute care setting 
[x] Home safety education was provided and the patient/caregiver indicated understanding. [x] Patient/family have participated as able in working towards goals and plan of care. [x] Patient/family agree to work toward stated goals and plan of care. [] Patient understands intent and goals of therapy, but is neutral about his/her participation. [] Patient is unable to participate in goal setting and plan of care. Thank you for this referral. 
Neville Donnelly OTR/L Time Calculation: 44 mins

## 2020-10-28 NOTE — PROGRESS NOTES
[address] [facility] [address] 137 [facility] Kristin Lavelle Marroquin MD, [facility] MD Monet Mars, PA-HUSEYIN Coe, [facility] Catalina Capone, [facility]   Raul Delaney FNP-C Pushpa Galvez, [facility] Jonatan [facility] 136 
  at [address], [address] Vini Fairbanks  22. 
  [phone] FAX: [address] [place] [address] Avenue 
  [facility] 
  [address], [address] 98 [facility], [address] 
  [phone] FAX: [phone] HEPATOLOGY PROGRESS NOTE The patient is a 47year old  male without previous knowledge of liver disease. He says he used to consume alcohol fairly heavily several years ago but cut back to only 1-2 drinks a few days per week 2 years ago. He notes progressive weakness and lower extremity swelling for the past few months and says he has had no alcohol for 2-3 weeks. He notes problem concentrating on work and increased forgetfullness. He was admitted to medical floor, developed aspiration and was transferred to ICU. Alert, oriented. Eating well. Still weak,  Motivated to work with PT and wants to go to rehab Understands he will need liver transplant and we will wrap up eval after he completes rehab Continues to need a lot of calories. For EGD this AM.   
Can tranasfer to rehab whenever suitable institution found. I will see him  Back in my offie once he is out of rehab and complete LT evaluation. ASSESSMENT AND PLAN: 
Cirrhosis The diagnosis of cirrhosis is based upon laboratory studies Cirrhosis is likely to be due to chronic alcohol use. Serology for other causes of chronic liver disease are all negative. The CTP is down to 7. Child class B.   The MELD score is down to 17  
 
 Cardiac portion of liver transplant evaluation testing completed before this acute event. ECHO was normal.   
Lexicon stress was negative for ischemia Will need to address nutrition and ambulation before we can wrap up LT evaluation. Alcohol liver disease The diagnosis is based upon a history of consuming significant alcohol on a daily basis for many years. The patient has been abstinent from alcohol since 9/2020. He will need to enter ETOH counseling after he finishes rehab. Screening for Esophageal varices The patient has not had an EGD to screen for varices. I do not want to do EGD now because of the very low PLT count. Will give PLT growth factor and perform EGD as OP after PLT is up and it would be safer for banding and bx if any lesions found. Hepatic encephalopathy Overt HE has not developed to date. Because of AMS he was placed on lactulose BID He can remain on this for now at current dose of QD Anemia This is due to multifactorial causes including portal hypertension with chronic GI blood loss, bone marrow suppression secondary to malnutrition and alcohol. FE panel was normal 
HB has drifted down from 10 to 8s over past week. Will perform EGD to screen for varices Tuesday or Wednesday prior to DC. Coagulopathy This has improved with treatment of infection and resolution of DIC INR down today to 1.8 Thrombocytopenia This has improved with treatment of infection and resolution of DIC 
PLT count now up from 20s to 50s. Screening for Hepatocellular Carcinoma US shows cirrhosis and no liver mass. Malnutrition/muscle wasting He needs maximal nutrition to deal with severe muscle wasting He needs to have as many Ensure as he can possible drink Needs OT and PT to help with ADL and ambulation Will need to transfer to rehab for about 2 weeks before going home PHYSICAL EXAMINATION: 
VS: per nursing note General: No acute distress. Eyes: Sclera anicteric. ENT: No oral lesions. Thyroid normal. 
Nodes: No adenopathy. Skin: No spider angiomata. No jaundice. No palmar erythema. Respiratory: Lungs clear to auscultation. Cardiovascular: Regular heart rate. No murmurs. No JVD. Abdomen: Soft non-tender, liver size normal to percussion/palpation. Spleen not palpable. No obvious ascites. Extremities: No edema. Muscle wasting. Neurologic: Alert and oriented. Cranial nerves grossly intact. No asterixis. LABORATORY: 
Results for Nolvia Yu (MRN [id number]) as of 10/27/2020 09:43 Ref. Range 10/25/2020 04:45 10/26/2020 00:09 10/27/2020 02:00 WBC Latest Ref Range: 4.6 - 13.2 K/uL 8.3 9.4 HGB Latest Ref Range: 13.0 - 16.0 g/dL 7.9 (L) 8.3 (L) PLATELET Latest Ref Range: 135 - 420 K/uL 43 (L) 52 (L) INR Latest Ref Range: 0.8 - 1.2   1.8 (H) 1.8 (H) 1.9 (H) Sodium Latest Ref Range: 136 - 145 mmol/L 141 140 137 Potassium Latest Ref Range: 3.5 - 5.5 mmol/L 3.4 (L) 3.6 3.8 Chloride Latest Ref Range: 100 - 111 mmol/L 108 107 102 CO2 Latest Ref Range: 21 - 32 mmol/L 27 25 26 Glucose Latest Ref Range: 74 - 99 mg/dL 153 (H) 316 (H) 187 (H) BUN Latest Ref Range: 7.0 - 18 MG/DL 10 12 11 Creatinine Latest Ref Range: 0.6 - 1.3 MG/DL 0.62 0.80 0.77 Bilirubin, total Latest Ref Range: 0.2 - 1.0 MG/DL 3.6 (H) 3.0 (H) 3.2 (H) Albumin Latest Ref Range: 3.4 - 5.0 g/dL 3.9 4.0 4.0 ALT Latest Ref Range: 16 - 61 U/L 40 41 43 AST Latest Ref Range: 10 - 38 U/L 46 (H) 42 (H) 44 (H) Alk. phosphatase Latest Ref Range: 45 - 117 U/L 45 68 61 RADIOLOGY: 
10/2020. CT scan abdomen without IV contrast.  Changes consistent with fatty liver and hepatomegaly. Splenomegaly. No ascites. Loreto Luna MD 
[address] [facility], [address] [place], [facility] [address] 
[phone] [address] [place] [address]

## 2020-10-28 NOTE — PROGRESS NOTES
D/C Plan: [address] [place] [address] 10/29/2020 Insurance [facility] has been approved. CM notified provider and have been notified pt will transition to [address] [place] [address] tomorrow. CM contacted pt's wife and made her aware of plan to transfer to [address] [place] [address] tomorrow. Transition of Care Plan:  
 
Communication to Patient/Family: Met with patient and family, and they are agreeable to the transition plan. The Plan for Transition of Care is related to the following treatment goals: SNF The Patient and/or patient representative  was provided with a choice of provider and agrees  
with the discharge plan. Yes [x] No [] Freedom of choice list was provided with basic dialogue that supports the patient's individualized plan of care/goals and shares the quality data associated with the providers. Yes [x] No [] SNF/Rehab Transition: 
Patient has been accepted to [facility] at [address] [facility], [address] [facility] for SNF and meets criteria for admission. Patient will transported by medical transport and expected to leave tomorrow. Communication to SNF/Rehab: 
Bedside RN,  has been notified to update the transition plan to the facility and call report [phone]. Discharge information has been updated on the AVS and communicated through [facility] or [facility] Link. Discharge instructions to be fax'd to facility at [phone] Please include all hard scripts for controlled substances, med rec and dc summary, and AVS in packet. Please medicate for pain prior to dc if possible and needed to help offset delay when patient first arrives to facility. Reviewed and confirmed with facility, [facility] can manage the patient care needs for the following:  
 
Jeancarlos Pete with (X) only those applicable: 
Medication: 
[]Medications are available at the facility []IV Antibiotics []Controlled Substance  hard copies available sent. []Weekly Labs Equipment: 
[]CPAP/BiPAP []Wound Vacuum []Ward or Urinary Device []PICC/Central Line []Nebulizer []Ventilator Treatment: 
[]Isolation (for MRSA, VRE, etc.) []Surgical Drain Management []Tracheostomy Care 
[]Dressing Changes []Dialysis with transportation []PEG Care []Oxygen []Daily Weights for Heart Failure Dietary: 
[]Any diet limitations []Tube Feedings []Total Parenteral Management (TPN) Financial Resources: 
[]Medicaid Application Completed [x]UAI Completed and copy given to pt/family. []A screening has previously been completed. []Level II Completed 
 
[] Private pay individual who will not become  
financially eligible for Medicaid within 6 months from admission to a [address] [place] [address] facility. [] Individual refused to have screening conducted. []Medicaid Application Completed []The screening denied because it was determined individual did not need/did not qualify for nursing facility level of care. [] Out of state residents seeking direct admission to a [address] facility. [] Individuals who are inpatients of an out of state hospital, or in state or out of state veterans/ hospital and seek direct admission to a [address] facility [] Individuals who are pateints or residents of a state owned/operated facility that is licensed by [employer] and seek direct admission to [address] facility [] A screening not required for enrollment in [facility] services as set out in 12 VAC 30- [] [facility] [place]) staff shall perform screenings of the [facility] clients. Advanced Care Plan: 
[]Surrogate Decision Maker of Care 
[]POA []Communicated Code Status and copy sent. Other:  
   
 
Care Management Interventions PCP Verified by CM: Yes Last Visit to PCP: 10/08/20 Mode of Transport at Discharge: BLS Transition of Care Consult (CM Consult): SNF Partner SNF: Yes Health Maintenance Reviewed: Yes Physical Therapy Consult: Yes Occupational Therapy Consult:  Yes 
 Current Support Network: Lives with Spouse Confirm Follow Up Transport: Family The Plan for Transition of Care is Related to the Following Treatment Goals : [facility] The Patient and/or Patient Representative was Provided with a Choice of Provider and Agrees with the Discharge Plan?: Yes Name of the Patient Representative Who was Provided with a Choice of Provider and Agrees with the Discharge Plan: pt/sife Freedom of Choice List was Provided with Basic Dialogue that Supports the Patient's Individualized Plan of Care/Goals, Treatment Preferences and Shares the Quality Data Associated with the Providers?: Yes Discharge Location Discharge Placement: Skilled nursing facility([facility])

## 2020-10-29 NOTE — DIABETES MGMT
GLYCEMIC CONTROL PROGRESS NOTE: 
 
- discussed in rounds, known h/o T2DM, basal/oral home regimen - BG out of target range ICU: 140-180 mg/dl, trending up requiring consistent coverage - TDD = 50 (14 Lantus + 18 (6) MTI + 18 units - Humalog Very Insulin Resistant Corrective Coverage) - per RN pt with good PO intake - Lantus increased per Hospitalist, do believe pt would benefit from adjustment to mealtime insulin *Humalog 8-10 units qac - Recent Glucose Results:  
Lab Results Component Value Date/Time  (H) 10/29/2020 04:40 AM  
 GLUCPOC 206 (H) 10/29/2020 08:18 AM  
 GLUCPOC 158 (H) 10/28/2020 08:57 PM  
 GLUCPOC 142 (H) 10/28/2020 03:33 PM  
 
 
 
Jamar Holder MS, RN, CDE Glycemic Control Team 
[phone] Pager [phone] (M-TH 8:00-4:30P) *After Hours pager [phone]

## 2020-10-29 NOTE — WOUND CARE
10/28/20 1100 Wound Sacral/coccyx Lower;Medial redden and small opening to scral/coccyx 10/09/20 Date First Assessed/Time First Assessed: 10/09/20 1440   Present on Hospital Admission: Yes  Location: Sacral/coccyx  Wound Location Orientation: Lower;Medial  Wound Description: redden and small opening to scral/coccyx  Date of First Observation: 10/. .. Dressing Status Breakthrough drainage; Intact Dressing Type Foam  
Pressure Injury Unstageable 
(kennady ulcer) Wound Length (cm) 12.4 cm 
(approximate, area has eschar and DTI) Wound Width (cm) 9 cm Wound Depth (cm)  
(unstageable) Wound Surface Area (cm^2) 111.6 cm^2 Condition of Base White;Eschar Assessment Dusky;Brown;Painful Drainage Amount Small Wound Odor Mild Kandice-wound Assessment Blanchable erythema;Non-blanchable erythema;Burgundy;Painful Cleansing and Cleansing Agents  Dermal wound cleanser Dressing Changed Changed/New Dressing Type Applied Foam 
(to order santyl) Procedure Tolerated Fair

## 2020-10-29 NOTE — PROGRESS NOTES
Problem: Pressure Injury - Risk of 
Goal: *Prevention of pressure injury Description: Document Trav Scale and appropriate interventions in the flowsheet. Outcome: Progressing Towards Goal 
Note: Pressure Injury Interventions: 
Sensory Interventions: Assess changes in LOC, Float heels, Keep linens dry and wrinkle-free, Pad between skin to skin, Pressure redistribution bed/mattress (bed type) Moisture Interventions: Absorbent underpads, Maintain skin hydration (lotion/cream), Minimize layers Activity Interventions: Pressure redistribution bed/mattress(bed type), PT/OT evaluation Mobility Interventions: PT/OT evaluation, Pressure redistribution bed/mattress (bed type) Nutrition Interventions: Document food/fluid/supplement intake Friction and Shear Interventions: Apply protective barrier, creams and emollients, Minimize layers, Foam dressings/transparent film/skin sealants Problem: Falls - Risk of 
Goal: *Absence of Falls Description: Document Oscar Click Fall Risk and appropriate interventions in the flowsheet. Outcome: Progressing Towards Goal 
Note: Fall Risk Interventions: 
Mobility Interventions: Bed/chair exit alarm, Patient to call before getting OOB, PT Consult for assist device competence, PT Consult for mobility concerns Mentation Interventions: Adequate sleep, hydration, pain control, Door open when patient unattended Medication Interventions: Patient to call before getting OOB, Teach patient to arise slowly, Bed/chair exit alarm Elimination Interventions: Bed/chair exit alarm, Call light in reach, Patient to call for help with toileting needs, Urinal in reach History of Falls Interventions: Bed/chair exit alarm, Door open when patient unattended, Room close to nurse's station Problem: Pain Goal: *Control of Pain Outcome: Progressing Towards Goal

## 2020-10-29 NOTE — PROGRESS NOTES
Incontinent of urine - eb care done, gown and pad changed. Encouraged to drink Glucerna supplement. Pt more alert and oriented than during weekend. 0440 PICC line dressing changed. Blood drawn and line flushed. Voiding in urinal. Lidocaine patch to R shoulder blade and L hip per pt's preference. Given Roxicodone once during shift, rested or slept most of the time. Mepilex to L flank and sacral area CDI. Pt repositioned with pillows several times during shift. Drinking dietary supplement of Glucerna. Shift uneventful

## 2020-10-30 NOTE — PROGRESS NOTES
Problem: Falls - Risk of 
Goal: *Absence of Falls Description: Document Benjamin Apo Fall Risk and appropriate interventions in the flowsheet. 10/29/2020 2023 by Imer Massey Outcome: Resolved/Met 
10/29/2020 1518 by Raul Dumont Matter Outcome: Progressing Towards Goal 
Note: Fall Risk Interventions: 
Mobility Interventions: Bed/chair exit alarm, Patient to call before getting OOB, PT Consult for assist device competence, PT Consult for mobility concerns Mentation Interventions: Adequate sleep, hydration, pain control, Door open when patient unattended Medication Interventions: Patient to call before getting OOB, Teach patient to arise slowly, Bed/chair exit alarm Elimination Interventions: Bed/chair exit alarm, Call light in reach, Patient to call for help with toileting needs, Urinal in reach History of Falls Interventions: Bed/chair exit alarm, Door open when patient unattended, Room close to nurse's station

## 2020-10-30 NOTE — PROGRESS NOTES
0730- Bedside and Verbal shift change report given to Yahaira Rosenthal RN (oncoming nurse) by Holland West RN (offgoing nurse). Report included the following information SBAR, Kardex, Intake/Output and MAR. 
 
1100- Spoke to Dr. Kishor Garcia about whether or not to take out PICC line. She stated she would put in an order to discontinue PICC line. 1359- Removed PICC line and applied pressure to site with gauze pad for several minutes. Minimal drainage noted. Tegaderm applied over gauze pad with airtight seal. 
 
1520- Changed patient brief, completed eb care. Re-dressed Mepilex on back with foam and gauze. Hydragaurd cream applied to groin and between legs. 1610- Patient scratched abcess on back. It started bleeding. CNA notified me. I placed mepilex on abcess to stop bleeding. 1700- Called medical transport to inquire estimated time of arrival. They stated that they were running late and planned to be there in 45 minutes. 1830- Patient taken off floor by medical transport. Brief SBAR given to medical transport.

## 2020-11-23 NOTE — ED PROVIDER NOTES
EMERGENCY DEPARTMENT HISTORY AND PHYSICAL EXAM 
 
Date: 11/23/2020 Patient Name: Dominik Bear History of Presenting Illness Chief Complaint Patient presents with  
 Skin Problem History Provided By: Patient Additional History (Context):  
Dominik Bear is a 54 y.o. male with PMHX diabetes presents to the emergency department via private vehicle C/O wound to his right buttocks after prolonged stay in the hospital and rehab facility. His family member at bedside reports that he has a home health nurse who did not notice the wound. It is unclear how long the wound has been there. His wife noticed that the wound has been there for 2 days and has been changing the dressing since yesterday. Pt denies fever, increasing pain, purulent discharge, and any other sxs or complaints. PCP: Joey Gray NP Current Facility-Administered Medications Medication Dose Route Frequency Provider Last Rate Last Dose  
 HYDROcodone-acetaminophen (NORCO) 5-325 mg per tablet 1 Tab  1 Tab Oral NOW Renee Giron,       
 
Current Outpatient Medications Medication Sig Dispense Refill  
 HYDROcodone-acetaminophen (Norco) 5-325 mg per tablet Take 1 Tab by mouth every six (6) hours as needed for Pain for up to 3 days. Max Daily Amount: 4 Tabs. 10 Tab 0  
 methocarbamoL (ROBAXIN) 500 mg tablet Take 500 mg by mouth four (4) times daily as needed for Muscle Spasm(s) or Pain.  insulin glargine (LANTUS) 100 unit/mL injection 20 Units by SubCUTAneous route nightly. (Patient taking differently: 10 Units by SubCUTAneous route nightly.) 1 Vial 0  
 insulin lispro (HUMALOG) 100 unit/mL injection 6 Units by SubCUTAneous route Before breakfast, lunch, and dinner. 1 Vial 0  
 lactulose (CHRONULAC) 10 gram/15 mL solution Take 45 mL by mouth daily. 1 Bottle 0  
 pantoprazole (PROTONIX) 40 mg tablet Take 1 Tab by mouth Daily (before breakfast).  30 Tab 0  
  potassium chloride (K-DUR, KLOR-CON) 20 mEq tablet Take 1 Tab by mouth daily. 10 Tab 0  
 thiamine mononitrate (B-1) 100 mg tablet Take 1 Tab by mouth daily. 10 Tab 0  
 lidocaine (LIDODERM) 5 % 2 Patches by TransDERmal route every twenty-four (24) hours. Apply patch to the affected area for 12 hours a day and remove for 12 hours a day. One patch is applied to left hip. One patch is applied to right shoulder blade.  DULoxetine (CYMBALTA) 60 mg capsule Take 1 Cap by mouth as needed.  traZODone (DESYREL) 50 mg tablet Take 50 mg by mouth nightly. Past History Past Medical History: 
Past Medical History:  
Diagnosis Date  Diabetes ([address]  Gastroparesis  Pancreatitis Past Surgical History: 
Past Surgical History:  
Procedure Laterality Date  HX CHOLECYSTECTOMY  HX HIP FRACTURE TX    
 left hip sx 9.23/2018 Family History: 
History reviewed. No pertinent family history. Social History: 
Social History Tobacco Use  Smoking status: Current Every Day Smoker  Smokeless tobacco: Never Used Substance Use Topics  Alcohol use: Not Currently  Drug use: No  
 
 
Allergies: 
No Known Allergies Review of Systems Review of Systems Constitutional: Negative for chills and fever. HENT: Negative for congestion, ear pain, sinus pain and sore throat. Eyes: Negative for pain and visual disturbance. Respiratory: Negative for cough and shortness of breath. Cardiovascular: Negative for chest pain and leg swelling. Gastrointestinal: Negative for abdominal pain, constipation, diarrhea, nausea and vomiting. Genitourinary: Negative for dysuria and hematuria. Musculoskeletal: Negative for back pain and neck pain. Skin: Positive for wound. Negative for pallor and rash. Neurological: Negative for dizziness, tremors, weakness, light-headedness and headaches. All other systems reviewed and are negative. Physical Exam  
 
Vitals: 11/23/20 1544 BP: 107/80 Pulse: (!) 118 Resp: 18 Temp: 97.7 °F (36.5 °C) SpO2: 100% Weight: 63.5 kg (140 lb) Height: 5' 9\" (1.753 m) Physical Exam 
Musculoskeletal:  
     Legs: 
 
 
 
 
Nursing note and vitals reviewed Constitutional: Chronically ill-appearing male, no acute distress Head: Normocephalic, Atraumatic Eyes: Pupils are equal, round, and reactive to light, EOMI Neck: Supple, non-tender Cardiovascular: Regular rate and rhythm, no murmurs, rubs, or gallops, + 2 radial pulses bilaterally Chest: Normal work of breathing and chest excursion bilaterally Lungs: Clear to ausculation bilaterally, no wheezes, no rhonchi Abdomen: Soft, non tender, non distended, normoactive bowel sounds Back: No evidence of trauma or deformity. There is stage II sacral decubitus ulcer around the gluteal cleft along his right buttocks. It is approximately 7 cm x 2 cm. There is no overlying erythema or purulent discharge. There is no noted necrosis. There is no palpable fluctuance. Extremities: No evidence of trauma or deformity, no LE edema. No streaking erythema, vesicular lesions, ulcerations or bulla Skin: Warm and dry, normal cap refill Neuro: Alert and appropriate, CN intact, normal speech, moving all 4 extremities freely and symmetrically Psychiatric: Normal mood and affect Diagnostic Study Results Labs - No results found for this or any previous visit (from the past 12 hour(s)). Radiologic Studies - No orders to display CT Results  (Last 48 hours) None CXR Results  (Last 48 hours) None Medical Decision Making I am the first provider for this patient. I reviewed the vital signs, available nursing notes, past medical history, past surgical history, family history and social history. Vital Signs-Reviewed the patient's vital signs. Pulse Oximetry Analysis -100% on room air Cardiac Monitor: 
Rate: 100 bpm 
Rhythm: Regular Records Reviewed: Nursing Notes and Old Medical Records Provider Notes:  
54 y.o. male presenting with sacral decub ulcer, unclear chronicity. Patient does not appear acutely ill or toxic. He is afebrile. He has a 7 x 2 cm stage 2 sacral decub along his right buttocks and gluteal cleft. There is no overlying erythema or purulence. There is no fluctuance. No necrosis. Patient will be given a Norco for pain control. There does not appear to have an active infection thus lab work and antibiotics is not indicated at this time. Will apply pressure dressing and discussed symptom control and supportive care. Patient's information was faxed over to wound clinic for continued management. Patient and his family member are in agreement with this plan. Procedures: 
Procedures ED Course:  
4:02 PM 
 Initial assessment performed. The patients presenting problems have been discussed, and they are in agreement with the care plan formulated and outlined with them. I have encouraged them to ask questions as they arise throughout their visit. Diagnosis and Disposition DISCHARGE NOTE: 
4:40 PM 
 
[place]  results have been reviewed with him. He has been counseled regarding his diagnosis, treatment, and plan. He verbally conveys understanding and agreement of the signs, symptoms, diagnosis, treatment and prognosis and additionally agrees to follow up as discussed. He also agrees with the care-plan and conveys that all of his questions have been answered. I have also provided discharge instructions for him that include: educational information regarding their diagnosis and treatment, and list of reasons why they would want to return to the ED prior to their follow-up appointment, should his condition change. He has been provided with education for proper emergency department utilization. CLINICAL IMPRESSION: 
 
1. Decubitus ulcer of sacral region, stage 2 ([facility] [place] [address] PLAN: 
1. D/C Home 2.  
Current Discharge Medication List  
  
START taking these medications Details HYDROcodone-acetaminophen (Norco) 5-325 mg per tablet Take 1 Tab by mouth every six (6) hours as needed for Pain for up to 3 days. Max Daily Amount: 4 Tabs. Qty: 10 Tab, Refills: 0 Associated Diagnoses: Decubitus ulcer of sacral region, stage 2 ([address] 3. Follow-up Information Follow up With Specialties Details Why Contact Info Telly Bassett NP Nurse Practitioner Schedule an appointment as soon as possible for a visit in 2 days  [address] [place] [address] [address] [facility] 
[phone] 
  
 wound clinic  Schedule an appointment as soon as possible for a visit [facility] EMERGENCY DEPT Emergency Medicine  As needed if symptoms worsen [address] [id number] 
[phone]  
  
 
____________________________________ Please note that this dictation was completed with [employer], the computer voice recognition software. Quite often unanticipated grammatical, syntax, homophones, and other interpretive errors are inadvertently transcribed by the computer software. Please disregard these errors. Please excuse any errors that have escaped final proofreading.

## 2020-11-23 NOTE — DISCHARGE INSTRUCTIONS
You were seen and evaluated in the Emergency Department. Please understand that your work up is not all encompassing and you should follow up with your primary care physician for further management and continuity of care. Please return to Emergency Department or seek medical attention immediately if you have acute worsening in your symptoms or develop chest pain, shortness of breath, repeated vomiting, fever, altered level of consciousness, coughing up blood, or start sweating and feel clammy. If you were prescribed any medicine for home, please take as prescribed by your health-care provider. If you were given any follow-up appointments or numbers to call, please do so as instructed. Avoid any tobacco products or excessive alcohol. Patient Education        Pressure Injuries: Care Instructions  Your Care Instructions     A pressure injury on the skin is caused by constant pressure to that area. These injuries--also called decubitus ulcers or bedsores--may happen when you lie in bed or sit in a wheelchair for a long time. The constant pressure blocks the blood supply to the skin. This causes skin cells to die and creates a sore. Pressure injuries usually occur over bony areas, such as the hips, lower back, elbows, heels, and shoulders. They also can occur in places where the skin folds over on itself. You may have mild redness or open sores that are harder to heal.  Good care at home can help heal pressure injuries. You or your caregiver needs to check your skin every day for sores. You need good nutrition and plenty of fluids to keep your skin healthy and prevent new pressure injuries. Follow-up care is a key part of your treatment and safety. Be sure to make and go to all appointments, and call your doctor if you are having problems. It's also a good idea to know your test results and keep a list of the medicines you take. How can you care for yourself at home?   · If your doctor prescribed a medicated ointment or cream, use it exactly as prescribed. Call your doctor if you think you are having a problem with your medicine. · Wash pressure injuries every day, or as often as your doctor recommends. Most tap water is safe, but follow the advice of your doctor or nurse. He or she may recommend that you use a saline solution. This is a salt and water solution that you can buy over the counter. · Put on bandages as your doctor or wound care specialist says. · Keep healthy tissue around the sore clean and dry. · Check your skin every day for sores (or have a caregiver do it). · If you know what is causing the pressure that caused the sore, find a way to remove that pressure. To prevent pressure injuries  · Change your position or have your caregiver help you change your position often. You may need to do this every 2 hours if you are in bed or every 15 minutes if you are in a wheelchair. This lowers the chance of making sores worse and getting new sores. · Use special mattresses or other support. These may include low-pressure mattresses or cushions made of foam that can be filled with air, water, beads, or fiber. · Eat healthy foods with plenty of protein to help heal damaged skin and to help new skin grow. · Try to stay at a healthy weight. Being overweight can lead to more pressure on your skin. · Do not slide across sheets or slump in a chair or bed. · Do not smoke. Smoking dries the skin and reduces its blood supply. If you need help quitting, talk to your doctor about stop-smoking programs and medicines. These can increase your chances of quitting for good. When should you call for help? Call your doctor now or seek immediate medical care if:    · You have signs of infection, such as:  ? Increased pain, swelling, warmth, or redness. ? Red streaks leading from the sore. ? Pus draining from the sore. ? A fever.    Watch closely for changes in your health, and be sure to contact your doctor if:    · Your pressure injuries are not healing.     · You have new pressure injuries.     · You need help changing positions in bed or in a chair.     · Your caregiver needs help to move you. Where can you learn more? Go to http://www.gray.com/  Enter F114 in the search box to learn more about \"Pressure Injuries: Care Instructions. \"  Current as of: March 4, 2020               Content Version: 12.6  © 8316-8973 [employer]. Care instructions adapted under license by [employer] (which disclaims liability or warranty for this information). If you have questions about a medical condition or this instruction, always ask your healthcare professional. [address] any warranty or liability for your use of this information. Patient Education        Learning About Preventing Pressure Injuries  What are pressure injuries? A pressure injury to the skin is caused by constant pressure over a period of time. The constant pressure blocks the blood supply to the skin. This causes skin cells to die and creates a sore. Pressure injuries are also called bedsores. Pressure injuries usually occur over bony areas, such as the hips, lower back, elbows, heels, and shoulders. Pressure injuries can also occur in places where the skin folds over on itself, or where medical equipment presses on the skin, such as when oxygen tubes press on the ears or cheeks. Pressure injuries can range from red areas on the surface of the skin to severe tissue damage that goes deep into muscle and bone. Severe sores are hard to treat and slow to heal. When pressure injuries do not heal properly, problems such as bone, blood, and skin infections can develop. What causes pressure injuries? Things that cause pressure injuries include:  · Pressure on the skin and tissues. For example, the sores may happen when a person lies in bed or sits in a wheelchair for a long time.  This is the most common cause of pressure injuries. · Sliding down in a bed or chair, forcing the skin to fold over itself (shear force). · Being pulled across bed sheets or other surfaces (friction burns). As we get older, our skin gets more thin and dry and less elastic, so it is easier to damage. Poor nutrition and not getting enough fluids make these natural changes in the skin worse. Skin in this condition may easily develop a pressure injury. Skin can also be damaged by sweat, feces, or urine, making pressure injuries more likely and harder to heal.  How can you help prevent pressure injuries? If you are not able to do these things yourself, ask a family member or friend for help. Change position often  · In a bed, change position every 2 hours. · In a wheelchair or other type of chair, shift your weight every 15 minutes, and give yourself a full relief of weight every hour. ? For a weight shift, lean forward and to the left and right. Push up out of the chair with your arms. If you have a chair that tilts, use the tilt control to help relieve pressure. ? For a full relief of weight, stand up or move to another chair or bed if you are able to. Personal care  · Check your skin every day, especially around bony areas. When a pressure injury is forming, skin temperature can be different than nearby skin. It might be warmer or cooler. The skin can feel either firmer or softer than the surrounding skin. · Keep your skin clean and free of sweat, wound drainage, urine, and feces. · Use skin lotions to keep your skin from drying out and cracking. Barrier lotions or creams have ingredients that can act as a shield to help protect the skin from moisture or irritation. · Try not to slide or slump across sheets in a chair or bed. And try not to sleep in a recliner chair. Lifestyle choices  · Eat healthy foods with plenty of protein to help heal damaged skin and to help new skin grow. · Get plenty of fluids.   · Stay at a healthy weight. Being either overweight or underweight can make pressure injuries more likely. · If you smoke, stop. Smoking dries the skin and reduces its blood supply. If you need help quitting, talk to your doctor about stop-smoking programs and medicines. These can increase your chances of quitting for good. Ask about using cushions or pads  · Overlays are special pads you put on top of a mattress. They provide a softer surface that will fit your body's shape better than a regular mattress. · Cushions or devices can be used to reduce pressure on certain areas of the body. For example, you can use a \"medical heel pillow\" to help prevent pressure injuries on heels. You can also get cushions for seating surfaces, such as wheelchair seats. Talk with your doctor about cushions and pads. Some products, such as doughnut-type devices, may actually cause pressure injuries or make them worse. Where can you learn more? Go to http://www.gray.com/  Enter H403 in the search box to learn more about \"Learning About Preventing Pressure Injuries. \"  Current as of: March 4, 2020               Content Version: 12.6  © 5824-2008 [employer]. Care instructions adapted under license by [employer] (which disclaims liability or warranty for this information). If you have questions about a medical condition or this instruction, always ask your healthcare professional. [place] [address] any warranty or liability for your use of this information. Patient Education        Learning About Turning a Person in Bed  Why is it important to turn a person in bed? People sometimes have to stay in bed for long periods of time. They may be very sick, in pain, or very weak and not be able to move themselves into different positions. It's very important that your loved one changes positions.  Lying in one position for a long time can cause pressure injuries (also called pressure sores). Pressure injuries are damage to the skin. They can range from red areas on the surface of the skin to severe tissue damage that goes deep into muscle and bone. These problems are hard to treat and slow to heal. When pressure injuries don't heal well, they can cause problems such as bone, blood, and skin infections. Pressure injuries usually occur over bony areas, such as the hips, lower back, elbows, heels, and shoulders. They can also occur in places where the skin folds over on itself. You can help your loved one avoid pressure injuries by helping him or her turn and change position in bed. A drawsheet can help. What is a drawsheet? A drawsheet makes it easier to \"roll\" your loved one into another position. You can buy a drawsheet, or you can make one with a sheet. You then make the bed using the drawsheet. To make a drawsheet:  · Fold a sheet in half lengthwise. · Place the sheet on top of the fitted bottom sheet so that the top and bottom of the drawsheet go across the bed (perpendicular to the bed). Position the drawsheet so that it will be between your loved one's head and knees. · Tuck in the drawsheet tightly on both sides. Smooth out any wrinkles to reduce possible skin irritation. How do you turn a person in bed? Before getting started, tell your loved one that you want him or her to roll into another position. If your loved one has any drains, tubes, or other medical equipment, adjust them so they don't get in the way. If your loved one can help  · You may need to help your loved one scoot toward the opposite side of the bed so that he or she will have room to roll. · Go to the side of the bed you want your love one to roll toward. · Ask your loved one to lie on his or her back with the knees bent. Have your loved one place his or her arms across the body. · Ask your loved one to roll toward you while keeping the knees bent.  If you have a rail on the bed, have your loved one reach toward the rail. · Help your loved one as needed. Gently place your hands on the shoulders and hips, and guide him or her toward you. If your loved one can't help  It is best to turn your loved one every 2 hours. If your loved one cannot move or finds it very hard, you can use your drawsheet (see \"What is a drawsheet? \"). Have a family member or friend help you. It is easier for two people to turn someone, and it can be dangerous for one person to do it. Position your loved one  · One person stands on each side of the bed. If your loved one is in a hospital bed, lower the height of the bed. This will make it easier to turn the person. · Untuck the drawsheet on both sides of the bed. Each person gathers up one side so you both almost have a \"handle\" to grab. You may also need to make sure your loved one is high enough up in the bed. If not, lift him or her toward the head of the bed. · Agree on a count, and then lift and move your loved one to the side of the bed you're going to roll away from. · Tuck in the drawsheet on the side of the bed that your loved one will roll toward. Move your loved one  When you and your assistant are ready:  · Help your loved one lie on his or her back with the knees bent. If your loved one can't bend the knees, cross one ankle over the other in the direction of the turn. Position your loved one's arms across his or her body. · Stand on opposite sides of the bed. One person will pull and the other push. Be sure that you and your assistant have your feet shoulder-width apart. This will help you avoid straining your back. ? If you're pulling your loved one toward you, lean from your hips (don't bend your back), reach over your loved one, and grab the drawsheet at your loved one's hip and shoulder areas. Slowly pull the drawsheet toward you to roll your loved one over.   ? If you're rolling your loved one away from you, slowly push at the hip and shoulder areas.  Moving someone in bed is best as a two-person job. If your loved one can help, even a little, you may be able to do it yourself. But do all you can to find someone to help you. How do you turn or move a person in bed? Positioning a drawsheet   1. When you make someone's bed with a drawsheet, position it so that it is between the person's head and knees. Turning or moving a person in bed   1. The drawsheet can then be used to turn or move the person you're caring for. What do you do after turning someone? You can use pillows to help your loved one get comfortable and avoid pressure injuries. If your loved one is on his or her side:  · Place pillows in front of your loved one, at chest level, with the top arm draped over a pillow. · If needed, tuck one edge of a pillow under the buttock, lengthwise. Then fold the pillow under and tuck the other edge under the first edge. That creates a \"roll\" that stays in place better and helps keep your loved one from rolling back. · Place a pillow between your loved one's knees, with the legs slightly bent. · Put the top leg a little in front of the bottom leg. This takes pressure off the bottom leg. · Put a pillow under the bottom leg so that the bottom ankle is off the bed. If your loved one is on his or her back:  · Put a pillow under your loved one's legs between the knees and ankles. · Do not put anything under the heels. · If you have a hospital bed, don't adjust the top end above 30 degrees. This helps prevent your loved one from sliding down. When you are finished, smooth out the drawsheet in its original position and tuck it in. Where can you learn more? Go to http://www.gray.com/  Enter V882 in the search box to learn more about \"Learning About Turning a Person in Bed. \"  Current as of: December 9, 2019               Content Version: 12.6  © 1333-9810 [employer].    Care instructions adapted under license by [employer] (which disclaims liability or warranty for this information). If you have questions about a medical condition or this instruction, always ask your healthcare professional. [address] any warranty or liability for your use of this information.

## 2020-11-30 PROBLEM — I26.99 PULMONARY EMBOLISM (HCC): Status: ACTIVE | Noted: 2020-01-01

## 2020-11-30 PROBLEM — L89.153 SACRAL DECUBITUS ULCER, STAGE III (HCC): Status: ACTIVE | Noted: 2020-01-01

## 2020-11-30 NOTE — ED PROVIDER NOTES
EMERGENCY DEPARTMENT HISTORY AND PHYSICAL EXAM 
 
Date: 11/30/2020 Patient Name: Grant Mcelroy History of Presenting Illness Chief Complaint Patient presents with  Fatigue History Provided By: Patient Additional History (Context):  
Grant Mcelroy is a 54 y.o. male with PMHX diabetes presents to the emergency department via private vehicle with his significant other C/O increasing fatigue, weakness, sacral decubitus ulcer and elevated heart rate that was noted by his home health nurse today. Patient had home health nursing to help him change the wound to sacral decubitus ulcer. States that his nurse noticed that his heart rate was elevated along with an elevated blood pressure and advised that the patient be seen in this emergency department for evaluation. Patient notes he has been fatigued and weak since discharge from a prolonged rehab stay and admission in the hospital.  He does report chronic abdominal pain since his discharge that is unchanged. Pt denies chest pain, and any other sxs or complaints. PCP: Niko Aguiar NP Current Outpatient Medications Medication Sig Dispense Refill  methocarbamoL (ROBAXIN) 500 mg tablet Take 500 mg by mouth four (4) times daily as needed for Muscle Spasm(s) or Pain.  insulin glargine (LANTUS) 100 unit/mL injection 20 Units by SubCUTAneous route nightly. (Patient taking differently: 10 Units by SubCUTAneous route nightly.) 1 Vial 0  
 insulin lispro (HUMALOG) 100 unit/mL injection 6 Units by SubCUTAneous route Before breakfast, lunch, and dinner. 1 Vial 0  
 lactulose (CHRONULAC) 10 gram/15 mL solution Take 45 mL by mouth daily. 1 Bottle 0  
 pantoprazole (PROTONIX) 40 mg tablet Take 1 Tab by mouth Daily (before breakfast). 30 Tab 0  
 potassium chloride (K-DUR, KLOR-CON) 20 mEq tablet Take 1 Tab by mouth daily. 10 Tab 0  
 thiamine mononitrate (B-1) 100 mg tablet Take 1 Tab by mouth daily.  10 Tab 0  
  lidocaine (LIDODERM) 5 % 2 Patches by TransDERmal route every twenty-four (24) hours. Apply patch to the affected area for 12 hours a day and remove for 12 hours a day. One patch is applied to left hip. One patch is applied to right shoulder blade.  DULoxetine (CYMBALTA) 60 mg capsule Take 1 Cap by mouth as needed.  traZODone (DESYREL) 50 mg tablet Take 50 mg by mouth nightly. Past History Past Medical History: 
Past Medical History:  
Diagnosis Date  Diabetes ([address]  Gastroparesis  Pancreatitis Past Surgical History: 
Past Surgical History:  
Procedure Laterality Date  HX CHOLECYSTECTOMY  HX HIP FRACTURE TX    
 left hip sx 9.23/2018 Family History: 
History reviewed. No pertinent family history. Social History: 
Social History Tobacco Use  Smoking status: Current Every Day Smoker  Smokeless tobacco: Never Used Substance Use Topics  Alcohol use: Not Currently  Drug use: No  
 
 
Allergies: 
No Known Allergies Review of Systems Review of Systems Constitutional: Positive for fatigue. Negative for chills and fever. HENT: Negative for congestion, ear pain, sinus pain and sore throat. Eyes: Negative for pain and visual disturbance. Respiratory: Positive for shortness of breath. Negative for cough. Cardiovascular: Negative for chest pain and leg swelling. Gastrointestinal: Negative for abdominal pain, constipation, diarrhea, nausea and vomiting. Genitourinary: Negative for dysuria and hematuria. Musculoskeletal: Negative for back pain and neck pain. Skin: Negative for pallor and rash. Neurological: Negative for dizziness, tremors, weakness, light-headedness and headaches. All other systems reviewed and are negative. Physical Exam  
 
Vitals:  
 11/30/20 1532 BP: 106/75 Pulse: (!) 120 Resp: 18 Temp: 97.8 °F (36.6 °C) SpO2: 100% Weight: 63.5 kg (140 lb) Height: 6' (1.829 m) Physical Exam 
 
 Nursing note and vitals reviewed Constitutional: Chronically ill-appearing middle-age  male, no acute distress Head: Normocephalic, Atraumatic Eyes: Pupils are equal, round, and reactive to light, EOMI Neck: Supple, non-tender Cardiovascular: Tachycardic, no murmurs, rubs, or gallops, + 2 radial pulses bilaterally Chest: Normal work of breathing and chest excursion bilaterally Lungs: Clear to ausculation bilaterally, no wheezes, no rhonchi Abdomen: Soft, mild abdominal tenderness that is generalized with no rebound or localization, non distended, normoactive bowel sounds Back: No evidence of trauma or deformity. There is stage III sacral decubitus ulcer around the gluteal cleft along his right buttocks. It is approximately 7 cm x 2 cm. There is overlying erythema. No obvious purulence or discharge. There is no noted necrosis. There is no palpable fluctuance. Extremities: No evidence of trauma or deformity, no LE edema. No streaking erythema, vesicular lesions, ulcerations or bulla Skin: Warm and dry, normal cap refill Neuro: Alert and appropriate, CN intact, normal speech, moving all 4 extremities freely and symmetrically Psychiatric: Normal mood and affect Diagnostic Study Results Labs - No results found for this or any previous visit (from the past 12 hour(s)). Radiologic Studies - No orders to display CT Results  (Last 48 hours) None CXR Results  (Last 48 hours) None Medical Decision Making I am the first provider for this patient. I reviewed the vital signs, available nursing notes, past medical history, past surgical history, family history and social history. Vital Signs-Reviewed the patient's vital signs. Pulse Oximetry Analysis -100% on room air Cardiac Monitor: 
Rate: 120 bpm 
Rhythm: Regular EKG interpretation: (Preliminary) 4:29 PM  
Sinus tachycardia 104 bpm.  NC interval 156 ms.  ms. QTc 489 ms. No acute ST elevation Records Reviewed: Nursing Notes and Old Medical Records Provider Notes:  
54 y.o. male presenting with fatigue, shortness of breath and concern for worsening sacral decubitus ulcer on exam patient is chronically ill in appearance however nontoxic. He is normotensive but noted to be tachycardic. He is afebrile. With his recent prolonged hospitalization and rehab, and his elevated heart rate, will obtain a D-dimer to rule out PE. Will also obtain chest x-ray to eval for any infectious etiology. Also noted to have mild abdominal tenderness. Will obtain a CT scan of the abdomen pelvis to evaluate. Sacral 3 decub ulcer is approximately 7 cm x 2 cm. There is overlying erythema but no purulent discharge. Initiate septic work-up. Will start patient on antibiotics for the sacral decub ulcer. Procedures: 
Procedures ED Course:  
4:29 PM 
 Initial assessment performed. The patients presenting problems have been discussed, and they are in agreement with the care plan formulated and outlined with them. I have encouraged them to ask questions as they arise throughout their visit. 5:27 PM 
Lactic acid is elevated. Code sepsis initiated. ABx initiated. Patient not hypotensive. However will start IVF for hydration. 7:42 PM 
Patient CTA significant for pulmonary embolism. Heart and BNP within normal limits. Patient with long hospitalization and rehab stay, expected for PE. Will start patient on heparin drip and admit for infected sacral decub ulcer and PE. Diagnosis and Disposition 7:40 PM 
I have spent 120 minutes of critical care time involved in lab review, consultations with specialist, family decision-making, and documentation. During this entire length of time I was immediately available to the patient. Critical Care:   The reason for providing this level of medical care for this critically ill patient was due a critical illness that impaired one or more vital organ systems such that there was a high probability of imminent or life threatening deterioration in the patients condition. This care involved high complexity decision making to assess, manipulate, and support vital system functions, to treat this degreee vital organ system failure and to prevent further life threatening deterioration of the patients condition. Core Measures: 
For Hospitalized Patients: 
 
1. Hospitalization Decision Time: The decision to hospitalize the patient was made by Miri Okeefe DO at 7:40 PM on 11/30/2020 2. Aspirin: Aspirin was not given because the patient did not present with a stroke at the time of their Emergency Department evaluation 7:40 PM  Patient is being admitted to the hospital by Dayton Acevedo MD. The results of their tests and reasons for their admission have been discussed with them and/or available family. They convey agreement and understanding for the need to be admitted and for their admission diagnosis. CONDITIONS ON ADMISSION: 
Sepsis is present at the time of admission. Thrombosis is present at the time of admission. Pneumonia is not present at the time of admission. MRSA is not present at the time of admission. Wound infection is present at the time of admission. Pressure Ulcer is present at the time of admission. CLINICAL IMPRESSION: 
 
1. Sepsis without acute organ dysfunction, due to unspecified organism ([address] 2. Single subsegmental pulmonary embolism without acute cor pulmonale (HCC) 3. Pressure injury of sacral region, stage 3 ([address]   
 
____________________________________ Please note that this dictation was completed with [employer] the [employer] voice recognition software. Quite often unanticipated grammatical, syntax, homophones, and other interpretive errors are inadvertently transcribed by the computer software. Please disregard these errors. Please excuse any errors that have escaped final proofreading.

## 2020-11-30 NOTE — PROGRESS NOTES
Pharmacy Dosing Services: Vancomycin Consult for Vancomycin Dosing by Pharmacy by Dr. Clay Iyer Consult provided for this 54y.o. year old male , for indication of SSTI. Day of Therapy 1 Ht Readings from Last 1 Encounters:  
11/30/20 182.9 cm (72\") Wt Readings from Last 1 Encounters:  
11/30/20 63.5 kg (140 lb) Serum Creatinine Lab Results Component Value Date/Time Creatinine 1.00 11/30/2020 05:20 PM  
 Creatinine, POC 0.9 10/07/2019 03:54 PM  
  
Creatinine Clearance Estimated Creatinine Clearance: 75 mL/min (based on SCr of 1 mg/dL). BUN Lab Results Component Value Date/Time BUN 15 11/30/2020 05:20 PM  
  
WBC Lab Results Component Value Date/Time WBC 7.1 11/30/2020 05:20 PM  
  
H/H Lab Results Component Value Date/Time HGB 12.4 (L) 11/30/2020 05:20 PM  
  
Platelets Lab Results Component Value Date/Time PLATELET 098 (L) 04/64/0477 05:20 PM  
  
Temp 97.8 °F (36.6 °C) Start Vancomycin therapy, with loading dose of 1250 mg at 1700 11/30/20. Follow with maintenance dose of 1250 mg at 1100 12/1/20, every 18 hours. Dose calculated to approximate a therapeutic trough of 10-15 mcg/mL. Pharmacy to follow daily and will make changes to dose and/or frequency based on clinical status.  
 
Pharmacist JERARDO Patino

## 2020-11-30 NOTE — ED TRIAGE NOTES
Patient arrived to ED with generalized fatigue. Patient states has been feeling weak and short of breath x 1 week

## 2020-12-01 PROBLEM — I82.403 ACUTE DEEP VEIN THROMBOSIS (DVT) OF BOTH LOWER EXTREMITIES (HCC): Status: ACTIVE | Noted: 2020-01-01

## 2020-12-01 NOTE — ED NOTES
Verbal shift exchange report given by Robert Tran RN report included the following information ED summary, SBAR, MAR.

## 2020-12-01 NOTE — ROUTINE PROCESS
Bedside and Verbal shift change report given to Damion Fernandes RN (oncoming nurse) by Tori Danielle RN (offgoing nurse). Report included the following information SBAR, Kardex, Intake/Output, MAR, and Cardiac Rhythm NSR.

## 2020-12-01 NOTE — PROGRESS NOTES
2126- TRANSFER - IN REPORT: 
 
Verbal phone report received from Kimmy  RN (name) on Daril Ask  being received from ED(unit) for routine progression of care Report consisted of patients Situation, Background, Assessment and  
Recommendations(SBAR). Information from the following report(s) SBAR, Kardex, MAR, Recent Results and Cardiac Rhythm NSR was reviewed with the receiving nurse. 2200- Pt arrived via stretcher, from ED to room 339, pt was A&Ox4. Pt was oriented to room call bell and unit routines. Tele box 9 was placed on pt and reads NSR. V/S stable. Admission database is being completed. Pt complained 7/10 pain from his sacrum and back. 3265- Critical lab received for aptt being greater than 180. Platelets were also 89. Michi Nelson 84 notified and orders per protocol were followed. MD stated to continue the Heparin drip . 0558- Critical Lab received for K+ 2.9.  notified and orders were received. 6380- Pt's blood sugar was 69, pt was given peanut butter and orange juice. 1359- Pt's blood sugar came up to 79, pt still eating peanut butter.

## 2020-12-01 NOTE — PROGRESS NOTES
Comprehensive Nutrition Assessment Type and Reason for Visit: Initial, Positive nutrition screen Nutrition Recommendations/Plan: Supplement: nathalie BID. Other: MVI, Vitamin C. Nutrition Assessment:  Hx of diabete, gastroparesis, pancreatitis, ETOH. Admitted w/ sacral ulcer stage 3, pulmonary embolism. Malnutrition Assessment: 
Malnutrition Status:  (P) Sever malnutrition Context:  (P) Acute illness Findings of the 6 clinical characteristics of malnutrition:  
Energy Intake:  (P) 1 - 75% or less of est energy req for 7 or more days Weight Loss:  (P) 1.0 - 5% over one month Body Fat Loss:  (P) 1 - Moderate body fat loss, (P) Triceps Muscle Mass Loss:  (P) 1 - Moderate muscle mass loss, (P) Thigh (quadraceps), Temples (temporalis), Calf Fluid Accumulation:  (P) Unable to assess,   
 Strength:  (P) Not performed Estimated Daily Nutrient Needs: 
Energy (kcal): 9965-0654(30-35kcal/kg); Weight Used for Energy Requirements: Current Protein (g): (1.3-1.5g/kg); Weight Used for Protein Requirements: Current Fluid (ml/day): 0164-0656; Method Used for Fluid Requirements: 1 ml/kcal 
 
 
Nutrition Related Findings:  Lab: K 2.9. Med: KCl, mirlalx, humalog, thiamine mononitrate, lactulose. No BM at ths time. Pt has hx of dysphagia on previous admission-recc SLP to check if appropriate and safe. Spoke w/ pt. Appetite is poor due to stomach pain. Does take ONS at home, usually 1 or 2 a day. Reported about 10-15 lb wt loss in about 2 months Wounds:   
Pressure injury, Stage III Current Nutrition Therapies: DIET DIABETIC CONSISTENT CARB Regular Anthropometric Measures: 
· Height:  6' (182.9 cm) · Current Body Wt:  68.7 kg (151 lb 7.3 oz) · Admission Body Wt:  151 lb 7.3 oz · Usual Body Wt:  73.4 kg (161 lb 13.1 oz)(10/26/20) · Ideal Body Wt:  178 lbs:  85.1 % · Adjusted Body Weight:   ; Weight Adjustment for: No adjustment · Adjusted BMI:      
 · BMI Category:  Normal weight (BMI 18.5-24. 9) Nutrition Diagnosis:  
· Inadequate oral intake related to acute injury/trauma as evidenced by wounds(Pressure injury) · Mild malnutrition related to acute injury/trauma as evidenced by weight loss greater than or equal to 5% in 1 month Nutrition Interventions:  
Food and/or Nutrient Delivery: Continue current diet, Start oral nutrition supplement Nutrition Education and Counseling: No recommendations at this time Coordination of Nutrition Care: Continue to monitor while inpatient, Interdisciplinary rounds Goals: 
Encourage PO >50% at most meals throughout the next 3 days Nutrition Monitoring and Evaluation:  
Behavioral-Environmental Outcomes: None identified Food/Nutrient Intake Outcomes: Supplement intake, Food and nutrient intake Physical Signs/Symptoms Outcomes: Biochemical data, Nutrition focused physical findings, Skin, Weight, GI status, Nausea/vomiting Discharge Planning: Too soon to determine Electronically signed by Baljinder Canales on 12/1/2020 at 1:46 PM

## 2020-12-01 NOTE — PROGRESS NOTES
Problem: Dysphagia (Adult) Goal: *Acute Goals and Plan of Care (Insert Text) Description: Dysphagia Present:   No 
 
Recommendations: 
Diet: Regular/thin Meds: Per patient preference Patient will: 1. Participate in training and education related to continued aspiration risk, diet recs and compensatory strategies (goal met). Outcome: Resolved/Met SPEECH LANGUAGE PATHOLOGY BEDSIDE SWALLOW EVALUATION AND DISCHARGE Patient: Abiola Luna (54 y.o. male) Date: 12/1/2020 Primary Diagnosis: Sacral decubitus ulcer, stage III ([facility] [address] [L89.153] Precautions: None PLOF: Living with family ASSESSMENT : 
Clinical beside swallow eval completed per MD orders. Pt A&Ox4. Functional communication. Intelligibility >90%. Cognitive-linguistic function appears intact. PMHx significant for dysphagia following extubation ~2 months ago. Seen by SLP and discharged on a regular diet with thin liquids. OM examination revealed oral motor structures functional for mastication and deglutition. Presented with thin liquid, puree, and solid trials. Exhibited + bolus cohesion, manipulation and A-P transit. Further exhibited + swallow timing/reflex and hyolaryngeal excursion. Pt able to manipulate and clear with 0 clinical s/s aspiration and/or oropharyngeal dysphagia. Pt safe for regular solid, thin liquid diet. 0 formal ST needs for dysphagia indicated at this time. SLP educated pt on role of speech therapist in current setting with re: speech/swallow; verbalized comprehension. SLP available for re-evaluation if indicated by MD. Results d/w RN, Airam Molina. Thank you for this referral.  
Paul Pearson, SLP  
 
PLAN : 
Recommendations and Planned Interventions: 
No formal ST needs ID'd for dysphagia. Eval only. Discharge Recommendations: None SUBJECTIVE:  
Patient stated I don't remember it, but I know that happened. OBJECTIVE:  
 
Past Medical History:  
Diagnosis Date Diabetes ([facility] [address] Gastroparesis Pancreatitis Past Surgical History:  
Procedure Laterality Date HX CHOLECYSTECTOMY HX HIP FRACTURE TX    
 left hip sx 9.23/2018 Home Situation:  
Home Situation Home Environment: Private residence One/Two Story Residence: Two story Living Alone: No 
Support Systems: Family member(s), Spouse/Significant Other/Partner Patient Expects to be Discharged to[de-identified] Private residence Current DME Used/Available at Home: Cane, quad Diet prior to admission: Regular/thin Current Diet:  Regular/thin  
 
Cognitive and Communication Status: 
Neurologic State: Alert Orientation Level: Oriented X4 Cognition: Appropriate decision making, Appropriate for age attention/concentration, Appropriate safety awareness Perception: Appears intact Perseveration: No perseveration noted Safety/Judgement: Awareness of environment Oral Assessment: 
Oral Assessment Labial: No impairment Dentition: Natural;Intact Oral Hygiene: Good Lingual: No impairment Velum: No impairment Mandible: No impairment P.O. Trials: 
Patient Position: [facility] 02 Vocal quality prior to P.O.: No impairment Consistency Presented: Thin liquid; Solid How Presented: Self-fed/presented;Straw;Successive swallows Bolus Acceptance: No impairment Bolus Formation/Control: No impairment Propulsion: No impairment Oral Residue: None Initiation of Swallow: No impairment Laryngeal Elevation: Functional 
Aspiration Signs/Symptoms: None Pharyngeal Phase Characteristics: No impairment, issues, or problems Effective Modifications: Small sips and bites Cues for Modifications: None Oral Phase Severity: No impairment Pharyngeal Phase Severity : No impairment PAIN: 
Pain level pre-treatment: 6/10 Pain level post-treatment: 6/10 After evaluation:  
[]            Patient left in no apparent distress sitting up in chair 
[x]            Patient left in no apparent distress in bed 
[x]            Call bell left within reach [x]            Nursing notified []            Family present 
[]            Caregiver present 
[]            Bed alarm activated COMMUNICATION/EDUCATION:  
[x]            Aspiration precautions; swallow safety; compensatory techniques. [x]            Patient/family have participated as able in goal setting and plan of care. [x]            Patient/family agree to work toward stated goals and plan of care. []            Patient understands intent and goals of therapy; neutral about participation. []            Patient unable to participate in goal setting/plan of care; educ ongoing with interdisciplinary staff 
[]         Posted safety precautions in patient's room. Thank you for this referral. 
Jacqueline Gonzalez, SLP Time Calculation: 10 mins

## 2020-12-01 NOTE — ED NOTES
TRANSFER - OUT REPORT: 
 
Verbal report given to Deepak Right RN (name) on [facility]  being transferred to Telemetry(unit) for routine progression of care Report consisted of patients Situation, Background, Assessment and  
Recommendations(SBAR). Information from the following report(s) SBAR, Kardex, ED Summary, [facility] and Cardiac Rhythm A-Fib was reviewed with the receiving nurse. Lines:  
Peripheral IV 11/30/20 Right Antecubital (Active) Site Assessment Clean, dry, & intact 11/30/20 1725 Phlebitis Assessment 0 11/30/20 1725 Infiltration Assessment 0 11/30/20 1725 Dressing Status Clean, dry, & intact 11/30/20 1725 Dressing Type Tape;Transparent 11/30/20 1725 Hub Color/Line Status Green;Flushed;Patent 11/30/20 1725 Action Taken Blood drawn 11/30/20 1725 Alcohol Cap Used No 11/30/20 1725 Peripheral IV 11/30/20 Left Wrist (Active) Site Assessment Clean, dry, & intact 11/30/20 1730 Phlebitis Assessment 0 11/30/20 1730 Infiltration Assessment 0 11/30/20 1730 Dressing Status Clean, dry, & intact 11/30/20 1730 Dressing Type Tape;Transparent 11/30/20 1730 Hub Color/Line Status Pink;Patent; Flushed 11/30/20 1730 Action Taken Blood drawn 11/30/20 1730 Alcohol Cap Used Yes 11/30/20 1730 Opportunity for questions and clarification was provided. Patient transported with: 
 Monitor Registered Nurse

## 2020-12-01 NOTE — ED NOTES
Verbal shift change report given to Erika Borjas RN (oncoming nurse) by Demond Albright RN (offgoing nurse). Report included the following information SBAR, Kardex, ED Summary, [facility] and Recent Results.

## 2020-12-01 NOTE — PROGRESS NOTES
Reason for Admission:   C/o generalized weakness RUR Score:    27 PCP: First and Last name:  
 Name of Practice: 
 Are you a current patient: Yes/No: 
 Approximate date of last visit:  
 Can you do a virtual visit with your PCP:  
          
Resources/supports as identified by patient/family:    
             
Top Challenges facing patient (as identified by patient/family and CM): Finances/Medication cost?     tircare Transportation? Support system or lack thereof? Living arrangements? Self-care/ADLs/Cognition?  independent Current Advanced Directive/Advance Care Plan: On chart Plan for utilizing home health:     
              
Transition of Care Plan:               
Chart reviewed per ED notes pt arrived with c/o generalized fatigue, hx includes DM,pancreatitis, pt admitted for sepsis, cm will cont to follow for d/c planning. Care Management Interventions PCP Verified by CM: Yes 
Palliative Care Criteria Met (RRAT>21 & CHF Dx)?: No 
Current Support Network: Lives with Spouse

## 2020-12-01 NOTE — DIABETES MGMT
GLYCEMIC CONTROL PROGRESS NOTE: 
 
- known h/o T2DM, HbA1C may not be reflective of GC basal/bolus home regimen - BG in target range non-ICU: < 180 mg/dL - TDD = Lantus 10 units daily Noted: 
- pt with hypoglycemic episode on AM labs 
- basal insulin discontinued per Hospitalist 
 
Recent Glucose Results:  
Lab Results Component Value Date/Time GLU 83 12/01/2020 02:20 AM  
  (H) 11/30/2020 05:20 PM  
 GLUCPOC 119 (H) 12/01/2020 07:17 AM  
 GLUCPOC 79 12/01/2020 06:37 AM  
 GLUCPOC 69 (L) 12/01/2020 06:16 AM  
 
 
 
Karissa Tuttle MS, RN, CDE Glycemic Control Team 
[phone] Pager [phone] (M-TH 8:00-4:30P) *After Hours pager [phone]

## 2020-12-01 NOTE — PROGRESS NOTES
Problem: Falls - Risk of 
Goal: *Absence of Falls Description: Document Jeff Butler Fall Risk and appropriate interventions in the flowsheet. Outcome: Progressing Towards Goal 
Note: Fall Risk Interventions: 
Mobility Interventions: Communicate number of staff needed for ambulation/transfer, OT consult for ADLs, Patient to call before getting OOB, PT Consult for mobility concerns, PT Consult for assist device competence Medication Interventions: Teach patient to arise slowly, Patient to call before getting OOB Problem: Patient Education: Go to Patient Education Activity Goal: Patient/Family Education Outcome: Progressing Towards Goal

## 2020-12-01 NOTE — PROGRESS NOTES
Pharmacy Clinical Services: consult for recommendation for anticoagulation options in patient with thrombocytopenia Indication: new PE Platelet count today 89 H/H 10.8 / 31.8 Current regimen: Heparin drip Alternative recommendations: 
1) Argatroban drip 2) Arixtra 7.5 mg sc once daily Patrick Klein [facility] [phone]

## 2020-12-01 NOTE — PROGRESS NOTES
Nutrition Note Coordination of Care: pt has hx of dysphagia on previous admission; would recc SLP eval to make sure that diet is safe and appropriate Electronically signed by Monika Cleveland on 12/1/2020 at 8:45 AM

## 2020-12-01 NOTE — PROGRESS NOTES
Hospitalist Progress Note Patient: Charlette Elaine MRN: [id number]  CSN: [id number] YOB: 1965  Age: 54 y.o. Sex: male DOA: 11/30/2020 LOS:  LOS: 1 day Chief Complaint: 
 
Acute PE Assessment/Plan Sacral decubitus ulcer, stage III  With Lactic acidosis No osteo on CT scan Started on Broad Abx zosyn/vanc/clindamycin Wound care consult  
  
Pulmonary Embolism (hx of Portal Thrombosis ) Will need heme input and pharmacy consult on options for [facility] due to low plt issue with cirrhosis Changed to argatroban with pharmacy consult Also has bilateral leg DVT, nonocclusive Hematology consult Repeat CBC now 
  
Cirrosis with MELD 20 Portal Vein Thrombosis, due to etoh abuse IV Albumin Cont lactulose Pending eval for liver transplant  
  
NIDDM, had hyperglycemia last admission, now BG low normal, hold lantus and premeal insulin Hypokale/Hypomag 
repletion ordered Thoracic aortic aneurysm Chronic diastolic CHF-monitor for volume overload Ascites-eval for volume in am with US Severe prot audrey malnutrition-dietry consult Saw patient earlier today, reviewed plan with him, reviewed anticoagulant therapy with opharmacy Wife advised he has had abd pain at home lately, so will make sure to get US and see if he requires paracentesis Advanced illness in this gentleman, full code status, prognosis is guarded Continue tele monitoring Disposition : 
Patient Active Problem List  
Diagnosis Code  Hypocalcemia E83.51  
 Hypomagnesemia E83.42  
 Alcoholism ([address] F10.20  Cirrhosis ([address] K74.60  Colitis K52.9  Acute respiratory failure with hypoxemia (HCC) J96.01  
 Aspiration pneumonia ([address] J69.0  Acute diastolic CHF (congestive heart failure) (HCC) I50.31  Thrombocytopenia ([address] D69.6  Severe protein-calorie malnutrition ([address] E43  Sacral decubitus ulcer, stage III ([address] W18.570  Pulmonary embolism ([address] I26.99  
  Acute deep vein thrombosis (DVT) of both lower extremities (HCC) I82.403 Subjective: 
 
I am ok What is the plan? Denies SOB at rest 
No hemoptysis No leg pain but he has had leg swelling Review of systems: 
 
Constitutional: denies fevers, chills Respiratory: denies cough Cardiovascular: denies chest pain Gastrointestinal: denies nausea, vomiting, diarrhea Vital signs/Intake and Output: 
Visit Vitals /87 Pulse (!) 110 Temp 97.7 °F (36.5 °C) Resp 18 Ht 6' (1.829 m) Wt 63.5 kg (140 lb) SpO2 100% BMI 18.99 kg/m² Current Shift:  No intake/output data recorded. Last three shifts:  11/30 0701 - 12/01 1900 In: 2300 [P.O.:200; I.V.:2100] Out: 925 [Urine:925] Exam: 
 
General: debilitated WM alert, NAD, OX3 Head/Neck: NCAT, supple, No masses, No lymphadenopathy CVS:Regular rate and rhythm, no M/R/G, S1/S2 heard, no thrill Lungs:Clear to auscultation bilaterally, no wheezes, rhonchi, or rales Abdomen: Soft, Nontender, No distention, Normal Bowel sounds, No hepatomegaly Extremities: 1 plus LE edema BL Skin:no jaundice Neuro:grossly normal , follows commands Psych:appropriate Labs: Results:  
   
Chemistry Recent Labs 12/01/20 0220 11/30/20 
1720 GLU 83 139*  138  
K 2.9* 3.2*  
 102 CO2 21 27 BUN 12 15 CREA 0.77 1.00  
CA 8.0* 8.6 AGAP 11 9 BUCR 16 15 AP 47 57  
TP 6.5 7.1 ALB 2.7* 2.8*  
GLOB 3.8 4.3* AGRAT 0.7* 0.7* CBC w/Diff Recent Labs 12/01/20 
0220 11/30/20 
1720 WBC 5.7 7.1 RBC 3.35* 3.85* HGB 10.8* 12.4*  
HCT 31.8* 36.3 PLT 89* 107* GRANS 62 58 LYMPH 22 32 EOS 3 2 Cardiac Enzymes Recent Labs 12/01/20 
0805 12/01/20 
0800 CPK 53 52 CKND1 4.0 3.1 Coagulation Recent Labs 12/01/20 2053 12/01/20 
1732  11/30/20 
1720 PTP  --   --   --  19.9* INR  --   --   --  1.7* APTT 78.4* 74.8*  69.6*   < > 41.1*  
 < > = values in this interval not displayed. Lipid Panel No results found for: CHOL, CHOLPOCT, CHOLX, CHLST, CHOLV, 861618, HDL, HDLP, LDL, LDLC, DLDLP, 716870, VLDLC, VLDL, TGLX, TRIGL, TRIGP, TGLPOCT, CHHD, CHHDX  
BNP No results for input(s): BNPP in the last 72 hours. Liver Enzymes Recent Labs 12/01/20 
0220 TP 6.5 ALB 2.7* AP 47 Thyroid Studies No results found for: T4, T3U, TSH, TSHEXT, TSHEXT Procedures/imaging: see electronic medical records for all procedures/Xrays and details which were not copied into this note but were reviewed prior to creation of Plan Waylon Pedersen MD

## 2020-12-01 NOTE — PROGRESS NOTES
0719:Received verbal bedside report from off going nurse LAVON Villanueva R.N. Patient care received. Patient alert and oriented x 4. Patient resting in bed denies pain. Patient stable. Call light with in reach bed in lowest position. 1400: Informed  that patient's wife said that patient has been complaining of stomach pain that has been preventing him from eating for about two weeks now. Also informed her that patient's wife would like an update. Pt was already rounded on this morning and is alert and oriented x 4. No orders received at this time. Will continue to monitor patient.

## 2020-12-01 NOTE — H&P
History & Physical 
 
Patient: Nicola Jacobs MRN: [id number]  CSN: [id number] YOB: 1965  Age: 54 y.o. Sex: male DOA: 11/30/2020 Chief Complaint:  
Chief Complaint Patient presents with  Fatigue HPI:  
 
Nicola Jacobs is a 54 y.o.  male who has hx of Cirrhosis, DM, Gastroparesis Recently discharged from hospital with asiration Pneumonia and was intubated Present to ER with worsening fatigue and sacral decub ulcer this was noted by his home health nurse Patient reports worsening fatigue and dyspnea with little exertion Also with new tachycardia noted by home health nurse In ER d dimer paula And CTA confirms new PE  Asked to admit for further treatment Past Medical History:  
Diagnosis Date  Diabetes ([address]  Gastroparesis  Pancreatitis Past Surgical History:  
Procedure Laterality Date  HX CHOLECYSTECTOMY  HX HIP FRACTURE TX    
 left hip sx 9.23/2018 History reviewed. No pertinent family history. Social History Socioeconomic History  Marital status:  Spouse name: Not on file  Number of children: Not on file  Years of education: Not on file  Highest education level: Not on file Tobacco Use  Smoking status: Current Every Day Smoker  Smokeless tobacco: Never Used Substance and Sexual Activity  Alcohol use: Not Currently  Drug use: No  
 
 
Prior to Admission medications Medication Sig Start Date End Date Taking? Authorizing Provider  
methocarbamoL (ROBAXIN) 500 mg tablet Take 500 mg by mouth four (4) times daily as needed for Muscle Spasm(s) or Pain. Provider, Historical  
insulin glargine (LANTUS) 100 unit/mL injection 20 Units by SubCUTAneous route nightly. Patient taking differently: 10 Units by SubCUTAneous route nightly.  10/29/20   Ronak Gaston MD  
insulin lispro (HUMALOG) 100 unit/mL injection 6 Units by SubCUTAneous route Before breakfast, lunch, and dinner. 10/29/20   Gaudencio Cosme MD  
lactulose (CHRONULAC) 10 gram/15 mL solution Take 45 mL by mouth daily. 10/30/20   Gaudencio Cosme MD  
pantoprazole (PROTONIX) 40 mg tablet Take 1 Tab by mouth Daily (before breakfast). 10/30/20   Gaudencio Cosme MD  
potassium chloride (K-DUR, KLOR-CON) 20 mEq tablet Take 1 Tab by mouth daily. 10/30/20   Gaudencio Cosme MD  
thiamine mononitrate (B-1) 100 mg tablet Take 1 Tab by mouth daily. 10/30/20   Gaudencio Cosme MD  
lidocaine (LIDODERM) 5 % 2 Patches by TransDERmal route every twenty-four (24) hours. Apply patch to the affected area for 12 hours a day and remove for 12 hours a day. One patch is applied to left hip. One patch is applied to right shoulder blade. Provider, Singh  
DULoxetine (CYMBALTA) 60 mg capsule Take 1 Cap by mouth as needed. 1/6/19   Other, MD Meño  
traZODone (DESYREL) 50 mg tablet Take 50 mg by mouth nightly. Other, MD Meño  
 
 
No Known Allergies Review of Systems GENERAL: Patient alert, awake and oriented times 3, able to communicate full sentences and not in distress. HEENT: No change in vision, no earache, tinnitus, sore throat or sinus congestion. NECK: No pain or stiffness. PULMONARY: + shortness of breath, nocough or wheeze. Cardiovascular: no pnd or orthopnea, no CP tachycardia noted by home health GASTROINTESTINAL: No abdominal pain, nausea, vomiting or diarrhea, melena or bright red blood per rectum. GENITOURINARY: No urinary frequency, urgency, hesitancy or dysuria. MUSCULOSKELETAL: No joint or muscle pain, no back pain, no recent trauma. DERMATOLOGIC: worsening sacral decub stage 3 ENDOCRINE: No polyuria, polydipsia, no heat or cold intolerance. No recent change in weight. HEMATOLOGICAL: No anemia or easy bruising or bleeding. NEUROLOGIC: No headache, seizures, numbness, tingling or weakness. Physical Exam:  
 
Physical Exam: 
Visit Vitals /78 Pulse 91 Temp 97.8 °F (36.6 °C) Resp 18 Ht 6' (1.829 m) Wt 63.5 kg (140 lb) SpO2 100% BMI 18.99 kg/m² O2 Device: Room air Temp (24hrs), Av.1 °F (36.7 °C), Min:97.7 °F (36.5 °C), Max:98.7 °F (37.1 °C) 
  1901 -  07 In: 1000 [I.V.:1000] Out: -    701 - 1900 In: 1100 [I.V.:1100] Out: - General:  Alert, cooperative, no distress, appears stated age. Head: Normocephalic, without obvious abnormality, atraumatic. Eyes:  Conjunctivae/corneas clear. PERRL, EOMs intact. Nose: Nares normal. No drainage or sinus tenderness. Neck: Supple, symmetrical, trachea midline, no adenopathy, thyroid: no enlargement, no carotid bruit and no JVD. Lungs:   Clear to auscultation bilaterally. Diminished breath sounds Heart:  Regular rate and rhythm, S1, S2 normal.tachycardia Abdomen: Soft, non-tender. Bowel sounds normal.   
Extremities: Extremities normal, atraumatic, no cyanosis or edema. Pulses: 2+ and symmetric all extremities. Skin:  stage 3 sacral decub  Borders appear clear no eshcar or purulent drainage No evidence of debridement needed Neurologic: AAOx3, No focal motor or sensory deficit. Labs Reviewed: All lab results for the last 24 hours reviewed. and EKG Procedures/imaging: see electronic medical records for all procedures/Xrays and details which were not copied into this note but were reviewed prior to creation of Plan CT Results  (Last 48 hours)  
          
 20  CT ABD PELV W CONT Final result Impression:  IMPRESSION:  
   
1. Cirrhotic morphology of liver with sequelae of portal venous hypertension  
including splenomegaly and moderate volume intra-abdominal ascites. 2. Nonspecific dilatation of the pancreatic duct up to 5 mm. 3. Lower sacral soft tissue ulcer without discrete CT evidence of osteomyelitis. Narrative:  EXAM: CT of the abdomen and pelvis CLINICAL INDICATION/HISTORY:  Weakness and dyspnea. Abdominal pain. COMPARISON: None. TECHNIQUE: Axial CT imaging of the abdomen and pelvis was performed with  
intravenous contrast. Multiplanar reformats were generated. One or more dose reduction techniques were used on this CT: automated exposure  
control, adjustment of the mAs and/or kVp according to patient size, and  
iterative reconstruction techniques. The specific techniques used on this CT  
exam have been documented in the patient's electronic medical record. Digital  
Imaging and Communications in Medicine (DICOM) format image data are available  
to nonaffiliated external healthcare facilities or entities on a secure, media  
free, reciprocally searchable basis with patient authorization for at least a  
12-month period after this study. _______________ FINDINGS:  
   
LOWER CHEST: Please see concurrently performed CT of the chest.  
   
LIVER, BILIARY: There is cirrhotic morphology of the liver. No focal parenchymal  
masses visualized. There is no intra-or extrahepatic biliary ductal dilatation. Gallbladder is surgically absent. PANCREAS: Pancreatic duct is dilated measuring 5 mm. No discrete pancreatic mass  
or intraductal abnormality visualized. SPLEEN: Enlarged measuring 15.6 cm in AP diameter. ADRENALS: Normal.  
   
KIDNEYS: Normal. There is no nephrolithiasis. There is no hydronephrosis. LYMPH NODES: No enlarged lymph nodes. GASTROINTESTINAL TRACT: No bowel dilation or wall thickening. The appendix is  
visualized and unremarkable. PELVIC ORGANS: Unremarkable. VASCULATURE: Unremarkable. BONES: Post surgical changes of prior left hip open reduction and internal  
fixation. OTHER: There is no free intraperitoneal fluid or free air.   
   
SUPERFICIAL SOFT TISSUES: There is a soft tissue defect at the level of the  
 lower sacral segments. No definite cortical changes in the sacrum  
   
_______________  
   
  
 11/30/20 1917  CTA CHEST W OR W WO CONT Final result Impression:  IMPRESSION:  
   
1. Acute pulmonary embolism involving the right lower lobe medial basal  
segmental artery. 2. Ascending thoracic aorta aneurysm measuring 4 cm without evidence of  
dissection. 3. No focal consolidative process in the chest.  
   
Critical results discussed with Dr. Stacey Alejandre at 7:33 PM on 10/30/2020. Narrative:  EXAM: CTA chest  
   
CLINICAL INDICATION/HISTORY:  Generalized fatigue. Dyspnea. COMPARISON: None TECHNIQUE: Axial CT imaging from the thoracic inlet through the diaphragm with  
intravenous contrast. Coronal and sagittal MIP reformats were generated. One or more dose reduction techniques were used on this CT: automated exposure  
control, adjustment of the mAs and/or kVp according to patient size, and  
iterative reconstruction techniques. The specific techniques used on this CT  
exam have been documented in the patient's electronic medical record. Digital  
Imaging and Communications in Medicine (DICOM) format image data are available  
to nonaffiliated external healthcare facilities or entities on a secure, media  
free, reciprocally searchable basis with patient authorization for at least a  
12-month period after this study. _______________ FINDINGS:  
   
EXAM QUALITY: Adequate opacification of the pulmonary arteries. PULMONARY ARTERIES: Critical result: There is a filling defect in the right  
lower lobe medial basal segmental artery. Remaining pulmonary arteries are  
unremarkable. MEDIASTINUM: Normal heart size. The ascending aorta is dilated measuring up to 4  
cm. No pericardial effusion. Right heart strain indices:  
RV/LV ratio: Normal (ratio </= 1). Interventricular septal bowing: None.   
Main pulmonary artery diameter: Normal.  
 Contrast reflux into the IVC: None. LUNGS: No suspicious nodule or mass. No abnormal opacities. PLEURA: Normal.  
   
AIRWAY: Normal.  
   
LYMPH NODES: No enlarged nodes. UPPER ABDOMEN: Please see concurrently performed CT abdomen/pelvis. OTHER: No acute or aggressive osseous abnormalities identified. SUPERFICIAL SOFT TISSUES: Unremarkable.  
   
_______________ Assessment/Plan Active Problems: 
 1. Sacral decubitus ulcer, stage III  With Lactic acidosis Started on Broad Abx zosyn/vanc/clind Wound care consult obtained 2. Pulmonary Embolism (hx of Portal Thrombosis ) Start iv heparin Poor AC canidate due to Thromboytopenia 3. Cirrosis with MELD 20 Portal Vein Thrombosis IV Albumin Cont lactulose Pending eval for liver transplant 4. DM Resume lantus and sliidng scale 5. Hypokale/Hypomag 
elcectrolyte protocol DVT/GI Prophylaxis: heparin drip Sanna Mendez MD 
11/30/2020 7:48 PM

## 2020-12-02 NOTE — PROGRESS NOTES
Hospitalist Progress Note    Patient: Bobbi Coley MRN: [id number]  CSN: [id number]    YOB: 1965  Age: 54 y.o. Sex: male    DOA: 11/30/2020 LOS:  LOS: 2 days          Chief Complaint:    Acute PE      Assessment/Plan     Sacral decubitus ulcer, stage III  With Lactic acidosis   No osteo on CT scan  Started on Broad Abx zosyn/vanc/clindamycin  Wound care consult      Pulmonary Embolism (hx of Portal Thrombosis ) with BLE DVT, nonocclusive  Changed to argatroban with pharmacy consult  Today hematology opinion, Dr. Yun Lincoln said rivaroxaban was OK     Cirrosis with MELD 20 Portal Vein Thrombosis, due to etoh abuse  IV Albumin   Cont lactulose  Pending eval for liver transplant      NIDDM, had hyperglycemia last admission, now BG low normal, hold lantus and premeal insulin    Hypokale/Hypomag  repletion ordered    Thoracic aortic aneurysm    Chronic diastolic CHF-monitor for volume overload    Ascites-eval for volume in am with US >Moderate volume abdominal/pelvic ascites. >would likely benefit from paracentesis    Severe prot audrey malnutrition-dietry consult    Advanced illness in this gentleman, full code status, prognosis is guarded    Continue tele monitoring      Disposition :  Patient Active Problem List   Diagnosis Code    Hypocalcemia E83.51    Hypomagnesemia E83.42    Alcoholism ([address] F10.20    Cirrhosis ([address] K74.60    Colitis K52.9    Acute respiratory failure with hypoxemia ([address] J96.01    Aspiration pneumonia ([address] J69.0    Acute diastolic CHF (congestive heart failure) (HCC) I50.31    Thrombocytopenia (HCC) D69.6    Severe protein-calorie malnutrition ([address] E43    Sacral decubitus ulcer, stage III (HCC) L89.153    Pulmonary embolism (HCC) I26.99    Acute deep vein thrombosis (DVT) of both lower extremities (HCC) I82.403       Subjective:    No issues or concerns    No nurse reported concerns      Review of systems:    Constitutional: denies fevers, chills  Respiratory: denies cough  Cardiovascular: denies chest pain  Gastrointestinal: denies nausea, vomiting, diarrhea      Vital signs/Intake and Output:  Visit Vitals  /80 (BP 1 Location: Right arm, BP Patient Position: At rest)   Pulse 95   Temp 97.5 °F (36.4 °C)   Resp 18   Ht 6' (1.829 m)   Wt 63.5 kg (140 lb)   SpO2 100%   BMI 18.99 kg/m²     Current Shift:  No intake/output data recorded. Last three shifts:  11/30 1901 - 12/02 0700  In: 1200 [P.O.:200; I.V.:1000]  Out: 925 [Urine:925]    Exam:    General: debilitated WM alert, NAD, OX3  Head/Neck: NCAT, supple, No masses, No lymphadenopathy  CVS:Regular rate and rhythm, no M/R/G, S1/S2 heard, no thrill  Lungs:Clear to auscultation bilaterally, no wheezes, rhonchi, or rales  Abdomen: Soft, Nontender, No distention, Normal Bowel sounds, No hepatomegaly  Extremities: 1 plus LE edema BL  Skin:no jaundice  Neuro:grossly normal , follows commands  Psych:appropriate                Labs: Results:       Chemistry Recent Labs     12/02/20  1125 12/02/20  0345 12/01/20 0220 11/30/20  1720   * 147* 83 139*    142 141 138   K 3.9 4.8 2.9* 3.2*   * 111 109 102   CO2 23 21 21 27   BUN 7 8 12 15   CREA 0.84 0.89 0.77 1.00   CA 8.1* 8.4* 8.0* 8.6   AGAP 7 10 11 9   BUCR 8* 9* 16 15   AP  --  43* 47 57   TP  --  6.2* 6.5 7.1   ALB  --  2.9* 2.7* 2.8*   GLOB  --  3.3 3.8 4.3*   AGRAT  --  0.9 0.7* 0.7*      CBC w/Diff Recent Labs     12/02/20  0345 12/01/20 0220 11/30/20  1720   WBC 4.0* 5.7 7.1   RBC 3.23* 3.35* 3.85*   HGB 10.3* 10.8* 12.4*   HCT 30.8* 31.8* 36.3   PLT 88* 89* 107*   GRANS 69 62 58   LYMPH 18* 22 32   EOS 3 3 2      Cardiac Enzymes Recent Labs     12/01/20  0805 12/01/20  0800   CPK 53 52   CKND1 4.0 3.1      Coagulation Recent Labs     12/02/20  1125 12/02/20  0825  11/30/20  1720   PTP  --   --   --  19.9*   INR  --   --   --  1.7*   APTT 83.3* 86.5*   < > 41.1*    < > = values in this interval not displayed.        Lipid Panel No results found for: CHOL, CHOLPOCT, CHOLX, CHLST, CHOLV, 422905, HDL, HDLP, LDL, LDLC, DLDLP, 492308, VLDLC, VLDL, TGLX, TRIGL, TRIGP, TGLPOCT, CHHD, CHHDX   BNP No results for input(s): BNPP in the last 72 hours.    Liver Enzymes Recent Labs     12/02/20  0345   TP 6.2*   ALB 2.9*   AP 43*      Thyroid Studies No results found for: T4, T3U, TSH, TSHEXT, TSHEXT     Procedures/imaging: see electronic medical records for all procedures/Xrays and details which were not copied into this note but were reviewed prior to creation of Mery Sow MD

## 2020-12-02 NOTE — WOUND CARE
IP WOUND CONSULT    Tamiko Boudreaux  MEDICAL RECORD NUMBER:  [id number]  AGE: 54 y.o. GENDER: male  : 1965  TODAY'S DATE:  2020    GENERAL     [] Follow-up   [x] New Consult    Tamiko Boudreaux is a 54 y.o. male referred by:   [x] Physician  [] Nursing  [] Other:         PAST MEDICAL HISTORY    Past Medical History:   Diagnosis Date    Diabetes ([address]     Gastroparesis     Pancreatitis         PAST SURGICAL HISTORY    Past Surgical History:   Procedure Laterality Date    HX CHOLECYSTECTOMY      HX HIP FRACTURE TX      left hip sx        FAMILY HISTORY    History reviewed. No pertinent family history. SOCIAL HISTORY    Social History     Tobacco Use    Smoking status: Current Every Day Smoker    Smokeless tobacco: Never Used   Substance Use Topics    Alcohol use: Not Currently    Drug use: No       ALLERGIES    No Known Allergies    MEDICATIONS    No current facility-administered medications on file prior to encounter. Current Outpatient Medications on File Prior to Encounter   Medication Sig Dispense Refill    methocarbamoL (ROBAXIN) 500 mg tablet Take 500 mg by mouth four (4) times daily as needed for Muscle Spasm(s) or Pain.  insulin glargine (LANTUS) 100 unit/mL injection 20 Units by SubCUTAneous route nightly. (Patient taking differently: 10 Units by SubCUTAneous route nightly.) 1 Vial 0    insulin lispro (HUMALOG) 100 unit/mL injection 6 Units by SubCUTAneous route Before breakfast, lunch, and dinner. 1 Vial 0    lactulose (CHRONULAC) 10 gram/15 mL solution Take 45 mL by mouth daily. 1 Bottle 0    pantoprazole (PROTONIX) 40 mg tablet Take 1 Tab by mouth Daily (before breakfast). 30 Tab 0    potassium chloride (K-DUR, KLOR-CON) 20 mEq tablet Take 1 Tab by mouth daily. 10 Tab 0    thiamine mononitrate (B-1) 100 mg tablet Take 1 Tab by mouth daily. 10 Tab 0    lidocaine (LIDODERM) 5 % 2 Patches by TransDERmal route every twenty-four (24) hours.  Apply patch to the affected area for 12 hours a day and remove for 12 hours a day. One patch is applied to left hip. One patch is applied to right shoulder blade.  DULoxetine (CYMBALTA) 60 mg capsule Take 1 Cap by mouth as needed.  traZODone (DESYREL) 50 mg tablet Take 50 mg by mouth nightly.          Wt Readings from Last 3 Encounters:   12/01/20 63.5 kg (140 lb)   11/23/20 63.5 kg (140 lb)   10/28/20 63.5 kg (139 lb 15.9 oz)       [unfilled]  Visit Vitals  /80 (BP 1 Location: Right arm, BP Patient Position: At rest)   Pulse 100   Temp 97.3 °F (36.3 °C)   Resp 18   Ht 6' (1.829 m)   Wt 63.5 kg (140 lb)   SpO2 100%   BMI 18.99 kg/m²       ASSESSMENT     Ulcer Identification:  Ulcer Type: pressure    Contributing Factors: diabetes, chronic pressure, decreased mobility and shear force    Wound Buttocks Left;Upper (Active)   Wound Etiology Pressure Stage 4 11/30/20 1834   Dressing Status Intact 12/02/20 1049   Cleansed Wound cleanser 12/02/20 1049   Dressing/Treatment Foam impregnated with Ag 12/02/20 1049   Wound Length (cm) 8 cm 12/02/20 1049   Wound Width (cm) 3 cm 12/02/20 1049   Wound Depth (cm) 0.4 cm 12/02/20 1049   Wound Surface Area (cm^2) 24 cm^2 12/02/20 1049   Change in Wound Size % 0 12/02/20 1049   Wound Volume (cm^3) 9.6 cm^3 12/02/20 1049   Undermining Starts ___ O'Clock 12 o'clock 12/02/20 1049   Undermining Ends ___ O'Clock 3 o'clock 12/02/20 1049   Undermining Maximum Distance (cm) 0.5 cm 12/02/20 1049   Wound Assessment Fibrinous;Pink/red 12/02/20 1049   Drainage Amount Scant 12/02/20 1049   Drainage Description Serosanguinous 12/02/20 1049   Wound Odor None 12/02/20 1049   Kandice-Wound/Incision Assessment Intact 12/02/20 1049   Edges Epibole (rolled edges) 12/02/20 1049   Wound Thickness Description Full thickness 12/02/20 1049   Number of days: 10       Wound Heel Left stage III 12/02/20 (Active)   Wound Etiology Pressure Stage 3 12/02/20 1049   Dressing Status Breakthrough drainage noted 12/02/20 1049   Cleansed Wound cleanser 12/02/20 1049   Dressing/Treatment Foam impregnated with Ag 12/02/20 1049   Wound Length (cm) 0.8 cm 12/02/20 1049   Wound Width (cm) 1.2 cm 12/02/20 1049   Wound Depth (cm) 0.2 cm 12/02/20 1049   Wound Surface Area (cm^2) 0.96 cm^2 12/02/20 1049   Wound Volume (cm^3) 0.19 cm^3 12/02/20 1049   Wound Assessment Pink/red 12/02/20 1049   Drainage Amount Scant 12/02/20 1049   Drainage Description Sanguineous 12/02/20 1049   Wound Odor None 12/02/20 1049   Kandice-Wound/Incision Assessment Intact 12/02/20 1049   Edges Defined edges 12/02/20 1049   Wound Thickness Description Full thickness 12/02/20 1049   Number of days: 0       [REMOVED] Wound Back Lateral;Left; Inferior small opeing to the left flank ( pt states it sn abcess) 10/09/20 (Removed)   Number of days: 44       [REMOVED] Wound Sacral/coccyx Lower;Medial redden and small opening to scral/coccyx 10/09/20 (Removed)   Number of days: 44       [REMOVED] Wound Heel Left purple/red, non blanchable, skin intact, dsg present upon starting shift (Removed)   Number of days: 30          PLAN     Skin Care & Pressure Relief Recommendations  Minimize layers of linen  Pads under patient to optimize support surface  Turn/reposition approximately every 2 hours  Pillow wedges  Manage incontinence   Promote continence; Skin Protective lotion/cream to buttocks and sacrum daily and as needed with incontinence care  Offload heels pillows       Recommendations:  Keep areas covered with current optifoam dressing for 3 days. After 3 days, clean wounds with wound cleanser and cover with Optifoam borders. Pt encouraged to follow up in outpatient wound clinic for periodic wound debridement and treatment.     Teaching completed with:   [x] Patient           [] Family member       [] Caregiver          [] Nursing  [] Other    Patient/Caregiver Teaching:  Level of patient/caregiver understanding able to:   [x] Indicates understanding       [] Needs reinforcement  [] Unsuccessful      [] Verbal Understanding  [] Demonstrated understanding       [] No evidence of learning  [] Refused teaching         [] N/A       Electronically signed by Clarisse Orr RN on 12/2/2020 at 10:57 AM

## 2020-12-02 NOTE — PROGRESS NOTES
0000-Pt is NPO 
 
0325- Pt has had loose stool because of lactulose given in the previous shift. Sacral wound bleeding after irrigation because stool was in wound. Will continue to monitor pt. 
 
[address] from pharmacy called about pt's un-theraputic aPTT level of 122.3 Argatroban 250 mg in  ml infusion. She stated to decrease rate by 0.1 mcg/kg/min, and continue q2h aPTT lab draws. Nursing clarified that no orders were needed to make this change and pharmacy stated that it did not need a new order because he already had an order placed for the medication. 0992- besides the incidents above no other clinical concerns. Night Shift chart check complete

## 2020-12-02 NOTE — PROGRESS NOTES
Clinical Pharmacy Note: Argatroban Dosing   Indication: PE and bilateral DVT's   Thrombocytopenia ==> platelet 88 today   PTT = 83.3 ( therapeutic 50-90 )   No change  Argatroban continues at 0.4 mcg/kg/min   Pt has had 2 consecutive PTT 's within goal range of 50-90 , change PTT to daily   Next PTT in am   Pharmacy to continue to follow and adjust per protocol  Patrick Klein [facility] [phone]

## 2020-12-02 NOTE — PROGRESS NOTES
DC Plan:  SNF vs home w/ Home Health    Met with patient at bedside; noted he was a patient at [facility] 10/9 to 10/29 which included acute respiratory failure with aspiration pneumonia, intubation. Also has advanced cirrhosis of the liver and was to have seen Dr. Greg Canseco [facility] to be evaluated for liver transplant. Per patient, was discharged to [facility] and then was at home receiving [facility] via [facility]. Discharge summary included decubitus sacral lesion which is now a stage III - patient seen by wound care today. Care management will continue to follow for discharge placement. Care Management Interventions  PCP Verified by CM:  Yes  Palliative Care Criteria Met (RRAT>21 & CHF Dx)?: No  Current Support Network: Lives with Spouse

## 2020-12-02 NOTE — PROGRESS NOTES
Argatroban Dosing Protocol:  
Argatroban drug monitoring was provided for this  54 y.o. , male , admitted to [facility] on 11/30/2020 for treatment of Thrombocytopenia PTT: 122.3 Seconds at 03:45 Change argatroban rate to: 0.4mcg/kg/min (Decrease by 0.1mcg/kg/min) Next APTT time ordered: q2hrs until at goal range(50-90s) x 2 consecutively Labs: 
Hemoglobin Lab Results Component Value Date/Time HGB 10.3 (L) 12/02/2020 03:45 AM  
  
Platelets Lab Results Component Value Date/Time PLATELET 88 (L) 96/13/7938 03:45 AM  
  
INR Lab Results Component Value Date/Time INR 1.7 (H) 11/30/2020 05:20 PM  
  
 
 
Current and accurate weight required for initiation. All Heparin, Heparin flushes and Enoxaparin are DISCONTINUED. Baseline PTT, PT/INR, Creatinine, CMP, HCT, Platelet Count All PTTs are to be run stat Draw PTT at 2 hours, until PTT therapeutic X2. Thereafter, draw  PTT daily. The tables in the calculator guide rate adjustment based upon an APTT. To figure out the new rate, find the current rate and corresponding APTT in the ranges provided. Adjust Argatroban rate to the new value in the table. When two consecutive PTTs are therapeutic (50-90 seconds) change PTT to daily [address] Contact information: [phone]

## 2020-12-02 NOTE — CONSULTS
[facility] Hematology / Oncology Consult Note Impression:  
Principal Problem: 
  Sacral decubitus ulcer, stage III ([facility] [address] (11/30/2020) Active Problems: 
  Severe protein-calorie malnutrition ([address] (10/26/2020) Pulmonary embolism ([address] (11/30/2020) segmental 
 
  Acute deep vein thrombosis (DVT) of both lower extremities ([address] (12/1/2020) Proximal veins Anemia with spherocytes Long standing thrombocytopenia, was much worse during recent admission for pneumonia due to       Aspiration Cirrhosis with splenomegaly. Probably due to alcoholism. Hep B/C serology negative Counts may be due to hypersplenism although we need to rule out Packer syndrome I see no contraindication to the use of rivaroxaban with fairly small PE. Plan:  
Reticulocyte count Direct Skyler Supportive care Consider alternative anticoagulants for long term use as discussed above No indication for thrombophilia workup at this time. Reason for Consultation: 
Nicola Jacobs is a 54 y.o. male who I've been asked to consult for PE and pancytopenia. HPI:  Patient admitted with sacral decubitus and fatigue with dyspnea. Found to have segmental PE and bilateral proximal DVT. He has been inactive since recent admission for aspiration pneumonia requiring mechanical ventilation. He was in rehab then home. No prior clots reported. He is also unaware of his liver disease until this admission. He has not been on chronic anticoagulants but probably received heparin in earlier admission. His platelets have been low long term and were weorse during his pneumonia admission. Past Medical History:  
Diagnosis Date  Diabetes ([address]  Gastroparesis  Pancreatitis Past Surgical History:  
Procedure Laterality Date  HX CHOLECYSTECTOMY  HX HIP FRACTURE TX    
 left hip sx 9.23/2018 History reviewed. No pertinent family history. No Known Allergies Home Medications: Hospital Medications:  
 
Current Facility-Administered Medications Medication Dose Route Frequency Provider Last Rate Last Dose  argatroban 250 mg in 0.9% sodium chloride 250 mL (1000 mcg/mL) infusion  0.5 mcg/kg/min IntraVENous TITRATE Kaley Webb MD 1.5 mL/hr at 12/02/20 0730 0.4 mcg/kg/min at 12/02/20 0730  
 multivitamin, tx-iron-ca-min (THERA-M w/ IRON) tablet 1 Tab  1 Tab Oral DAILY Don Tomas MD      
 ascorbic acid (vitamin C) (VITAMIN C) tablet 500 mg  500 mg Oral DAILY Don Tomas MD      
 sodium chloride (NS) flush 5-10 mL  5-10 mL IntraVENous PRN Saritha Giron, DO      
 clindamycin (CLEOCIN) 600mg NS 50 mL IVPB (premix)  600 mg IntraVENous Q8H Heide Giron DO   600 mg at 12/02/20 0202  piperacillin-tazobactam (ZOSYN) 4.5 g in 0.9% sodium chloride (MBP/ADV) 100 mL MBP  4.5 g IntraVENous Q6H Heide Giron  mL/hr at 12/02/20 0515 4.5 g at 12/02/20 0515  
 vancomycin (VANCOCIN) 1,250 mg in 0.9% sodium chloride 250 mL (VIAL-MATE)  1,250 mg IntraVENous Q18H Saritha Giron, DO   1,250 mg at 12/01/20 1419  Vancomycin - Rx to dose and monitor  1 Each Other Rx Dosing/Monitoring Saritha Giron, DO      
 insulin lispro (HUMALOG) injection   SubCUTAneous AC&HS Don Tomas MD   Stopped at 12/02/20 0730  
 glucose chewable tablet 16 g  4 Tab Oral PRN Don Tomas MD      
 glucagon (GLUCAGEN) injection 1 mg  1 mg IntraMUSCular PRN Don Tomas MD      
 dextrose 10% infusion 125-250 mL  125-250 mL IntraVENous PRN Don Tomas MD      
 albumin human 25% (BUMINATE) solution 12.5 g  12.5 g IntraVENous Q6H Don Tomas MD 60 mL/hr at 12/01/20 2030 12.5 g at 12/01/20 2030  DULoxetine (CYMBALTA) capsule 60 mg  60 mg Oral DAILY Don Tomas MD   60 mg at 12/01/20 1008  lactulose (CHRONULAC) 10 gram/15 mL solution 45 mL  30 g Oral DAILY Clem Tomas MD   45 mL at 12/01/20 1008  pantoprazole (PROTONIX) tablet 40 mg  40 mg Oral ACB Clem Tomas MD   40 mg at 12/02/20 4227  thiamine mononitrate (B-1) tablet 100 mg  100 mg Oral DAILY Clem Tomas MD   100 mg at 12/01/20 1008  traZODone (DESYREL) tablet 50 mg  50 mg Oral QHS Clem Tomas MD   50 mg at 12/01/20 2256  potassium chloride (K-DUR, KLOR-CON) SR tablet 20 mEq  20 mEq Oral DAILY Clem Tomas MD   20 mEq at 12/01/20 1008  sodium chloride (NS) flush 5-40 mL  5-40 mL IntraVENous Q8H Clem Tomas MD   10 mL at 12/02/20 0517  
 sodium chloride (NS) flush 5-40 mL  5-40 mL IntraVENous PRN Clem Tomas MD      
 acetaminophen (TYLENOL) tablet 650 mg  650 mg Oral Q6H PRN Clem Tomas MD   650 mg at 12/01/20 1649 Or  acetaminophen (TYLENOL) suppository 650 mg  650 mg Rectal Q6H PRN Clem Tomas MD      
 polyethylene glycol (MIRALAX) packet 17 g  17 g Oral DAILY PRN Clem Tomas MD      
 ondansetron (ZOFRAN ODT) tablet 4 mg  4 mg Oral Q8H PRN Clem Tomas MD      
 Or  
 ondansetron (ZOFRAN) injection 4 mg  4 mg IntraVENous Q6H PRN Clem Tomas MD      
 oxyCODONE IR (ROXICODONE) tablet 5 mg  5 mg Oral Q6H PRN Clem Tomas MD   5 mg at 12/02/20 2017 Review of Systems:  
Constitutional: 
 Fever: Negative, Chills: Negative, Weight Loss: ++, Malaise/Fatigue: ++ 
 Diaphoresis: Negative,  Weakness: ++ 
Skin: 
 Rash: Negative,  Itching: Negative HENT: 
 Headache:Negative, Hearing Loss: Negative, Tinnitus: Negative, Ear Pain: Negative Nosebleeds: Negative, Congestion: Negative, Stridor: Negative, Sore Throat: Negative Eyes:  
 Blurred Vision: Negative, Double Vision: Negative, Photophobia: Negative Eye Pain: Negative, Eye Discharge: Negative, Eye Redness: Negative Cardiovascular:  
 Chest Pain: Negative, Palpitations: Negative, Orthopnea: Negative Claudication: Negative, Leg Swelling: +PND: Negative Respiratory: 
 Cough: Negative, Hemoptysis: Negative, Sputum Production: Negative Shortness of Breath: ++, Wheezing: Negative Gastrointestinal: 
 Heartburn: Negative, Nausea: Negative, Vomiting: Negative Abdominal Pain: Negative, Diarrhea: Negative, Constipation: Negative Blood in Stool: Negative, Melena: Negative Genitourinary:  
 Dysuria: Negative, Urgency: Negative, Frequency: Negative Hematuria: Negative, Flank Pain: Negative Musculoskeletal: Myalgias: Negative, Neck Pain: Negative, Back Pain: Negative Joint Pain: Negative, Falls: Negative Endo/Heme/Allergies:  
 Easy Bruise/Blood: Negative, Env. Allergies: Negative, Polydipsia: Negative Neurological:  
 Dizziness: Negative, Tingling: Negative. Tremor: Negative, Sensory Change: Negative. Speech Change: Negative, Focal Weakness: Negative     Seizures: Negative, LOC: Negative Psychiatric: 
 Depression: Negative, Suicidal Ideas: Negative, Substance Abuse: Negative Hallucinations: Negative, Nervous/Anxious: Negative Insomnia: Negative, Memory Loss: Negative Physical Assessment:  
 
Visit Vitals BP (!) 123/90 Pulse (!) 108 Temp 97.7 °F (36.5 °C) Resp 18 Ht 6' (1.829 m) Wt 63.5 kg (140 lb) SpO2 100% BMI 18.99 kg/m² Temp (24hrs), Av.7 °F (36.5 °C), Min:97.3 °F (36.3 °C), Max:98.1 °F (36.7 °C) Alert and conversant Comfortable No adenopathy Chest is clear Heart Reg rhythm Abdomen distended wit ascites. Liver and spleen not palpable but reported abnormal on CT imaging. Extremities some edema but no palpable cords Sacral decubitus not undressed. Labs: 
Recent Labs 20 
0345 20 
0220 20 
1720 WBC 4.0* 5.7 7.1 RBC 3.23* 3.35* 3.85* HCT 30.8* 31.8* 36.3 MCV 95.4 94.9 94.3 MCH 31.9 32.2 32.2 MCHC 33.4 34.0 34.2 RDW 14.6* 14.5 14.4 Recent Labs 12/02/20 
0345 12/01/20 
0220 11/30/20 
1720 CO2 21 21 27 BUN 8 12 15 Results for Dorys Ford (MRN [id number]) as of 12/2/2020 08:02 Ref. Range 10/10/2020 03:55 10/13/2020 03:48 Hepatitis B surface Ag Latest Ref Range: <1.00 Index 0.11 Hep B surface Ag Interp. Latest Ref Range: NEG   Negative Hepatitis B surface Ab Latest Ref Range: >10.0 mIU/mL 5.85 (L) <3.10 (L) Hep B surface Ab Interp. Latest Ref Range: POS   Negative (A) Negative (A) Hep B surface Ab comment Latest Units:   Samples with a  value of 10 mIU/mL or greater are considered positive (protective immunity) in ac. .. Samples with a  value of 10 mIU/mL or greater are considered positive (protective immunity) in ac. .. Hepatitis C virus Ab Latest Ref Range: <0.80 Index  0.11 Hep C  virus Ab comment Latest Units:    Pend Results for Dorys Ford (MRN [id number]) as of 12/2/2020 08:02 Ref. Range 11/9/2020 03:15 11/30/2020 17:20 12/1/2020 02:20 12/2/2020 03:45 WBC Latest Ref Range: 4.6 - 13.2 K/uL 4.0 (L) 7.1 5.7 4.0 (L) RBC Latest Ref Range: 4.70 - 5.50 M/uL 3.01 (L) 3.85 (L) 3.35 (L) 3.23 (L) HGB Latest Ref Range: 13.0 - 16.0 g/dL 10.0 (L) 12.4 (L) 10.8 (L) 10.3 (L) HCT Latest Ref Range: 36.0 - 48.0 % 31.0 (L) 36.3 31.8 (L) 30.8 (L) MCV Latest Ref Range: 74.0 - 97.0 .0 (H) 94.3 94.9 95.4 MCH Latest Ref Range: 24.0 - 34.0 PG 33.2 32.2 32.2 31.9 MCHC Latest Ref Range: 31.0 - 37.0 g/dL 32.3 34.2 34.0 33.4 RDW Latest Ref Range: 11.6 - 14.5 % 16.0 (H) 14.4 14.5 14.6 (H) PLATELET Latest Ref Range: 135 - 420 K/uL 107 (L) 107 (L) 89 (L) 88 (L) MPV Latest Ref Range: 9.2 - 11.8 FL 13.9 (H) 12.9 (H) 13.0 (H) 12.9 (H) NEUTROPHILS Latest Ref Range: 40 - 73 % 58 58 62 69 LYMPHOCYTES Latest Ref Range: 21 - 52 % 26 32 22 18 (L) MONOCYTES Latest Ref Range: 3 - 10 % 11 (H) 8 12 (H) 9  
EOSINOPHILS Latest Ref Range: 0 - 5 % 4 2 3 3 BASOPHILS Latest Ref Range: 0 - 2 % 1 0 1 1 DF Latest Units:   AUTOMATED AUTOMATED AUTOMATED AUTOMATED  
ABS. NEUTROPHILS Latest Ref Range: 1.8 - 8.0 K/UL 2.3 4.1 3.6 2.8  
ABS. LYMPHOCYTES Latest Ref Range: 0.9 - 3.6 K/UL 1.1 2.3 1.3 0.7 (L)  
ABS. MONOCYTES Latest Ref Range: 0.05 - 1.2 K/UL 0.5 0.6 0.7 0.4  
ABS. EOSINOPHILS Latest Ref Range: 0.0 - 0.4 K/UL 0.2 0.2 0.2 0.1 ABS. BASOPHILS Latest Ref Range: 0.0 - 0.1 K/UL 0.0 0.0 0.0 0.0 Results for Alfonzo Witt (MRN [id number]) as of 12/2/2020 08:02 Ref. Range 12/1/2020 08:00 12/1/2020 08:05 12/2/2020 03:45 Sodium Latest Ref Range: 136 - 145 mmol/L   142 Potassium Latest Ref Range: 3.5 - 5.5 mmol/L   4.8 Chloride Latest Ref Range: 100 - 111 mmol/L   111 CO2 Latest Ref Range: 21 - 32 mmol/L   21 Anion gap Latest Ref Range: 3.0 - 18 mmol/L   10 Glucose Latest Ref Range: 74 - 99 mg/dL   147 (H) BUN Latest Ref Range: 7.0 - 18 MG/DL   8 Creatinine Latest Ref Range: 0.6 - 1.3 MG/DL   0.89 BUN/Creatinine ratio Latest Ref Range: 12 - 20     9 (L) Calcium Latest Ref Range: 8.5 - 10.1 MG/DL   8.4 (L) Magnesium Latest Ref Range: 1.6 - 2.6 mg/dL   2.0  
GFR est non-AA Latest Ref Range: >60 ml/min/1.73m2   >60  
GFR est AA Latest Ref Range: >60 ml/min/1.73m2   >60 Bilirubin, total Latest Ref Range: 0.2 - 1.0 MG/DL   1.7 (H) Protein, total Latest Ref Range: 6.4 - 8.2 g/dL   6.2 (L) Albumin Latest Ref Range: 3.4 - 5.0 g/dL   2.9 (L) Globulin Latest Ref Range: 2.0 - 4.0 g/dL   3.3 A-G Ratio Latest Ref Range: 0.8 - 1.7     0.9 ALT Latest Ref Range: 16 - 61 U/L   20 AST Latest Ref Range: 10 - 38 U/L   28 Alk. phosphatase Latest Ref Range: 45 - 117 U/L   43 (L) CK Latest Ref Range: 39 - 308 U/L 52 53 CK-MB Index Latest Ref Range: 0.0 - 4.0 % 3.1 4.0 CK - MB Latest Ref Range: <3.6 ng/ml 1.6 2.1 Troponin-I, Qt. Latest Ref Range: 0.0 - 0.045 NG/ML <0.02 <0.02 Ammonia Latest Ref Range: 11 - 32 UMOL/L   22 IMPRESSION 
 IMPRESSION: 
  
1. Cirrhotic morphology of liver with sequelae of portal venous hypertension 
including splenomegaly and moderate volume intra-abdominal ascites. 2. Nonspecific dilatation of the pancreatic duct up to 5 mm. 3. Lower sacral soft tissue ulcer without discrete CT evidence of osteomyelitis. 
 
  
1. Acute pulmonary embolism involving the right lower lobe medial basal 
segmental artery. 2. Ascending thoracic aorta aneurysm measuring 4 cm without evidence of 
dissection. 3. No focal consolidative process in the chest. 
 
Right Lower Venous Non-occlusive thrombus present in the right common femoral vein. The profunda femoral, femoral, popliteal, posterior tibial and saphenous femoral junction vein(s) were imaged in the transverse and longitudinal planes. The vessels showed normal color filling and compressibility. Doppler interrogation of the veins showed phasic and spontaneous flow. The peroneal vein(s) were not well visualized. Left Lower Venous Non-occlusive thrombus present in the left profunda femoral vein. The common femoral, saphenous femoral junction, femoral, popliteal and posterior tibial vein(s) were imaged in the transverse and longitudinal planes. The vessels showed normal color filling and compressibility. Doppler interrogation of the veins showed phasic and spontaneous flow. The peroneal vein(s) were not well visualized. Nirmal Padilla MD FACP [phone]

## 2020-12-02 NOTE — PROGRESS NOTES
2117 Called and spoke with Martha Mendes the pharmacist on call to review the latest PTT which is 74.8. The next PTT will be drawn in the AM per protocol. The patient will remain at the current rate of 0.5 mcg/kg/min.

## 2020-12-02 NOTE — PROGRESS NOTES
Argatroban Dosing Protocol:  
Argatroban drug monitoring was provided for this  54 y.o. , male , admitted to THE [facility] on 11/30/2020 for treatment of Thrombocytopenia PTT: 122.3 Seconds at 03:45 Change argatroban rate to: 0.4mcg/kg/min (Decrease by 0.1mcg/kg/min) Next APTT time ordered: q2hrs until at goal range(50-90s) x 2 consecutively Labs: 
Hemoglobin Lab Results Component Value Date/Time HGB 10.3 (L) 12/02/2020 03:45 AM  
  
Platelets Lab Results Component Value Date/Time PLATELET 88 (L) 83/14/3962 03:45 AM  
  
INR Lab Results Component Value Date/Time INR 1.7 (H) 11/30/2020 05:20 PM  
  
 
 
Current and accurate weight required for initiation. All Heparin, Heparin flushes and Enoxaparin are DISCONTINUED. Baseline PTT, PT/INR, Creatinine, CMP, HCT, Platelet Count All PTTs are to be run stat Draw PTT at 2 hours, until PTT therapeutic X2. Thereafter, draw  PTT daily. The tables in the calculator guide rate adjustment based upon an APTT. To figure out the new rate, find the current rate and corresponding APTT in the ranges provided. Adjust Argatroban rate to the new value in the table. When two consecutive PTTs are therapeutic (50-90 seconds) change PTT to daily [address] [facility] Contact information: [phone]

## 2020-12-02 NOTE — ROUTINE PROCESS
Bedside and Verbal shift change report given to LAVON Villanueva R.N. (oncoming nurse) by NATHAN Benson R.N. (offgoing nurse). Report included the following information SBAR, Kardex, Intake/Output, MAR, Accordion, Recent Results and Med Rec Status.

## 2020-12-02 NOTE — PROGRESS NOTES
Problem: Falls - Risk of 
Goal: *Absence of Falls Description: Document Tom Perry Fall Risk and appropriate interventions in the flowsheet. Outcome: Progressing Towards Goal 
Note: Fall Risk Interventions: 
Mobility Interventions: Communicate number of staff needed for ambulation/transfer, OT consult for ADLs, Patient to call before getting OOB, PT Consult for mobility concerns, PT Consult for assist device competence Medication Interventions: Teach patient to arise slowly, Patient to call before getting OOB Problem: Patient Education: Go to Patient Education Activity Goal: Patient/Family Education Outcome: Progressing Towards Goal 
  
Problem: Patient Education: Go to Patient Education Activity Goal: Patient/Family Education Outcome: Progressing Towards Goal

## 2020-12-03 PROBLEM — L89.153 SACRAL DECUBITUS ULCER, STAGE III (HCC): Status: RESOLVED | Noted: 2020-01-01 | Resolved: 2020-01-01

## 2020-12-03 PROBLEM — R18.8 ASCITES: Status: ACTIVE | Noted: 2020-01-01

## 2020-12-03 PROBLEM — J96.01 ACUTE RESPIRATORY FAILURE WITH HYPOXEMIA (HCC): Status: RESOLVED | Noted: 2020-01-01 | Resolved: 2020-01-01

## 2020-12-03 PROBLEM — J69.0 ASPIRATION PNEUMONIA (HCC): Status: RESOLVED | Noted: 2020-01-01 | Resolved: 2020-01-01

## 2020-12-03 PROBLEM — K52.9 COLITIS: Status: RESOLVED | Noted: 2020-01-01 | Resolved: 2020-01-01

## 2020-12-03 PROBLEM — I50.31 ACUTE DIASTOLIC CHF (CONGESTIVE HEART FAILURE) (HCC): Status: RESOLVED | Noted: 2020-01-01 | Resolved: 2020-01-01

## 2020-12-03 PROBLEM — L89.94 STAGE 4 DECUBITUS ULCER (HCC): Status: ACTIVE | Noted: 2020-01-01

## 2020-12-03 NOTE — PROGRESS NOTES
151 [facility] include 234 beds at the [facility]  Hematology / Oncology Consult Note      Impression:   Principal Problem:    Sacral decubitus ulcer, stage III ([facility] [address] (11/30/2020)    Active Problems:    Severe protein-calorie malnutrition ([address] (10/26/2020)      Pulmonary embolism ([address] (11/30/2020) segmental, stable      Acute deep vein thrombosis (DVT) of both lower extremities ([address] (12/1/2020) Proximal veins      Anemia with spherocytes, Skyler negative    Long standing thrombocytopenia, was much worse during recent admission for pneumonia due to       Aspiration    Cirrhosis with splenomegaly. Probably due to alcoholism. Hep B/C serology negative    Counts may be due to hypersplenism. No evidence for Packer syndrome      I see no contraindication to the use of rivaroxaban with fairly small PE. He remains on argatroban and doing well  He is increasingly ambulatory with cane, no dyspnea on walk to bathroom  Plan:   Reticulocyte count 0.9  Direct Skyler, negative   Supportive care  Can begin alternative anticoagulants for long term use as discussed above per primary team  No indication for thrombophilia workup at this time. Abhinav Rodríguez is a 54 y.o. male who I've been asked to consult for PE and pancytopenia. HPI:  Patient admitted with sacral decubitus and fatigue with dyspnea. Found to have segmental PE and bilateral proximal DVT. He has been inactive since recent admission for aspiration pneumonia requiring mechanical ventilation. He was in rehab then home. No prior clots reported. He is also unaware of his liver disease until this admission. He has not been on chronic anticoagulants but probably received heparin in earlier admission. His platelets have been low long term and were weorse during his pneumonia admission.         Past Medical History:   Diagnosis Date    Diabetes ([address]     Gastroparesis     Pancreatitis      Past Surgical History:   Procedure Laterality Date    HX CHOLECYSTECTOMY      HX HIP FRACTURE TX      left hip sx 9.23/2018       History reviewed. No pertinent family history.   No Known Allergies    Home Medications:       Hospital Medications:     Current Facility-Administered Medications   Medication Dose Route Frequency Provider Last Rate Last Dose    vancomycin (VANCOCIN) 1,000 mg in 0.9% sodium chloride 250 mL (VIAL-MATE)  1,000 mg IntraVENous Q18H Tracy Giron DO   1,000 mg at 12/03/20 0519    argatroban 250 mg in 0.9% sodium chloride 250 mL (1000 mcg/mL) infusion  0.5 mcg/kg/min IntraVENous TITRATE Davonte Goldberg MD 1.1 mL/hr at 12/03/20 0747 0.3 mcg/kg/min at 12/03/20 0747    multivitamin, tx-iron-ca-min (THERA-M w/ IRON) tablet 1 Tab  1 Tab Oral DAILY Aniceto Tomas MD   1 Tab at 12/02/20 0920    ascorbic acid (vitamin C) (VITAMIN C) tablet 500 mg  500 mg Oral DAILY Aniceto Tomas MD   500 mg at 12/02/20 0920    sodium chloride (NS) flush 5-10 mL  5-10 mL IntraVENous PRN Tracy Giron DO        clindamycin (CLEOCIN) 600mg NS 50 mL IVPB (premix)  600 mg IntraVENous Q8H Heide Giron DO   600 mg at 12/03/20 0347    piperacillin-tazobactam (ZOSYN) 4.5 g in 0.9% sodium chloride (MBP/ADV) 100 mL MBP  4.5 g IntraVENous Q6H Heide Giron  mL/hr at 12/03/20 0746 4.5 g at 12/03/20 0746    Vancomycin - Rx to dose and monitor  1 Each Other Rx Dosing/Monitoring Tracy Giron DO        insulin lispro (HUMALOG) injection   SubCUTAneous AC&HS Aniceto Tomas MD   Stopped at 12/03/20 0730    glucose chewable tablet 16 g  4 Tab Oral PRN Aniceto Tomas MD        glucagon (GLUCAGEN) injection 1 mg  1 mg IntraMUSCular PRN Aniceto Tomas MD        dextrose 10% infusion 125-250 mL  125-250 mL IntraVENous PRN Aniceto Tomas MD        albumin human 25% (BUMINATE) solution 12.5 g  12.5 g IntraVENous Q6H Aniceto Tomas MD 60 mL/hr at 12/02/20 2138 12.5 g at 12/02/20 2138    DULoxetine (CYMBALTA) capsule 60 mg  60 mg Oral DAILY Nigel Tomas MD   60 mg at 12/02/20 0920    lactulose (CHRONULAC) 10 gram/15 mL solution 45 mL  30 g Oral DAILY Nigel Tomas MD   45 mL at 12/01/20 1008    pantoprazole (PROTONIX) tablet 40 mg  40 mg Oral ACB Nigel Tomas MD   40 mg at 12/03/20 0630    thiamine mononitrate (B-1) tablet 100 mg  100 mg Oral DAILY Nigel Tomas MD   100 mg at 12/02/20 0920    traZODone (DESYREL) tablet 50 mg  50 mg Oral QHS Nigel Tomas MD   50 mg at 12/02/20 2138    potassium chloride (K-DUR, KLOR-CON) SR tablet 20 mEq  20 mEq Oral DAILY Nigel Tomas MD   20 mEq at 12/02/20 0920    sodium chloride (NS) flush 5-40 mL  5-40 mL IntraVENous Q8H Nigel Tomas MD   10 mL at 12/03/20 0629    sodium chloride (NS) flush 5-40 mL  5-40 mL IntraVENous PRN Nigel Tomas MD        acetaminophen (TYLENOL) tablet 650 mg  650 mg Oral Q6H PRN Nigel Tomas MD   650 mg at 12/01/20 1649    Or    acetaminophen (TYLENOL) suppository 650 mg  650 mg Rectal Q6H PRN Nigel Tomas MD        polyethylene glycol (MIRALAX) packet 17 g  17 g Oral DAILY PRN Nigel Tomas MD        ondansetron (ZOFRAN ODT) tablet 4 mg  4 mg Oral Q8H PRN Nigel Tomas MD        Or    ondansetron (ZOFRAN) injection 4 mg  4 mg IntraVENous Q6H PRN Nigel Tomas MD   4 mg at 12/02/20 1720    oxyCODONE IR (ROXICODONE) tablet 5 mg  5 mg Oral Q6H PRN Nigel Tomas MD   5 mg at 12/03/20 6810       Review of Systems:   Constitutional:   Fever: Negative, Chills: Negative, Weight Loss: ++, Malaise/Fatigue: ++   Diaphoresis: Negative,  Weakness: ++  Skin:   Rash: Negative,  Itching: Negative  HENT:   Headache:Negative, Hearing Loss: Negative, Tinnitus: Negative, Ear Pain: Negative   Nosebleeds: Negative, Congestion: Negative, Stridor: Negative,   Sore Throat: Negative  Eyes: Blurred Vision: Negative, Double Vision: Negative, Photophobia: Negative   Eye Pain: Negative, Eye Discharge: Negative, Eye Redness: Negative  Cardiovascular:    Chest Pain: Negative, Palpitations: Negative, Orthopnea: Negative   Claudication: Negative, Leg Swelling: +PND: Negative  Respiratory:   Cough: Negative, Hemoptysis: Negative, Sputum Production: Negative   Shortness of Breath: improving, Wheezing: Negative  Gastrointestinal:   Heartburn: Negative, Nausea: Negative, Vomiting: Negative   Abdominal Pain: Negative, Diarrhea: Negative, Constipation: Negative   Blood in Stool: Negative, Melena: Negative   Genitourinary:    Dysuria: Negative, Urgency: Negative, Frequency: Negative   Hematuria: Negative, Flank Pain: Negative  Musculoskeletal:    Myalgias: Negative, Neck Pain: Negative, Back Pain: Negative   Joint Pain: Negative, Falls: Negative  Endo/Heme/Allergies:    Easy Bruise/Blood: Negative, Env. Allergies: Negative, Polydipsia: Negative   Neurological:    Dizziness: Negative, Tingling: Negative. Tremor: Negative,    Sensory Change: Negative. Speech Change: Negative, Focal Weakness: Negative     Seizures: Negative, LOC: Negative  Psychiatric:   Depression: Negative, Suicidal Ideas: Negative, Substance Abuse: Negative   Hallucinations: Negative, Nervous/Anxious: Negative   Insomnia: Negative, Memory Loss: Negative     Physical Assessment:     Visit Vitals  /86   Pulse 95   Temp 97.4 °F (36.3 °C)   Resp 16   Ht 6' (1.829 m)   Wt 63.5 kg (140 lb)   SpO2 100%   BMI 18.99 kg/m²       Temp (24hrs), Av.7 °F (36.5 °C), Min:97.2 °F (36.2 °C), Max:98.2 °F (36.8 °C)      Alert and conversant  Comfortable  No adenopathy  Chest is clear  Heart Reg rhythm  Abdomen distended with ascites. Liver and spleen not palpable but reported abnormal on CT imaging. Extremities some edema but no palpable cords  Sacral decubitus not undressed.       Labs:  Recent Labs     20  0602 20  0345 20  0220   WBC 5.0 4. 0* 5.7   RBC 3.26* 3.23* 3.35*   HCT 31.2* 30.8* 31.8*   MCV 95.7 95.4 94.9   MCH 31.9 31.9 32.2   MCHC 33.3 33.4 34.0   RDW 14.7* 14.6* 14.5       Recent Labs     12/03/20  0602 12/03/20  0132 12/02/20  1125   CO2 22 22 23   BUN 5* 5* 7     Results for Mercedes Meyer (MRN [id number]) as of 12/2/2020 08:02   Ref. Range 10/10/2020 03:55 10/13/2020 03:48   Hepatitis B surface Ag Latest Ref Range: <1.00 Index 0.11    Hep B surface Ag Interp. Latest Ref Range: NEG   Negative    Hepatitis B surface Ab Latest Ref Range: >10.0 mIU/mL 5.85 (L) <3.10 (L)   Hep B surface Ab Interp. Latest Ref Range: POS   Negative (A) Negative (A)   Hep B surface Ab comment Latest Units:   Samples with a  value of 10 mIU/mL or greater are considered positive (protective immunity) in ac. .. Samples with a  value of 10 mIU/mL or greater are considered positive (protective immunity) in ac. .. Hepatitis C virus Ab Latest Ref Range: <0.80 Index  0.11   Hep C  virus Ab comment Latest Units:    Pend   Results for Mercedes Meyer (MRN [id number]) as of 12/2/2020 08:02   Ref.  Range 11/9/2020 03:15 11/30/2020 17:20 12/1/2020 02:20 12/2/2020 03:45   WBC Latest Ref Range: 4.6 - 13.2 K/uL 4.0 (L) 7.1 5.7 4.0 (L)   RBC Latest Ref Range: 4.70 - 5.50 M/uL 3.01 (L) 3.85 (L) 3.35 (L) 3.23 (L)   HGB Latest Ref Range: 13.0 - 16.0 g/dL 10.0 (L) 12.4 (L) 10.8 (L) 10.3 (L)   HCT Latest Ref Range: 36.0 - 48.0 % 31.0 (L) 36.3 31.8 (L) 30.8 (L)   MCV Latest Ref Range: 74.0 - 97.0 .0 (H) 94.3 94.9 95.4   MCH Latest Ref Range: 24.0 - 34.0 PG 33.2 32.2 32.2 31.9   MCHC Latest Ref Range: 31.0 - 37.0 g/dL 32.3 34.2 34.0 33.4   RDW Latest Ref Range: 11.6 - 14.5 % 16.0 (H) 14.4 14.5 14.6 (H)   PLATELET Latest Ref Range: 135 - 420 K/uL 107 (L) 107 (L) 89 (L) 88 (L)   MPV Latest Ref Range: 9.2 - 11.8 FL 13.9 (H) 12.9 (H) 13.0 (H) 12.9 (H)   NEUTROPHILS Latest Ref Range: 40 - 73 % 58 58 62 69   LYMPHOCYTES Latest Ref Range: 21 - 52 % 26 32 22 18 (L)   MONOCYTES Latest Ref Range: 3 - 10 % 11 (H) 8 12 (H) 9   EOSINOPHILS Latest Ref Range: 0 - 5 % 4 2 3 3   BASOPHILS Latest Ref Range: 0 - 2 % 1 0 1 1   DF Latest Units:   AUTOMATED AUTOMATED AUTOMATED AUTOMATED   ABS. NEUTROPHILS Latest Ref Range: 1.8 - 8.0 K/UL 2.3 4.1 3.6 2.8   ABS. LYMPHOCYTES Latest Ref Range: 0.9 - 3.6 K/UL 1.1 2.3 1.3 0.7 (L)   ABS. MONOCYTES Latest Ref Range: 0.05 - 1.2 K/UL 0.5 0.6 0.7 0.4   ABS. EOSINOPHILS Latest Ref Range: 0.0 - 0.4 K/UL 0.2 0.2 0.2 0.1   ABS. BASOPHILS Latest Ref Range: 0.0 - 0.1 K/UL 0.0 0.0 0.0 0.0   Results for Deidra Judge (MRN [id number]) as of 12/2/2020 08:02   Ref. Range 12/1/2020 08:00 12/1/2020 08:05 12/2/2020 03:45   Sodium Latest Ref Range: 136 - 145 mmol/L   142   Potassium Latest Ref Range: 3.5 - 5.5 mmol/L   4.8   Chloride Latest Ref Range: 100 - 111 mmol/L   111   CO2 Latest Ref Range: 21 - 32 mmol/L   21   Anion gap Latest Ref Range: 3.0 - 18 mmol/L   10   Glucose Latest Ref Range: 74 - 99 mg/dL   147 (H)   BUN Latest Ref Range: 7.0 - 18 MG/DL   8   Creatinine Latest Ref Range: 0.6 - 1.3 MG/DL   0.89   BUN/Creatinine ratio Latest Ref Range: 12 - 20     9 (L)   Calcium Latest Ref Range: 8.5 - 10.1 MG/DL   8.4 (L)   Magnesium Latest Ref Range: 1.6 - 2.6 mg/dL   2.0   GFR est non-AA Latest Ref Range: >60 ml/min/1.73m2   >60   GFR est AA Latest Ref Range: >60 ml/min/1.73m2   >60   Bilirubin, total Latest Ref Range: 0.2 - 1.0 MG/DL   1.7 (H)   Protein, total Latest Ref Range: 6.4 - 8.2 g/dL   6.2 (L)   Albumin Latest Ref Range: 3.4 - 5.0 g/dL   2.9 (L)   Globulin Latest Ref Range: 2.0 - 4.0 g/dL   3.3   A-G Ratio Latest Ref Range: 0.8 - 1.7     0.9   ALT Latest Ref Range: 16 - 61 U/L   20   AST Latest Ref Range: 10 - 38 U/L   28   Alk. phosphatase Latest Ref Range: 45 - 117 U/L   43 (L)   CK Latest Ref Range: 39 - 308 U/L 52 53    CK-MB Index Latest Ref Range: 0.0 - 4.0 % 3.1 4.0    CK - MB Latest Ref Range: <3.6 ng/ml 1.6 2.1    Troponin-I, Qt.  Latest Ref Range: 0.0 - 0.045 NG/ML <0.02 <0.02    Ammonia Latest Ref Range: 11 - 32 UMOL/L   22   IMPRESSION  IMPRESSION:     1. Cirrhotic morphology of liver with sequelae of portal venous hypertension  including splenomegaly and moderate volume intra-abdominal ascites. 2. Nonspecific dilatation of the pancreatic duct up to 5 mm. 3. Lower sacral soft tissue ulcer without discrete CT evidence of osteomyelitis.       1. Acute pulmonary embolism involving the right lower lobe medial basal  segmental artery. 2. Ascending thoracic aorta aneurysm measuring 4 cm without evidence of  dissection. 3. No focal consolidative process in the chest.    Right Lower Venous     Non-occlusive thrombus present in the right common femoral vein. The profunda femoral, femoral, popliteal, posterior tibial and saphenous femoral junction vein(s) were imaged in the transverse and longitudinal planes. The vessels showed normal color filling and compressibility. Doppler interrogation of the veins showed phasic and spontaneous flow. The peroneal vein(s) were not well visualized. Left Lower Venous     Non-occlusive thrombus present in the left profunda femoral vein. The common femoral, saphenous femoral junction, femoral, popliteal and posterior tibial vein(s) were imaged in the transverse and longitudinal planes. The vessels showed normal color filling and compressibility. Doppler interrogation of the veins showed phasic and spontaneous flow. The peroneal vein(s) were not well visualized.             Elvia Grider MD [facility]  [phone]

## 2020-12-03 NOTE — PROGRESS NOTES
Problem: Falls - Risk of  Goal: *Absence of Falls  Description: Document Mary Anne Hugo Fall Risk and appropriate interventions in the flowsheet.   Outcome: Progressing Towards Goal  Note: Fall Risk Interventions:  Mobility Interventions: Patient to call before getting OOB         Medication Interventions: Teach patient to arise slowly                   Problem: Patient Education: Go to Patient Education Activity  Goal: Patient/Family Education  Outcome: Progressing Towards Goal     Problem: Patient Education: Go to Patient Education Activity  Goal: Patient/Family Education  Outcome: Progressing Towards Goal

## 2020-12-03 NOTE — PROGRESS NOTES
Argatroban Dosing Protocol:  Argatroban drug monitoring was provided for this  54 y.o. , male , admitted to [facility] on 11/30/20  for treatment of Thrombocytopenia    PTT- 98.7s at 01:32  Change argatroban rate to: 0.3mcg/kg/min (Decrease by 0.1mcg/kg/min)  Next APTT time ordered: q2hrs until at goal range(50-90s) x 2 consecutively    Labs:  Hemoglobin      Lab Results   Component Value Date/Time    HGB 10.3 (L) 12/02/2020 03:45 AM      Platelets      Lab Results   Component Value Date/Time    PLATELET 88 (L) 72/97/0724 03:45 AM      INR      Lab Results   Component Value Date/Time    INR 1.7 (H) 11/30/2020 05:20 PM          Current and accurate weight required for initiation. All Heparin, Heparin flushes and Enoxaparin are DISCONTINUED. Baseline PTT, PT/INR, Creatinine, CMP, HCT, Platelet Count       All PTTs are to be run stat     Draw PTT at 2 hours, until PTT therapeutic X2. Thereafter, draw  PTT daily. The tables in the calculator guide rate adjustment based upon an APTT. To figure out the new rate, find the current rate and corresponding APTT in the ranges provided. Adjust Argatroban rate to the new value in the table.     When two consecutive PTTs are therapeutic (50-90 seconds) change PTT to daily     [place] Contact information:  [phone]

## 2020-12-03 NOTE — ROUTINE PROCESS
Assume care of patient from [facility]. Patient received in bed awake. Patient A&Ox4, denies pain and discomfort. No distress noted. Bed locked in low position. Call bell within reach and patient verbalized understanding of use for assistance and needs. 0745- Bedside and Verbal shift change report given to [employer] RN (oncoming nurse) by Carly Hutchison RN (offgoing nurse). Report included the following information SBAR, Kardex, Intake/Output, MAR and Recent Results. [facility] Night Shift Chart Audit    Chart Audit completed?  YES

## 2020-12-03 NOTE — PROGRESS NOTES
Hospitalist Progress Note    Patient: Lyubov Denis MRN: [id number]  CSN: [id number]    YOB: 1965  Age: 54 y.o. Sex: male    DOA: 11/30/2020 LOS:  LOS: 3 days          Chief Complaint:    SOB      Assessment/Plan     Acute PE  Ascites  Liver cirrhosis due to etoh abuse  Infected stage 4 decubitus sarcal ulcer  thrombocytopenia  Severe prot audrey malnutrition  Chronic diastolic CHF  Bilateral DVT    Argatroban gtt-continue for now, expect paracentesis needed, so will turn off few hours  before procedure  And repeat INR    Start level 1 diuretics, lasix and aldactone    Plan to go on xarelto once procedure done, as noted by hematology, appreciate their help    PT consult    Continue PO as tolerated    Wound care and make appt for follow up, change to PO abx for d/c    Expected d/c this weekend if remains stable      Disposition :  Patient Active Problem List   Diagnosis Code    Hypocalcemia E83.51    Hypomagnesemia E83.42    Alcoholism ([facility] [address] F10.20    Cirrhosis ([facility] [address] K74.60    Thrombocytopenia ([facility] [address] D69.6    Severe protein-calorie malnutrition ([facility] [address] E43    Pulmonary embolism (HCC) I26.99    Acute deep vein thrombosis (DVT) of both lower extremities (HCC) I82.403    Stage 4 decubitus ulcer ([facility] [address] L89.94    Ascites R18.8       Subjective:    Denies SOB or leg pain  No chest pain    Abdomen feels full and distended    Review of systems:    Constitutional: denies fevers, chills  Respiratory: denies SOB, cough  Cardiovascular: denies chest pain  Gastrointestinal: denies nausea, vomiting, diarrhea      Vital signs/Intake and Output:  Visit Vitals  /89   Pulse 87   Temp 98.1 °F (36.7 °C)   Resp 16   Ht 6' (1.829 m)   Wt 63.5 kg (140 lb)   SpO2 100%   BMI 18.99 kg/m²     Current Shift:  12/03 0701 - 12/03 1900  In: -   Out: 350 [Urine:350]  Last three shifts:  12/01 1901 - 12/03 0700  In: 2001.5 [P.O.:840;  I.V.:1161.5]  Out: 1 [Urine:1]    Exam:    General: Weakened WM alert, NAD, OX3  Head/Neck: NCAT, supple, No masses, No lymphadenopathy  CVS:Regular rate and rhythm, no M/R/G, S1/S2 heard, no thrill  Lungs:Clear to auscultation bilaterally, no wheezes, rhonchi, or rales  Abdomen: Soft, moderate soft ascites wave/distention, Normal Bowel sounds, No hepatomegaly  Extremities: 2 plus edema EL BL  Clean large ulcer sacrum, stage 4, mild slough left edge, no bleeding  Neuro:grossly normal , follows commands  Psych:appropriate                Labs: Results:       Chemistry Recent Labs     12/03/20  0602 12/03/20  0132 12/02/20  1125 12/02/20  0345 12/01/20  0220   GLU 91 112* 154* 147* 83    142 142 142 141   K 3.6 3.2* 3.9 4.8 2.9*    113* 112* 111 109   CO2 22 22 23 21 21   BUN 5* 5* 7 8 12   CREA 0.95 0.88 0.84 0.89 0.77   CA 8.4* 8.0* 8.1* 8.4* 8.0*   AGAP 8 7 7 10 11   BUCR 5* 6* 8* 9* 16   AP  --  41*  --  43* 47   TP  --  5.7*  --  6.2* 6.5   ALB  --  2.8*  --  2.9* 2.7*   GLOB  --  2.9  --  3.3 3.8   AGRAT  --  1.0  --  0.9 0.7*      CBC w/Diff Recent Labs     12/03/20  0602 12/02/20  0345 12/01/20  0220   WBC 5.0 4.0* 5.7   RBC 3.26* 3.23* 3.35*   HGB 10.4* 10.3* 10.8*   HCT 31.2* 30.8* 31.8*   PLT 84* 88* 89*   GRANS 53 69 62   LYMPH 32 18* 22   EOS 4 3 3      Cardiac Enzymes Recent Labs     12/01/20  0805 12/01/20  0800   CPK 53 52   CKND1 4.0 3.1      Coagulation Recent Labs     12/03/20  1015 12/03/20  0602  11/30/20  1720   PTP 34.3*  --   --  19.9*   INR 3.5*  --   --  1.7*   APTT 74.5* 77.0*   < > 41.1*    < > = values in this interval not displayed. Lipid Panel No results found for: CHOL, CHOLPOCT, CHOLX, CHLST, CHOLV, 818664, HDL, HDLP, LDL, LDLC, DLDLP, 982515, VLDLC, VLDL, TGLX, TRIGL, TRIGP, TGLPOCT, CHHD, CHHDX   BNP No results for input(s): BNPP in the last 72 hours.    Liver Enzymes Recent Labs     12/03/20  0132   TP 5.7*   ALB 2.8*   AP 41*      Thyroid Studies No results found for: T4, T3U, TSH, TSHEXT, TSHEXT     Procedures/imaging: see electronic medical records for all procedures/Xrays and details which were not copied into this note but were reviewed prior to creation of José Luis Gutierrez MD

## 2020-12-03 NOTE — PROGRESS NOTES
1318 Assessment completed, patient is alert and oriented x's 4,  C/o abd. pain. NSR on the monitor with a HR in the 90s. Lung fields are clear throughout on Ra, 02 sat sustained at 100% with no cough noted. Patient is tolerating a diabetic diet without any N/V or gastric distention. Argatroban gtt infusing at 0.3mcg without any s/s of bleeding. Patient is currently sitting up in bed consuming lunch with SO at bedside, no s/s of any distress, VSS, call bell and personal belongings within reach, will continue to monitor. 1500 NAD, will continue to monitor. 1733 Scheduled medications administered as ordered without any complications. Patient is currently sitting up in bed consuming dinner while watching television, no s/s of any pain or distress, will continue to monitor    2013 Bedside and Verbal shift change report given to Cuong Shaffer (oncoming nurse) by Debra Cuevas RN (offgoing nurse). Report included the following information SBAR, MAR, Accordion and Cardiac Rhythm NSR.

## 2020-12-03 NOTE — PROGRESS NOTES
Bedside shift change report given to [address] [place] [address] (oncoming nurse) by Allison Villanueva (offgoing nurse). Report included the following information SBAR, Kardex and ED Summary. 0945 shift assessment complete. Written shift change report given to Carolyne Colorado RN (oncoming nurse) by Arian Boykin RN (offgoing nurse). Report included the following information SBAR, Kardex and ED Summary.

## 2020-12-03 NOTE — PROGRESS NOTES
Pharmacy Dosing Services: Vancomycin    Consult for Vancomycin Dosing by Pharmacy by Dr. Alicea Plane  Consult provided for this 54y.o. year old male , for indication of SSTI. Day of Therapy 4    Vancomycin trough level- 17.6mcg/ml on 12/3/20. Goal trough- 10-15mcg/ml. Plan:  Decrease dose to 1000mg IV q18h  Obtain trough prior to 3rd dose. Pharmacy will follow daily and make changes to dose and/or frequency based on clinical status. Ht Readings from Last 1 Encounters:   12/01/20 182.9 cm (72\")        Wt Readings from Last 1 Encounters:   12/02/20 63.5 kg (140 lb)        Previous Regimen 1250mg IV q18h   Last Level 17.6mcg/ml   Serum Creatinine Lab Results   Component Value Date/Time    Creatinine 0.88 12/03/2020 01:32 AM    Creatinine, POC 0.9 10/07/2019 03:54 PM      Creatinine Clearance Estimated Creatinine Clearance: 85.2 mL/min (based on SCr of 0.88 mg/dL).    BUN Lab Results   Component Value Date/Time    BUN 5 (L) 12/03/2020 01:32 AM      WBC Lab Results   Component Value Date/Time    WBC 4.0 (L) 12/02/2020 03:45 AM      H/H Lab Results   Component Value Date/Time    HGB 10.3 (L) 12/02/2020 03:45 AM      Platelets Lab Results   Component Value Date/Time    PLATELET 88 (L) 33/94/4398 03:45 AM      Temp 98.2 °F (36.8 °C)     Pharmacist Nixon Pryor, [address]

## 2020-12-03 NOTE — ROUTINE PROCESS
Bedside and Verbal shift change report given to Sesar Gray RN (oncoming nurse) by Augusto Tong RN (offgoing nurse). Report included the following information SBAR and Kardex.

## 2020-12-03 NOTE — PROGRESS NOTES
Physician Progress Note      Klaudia Rodriguez  [facility] #:                  [id number]  :                       1965  ADMIT DATE:       2020 4:23 PM  DISCH DATE:  RESPONDING  PROVIDER #:        Stan Panda MD          QUERY TEXT:          Patient admitted with multiple pressure ulcers ,POA. Noted documentation of Sacral   decubitus  ulcer stage 3   by hospitalist on   and  left buttocks  pressure  Stage  4  by  Wound care nurse on  . If possible, please document in progress notes and discharge summary if you are evaluating and /or treating any of the following: The medical record reflects the following:    Risk Factors: cirrhosis   incontinence;    clinical  Indicators: recen  prolonged t hospital admit   , requiring  vent    Treatment: wound care ordered   Minimize layers of linen  Pads under patient to optimize support surface  Turn/reposition approximately every 2 hours  Keep areas covered with current optifoam dressing for 3 days. After 3 days, clean wounds with wound cleanser and cover with Optifoam borders. Thank you,    Oleg Hussein  RN  CCDS   x [phone]  Options provided:  -- Sacral   decubitus  ulcer stage 3   POA  confirmed and  left buttocks  pressure  Stage  4   POA  ruled out  -- left buttocks  pressure  Stage  4   POA  confirmed and Sacral   decubitus  ulcer stage 3   POA  ruled out  -- Other - I will add my own diagnosis  -- Disagree - Not applicable / Not valid  -- Disagree - Clinically unable to determine / Unknown  -- Refer to Clinical Documentation Reviewer    PROVIDER RESPONSE TEXT:    After study, left buttocks pressure Stage 4 POA confirmed and Sacral decubitus ulcer stage 3 POA ruled out. Query created by: Esteban Decker on 12/3/2020 8:52 AM      QUERY TEXT:          Patient admitted with multiple skin ulcers. Pt noted to have Left heel  pressure ulcer Stage 3  POA   by wound care nurse  on .     If possible, please document in progress notes and discharge summary the present on admission status of Left heel pressure  wound stage 3.:    The medical record reflects the following:    Risk Factors: -recent prolonged hospital stay on vent    Clinical Indicators: wound care nurse evaluation- left heel pressure wound  stage 3  with breakthrough  drainage    Treatment: wound care orders  by  Wound care nurse    Thank you,    Andrew Bach RN  CCDS  x [phone]  Options provided:  -- Yes,  Left heel  pressure ulcer Stage 3  POA   was present at the time of the order to admit to the hospital  -- No,  Left heel  pressure ulcer Stage 3  POA  was not present on admission and developed during the inpatient stay  -- Other - I will add my own diagnosis  -- Disagree - Not applicable / Not valid  -- Disagree - Clinically unable to determine / Unknown  -- Refer to Clinical Documentation Reviewer    PROVIDER RESPONSE TEXT:    Yes, Left heel pressure ulcer Stage 3 POA was present at the time of the order to admit to the hospital.    Query created by: Gail Pierre on 12/3/2020 9:03 AM      Electronically signed by:  Anshul Chacon MD 12/3/2020 2:36 PM

## 2020-12-03 NOTE — PROGRESS NOTES
Problem: Falls - Risk of  Goal: *Absence of Falls  Description: Document Silvio Parada Fall Risk and appropriate interventions in the flowsheet.   12/2/2020 2208 by Wing Marcos RN  Outcome: Progressing Towards Goal  Note: Fall Risk Interventions:  Mobility Interventions: Patient to call before getting OOB         Medication Interventions: Teach patient to arise slowly                12/2/2020 2206 by Wing Marcos RN  Outcome: Progressing Towards Goal  Note: Fall Risk Interventions:  Mobility Interventions: Patient to call before getting OOB         Medication Interventions: Teach patient to arise slowly                   Problem: Patient Education: Go to Patient Education Activity  Goal: Patient/Family Education  12/2/2020 2208 by Wing Marcos RN  Outcome: Progressing Towards Goal  12/2/2020 2206 by Wing Marcos RN  Outcome: Progressing Towards Goal     Problem: Patient Education: Go to Patient Education Activity  Goal: Patient/Family Education  12/2/2020 2208 by Wing Marcos RN  Outcome: Progressing Towards Goal  12/2/2020 2206 by Wing Marcos RN  Outcome: Progressing Towards Goal

## 2020-12-03 NOTE — PROGRESS NOTES
Pharmacy Dosing Services: Zosyn      Indication: SSTI  Previous Regimen Zosyn 4.5 g  every 6 hours   Serum Creatinine Lab Results   Component Value Date/Time    Creatinine 0.95 12/03/2020 06:02 AM    Creatinine, POC 0.9 10/07/2019 03:54 PM      Creatinine Clearance Estimated Creatinine Clearance: 78.9 mL/min (based on SCr of 0.95 mg/dL).    BUN Lab Results   Component Value Date/Time    BUN 5 (L) 12/03/2020 06:02 AM       Dose adjustments made based on indication and CrCl  CrCl= 78.9 ml/min  SSTI -No growth 3 days    Zosyn 3.375 g Q 6 hours

## 2020-12-03 NOTE — PROGRESS NOTES
Problem: Falls - Risk of  Goal: *Absence of Falls  Description: Document Armando Renee Fall Risk and appropriate interventions in the flowsheet. Outcome: Progressing Towards Goal  Note: Fall Risk Interventions:  Mobility Interventions: Bed/chair exit alarm, Communicate number of staff needed for ambulation/transfer, Patient to call before getting OOB, Utilize walker, cane, or other assistive device         Medication Interventions: Bed/chair exit alarm, Patient to call before getting OOB, Teach patient to arise slowly                   Problem: Patient Education: Go to Patient Education Activity  Goal: Patient/Family Education  Outcome: Progressing Towards Goal     Problem: Patient Education: Go to Patient Education Activity  Goal: Patient/Family Education  Outcome: Progressing Towards Goal     Problem: Pressure Injury - Risk of  Goal: *Prevention of pressure injury  Description: Document Trav Scale and appropriate interventions in the flowsheet.   Outcome: Progressing Towards Goal  Note: Pressure Injury Interventions:                 Mobility Interventions: HOB 30 degrees or less    Nutrition Interventions: Document food/fluid/supplement intake    Friction and Shear Interventions: HOB 30 degrees or less                Problem: Patient Education: Go to Patient Education Activity  Goal: Patient/Family Education  Outcome: Progressing Towards Goal     Problem: Pain  Goal: *Control of Pain  Outcome: Progressing Towards Goal  Goal: *PALLIATIVE CARE:  Alleviation of Pain  Outcome: Progressing Towards Goal     Problem: Patient Education: Go to Patient Education Activity  Goal: Patient/Family Education  Outcome: Progressing Towards Goal     Problem: General Wound Care  Goal: *Non-infected wound: Improvement of existing wound, absence of infection, and maintenance of skin integrity  Outcome: Progressing Towards Goal  Goal: *Infected Wound: Prevention of further infection and promotion of healing  Description: Infection control procedures (eg: clean dressings, clean gloves, hand washing, precautions to isolate wound from contamination, sterile instruments used for wound debridement) should be implemented.   Outcome: Progressing Towards Goal  Goal: Interventions  Outcome: Progressing Towards Goal     Problem: Patient Education: Go to Patient Education Activity  Goal: Patient/Family Education  Outcome: Progressing Towards Goal

## 2020-12-03 NOTE — PROGRESS NOTES
Problem: Mobility Impaired (Adult and Pediatric)  Goal: *Acute Goals and Plan of Care (Insert Text)  Description: Physical Therapy Goals   Initiated 12/3/2020 and to be accomplished within 5 day(s)  1. Patient will move from supine <> sit with independence in prep for out of bed activity and change of position. 2.  Patient will perform sit<> stand with S with LRAD in prep for transfers/ambulation. 3.  Patient will transfer from bed <> chair with S with LRAD for time up in chair for completion of ADL activity. 4.  Patient will ambulate 150 feet with S/LRAD for improved functional mobility at discharge. 5.  Patient will ascend/descend 3 stairs with handrail(s) with CGA for home re-entry as needed. Outcome: Progressing Towards Goal  PHYSICAL THERAPY EVALUATION    Patient: Tamiko Boudreaux (54 y.o. male)  Date: 12/3/2020  Primary Diagnosis: Sacral decubitus ulcer, stage III ([facility] [address] [L89.153]  Precautions: Fall    ASSESSMENT :  Based on the objective data described below, the patient seen on telemetry unit following admission for above diagnosis, as well as acute PE and bilat DVT. Pt presents with decreased motor performance LE's, decr'd balance and activity tolerance, resulting in decreased independence in functional mobility with regard to transfers, and gait quality. Pt reports amb with SBQC and receiving HHPT PTA. Reports abdominal pain 5/10 at this time, however, worse at other times. Pt demonstrates bed mobility with independence, STS transfers with SBA. Initially amb to bathroom with QC/CGA and used urinal. One episode of unsteady gt with QC, therefore continued GT with RW 140ft with RW/CGA. Ewa slow, antalgic, and pt with ACOSTA: , O2 sat 100%. Pt left back in bed with all needs in reach, and nurse Adi Melendez notified that pt requires use of RW and staff assist for bed to bathroom for safety. [employer] updated.   Recommend HHPT for follow up physical therapy upon discharge to reach maximal level of independence/safety with functional mobility. Pt Education: Role of physical therapy in acute care setting, fall prevention and safety/technique during functional mobility tasks      Patient will benefit from skilled intervention to address the above impairments. Patients rehabilitation potential is considered to be Fair  Factors which may influence rehabilitation potential include:   []         None noted  []         Mental ability/status  [x]         Medical condition  []         Home/family situation and support systems  []         Safety awareness  []         Pain tolerance/management  []         Other:      PLAN :  Recommendations and Planned Interventions:  [x]           Bed Mobility Training             []    Neuromuscular Re-Education  [x]           Transfer Training                   []    Orthotic/Prosthetic Training  [x]           Gait Training                          []    Modalities  [x]           Therapeutic Exercises          []    Edema Management/Control  [x]           Therapeutic Activities            [x]    Patient and Family Training/Education  []           Other (comment):    Frequency/Duration: Patient will be followed by physical therapy 1-2 times per day to address goals. Discharge Recommendations:Home Health   Further Equipment Recommendations for Discharge: N/A     SUBJECTIVE:   Patient stated Doing alright.     OBJECTIVE DATA SUMMARY:     Past Medical History:   Diagnosis Date    Diabetes ([facility] [address]     Gastroparesis     Pancreatitis      Past Surgical History:   Procedure Laterality Date    HX CHOLECYSTECTOMY      HX HIP FRACTURE [place]      left hip sx 9.23/2018     Barriers to Learning/Limitations: yes;  physical  Compensate with: Visual Cues, Verbal Cues, and Tactile Cues  Prior Level of Function/Home Situation: amb with QC and receiving HHPT, HHOT PTA  Home Situation  Home Environment: Private residence  # Steps to Enter: 3  Rails to Enter: Yes  [employer] : Bilateral  One/Two Story Residence: Two story, live on 1st floor  # of Interior Steps: 12  Interior Rails: Left(R half way at bottom)  Living Alone: No  Support Systems: Spouse/Significant Other/Partner  Patient Expects to be Discharged to[de-identified] Private residence  Current DME Used/Available at Home: [employer], quad, [address] chair, Walker, rolling, Grab bars  Critical Behavior:  Neurologic State: Alert  Orientation Level: Oriented X4  Cognition: Follows commands  Safety/Judgement: Awareness of environment; Fall prevention  Psychosocial  Patient Behaviors: Calm; Cooperative  Purposeful Interaction: Yes  Pt Identified Daily Priority: Clinical issues (comment)  Caritas Process: Nurture loving kindness;Establish trust;Teaching/learning; Attend basic human needs  Caring Interventions: Reassure; Therapeutic modalities  Reassure: Therapeutic listening; Informing;Caring rounds  Therapeutic Modalities: Intentional therapeutic touch;Humor  Skin Condition/Temp: Dry;Warm  Skin Integrity: Wound (add Wound LDA)  Skin Integumentary  Skin Color: Appropriate for ethnicity  Skin Condition/Temp: Dry;Warm  Skin Integrity: Wound (add Wound LDA)  Turgor: Non-tenting  Hair Growth: Present  Varicosities: Absent  Nails: Within Defined Limits  Strength:    Strength: Generally decreased, functional  Tone & Sensation:   Tone: Normal  Sensation: Intact  Range Of Motion:  AROM: Generally decreased, functional  PROM: Generally decreased, functional  Functional Mobility:  Bed Mobility:  Rolling: Modified independent  Supine to Sit: Modified independent  Scooting: Modified independent  Transfers:  Sit to Stand: Stand-by assistance  Stand to Sit: Stand-by assistance  Balance:   Sitting: Intact  Standing: Intact; With support(RW)  Ambulation/Gait Training:  Distance (ft): 140 Feet (ft)  Assistive Device: Gait belt;Walker, rolling  Ambulation - Level of Assistance: Contact guard assistance  Gait Description (WDL): Exceptions to WDL  Gait Abnormalities: Antalgic;Decreased step clearance  Speed/Ewa: Slow  Interventions: Safety awareness training  Pain:  Pain Scale 1: Numeric (0 - 10)  Pain Intensity 1: 5  Pain Location 1: Abdomen  Pain Orientation 1: Anterior  Pain Description 1: Sharp;Pressure  Pain Intervention(s) 1: Medication (see MAR)  Activity Tolerance:   Fair   Please refer to the flowsheet for vital signs taken during this treatment. After treatment:   []         Patient left in no apparent distress sitting up in chair  [x]         Patient left in no apparent distress in bed  [x]         Call bell left within reach  [x]         Nursing notified  []         Caregiver present  []         Bed alarm activated    COMMUNICATION/EDUCATION:   [x]         Fall prevention education was provided and the patient/caregiver indicated understanding. [x]         Patient/family have participated as able in goal setting and plan of care. [x]         Patient/family agree to work toward stated goals and plan of care. []         Patient understands intent and goals of therapy, but is neutral about his/her participation. []         Patient is unable to participate in goal setting and plan of care.     Eval Complexity: History: HIGH Complexity :3+ comorbidities / personal factors will impact the outcome/ POC Exam:MEDIUM Complexity : 3 Standardized tests and measures addressing body structure, function, activity limitation and / or participation in recreation  Presentation: MEDIUM Complexity : Evolving with changing characteristics  Clinical Decision Making:Medium Complexity    Overall Complexity:MEDIUM    Thank you for this referral.  Floyd Nicole, PT   Time Calculation: 29 mins

## 2020-12-04 NOTE — PROCEDURES
INTERVENTIONAL RADIOLOGY PROCEDURE NOTE:    Interventional Radiologist: Manuel Almodovar MD    Procedure: US Guided Paracentesis    Pre-operative Diagnosis: ascites    Post-operative Diagnosis: same    Indication: Ascites    Procedure Details: Standard aseptic technique. Ultrasound guidance. 1% lidocaine for local anesthesia. 5 [place] Lisa Yueh sheathed needle placed into ascites in Right abdomen. clearf ascites removed. Please see final report for full details. Specimen:  Clear ascites         Complications:  None. EBL: Minimal.               Condition: Stable. Impression:  Successful paracentesis.       Manuel Almodovar MD  [address] [place] [address] [facility]

## 2020-12-04 NOTE — PROGRESS NOTES
Comprehensive Nutrition Assessment    Type and Reason for Visit: Reassess, Positive nutrition screen    Nutrition Recommendations/Plan: Other: continue w/POC    Nutrition Assessment:  Hx of diabete, gastroparesis, pancreatitis, ETOH. Admitted w/ sacral ulcer stage 3, pulmonary embolism, ascites, severe protien calorie malnutrition. Scheduled for paracentesis procedure today @3. Malnutrition Assessment:  Malnutrition Status:  Mild malnutrition    Context:  Acute illness     Findings of the 6 clinical characteristics of malnutrition:   Energy Intake:  1 - 75% or less of est energy req for 7 or more days  Weight Loss:  1.0 - 5% over one month     Body Fat Loss:  1 - Mild body fat loss, Triceps   Muscle Mass Loss:  1 - Mild muscle mass loss, Thigh (quadraceps), Temples (temporalis), Calf  Fluid Accumulation:  Unable to assess,     Strength:  Not performed         Estimated Daily Nutrient Needs:  Energy (kcal): (P) 3034-8287; Weight Used for Energy Requirements: (P) Current  Protein (g): (P) ; Weight Used for Protein Requirements: (P) Current  Fluid (ml/day): (P) 6215-5994; Method Used for Fluid Requirements: (P) 1 ml/kcal      Nutrition Related Findings:  Lab: Mg 1.5. Med: lasix, aldactone, MVI, vit c, thiamine, lactulose, KCl, protonix. +BM 12/2/20. Wounds:    Pressure injury       Current Nutrition Therapies:  DIET DIABETIC CONSISTENT CARB Regular  DIET NUTRITIONAL SUPPLEMENTS Lunch, Dinner; Kenneth    Anthropometric Measures:  · Height:  6' (182.9 cm)  · Current Body Wt:  69 kg (152 lb 1.9 oz)   · Admission Body Wt:  151 lb    · Usual Body Wt:  73.4 kg (161 lb 13.1 oz)     · Ideal Body Wt:  178 lbs:  85.5 %   · Adjusted Body Weight:   ; Weight Adjustment for: No adjustment   · Adjusted BMI:       · BMI Category:  Normal weight (BMI 18.5-24. 9)       Nutrition Diagnosis:   · Inadequate oral intake related to increased demand for energy/nutrients as evidenced by wounds(Pressure injury)      Nutrition Interventions:   Food and/or Nutrient Delivery: (P) Continue current diet, Continue oral nutrition supplement  Nutrition Education and Counseling: (P) No recommendations at this time  Coordination of Nutrition Care: (P) Continue to monitor while inpatient, Interdisciplinary rounds    Goals:  (P) Encourage PO >75% at most meals throughout the next 3-7 days       Nutrition Monitoring and Evaluation:   Behavioral-Environmental Outcomes: None identified  Food/Nutrient Intake Outcomes: (P) Food and nutrient intake, Supplement intake, Vitamin/mineral intake  Physical Signs/Symptoms Outcomes: (P) Biochemical data, Nutrition focused physical findings, Skin, Weight, GI status, Nausea/vomiting    Discharge Planning:    (P) Too soon to determine     Electronically signed by Mihir Mann on 12/4/2020 at 9:20 AM

## 2020-12-04 NOTE — ROUTINE PROCESS
Ultrasound Guided Right Lower Quad Paracentesis was preformed. Patient tolerated procedure with out pain. 3.8 Liters of fluid was drained. Patient was handed off to transport in stable condition.

## 2020-12-04 NOTE — PROGRESS NOTES
Hospitalist Progress Note    Patient: Dee Feliz MRN: [id number]  CSN: [id number]    YOB: 1965  Age: 54 y.o.   Sex: male    DOA: 11/30/2020 LOS:  LOS: 4 days          Chief Complaint:      PE    Assessment/Plan     Acute PE  Ascites  Liver cirrhosis due to etoh abuse  Infected stage 4 decubitus sarcal ulcer  thrombocytopenia  Severe prot audrey malnutrition  Chronic diastolic CHF  Bilateral DVT    Argatroban to stop today  Paracentesis today    Change lasix tomorrow to PO    Labs reviewed    xarelto to start tonight for PE/DVT    PT has cleared him    Continue treatment of sacral decubitus, change to PO abx tomorrow, and continue wound care    May be able to d/c tomorrow      Disposition :  Patient Active Problem List   Diagnosis Code    Hypocalcemia E83.51    Hypomagnesemia E83.42    Alcoholism ([address] F10.20    Cirrhosis ([address] K74.60    Thrombocytopenia ([address] D69.6    Severe protein-calorie malnutrition ([address] E43    Pulmonary embolism (HCC) I26.99    Acute deep vein thrombosis (DVT) of both lower extremities (HCC) I82.403    Stage 4 decubitus ulcer ([address] L89.94    Ascites R18.8       Subjective:    I am ok  No new complaints  Denies CP  Has some mild SOB when up in halls but walking on own    Review of systems:    Constitutional: denies fevers, chills, myalgias  Respiratory: denies SOB, cough  Cardiovascular: denies chest pain, palpitations  Gastrointestinal: denies nausea, vomiting, diarrhea      Vital signs/Intake and Output:  Visit Vitals  /78   Pulse 95   Temp 98.3 °F (36.8 °C)   Resp 17   Ht 6' (1.829 m)   Wt 69 kg (152 lb 1.6 oz)   SpO2 100%   BMI 20.63 kg/m²     Current Shift:  12/04 0701 - 12/04 1900  In: -   Out: 300 [Urine:300]  Last three shifts:  12/02 1901 - 12/04 0700  In: 1111.4 [P.O.:340; I.V.:771.4]  Out: 3300 [Urine:3300]    Exam:    General: Well developed, alert, NAD, OX3  Head/Neck: NCAT, supple, No masses, No lymphadenopathy  CVS:Regular rate and rhythm, no M/R/G, S1/S2 heard, no thrill  Lungs:Clear to auscultation bilaterally, no wheezes, rhonchi, or rales  Abdomen: Soft, Nontender, No distention, Normal Bowel sounds, No hepatomegaly  Extremities: No C/C/E, pulses palpable 2+  Skin:normal texture and turgor, no rashes, no lesions  Neuro:grossly normal , follows commands  Psych:appropriate                Labs: Results:       Chemistry Recent Labs     12/04/20  0434 12/03/20  0602 12/03/20  0132  12/02/20  0345   GLU 95 91 112*   < > 147*    140 142   < > 142   K 3.5 3.6 3.2*   < > 4.8    110 113*   < > 111   CO2 25 22 22   < > 21   BUN 3* 5* 5*   < > 8   CREA 0.87 0.95 0.88   < > 0.89   CA 8.3* 8.4* 8.0*   < > 8.4*   AGAP 9 8 7   < > 10   BUCR 3* 5* 6*   < > 9*   AP  --   --  41*  --  43*   TP  --   --  5.7*  --  6.2*   ALB  --   --  2.8*  --  2.9*   GLOB  --   --  2.9  --  3.3   AGRAT  --   --  1.0  --  0.9    < > = values in this interval not displayed. CBC w/Diff Recent Labs     12/04/20 0434 12/03/20  0602 12/02/20  0345   WBC 3.9* 5.0 4.0*   RBC 3.13* 3.26* 3.23*   HGB 10.1* 10.4* 10.3*   HCT 29.4* 31.2* 30.8*   PLT 85* 84* 88*   GRANS 48 53 69   LYMPH 36 32 18*   EOS 3 4 3      Cardiac Enzymes No results for input(s): CPK, CKND1, LYDIA in the last 72 hours. No lab exists for component: CKRMB, TROIP   Coagulation Recent Labs     12/04/20 0434 12/03/20  1015   PTP  --  34.3*   INR  --  3.5*   APTT 83.1* 74.5*       Lipid Panel No results found for: CHOL, CHOLPOCT, CHOLX, CHLST, CHOLV, 924217, HDL, HDLP, LDL, LDLC, DLDLP, 077729, VLDLC, VLDL, TGLX, TRIGL, TRIGP, TGLPOCT, CHHD, CHHDX   BNP No results for input(s): BNPP in the last 72 hours.    Liver Enzymes Recent Labs     12/03/20  0132   TP 5.7*   ALB 2.8*   AP 41*      Thyroid Studies No results found for: T4, T3U, TSH, TSHEXT     Procedures/imaging: see electronic medical records for all procedures/Xrays and details which were not copied into this note but were reviewed prior to creation of Alexandra Umana MD

## 2020-12-04 NOTE — PROGRESS NOTES
[facility]  Hematology / Oncology Consult Note      Impression:   Principal Problem:    Sacral decubitus ulcer, stage III ([address] (11/30/2020)    Active Problems:    Severe protein-calorie malnutrition ([address] (10/26/2020)      Pulmonary embolism ([address] (11/30/2020) segmental, stable      Acute deep vein thrombosis (DVT) of both lower extremities ([address] (12/1/2020) Proximal veins      Anemia with spherocytes, Skyler negative    Long standing thrombocytopenia, was much worse during recent admission for pneumonia due to       Aspiration    Cirrhosis with splenomegaly. Probably due to alcoholism. Hep B/C serology negative    Counts may be due to hypersplenism. No evidence for Packer syndrome      I see no contraindication to the use of rivaroxaban with fairly small PE. He remains on argatroban and doing well  He is increasingly ambulatory with cane, no dyspnea on walk to bathroom  Plan: For paracentesis  Continuing argtatroban until procedures completed, then change to xarelto  Will sign off, please call if further need for Heme  Supportive care          Kathalene Bence is a 54 y.o. male who I've been asked to consult for PE and pancytopenia. HPI:  Patient admitted with sacral decubitus and fatigue with dyspnea. Found to have segmental PE and bilateral proximal DVT. He has been inactive since recent admission for aspiration pneumonia requiring mechanical ventilation. He was in rehab then home. No prior clots reported. He is also unaware of his liver disease until this admission. He has not been on chronic anticoagulants but probably received heparin in earlier admission. His platelets have been low long term and were weorse during his pneumonia admission.         Past Medical History:   Diagnosis Date    Diabetes ([address]     Gastroparesis     Pancreatitis      Past Surgical History:   Procedure Laterality Date    HX CHOLECYSTECTOMY      HX HIP FRACTURE TX      left hip sx 9.23/2018 History reviewed. No pertinent family history.   No Known Allergies    Home Medications:       Hospital Medications:     Current Facility-Administered Medications   Medication Dose Route Frequency Provider Last Rate Last Dose    vancomycin (VANCOCIN) 1,000 mg in 0.9% sodium chloride 250 mL (VIAL-MATE)  1,000 mg IntraVENous Q18H Author Nadia Giron,    1,000 mg at 12/03/20 2241    furosemide (LASIX) injection 40 mg  40 mg IntraVENous DAILY Rob Wilkerson MD   40 mg at 12/03/20 1251    spironolactone (ALDACTONE) tablet 25 mg  25 mg Oral DAILY Rob Wilkerson MD   25 mg at 12/03/20 1251    Vancomycin trough draw on 12/4/20 at 1630  1 Each Other Rx Dosing/Monitoring Author Nadia Giron DO        piperacillin-tazobactam (ZOSYN) 3.375 g in 0.9% sodium chloride (MBP/ADV) 100 mL MBP  3.375 g IntraVENous Q6H Author Nadia Giron  mL/hr at 12/04/20 0148 3.375 g at 12/04/20 0148    argatroban 250 mg in 0.9% sodium chloride 250 mL (1000 mcg/mL) infusion  0.5 mcg/kg/min IntraVENous TITRATE Rob Wilkerson MD 1.1 mL/hr at 12/03/20 1928 0.3 mcg/kg/min at 12/03/20 1928    multivitamin, tx-iron-ca-min (THERA-M w/ IRON) tablet 1 Tab  1 Tab Oral DAILY Clem Tomas MD   1 Tab at 12/03/20 1040    ascorbic acid (vitamin C) (VITAMIN C) tablet 500 mg  500 mg Oral DAILY Clem Tomas MD   500 mg at 12/03/20 1039    sodium chloride (NS) flush 5-10 mL  5-10 mL IntraVENous PRN Author Nadia Giron DO        Vancomycin - Rx to dose and monitor  1 Each Other Rx Dosing/Monitoring Author Nadia Giron DO        insulin lispro (HUMALOG) injection   SubCUTAneous AC&HS Clem Tomas MD   4 Units at 12/03/20 2150    glucose chewable tablet 16 g  4 Tab Oral PRN Clem Tomas MD        glucagon (GLUCAGEN) injection 1 mg  1 mg IntraMUSCular PRN Clem Tomas MD        dextrose 10% infusion 125-250 mL  125-250 mL IntraVENous PRN Juan C Tomas MD        albumin human 25% (BUMINATE) solution 12.5 g  12.5 g IntraVENous Q6H Juan C Tomas MD 60 mL/hr at 12/02/20 2138 12.5 g at 12/03/20 2144    DULoxetine (CYMBALTA) capsule 60 mg  60 mg Oral DAILY Juan C Tomas MD   60 mg at 12/03/20 1040    lactulose (CHRONULAC) 10 gram/15 mL solution 45 mL  30 g Oral DAILY Juan C Tomas MD   45 mL at 12/01/20 1008    pantoprazole (PROTONIX) tablet 40 mg  40 mg Oral ACB Juan C Tomas MD   40 mg at 12/03/20 0630    thiamine mononitrate (B-1) tablet 100 mg  100 mg Oral DAILY Juan C Tomas MD   100 mg at 12/03/20 1053    traZODone (DESYREL) tablet 50 mg  50 mg Oral QHS Juan C Tomas MD   50 mg at 12/03/20 2149    potassium chloride (K-DUR, KLOR-CON) SR tablet 20 mEq  20 mEq Oral DAILY Juan C Tomas MD   20 mEq at 12/03/20 1040    sodium chloride (NS) flush 5-40 mL  5-40 mL IntraVENous Q8H Juan C Tomas MD   10 mL at 12/03/20 2153    sodium chloride (NS) flush 5-40 mL  5-40 mL IntraVENous PRN Juan C Tomas MD        acetaminophen (TYLENOL) tablet 650 mg  650 mg Oral Q6H PRN Juan C Tomas MD   650 mg at 12/01/20 1649    Or    acetaminophen (TYLENOL) suppository 650 mg  650 mg Rectal Q6H PRN Juan C Tomas MD        polyethylene glycol (MIRALAX) packet 17 g  17 g Oral DAILY PRN Juan C Tomas MD        ondansetron (ZOFRAN ODT) tablet 4 mg  4 mg Oral Q8H PRN Juan C Tomas MD        Or    ondansetron (ZOFRAN) injection 4 mg  4 mg IntraVENous Q6H PRN Juan C Tomas MD   4 mg at 12/02/20 1720    oxyCODONE IR (ROXICODONE) tablet 5 mg  5 mg Oral Q6H PRN Juan C Tomas MD   5 mg at 12/04/20 0347       Review of Systems:   Constitutional:   Fever: Negative, Chills: Negative, Weight Loss: ++, Malaise/Fatigue: ++   Diaphoresis: Negative,  Weakness: ++  Skin:   Rash: Negative,  Itching: Negative  HENT:   Headache:Negative, Hearing Loss: Negative, Tinnitus: Negative, Ear Pain: Negative   Nosebleeds: Negative, Congestion: Negative, Stridor: Negative,   Sore Throat: Negative  Eyes:    Blurred Vision: Negative, Double Vision: Negative, Photophobia: Negative   Eye Pain: Negative, Eye Discharge: Negative, Eye Redness: Negative  Cardiovascular:    Chest Pain: Negative, Palpitations: Negative, Orthopnea: Negative   Claudication: Negative, Leg Swelling: +PND: Negative  Respiratory:   Cough: Negative, Hemoptysis: Negative, Sputum Production: Negative   Shortness of Breath: improving, Wheezing: Negative  Gastrointestinal:   Heartburn: Negative, Nausea: Negative, Vomiting: Negative   Abdominal Pain: Negative, Diarrhea: Negative, Constipation: Negative   Blood in Stool: Negative, Melena: Negative   Genitourinary:    Dysuria: Negative, Urgency: Negative, Frequency: Negative   Hematuria: Negative, Flank Pain: Negative  Musculoskeletal:    Myalgias: Negative, Neck Pain: Negative, Back Pain: Negative   Joint Pain: Negative, Falls: Negative  Endo/Heme/Allergies:    Easy Bruise/Blood: Negative, Env. Allergies: Negative, Polydipsia: Negative   Neurological:    Dizziness: Negative, Tingling: Negative. Tremor: Negative,    Sensory Change: Negative. Speech Change: Negative, Focal Weakness: Negative     Seizures: Negative, LOC: Negative  Psychiatric:   Depression: Negative, Suicidal Ideas: Negative, Substance Abuse: Negative   Hallucinations: Negative, Nervous/Anxious: Negative   Insomnia: Negative, Memory Loss: Negative     Physical Assessment:     Visit Vitals  /81 (BP 1 Location: Right arm, BP Patient Position: At rest)   Pulse 92   Temp 98.2 °F (36.8 °C)   Resp 16   Ht 6' (1.829 m)   Wt 69 kg (152 lb 1.6 oz)   SpO2 100%   BMI 20.63 kg/m²       Temp (24hrs), Av.9 °F (36.6 °C), Min:97.2 °F (36.2 °C), Max:98.6 °F (37 °C)      Alert and conversant  Comfortable  No adenopathy  Chest is clear  Heart Reg rhythm  Abdomen distended with ascites.   Liver and spleen not palpable but reported abnormal on CT imaging. Extremities some edema but no palpable cords  Sacral decubitus not undressed. Labs:  Recent Labs     12/04/20  0434 12/03/20  0602 12/02/20  0345   WBC 3.9* 5.0 4.0*   RBC 3.13* 3.26* 3.23*   HCT 29.4* 31.2* 30.8*   MCV 93.9 95.7 95.4   MCH 32.3 31.9 31.9   MCHC 34.4 33.3 33.4   RDW 14.6* 14.7* 14.6*       Recent Labs     12/04/20  0434 12/03/20  0602 12/03/20  0132   CO2 25 22 22   BUN 3* 5* 5*     Results for Gerri Asif (MRN [id number]) as of 12/2/2020 08:02   Ref. Range 10/10/2020 03:55 10/13/2020 03:48   Hepatitis B surface Ag Latest Ref Range: <1.00 Index 0.11    Hep B surface Ag Interp. Latest Ref Range: NEG   Negative    Hepatitis B surface Ab Latest Ref Range: >10.0 mIU/mL 5.85 (L) <3.10 (L)   Hep B surface Ab Interp. Latest Ref Range: POS   Negative (A) Negative (A)   Hep B surface Ab comment Latest Units:   Samples with a  value of 10 mIU/mL or greater are considered positive (protective immunity) in ac. .. Samples with a  value of 10 mIU/mL or greater are considered positive (protective immunity) in ac. .. Hepatitis C virus Ab Latest Ref Range: <0.80 Index  0.11   Hep C  virus Ab comment Latest Units:    Pend   Results for Gerri Asif (MRN [id number]) as of 12/2/2020 08:02   Ref.  Range 11/9/2020 03:15 11/30/2020 17:20 12/1/2020 02:20 12/2/2020 03:45   WBC Latest Ref Range: 4.6 - 13.2 K/uL 4.0 (L) 7.1 5.7 4.0 (L)   RBC Latest Ref Range: 4.70 - 5.50 M/uL 3.01 (L) 3.85 (L) 3.35 (L) 3.23 (L)   HGB Latest Ref Range: 13.0 - 16.0 g/dL 10.0 (L) 12.4 (L) 10.8 (L) 10.3 (L)   HCT Latest Ref Range: 36.0 - 48.0 % 31.0 (L) 36.3 31.8 (L) 30.8 (L)   MCV Latest Ref Range: 74.0 - 97.0 .0 (H) 94.3 94.9 95.4   MCH Latest Ref Range: 24.0 - 34.0 PG 33.2 32.2 32.2 31.9   MCHC Latest Ref Range: 31.0 - 37.0 g/dL 32.3 34.2 34.0 33.4   RDW Latest Ref Range: 11.6 - 14.5 % 16.0 (H) 14.4 14.5 14.6 (H)   PLATELET Latest Ref Range: 135 - 420 K/uL 107 (L) 107 (L) 89 (L) 88 (L)   MPV Latest Ref Range: 9.2 - 11.8 FL 13.9 (H) 12.9 (H) 13.0 (H) 12.9 (H)   NEUTROPHILS Latest Ref Range: 40 - 73 % 58 58 62 69   LYMPHOCYTES Latest Ref Range: 21 - 52 % 26 32 22 18 (L)   MONOCYTES Latest Ref Range: 3 - 10 % 11 (H) 8 12 (H) 9   EOSINOPHILS Latest Ref Range: 0 - 5 % 4 2 3 3   BASOPHILS Latest Ref Range: 0 - 2 % 1 0 1 1   DF Latest Units:   AUTOMATED AUTOMATED AUTOMATED AUTOMATED   ABS. NEUTROPHILS Latest Ref Range: 1.8 - 8.0 K/UL 2.3 4.1 3.6 2.8   ABS. LYMPHOCYTES Latest Ref Range: 0.9 - 3.6 K/UL 1.1 2.3 1.3 0.7 (L)   ABS. MONOCYTES Latest Ref Range: 0.05 - 1.2 K/UL 0.5 0.6 0.7 0.4   ABS. EOSINOPHILS Latest Ref Range: 0.0 - 0.4 K/UL 0.2 0.2 0.2 0.1   ABS. BASOPHILS Latest Ref Range: 0.0 - 0.1 K/UL 0.0 0.0 0.0 0.0   Results for Shania Shepard (MRN [id number]) as of 12/2/2020 08:02   Ref. Range 12/1/2020 08:00 12/1/2020 08:05 12/2/2020 03:45   Sodium Latest Ref Range: 136 - 145 mmol/L   142   Potassium Latest Ref Range: 3.5 - 5.5 mmol/L   4.8   Chloride Latest Ref Range: 100 - 111 mmol/L   111   CO2 Latest Ref Range: 21 - 32 mmol/L   21   Anion gap Latest Ref Range: 3.0 - 18 mmol/L   10   Glucose Latest Ref Range: 74 - 99 mg/dL   147 (H)   BUN Latest Ref Range: 7.0 - 18 MG/DL   8   Creatinine Latest Ref Range: 0.6 - 1.3 MG/DL   0.89   BUN/Creatinine ratio Latest Ref Range: 12 - 20     9 (L)   Calcium Latest Ref Range: 8.5 - 10.1 MG/DL   8.4 (L)   Magnesium Latest Ref Range: 1.6 - 2.6 mg/dL   2.0   GFR est non-AA Latest Ref Range: >60 ml/min/1.73m2   >60   GFR est AA Latest Ref Range: >60 ml/min/1.73m2   >60   Bilirubin, total Latest Ref Range: 0.2 - 1.0 MG/DL   1.7 (H)   Protein, total Latest Ref Range: 6.4 - 8.2 g/dL   6.2 (L)   Albumin Latest Ref Range: 3.4 - 5.0 g/dL   2.9 (L)   Globulin Latest Ref Range: 2.0 - 4.0 g/dL   3.3   A-G Ratio Latest Ref Range: 0.8 - 1.7     0.9   ALT Latest Ref Range: 16 - 61 U/L   20   AST Latest Ref Range: 10 - 38 U/L   28   Alk.  phosphatase Latest Ref Range: 45 - 117 U/L   43 (L)   CK Latest Ref Range: 39 - 308 U/L 52 53    CK-MB Index Latest Ref Range: 0.0 - 4.0 % 3.1 4.0    CK - MB Latest Ref Range: <3.6 ng/ml 1.6 2.1    Troponin-I, Qt. Latest Ref Range: 0.0 - 0.045 NG/ML <0.02 <0.02    Ammonia Latest Ref Range: 11 - 32 UMOL/L   22   IMPRESSION  IMPRESSION:     1. Cirrhotic morphology of liver with sequelae of portal venous hypertension  including splenomegaly and moderate volume intra-abdominal ascites. 2. Nonspecific dilatation of the pancreatic duct up to 5 mm. 3. Lower sacral soft tissue ulcer without discrete CT evidence of osteomyelitis.       1. Acute pulmonary embolism involving the right lower lobe medial basal  segmental artery. 2. Ascending thoracic aorta aneurysm measuring 4 cm without evidence of  dissection. 3. No focal consolidative process in the chest.    Right Lower Venous     Non-occlusive thrombus present in the right common femoral vein. The profunda femoral, femoral, popliteal, posterior tibial and saphenous femoral junction vein(s) were imaged in the transverse and longitudinal planes. The vessels showed normal color filling and compressibility. Doppler interrogation of the veins showed phasic and spontaneous flow. The peroneal vein(s) were not well visualized. Left Lower Venous     Non-occlusive thrombus present in the left profunda femoral vein. The common femoral, saphenous femoral junction, femoral, popliteal and posterior tibial vein(s) were imaged in the transverse and longitudinal planes. The vessels showed normal color filling and compressibility. Doppler interrogation of the veins showed phasic and spontaneous flow. The peroneal vein(s) were not well visualized.             Tito Cox MD [facility]  [phone]

## 2020-12-04 NOTE — PROGRESS NOTES
Problem: Falls - Risk of  Goal: *Absence of Falls  Description: Document Jamar Dickinson Fall Risk and appropriate interventions in the flowsheet.   Outcome: Progressing Towards Goal  Note: Fall Risk Interventions:  Mobility Interventions: Assess mobility with egress test, Patient to call before getting OOB, Communicate number of staff needed for ambulation/transfer         Medication Interventions: Evaluate medications/consider consulting pharmacy, Patient to call before getting OOB, Teach patient to arise slowly    Elimination Interventions: Call light in reach, Patient to call for help with toileting needs, Toilet paper/wipes in reach, Toileting schedule/hourly rounds, Urinal in reach              Problem: Patient Education: Go to Patient Education Activity  Goal: Patient/Family Education  Outcome: Progressing Towards Goal

## 2020-12-04 NOTE — PROGRESS NOTES
Problem: Falls - Risk of  Goal: *Absence of Falls  Description: Document Dunn Pipnaima Fall Risk and appropriate interventions in the flowsheet.   Outcome: Progressing Towards Goal  Note: Fall Risk Interventions:  Mobility Interventions: Patient to call before getting OOB         Medication Interventions: Teach patient to arise slowly    Elimination Interventions: Call light in reach

## 2020-12-04 NOTE — PROGRESS NOTES
0700: Assumed patient care from off going [address] [place] [address]    7614: Argatroban gtt stopped at this time per  orders. 1626: Received phone call from Ultrasound. Patient sent back up to unit, procedure cancelled by Dr. Cristina Anaya after reviewing labs. Paged Dr. Foreign Tejada to inform her they plan to reschedule procedure Monday if patient is still here. 1640: Dr. Foreign Tejada informed of situation. .   Procedure back on     1758: Spoke with pharmacy to confirm Vanc dose.  . . Vanc trough too high was instructed to hold 1700 dose and pharmacy will reschedule for another time

## 2020-12-04 NOTE — PROGRESS NOTES
Spoke to interventional radiologist, Dr. Steven Figueroa, regarding ordered US paracentesis procedure, current Argatroban gtt infusion, and PT/INR, PTT = 34.3/3.5, 74.5. Upon review, IR ordered to stop Argatroban infusion 2 hrs prior to procedure. Procedure is scheduled at 3pm in [address] [facility]. Requested that RN stop infusion at 12pm, will order redraw of PT/INR @ [address] for para at 4301 Robbi Robbins RN verbalized understanding.

## 2020-12-04 NOTE — PROGRESS NOTES
Pharmacy Dosing Services: Vancomycin    Consult for Vancomycin Dosing by Pharmacy by Dr. Alireza Steven  Indication:SSTI  Day of Therapy 5    Previous Regimen Vancomycin 1000 mg Q 18H   Last Level 15.9 mcg/ml   Other Current Antibiotics Zosyn   Serum Creatinine Lab Results   Component Value Date/Time    Creatinine, POC 0.9 10/07/2019 03:54 PM    Creatinine 0.87 12/04/2020 04:34 AM      Creatinine Clearance Estimated Creatinine Clearance: 93.6 mL/min (based on SCr of 0.87 mg/dL).    BUN Lab Results   Component Value Date/Time    BUN 3 (L) 12/04/2020 04:34 AM       WBC Lab Results   Component Value Date/Time    WBC 3.9 (L) 12/04/2020 04:34 AM      H/H    Lab Results   Component Value Date/Time    HGB 10.1 (L) 12/04/2020 04:34 AM      Platelets Lab Results   Component Value Date/Time    PLATELET 85 (L) 25/26/2967 04:34 AM      Temp 98.7 °F (37.1 °C)     Vancomycin Trough = 15.9 mcg/ml, ( Trough Goal  = 10/15 mcg/ml )  Vancomycin dose was changed to Vancomycin 750 mg IV Q 12 hours        Plan:  Continue to monitor and make any necessary changes based on clinical status

## 2020-12-04 NOTE — PROGRESS NOTES
Problem: Mobility Impaired (Adult and Pediatric)  Goal: *Acute Goals and Plan of Care (Insert Text)  Description: Physical Therapy Goals   Initiated 12/3/2020 and to be accomplished within 5 day(s)  1. Patient will move from supine <> sit with independence in prep for out of bed activity and change of position. 2.  Patient will perform sit<> stand with S with LRAD in prep for transfers/ambulation. 3.  Patient will transfer from bed <> chair with S with LRAD for time up in chair for completion of ADL activity. 4.  Patient will ambulate 150 feet with S/LRAD for improved functional mobility at discharge. 5.  Patient will ascend/descend 3 stairs with handrail(s) with CGA for home re-entry as needed. Outcome: Resolved/Met   PHYSICAL THERAPY TREATMENT AND DISCHARGE    Patient: Matt Salazar (54 y.o. male)  Date: 12/4/2020  Diagnosis: Sacral decubitus ulcer, stage III ([address] [L89.153]   Sacral decubitus ulcer, stage III ([address]    Precautions:  PLOF: ambulatory with a quad cane, has a RW    ASSESSMENT:  No assistance needed for supine<>sit or sit<>stand. Ambulated with RW, reciprocal gt pattern, steady slow pace, no LOB or path deviations. Negotiated 3 steps holding L hand rail only. Returned to room and left supine in bed. Pt is safe to ambulate in halls independently with RW. PLAN:  Maximum therapeutic gains met at current level of care and patient will be discharged from physical therapy at this time.   Rationale for discharge:  [x]     Goals Achieved  []     [address]  []     Patient not participating in therapy  []     Other:  Discharge Recommendations:  Home Health  Further Equipment Recommendations for Discharge:  rolling walker     SUBJECTIVE:   Patient stated I have a procedure at 3.    OBJECTIVE DATA SUMMARY:   Critical Behavior:  Neurologic State: Alert  Orientation Level: Oriented X4  Cognition: Appropriate decision making, Appropriate for age attention/concentration, Appropriate safety awareness, Follows commands  Safety/Judgement: Awareness of environment, Fall prevention  Functional Mobility Training:  Bed Mobility:    Supine to Sit: Independent  Sit to Supine: Independent  Scooting: Supervision  Transfers:  Sit to Stand: Supervision  Stand to Sit: Supervision  Balance:  Sitting: Intact  Standing: Intact; With support   Ambulation/Gait Training:  Distance (ft): 200 Feet (ft)  Assistive Device: Gait belt;Walker, rolling  Ambulation - Level of Assistance: Modified independent  Speed/Ewa: Slow  Stairs:  Number of Stairs Trained: 3  Stairs - Level of Assistance: Stand-by assistance  Rail Use: Left   Pain:  Pain level pre-treatment: 0/10   Pain level post-treatment: 0/10   Pain Intervention(s): Medication (see MAR); Rest, Ice, Repositioning   Response to intervention: Nurse notified, See doc flow    Activity Tolerance:   GOod  Please refer to the flowsheet for vital signs taken during this treatment. After treatment:   [] Patient left in no apparent distress sitting up in chair  [x] Patient left in no apparent distress in bed  [x] Call bell left within reach  [] Nursing notified  [] Caregiver present  [] Bed alarm activated  [] SCDs applied      COMMUNICATION/EDUCATION:   [x]         Role of Physical Therapy in the acute care setting. [x]         Fall prevention education was provided and the patient/caregiver indicated understanding. [x]         Patient/family have participated as able and agree with findings and recommendations. []         Patient is unable to participate in plan of care at this time.   []         Other:        Dean Parr PTA   Time Calculation: 14 mins

## 2020-12-04 NOTE — PROGRESS NOTES
D/C Plan: when discharged pt will return back home , referral has been sent to [facility] services.

## 2020-12-04 NOTE — PROGRESS NOTES
1945 Pt received from offgoing nurse without any signs or symptoms of distress. Pt vitals are stable and within normal limits. Pt bed in low position with wheels locked and call bell within reach. 2026 Scheduled medications administered as ordered. Pain medication administered as per PRN order for c/o pain. See flow sheets for follow up documentation. 2030 Assessment completed and documented in flow sheet. Pt denies any further needs at this time. Pt in NAD with bed in low position, wheels locked and call bell within reach. Purposeful rounding completed. Pt resting quietly. No further needs voiced at this time. 2144 Scheduled medications administered as ordered. Purposeful rounding completed. Pt resting quietly. No further needs voiced at this time. 2241 Scheduled medications administered as ordered. Purposeful rounding completed. Pt resting quietly. No further needs voiced at this time. 0056 Reassessment completed with no changes noted. Bed locked, in lowest position, with call light within reach. 0148 Scheduled medications administered as ordered. Purposeful rounding completed. Pt resting quietly. No further needs voiced at this time. Purposeful rounding completed. Pt resting quietly. No further needs voiced at this time. 6131 Reassessment completed with no changes noted. Bed locked, in lowest position, with call light within reach. Purposeful rounding completed. Pt resting quietly. No further needs voiced at this time. Pain medication administered as per PRN order for c/o pain. See flow sheets for follow up documentation. 0434 PTT therapeutic. Due tomorrow AM per protocol. 6537 Scheduled medications administered as ordered. Purposeful rounding completed. Pt resting quietly. No further needs voiced at this time. 6640 Bedside and Verbal shift change report given to [address] [place] [address] (oncoming nurse) by Shiva Dominguez RN (offgoing nurse).  Report included the following information SBAR, Procedure Summary, Intake/Output, MAR, and Recent Results.

## 2020-12-05 NOTE — DISCHARGE SUMMARY
[address]    Name:  Fernando Louise  MR#:   [id number]  :  1965  ACCOUNT #:  [de-identified]  ADMIT DATE:  2020  DISCHARGE DATE:  2020    DISCHARGE DIAGNOSES:  1. Acute pulmonary embolism. 2.  Ascites. 3.  Liver cirrhosis due to alcohol abuse. 4.  Infected stage IV decubitus sacral ulcer. 5.  Thrombocytopenia. 6.  Severe protein-calorie malnutrition. 7.  Chronic diastolic congestive heart failure. 8.  Bilateral deep vein thrombosis. 9.  Hypokalemia. CONSULTANTS:  Dr. Pb Luther, Hematology/Oncology. HOSPITAL SUMMARY:  The patient is 54years old. He has developed cirrhosis this year from alcohol abuse. He stopped drinking in September. He had an extensive hospitalization in October for advanced cirrhosis of the liver with acute respiratory failure and aspiration pneumonia. He was in the intensive care unit on a ventilator during that period of time and was very ill. He ultimately improved enough to be discharged to a skilled nursing facility and then had come home from my understanding, but came back to the emergency room on the  with worsening fatigue and a sacral decubitus ulcer noted by the home health nurse. He was tachycardic and his D-dimer was high, so evaluation was done for pulmonary embolism and that was positive. The patient had a CT angiogram of the chest that showed acute pulmonary embolism involving the right lower lobe medial basal segmental artery and an ascending thoracic aortic aneurysm measuring 4 cm with no evidence for dissection. He ultimately also had a duplex of his lower extremities and that showed nonocclusive thrombus in the right common femoral vein and the left profunda femoral vein. The patient tolerated initially heparin, but due to his thrombocytopenia we changed to argatroban via pharmacy dosing and asked Oncology to recommend further treatment.   He has had stable platelets here in the 80-90,000 range and their recommendation was to treat him with Xarelto as there is no evidence for bleeding otherwise and his platelet count is stable and actually improved as to what it was when he was here back in October, so we switched over to the Xarelto yesterday. He came off the argatroban to have a paracentesis for ascites. He has been treated with antibiotics for the infected sacral ulcer, which now looks very pink and clean and not infected. Wound Care has been helping with that. It is covered. It is clean, dry and intact today. As far as no signs of drainage, sloughing, or foul odor. It is stage IV and he is going to continue with the wound clinic outpatient for management and healing of this. The patient has been fairly active during his hospital stay here. He continues to walk with a walker in the hallway and physical therapy has evaluated him also. He has been declared safe to ambulate in the halls independently with a rolling walker and they are just recommending home health. So, overall, the patient has been treated for an acute PE and DVT. He is not hypoxic. He is improved as far as his shortness of breath. He is not tachycardic. Vital signs are stable and he is on room air. He is tolerating Xarelto, which was recommended by Hematology. He has had IV antibiotics for the infected sacral ulcer. It looks to be a superficial infection, that is now better. A CT abdomen and pelvis showed lower sacral soft tissue ulcer with no evidence for osteomyelitis. The patient does have advanced cirrhosis and malnutrition secondary to this. He developed ascites, which required a paracentesis and he had almost 4 L removed yesterday. We started low-dose diuretic therapy for him. He continues on potassium repletion also. His ammonia level was normal.  He is not encephalopathic. His H and H are stable at 10.4 and 30.4. His platelet count is stable at 82,000. There are no signs of bleeding.   The patient wants to discharge home today. He feels well enough to do so. Recommend that he go to outpatient wound care on 12/09 as scheduled for his sacral ulcer. [address] to re-engage and follow up with Demetrius Austin nurse practitioner in one week. Meds for discharge are the following. DISCHARGE MEDICATIONS:  1. Augmentin 875 one p.o. twice daily for four days. 2.  Lasix 20 mg daily. 3.  Multivitamin with iron 1 tablet daily. 4.  Oxycodone 5 mg every 6 hours as needed for severe pain, #10 only. 5.  Xarelto 15 mg twice daily for 20 more days, then 20 mg daily thereafter. 6.  He is to resume his Lantus 10 units at night. 7.  Robaxin as needed every 6 hours for muscle spasms. 8.  Thiamine 100 mg daily. 9.  Desyrel 50 mg at night as needed. 10.  Cymbalta 60 mg daily. 11.  Sliding scale insulin as needed. 12.  Lactulose 45 mL daily. 13.  Protonix 40 mg daily. 14.  K-Dur 20 mEq daily. He should be on a diabetic diet with supplements, Glucerna as tolerated. His blood sugars in the 130-235 range. He was diagnosed with diabetes during his last stay at least with hyperglycemia, which he has continued to deal with, but overall is stable at this point. PHYSICAL EXAMINATION:  VITAL SIGNS:  Today, temperature 98.3, pulse 103, blood pressure 108/73, respiratory rate 16, SaO2 100%. GENERAL:  Awake, alert, oriented x3, in no acute distress, chronically ill-appearing 27-year-old male, nontoxic. Good spirits, ready for discharge per his report. LUNGS:  Clear. CARDIAC:  Regular rate and rhythm. No murmur. ABDOMEN:  Soft, nontender, much improved distention. LOWER EXTREMITIES:  No pitting edema. Distal pulses palpable. NEUROLOGIC:  No asterixis. He is oriented x3. Affect is appropriate. PLAN:  Discharge home with the following instructions and medications as noted. Resume home health. 50 minutes spent on discharge time today.       MD ALAN [email or web address]  D:  12/05/2020 9:44  T:  12/05/2020 11:55  JOB #:  [id number]  CC:  LEANNE Suarez MD

## 2020-12-05 NOTE — PROGRESS NOTES
0730: Bedside and Verbal shift change report given to [address] (oncoming nurse) by Hafsa Villeda RN (offgoing nurse). Report included the following information SBAR and Kardex.      0915: MD Sheila Bear at the bedside discusses tx plan with pt  DANNY Guajardo RN    0907: d/c order noted per MD Nelsy Smyth RN    4808: pt aware of d/c home, pt request bag ,brief, and new sacral dsg at time of d/c, pt made aware matter to be addressed  Taqueria Reinoso RN    21 [phone]: d/c instructions reviewed with pt, all questions answered, no c/o health concern noted, transport paged  Taqueria Reinoso RN

## 2020-12-05 NOTE — PROGRESS NOTES
Problem: Falls - Risk of  Goal: *Absence of Falls  Description: Document Tom Draket Fall Risk and appropriate interventions in the flowsheet.   Outcome: Progressing Towards Goal  Note: Fall Risk Interventions:  Mobility Interventions: Assess mobility with egress test, Communicate number of staff needed for ambulation/transfer, Patient to call before getting OOB         Medication Interventions: Evaluate medications/consider consulting pharmacy, Patient to call before getting OOB, Teach patient to arise slowly    Elimination Interventions: Call light in reach, Patient to call for help with toileting needs              Problem: Patient Education: Go to Patient Education Activity  Goal: Patient/Family Education  Outcome: Progressing Towards Goal

## 2020-12-05 NOTE — PROGRESS NOTES
Bedside shift change report given to Bev ACOSTA RN (oncoming nurse) by Cecilia Dennis RN (offgoing nurse). Report included the following information SBAR, Kardex, Procedure Summary, Intake/Output, MAR and Recent Results.

## 2020-12-05 NOTE — DISCHARGE INSTRUCTIONS
Patient Education       DISCHARGE SUMMARY from Nurse    PATIENT INSTRUCTIONS:    After general anesthesia or intravenous sedation, for 24 hours or while taking prescription Narcotics:  · Limit your activities  · Do not drive and operate hazardous machinery  · Do not make important personal or business decisions  · Do  not drink alcoholic beverages  · If you have not urinated within 8 hours after discharge, please contact your doctor on call. Report the following to your doctor  · Temperature over 101.5  · Nausea and vomiting lasting longer than 6 hours or if unable to take medications  · Any signs of decreased circulation or nerve impairment to extremity: change in color, persistent  numbness, tingling, coldness or increase pain  · Any questions    What to do at Home:  Recommended activity: Activity as tolerated and no driving for today, no running long distances, no lifting more than 30lbs prior to MD follow up, hard contact sports prior to doctor follow up    If you experience any of the following symptoms please follow up with your doctor. *  Please give a list of your current medications to your Primary Care Provider. *  Please update this list whenever your medications are discontinued, doses are      changed, or new medications (including over-the-counter products) are added. *  Please carry medication information at all times in case of emergency situations. These are general instructions for a healthy lifestyle:    No smoking/ No tobacco products/ Avoid exposure to second hand smoke  Surgeon General's Warning:  Quitting smoking now greatly reduces serious risk to your health.     Obesity, smoking, and sedentary lifestyle greatly increases your risk for illness    A healthy diet, regular physical exercise & weight monitoring are important for maintaining a healthy lifestyle    You may be retaining fluid if you have a history of heart failure or if you experience any of the following symptoms: Weight gain of 3 pounds or more overnight or 5 pounds in a week, increased swelling in our hands or feet or shortness of breath while lying flat in bed. Please call your doctor as soon as you notice any of these symptoms; do not wait until your next office visit. The discharge information has been reviewed with the patient. The patient verbalized understanding. Discharge medications reviewed with the patient and appropriate educational materials and side effects teaching were provided. ___________________________________________________________________________________________________________________________________           Alcohol Detoxification and Withdrawal: Care Instructions  Your Care Instructions     If you drink alcohol regularly and then suddenly stop, you may go through some physical and emotional problems while the alcohol clears out of your system. Clearing the alcohol from your body is called detoxification, or detox. Physical and emotional problems that may happen during detox are called withdrawal.  Symptoms of withdrawal can be scary and dangerous. Mild symptoms include nausea and vomiting, sweating, shakiness, and intense worry. Severe symptoms include being confused and irritable, feeling things on your body that are not there, seeing or hearing things that are not there, and trembling. You may even have seizures. If your symptoms become severe you must see a doctor. People who drink large amounts of alcohol should not try to detox at home. A person can die of severe alcohol withdrawal.  Symptoms of alcohol withdrawal may begin from 4 to 12 hours after you stop drinking. But they may not start for several days after the last drink. They can last a few days. It is hard to stop drinking. But when you have cleared the alcohol from your system, you will be able to start the next part of your life, free from the burden of being dependent.   Follow-up care is a key part of your treatment and safety. Be sure to make and go to all appointments, and call your doctor if you are having problems. It's also a good idea to know your test results and keep a list of the medicines you take. How can you care for yourself at home? · Before you stop drinking, talk to your doctor about how you plan to stop. Be sure to be completely honest with him or her about how much you have been drinking. Your doctor will figure out whether you need to detox in a supervised medical center. · Take your medicines exactly as prescribed. Call your doctor if you think you are having a problem with your medicine. · Make sure someone you trust is with you the whole time. Have friends and family members take turns staying with you until you are done with detox. · Put a list of emergency numbers near the phone. This should include your doctor, the police, the nearest hospital and emergency room, and neighbors who can help if needed. · Make sure all alcohol is removed from the house before you start. This includes beverages as well as medicines, rubbing alcohol, and certain flavorings like vanilla extract. · Keep \"drinking buddies\" away during the time you are going through detox. · Make your surroundings calm. Soft lights, soft music, and a comfortable place to sit or lie down can help make the process easier. · Drink lots of fluids and eat snacks such as fruit, cheese and crackers, and pretzels. Foods high in carbohydrate may help reduce the craving for alcohol. · Understand that detox is going to be hard. · Keep in mind that the people watching over you during detox are there to help. Explain to them before you start that you may not act like yourself until detox is finished. · Consider joining a support group such as Alcoholics Anonymous. Sharing your experiences with other people who face similar challenges may help you feel less overwhelmed.   · Keep the numbers for these national suicide hotlines: 1-341-337-TALK (6-460.421.3741) and 1-749-MOVOQEY (8-390.156.7068). If you or someone you know talks about suicide or feeling hopeless, get help right away. When should you call for help? Call 911 anytime you think you may need emergency care. For example, call if:    · You feel you cannot stop from hurting yourself or someone else.     · You vomit many times and cannot stop.     · You vomit blood or what looks like coffee grounds.     · You have trouble breathing or are breathing very fast.     · Your heart beats more than 120 times a minute and will not slow down.     · You have chest pain.     · You have a seizure.     · You see or feel things that are not there (hallucinate). If you are caring for someone who is going through detox, call if:    · The person passes out (loses consciousness).     · The person sees or feels things that are not there and sees or hears the same things many times.     · The person is very agitated and will not calm down.     · The person becomes violent or threatens to be violent.     · The person has a seizure. Call your doctor now or seek immediate medical care if:    · You have a high fever.     · You have severe belly pain.     · You are very shaky. Watch closely for changes in your health, and be sure to contact your doctor if:    · You do not get better as expected. Where can you learn more? Go to http://www.HouseTab.com/  Enter D051 in the search box to learn more about \"Alcohol Detoxification and Withdrawal: Care Instructions. \"  Current as of: June 29, 2020               Content Version: 12.6  © 0228-0076 [employer]. Care instructions adapted under license by [employer] (which disclaims liability or warranty for this information).  If you have questions about a medical condition or this instruction, always ask your healthcare professional. [address] any warranty or liability for your use of this information.

## 2020-12-05 NOTE — PROGRESS NOTES
1915 Pt received from offgoing nurse without any signs or symptoms of distress. Pt vitals are stable and within normal limits. Pt bed in low position with wheels locked and call bell within reach. 2001 Assessment completed and documented in flow sheet. Pt denies any further needs at this time. Pt in NAD with bed in low position, wheels locked and call bell within reach. Parcentesis site (R) side C/D/I. Denies any pain or tenderness to the site. 2224 Scheduled medications administered as ordered. Purposeful rounding completed. Pt resting quietly. No further needs voiced at this time. Pain medication administered as per PRN order for c/o pain. See flow sheets for follow up documentation. 0137 Scheduled medications administered as ordered. Purposeful rounding completed. Pt resting quietly. No further needs voiced at this time. 0334 Reassessment completed with no changes noted. Bed locked, in lowest position, with call light within reach. Purposeful rounding completed. Pt resting quietly. No further needs voiced at this time. Pain medication administered as per PRN order for c/o pain. See flow sheets for follow up documentation. 7825 Scheduled medications administered as ordered. 1650 Bedside and Verbal shift change report given to Maile Carter RN (oncoming nurse) by Pk Calix RN (offgoing nurse). Report included the following information SBAR, Intake/Output, MAR and Recent Results.

## 2020-12-05 NOTE — PROGRESS NOTES
Problem: Falls - Risk of  Goal: *Absence of Falls  Description: Document Dilip Jackson Fall Risk and appropriate interventions in the flowsheet. Outcome: Progressing Towards Goal  Note: Fall Risk Interventions:  Mobility Interventions: Bed/chair exit alarm, Communicate number of staff needed for ambulation/transfer, OT consult for ADLs, Patient to call before getting OOB, PT Consult for mobility concerns, PT Consult for assist device competence, Strengthening exercises (ROM-active/passive), Utilize walker, cane, or other assistive device         Medication Interventions: Bed/chair exit alarm, Evaluate medications/consider consulting pharmacy, Patient to call before getting OOB, Teach patient to arise slowly    Elimination Interventions: Bed/chair exit alarm, Call light in reach, Elevated toilet seat, Patient to call for help with toileting needs, Stay With Me (per policy), Toilet paper/wipes in reach, Toileting schedule/hourly rounds, Urinal in reach              Problem: Patient Education: Go to Patient Education Activity  Goal: Patient/Family Education  Outcome: Progressing Towards Goal     Problem: Patient Education: Go to Patient Education Activity  Goal: Patient/Family Education  Outcome: Progressing Towards Goal     Problem: Pressure Injury - Risk of  Goal: *Prevention of pressure injury  Description: Document Trav Scale and appropriate interventions in the flowsheet. Outcome: Progressing Towards Goal  Note: Pressure Injury Interventions: Activity Interventions: Assess need for specialty bed, Chair cushion, Increase time out of bed, Pressure redistribution bed/mattress(bed type), PT/OT evaluation    Mobility Interventions: Assess need for specialty bed, Chair cushion, Float heels, Pressure redistribution bed/mattress (bed type), PT/OT evaluation, Turn and reposition approx.  every two hours(pillow and wedges)    Nutrition Interventions: Document food/fluid/supplement intake, Discuss nutritional consult with provider, Offer support with meals,snacks and hydration    Friction and Shear Interventions: Feet elevated on foot rest, Foam dressings/transparent film/skin sealants, Lift sheet, Lift team/patient mobility team, Minimize layers, Transferring/repositioning devices                Problem: Patient Education: Go to Patient Education Activity  Goal: Patient/Family Education  Outcome: Progressing Towards Goal     Problem: Pain  Goal: *Control of Pain  Outcome: Progressing Towards Goal  Goal: *PALLIATIVE CARE:  Alleviation of Pain  Outcome: Progressing Towards Goal     Problem: Patient Education: Go to Patient Education Activity  Goal: Patient/Family Education  Outcome: Progressing Towards Goal     Problem: General Wound Care  Goal: *Non-infected wound: Improvement of existing wound, absence of infection, and maintenance of skin integrity  Outcome: Progressing Towards Goal  Goal: *Infected Wound: Prevention of further infection and promotion of healing  Description: Infection control procedures (eg: clean dressings, clean gloves, hand washing, precautions to isolate wound from contamination, sterile instruments used for wound debridement) should be implemented.   Outcome: Progressing Towards Goal  Goal: Interventions  Outcome: Progressing Towards Goal     Problem: Patient Education: Go to Patient Education Activity  Goal: Patient/Family Education  Outcome: Progressing Towards Goal     Problem: Patient Education: Go to Patient Education Activity  Goal: Patient/Family Education  Outcome: Progressing Towards Goal

## 2020-12-09 NOTE — WOUND CARE
12/09/20 1607 Wound Heel Left stage III 12/02/20 Date First Assessed/Time First Assessed: 12/02/20 1052   Present on Hospital Admission: Yes  Primary Wound Type: Pressure Injury  Location: Heel  Wound Location Orientation: Left  Wound Description: stage III  Date of First Observation: 12/02/20 Wound Etiology Venous Dressing Status (no dressing) Cleansed Wound cleanser Dressing/Treatment Cast padding;Coban/self-adherent bandages; Collagen with Ag;Gauze dressing/dressing sponge Wound Length (cm) 0.8 cm Wound Width (cm) 1.3 cm Wound Depth (cm) 0.1 cm Wound Surface Area (cm^2) 1.04 cm^2 Change in Wound Size % -8.33 Wound Volume (cm^3) 0.1 cm^3 Wound Healing % 47 Post-Procedure Length (cm) 0.9 cm Post-Procedure Width (cm) 1.4 cm Post-Procedure Depth (cm) 0.1 cm Post-Procedure Surface Area (cm^2) 1.26 cm^2 Post-Procedure Volume (cm^3) 0.13 cm^3 Wound Assessment Pink/red Drainage Amount Small Drainage Description Serosanguinous Wound Odor None Kandice-Wound/Incision Assessment Intact Edges Defined edges Wound Thickness Description Full thickness Wound Sacrum 11/22/20 Date First Assessed/Time First Assessed: 12/09/20 1612   Primary Wound Type: Pressure Injury  Location: Sacrum  Date of First Observation: 11/22/20 Wound Etiology Pressure Stage 3 Dressing Status Breakthrough drainage noted Cleansed Wound cleanser Dressing/Treatment Alginate with Ag;Collagen with Ag;Dry dressing; Foam  
Wound Length (cm) 7 cm Wound Width (cm) 3 cm Wound Depth (cm) 0.5 cm Wound Surface Area (cm^2) 21 cm^2 Wound Volume (cm^3) 10.5 cm^3 Post-Procedure Length (cm) 7.1 cm Post-Procedure Width (cm) 3.1 cm Post-Procedure Depth (cm) 0.6 cm Post-Procedure Surface Area (cm^2) 22.01 cm^2 Post-Procedure Volume (cm^3) 13.21 cm^3 Wound Assessment Fibrin;Pink/red Drainage Amount Moderate Drainage Description Serosanguinous Wound Odor None Kandice-Wound/Incision Assessment Intact Edges Epibole (rolled edges) Wound Thickness Description Full thickness Post debridement pictures:

## 2020-12-09 NOTE — DISCHARGE INSTRUCTIONS
Discharge Instructions for  [address]  [address] [place], [facility]0 [facility]  [phone] Fax [phone]    NAME:  Nae Bella  YOB: 1965  MEDICAL RECORD NUMBER:  [id number]  DATE:  12/9/2020    Wound Cleansing:   Do not scrub or use excessive force. Cleanse wound prior to applying a clean dressing with:  [] Normal Saline [] Keep Wound Dry in Shower    [] Wound Cleanser   [] Cleanse wound with Mild Soap & Water  [] May Shower at Discharge   [] Other:      Topical Treatments:  Do not apply lotions, creams, or ointments to wound bed unless directed. [] Apply moisturizing lotion to skin surrounding the wound prior to dressing change.  [] Apply antifungal ointment to skin surrounding the wound prior to dressing change.  [] Apply thin film of moisture barrier ointment to skin immediately around wound. [] Other:       Dressings:           Wound Location - sacrum and left heel  [] Apply Primary Dressing:       [] MediHoney Gel [] Alginate with Silver [] Alginate   [] Collagen [] Collagen with Silver   [] Santyl with Moisten saline gauze     [] Hydrocolloid   [] MediHoney Alginate [] Foam with Silver   [] Foam   [] Hydrofera Blue    [] Mepilex Border    [] Moisten with Saline [] Hydrogel [] Mepitel     [] Bactroban/Mupirocin [] Polysporin  [] Other:    [] Pack wound loosely with  [] Iodoform   [] Plain Packing  [] Other   [] Cover and Secure with:     [] Gauze [] Levy [] Kerlix   [] Ace Wrap [] Cover Roll Tape [] ABD     [] Other:    Avoid contact of tape with skin.   [] Change dressing: [] Daily    [] Every Other Day [] Three times per week   [] Once a week [x] Do Not Change Dressing   [x] Other: Home health to change dressing twice a week           Dietary:  [x] Diet as tolerated: [] Calorie Diabetic Diet: [] No Added Salt:  [] Increase Protein: [] Other:       Activity:    [x] Activity as tolerated:  [] Patient has no activity restrictions     [] Strict Bedrest: [] Remain off Work:     [] May return to full duty work:                                   [] Return to work with restrictions:             Return Appointment:  [] Wound and dressing supply provider:   [] ECF or Home Healthcare:  [] Wound Assessment: [] Physician or NP scheduled for Wound Assessment:   [x] Return Appointment: With Dr. Estee Mccall in 1 week  [] Ordered tests:      Electronically signed Adry Romero RN on 12/9/2020 at 4:46 PM     Jemma Rosario 281: Should you experience any significant changes in your wound(s) or have questions about your wound care, please contact the [facility] Main at [address] [place] [address] 8:00 am - 4:30. If you need help with your wound outside these hours and cannot wait until we are again available, contact your PCP or go to the hospital emergency room. PLEASE NOTE: IF YOU ARE UNABLE TO OBTAIN WOUND SUPPLIES, CONTINUE TO USE THE SUPPLIES YOU HAVE AVAILABLE UNTIL YOU ARE ABLE TO REACH US. IT IS MOST IMPORTANT TO KEEP THE WOUND COVERED AT ALL TIMES.      Physician Signature:_______________________    Date: ___________ Time:  ____________

## 2020-12-13 PROBLEM — D68.9 COAGULOPATHY (HCC): Status: ACTIVE | Noted: 2020-01-01

## 2020-12-13 NOTE — ED PROVIDER NOTES
EMERGENCY DEPARTMENT HISTORY AND PHYSICAL EXAM 
 
Date: 12/13/2020 Patient Name: Kathalene Bence History of Presenting Illness Chief Complaint Patient presents with  Abdominal Pain History Provided By: Patient Chief Complaint: abd pain/swelling Additional History (Context):  
3:35 PM 
Kathalene Bence is a 54 y.o. male with PMHX diabetes, gastroparesis, pancreatitis, alcoholic cirrhosis, CHF, thrombocytopenia, PE and DVT presents to the emergency department C/O abdominal pain and distention that he noticed today. Recently admitted on 11/30 for fatigue. He was found to have a PE and a DVT and a decubitus ulcer at that time. He was started on blood thinner and antibiotics. He is currently, followed by the wound clinic. He is no longer taking antibiotics and has been compliant with his Xarelto. Patient denies any vomiting but has been nauseous. No diarrhea. Last bowel movement was 5 days ago. He has not been taking his lactulose. He had a paracentesis on 12/4 and had 3-1/2 L taken off. Patient denies any fevers. He has had some shortness of breath that has been persistent since his hospitalization but no change. No chest pain. Echo on 12/1 showed EF of 55 to 60% PCP: Faustino Ford NP Current Outpatient Medications Medication Sig Dispense Refill  insulin glargine (LANTUS) 100 unit/mL injection 10 Units by SubCUTAneous route nightly. 1 Vial 0  
 furosemide (LASIX) 40 mg tablet Take 0.5 Tabs by mouth daily. 30 Tab 1  
 multivitamin, tx-iron-ca-min (THERA-M w/ IRON) 9 mg iron-400 mcg tab tablet Take 1 Tab by mouth daily. 30 Tab 1  rivaroxaban (XARELTO) 15 mg tab tablet Take 1 Tab by mouth two (2) times daily (with meals) for 20 days. 40 Tab 0  
 rivaroxaban (Xarelto) 20 mg tab tablet Take 1 Tab by mouth daily (with breakfast). 30 Tab 0  
 amoxicillin-clavulanate (Augmentin) 875-125 mg per tablet Take 1 Tab by mouth two (2) times a day.  8 Tab 0  
  methocarbamoL (ROBAXIN) 500 mg tablet Take 500 mg by mouth four (4) times daily as needed for Muscle Spasm(s) or Pain.  insulin lispro (HUMALOG) 100 unit/mL injection 6 Units by SubCUTAneous route Before breakfast, lunch, and dinner. 1 Vial 0  
 lactulose (CHRONULAC) 10 gram/15 mL solution Take 45 mL by mouth daily. 1 Bottle 0  
 pantoprazole (PROTONIX) 40 mg tablet Take 1 Tab by mouth Daily (before breakfast). 30 Tab 0  
 potassium chloride (K-DUR, KLOR-CON) 20 mEq tablet Take 1 Tab by mouth daily. 10 Tab 0  
 thiamine mononitrate (B-1) 100 mg tablet Take 1 Tab by mouth daily. 10 Tab 0  
 lidocaine (LIDODERM) 5 % 2 Patches by TransDERmal route every twenty-four (24) hours. Apply patch to the affected area for 12 hours a day and remove for 12 hours a day. One patch is applied to left hip. One patch is applied to right shoulder blade.  DULoxetine (CYMBALTA) 60 mg capsule Take 1 Cap by mouth daily.  traZODone (DESYREL) 50 mg tablet Take 50 mg by mouth nightly. Past History Past Medical History: 
Past Medical History:  
Diagnosis Date  Diabetes ([address]  Gastroparesis  Pancreatitis Past Surgical History: 
Past Surgical History:  
Procedure Laterality Date  HX CHOLECYSTECTOMY  HX HIP FRACTURE TX    
 left hip sx 9.23/2018 Family History: 
History reviewed. No pertinent family history. Social History: 
Social History Tobacco Use  Smoking status: Current Every Day Smoker  Smokeless tobacco: Never Used Substance Use Topics  Alcohol use: Not Currently  Drug use: No  
 
 
Allergies: 
No Known Allergies Review of Systems Review of Systems Constitutional: Negative for chills and fever. Respiratory: Positive for shortness of breath. Cardiovascular: Negative for chest pain, palpitations and leg swelling. Gastrointestinal: Positive for abdominal distention, abdominal pain, constipation and nausea. Negative for blood in stool, diarrhea and vomiting. Genitourinary: Negative for decreased urine volume, difficulty urinating, dysuria, enuresis, flank pain, frequency, hematuria and urgency. Musculoskeletal: Negative for back pain. Neurological: Negative for weakness and numbness. All other systems reviewed and are negative. Physical Exam  
 
Vitals:  
 12/13/20 1532 12/13/20 1634 12/13/20 1640 BP: 104/77 118/78 109/80 Pulse: (!) 118 96 (!) 107 Resp: 19 18 22 Temp: 97.1 °F (36.2 °C) SpO2: 100% 100% 100% Weight: 62.6 kg (138 lb) Height: 5' 9\" (1.753 m) Physical Exam 
Vitals signs and nursing note reviewed. Constitutional:   
   Appearance: He is well-developed. Comments: Chronically ill-appearing male cachectic and jaundiced HENT:  
   Head: Normocephalic and atraumatic. Eyes:  
   General: Scleral icterus present. Neck: Musculoskeletal: Normal range of motion and neck supple. Cardiovascular:  
   Rate and Rhythm: Regular rhythm. Tachycardia present. Heart sounds: Normal heart sounds. No murmur. Pulmonary:  
   Effort: Pulmonary effort is normal. No respiratory distress. Breath sounds: Normal breath sounds. No wheezing or rales. Abdominal:  
   General: Bowel sounds are increased. There is distension. Palpations: Abdomen is soft. Tenderness: There is abdominal tenderness in the right lower quadrant, suprapubic area and left lower quadrant. There is no guarding or rebound. Skin: 
   Coloration: Skin is jaundiced. Neurological:  
   Mental Status: He is alert and oriented to person, place, and time. Psychiatric:     
   Judgment: Judgment normal.  
 
 
 
 
Diagnostic Study Results Labs: 
  
Recent Results (from the past 12 hour(s)) EKG, 12 LEAD, INITIAL Collection Time: 12/13/20  3:42 PM  
Result Value Ref Range Ventricular Rate 108 BPM  
 Atrial Rate 108 BPM  
 P-R Interval 170 ms QRS Duration 76 ms  
 Q-T Interval 342 ms QTC Calculation (Bezet) 458 ms Calculated R Axis 16 degrees Calculated T Axis -5 degrees Diagnosis Sinus tachycardia Low voltage QRS Possible Inferior infarct , age undetermined Abnormal ECG When compared with ECG of 30-NOV-2020 17:07, Sinus rhythm has replaced Ectopic atrial rhythm Incomplete right bundle branch block is no longer present Borderline criteria for Inferior infarct are now present Confirmed by Key Patel MD, -- ([id number]) on 12/13/2020 3:71:81 PM 
  
METABOLIC PANEL, COMPREHENSIVE Collection Time: 12/13/20  4:19 PM  
Result Value Ref Range Sodium 137 136 - 145 mmol/L Potassium 3.2 (L) 3.5 - 5.5 mmol/L Chloride 100 100 - 111 mmol/L  
 CO2 28 21 - 32 mmol/L Anion gap 9 3.0 - 18 mmol/L Glucose 186 (H) 74 - 99 mg/dL BUN 9 7.0 - 18 MG/DL Creatinine 0.88 0.6 - 1.3 MG/DL  
 BUN/Creatinine ratio 10 (L) 12 - 20 GFR est AA >60 >60 ml/min/1.73m2 GFR est non-AA >60 >60 ml/min/1.73m2 Calcium 8.7 8.5 - 10.1 MG/DL Bilirubin, total 2.0 (H) 0.2 - 1.0 MG/DL  
 ALT (SGPT) 22 16 - 61 U/L  
 AST (SGOT) 48 (H) 10 - 38 U/L Alk. phosphatase 57 45 - 117 U/L Protein, total 7.0 6.4 - 8.2 g/dL Albumin 2.9 (L) 3.4 - 5.0 g/dL Globulin 4.1 (H) 2.0 - 4.0 g/dL A-G Ratio 0.7 (L) 0.8 - 1.7    
CBC WITH AUTOMATED DIFF Collection Time: 12/13/20  4:19 PM  
Result Value Ref Range WBC 6.9 4.6 - 13.2 K/uL  
 RBC 3.47 (L) 4.70 - 5.50 M/uL  
 HGB 10.8 (L) 13.0 - 16.0 g/dL HCT 31.4 (L) 36.0 - 48.0 % MCV 90.5 74.0 - 97.0 FL  
 MCH 31.1 24.0 - 34.0 PG  
 MCHC 34.4 31.0 - 37.0 g/dL  
 RDW 14.3 11.6 - 14.5 % PLATELET 68 (L) 489 - 420 K/uL NEUTROPHILS 56 40 - 73 % LYMPHOCYTES 31 21 - 52 % MONOCYTES 9 3 - 10 % EOSINOPHILS 2 0 - 5 % BASOPHILS 2 0 - 2 %  
 ABS. NEUTROPHILS 3.9 1.8 - 8.0 K/UL  
 ABS. LYMPHOCYTES 2.1 0.9 - 3.6 K/UL ABS. MONOCYTES 0.6 0.05 - 1.2 K/UL  
 ABS. EOSINOPHILS 0.2 0.0 - 0.4 K/UL  
 ABS. BASOPHILS 0.1 0.0 - 0.1 K/UL  
 DF AUTOMATED    
LIPASE Collection Time: 12/13/20  4:19 PM  
Result Value Ref Range Lipase 12 (L) 73 - 393 U/L  
CARDIAC PANEL,(CK, CKMB & TROPONIN) Collection Time: 12/13/20  4:19 PM  
Result Value Ref Range CK - MB 1.5 <3.6 ng/ml CK-MB Index 3.1 0.0 - 4.0 % CK 48 39 - 308 U/L Troponin-I, QT <0.02 0.0 - 0.045 NG/ML  
PROTHROMBIN TIME + INR Collection Time: 12/13/20  4:19 PM  
Result Value Ref Range Prothrombin time 67.1 (H) 11.5 - 15.2 sec INR 8.5 (HH) 0.8 - 1.2 PTT Collection Time: 12/13/20  4:19 PM  
Result Value Ref Range aPTT 68.8 (H) 23.0 - 36.4 SEC AMMONIA Collection Time: 12/13/20  4:19 PM  
Result Value Ref Range Ammonia 32 11 - 32 UMOL/L  
MAGNESIUM Collection Time: 12/13/20  4:19 PM  
Result Value Ref Range Magnesium 1.7 1.6 - 2.6 mg/dL PROTHROMBIN TIME + INR Collection Time: 12/13/20  5:55 PM  
Result Value Ref Range Prothrombin time 69.8 (H) 11.5 - 15.2 sec INR 9.0 (HH) 0.8 - 1.2 Radiologic Studies:  
CT ABD PELV W CONT Final Result IMPRESSION:  
  
Cirrhosis, splenomegaly, large volume of ascites and portal hypertension. Thickened small bowel loops throughout the abdomen which may be related to the  
ascites and cirrhosis however enteritis not excluded. There is pancreatic duct dilatation however no atrophy or discrete mass  
identified. However I would advise further workup exclude any ampullary lesion. Advise GI consultation. Sacral decubitus ulcer without osteomyelitis. Large volume of retained stool in the colon. CT Results  (Last 48 hours)  
          
 12/13/20 1850  CT ABD PELV W CONT Final result Impression:  IMPRESSION:  
   
Cirrhosis, splenomegaly, large volume of ascites and portal hypertension. Thickened small bowel loops throughout the abdomen which may be related to the  
ascites and cirrhosis however enteritis not excluded. There is pancreatic duct dilatation however no atrophy or discrete mass  
identified. However I would advise further workup exclude any ampullary lesion. Advise GI consultation. Sacral decubitus ulcer without osteomyelitis. Large volume of retained stool in the colon. Narrative:  EXAM: CT of the abdomen and pelvis INDICATION: Abdominal pain and swelling COMPARISON: November 30, 2020 TECHNIQUE: Axial CT imaging of the abdomen and pelvis was performed with  
intravenous contrast. Multiplanar reformats were generated. One or more dose  
reduction techniques were used on this CT: automated exposure control,  
adjustment of the mAs and/or kVp according to patient size, and iterative  
reconstruction techniques. The specific techniques used on this CT exam have  
been documented in the patient's electronic medical record. Digital Imaging and  
Communications in Medicine (DICOM) format image data are available to  
nonaffiliated external healthcare facilities or entities on a secure, media  
free, reciprocally searchable basis with patient authorization for at least a  
12-month period after this study. _______________ FINDINGS:  
   
LOWER CHEST: Unremarkable. LIVER, BILIARY: There is a nodular appearing liver suggesting cirrhosis. No  
focal hepatic lesion seen. No biliary dilation. Cholecystectomy PANCREAS: Pancreatic duct is mildly dilated to 6 mm. There is no atrophy of the  
pancreas. No discrete pancreatic mass identified however prior GI consultation  
and further workup. SPLEEN: Enlarged measuring 14.2 cm. ADRENALS: Normal.  
   
KIDNEYS: Punctate 2 to 3 mm calculi seen in both kidneys. Kidneys demonstrate no  
hydronephrosis. VASCULATURE: Mild to moderate calcific atherosclerosis present. There is  
recanalization the periumbilical vein suggesting portal hypertension. LYMPH NODES: No enlarged lymph nodes. GASTROINTESTINAL TRACT: Large volume of stool present throughout the colon. There are thickened small bowel loops along the left side of the midabdomen  
which may related to the ascites and cirrhosis however enteritis cannot be  
excluded. Is no bowel obstruction. PELVIC ORGANS: Unremarkable. BONES: No acute or aggressive osseous abnormalities identified. There is a sacral decubitus ulcer without evidence of osteomyelitis. OTHER: Large volume of ascites present.  
   
_______________ CXR Results  (Last 48 hours) None Medical Decision Making I am the first provider for this patient. I reviewed the vital signs, available nursing notes, past medical history, past surgical history, family history and social history. Vital Signs: Reviewed the patient's vital signs. Pulse Oximetry Analysis: 100% on RA Cardiac Monitor: 
Rate: 101 bpm 
Rhythm: sinus tach EKG interpretation: (Preliminary) 3:35 PM  
Sinus tachycardia rate 108 bpm with occasional PVC low voltage QRS 
EKG read by Prabha Sheehan PA-C Records Reviewed: Nursing Notes, Old Medical Records and Previous electrocardiograms Procedures: 
Procedures ED Course:  
3:35 PM Initial assessment performed. The patients presenting problems have been discussed, and they are in agreement with the care plan formulated and outlined with them. I have encouraged them to ask questions as they arise throughout their visit. Discussed with Dr. Kaushik Maria, hepatology, recommends repeat INR. May give dose of vitamin K. Will see patient tomorrow agrees with admission to hospitalist 
 
Critical Care Time: 60 mins Core Measures: 
For Hospitalized Patients: 
 
1.  Hospitalization Decision Time: 
 The decision to hospitalize the patient was made by Prabha Sheehan PA-C 
on 12/13/2020 2. Aspirin: Aspirin was not given because the patient did not present with a stroke at the time of their Emergency Department evaluation 8:01 PM  Patient is being admitted to the hospital by Alejandro Taylor MD. The results of their tests and reasons for their admission have been discussed with them and/or available family. They convey agreement and understanding for the need to be admitted and for their admission diagnosis. CONDITIONS ON ADMISSION: 
Sepsis is not present at the time of admission. Urinary Tract Infection is not present at the time of admission. Pneumonia is not present at the time of admission. Pressure Ulcer is present at the time of admission. CLINICAL IMPRESSION: 
 
1. Coagulopathy ([address] 2. Pressure injury of skin of buttock, unspecified injury stage, unspecified laterality 3. Hyperglycemia 4. Ascites due to alcoholic cirrhosis (HCC) 5. Hypokalemia Discussion: Pt presents with abd pain and distention. He has a history of alcoholic cirrhosis and PE recently started on Xarelto. Patient denies vomiting or diarrhea. No fevers. Denies black tarry stool. Labs show an INR of 8.5 slight hypokalemia and hyperglycemia. Abdominal CT shows chronic findings consistent with alcoholic cirrhosis and ascites. Will admit and have hepatology see tomorrow Diagnosis and Disposition CLINICAL IMPRESSION: 
 
1. Coagulopathy ([address] 2. Pressure injury of skin of buttock, unspecified injury stage, unspecified laterality 3. Hyperglycemia 4. Ascites due to alcoholic cirrhosis (HCC) 5. Hypokalemia PLAN: 
1. admit Please note that this dictation was completed with Apps[employer], the computer voice recognition software. Quite often unanticipated grammatical, syntax, homophones, and other interpretive errors are inadvertently transcribed by the computer software. Please disregard these errors. Please excuse any errors that have escaped final proofreading.

## 2020-12-14 PROBLEM — R78.81 POSITIVE BLOOD CULTURE: Status: ACTIVE | Noted: 2020-01-01

## 2020-12-14 PROBLEM — K52.9 COLITIS: Status: ACTIVE | Noted: 2020-01-01

## 2020-12-14 NOTE — PROGRESS NOTES
Problem: Risk for Spread of Infection Goal: Prevent transmission of infectious organism to others Description: Prevent the transmission of infectious organisms to other patients, staff members, and visitors. Outcome: Progressing Towards Goal 
  
Problem: Patient Education:  Go to Education Activity Goal: Patient/Family Education Outcome: Progressing Towards Goal 
  
Problem: Falls - Risk of 
Goal: *Absence of Falls Description: Document Richard Merlin Fall Risk and appropriate interventions in the flowsheet. Outcome: Progressing Towards Goal 
Note: Fall Risk Interventions: 
Mobility Interventions: Communicate number of staff needed for ambulation/transfer, Bed/chair exit alarm, Patient to call before getting OOB, PT Consult for mobility concerns, PT Consult for assist device competence, Utilize walker, cane, or other assistive device Medication Interventions: Bed/chair exit alarm, Patient to call before getting OOB, Teach patient to arise slowly Problem: Patient Education: Go to Patient Education Activity Goal: Patient/Family Education Outcome: Progressing Towards Goal 
  
Problem: Pressure Injury - Risk of 
Goal: *Prevention of pressure injury Description: Document Trav Scale and appropriate interventions in the flowsheet. Outcome: Progressing Towards Goal 
Note: Pressure Injury Interventions: Activity Interventions: Increase time out of bed, Pressure redistribution bed/mattress(bed type) Mobility Interventions: HOB 30 degrees or less, Pressure redistribution bed/mattress (bed type), PT/OT evaluation Nutrition Interventions: Document food/fluid/supplement intake, Offer support with meals,snacks and hydration Friction and Shear Interventions: Apply protective barrier, creams and emollients, Foam dressings/transparent film/skin sealants, HOB 30 degrees or less Problem: Patient Education: Go to Patient Education Activity Goal: Patient/Family Education Outcome: Progressing Towards Goal 
  
Problem: Pain Goal: *Control of Pain Outcome: Progressing Towards Goal 
Goal: *PALLIATIVE CARE:  Alleviation of Pain Outcome: Progressing Towards Goal 
  
Problem: Patient Education: Go to Patient Education Activity Goal: Patient/Family Education Outcome: Progressing Towards Goal 
  
Problem: Ascities-Palliative Care Goal: *PALLIATIVE CARE-Alleviation of ascities Outcome: Progressing Towards Goal 
  
Problem: Patient Education: Go to Patient Education Activity Goal: Patient/Family Education Outcome: Progressing Towards Goal

## 2020-12-14 NOTE — CONSULTS
[address] 137 [facility] Kristin Lawrence Frederick MD, Bereket Mariscal [facility] Tamar Joseph MD, MPH Sergio Herron, PAURIEL Smith, [facility] Catalina Capone, [facility]   Robinaian Bragg, [facility]- Jaleesa Trammellward, [facility] 136 
  at [address], [facility] Vini Fairbanks  22. 
  [phone] FAX: [address] [place] [address] Avenue 
  [facility] 
  [address], [address] 98 [facility], [address] 
  [phone] FAX: [phone] HEPATOLOGY CONSULT NOTE The patient is known to me from a previous hospitalization at [facility] in 10/2020. The patient is a 47year old  male without previous knowledge of liver disease. He says he used to consume alcohol fairly heavily several years ago but cut back to only 1-2 a few days per week 2 years ago. During the previous hospitalization he developed a DVT and PE and was stated on Xaralto. He came to the ED yesterday because of abdominal pain and distention. CT scan demonstrated dilation of PD without obvious pancreas mass, cirrhosis and large amount of ascites. Labs were significant for INR 8.5, TBILI 1.7, SCr .61 mg This AM he feels better. He is alert, oriented and able to answer questions and carry on conversation. The abdominal pain has resolved. There is obvious ascites but the abdomen is not tight. 
  
  
ASSESSMENT AND PLAN: 
Cirrhosis The diagnosis of cirrhosis is based upon laboratory studies, imaging, complications of cirrhosis. The etiology is post-likely due to alcohol. Serology for other causes of chronic liver disease are all negative. 
  
The CTP is 9. Child class B. The MELD score is 33. The MELD score is being driven up by the prolonged INR. If this were 2.0 the MELD would be 17.  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 We had started evaluating him for LT during the previous hospitalization but will put this on hold given the new pancreas finding. Cardiac portion of liver transplant evaluation testing was negative. ECHO was normal.  Lexicon stress was negative for ischemia Coagulopathy Unclear why this is so prolonged. He is on Xaralto but this will not due this. Will start IV vitamin K 10 mg every day x 3 to see if this malnutrition of fat malabsorption. There is no evidence of bleeding. No need for FFP at this time. Ascites Ascites is persistent Will start step 1 diuretics since Scr is noral. 
Start IV albumin 25 gm Q6H No paracentesis at this time since ascites not tight and both PLT low and INR markedly prolonged. 2 gm NA diet Screening for Esophageal varices The patient has not had an EGD to screen for varices. HB is stable and no evidence of bleeding. Will hold on EGD until INR improved. 
  
Hepatic encephalopathy Overt HE is controlled on current dose of lactulose. Will continue lactulose once QD 
  
Anemia This is due to multifactorial causes including portal hypertension with chronic GI blood loss, bone marrow suppression secondary to malnutrition and alcohol. FE panel was normal 
  Thrombocytopenia This is secondary to cirrhosis. There is no evidence of overt bleeding. No treatment is required. The platelet count is under 50K. The use of a platelet growth factor to raise the platelet count and avoid platelet transfusions would be indicated if the patient requires a medical or surgical procedure. 
  
Dilated pancreatic duct Concern is that this is due to pancreatic cancer. CBD not dilated. Schedule for MRI later today 
   
  
PHYSICAL EXAMINATION: 
VS: per nursing note General: No acute distress. Eyes: Sclera anicteric. ENT: No oral lesions. Thyroid normal. 
Nodes: No adenopathy. Skin: No spider angiomata. No jaundice. No palmar erythema. Respiratory: Lungs clear to auscultation. Cardiovascular: Regular heart rate. No murmurs. No JVD. Abdomen: Soft non-tender, Some ascites is present. Extremities: No edema. No muscle wasting. No gross arthritic changes. Neurologic: Alert and oriented. Cranial nerves grossly intact. No asterixis. LABORATORY: 
Results for Isabel Alex (MRN [id number]) as of 12/14/2020 07:32 Ref. Range 12/4/2020 16:49 12/5/2020 04:00 12/13/2020 16:19 12/14/2020 03:48 WBC Latest Ref Range: 4.6 - 13.2 K/uL  4.4 (L) 6.9 7.5 HGB Latest Ref Range: 13.0 - 16.0 g/dL  10.4 (L) 10.8 (L) 10.2 (L) PLATELET Latest Ref Range: 135 - 420 K/uL  82 (L) 68 (L) 43 (L) INR Latest Ref Range: 0.8 - 1.2   2.2 (H)  8.5 (HH) 7.5 (HH) Sodium Latest Ref Range: 136 - 145 mmol/L  139 137 138 Potassium Latest Ref Range: 3.5 - 5.5 mmol/L  2.9 (LL) 3.2 (L) 3.5 Chloride Latest Ref Range: 100 - 111 mmol/L  102 100 104 CO2 Latest Ref Range: 21 - 32 mmol/L  26 28 26 Glucose Latest Ref Range: 74 - 99 mg/dL  139 (H) 186 (H) 125 (H) BUN Latest Ref Range: 7.0 - 18 MG/DL  2 (L) 9 7 Creatinine Latest Ref Range: 0.6 - 1.3 MG/DL  0.78 0.88 0.61 Bilirubin, total Latest Ref Range: 0.2 - 1.0 MG/DL   2.0 (H) 1.7 (H) Albumin Latest Ref Range: 3.4 - 5.0 g/dL   2.9 (L) 2.6 (L) ALT Latest Ref Range: 16 - 61 U/L   22 20 AST Latest Ref Range: 10 - 38 U/L   48 (H) 43 (H) Alk. phosphatase Latest Ref Range: 45 - 117 U/L   57 50 Ammonia Latest Ref Range: 11 - 32 UMOL/L  20 32 RADIOLOGY: 
12/2020. CT scan abdomen with IV contrast.  Changes consistent with cirrhosis. No liver mass lesions. No dilated bile ducts. Moderate ascites. Dilated PD. No pancreas mass. Cydney Holcomb MD 
[address] [facility], [address] 
[phone] [address] [place] [address]

## 2020-12-14 NOTE — PROGRESS NOTES
Hospitalist Progress Note-critical care note Patient: Roxanna Marsh MRN: [id number]  [facility]: [id number] YOB: 1965  Age: 54 y.o. Sex: male DOA: 12/13/2020 LOS:  LOS: 1 day Chief complaint: colitis, + bcx , decubitus ulcer , ascites ,cirrhosis Assessment/Plan Hospital Problems  Date Reviewed: 10/9/2020 Codes Class Noted POA Colitis ICD-10-CM: K52.9 ICD-9-CM: 558.9  12/14/2020 Unknown Positive blood culture ICD-10-CM: R78.81 ICD-9-CM: 790.7  12/14/2020 Unknown Coagulopathy ([facility] ICD-10-CM: B73.0 ICD-9-CM: 286.9  12/13/2020 Unknown Stage 4 decubitus ulcer ([facility] ICD-10-CM: L89.94 
ICD-9-CM: 707.00, 707.24  12/3/2020 Yes Ascites ICD-10-CM: R18.8 ICD-9-CM: 789.59  12/3/2020 Unknown Cirrhosis ([facility] ICD-10-CM: K74.60 ICD-9-CM: 571.5  10/9/2020 Unknown Cirrhosis ([facility] (10/9/2020) with ascites Liz Ards IV albumin Ammonia level monitoring  
  
-IR consult for paracentesis-hold due to elevated inr  
  
  
Coagulopathy ([facility] (12/13/2020) 
inr 7.5 today ,received vit K and continue monitoring  
inr daily Continue hold xarelto     
  
PE /DVT on 11/30/2020 
inr 7.5 today  
  
Pancreatic Duct enlargement MRI of Abdomen :Evidence of hepatic cirrhosis with no mass lesion, but chronic pancreatitis and fibrotic changes at the ampulla with mild extrinsic narrowing of the distal CBD. No mass lesion identified Luis Fernando Maria saw pt AM  
  
+ blood cx Add vanc and repeat cx  
  
DM 
ssi and hypoglycemia protocol  
 
  
Large Sacral wound On vanc and  Zosyn IP consult wound care  
  
Colitis on CT 
IV zosyn,no diarrhea Will send fluid study r/o sbp Need to r/o c diff Subjective : had diarrhea before coming here Disposition :tbd, Review of systems: 
 
General: No fevers or chills. Cardiovascular: No chest pain or pressure. No palpitations. Pulmonary: No shortness of breath. Gastrointestinal: No nausea, vomiting. no abdomen pain Vital signs/Intake and Output: 
Visit Vitals /86 Pulse 92 Temp 97.4 °F (36.3 °C) Resp 16 Ht 5' 9\" (1.753 m) Wt 62.6 kg (138 lb) SpO2 100% BMI 20.38 kg/m² Current Shift:  No intake/output data recorded. Last three shifts:  12/12 1901 - 12/14 0700 In: 1150 [P.O.:500; I.V.:650] Out: 500 [Urine:500] Physical Exam: 
General: WD, WN. Alert, cooperative, no acute distress HEENT: NC, Atraumatic. PERRLA, anicteric sclerae. Lungs: CTA Bilaterally. No Wheezing/Rhonchi/Rales. Heart:  Regular  rhythm,  No murmur, No Rubs, No Gallops Abdomen: Soft, mild  distended, Non tender. +Bowel sounds, Extremities: No c/c/e Psych:   Not anxious or agitated. Neurologic:  No acute neurological deficit. Labs: Results:  
   
Chemistry Recent Labs 12/14/20 0348 12/13/20 
1619 * 186*  137  
K 3.5 3.2*  
 100 CO2 26 28 BUN 7 9 CREA 0.61 0.88 CA 8.1* 8.7 AGAP 8 9 BUCR 11* 10* AP 50 57  
TP 6.1* 7.0 ALB 2.6* 2.9*  
GLOB 3.5 4.1* AGRAT 0.7* 0.7* CBC w/Diff Recent Labs 12/14/20 0348 12/13/20 
1619 WBC 7.5 6.9  
RBC 3.26* 3.47* HGB 10.2* 10.8* HCT 29.8* 31.4* PLT 43* 68* GRANS 25* 56 LYMPH 72* 31  
EOS 0 2 Cardiac Enzymes Recent Labs 12/13/20 
1619 CPK 48 CKND1 3.1 Coagulation Recent Labs 12/14/20 
0348 12/13/20 
1755 12/13/20 
1619 PTP 61.2* 69.8* 67.1* INR 7.5* 9.0* 8.5* APTT  --   --  68.8* Lipid Panel No results found for: CHOL, CHOLPOCT, CHOLX, CHLST, CHOLV, 662135, HDL, HDLP, LDL, LDLC, DLDLP, 766068, VLDLC, VLDL, TGLX, TRIGL, TRIGP, TGLPOCT, CHHD, CHHDX  
BNP No results for input(s): BNPP in the last 72 hours. Liver Enzymes Recent Labs 12/14/20 
0348 TP 6.1* ALB 2.6* AP 50 Thyroid Studies No results found for: T4, T3U, TSH, TSHEXT, TSHEXT    
 Procedures/imaging: see electronic medical records for all procedures/Xrays and details which were not copied into this note but were reviewed prior to creation of Plan Cta Chest W Or W Wo Cont Result Date: 11/30/2020 EXAM: CTA chest CLINICAL INDICATION/HISTORY:  Generalized fatigue. Dyspnea. COMPARISON: None TECHNIQUE: Axial CT imaging from the thoracic inlet through the diaphragm with intravenous contrast. Coronal and sagittal MIP reformats were generated. One or more dose reduction techniques were used on this CT: automated exposure control, adjustment of the mAs and/or kVp according to patient size, and iterative reconstruction techniques. The specific techniques used on this CT exam have been documented in the patient's electronic medical record. Digital Imaging and Communications in Medicine (DICOM) format image data are available to nonaffiliated external healthcare facilities or entities on a secure, media free, reciprocally searchable basis with patient authorization for at least a 12-month period after this study. _______________ FINDINGS: EXAM QUALITY: Adequate opacification of the pulmonary arteries. PULMONARY ARTERIES: Critical result: There is a filling defect in the right lower lobe medial basal segmental artery. Remaining pulmonary arteries are unremarkable. MEDIASTINUM: Normal heart size. The ascending aorta is dilated measuring up to 4 cm. No pericardial effusion. Right heart strain indices: RV/LV ratio: Normal (ratio </= 1). Interventricular septal bowing: None. Main pulmonary artery diameter: Normal. Contrast reflux into the IVC: None. LUNGS: No suspicious nodule or mass. No abnormal opacities. PLEURA: Normal. AIRWAY: Normal. LYMPH NODES: No enlarged nodes. UPPER ABDOMEN: Please see concurrently performed CT abdomen/pelvis. OTHER: No acute or aggressive osseous abnormalities identified. SUPERFICIAL SOFT TISSUES: Unremarkable. _______________ IMPRESSION: 1. Acute pulmonary embolism involving the right lower lobe medial basal segmental artery. 2. 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 3. No focal consolidative process in the chest. Critical results discussed with Dr. Land at 7:33 PM on 10/30/2020. Ct Abd Pelv W Cont Result Date: 12/13/2020 EXAM: CT of the abdomen and pelvis INDICATION: Abdominal pain and swelling COMPARISON: November 30, 2020 TECHNIQUE: Axial CT imaging of the abdomen and pelvis was performed with intravenous contrast. Multiplanar reformats were generated. One or more dose reduction techniques were used on this CT: automated exposure control, adjustment of the mAs and/or kVp according to patient size, and iterative reconstruction techniques. The specific techniques used on this CT exam have been documented in the patient's electronic medical record. Digital Imaging and Communications in Medicine (DICOM) format image data are available to nonaffiliated external healthcare facilities or entities on a secure, media free, reciprocally searchable basis with patient authorization for at least a 12-month period after this study. _______________ FINDINGS: LOWER CHEST: Unremarkable. LIVER, BILIARY: There is a nodular appearing liver suggesting cirrhosis. No focal hepatic lesion seen. No biliary dilation. Cholecystectomy PANCREAS: Pancreatic duct is mildly dilated to 6 mm. There is no atrophy of the pancreas. No discrete pancreatic mass identified however prior GI consultation and further workup. SPLEEN: Enlarged measuring 14.2 cm. ADRENALS: Normal. KIDNEYS: Punctate 2 to 3 mm calculi seen in both kidneys. Kidneys demonstrate no hydronephrosis. VASCULATURE: Mild to moderate calcific atherosclerosis present. There is recanalization the periumbilical vein suggesting portal hypertension. LYMPH NODES: No enlarged lymph nodes. GASTROINTESTINAL TRACT: Large volume of stool present throughout the colon. There are thickened small bowel loops along the left side of the midabdomen which may related to the ascites and cirrhosis however enteritis cannot be excluded. Is no bowel obstruction. PELVIC ORGANS: Unremarkable. BONES: No acute or aggressive osseous abnormalities identified. There is a sacral decubitus ulcer without evidence of osteomyelitis. OTHER: Large volume of ascites present. _______________ IMPRESSION: Cirrhosis, splenomegaly, large volume of ascites and portal hypertension. Thickened small bowel loops throughout the abdomen which may be related to the ascites and cirrhosis however enteritis not excluded. There is pancreatic duct dilatation however no atrophy or discrete mass identified. However I would advise further workup exclude any ampullary lesion. Advise GI consultation. Sacral decubitus ulcer without osteomyelitis. Large volume of retained stool in the colon. Ct Abd Pelv W Cont Result Date: 11/30/2020 EXAM: CT of the abdomen and pelvis CLINICAL INDICATION/HISTORY:  Weakness and dyspnea. Abdominal pain. COMPARISON: None. TECHNIQUE: Axial CT imaging of the abdomen and pelvis was performed with intravenous contrast. Multiplanar reformats were generated. One or more dose reduction techniques were used on this CT: automated exposure control, adjustment of the mAs and/or kVp according to patient size, and iterative reconstruction techniques. The specific techniques used on this CT exam have been documented in the patient's electronic medical record. Digital Imaging and Communications in Medicine (DICOM) format image data are available to nonaffiliated external healthcare facilities or entities on a secure, media free, reciprocally searchable basis with patient authorization for at least a 12-month period after this study. _______________ FINDINGS: LOWER CHEST: Please see concurrently performed CT of the chest. LIVER, BILIARY: There is cirrhotic morphology of the liver. No focal parenchymal masses visualized. There is no intra-or extrahepatic biliary ductal dilatation. Gallbladder is surgically absent. PANCREAS: Pancreatic duct is dilated measuring 5 mm. No discrete pancreatic mass or intraductal abnormality visualized. SPLEEN: Enlarged measuring 15.6 cm in AP diameter. ADRENALS: Normal. KIDNEYS: Normal. There is no nephrolithiasis. There is no hydronephrosis. LYMPH NODES: No enlarged lymph nodes. GASTROINTESTINAL TRACT: No bowel dilation or wall thickening. The appendix is visualized and unremarkable. PELVIC ORGANS: Unremarkable. VASCULATURE: Unremarkable. BONES: Post surgical changes of prior left hip open reduction and internal fixation. OTHER: There is no free intraperitoneal fluid or free air. SUPERFICIAL SOFT TISSUES: There is a soft tissue defect at the level of the lower sacral segments. No definite cortical changes in the sacrum _______________ IMPRESSION: 1. Cirrhotic morphology of liver with sequelae of portal venous hypertension including splenomegaly and moderate volume intra-abdominal ascites. 2. Nonspecific dilatation of the pancreatic duct up to 5 mm. 3. Lower sacral soft tissue ulcer without discrete CT evidence of osteomyelitis. Us Guide Paracentesis Result Date: 12/9/2020 PROCEDURE:  ULTRASOUND GUIDED PARACENTESIS INDICATION: Ascites. ________________________ TECHNIQUE/FINDINGS: I performed the procedure. The ascites in the right paracolic gutter was localized under ultrasound guidance. An image of the ascites was obtained and submitted into the patient record. The skin was marked, prepped and draped in sterile fashion and lidocaine was used for local anesthesia. Sterile probe cover and gel was used. A 5 Western Lisa Yueh sheathed needle was advanced into the fluid. A total of 3.8 L of straw colored fluid was drained using a Vacutainer system. The patient tolerated the procedure well and there were no immediate complications. Pre-procedure time out:  1659 hours. Post procedure pause:  1726 hours. GUIDANCE: Ultrasound guidance was used to position (and confirm the position of) the Yueh sheathed needle. Image(s) saved in PACS: Ultrasound ________________________ IMPRESSION: Successful ultrasound guided paracentesis of approximately 3.8 L of straw colored fluid. [address] [place] [address] Result Date: 12/2/2020 EXAM: Limited right upper quadrant abdominal ultrasound INDICATION: 22-year-old patient with cirrhosis and reported portal venous thrombosis. . COMPARISON: CT 11/30/2020. TECHNIQUE: Real-time abdomen/right upper quadrant sonography in multiple planes was performed with image documentation. Grayscale, color flow Doppler imaging, and velocity spectral waveform analysis of the portal vein was performed (duplex imaging). _______________ FINDINGS: LIVER: Diffusely coarsened hepatic parenchymal echotexture noted with lobular surface contour. . No focal mass Color flow Doppler and velocity spectral waveform analysis of the portal vein shows normal (hepatopedal) direction of flow. Cathye Fern BILIARY SYSTEM: No intrahepatic biliary dilatation. Common bile duct is normal in caliber measuring 0.4 cm. GALLBLADDER: Surgically absent. RIGHT KIDNEY: 10.8 cm in length. No hydronephrosis or renal mass. No visible calculi. PANCREAS: Nonvisualized. IVC: Visualized portions are unremarkable in appearance. OTHER: Moderate amount of intra-abdominal/pelvic ascites. . _______________ IMPRESSION: 1. Cirrhotic hepatic morphology. No biliary ductal dilatation or suspicious appearing hepatic lesion. 2. Moderate volume abdominal/pelvic ascites. . 3. Prior cholecystectomy. Xr Chest [facility] Result Date: 11/30/2020 EXAM: One view chest x-ray CLINICAL INDICATION/HISTORY: Weakness and dyspnea. COMPARISON: 10/22/2020. TECHNIQUE: Single AP view of the chest was obtained. _______________ FINDINGS: HEART, VESSELS, MEDIASTINUM: Heart size is normal. No vascular congestion. LUNGS, PLEURAL SPACES: The lungs are clear. No effusion or pneumothorax. BONY THORAX, SOFT TISSUES: Unremarkable. _______________ IMPRESSION: No acute cardiopulmonary process.  
 
 
Zina Coleman MD

## 2020-12-14 NOTE — PROGRESS NOTES
Order for US guided paracentesis noted. P's INR today is 7.5. Informed interventional radiologist, Dr. Kaely Melgoza. MD stated INR must be less than 4.0 prior to proceeding with the paracentesis. Updated pt's bedside nurse, Yariel Gonzalez RN. Yariel Gonzalez, JOAN to discuss w/pt's medical providers.

## 2020-12-14 NOTE — PROGRESS NOTES
Pharmacy Dosing Services: Vancomycin Consult for Vancomycin Dosing by Pharmacy by Dr. Zack Krause provided for this 54y.o. year old male , for indication of Bacteremia. Day of Therapy 01 Ht Readings from Last 1 Encounters:  
12/13/20 175.3 cm (69\") Wt Readings from Last 1 Encounters:  
12/13/20 62.6 kg (138 lb) Other Current Antibiotics Zosyn 3.375 g every 6 hours Significant Cultures pending Serum Creatinine Lab Results Component Value Date/Time Creatinine 0.61 12/14/2020 03:48 AM  
 Creatinine, POC 0.9 10/07/2019 03:54 PM  
  
Creatinine Clearance Estimated Creatinine Clearance: 105.6 mL/min (by C-G formula based on SCr of 0.61 mg/dL). BUN Lab Results Component Value Date/Time BUN 7 12/14/2020 03:48 AM  
  
WBC Lab Results Component Value Date/Time WBC 7.5 12/14/2020 03:48 AM  
  
H/H Lab Results Component Value Date/Time HGB 10.2 (L) 12/14/2020 03:48 AM  
  
Platelets Lab Results Component Value Date/Time PLATELET 43 (L) 23/38/8021 03:48 AM  
  
Temp 97.7 °F (36.5 °C) Start Vancomycin therapy, with loading dose of 1250 mg at 1400 on 12/14/2020. Followed with maintenance dose of Vancomycin 1000 mg every 8 hours at 2200 on 12/14/2020. Dose calculated to approximate a therapeutic trough of 15-20 mcg/mL. Pharmacy to follow daily and will make changes to dose and/or frequency based on clinical status. Pharmacist Marixa

## 2020-12-14 NOTE — H&P
History & Physical 
 
Patient: Radha Tate MRN: [id number]  CSN: [id number] YOB: 1965  Age: 54 y.o. Sex: male DOA: 12/13/2020 Chief Complaint:  
Chief Complaint Patient presents with  Abdominal Pain HPI:  
 
Radha Tate is a 54 y.o.  male who has history of recent pulmonary embolism and DVT, cirrhosis, diabetes, vent dependent respiratory failure with pneumonia presents to the emergency room with increasing abdominal pain and swelling despite abdominal paracentesis December 4. Patient has had bout of diarrhea that recently resolved about a day ago he took over-the-counter Imodium to help with that. He is feeling increasingly weak he has had some bleeding mostly through his sacral decub and some mild bleeding in his heel ulcer. He is starting to ambulate more with his cane but in general still feeling largely weak. He denies drinking alcohol recently He has been using Lidoderm patches for his pain in his back and hip. In the emergency room he was found to have an INR of 9 with no active bleeding CT scan does show portal hypertension and cirrhosis as well as enlarging ascites there is mention of possible pancreatic duct dilatation and ampullary lesion. I am asked to admit for further work-up Past Medical History:  
Diagnosis Date  Diabetes ([address]  Gastroparesis  Pancreatitis Past Surgical History:  
Procedure Laterality Date  HX CHOLECYSTECTOMY  HX HIP FRACTURE TX    
 left hip sx 9.23/2018 History reviewed. No pertinent family history. Social History Socioeconomic History  Marital status:  Spouse name: Not on file  Number of children: Not on file  Years of education: Not on file  Highest education level: Not on file Tobacco Use  Smoking status: Current Every Day Smoker  Smokeless tobacco: Never Used Substance and Sexual Activity  Alcohol use: Not Currently  Drug use:  No  
 
 
 Prior to Admission medications Medication Sig Start Date End Date Taking? Authorizing Provider  
insulin glargine (LANTUS) 100 unit/mL injection 10 Units by SubCUTAneous route nightly. 12/5/20   Víctor Valencia MD  
furosemide (LASIX) 40 mg tablet Take 0.5 Tabs by mouth daily. 12/5/20   Víctor Valencia MD  
multivitamin, tx-iron-ca-min (THERA-M w/ IRON) 9 mg iron-400 mcg tab tablet Take 1 Tab by mouth daily. 12/6/20   Víctor Valencia MD  
rivaroxaban (XARELTO) 15 mg tab tablet Take 1 Tab by mouth two (2) times daily (with meals) for 20 days. 12/5/20 12/25/20  Víctor Valencia MD  
rivaroxaban (Xarelto) 20 mg tab tablet Take 1 Tab by mouth daily (with breakfast). 12/5/20   Víctor Valencia MD  
amoxicillin-clavulanate (Augmentin) 875-125 mg per tablet Take 1 Tab by mouth two (2) times a day. 12/5/20   Víctor Valencia MD  
methocarbamoL (ROBAXIN) 500 mg tablet Take 500 mg by mouth four (4) times daily as needed for Muscle Spasm(s) or Pain. Provider, Historical  
insulin lispro (HUMALOG) 100 unit/mL injection 6 Units by SubCUTAneous route Before breakfast, lunch, and dinner. 10/29/20   Víctor Valencia MD  
lactulose (CHRONULAC) 10 gram/15 mL solution Take 45 mL by mouth daily. 10/30/20   Víctor Valencia MD  
pantoprazole (PROTONIX) 40 mg tablet Take 1 Tab by mouth Daily (before breakfast). 10/30/20   Víctor Valencia MD  
potassium chloride (K-DUR, KLOR-CON) 20 mEq tablet Take 1 Tab by mouth daily. 10/30/20   Víctor Valencia MD  
thiamine mononitrate (B-1) 100 mg tablet Take 1 Tab by mouth daily. 10/30/20   Víctor Valencia MD  
lidocaine (LIDODERM) 5 % 2 Patches by TransDERmal route every twenty-four (24) hours. Apply patch to the affected area for 12 hours a day and remove for 12 hours a day. One patch is applied to left hip. One patch is applied to right shoulder blade. Provider, Historical  
DULoxetine (CYMBALTA) 60 mg capsule Take 1 Cap by mouth daily.  1/6/19   Other, MD Meño  
 traZODone (DESYREL) 50 mg tablet Take 50 mg by mouth nightly. Other, MD Meño  
 
 
No Known Allergies Review of Systems GENERAL: Patient alert, awake and oriented times 3, able to communicate full sentences and not in distress. HEENT: No change in vision, no earache, tinnitus, sore throat or sinus congestion. NECK: No pain or stiffness. PULMONARY: No shortness of breath, cough or wheeze. Cardiovascular: no pnd or orthopnea, no CP GASTROINTESTINAL:+ abdominal pain, nausea, vomiting or diarrhea, melena or bright red blood per rectum. GENITOURINARY: No urinary frequency, urgency, hesitancy or dysuria. MUSCULOSKELETAL: No joint or muscle pain, +ack pain, no recent trauma. DERMATOLOGIC: sacral decub and heel ulcer. ENDOCRINE: No polyuria, polydipsia, no heat or cold intolerance= recent change in weight. HEMATOLOGICAL: No anemia or easy bruising or bleeding. NEUROLOGIC: No headache, seizures, numbness, tingling or weakness. Physical Exam:  
 
Physical Exam: 
Visit Vitals /80 Pulse (!) 107 Temp 97.1 °F (36.2 °C) Resp 22 Ht 5' 9\" (1.753 m) Wt 62.6 kg (138 lb) SpO2 100% BMI 20.38 kg/m² O2 Device: Room air Temp (24hrs), Av.1 °F (36.2 °C), Min:97.1 °F (36.2 °C), Max:97.1 °F (36.2 °C) No intake/output data recorded. No intake/output data recorded. General:  Alert, cooperative, no distress, appears stated age. Head: Normocephalic, without obvious abnormality, atraumatic. Eyes:  Conjunctivae/corneas clear. PERRL, EOMs intact. Nose: Nares normal. No drainage or sinus tenderness. Neck: Supple, symmetrical, trachea midline, no adenopathy, thyroid: no enlargement, no carotid bruit and no JVD. Lungs:   Clear to auscultation bilaterally. diminshed breath sounds Heart:  Regular rate and rhythm, S1, S2 normal.  
  Abdomen: Soft, non-tender. Bowel sounds normal. Moderate ascites Extremities: Extremities normal, atraumatic, no cyanosis or edema. Pulses: 2+ and symmetric all extremities. Skin:  No rashes or lesions Neurologic: AAOx3, No focal motor or sensory deficit. Labs Reviewed:. Ava Mercer CT Results  (Last 48 hours)  
          
 12/13/20 1850  CT ABD PELV W CONT Final result Impression:  IMPRESSION:  
   
Cirrhosis, splenomegaly, large volume of ascites and portal hypertension. Thickened small bowel loops throughout the abdomen which may be related to the  
ascites and cirrhosis however enteritis not excluded. There is pancreatic duct dilatation however no atrophy or discrete mass  
identified. However I would advise further workup exclude any ampullary lesion. Advise GI consultation. Sacral decubitus ulcer without osteomyelitis. Large volume of retained stool in the colon. Narrative:  EXAM: CT of the abdomen and pelvis INDICATION: Abdominal pain and swelling COMPARISON: November 30, 2020 TECHNIQUE: Axial CT imaging of the abdomen and pelvis was performed with  
intravenous contrast. Multiplanar reformats were generated. One or more dose  
reduction techniques were used on this CT: automated exposure control,  
adjustment of the mAs and/or kVp according to patient size, and iterative  
reconstruction techniques. The specific techniques used on this CT exam have  
been documented in the patient's electronic medical record. Digital Imaging and  
Communications in Medicine (DICOM) format image data are available to  
nonaffiliated external healthcare facilities or entities on a secure, media  
free, reciprocally searchable basis with patient authorization for at least a  
12-month period after this study. _______________ FINDINGS:  
   
LOWER CHEST: Unremarkable. LIVER, BILIARY: There is a nodular appearing liver suggesting cirrhosis. No  
focal hepatic lesion seen. No biliary dilation. Cholecystectomy PANCREAS: Pancreatic duct is mildly dilated to 6 mm. There is no atrophy of the  
pancreas. No discrete pancreatic mass identified however prior GI consultation  
and further workup. SPLEEN: Enlarged measuring 14.2 cm. ADRENALS: Normal.  
   
KIDNEYS: Punctate 2 to 3 mm calculi seen in both kidneys. Kidneys demonstrate no  
hydronephrosis. VASCULATURE: Mild to moderate calcific atherosclerosis present. There is  
recanalization the periumbilical vein suggesting portal hypertension. LYMPH NODES: No enlarged lymph nodes. GASTROINTESTINAL TRACT: Large volume of stool present throughout the colon. There are thickened small bowel loops along the left side of the midabdomen  
which may related to the ascites and cirrhosis however enteritis cannot be  
excluded. Is no bowel obstruction. PELVIC ORGANS: Unremarkable. BONES: No acute or aggressive osseous abnormalities identified. There is a sacral decubitus ulcer without evidence of osteomyelitis. OTHER: Large volume of ascites present.  
   
_______________ All lab results for the last 24 hours reviewed. and EKG Procedures/imaging: see electronic medical records for all procedures/Xrays and details which were not copied into this note but were reviewed prior to creation of Plan Recent Results (from the past 12 hour(s)) EKG, 12 LEAD, INITIAL Collection Time: 12/13/20  3:42 PM  
Result Value Ref Range Ventricular Rate 108 BPM  
 Atrial Rate 108 BPM  
 P-R Interval 170 ms QRS Duration 76 ms  
 Q-T Interval 342 ms QTC Calculation (Bezet) 458 ms Calculated R Axis 16 degrees Calculated T Axis -5 degrees Diagnosis Sinus tachycardia Low voltage QRS Possible Inferior infarct , age undetermined Abnormal ECG When compared with ECG of 30-NOV-2020 17:07, Sinus rhythm has replaced Ectopic atrial rhythm Incomplete right bundle branch block is no longer present Borderline criteria for Inferior infarct are now present Confirmed by Deirdre Sood MD, -- ([id number]) on 12/13/2020 2:19:05 PM 
  
METABOLIC PANEL, COMPREHENSIVE Collection Time: 12/13/20  4:19 PM  
Result Value Ref Range Sodium 137 136 - 145 mmol/L Potassium 3.2 (L) 3.5 - 5.5 mmol/L Chloride 100 100 - 111 mmol/L  
 CO2 28 21 - 32 mmol/L Anion gap 9 3.0 - 18 mmol/L Glucose 186 (H) 74 - 99 mg/dL BUN 9 7.0 - 18 MG/DL Creatinine 0.88 0.6 - 1.3 MG/DL  
 BUN/Creatinine ratio 10 (L) 12 - 20 GFR est AA >60 >60 ml/min/1.73m2 GFR est non-AA >60 >60 ml/min/1.73m2 Calcium 8.7 8.5 - 10.1 MG/DL Bilirubin, total 2.0 (H) 0.2 - 1.0 MG/DL  
 ALT (SGPT) 22 16 - 61 U/L  
 AST (SGOT) 48 (H) 10 - 38 U/L Alk. phosphatase 57 45 - 117 U/L Protein, total 7.0 6.4 - 8.2 g/dL Albumin 2.9 (L) 3.4 - 5.0 g/dL Globulin 4.1 (H) 2.0 - 4.0 g/dL A-G Ratio 0.7 (L) 0.8 - 1.7    
CBC WITH AUTOMATED DIFF Collection Time: 12/13/20  4:19 PM  
Result Value Ref Range WBC 6.9 4.6 - 13.2 K/uL  
 RBC 3.47 (L) 4.70 - 5.50 M/uL  
 HGB 10.8 (L) 13.0 - 16.0 g/dL HCT 31.4 (L) 36.0 - 48.0 % MCV 90.5 74.0 - 97.0 FL  
 MCH 31.1 24.0 - 34.0 PG  
 MCHC 34.4 31.0 - 37.0 g/dL  
 RDW 14.3 11.6 - 14.5 % PLATELET 68 (L) 876 - 420 K/uL NEUTROPHILS 56 40 - 73 % LYMPHOCYTES 31 21 - 52 % MONOCYTES 9 3 - 10 % EOSINOPHILS 2 0 - 5 % BASOPHILS 2 0 - 2 %  
 ABS. NEUTROPHILS 3.9 1.8 - 8.0 K/UL  
 ABS. LYMPHOCYTES 2.1 0.9 - 3.6 K/UL  
 ABS. MONOCYTES 0.6 0.05 - 1.2 K/UL  
 ABS. EOSINOPHILS 0.2 0.0 - 0.4 K/UL  
 ABS. BASOPHILS 0.1 0.0 - 0.1 K/UL  
 DF AUTOMATED    
LIPASE Collection Time: 12/13/20  4:19 PM  
Result Value Ref Range Lipase 12 (L) 73 - 393 U/L  
CARDIAC PANEL,(CK, CKMB & TROPONIN) Collection Time: 12/13/20  4:19 PM  
Result Value Ref Range CK - MB 1.5 <3.6 ng/ml CK-MB Index 3.1 0.0 - 4.0 % CK 48 39 - 308 U/L  Troponin-I, QT <0.02 0.0 - 0.045 NG/ML  
PROTHROMBIN TIME + INR  
 Collection Time: 12/13/20  4:19 PM  
Result Value Ref Range Prothrombin time 67.1 (H) 11.5 - 15.2 sec INR 8.5 (HH) 0.8 - 1.2 PTT Collection Time: 12/13/20  4:19 PM  
Result Value Ref Range aPTT 68.8 (H) 23.0 - 36.4 SEC AMMONIA Collection Time: 12/13/20  4:19 PM  
Result Value Ref Range Ammonia 32 11 - 32 UMOL/L  
MAGNESIUM Collection Time: 12/13/20  4:19 PM  
Result Value Ref Range Magnesium 1.7 1.6 - 2.6 mg/dL PROTHROMBIN TIME + INR Collection Time: 12/13/20  5:55 PM  
Result Value Ref Range Prothrombin time 69.8 (H) 11.5 - 15.2 sec INR 9.0 (HH) 0.8 - 1.2 Assessment/Plan Active Problems: 1. Cirrhosis ([facility] [address] (10/9/2020) Waymond Cruise IV albumin 2. Ascites (12/3/2020) -will start on Albumin IV  
- give vitamin K times one dose 
-IR consult for paracentesis 3. Coagulopathy ([facility] [address] (12/13/2020) Will hold Xarelto today For paracentesis in am  
Start Argatroban/vs. Xarelto post procedure Monitor platlets and monitor for bleeding 4. PE /DVT Holding [facility] tonight Restart Argatroban or Xarelto post procedure 5. Pancreatic Duct enlargement Check MRI of Abdomen in am 
[place] Consulted 6. Hypo magnesium/ Hypokalemia Replace per protocol Hold diuretic for now 7. DM Restart insulin Sliding scale as well 8. Large Sacral wound Restart Zosyn IP consult wound care 9. Colitis on CT 
IV zosyn Stool cultures if diarrhea present including c diff DVT/GI Prophylaxis: Xarelto INR high holding 12/13 please restart New PE last admission Tye Galvan MD 
12/13/2020 7:53 PM

## 2020-12-14 NOTE — PROGRESS NOTES
0900 - Assessment completed as per flowsheet. Patient alert and oriented x4. Denies any SOB/chest pain. Abd distended and firm. Mepilex noted to sacral ulcer. Wound noted to left heel. Patient reports 4/10 pain to abdomen. Patient resting in bed with call light in reach. 0913 - PRN Percocet administered for 4/10 pain to abdomen. [id number] - Call out to Dr. Navneet Keller to notify of INR to high at 7.5 and can not have paracentesis until less than 4 and positive blood culture results. Awaiting return call. 1025 - Off floor to MRI. 1045 - Dr. Navneet Keller present on unit and notified of positive blood cultures and unable to have paracentesis today due to elevated INR at 7.5.  
 
1347 - PRN Percocet administered for abdominal pain. 1601 - PRN Zofran administered for c/o nausea. 1904 - PRN Morphine administered for 6/10 pain to abdomen.

## 2020-12-14 NOTE — PROGRESS NOTES
Reason for Admission:  Patient is a readmission, last here  11/30-12/5 and discharged home w/ SALMA [facility] and follow up at [facility] wound clinic for Stage III sacral decubitus treatment. Per H&P, this 54 yr old male \"presents to the emergency room with increasing abdominal pain and swelling despite abdominal paracentesis December 4. Patient has had bout of diarrhea that recently resolved about a day ago he took over-the-counter Imodium to help with that. He is feeling increasingly weak he has had some bleeding mostly through his sacral decub and some mild bleeding in his heel ulcer. He is starting to ambulate more with his cane but in general still feeling largely weak. He denies drinking alcohol recently He has been using Lidoderm patches for his pain in his back and hip. In the emergency room he was found to have an INR of 9 with no active bleeding CT scan does show portal hypertension and cirrhosis as well as enlarging ascites there is mention of possible pancreatic duct dilatation and ampullary lesion. \" 
            
RUR Score:   High; 31% PCP: First and Last name: Kwabena Webb NP Name of Practice:   General Practice Are you a current patient: Yes/No:  yes Approximate date of last visit:  
 Can you do a virtual visit with your PCP:  
          
Resources/supports as identified by patient/family:    
             
Top Challenges facing patient (as identified by patient/family and CM): Finances/Medication cost?    Transportation? wife Support system or lack thereof? Lives with wife, own home Living arrangements? Own home Self-care/ADLs/Cognition? Alert, oriented and appropriate Current Advanced Directive/Advance Care Plan:  Full code, has ACP on record Plan for utilizing home health: On previous admission, patient had been discharged using PT/OT SN from [facility]; [address] offered and patient would like to continue with therapy when he is ready for discharge Transition of Care Plan:       Met with patient at bedside; currently on Cdiff precautions until stool is sent, no diarrhea since admission. Care management will continue to follow for discharge needs. Care Management Interventions PCP Verified by CM: Yes Mode of Transport at Discharge: Self Transition of Care Consult (CM Consult): Discharge Planning, Home Health [facility] - INPATIENT: Yes Current Support Network: Lives with Spouse, Own Home Confirm Follow Up Transport: Family The Plan for Transition of Care is Related to the Following Treatment Goals : home when medically stable The Patient and/or Patient Representative was Provided with a Choice of Provider and Agrees with the Discharge Plan?: Yes Name of the Patient Representative Who was Provided with a Choice of Provider and Agrees with the Discharge Plan: Abiola Luna, patient [place] of Choice List was Provided with Basic Dialogue that Supports the Patient's Individualized Plan of Care/Goals, Treatment Preferences and Shares the Quality Data Associated with the Providers?: Yes Discharge Location Discharge Placement: Home with home health Readmission Assessment Number of days since last admission?: 8-30 days Previous disposition: Home with Home Health Who is being interviewed?: Patient What was the patient's/caregiver's perception as to why they think they needed to return back to the hospital?: Other (Comment)(new problem) Did you visit your Primary Care Physician after you left the hospital, before you returned this time?: No 
Why weren't you able to visit your PCP?: Other (Comment)(admitted before appointment) Did you see a specialist, such as Cardiac, Pulmonary, Orthopedic Physician, etc. after you left the hospital?: Yes(wound care MD Dr. Monalisa Head) Who advised the patient to return to the hospital?: Self-referral 
Does the patient report anything that got in the way of taking their medications?: No 
In our efforts to provide the best possible care to you and others like you, can you think of anything that we could have done to help you after you left the hospital the first time, so that you might not have needed to return so soon?: Other (Comment)(none)

## 2020-12-14 NOTE — ED NOTES
TRANSFER - OUT REPORT: 
 
Verbal report given to FERNANDA Becerra (name) on Marisa Fitzpatrick  being transferred to [employer] (unit) for routine progression of care Report consisted of patients Situation, Background, Assessment and  
Recommendations(SBAR). Information from the following report(s) SBAR, ED Summary, [facility] and Cardiac Rhythm SR was reviewed with the receiving nurse. Lines:  
Peripheral IV 12/13/20 Left Hand (Active) Site Assessment Clean, dry, & intact 12/13/20 1635 Phlebitis Assessment 0 12/13/20 1635 Infiltration Assessment 0 12/13/20 1635 Dressing Status Clean, dry, & intact 12/13/20 1635 Dressing Type Transparent 12/13/20 1635 Hub Color/Line Status Blue 12/13/20 1635 Action Taken Blood drawn 12/13/20 1635 Alcohol Cap Used Yes 12/13/20 1635 Peripheral IV 12/13/20 Right Antecubital (Active) Site Assessment Clean, dry, & intact 12/13/20 1801 Phlebitis Assessment 0 12/13/20 1801 Infiltration Assessment 0 12/13/20 1801 Dressing Status Clean, dry, & intact 12/13/20 1801 Dressing Type 4 X 4 12/13/20 1801 Hub Color/Line Status Pink 12/13/20 1801 Alcohol Cap Used Yes 12/13/20 1801 Opportunity for questions and clarification was provided. Patient transported with: 
 Monitor Registered Nurse

## 2020-12-15 NOTE — PROGRESS NOTES
80 Pt received from offgoing nurse without any signs or symptoms of distress. Pt vitals are stable and within normal limits. Pt bed in low position with wheels locked and call bell within reach. 0020 Assessment completed and documented in flow sheet. Pt denies any further needs at this time. Pt in NAD with bed in low position, wheels locked and call bell within reach. Purposeful rounding completed. Pt resting quietly. No further needs voiced at this time. Scheduled medications administered as ordered. 8090 Reassessment completed with no changes noted. Bed locked, in lowest position, with call light within reach. 9256 Scheduled medications administered as ordered. 1239 Scheduled medications administered as ordered. 3374  Dr Roxanne Jimenes made aware of pt INR results. 2998 Bedside and Verbal shift change report given to [place] (oncoming nurse) by Teri Hendrix RN (offgoing nurse). Report given with SBAR, Intake/Output, MAR and Recent Results.

## 2020-12-15 NOTE — PROGRESS NOTES
Problem: Risk for Spread of Infection Goal: Prevent transmission of infectious organism to others Description: Prevent the transmission of infectious organisms to other patients, staff members, and visitors. Outcome: Progressing Towards Goal 
  
Problem: Patient Education:  Go to Education Activity Goal: Patient/Family Education Outcome: Progressing Towards Goal 
  
Problem: Falls - Risk of 
Goal: *Absence of Falls Description: Document Dionte Salgado Fall Risk and appropriate interventions in the flowsheet. Outcome: Progressing Towards Goal 
Note: Fall Risk Interventions: 
Mobility Interventions: Patient to call before getting OOB Medication Interventions: Patient to call before getting OOB, Teach patient to arise slowly Elimination Interventions: Call light in reach, Patient to call for help with toileting needs Problem: Patient Education: Go to Patient Education Activity Goal: Patient/Family Education Outcome: Progressing Towards Goal 
  
Problem: Pressure Injury - Risk of 
Goal: *Prevention of pressure injury Description: Document Trav Scale and appropriate interventions in the flowsheet. Outcome: Progressing Towards Goal 
Note: Pressure Injury Interventions: Activity Interventions: Pressure redistribution bed/mattress(bed type) Mobility Interventions: Pressure redistribution bed/mattress (bed type) Nutrition Interventions: Document food/fluid/supplement intake Friction and Shear Interventions: Apply protective barrier, creams and emollients, Foam dressings/transparent film/skin sealants, HOB 30 degrees or less Problem: Patient Education: Go to Patient Education Activity Goal: Patient/Family Education Outcome: Progressing Towards Goal 
  
Problem: Pain Goal: *Control of Pain Outcome: Progressing Towards Goal 
 
Problem: Patient Education: Go to Patient Education Activity Goal: Patient/Family Education Outcome: Progressing Towards Goal 
  
 Problem: Ascities-Palliative Care Goal: *PALLIATIVE CARE-Alleviation of ascities Outcome: Progressing Towards Goal 
  
Problem: Patient Education: Go to Patient Education Activity Goal: Patient/Family Education Outcome: Progressing Towards Goal

## 2020-12-15 NOTE — PROGRESS NOTES
1934: Bedside report received from Charleen Henao RN. Assumed care of pt at this time. Pt in bed with no signs of distress. Pt left with call light within reach and encouraged to call for assistance. 2110: Percocet 5-325 mg given PRN. 2335: Morphine given PRN. 2336: Bedside shift change report given to Robi Adams RN (oncoming nurse) by Rosy KOWALSKI RN (offgoing nurse). Report included the following information SBAR, Kardex and MAR.

## 2020-12-15 NOTE — WOUND CARE
12/14/20 1400 Wound Sacrum 11/22/20 Date First Assessed/Time First Assessed: 12/09/20 1612   Primary Wound Type: Pressure Injury  Location: Sacrum  Date of First Observation: 11/22/20 Wound Image Wound Etiology Pressure Stage 3 Dressing Status Breakthrough drainage noted Cleansed Wound cleanser Dressing/Treatment Silicone border;Collagen with Ag Offloading for Diabetic Foot Ulcers No offloading required Dressing Change Due  
(prn) Wound Assessment Granulation tissue;Pink/red Drainage Amount Moderate Drainage Description Serosanguinous Wound Odor Mild Kandice-Wound/Incision Assessment Blanchable erythema Edges Attached edges Wound Thickness Description Full thickness

## 2020-12-15 NOTE — PROGRESS NOTES
0738-Written report received from [address] via [address]. Assumed care of patient at this time. 4235 - Pain 7/10. PRN 1 tablet 5-325 percocet pain medication administered at this time. Patient has been educated on side effects. 0937- Patient in bed at this time. A/O x 4. IV to left hand and right AC  intact and patent. Patient platelets are 44. Sacral mepilex dressing to sacral wound and mepilex to left heel CDI. pateint denies numbness/tingling. Pedal pulses palpable. Lungs clear, diminished. Bowel sounds active to all quadrants. Pain 7/10. Patient LBM 12/8/2020, bowel sounds active. 0816-Pedro Luis Ovens in room with patient. 1200-Dr. Leti Saeed notified that patient BP 95.63, and that he is refusing lasctulose but reports he has not had a bowel movement since last Wednesday. Patient has not had bowel movement since admission and if he has not had one by 1600, c diff r/o can be removed per Dr. Leti Saeed.  This nurse informed Dr. Leti Saeed that patient is refusing lactulose, Dr. Leti Saeed will call patient. 1308-Patient reports he had to cancel wound clinic visit for today, wants to know if they will come see him. 1417 - Pain 7/10. PRN 2 tablets 5-325 percocet pain medication administered at this time. Patient has been educated on side effects. 1920 - Bedside and Verbal shift change report given to Romeo Alejandro RN by Анна Grissom RN. Report included the following information SBAR, Kardex, OR Summary, Intake/Output and MAR.

## 2020-12-15 NOTE — CONSULTS
LOGO HERE 
 
[facility] Phone: [phone] Paging : (  [address]) [phone] 
[facility] [phone] Hematology / Oncology Consult Note Impression: Active Problems: 
  Cirrhosis ([address] (10/9/2020) Stage 4 decubitus ulcer ([address] (12/3/2020) Ascites (12/3/2020) Coagulopathy ([address] (12/13/2020) Colitis (12/14/2020) Positive blood culture (12/14/2020) Coagulopathy. Likely a mixture of vitamin K deficiency from antibiotics, liver failure, anticoagulation. I will screen for antiphospholipid disorders given his recent PE as this can cause prolongation of clotting factors but this is unlikely. I will also screen for DIC although I think this is less likely. Thrombocytopenia. Due to splenomegaly and cirrhosis. DVT/PE. This appears to have been provoked by recent infection. Due to thrombocytopenia, I would hold at the moment. His platelet count may improve with stabilization of his acute issues. If that happens, we could restart anticoagulation. At the moment, I would hold off on a filter. Plan: If procedure needs to be done, I would recommend FFP for correction of coagulopathy. Agree with vitamin K as well to replete any vitamin K deficiency due to antibiotics Trend CBC daily Check D-dimer and fibrinogen and antiphospholipid antibodies Hold anticoagulation for now Final decision on anticoagulation pending improvement in platelet count Reason for Consultation: 
Kwasi Bonilla is a 54 y.o. male who I've been asked to consult for  Coagulopathy and history of DVT and PE 
 
HPI:   
 
 He was initially admitted on November 30, 2020 and discharged on December 5, 2020. At that point, he had a pulmonary embolism and was discovered to have cirrhosis due to alcohol abuse as well as an infected stage IV sacral ulcer. He also had an aspiration pneumonia and was in the ICU on a ventilator for a long period of time. Lower extremity showed nonocclusive thrombus in the right common femoral and left profunda femoral vein. He was discharged on Xarelto. He was noted to have thrombocytopenia in the 80-90,000 range due to cirrhosis, presumably. He was admitted to the hospital with worsening abdominal pain and ascites. Blood work at the time of admission this time showed platelet count of 47,472, hemoglobin of 10, white blood cell count of 6. Patient parameters were very abnormal with an INR of 8, APTT of 68. Total bilirubin of 1.5, albumin of 2.6.  12/13/2020 shows cirrhosis, splenomegaly, thickened small bowel loops, pancreatic duct dilation. He also had a sacral decubitus ulcer without osteomyelitis. He has MRI of abdomen on 12/14/2020 showed cirrhosis, chronic pancreatitis, fibrotic changes at the ampulla with mild extrinsic narrowing of the distal common bile duct. No mass identified. Past Medical History:  
Diagnosis Date  Cirrhosis ([address]  Diabetes ([address]  Gastroparesis  Pancreatitis  Pulmonary embolism ([address] Past Surgical History:  
Procedure Laterality Date  HX CHOLECYSTECTOMY  HX HIP FRACTURE TX    
 left hip sx 9.23/2018  
 
@[email or web address]@ History reviewed. No pertinent family history. No Known Allergies Home Medications:  
[unfilled] [facility] Medications:  
 
Current Facility-Administered Medications Medication Dose Route Frequency Provider Last Rate Last Dose  magnesium sulfate 1 g/100 ml IVPB (premix or compounded)  1 g IntraVENous Kiran Petty MD      
  phytonadione (vitamin K1) (AQUA-MEPHYTON) 10 mg in 0.9% sodium chloride 50 mL IVPB  10 mg IntraVENous DAILY Nolvia Miller  mL/hr at 12/14/20 0841 10 mg at 12/14/20 3823  Vancomycin- Pharmacy to Dose  1 Each Other Rx Dosing/Monitoring Fiorella Cooper MD      
 vancomycin (VANCOCIN) 1,000 mg in 0.9% sodium chloride 250 mL (VIAL-MATE)  1,000 mg IntraVENous Q8H Fiorella Cooper MD   1,000 mg at 12/15/20 0020  
 nicotine (NICODERM CQ) 21 mg/24 hr patch 1 Patch  1 Patch TransDERmal DAILY Isha Tomas MD   1 Patch at 12/14/20 8178  lidocaine 4 % patch 2 Patch  2 Patch TransDERmal Q24H Isha Tomas MD   2 Patch at 12/14/20 2110  
 oxyCODONE-acetaminophen (PERCOCET) 5-325 mg per tablet 1-2 Tab  1-2 Tab Oral Q6H PRN Isha Tomas MD   1 Tab at 12/14/20 2110  
 albumin human 5% (BUMINATE) solution 25 g  25 g IntraVENous Q6H Isha Tomas  mL/hr at 12/15/20 0531 25 g at 12/15/20 0531  morphine injection 2 mg  2 mg IntraVENous Q4H PRN Isha Tomas MD   2 mg at 12/15/20 5485  piperacillin-tazobactam (ZOSYN) 3.375 g in 0.9% sodium chloride (MBP/ADV) 100 mL MBP  3.375 g IntraVENous Q6H Isha Tomas  mL/hr at 12/15/20 0512 3.375 g at 12/15/20 0362  
 insulin lispro (HUMALOG) injection   SubCUTAneous AC&HS Isha Tomas MD   4 Units at 12/14/20 2121  
 glucose chewable tablet 16 g  4 Tab Oral PRN Isha Tomas MD      
 glucagon (GLUCAGEN) injection 1 mg  1 mg IntraMUSCular PRN Isha Tomas MD      
 dextrose 10% infusion 125-250 mL  125-250 mL IntraVENous PRN Isha Tomas MD      
 DULoxetine (CYMBALTA) capsule 60 mg  60 mg Oral DAILY Isha Tomas MD   60 mg at 12/14/20 0840  
 insulin glargine (LANTUS) injection 10 Units  10 Units SubCUTAneous QHS Isha Tomas MD   10 Units at 12/14/20 2110  insulin lispro (HUMALOG) injection 6 Units  6 Units SubCUTAneous TIDAC Claudio Tomas MD   6 Units at 12/14/20 1913  
 lactulose (CHRONULAC) 10 gram/15 mL solution 45 mL  30 g Oral DAILY Claudio Tomas MD      
 pantoprazole (PROTONIX) tablet 40 mg  40 mg Oral ACB Claudio Tomas MD   40 mg at 12/14/20 5819  thiamine mononitrate (B-1) tablet 100 mg  100 mg Oral DAILY Claudio Tomas MD   100 mg at 12/14/20 0840  traZODone (DESYREL) tablet 50 mg  50 mg Oral QHS Claudio Tomas MD   50 mg at 12/14/20 2110  potassium chloride (K-DUR, KLOR-CON) SR tablet 20 mEq  20 mEq Oral DAILY Claudio Tomas MD   20 mEq at 12/14/20 0839  
 ondansetron (ZOFRAN) injection 4 mg  4 mg IntraVENous Q6H PRN Claudio Tomas MD   4 mg at 12/14/20 2336 Review of Systems:  
Constitutional: 
 Fever: Negative, Chills: Negative, Weight Loss: Negative, Malaise/Fatigue: Negative Diaphoresis: Negative,  Weakness: Negative Skin: 
 Rash: Negative,  Itching: Negative HENT: 
 Headache:Negative, Hearing Loss: Negative, Tinnitus: Negative, Ear Pain: Negative Nosebleeds: Negative, Congestion: Negative, Stridor: Negative, Sore Throat: Negative Eyes:  
 Blurred Vision: Negative, Double Vision: Negative, Photophobia: Negative Eye Pain: Negative, Eye Discharge: Negative, Eye Redness: Negative Cardiovascular:  
 Chest Pain: Negative, Palpitations: Negative, Orthopnea: Negative Claudication: Negative, Leg Swelling: Negative PND: Negative Respiratory: 
 Cough: Negative, Hemoptysis: Negative, Sputum Production: Negative Shortness of Breath: Negative, Wheezing: Negative Gastrointestinal: 
 Heartburn: Negative, Nausea: Negative, Vomiting: Negative Abdominal Pain: Negative, Diarrhea: Negative, Constipation: Negative Blood in Stool: Negative, Melena: Negative Genitourinary:  
 Dysuria: Negative, Urgency: Negative, Frequency: Negative Hematuria: Negative, Flank Pain: Negative Musculoskeletal: Myalgias: Negative, Neck Pain: Negative, Back Pain: Negative Joint Pain: Negative, Falls: Negative Endo/Heme/Allergies:  
 Easy Bruise/Blood: Negative, Env. Allergies: Negative, Polydipsia: Negative Neurological:  
 Dizziness: Negative, Tingling: Negative. Tremor: Negative, Sensory Change: Negative. Speech Change: Negative, Focal Weakness: Negative     Seizures: Negative, LOC: Negative Psychiatric: 
 Depression: Negative, Suicidal Ideas: Negative, Substance Abuse: Negative Hallucinations: Negative, Nervous/Anxious: Negative Insomnia: Negative, Memory Loss: Negative Visit Vitals /77 Pulse 88 Temp 98 °F (36.7 °C) Resp 14 Ht 5' 9\" (1.753 m) Wt 62.6 kg (138 lb) SpO2 100% BMI 20.38 kg/m² Temp (24hrs), Av.7 °F (36.5 °C), Min:97.4 °F (36.3 °C), Max:98 °F (36.7 °C) General: no acute distress and laying in bed HEENT: no icterus, no oral lesions Lym: no cervical or supraclavicular adenopathy CV: rate is regular and normal and their is no murmur Lung: clear to auscultation, no increased work of breathing Abd: nontender, no organomegaly Neuro: cnoversant and moving extremities MSK: no sarcopenia, normal tone Derm: no rash Psych: normal affect Per: warm, no edema Labs: 
Recent Labs 12/15/20 
0140 20 
1619 WBC 2.9* 7.5 6.9  
RBC 2.55* 3.26* 3.47* HCT 23.4* 29.8* 31.4* MCV 91.8 91.4 90.5 MCH 31.8 31.3 31.1 MCHC 34.6 34.2 34.4  
RDW 14.4 14.3 14.3 Recent Labs 12/15/20 
0140 20 
1619 CO2 22 26 28 BUN 5* 7 9 IMPRESSION IMPRESSION: 
  
Cirrhosis, splenomegaly, large volume of ascites and portal hypertension. 
  
Thickened small bowel loops throughout the abdomen which may be related to the 
ascites and cirrhosis however enteritis not excluded. 
  
There is pancreatic duct dilatation however no atrophy or discrete mass identified. However I would advise further workup exclude any ampullary lesion. Advise GI consultation. 
  
Sacral decubitus ulcer without osteomyelitis. 
  
Large volume of retained stool in the colon. 
 
  
IMPRESSION IMPRESSION: 
  
1. Acute pulmonary embolism involving the right lower lobe medial basal 
segmental artery. 2. Ascending thoracic aorta aneurysm measuring 4 cm without evidence of 
dissection. 3. No focal consolidative process in the chest. 
 
No results for input(s): ALBUMIN in the last 72 hours. No lab exists for component: TOTPR, ALKPHOS 
 
[email or web address] [facility] MD HELDER Killian 
[facility]

## 2020-12-15 NOTE — PROGRESS NOTES
[address] [facility] Kristin Hansen MD, Fabby Galdamez [facility] Rosamaria Key MD, MPH Earline Hankins, PA-HUSEYIN Leyva, [facility] Catalina Capone, [facility]   Trey Hansen, FNP-C Enedina Skinner, [facility] Jonatan [facility] 
  at [place] [address] [address] [facility], [address] [facility], [address] 
  [phone] FAX: [address] [place] [address] 
  [address], [address] [facility] Mabel Moreno, [address] 
  [phone] FAX: [phone] HEPATOLOGY PROGRESS NOTE The patient is a 47year old  male without previous knowledge of liver disease. He says he used to consume alcohol fairly heavily several years ago but cut back to only 1-2 a few days per week 2 years ago. He has been abstinent from alcohol since 9/2020. During the previous hospitalization he developed a DVT and PE and was stated on Xaralto. He came to the ED because of abdominal pain and distention. CT scan demonstrated dilation of PD without obvious pancreas mass, cirrhosis and large amount of ascites. Labs were significant for INR 8.5, TBILI 1.7, SCr .61 mg 
 
MRI yesterday is consistent with chronic pancreatitis. No pancreas mass. He is eating without pain. INR is down to 6.9 with 1 dose of vitamin K given yesterday. We should wean and try to get him off narcotic pain meds.  
 
  
ASSESSMENT AND PLAN: 
Cirrhosis The diagnosis of cirrhosis is based upon laboratory studies, imaging, complications of cirrhosis. The etiology is post-likely due to alcohol. Serology for other causes of chronic liver disease are all negative. 
  
The CTP is 9. Child class B. The MELD score is 33. The MELD score is being driven up by the prolonged INR. If this were 2.0 the MELD would be 17.  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 We had started evaluating him for LT during the previous hospitalization Now that we know he has chronic pancreatitis and not pancreatic CA we can complete his LT evaluation. Cardiac portion of liver transplant evaluation testing was negative. ECHO was normal.  Lexicon stress was negative for ischemia Coagulopathy Unclear why this is so prolonged. He is on Xaralto but this will not due this. This was held because of the prolonged INR He is on IV vitamin K 10 mg every day x 3 There is no evidence of bleeding. No need for FFP at this time. Ascites Ascites is persistent Will start step 1 diuretics since Scr is normal. 
Continue IV albumin 25 gm Q6H 
NO paracentesis at this time since ascites not tight and both PLT low and INR markedly prolonged. 2 gm NA diet Screening for Esophageal varices The patient has not had an EGD to screen for varices. HB is stable and no evidence of bleeding. Will hold on EGD until INR improved. 
  
Hepatic encephalopathy Overt HE is controlled on current dose of lactulose. Will continue lactulose once QD 
  
Anemia This is due to multifactorial causes including portal hypertension with chronic GI blood loss, bone marrow suppression secondary to malnutrition and alcohol. FE panel was normal 
  Thrombocytopenia This is secondary to cirrhosis. There is no evidence of overt bleeding. No treatment is required. The platelet count is under 50K. The use of a platelet growth factor to raise the platelet count and avoid platelet transfusions would be indicated if the patient requires a medical or surgical procedure. 
  
Chronic pancreatitis This was demonstrated on MRI. The PD is irregular and there is no pancreas mass. CBD not dilated. 
   
  
PHYSICAL EXAMINATION: 
VS: per nursing note General: No acute distress. Eyes: Sclera anicteric. ENT: No oral lesions. Thyroid normal. 
Nodes: No adenopathy. Skin: No spider angiomata. No jaundice. No palmar erythema. Respiratory: Lungs clear to auscultation. Cardiovascular: Regular heart rate. No murmurs. No JVD. Abdomen: Soft non-tender, Some ascites is present. Extremities: No edema. No muscle wasting. No gross arthritic changes. Neurologic: Alert and oriented. Cranial nerves grossly intact. No asterixis. LABORATORY: 
Results for Sylvia Noe (MRN [id number]) as of 12/15/2020 07:44 Ref. Range 12/13/2020 16:19 12/14/2020 03:48 12/15/2020 01:40 WBC Latest Ref Range: 4.6 - 13.2 K/uL 6.9 7.5 2.9 (L) HGB Latest Ref Range: 13.0 - 16.0 g/dL 10.8 (L) 10.2 (L) 8.1 (L) PLATELET Latest Ref Range: 135 - 420 K/uL 68 (L) 43 (L) 44 (L) INR Latest Ref Range: 0.8 - 1.2   8.5 (HH) 7.5 (HH) 6.9 (HH) Sodium Latest Ref Range: 136 - 145 mmol/L 137 138 140 Potassium Latest Ref Range: 3.5 - 5.5 mmol/L 3.2 (L) 3.5 4.0 Chloride Latest Ref Range: 100 - 111 mmol/L 100 104 109 CO2 Latest Ref Range: 21 - 32 mmol/L 28 26 22 Glucose Latest Ref Range: 74 - 99 mg/dL 186 (H) 125 (H) 134 (H) BUN Latest Ref Range: 7.0 - 18 MG/DL 9 7 5 (L) Creatinine Latest Ref Range: 0.6 - 1.3 MG/DL 0.88 0.61 0.80 Bilirubin, total Latest Ref Range: 0.2 - 1.0 MG/DL 2.0 (H) 1.7 (H) 1.5 (H) Albumin Latest Ref Range: 3.4 - 5.0 g/dL 2.9 (L) 2.6 (L) 3.3 (L) ALT Latest Ref Range: 16 - 61 U/L 22 20 15 (L) AST Latest Ref Range: 10 - 38 U/L 48 (H) 43 (H) 37 Alk. phosphatase Latest Ref Range: 45 - 117 U/L 57 50 49 Ammonia Latest Ref Range: 11 - 32 UMOL/L 32    
 
 
RADIOLOGY: 
12/2020. CT scan abdomen with IV contrast.  Changes consistent with cirrhosis. No liver mass lesions. No dilated bile ducts. Moderate ascites. Dilated PD. No pancreas mass. Paul Garner MD 
[address] [facility], [address] [place], [facility] [address] [phone] [address]

## 2020-12-15 NOTE — DIABETES MGMT
GLYCEMIC CONTROL PROGRESS NOTE: 
 
- discussed in rounds, known h/o T2DM HbA1C within recommended range for age + comorbids on insulin home regimen - BG out of target range ICU:140-180 mg/dL - TDD =28 (10 Lantus + 12 (6) MTI + 6 - Humalog Normal Insulin Sensitivity Corrective Coverage) 
- recommend continue with current regimen Recent Glucose Results:  
Lab Results Component Value Date/Time  (H) 12/15/2020 01:40 AM  
 GLUCPOC 129 (H) 12/15/2020 05:55 AM  
 GLUCPOC 204 (H) 12/14/2020 09:21 PM  
 GLUCPOC 165 (H) 12/14/2020 07:03 PM  
 
Ema Armstrong MS, RN, CDE Glycemic Control Team 
[phone] Pager [phone] (M-TH 8:00-4:30P) *After Hours pager [phone]

## 2020-12-15 NOTE — PROGRESS NOTES
Problem: Falls - Risk of 
Goal: *Absence of Falls Description: Document Armando Granado Fall Risk and appropriate interventions in the flowsheet. Outcome: Progressing Towards Goal 
Note: Fall Risk Interventions: 
Mobility Interventions: Assess mobility with egress test, Patient to call before getting OOB, Strengthening exercises (ROM-active/passive), Utilize walker, cane, or other assistive device Mentation Interventions: Adequate sleep, hydration, pain control Medication Interventions: Patient to call before getting OOB, Teach patient to arise slowly Elimination Interventions: Call light in reach, Patient to call for help with toileting needs, Urinal in reach

## 2020-12-15 NOTE — PROGRESS NOTES
Comprehensive Nutrition Assessment Type and Reason for Visit: Initial, Wound Nutrition Recommendations/Plan: Supplement: Add Glucerna TID Supplement: nathalie BID Nutrition Assessment:  pt admitted w/ ascites,cirrhosis,  coagulopathy, anemia, thrombocytopenia, wounds POA, colitis, Pancreatic Duct enlargement Malnutrition Assessment: 
Malnutrition Status:  (P) Severe malnutrition Context:  (P) Chronic illness Findings of the 6 clinical characteristics of malnutrition:  
Energy Intake:  (P) Unable to assess Weight Loss:  (P) 7 - Greater than 5% over 1 month Body Fat Loss:  (P) 7 - Severe body fat loss, (P) Orbital, Buccal region Muscle Mass Loss:  (P) 7 - Severe muscle mass loss, (P) Temples (temporalis), Scapula (trapezius) Fluid Accumulation:  (P) 7 - Severe, (P) Ascites  Strength:  (P) Not performed Estimated Daily Nutrient Needs: 
Energy (kcal): 7324; Weight Used for Energy Requirements: Current Protein (g): 75-94; Weight Used for Protein Requirements: Current(1.2-1.5) Fluid (ml/day): 2504; Method Used for Fluid Requirements: 1 ml/kcal 
 
 
Nutrition Related Findings:  Meds: lasix, lantus, humalog, magnesium sulfate, lactulose, protonix, vit K, thiamine, KCl Wounds:   
Pressure injury Current Nutrition Therapies: DIET CARDIAC Regular Anthropometric Measures: 
· Height:  5' 9\" (175.3 cm) · Current Body Wt:  62.6 kg (138 lb) · Admission Body Wt:      
· Usual Body Wt:  73 kg (161 lb) · Ideal Body Wt:  160 lbs:  86.3 % · Adjusted Body Weight:   ; Weight Adjustment for: · Adjusted BMI:      
· BMI Category:  Underweight (BMI less than 18.5) Nutrition Diagnosis: · Severe malnutrition related to inadequate protein-energy intake as evidenced by wounds, weight loss greater than or equal to 5% in 1 month, localized or generalized fluid accumulation Nutrition Interventions: Food and/or Nutrient Delivery: Continue current diet, Start oral nutrition supplement Nutrition Education and Counseling: No recommendations at this time Coordination of Nutrition Care: Continue to monitor while inpatient, Interdisciplinary rounds Goals: 
Encourage PO intake >50% at most meals in the next 3-5days Nutrition Monitoring and Evaluation:  
Behavioral-Environmental Outcomes:   
Food/Nutrient Intake Outcomes: Diet advancement/tolerance, Food and nutrient intake, Supplement intake Physical Signs/Symptoms Outcomes: Biochemical data, Skin, Weight, Nutrition focused physical findings, GI status, Fluid status or edema Discharge Planning:   
Continue current diet Electronically signed by Marya Dacosta on 12/15/2020 at 11:02 AM

## 2020-12-16 PROBLEM — R78.81 BACTEREMIA: Status: ACTIVE | Noted: 2020-01-01

## 2020-12-16 NOTE — PROGRESS NOTES
Physician Progress Note Dontrell Thacker 
CSN #:                  [id number] :                       1965 ADMIT DATE:       2020 3:34 PM 
[address] [place] DATE: 
RESPONDING 
PROVIDER #:        Antolin Julien MD 
 
 
 
 
QUERY TEXT: 
 
Dear Dr. Hira Angel, Patient admitted with cirrhosis. Per H&P, noted to also have sacral and heel ulcer. If possible, please document in progress notes and discharge summary the type of ulcer, site of the ulcer and present on admission status of the ulcer: The medical record reflects the following: 
Risk Factors: diabetes, weakness, coagulation defect Clinical Indicators: 
stage III ulcer to left heel per flow sheet \"DERMATOLOGIC: sacral decub and heel ulcer. \"  and \"Sacral decubitus ulcer without osteomyelitis\"  per Dr. Lonnie Wynn MD, H&P, 2020. \"He was found to have a PE and a DVT and a decubitus ulcer at that time. He was started on blood thinner and antibiotics. He is currently, followed by the wound clinic. \"  and \"Pressure Ulcer is present at the time of admission. \"  per Graciela Danielle, ED provider note, 2020. Treatment:  sacral ulcer and heel ulcer covered with dressing per flow sheet. Thank you, FLEX Chen RN, [facility] Office #:  [phone] Options provided: 
-- Decubitus Pressure Ulcer to sacrum and Stage III decubitus pressure ulcer to left heel present on admission -- Diabetic Ulcer to sacrum and left heel present on admission -- Decubitus Pressure Ulcer to sacrum and stage III diabetic ulcer to left heel present on admission -- Diabetic Ulcer to sacrum and decubitus pressure ulcer stage III to left heel ulcer present on admission 
-- Other - I will add my own diagnosis -- Disagree - Not applicable / Not valid -- Disagree - Clinically unable to determine / Unknown 
-- Refer to Clinical Documentation Reviewer PROVIDER RESPONSE TEXT: 
 
 This patient has a decubitus pressure ulcer to sacrum and stage III decubitus pressure ulcer to left heel that was present on admission. Query created by: Rosamaria Pemberton on 12/14/2020 9:33 AM 
 
 
QUERY TEXT: 
 
Dear Dr. Franky Mooney, Pt admitted with cirrhosis. Pt noted to have ascites. If possible, please document in progress notes and discharge summary the relationship, if any, between *** and ***. The medical record reflects the following: 
Risk Factors: coagulation defect Clinical Indicators: 
\"presents to the emergency room with increasing abdominal pain and swelling despite abdominal paracentesis December 4. \" \"In the emergency room he was found to have an INR of 9 with no active bleeding\" \"1. Cirrhosis ([address] (10/9/2020)\" and \"2. Ascites (12/3/2020)\"  per Dr. Alexa Skinner MD, H&P. Treatment: \"Restart Lactulose , IV albumin\"  and \"give vitamin K times one dose,  IR consult for paracentesis\"  per Dr. Alexa Skinner MD, H&P. Thank you, FLEX Chen, RN, [facility] Office #:  [phone] Options provided: 
-- Ascites due to cirrhosis -- Ascites unrelated to cirrhosis -- Other - I will add my own diagnosis -- Disagree - Not applicable / Not valid -- Disagree - Clinically unable to determine / Unknown 
-- Refer to Clinical Documentation Reviewer PROVIDER RESPONSE TEXT: 
 
This patient has ascites due to cirrhosis. Query created by:  Rosamaria Pemberton on 12/14/2020 9:53 AM 
 
 
Electronically signed by:  Damien Langford MD 12/15/2020 9:38 PM

## 2020-12-16 NOTE — PROGRESS NOTES
Hospitalist Progress Note-critical care note Patient: Foster Roberts MRN: [id number]  [facility]: [id number] YOB: 1965  Age: 54 y.o. Sex: male DOA: 12/13/2020 LOS:  LOS: 3 days Chief complaint: colitis, bacteremia, decubitus ulcer , ascites ,cirrhosis Assessment/Plan Hospital Problems  Date Reviewed: 10/9/2020 Codes Class Noted POA Bacteremia ICD-10-CM: R78.81 ICD-9-CM: 790.7  12/16/2020 Unknown Colitis ICD-10-CM: K52.9 ICD-9-CM: 558.9  12/14/2020 Unknown Positive blood culture ICD-10-CM: R78.81 ICD-9-CM: 790.7  12/14/2020 Unknown Coagulopathy ([facility] ICD-10-CM: D08.0 ICD-9-CM: 286.9  12/13/2020 Unknown Stage 4 decubitus ulcer ([facility] ICD-10-CM: L89.94 
ICD-9-CM: 707.00, 707.24  12/3/2020 Yes Ascites ICD-10-CM: R18.8 ICD-9-CM: 789.59  12/3/2020 Unknown Severe protein-calorie malnutrition ([facility] ICD-10-CM: R42 ICD-9-CM: 983  10/26/2020 Yes Cirrhosis ([facility] ICD-10-CM: K74.60 ICD-9-CM: 571.5  10/9/2020 Unknown Cirrhosis ([facility] (10/9/2020) with ascites Continue lactulose -he refused yesterday-increase lactulose today due to ammonia level -he agrees IV albumin Ammonia level monitoring Lasix and spirnolactone   
-IR consult for paracentesis-hold due to elevated inr 6 today, continue vit K  
  
  
Coagulopathy ([facility] (12/13/2020) 
inr 6 today  
inr daily Continue hold xarelto     
  
PE /DVT on 11/30/2020 Appreciated dr. Hanks Share on board Continue hold ac  
  
Pancreatic Duct enlargement MRI of Abdomen :Evidence of hepatic cirrhosis with no mass lesion, but chronic pancreatitis and fibrotic changes at the ampulla with mild extrinsic narrowing of the distal CBD. No mass lesion identified Miriam Pica saw pt AM  
  
Bacteremia Enterococcus faecalis Continue zosyn for now , switch to amoxi on d/c  
  
DM 
ssi and hypoglycemia protocol  
 
  
Large Sacral wound Zosyn Continue local wound care  
  
 Colitis on CT 
IV zosyn,no diarrhea Hypokalemia K replacement Subjective : ok to take lactulose Disposition :tbd, Review of systems: 
 
General: No fevers or chills. Cardiovascular: No chest pain or pressure. No palpitations. Pulmonary:  No shortness of breath  . Gastrointestinal: No nausea, vomiting. no abdomen pain Vital signs/Intake and Output: 
Visit Vitals /80 Pulse (!) 102 Temp 98.3 °F (36.8 °C) Resp 14 Ht 5' 9\" (1.753 m) Wt 73.1 kg (161 lb 1.6 oz) SpO2 98% BMI 23.79 kg/m² Current Shift:  12/16 0701 - 12/16 1900 In: -  
Out: 1150 [PCRHN:1027] Last three shifts:  12/14 1901 - 12/16 0700 In: 2850 [P.O.:1400; I.V.:1450] Out: 2600 [Urine:2600] Physical Exam: 
General: WD, WN. Alert, cooperative, no acute distress HEENT: NC, Atraumatic. PERRLA, anicteric sclerae. Lungs: CTA Bilaterally. No Wheezing/Rhonchi/Rales. Heart:  Regular  rhythm,  No murmur, No Rubs, No Gallops Abdomen: Soft, less distended, Non tender. +Bowel sounds, Extremities: No c/c/e Psych:   Not anxious or agitated. Neurologic:  No acute neurological deficit. Labs: Results:  
   
Chemistry Recent Labs 12/16/20 
5891 12/15/20 
0140 12/14/20 
4122 * 134* 125*  140 138  
K 3.2* 4.0 3.5  109 104 CO2 22 22 26 BUN 3* 5* 7 CREA 0.67 0.80 0.61  
CA 8.4* 8.1* 8.1* AGAP 9 9 8 BUCR 4* 6* 11* AP 48 49 50  
TP 5.8* 5.9* 6.1* ALB 3.4 3.3* 2.6*  
GLOB 2.4 2.6 3.5 AGRAT 1.4 1.3 0.7* CBC w/Diff Recent Labs 12/16/20 
0420 12/15/20 
0730 12/15/20 
0140 12/14/20 
1087 WBC 3.1*  --  2.9* 7.5  
RBC 2.68*  --  2.55* 3.26* HGB 8.3* 8.6* 8.1* 10.2* HCT 24.4* 24.9* 23.4* 29.8* PLT 43*  --  44* 43* GRANS 56  --  60 25* LYMPH 31  --  24 72* EOS 2  --  3 0 Cardiac Enzymes No results for input(s): CPK, CKND1, LYDIA in the last 72 hours. No lab exists for component: Soledad Keating Coagulation Recent Labs 12/16/20 1571 12/15/20 
0140 PTP 51.5* 57.0* INR 6.0* 6.9* Lipid Panel No results found for: CHOL, CHOLPOCT, CHOLX, CHLST, CHOLV, 810307, HDL, HDLP, LDL, LDLC, DLDLP, 073198, VLDLC, VLDL, TGLX, TRIGL, TRIGP, TGLPOCT, CHHD, CHHDX  
BNP No results for input(s): BNPP in the last 72 hours. Liver Enzymes Recent Labs 12/16/20 
3129 TP 5.8* ALB 3.4 AP 48 Thyroid Studies No results found for: T4, T3U, TSH, TSHEXT, TSHEXT Procedures/imaging: see electronic medical records for all procedures/Xrays and details which were not copied into this note but were reviewed prior to creation of Plan Cta Chest W Or W Wo Cont Result Date: 11/30/2020 EXAM: CTA chest CLINICAL INDICATION/HISTORY:  Generalized fatigue. Dyspnea. COMPARISON: None TECHNIQUE: Axial CT imaging from the thoracic inlet through the diaphragm with intravenous contrast. Coronal and sagittal MIP reformats were generated. One or more dose reduction techniques were used on this CT: automated exposure control, adjustment of the mAs and/or kVp according to patient size, and iterative reconstruction techniques. The specific techniques used on this CT exam have been documented in the patient's electronic medical record. Digital Imaging and Communications in Medicine (DICOM) format image data are available to nonaffiliated external healthcare facilities or entities on a secure, media free, reciprocally searchable basis with patient authorization for at least a 12-month period after this study. _______________ FINDINGS: EXAM QUALITY: Adequate opacification of the pulmonary arteries. PULMONARY ARTERIES: Critical result: There is a filling defect in the right lower lobe medial basal segmental artery. Remaining pulmonary arteries are unremarkable. MEDIASTINUM: Normal heart size. The ascending aorta is dilated measuring up to 4 cm. No pericardial effusion. Right heart strain indices: RV/LV ratio: Normal (ratio </= 1). Interventricular septal bowing: None. Main pulmonary artery diameter: Normal. Contrast reflux into the IVC: None. LUNGS: No suspicious nodule or mass. No abnormal opacities. PLEURA: Normal. AIRWAY: Normal. LYMPH NODES: No enlarged nodes. UPPER ABDOMEN: Please see concurrently performed CT abdomen/pelvis. OTHER: No acute or aggressive osseous abnormalities identified. SUPERFICIAL SOFT TISSUES: Unremarkable. _______________ IMPRESSION: 1. Acute pulmonary embolism involving the right lower lobe medial basal segmental artery. 2. 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 3. No focal consolidative process in the chest. Critical results discussed with Dr. Rose Villareal at 7:33 PM on 10/30/2020. Ct Abd Pelv W Cont Result Date: 12/13/2020 EXAM: CT of the abdomen and pelvis INDICATION: Abdominal pain and swelling COMPARISON: November 30, 2020 TECHNIQUE: Axial CT imaging of the abdomen and pelvis was performed with intravenous contrast. Multiplanar reformats were generated. One or more dose reduction techniques were used on this CT: automated exposure control, adjustment of the mAs and/or kVp according to patient size, and iterative reconstruction techniques. The specific techniques used on this CT exam have been documented in the patient's electronic medical record. Digital Imaging and Communications in Medicine (DICOM) format image data are available to nonaffiliated external healthcare facilities or entities on a secure, media free, reciprocally searchable basis with patient authorization for at least a 12-month period after this study. _______________ FINDINGS: LOWER CHEST: Unremarkable. LIVER, BILIARY: There is a nodular appearing liver suggesting cirrhosis. No focal hepatic lesion seen. No biliary dilation. Cholecystectomy PANCREAS: Pancreatic duct is mildly dilated to 6 mm. There is no atrophy of the pancreas. No discrete pancreatic mass identified however prior GI consultation and further workup. SPLEEN: Enlarged measuring 14.2 cm. ADRENALS: Normal. KIDNEYS: Punctate 2 to 3 mm calculi seen in both kidneys. Kidneys demonstrate no hydronephrosis. VASCULATURE: Mild to moderate calcific atherosclerosis present. There is recanalization the periumbilical vein suggesting portal hypertension. LYMPH NODES: No enlarged lymph nodes. GASTROINTESTINAL TRACT: Large volume of stool present throughout the colon. There are thickened small bowel loops along the left side of the midabdomen which may related to the ascites and cirrhosis however enteritis cannot be excluded. Is no bowel obstruction. PELVIC ORGANS: Unremarkable. BONES: No acute or aggressive osseous abnormalities identified. There is a sacral decubitus ulcer without evidence of osteomyelitis. OTHER: Large volume of ascites present. _______________ IMPRESSION: Cirrhosis, splenomegaly, large volume of ascites and portal hypertension. Thickened small bowel loops throughout the abdomen which may be related to the ascites and cirrhosis however enteritis not excluded. There is pancreatic duct dilatation however no atrophy or discrete mass identified. However I would advise further workup exclude any ampullary lesion. Advise GI consultation. Sacral decubitus ulcer without osteomyelitis. Large volume of retained stool in the colon. Ct Abd Pelv W Cont Result Date: 11/30/2020 EXAM: CT of the abdomen and pelvis CLINICAL INDICATION/HISTORY:  Weakness and dyspnea. Abdominal pain. COMPARISON: None. TECHNIQUE: Axial CT imaging of the abdomen and pelvis was performed with intravenous contrast. Multiplanar reformats were generated. One or more dose reduction techniques were used on this CT: automated exposure control, adjustment of the mAs and/or kVp according to patient size, and iterative reconstruction techniques. The specific techniques used on this CT exam have been documented in the patient's electronic medical record. Digital Imaging and Communications in Medicine (DICOM) format image data are available to nonaffiliated external healthcare facilities or entities on a secure, media free, reciprocally searchable basis with patient authorization for at least a 12-month period after this study. _______________ FINDINGS: LOWER CHEST: Please see concurrently performed CT of the chest. LIVER, BILIARY: There is cirrhotic morphology of the liver. No focal parenchymal masses visualized. There is no intra-or extrahepatic biliary ductal dilatation. Gallbladder is surgically absent. PANCREAS: Pancreatic duct is dilated measuring 5 mm. No discrete pancreatic mass or intraductal abnormality visualized. SPLEEN: Enlarged measuring 15.6 cm in AP diameter. ADRENALS: Normal. KIDNEYS: Normal. There is no nephrolithiasis. There is no hydronephrosis. LYMPH NODES: No enlarged lymph nodes. GASTROINTESTINAL TRACT: No bowel dilation or wall thickening. The appendix is visualized and unremarkable. PELVIC ORGANS: Unremarkable. VASCULATURE: Unremarkable. BONES: Post surgical changes of prior left hip open reduction and internal fixation. OTHER: There is no free intraperitoneal fluid or free air. SUPERFICIAL SOFT TISSUES: There is a soft tissue defect at the level of the lower sacral segments. No definite cortical changes in the sacrum _______________ IMPRESSION: 1. Cirrhotic morphology of liver with sequelae of portal venous hypertension including splenomegaly and moderate volume intra-abdominal ascites. 2. Nonspecific dilatation of the pancreatic duct up to 5 mm. 3. Lower sacral soft tissue ulcer without discrete CT evidence of osteomyelitis. Us Guide Paracentesis Result Date: 12/9/2020 PROCEDURE:  ULTRASOUND GUIDED PARACENTESIS INDICATION: Ascites. ________________________ TECHNIQUE/FINDINGS: I performed the procedure. The ascites in the right paracolic gutter was localized under ultrasound guidance. An image of the ascites was obtained and submitted into the patient record. The skin was marked, prepped and draped in sterile fashion and lidocaine was used for local anesthesia. Sterile probe cover and gel was used. A 5 Western Lisa Yueh sheathed needle was advanced into the fluid. A total of 3.8 L of straw colored fluid was drained using a Vacutainer system. The patient tolerated the procedure well and there were no immediate complications. Pre-procedure time out:  1659 hours. Post procedure pause:  1726 hours. GUIDANCE: Ultrasound guidance was used to position (and confirm the position of) the Yueh sheathed needle. Image(s) saved in PACS: Ultrasound ________________________ IMPRESSION: Successful ultrasound guided paracentesis of approximately 3.8 L of straw colored fluid. [address] [place] [address] Result Date: 12/2/2020 EXAM: Limited right upper quadrant abdominal ultrasound INDICATION: 29-year-old patient with cirrhosis and reported portal venous thrombosis. . COMPARISON: CT 11/30/2020. TECHNIQUE: Real-time abdomen/right upper quadrant sonography in multiple planes was performed with image documentation. Grayscale, color flow Doppler imaging, and velocity spectral waveform analysis of the portal vein was performed (duplex imaging). _______________ FINDINGS: LIVER: Diffusely coarsened hepatic parenchymal echotexture noted with lobular surface contour. . No focal mass Color flow Doppler and velocity spectral waveform analysis of the portal vein shows normal (hepatopedal) direction of flow. [place] BILIARY SYSTEM: No intrahepatic biliary dilatation. Common bile duct is normal in caliber measuring 0.4 cm. GALLBLADDER: Surgically absent. RIGHT KIDNEY: 10.8 cm in length. No hydronephrosis or renal mass. No visible calculi. PANCREAS: Nonvisualized. IVC: Visualized portions are unremarkable in appearance. OTHER: Moderate amount of intra-abdominal/pelvic ascites. . _______________ IMPRESSION: 1. Cirrhotic hepatic morphology. No biliary ductal dilatation or suspicious appearing hepatic lesion. 2. Moderate volume abdominal/pelvic ascites. . 3. Prior cholecystectomy. Xr Chest Dale Johnson Result Date: 11/30/2020 EXAM: One view chest x-ray CLINICAL INDICATION/HISTORY: Weakness and dyspnea. COMPARISON: 10/22/2020. TECHNIQUE: Single AP view of the chest was obtained. _______________ FINDINGS: HEART, VESSELS, MEDIASTINUM: Heart size is normal. No vascular congestion. LUNGS, PLEURAL SPACES: The lungs are clear. No effusion or pneumothorax. BONY THORAX, SOFT TISSUES: Unremarkable. _______________ IMPRESSION: No acute cardiopulmonary process.  
 
 
Gio Houser MD

## 2020-12-16 NOTE — PROGRESS NOTES
Problem: Patient Education:  Go to Education Activity Goal: Patient/Family Education Outcome: Progressing Towards Goal 
  
Problem: Falls - Risk of 
Goal: *Absence of Falls Description: Document Mary Anne Heads Fall Risk and appropriate interventions in the flowsheet. Outcome: Progressing Towards Goal 
Note: Fall Risk Interventions: 
Mobility Interventions: Assess mobility with egress test, PT Consult for mobility concerns Mentation Interventions: Adequate sleep, hydration, pain control Medication Interventions: Teach patient to arise slowly Elimination Interventions: Call light in reach Problem: Pressure Injury - Risk of 
Goal: *Prevention of pressure injury Description: Document Trav Scale and appropriate interventions in the flowsheet. Outcome: Progressing Towards Goal 
Note: Pressure Injury Interventions: 
Sensory Interventions: Assess changes in LOC Moisture Interventions: Absorbent underpads Activity Interventions: PT/OT evaluation Mobility Interventions: PT/OT evaluation Nutrition Interventions: Document food/fluid/supplement intake Friction and Shear Interventions: Minimize layers Problem: Pain Goal: *Control of Pain Outcome: Progressing Towards Goal

## 2020-12-16 NOTE — PROGRESS NOTES
2119-alert and oriented x 4. Lungs CTA on RA, but dyspnea noted on exertion. BS active x 4 quads. Mepilex to sacrum intact, mepilex to left heel intact. Bed mobility good. Pain rated at 8/10 with Morphine given at 2107. IV access to left hand infusing Vancomycin without difficulty and IV access to left hand infusing albumin without difficulty. On tele box 34 showing NSR. 
 
2200-pain decreased to 5/10. 
0041-Pain rated 6/10, Morphine given. 0127-Pain decreased to 4/10. 
 
0445-Morphine given for pain rated 7/10. 
0530-Pain decreased to 5/10. Shift summary-pain within tolerable levels with morphine given. ATB given with no adverse effects. Patient not out of bed, but had good bed mobility. INR down but still high at 6.0.

## 2020-12-16 NOTE — PROGRESS NOTES
0700 : assumed care of patient from 1501 Tone Schroeder S : morning meds and shift assessment completed. Patient complaining of Pain and nausea, see MAR for medication administration. 1242 : pt complaining of abd pain, requested more pain medication. See MAR for administration. Pt not eating lunch, held insulin 1611 : Patient requested pain medication. Pt stated he is feeling better but still in pain, was able to sleep for a bit this afternoon. 1729 : patient resting in bed, ordered dinner. POC low so currently holding mealtime insulin, will administer if patient eats dinner. 1900 : Bedside shift change report given to Bashir Leigh (oncoming nurse) by Glenys Estrada RN (offgoing nurse). Report included the following information SBAR, Kardex, Intake/Output and MAR.

## 2020-12-16 NOTE — CONSULTS
LOGO HERE 
 
[facility] Phone: [phone] Paging : (  [address]) [phone] 
[facility] ([phone] Hematology / Oncology Consult Note Impression: Active Problems: 
  Cirrhosis ([address] (10/9/2020) Severe protein-calorie malnutrition ([address] (10/26/2020) Stage 4 decubitus ulcer ([address] (12/3/2020) Ascites (12/3/2020) Coagulopathy ([address] (12/13/2020) Colitis (12/14/2020) Positive blood culture (12/14/2020) Coagulopathy. Likely a mixture of vitamin K deficiency from antibiotics, liver failure, anticoagulation. I will screen for antiphospholipid disorders given his recent PE as this can cause prolongation of clotting factors but this is unlikely. I will also screen for DIC although I think this is less likely. Workup pending but I think we know the cause. Thrombocytopenia. Chronic component Due to splenomegaly and cirrhosis. Acute component dt/ bacteremia. DVT/PE. This appears to have been provoked by recent infection. Due to thrombocytopenia, I would hold anticoagulation at the moment. His platelet count may improve with stabilization of his acute issues. If that happens, we could restart anticoagulation. At the moment, I would hold off on an IVC filter but this may be a consideration Sacral Decubitus ? Enterococcus Bacteremia Ascites Plan: If procedure needs to be done, I would recommend FFP for correction of coagulopathy. Agree with vitamin K as well to replete any vitamin K deficiency due to antibiotics Trend CBC daily Check D-dimer and fibrinogen and antiphospholipid antibodies Hold anticoagulation for now Final decision on anticoagulation pending improvement in platelet count F/U lab tests ordered Contemplating an IVC filter Reason for Consultation: 
Lyubov Denis is a 54 y.o. male who I've been asked to consult for  Coagulopathy and history of DVT and PE 
 
 HPI:   
 
He was initially admitted on November 30, 2020 and discharged on December 5, 2020. At that point, he had a pulmonary embolism and was discovered to have cirrhosis due to alcohol abuse as well as an infected stage IV sacral ulcer. He also had an aspiration pneumonia and was in the ICU on a ventilator for a long period of time. Lower extremity showed nonocclusive thrombus in the right common femoral and left profunda femoral vein. He was discharged on Xarelto. He was noted to have thrombocytopenia in the 80-90,000 range due to cirrhosis, presumably. He was admitted to the hospital with worsening abdominal pain and ascites. Blood work at the time of admission this time showed platelet count of 96,323, hemoglobin of 10, white blood cell count of 6. Patient parameters were very abnormal with an INR of 8, APTT of 68. Total bilirubin of 1.5, albumin of 2.6.  12/13/2020 shows cirrhosis, splenomegaly, thickened small bowel loops, pancreatic duct dilation. He also had a sacral decubitus ulcer without osteomyelitis. He has MRI of abdomen on 12/14/2020 showed cirrhosis, chronic pancreatitis, fibrotic changes at the ampulla with mild extrinsic narrowing of the distal common bile duct. No mass identified. Past Medical History:  
Diagnosis Date  Cirrhosis ([address]  Diabetes ([address]  Gastroparesis  Pancreatitis  Pulmonary embolism ([facility] [facility] Past Surgical History:  
Procedure Laterality Date  HX CHOLECYSTECTOMY  HX HIP FRACTURE TX    
 left hip sx 9.23/2018  
 
[email or web address]@ History reviewed. No pertinent family history. No Known Allergies Home Medications:  
[unfilled] Hospital Medications:  
 
Current Facility-Administered Medications Medication Dose Route Frequency Provider Last Rate Last Admin  phytonadione (vitamin K1) (AQUA-MEPHYTON) 10 mg in 0.9% sodium chloride 50 mL IVPB  10 mg IntraVENous DAILY Mercedes Kaminski MD      
  vancomycin (VANCOCIN) 1,000 mg injection  furosemide (LASIX) tablet 40 mg  40 mg Oral DAILY Martina Kaminski MD      
 spironolactone (ALDACTONE) tablet 100 mg  100 mg Oral DAILY Martina Kaminski MD      
 docusate sodium (COLACE) capsule 100 mg  100 mg Oral DAILY Lamont Vivar MD      
 Vancomycin- Pharmacy to Dose  1 Each Other Rx Dosing/Monitoring Lamont Vivar MD      
 vancomycin (VANCOCIN) 1,000 mg in 0.9% sodium chloride 250 mL (VIAL-MATE)  1,000 mg IntraVENous Q8H Lamont Vivar MD   1,000 mg at 12/16/20 0653  
 nicotine (NICODERM CQ) 21 mg/24 hr patch 1 Patch  1 Patch TransDERmal DAILY Trisha Tomas MD   1 Patch at 12/15/20 1206  lidocaine 4 % patch 2 Patch  2 Patch TransDERmal Q24H Trisha Tomas MD   2 Patch at 12/15/20 2118  
 oxyCODONE-acetaminophen (PERCOCET) 5-325 mg per tablet 1-2 Tab  1-2 Tab Oral Q6H PRN Trisha Tomas MD   2 Tab at 12/15/20 1417  albumin human 5% (BUMINATE) solution 25 g  25 g IntraVENous Q6H Trisha Tomas  mL/hr at 12/15/20 0531 25 g at 12/16/20 0400  morphine injection 2 mg  2 mg IntraVENous Q4H PRN Trisha Tomas MD   2 mg at 12/16/20 7323  piperacillin-tazobactam (ZOSYN) 3.375 g in 0.9% sodium chloride (MBP/ADV) 100 mL MBP  3.375 g IntraVENous Q6H Trisha Tomas  mL/hr at 12/16/20 0033 3.375 g at 12/16/20 0033  insulin lispro (HUMALOG) injection   SubCUTAneous AC&HS Trisha Tomas MD   2 Units at 12/15/20 2115  
 glucose chewable tablet 16 g  4 Tab Oral PRN Trisha Tomas MD      
 glucagon (GLUCAGEN) injection 1 mg  1 mg IntraMUSCular PRN Trisha Tomas MD      
 dextrose 10% infusion 125-250 mL  125-250 mL IntraVENous PRN Trisha Tomas MD      
 DULoxetine (CYMBALTA) capsule 60 mg  60 mg Oral DAILY Trisha Tomas MD   60 mg at 12/15/20 1038  insulin glargine (LANTUS) injection 10 Units  10 Units SubCUTAneous QHS Kelin Tomas MD   10 Units at 12/15/20 2115  insulin lispro (HUMALOG) injection 6 Units  6 Units SubCUTAneous TIDAC Kelin Tomas MD   Stopped at 12/15/20 4934  lactulose (CHRONULAC) 10 gram/15 mL solution 45 mL  30 g Oral DAILY Kelin Tomas MD      
 pantoprazole (PROTONIX) tablet 40 mg  40 mg Oral ACB Kelin Tomas MD   40 mg at 12/16/20 9002  thiamine mononitrate (B-1) tablet 100 mg  100 mg Oral DAILY Kelin Tomas MD   100 mg at 12/15/20 1038  traZODone (DESYREL) tablet 50 mg  50 mg Oral QHS Kelin Tomas MD   50 mg at 12/15/20 2117  potassium chloride (K-DUR, KLOR-CON) SR tablet 20 mEq  20 mEq Oral DAILY Kelin Tomas MD   20 mEq at 12/15/20 1038  ondansetron (ZOFRAN) injection 4 mg  4 mg IntraVENous Q6H PRN Kelin Tomas MD   4 mg at 12/14/20 2336 Review of Systems:  
Constitutional: 
 Fever: Negative, Chills: Negative, Weight Loss: Negative, Malaise/Fatigue: Negative Diaphoresis: Negative,  Weakness: Negative Skin: 
 Rash: Negative,  Itching: Negative HENT: 
 Headache:Negative, Hearing Loss: Negative, Tinnitus: Negative, Ear Pain: Negative Nosebleeds: Negative, Congestion: Negative, Stridor: Negative, Sore Throat: Negative Eyes:  
 Blurred Vision: Negative, Double Vision: Negative, Photophobia: Negative Eye Pain: Negative, Eye Discharge: Negative, Eye Redness: Negative Cardiovascular:  
 Chest Pain: Negative, Palpitations: Negative, Orthopnea: Negative Claudication: Negative, Leg Swelling: Negative PND: Negative Respiratory: 
 Cough: Negative, Hemoptysis: Negative, Sputum Production: Negative Shortness of Breath: Negative, Wheezing: Negative Gastrointestinal: 
 Heartburn: Negative, Nausea: Negative, Vomiting: Negative Abdominal Pain: Negative, Diarrhea: Negative, Constipation: Negative Blood in Stool: Negative, Melena: Negative Genitourinary:  
 Dysuria: Negative, Urgency: Negative, Frequency: Negative Hematuria: Negative, Flank Pain: Negative Musculoskeletal: Myalgias: Negative, Neck Pain: Negative, Back Pain: Negative Joint Pain: Negative, Falls: Negative Endo/Heme/Allergies:  
 Easy Bruise/Blood: Negative, Env. Allergies: Negative, Polydipsia: Negative Neurological:  
 Dizziness: Negative, Tingling: Negative. Tremor: Negative, Sensory Change: Negative. Speech Change: Negative, Focal Weakness: Negative     Seizures: Negative, LOC: Negative Psychiatric: 
 Depression: Negative, Suicidal Ideas: Negative, Substance Abuse: Negative Hallucinations: Negative, Nervous/Anxious: Negative Insomnia: Negative, Memory Loss: Negative Visit Vitals /78 (BP 1 Location: Left arm, BP Patient Position: At rest) Pulse 98 Temp 97.9 °F (36.6 °C) Resp 14 Ht 5' 9\" (1.753 m) Wt 73.1 kg (161 lb 1.6 oz) SpO2 98% BMI 23.79 kg/m² Temp (24hrs), Av °F (36.7 °C), Min:97.8 °F (36.6 °C), Max:98.1 °F (36.7 °C) General: no acute distress and laying in bed HEENT: no icterus, no oral lesions Lym: no cervical or supraclavicular adenopathy CV: rate is regular and normal and their is no murmur Lung: clear to auscultation, no increased work of breathing Abd: nontender, no organomegaly Neuro: cnoversant and moving extremities MSK: no sarcopenia, normal tone Derm: no rash Psych: normal affect Per: warm, no edema Labs: 
Recent Labs 20 
0420 12/15/20 
0730 12/15/20 
0140 20 
1200 WBC 3.1*  --  2.9* 7.5  
RBC 2.68*  --  2.55* 3.26* HCT 24.4* 24.9* 23.4* 29.8* MCV 91.0  --  91.8 91.4 MCH 31.0  --  31.8 31.3 MCHC 34.0  --  34.6 34.2  
RDW 14.7*  --  14.4 14.3 Recent Labs 12/16/20 
2012 12/15/20 
0140 20 
0925 CO2 22 22 26 BUN 3* 5* 7 IMPRESSION IMPRESSION: 
  
 Cirrhosis, splenomegaly, large volume of ascites and portal hypertension. 
  
Thickened small bowel loops throughout the abdomen which may be related to the 
ascites and cirrhosis however enteritis not excluded. 
  
There is pancreatic duct dilatation however no atrophy or discrete mass 
identified. However I would advise further workup exclude any ampullary lesion. Advise GI consultation. 
  
Sacral decubitus ulcer without osteomyelitis. 
  
Large volume of retained stool in the colon. 
 
  
IMPRESSION IMPRESSION: 
  
1. Acute pulmonary embolism involving the right lower lobe medial basal 
segmental artery. 2. Ascending thoracic aorta aneurysm measuring 4 cm without evidence of 
dissection. 3. No focal consolidative process in the chest. 
 
No results for input(s): ALBUMIN in the last 72 hours. No lab exists for component: TOTPR, ALKPHOS 
 
[email or web address] [place] MD HELDER Reese 
[facility]

## 2020-12-16 NOTE — WOUND CARE
IP WOUND CONSULT Jean Beatty MEDICAL RECORD NUMBER:  [id number] AGE: 54 y.o. GENDER: male  : 1965 TODAY'S DATE:  2020 GENERAL [x] Follow-up [] New Consult Jean Beatty is a 54 y.o. male referred by:  
[] Physician 
[x] Nursing 
[] Other: PAST MEDICAL HISTORY Past Medical History:  
Diagnosis Date  Cirrhosis ([facility] [address]  Diabetes ([facility] [facility]  Gastroparesis  Pancreatitis  Pulmonary embolism ([facility] [address] PAST SURGICAL HISTORY Past Surgical History:  
Procedure Laterality Date  HX CHOLECYSTECTOMY  HX HIP FRACTURE TX    
 left hip sx  FAMILY HISTORY History reviewed. No pertinent family history. SOCIAL HISTORY Social History Tobacco Use  Smoking status: Current Every Day Smoker  Smokeless tobacco: Never Used Substance Use Topics  Alcohol use: Not Currently  Drug use: No  
 
 
ALLERGIES No Known Allergies MEDICATIONS No current facility-administered medications on file prior to encounter. Current Outpatient Medications on File Prior to Encounter Medication Sig Dispense Refill  insulin glargine (LANTUS) 100 unit/mL injection 10 Units by SubCUTAneous route nightly. 1 Vial 0  
 furosemide (LASIX) 40 mg tablet Take 0.5 Tabs by mouth daily. 30 Tab 1  
 multivitamin, tx-iron-ca-min (THERA-M w/ IRON) 9 mg iron-400 mcg tab tablet Take 1 Tab by mouth daily. 30 Tab 1  rivaroxaban (XARELTO) 15 mg tab tablet Take 1 Tab by mouth two (2) times daily (with meals) for 20 days. 40 Tab 0  
 rivaroxaban (Xarelto) 20 mg tab tablet Take 1 Tab by mouth daily (with breakfast). 30 Tab 0  
 amoxicillin-clavulanate (Augmentin) 875-125 mg per tablet Take 1 Tab by mouth two (2) times a day. 8 Tab 0  
 methocarbamoL (ROBAXIN) 500 mg tablet Take 500 mg by mouth four (4) times daily as needed for Muscle Spasm(s) or Pain.  insulin lispro (HUMALOG) 100 unit/mL injection 6 Units by SubCUTAneous route Before breakfast, lunch, and dinner. 1 Vial 0  
 lactulose (CHRONULAC) 10 gram/15 mL solution Take 45 mL by mouth daily. 1 Bottle 0  
 pantoprazole (PROTONIX) 40 mg tablet Take 1 Tab by mouth Daily (before breakfast). 30 Tab 0  
 potassium chloride (K-DUR, KLOR-CON) 20 mEq tablet Take 1 Tab by mouth daily. 10 Tab 0  
 thiamine mononitrate (B-1) 100 mg tablet Take 1 Tab by mouth daily. 10 Tab 0  
 lidocaine (LIDODERM) 5 % 2 Patches by TransDERmal route every twenty-four (24) hours. Apply patch to the affected area for 12 hours a day and remove for 12 hours a day. One patch is applied to left hip. One patch is applied to right shoulder blade.  DULoxetine (CYMBALTA) 60 mg capsule Take 1 Cap by mouth daily.  traZODone (DESYREL) 50 mg tablet Take 50 mg by mouth nightly. Wt Readings from Last 3 Encounters:  
12/16/20 73.1 kg (161 lb 1.6 oz) 12/07/20 59.4 kg (131 lb) 12/05/20 62.2 kg (137 lb 3.2 oz) [unfilled] Visit Vitals /81 Pulse 99 Temp 98.2 °F (36.8 °C) Resp 14 Ht 5' 9\" (1.753 m) Wt 73.1 kg (161 lb 1.6 oz) SpO2 99% BMI 23.79 kg/m² ASSESSMENT Ulcer Identification: 
Ulcer Type: pressure Contributing Factors: diabetes, chronic pressure, malnutrition and substance abuse Wound Heel Left stage III 12/02/20 (Active) Dressing Status Clean;Dry; Intact 12/15/20 0830 Dressing/Treatment Foam 12/15/20 0830 Drainage Amount None 12/15/20 0830 Wound Odor None 12/15/20 0830 Number of days: 14 Wound Sacrum 11/22/20 (Active) Wound Image   12/14/20 1400 Wound Etiology Pressure Stage 3 12/14/20 1400 Dressing Status Intact 12/15/20 2119 Cleansed Wound cleanser 12/14/20 1400 Dressing/Treatment Foam 12/15/20 2119 Offloading for Diabetic Foot Ulcers No offloading required 12/14/20 1400 Wound Assessment Granulation tissue;Pink/red 12/14/20 1400 Drainage Amount None 12/15/20 2119 Drainage Description Serous 12/15/20 0830 Wound Odor None 12/15/20 2119 Kandice-Wound/Incision Assessment Blanchable erythema 12/14/20 1400 Edges Attached edges 12/14/20 1400 Wound Thickness Description Full thickness 12/14/20 1400 Number of days: 7 [REMOVED] Wound Back Lateral;Left; Inferior small opeing to the left flank ( pt states it sn abcess) 10/09/20 (Removed) Number of days: 44  
   
[REMOVED] Wound Sacral/coccyx Lower;Medial redden and small opening to scral/coccyx 10/09/20 (Removed) Number of days: 44  
   
[REMOVED] Wound Heel Left purple/red, non blanchable, skin intact, dsg present upon starting shift (Removed) Number of days: 30  
   
[REMOVED] Wound Sacrum Left;Upper (Removed) Number of days: 19 PLAN Skin Care & Pressure Relief Recommendations Minimize layers of linen Pads under patient to optimize support surface Turn/reposition approximately every 2 hours Promote continence; Skin Protective lotion/cream to buttocks and sacrum daily and as needed with incontinence care Physician/Provider notified: Yes Recommendations: Patient seen in the wound clinic, [place] and a mepilex boarder Teaching completed with:  
[x] Patient          
[] Family member      
[] Caregiver         
[] Nursing 
[] Other Patient/Caregiver Teaching: 
Level of patient/caregiver understanding able to:  
[x] Indicates understanding       [] Needs reinforcement 
[] Unsuccessful      [] Verbal Understanding 
[] Demonstrated understanding       [] No evidence of learning 
[] Refused teaching         [] N/A Electronically signed by Bambi Naik RN on 12/16/2020 at 8:57 AM

## 2020-12-16 NOTE — PROGRESS NOTES
Hospitalist Progress Note-critical care note Patient: Marisa Fitzpatrick MRN: [id number]  CSN: [id number] YOB: 1965  Age: 54 y.o. Sex: male DOA: 12/13/2020 LOS:  LOS: 2 days Chief complaint: colitis, + bcx , decubitus ulcer , ascites ,cirrhosis Assessment/Plan Hospital Problems  Date Reviewed: 10/9/2020 Codes Class Noted POA Colitis ICD-10-CM: K52.9 ICD-9-CM: 558.9  12/14/2020 Unknown Positive blood culture ICD-10-CM: R78.81 ICD-9-CM: 790.7  12/14/2020 Unknown Coagulopathy ([facility] ICD-10-CM: J75.0 ICD-9-CM: 286.9  12/13/2020 Unknown Stage 4 decubitus ulcer [facility] ICD-10-CM: L89.94 
ICD-9-CM: 707.00, 707.24  12/3/2020 Yes Ascites ICD-10-CM: R18.8 ICD-9-CM: 789.59  12/3/2020 Unknown Severe protein-calorie malnutrition ([facility] [address] ICD-10-CM: J56 ICD-9-CM: 962  10/26/2020 Yes Cirrhosis ([facility] ICD-10-CM: K74.60 ICD-9-CM: 571.5  10/9/2020 Unknown Cirrhosis ([facility] (10/9/2020) with ascites Continue lactulose IV albumin Ammonia level monitoring Lasix and spirnolactone -cut half dose due to low bp  
  
-IR consult for paracentesis-hold due to elevated inr -will continue vit K  
  
  
Coagulopathy (HCC) (12/13/2020) 
inr >6  vit k continue  
inr daily Continue hold xarelto     
  
PE /DVT on 11/30/2020 Appreciated dr. Jaz Logan on board Continue hold ac  
  
Pancreatic Duct enlargement MRI of Abdomen :Evidence of hepatic cirrhosis with no mass lesion, but chronic pancreatitis and fibrotic changes at the ampulla with mild extrinsic narrowing of the distal CBD. No mass lesion identified Hanna Boyd saw pt AM  
  
+ blood cx Continue vanc and repeat cx  
  
DM 
ssi and hypoglycemia protocol  
 
  
Large Sacral wound On vanc and  Zosyn IP consult wound care  
  
Colitis on CT 
IV zosyn,no diarrhea Subjective : abdomen more distended , no sob, but sob on exertion Will give ffp for paracentesis tomorrow if inr still up Disposition :tbd, Review of systems: 
 
General: No fevers or chills. Cardiovascular: No chest pain or pressure. No palpitations. Pulmonary:  shortness of breath on exertion . Gastrointestinal: No nausea, vomiting. no abdomen pain Vital signs/Intake and Output: 
Visit Vitals /82 (BP 1 Location: Left arm, BP Patient Position: At rest) Pulse 93 Temp 97.8 °F (36.6 °C) Resp 14 Ht 5' 9\" (1.753 m) Wt 62.6 kg (138 lb) SpO2 98% BMI 20.38 kg/m² Current Shift:  No intake/output data recorded. Last three shifts:  12/14 0701 - 12/15 1900 In: 2850 [P.O.:1400; I.V.:1450] Out: 1600 [Urine:1600] Physical Exam: 
General: WD, WN. Alert, cooperative, no acute distress HEENT: NC, Atraumatic. PERRLA, anicteric sclerae. Lungs: CTA Bilaterally. No Wheezing/Rhonchi/Rales. Heart:  Regular  rhythm,  No murmur, No Rubs, No Gallops Abdomen: Soft, more  distended, Non tender. +Bowel sounds, Extremities: No c/c/e Psych:   Not anxious or agitated. Neurologic:  No acute neurological deficit. Labs: Results:  
   
Chemistry Recent Labs 12/15/20 
0140 12/14/20 
0348 12/13/20 
1619 * 125* 186*  138 137  
K 4.0 3.5 3.2*  
 104 100 CO2 22 26 28 BUN 5* 7 9 CREA 0.80 0.61 0.88 CA 8.1* 8.1* 8.7 AGAP 9 8 9 BUCR 6* 11* 10* AP 49 50 57  
TP 5.9* 6.1* 7.0 ALB 3.3* 2.6* 2.9*  
GLOB 2.6 3.5 4.1* AGRAT 1.3 0.7* 0.7* CBC w/Diff Recent Labs 12/15/20 
0730 12/15/20 
0140 12/14/20 
0348 12/13/20 
1619 WBC  --  2.9* 7.5 6.9  
RBC  --  2.55* 3.26* 3.47* HGB 8.6* 8.1* 10.2* 10.8* HCT 24.9* 23.4* 29.8* 31.4* PLT  --  44* 43* 68* GRANS  --  60 25* 56 LYMPH  --  24 72* 31 EOS  --  3 0 2 Cardiac Enzymes Recent Labs 12/13/20 
1619 CPK 48 CKND1 3.1 Coagulation Recent Labs 12/15/20 
0140 12/14/20 
0348  12/13/20 
1619 PTP 57.0* 61.2*   < > 67.1*  
 INR 6.9* 7.5*   < > 8.5* APTT  --   --   --  68.8*  
 < > = values in this interval not displayed. Lipid Panel No results found for: CHOL, CHOLPOCT, CHOLX, CHLST, CHOLV, 105718, HDL, HDLP, LDL, LDLC, DLDLP, 529190, VLDLC, VLDL, TGLX, TRIGL, TRIGP, TGLPOCT, CHHD, CHHDX  
BNP No results for input(s): BNPP in the last 72 hours. Liver Enzymes Recent Labs 12/15/20 
0140 TP 5.9* ALB 3.3* AP 49 Thyroid Studies No results found for: T4, T3U, TSH, TSHEXT, TSHEXT Procedures/imaging: see electronic medical records for all procedures/Xrays and details which were not copied into this note but were reviewed prior to creation of Plan Cta Chest W Or W Wo Cont Result Date: 11/30/2020 EXAM: CTA chest CLINICAL INDICATION/HISTORY:  Generalized fatigue. Dyspnea. COMPARISON: None TECHNIQUE: Axial CT imaging from the thoracic inlet through the diaphragm with intravenous contrast. Coronal and sagittal MIP reformats were generated. One or more dose reduction techniques were used on this CT: automated exposure control, adjustment of the mAs and/or kVp according to patient size, and iterative reconstruction techniques. The specific techniques used on this CT exam have been documented in the patient's electronic medical record. Digital Imaging and Communications in Medicine (DICOM) format image data are available to nonaffiliated external healthcare facilities or entities on a secure, media free, reciprocally searchable basis with patient authorization for at least a 12-month period after this study. _______________ FINDINGS: EXAM QUALITY: Adequate opacification of the pulmonary arteries. PULMONARY ARTERIES: Critical result: There is a filling defect in the right lower lobe medial basal segmental artery. Remaining pulmonary arteries are unremarkable. MEDIASTINUM: Normal heart size. The ascending aorta is dilated measuring up to 4 cm. No pericardial effusion. Right heart strain indices: RV/LV ratio: Normal (ratio </= 1). Interventricular septal bowing: None. Main pulmonary artery diameter: Normal. Contrast reflux into the IVC: None. LUNGS: No suspicious nodule or mass. No abnormal opacities. PLEURA: Normal. AIRWAY: Normal. LYMPH NODES: No enlarged nodes. UPPER ABDOMEN: Please see concurrently performed CT abdomen/pelvis. OTHER: No acute or aggressive osseous abnormalities identified. SUPERFICIAL SOFT TISSUES: Unremarkable. _______________ IMPRESSION: 1. Acute pulmonary embolism involving the right lower lobe medial basal segmental artery. 2. 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 3. No focal consolidative process in the chest. Critical results discussed with Dr. Alva Bianchi at 7:33 PM on 10/30/2020. Ct Abd Pelv W Cont Result Date: 12/13/2020 EXAM: CT of the abdomen and pelvis INDICATION: Abdominal pain and swelling COMPARISON: November 30, 2020 TECHNIQUE: Axial CT imaging of the abdomen and pelvis was performed with intravenous contrast. Multiplanar reformats were generated. One or more dose reduction techniques were used on this CT: automated exposure control, adjustment of the mAs and/or kVp according to patient size, and iterative reconstruction techniques. The specific techniques used on this CT exam have been documented in the patient's electronic medical record. Digital Imaging and Communications in Medicine (DICOM) format image data are available to nonaffiliated external healthcare facilities or entities on a secure, media free, reciprocally searchable basis with patient authorization for at least a 12-month period after this study. _______________ FINDINGS: LOWER CHEST: Unremarkable. LIVER, BILIARY: There is a nodular appearing liver suggesting cirrhosis. No focal hepatic lesion seen. No biliary dilation. Cholecystectomy PANCREAS: Pancreatic duct is mildly dilated to 6 mm. There is no atrophy of the pancreas. No discrete pancreatic mass identified however prior GI consultation and further workup. SPLEEN: Enlarged measuring 14.2 cm. ADRENALS: Normal. KIDNEYS: Punctate 2 to 3 mm calculi seen in both kidneys. Kidneys demonstrate no hydronephrosis. VASCULATURE: Mild to moderate calcific atherosclerosis present. There is recanalization the periumbilical vein suggesting portal hypertension. LYMPH NODES: No enlarged lymph nodes. GASTROINTESTINAL TRACT: Large volume of stool present throughout the colon. There are thickened small bowel loops along the left side of the midabdomen which may related to the ascites and cirrhosis however enteritis cannot be excluded. Is no bowel obstruction. PELVIC ORGANS: Unremarkable. BONES: No acute or aggressive osseous abnormalities identified. There is a sacral decubitus ulcer without evidence of osteomyelitis. OTHER: Large volume of ascites present. _______________ IMPRESSION: Cirrhosis, splenomegaly, large volume of ascites and portal hypertension. Thickened small bowel loops throughout the abdomen which may be related to the ascites and cirrhosis however enteritis not excluded. There is pancreatic duct dilatation however no atrophy or discrete mass identified. However I would advise further workup exclude any ampullary lesion. Advise GI consultation. Sacral decubitus ulcer without osteomyelitis. Large volume of retained stool in the colon. Ct Abd Pelv W Cont Result Date: 11/30/2020 EXAM: CT of the abdomen and pelvis CLINICAL INDICATION/HISTORY:  Weakness and dyspnea. Abdominal pain. COMPARISON: None. TECHNIQUE: Axial CT imaging of the abdomen and pelvis was performed with intravenous contrast. Multiplanar reformats were generated. One or more dose reduction techniques were used on this CT: automated exposure control, adjustment of the mAs and/or kVp according to patient size, and iterative reconstruction techniques. The specific techniques used on this CT exam have been documented in the patient's electronic medical record. Digital Imaging and Communications in Medicine (DICOM) format image data are available to nonaffiliated external healthcare facilities or entities on a secure, media free, reciprocally searchable basis with patient authorization for at least a 12-month period after this study. _______________ FINDINGS: LOWER CHEST: Please see concurrently performed CT of the chest. LIVER, BILIARY: There is cirrhotic morphology of the liver. No focal parenchymal masses visualized. There is no intra-or extrahepatic biliary ductal dilatation. Gallbladder is surgically absent. PANCREAS: Pancreatic duct is dilated measuring 5 mm. No discrete pancreatic mass or intraductal abnormality visualized. SPLEEN: Enlarged measuring 15.6 cm in AP diameter. ADRENALS: Normal. KIDNEYS: Normal. There is no nephrolithiasis. There is no hydronephrosis. LYMPH NODES: No enlarged lymph nodes. GASTROINTESTINAL TRACT: No bowel dilation or wall thickening. The appendix is visualized and unremarkable. PELVIC ORGANS: Unremarkable. VASCULATURE: Unremarkable. BONES: Post surgical changes of prior left hip open reduction and internal fixation. OTHER: There is no free intraperitoneal fluid or free air. SUPERFICIAL SOFT TISSUES: There is a soft tissue defect at the level of the lower sacral segments. No definite cortical changes in the sacrum _______________ IMPRESSION: 1. Cirrhotic morphology of liver with sequelae of portal venous hypertension including splenomegaly and moderate volume intra-abdominal ascites. 2. Nonspecific dilatation of the pancreatic duct up to 5 mm. 3. Lower sacral soft tissue ulcer without discrete CT evidence of osteomyelitis. Us Guide Paracentesis Result Date: 12/9/2020 PROCEDURE:  ULTRASOUND GUIDED PARACENTESIS INDICATION: Ascites. ________________________ TECHNIQUE/FINDINGS: I performed the procedure. The ascites in the right paracolic gutter was localized under ultrasound guidance. An image of the ascites was obtained and submitted into the patient record. The skin was marked, prepped and draped in sterile fashion and lidocaine was used for local anesthesia. Sterile probe cover and gel was used. A 5 Western Lisa Yueh sheathed needle was advanced into the fluid. A total of 3.8 L of straw colored fluid was drained using a Vacutainer system. The patient tolerated the procedure well and there were no immediate complications. Pre-procedure time out:  1659 hours. Post procedure pause:  1726 hours. GUIDANCE: Ultrasound guidance was used to position (and confirm the position of) the Yueh sheathed needle. Image(s) saved in PACS: Ultrasound ________________________ IMPRESSION: Successful ultrasound guided paracentesis of approximately 3.8 L of straw colored fluid. [address] [place] [address] Result Date: 12/2/2020 EXAM: Limited right upper quadrant abdominal ultrasound INDICATION: 70-year-old patient with cirrhosis and reported portal venous thrombosis. . COMPARISON: CT 11/30/2020. TECHNIQUE: Real-time abdomen/right upper quadrant sonography in multiple planes was performed with image documentation. Grayscale, color flow Doppler imaging, and velocity spectral waveform analysis of the portal vein was performed (duplex imaging). _______________ FINDINGS: LIVER: Diffusely coarsened hepatic parenchymal echotexture noted with lobular surface contour. . No focal mass Color flow Doppler and velocity spectral waveform analysis of the portal vein shows normal (hepatopedal) direction of flow. Tommas Baptise BILIARY SYSTEM: No intrahepatic biliary dilatation. Common bile duct is normal in caliber measuring 0.4 cm. GALLBLADDER: Surgically absent. RIGHT KIDNEY: 10.8 cm in length. No hydronephrosis or renal mass. No visible calculi. PANCREAS: Nonvisualized. IVC: Visualized portions are unremarkable in appearance. OTHER: Moderate amount of intra-abdominal/pelvic ascites. . _______________ IMPRESSION: 1. Cirrhotic hepatic morphology. No biliary ductal dilatation or suspicious appearing hepatic lesion. 2. Moderate volume abdominal/pelvic ascites. . 3. Prior cholecystectomy. Xr Chest [facility] Result Date: 11/30/2020 EXAM: One view chest x-ray CLINICAL INDICATION/HISTORY: Weakness and dyspnea. COMPARISON: 10/22/2020. TECHNIQUE: Single AP view of the chest was obtained. _______________ FINDINGS: HEART, VESSELS, MEDIASTINUM: Heart size is normal. No vascular congestion. LUNGS, PLEURAL SPACES: The lungs are clear. No effusion or pneumothorax. BONY THORAX, SOFT TISSUES: Unremarkable. _______________ IMPRESSION: No acute cardiopulmonary process.  
 
 
Pauline Kayser, MD

## 2020-12-17 NOTE — PROGRESS NOTES
Problem: Falls - Risk of 
Goal: *Absence of Falls Description: Document Ximena Fabianann Fall Risk and appropriate interventions in the flowsheet. Outcome: Progressing Towards Goal 
Note: Fall Risk Interventions: 
Mobility Interventions: Communicate number of staff needed for ambulation/transfer, Patient to call before getting OOB, Utilize walker, cane, or other assistive device Mentation Interventions: Adequate sleep, hydration, pain control Medication Interventions: Assess postural VS orthostatic hypotension, Patient to call before getting OOB, Teach patient to arise slowly Elimination Interventions: Call light in reach, Patient to call for help with toileting needs, Urinal in reach

## 2020-12-17 NOTE — PROGRESS NOTES
0732 Bedside and Verbal shift change report given to LAVON Ga RN (oncoming nurse) by Kobi Jewell. [place], RN (offgoing nurse). Report included the following information SBAR, Kardex, Intake/Output, MAR, and Recent Results. Pt in bed, call light in reach. 0857 Pt assessed. No signs of acute distress. Pt in bed. 
 
0915 Pt in pain, morphine given. 1116 Pt assessed. No signs of acute distress. Pt in bed. 
 
1309 Pt in pain, morphine given. 1715 Pt assessed. No signs of acute distress. Pt in bed. Morphine given for pain. 1930 Bedside and Verbal shift change report given to Jean-Claude Maier RN (oncoming nurse) by Rupert Levine. Shukri Ga RN (offgoing nurse). Report included the following information SBAR, Kardex, Intake/Output, and MAR. Pt in bed, call light in reach.

## 2020-12-17 NOTE — PALLIATIVE CARE
Patient known to palliative care. Was on life support in October and medically extubated. After extubation, patient changed his code status back to full code prior to discharge as he was in process of work up for liver transplant. Attempted to see patient for consult. He was working with physical therapy in the hallway. Plan to return later this afternoon.

## 2020-12-17 NOTE — PROGRESS NOTES
Occupational Therapy Evaluation/Treatment Attempt Chart reviewed. Attempted Occupational Therapy Evaluation/Treatment, however, patient unable to be seen due to: 
[]  Nausea/vomiting 
[]  Eating 
[]  Pain 
[]  Patient too lethargic 
[]  Off Unit for testing/procedure 
[]  Dialysis treatment in progress  
[]  Telemetry Results [x]  Other: Pt reports just walked w/ PT and was wanting his IV turned off stating it was complete. Notified pt that there was still fluids running and when it beeped to depress call bell for Nurse to assess. Will follow up later as patient's schedule allows.   
Thank you for this referral. 
Germaine Ray, OTR/L, CSRS, CDCS, CFPS

## 2020-12-17 NOTE — PROGRESS NOTES
DC Plan: home when stable, follow up with [facility] and [facility] Noted patient ambulating in hallway w/ PT; met with patient and patient not ready for discharge; care manager will continue to follow and will reorder [facility] services at discharge. Care Management Interventions PCP Verified by CM: Yes Mode of Transport at Discharge: Self Transition of Care Consult (CM Consult): Discharge Planning, Home Health [address]: Yes Current Support Network: Lives with Spouse, Own Home Confirm Follow Up Transport: Family The Plan for Transition of Care is Related to the Following Treatment Goals : home when medically stable The Patient and/or Patient Representative was Provided with a Choice of Provider and Agrees with the Discharge Plan?: Yes Name of the Patient Representative Who was Provided with a Choice of Provider and Agrees with the Discharge Plan: Shameka Forbes patient [place] of Choice List was Provided with Basic Dialogue that Supports the Patient's Individualized Plan of Care/Goals, Treatment Preferences and Shares the Quality Data Associated with the Providers?: Yes Discharge Location Discharge Placement: Home with home health

## 2020-12-17 NOTE — CONSULTS
LOGO HERE 
 
[facility] Phone: [phone] Paging : (  [address]) [phone] 
[facility] ([phone] Hematology / Oncology Consult Note Impression: Active Problems: 
  Cirrhosis ([address] (10/9/2020) Severe protein-calorie malnutrition ([address] (10/26/2020) Stage 4 decubitus ulcer ([address] (12/3/2020) Ascites (12/3/2020) Coagulopathy ([address] (12/13/2020) Colitis (12/14/2020) Positive blood culture (12/14/2020) Bacteremia (12/16/2020) Coagulopathy. Likely a mixture of vitamin K deficiency from antibiotics, liver failure, anticoagulation, DIC. I will screen for antiphospholipid disorders given his recent PE as this can cause prolongation of clotting factors but this is unlikely. Fibrinogen still Thrombocytopenia. Chronic component Due to splenomegaly and cirrhosis. Acute component dt/ bacteremia. DVT/PE. This appears to have been provoked by recent infection. High risk for recurrent PE. IVC filter may help prevent recurrent PE but will not help treat recent PE. U/S does not identify any residual thrombus in the leg, however. Sacral Decubitus Enterococcus Bacteremia Ascites Plan:  
 
Patient is high risk for bleeding and thrombus given the mixed hemostatic defects of cirrhosis, etc. Significant risk of recurrent DVT/PE. However, low fibrinogen and very low platelets challenge our use of full dose anticoagulation. I would favor prophylactic dose enoxaparin if no procedure is planned, 40 mg daily. Depending upon how his coagluation and platelet parameters play out this weekened, may consider full dose enoxaparin at time of discharge. I would favor enoxaparin over NOAC. Would need to hold anticoagulation for platelets < 50. Given no residual thrombus on U/S, I would not favor IVC filter. Reason for Consultation: Suly Posada is a 54 y.o. male who I've been asked to consult for  Coagulopathy and history of DVT and PE 
 
HPI:   
 
He was initially admitted on November 30, 2020 and discharged on December 5, 2020. At that point, he had a pulmonary embolism and was discovered to have cirrhosis due to alcohol abuse as well as an infected stage IV sacral ulcer. He also had an aspiration pneumonia and was in the ICU on a ventilator for a long period of time. Lower extremity showed nonocclusive thrombus in the right common femoral and left profunda femoral vein. He was discharged on Xarelto. He was noted to have thrombocytopenia in the 80-90,000 range due to cirrhosis, presumably. He was admitted to the hospital with worsening abdominal pain and ascites. Blood work at the time of admission this time showed platelet count of 00,138, hemoglobin of 10, white blood cell count of 6. Patient parameters were very abnormal with an INR of 8, APTT of 68. Total bilirubin of 1.5, albumin of 2.6.  12/13/2020 shows cirrhosis, splenomegaly, thickened small bowel loops, pancreatic duct dilation. He also had a sacral decubitus ulcer without osteomyelitis. He has MRI of abdomen on 12/14/2020 showed cirrhosis, chronic pancreatitis, fibrotic changes at the ampulla with mild extrinsic narrowing of the distal common bile duct. No mass identified. Past Medical History:  
Diagnosis Date  Cirrhosis ([address]  [address]  Gastroparesis  Pancreatitis  Pulmonary embolism ([facility] [facility] Past Surgical History:  
Procedure Laterality Date  HX CHOLECYSTECTOMY  HX HIP FRACTURE TX    
 left hip sx 9.23/2018  
 
@[email or web address]@ History reviewed. No pertinent family history. No Known Allergies Home Medications:  
[unfilled] Hospital Medications:  
 
Current Facility-Administered Medications Medication Dose Route Frequency Provider Last Rate Last Admin  lactulose (CHRONULAC) 10 gram/15 mL solution 45 mL  30 g Oral TID Tiffanie Browne MD   45 mL at 12/16/20 2234  furosemide (LASIX) tablet 80 mg  80 mg Oral DAILY Mercedes Kaminski MD      
 spironolactone (ALDACTONE) tablet 200 mg  200 mg Oral DAILY Mercedes Kaminski MD      
 docusate sodium (COLACE) capsule 100 mg  100 mg Oral DAILY Tiffanie Browne MD   100 mg at 12/16/20 4902  
 nicotine (NICODERM CQ) 21 mg/24 hr patch 1 Patch  1 Patch TransDERmal DAILY Ingrid Tomas MD   1 Patch at 12/16/20 1795  lidocaine 4 % patch 2 Patch  2 Patch TransDERmal Q24H Ingrid Tomas MD   2 Patch at 12/16/20 2235  
 oxyCODONE-acetaminophen (PERCOCET) 5-325 mg per tablet 1-2 Tab  1-2 Tab Oral Q6H PRN Ingrid Tomas MD   2 Tab at 12/16/20 1242  morphine injection 2 mg  2 mg IntraVENous Q4H PRN Ingrid Tomas MD   2 mg at 12/17/20 4773  piperacillin-tazobactam (ZOSYN) 3.375 g in 0.9% sodium chloride (MBP/ADV) 100 mL MBP  3.375 g IntraVENous Q6H Ingrid Tomas  mL/hr at 12/17/20 0506 3.375 g at 12/17/20 6470  insulin lispro (HUMALOG) injection   SubCUTAneous AC&HS Ingrid Tomas MD   2 Units at 12/17/20 4723  
 glucose chewable tablet 16 g  4 Tab Oral PRN Ingrid Tomas MD      
 glucagon (GLUCAGEN) injection 1 mg  1 mg IntraMUSCular PRN Ingrid Tomas MD      
 dextrose 10% infusion 125-250 mL  125-250 mL IntraVENous PRN Ingrid Tomas MD      
 DULoxetine (CYMBALTA) capsule 60 mg  60 mg Oral DAILY Ingrid Tomas MD   60 mg at 12/16/20 0923  
 insulin glargine (LANTUS) injection 10 Units  10 Units SubCUTAneous QHS Ingrid Tomas MD   10 Units at 12/16/20 2228  insulin lispro (HUMALOG) injection 6 Units  6 Units SubCUTAneous TIDAC Ingrid Tomas MD   Stopped at 12/16/20 1130  pantoprazole (PROTONIX) tablet 40 mg  40 mg Oral ACB Ingrid Tomas MD   40 mg at 12/17/20 1121  thiamine mononitrate (B-1) tablet 100 mg  100 mg Oral DAILY Loni Tomas MD   100 mg at 12/16/20 8418  traZODone (DESYREL) tablet 50 mg  50 mg Oral QHS Loni Tomas MD   50 mg at 12/16/20 2231  potassium chloride (K-DUR, KLOR-CON) SR tablet 20 mEq  20 mEq Oral DAILY Loni Tomas MD   20 mEq at 12/16/20 9559  ondansetron (ZOFRAN) injection 4 mg  4 mg IntraVENous Q6H PRN Loni Tomas MD   4 mg at 12/17/20 3654 Review of Systems:  
Constitutional: 
 Fever: Negative, Chills: Negative, Weight Loss: Negative, Malaise/Fatigue: Negative Diaphoresis: Negative,  Weakness: Negative Skin: 
 Rash: Negative,  Itching: Negative HENT: 
 Headache:Negative, Hearing Loss: Negative, Tinnitus: Negative, Ear Pain: Negative Nosebleeds: Negative, Congestion: Negative, Stridor: Negative, Sore Throat: Negative Eyes:  
 Blurred Vision: Negative, Double Vision: Negative, Photophobia: Negative Eye Pain: Negative, Eye Discharge: Negative, Eye Redness: Negative Cardiovascular:  
 Chest Pain: Negative, Palpitations: Negative, Orthopnea: Negative Claudication: Negative, Leg Swelling: Negative PND: Negative Respiratory: 
 Cough: Negative, Hemoptysis: Negative, Sputum Production: Negative Shortness of Breath: Negative, Wheezing: Negative Gastrointestinal: 
 Heartburn: Negative, Nausea: Negative, Vomiting: Negative Abdominal Pain: Negative, Diarrhea: Negative, Constipation: Negative Blood in Stool: Negative, Melena: Negative Genitourinary:  
 Dysuria: Negative, Urgency: Negative, Frequency: Negative Hematuria: Negative, Flank Pain: Negative Musculoskeletal: Myalgias: Negative, Neck Pain: Negative, Back Pain: Negative Joint Pain: Negative, Falls: Negative Endo/Heme/Allergies:  
 Easy Bruise/Blood: Negative, Env. Allergies: Negative, Polydipsia: Negative Neurological:  
 Dizziness: Negative, Tingling: Negative.  Tremor: Negative,  
 Sensory Change: Negative. Speech Change: Negative, Focal Weakness: Negative     Seizures: Negative, LOC: Negative Psychiatric: 
 Depression: Negative, Suicidal Ideas: Negative, Substance Abuse: Negative Hallucinations: Negative, Nervous/Anxious: Negative Insomnia: Negative, Memory Loss: Negative Visit Vitals /77 (BP 1 Location: Left arm, BP Patient Position: At rest) Pulse (!) 104 Temp 98 °F (36.7 °C) Resp 16 Ht 5' 9\" (1.753 m) Wt 71.9 kg (158 lb 8 oz) SpO2 100% BMI 23.41 kg/m² Temp (24hrs), Av.1 °F (36.7 °C), Min:97.6 °F (36.4 °C), Max:98.7 °F (37.1 °C) General: no acute distress and laying in bed HEENT: no icterus, no oral lesions Lym: no cervical or supraclavicular adenopathy CV: rate is regular and normal and their is no murmur Lung: clear to auscultation, no increased work of breathing Abd: nontender, no organomegaly Neuro: cnoversant and moving extremities MSK: no sarcopenia, normal tone Derm: no rash Psych: normal affect Per: warm, no edema Labs: 
Recent Labs 20 
7023 20 
0420 12/15/20 
0730 12/15/20 
0140 WBC 7.6 3.1*  --  2.9*  
RBC 3.42* 2.68*  --  2.55* HCT 31.2* 24.4* 24.9* 23.4* MCV 91.2 91.0  --  91.8 MCH 31.0 31.0  --  31.8 MCHC 34.0 34.0  --  34.6  
RDW 14.9* 14.7*  --  14.4 Recent Labs 20 
7271 20 
0420 12/15/20 
0140 CO2 24 22 22 BUN 2* 3* 5* IMPRESSION IMPRESSION: 
  
Cirrhosis, splenomegaly, large volume of ascites and portal hypertension. 
  
Thickened small bowel loops throughout the abdomen which may be related to the 
ascites and cirrhosis however enteritis not excluded. 
  
There is pancreatic duct dilatation however no atrophy or discrete mass 
identified. However I would advise further workup exclude any ampullary lesion. Advise GI consultation. 
  
Sacral decubitus ulcer without osteomyelitis. 
  
Large volume of retained stool in the colon. 
 
  
IMPRESSION IMPRESSION: 
   
1. Acute pulmonary embolism involving the right lower lobe medial basal 
segmental artery. 2. Ascending thoracic aorta aneurysm measuring 4 cm without evidence of 
dissection. 3. No focal consolidative process in the chest. 
 
No results for input(s): ALBUMIN in the last 72 hours. No lab exists for component: TOTPR, ALKPHOS  
 
PVL - no thrombus 
[email or web address]@ [place] MD HELDER Guadarrama 
[facility]

## 2020-12-17 NOTE — PROGRESS NOTES
Problem: Mobility Impaired (Adult and Pediatric) Goal: *Acute Goals and Plan of Care (Insert Text) Description: Physical Therapy short term goals initiated 12/17/2020, to be achieved in 4-7 days. Pt will: 1. Perform bed mobility with indep in prep for OOB activity. 2. Perform sit <> stand transfers with LRAD and S in prep for functional mobility and ambulation. 3. Ambulate 200 ft with LRAD and S in prep for household and community mobility. 4. Ascend/descend 3 stairs with 1 HR, LRAD, and S for safe home entry. Note: PHYSICAL THERAPY EVALUATION Patient: Maryellen Andrade (54 y.o. male) Date: 12/17/2020 Primary Diagnosis: Coagulopathy ([facility] [D68.9] Ascites [R18.8] Cirrhosis ([facility] [address] [U79.79] Precautions:  Fall PLOF: Pt used QC for mobility PTA, pt was receiving HHPT and HHOT PTA. ASSESSMENT : 
Based on the objective data described below, the patient presents with decr B UE and LE strength, decr balance, and decr activity tolerance resulting in deficits in bed mobility, transfers, and gait. Pt supine in bed on PT entry. Pt performed supine > sit with CGA. Pt required CGA for sit <> stand transfers with RW, pt reporting he feels he is too weak to use QC. Pt ambulated 180ft in reyes with RW and CGA demonstrating decr speed and shuffling steps. Pt returned to supine in bed, all needs met and all questions answered. Pt educated on importance of mobility and PT plan of care. Pt would benefit from additional skilled therapy in order to incr functional mobility and activity tolerance. Recommend HHPT after discharge [place] Patient will benefit from skilled intervention to address the above impairments. Patient's rehabilitation potential is considered to be Good Factors which may influence rehabilitation potential include:  
[]         None noted 
[]         Mental ability/status [x]         Medical condition 
[]         Home/family situation and support systems 
[]         Safety awareness [x]         Pain tolerance/management 
[]         Other: PLAN : 
Recommendations and Planned Interventions:  
[x]           Bed Mobility Training             []    Neuromuscular Re-Education 
[x]           Transfer Training                   []    Orthotic/Prosthetic Training 
[x]           Gait Training                          [x]    Modalities [x]           Therapeutic Exercises           []    Edema Management/Control 
[x]           Therapeutic Activities            [x]    Family Training/Education 
[x]           Patient Education 
[]           Other (comment): Frequency/Duration: Patient will be followed by physical therapy 1-2 times per day/4-7 days per week to address goals. Discharge Recommendations: Home Health Further Equipment Recommendations for Discharge: N/A  
 
SUBJECTIVE:  
Patient stated I feel weak, I don't think I can use the quad cane.  OBJECTIVE DATA SUMMARY:  
 
Past Medical History:  
Diagnosis Date Cirrhosis ([facility] [address] Diabetes ([facility] [address] Gastroparesis Pancreatitis Pulmonary embolism ([facility] [address] Past Surgical History:  
Procedure Laterality Date HX CHOLECYSTECTOMY HX HIP FRACTURE TX    
 left hip sx 9.23/2018 Barriers to Learning/Limitations: None Compensate with: Visual Cues, Verbal Cues, and Tactile Cues Home Situation: 
Home Situation Home Environment: Private residence # Steps to Enter: 3 Rails to Enter: Yes Hand Rails : Bilateral((wide)) One/Two Story Residence: Two story, live on 1st floor Living Alone: No 
Support Systems: Spouse/Significant Other/Partner Patient Expects to be Discharged to[de-identified] Private residence Current DME Used/Available at Home: Cane, quad, Grab bars, Walker, rolling, Shower chair, Raised toilet seat Critical Behavior: 
Neurologic State: Alert Orientation Level: Oriented X4 Cognition: Follows commands; Appropriate for age attention/concentration Safety/Judgement: Awareness of environment; Insight into deficits Psychosocial 
Patient Behaviors: Calm; Cooperative Strength:   
Strength: Generally decreased, functional 
Tone & Sensation:  
Tone: Normal 
Range Of Motion: 
AROM: Generally decreased, functional 
PROM: Generally decreased, functional 
Functional Mobility: 
Bed Mobility: 
Supine to Sit: Contact guard assistance Sit to Supine: Stand-by assistance Scooting: Stand-by assistance Transfers: 
Sit to Stand: Contact guard assistance Stand to Sit: Contact guard assistance Balance:  
Sitting: Intact Standing: Intact; With support Ambulation/Gait Training: 
Distance (ft): 180 Feet (ft) Assistive Device: Gait belt;Walker, rolling Ambulation - Level of Assistance: Contact guard assistance; Additional time Gait Abnormalities: Step to gait; Decreased step clearance Speed/[place]: Slow;Shuffled Step Length: Right shortened;Left shortened Interventions: Safety awareness training;Verbal cues Pain: 
Pain level pre-treatment: 0/10 Pain level post-treatment: 0/10 Pain Intervention(s) : Medication (see MAR); Rest, Ice, Repositioning Response to intervention: Nurse notified, See doc flow Activity Tolerance:  
Pt tolerated amb in reyes with RW and CGA with no LOB or pain. Please refer to the flowsheet for vital signs taken during this treatment. After treatment:  
[]         Patient left in no apparent distress sitting up in chair 
[x]         Patient left in no apparent distress in bed 
[x]         Call bell left within reach [x]         Nursing notified 
[x]         Caregiver present 
[]         Bed alarm activated 
[]         SCDs applied COMMUNICATION/EDUCATION:  
[x]         Role of Physical Therapy in the acute care setting. [x]         Fall prevention education was provided and the patient/caregiver indicated understanding. [x]         Patient/family have participated as able in goal setting and plan of care. [x]         Patient/family agree to work toward stated goals and plan of care. []         Patient understands intent and goals of therapy, but is neutral about his/her participation. []         Patient is unable to participate in goal setting/plan of care: ongoing with therapy staff. 
[]         Other: Thank you for this referral. 
Alyssa Fabian Time Calculation: 29 mins Eval Complexity: History: MEDIUM  Complexity : 1-2 comorbidities / personal factors will impact the outcome/ POC Exam:MEDIUM Complexity : 3 Standardized tests and measures addressing body structure, function, activity limitation and / or participation in recreation  Presentation: MEDIUM Complexity : Evolving with changing characteristics  Clinical Decision Making:Medium Complexity    Overall Complexity:MEDIUM

## 2020-12-17 NOTE — PROGRESS NOTES
Hospitalist Progress Note-critical care note Patient: Roxanna Marsh MRN: [id number]  [facility]: [id number] YOB: 1965  Age: 54 y.o. Sex: male DOA: 12/13/2020 LOS:  LOS: 4 days Chief complaint: colitis, bacteremia, decubitus ulcer , ascites ,cirrhosis Assessment/Plan Hospital Problems  Date Reviewed: 10/9/2020 Codes Class Noted POA Bacteremia ICD-10-CM: R78.81 ICD-9-CM: 790.7  12/16/2020 Unknown Colitis ICD-10-CM: K52.9 ICD-9-CM: 558.9  12/14/2020 Unknown Positive blood culture ICD-10-CM: R78.81 ICD-9-CM: 790.7  12/14/2020 Unknown Coagulopathy ([facility].) ICD-10-CM: M21.0 ICD-9-CM: 286.9  12/13/2020 Unknown Stage 4 decubitus ulcer ([facility].) ICD-10-CM: L89.94 
ICD-9-CM: 707.00, 707.24  12/3/2020 Yes Ascites ICD-10-CM: R18.8 ICD-9-CM: 789.59  12/3/2020 Unknown Severe protein-calorie malnutrition ([facility].) ICD-10-CM: Q98 ICD-9-CM: 269  10/26/2020 Yes Cirrhosis ([facility].) ICD-10-CM: K74.60 ICD-9-CM: 571.5  10/9/2020 Unknown Bacteremia Enterococcus faecalis Continue zosyn 3/14 days Repeated cx no growth Cirrhosis ([facility] (10/9/2020) with ascites Continue lactulose once daily IV albumin Ammonia daily Lasix and spirnolactone   
Will hold paracentesis now-like respond to  diuretics Dr. Luis Fernando Maria working for liver transplant -The CTP is 9.  Child class B.  The MELD score is 33. Coagulopathy ([facility] (12/13/2020) 
inr daily Continue hold xarelto     
  
PE /DVT on 11/30/2020 Appreciated dr. Valdemar Herbert on board Continue hold ac for now  
  
Pancreatic Duct enlargement MRI of Abdomen :Evidence of hepatic cirrhosis with no mass lesion, but chronic pancreatitis and fibrotic changes at the ampulla with mild extrinsic narrowing of the distal CBD. No mass lesion identified 
 
  
DM 
ssi and hypoglycemia protocol  
 
  
Large Sacral wound Zosyn Continue local wound care  
  
Colitis on CT 
 IV zosyn,no diarrhea Hypokalemia K replacement, will give mg also Subjective : I am afraid of going to bathroom , hand tremor, please call my wife. Talked with wife per phone. She is updated. 35 total min's spent on patient care including >50% on counseling/coordinating care. Discussed the above assessments. also discussed labs, medications and hospital course Disposition :tbd, Review of systems: 
 
General: No fevers or chills. Tired Cardiovascular: No chest pain or pressure. No palpitations. Pulmonary:  No shortness of breath  . Gastrointestinal: No nausea, vomiting. no abdomen pain Vital signs/Intake and Output: 
Visit Vitals /78 Pulse 100 Temp 97.9 °F (36.6 °C) Resp 16 Ht 5' 9\" (1.753 m) Wt 71.9 kg (158 lb 8 oz) SpO2 100% BMI 23.41 kg/m² Current Shift:  No intake/output data recorded. Last three shifts:  12/15 1901 - 12/17 0700 In: -  
Out: 2150 [Urine:2150] Physical Exam: 
General: WD, WN. Alert, cooperative, no acute distress HEENT: NC, Atraumatic. PERRLA, anicteric sclerae. Lungs: CTA Bilaterally. No Wheezing/Rhonchi/Rales. Heart:  Regular  rhythm,  No murmur, No Rubs, No Gallops Abdomen: Soft, less distended, Non tender. +Bowel sounds, Extremities: No c/c/e Psych:   + anxious or agitated, tearful Neurologic:  No acute neurological deficit. Labs: Results:  
   
Chemistry Recent Labs 12/17/20 
0278 12/16/20 
0420 12/15/20 
0140 * 123* 134*  141 140  
K 3.2* 3.2* 4.0  
 110 109 CO2 24 22 22 BUN 2* 3* 5*  
CREA 1.11 0.67 0.80 CA 8.9 8.4* 8.1* AGAP 13 9 9 BUCR 2* 4* 6* AP 40* 48 49  
TP 6.8 5.8* 5.9* ALB 3.7 3.4 3.3*  
GLOB 3.1 2.4 2.6 AGRAT 1.2 1.4 1.3  
  
CBC w/Diff Recent Labs 12/17/20 
7670 12/16/20 
0420 12/15/20 
0730 12/15/20 
0140 WBC 7.6 3.1*  --  2.9*  
RBC 3.42* 2.68*  --  2.55* HGB 10.6* 8.3* 8.6* 8.1* HCT 31.2* 24.4* 24.9* 23.4* PLT 63* 43*  --  44* GRANS 72 56  --  60  
LYMPH 20* 31  --  24 EOS 1 2  --  3 Cardiac Enzymes No results for input(s): CPK, CKND1, LYDIA in the last 72 hours. No lab exists for component: Benoit Stovall Coagulation Recent Labs 12/17/20 
0412 12/16/20 
0930 PTP 42.8* 24.5* INR 4.7* 2.4* Lipid Panel No results found for: CHOL, CHOLPOCT, CHOLX, CHLST, CHOLV, 328802, HDL, HDLP, LDL, LDLC, DLDLP, 218744, VLDLC, VLDL, TGLX, TRIGL, TRIGP, TGLPOCT, CHHD, CHHDX  
BNP No results for input(s): BNPP in the last 72 hours. Liver Enzymes Recent Labs 12/17/20 
0070 TP 6.8 ALB 3.7 AP 40* Thyroid Studies No results found for: T4, T3U, TSH, TSHEXT, TSHEXT Procedures/imaging: see electronic medical records for all procedures/Xrays and details which were not copied into this note but were reviewed prior to creation of Plan Cta Chest W Or W Wo Cont Result Date: 11/30/2020 EXAM: CTA chest CLINICAL INDICATION/HISTORY:  Generalized fatigue. Dyspnea. COMPARISON: None TECHNIQUE: Axial CT imaging from the thoracic inlet through the diaphragm with intravenous contrast. Coronal and sagittal MIP reformats were generated. One or more dose reduction techniques were used on this CT: automated exposure control, adjustment of the mAs and/or kVp according to patient size, and iterative reconstruction techniques. The specific techniques used on this CT exam have been documented in the patient's electronic medical record. Digital Imaging and Communications in Medicine (DICOM) format image data are available to nonaffiliated external healthcare facilities or entities on a secure, media free, reciprocally searchable basis with patient authorization for at least a 12-month period after this study. _______________ FINDINGS: EXAM QUALITY: Adequate opacification of the pulmonary arteries. PULMONARY ARTERIES: Critical result: There is a filling defect in the right lower lobe medial basal segmental artery. Remaining pulmonary arteries are unremarkable. MEDIASTINUM: Normal heart size. The ascending aorta is dilated measuring up to 4 cm. No pericardial effusion. Right heart strain indices: RV/LV ratio: Normal (ratio </= 1). Interventricular septal bowing: None. Main pulmonary artery diameter: Normal. Contrast reflux into the IVC: None. LUNGS: No suspicious nodule or mass. No abnormal opacities. PLEURA: Normal. AIRWAY: Normal. LYMPH NODES: No enlarged nodes. UPPER ABDOMEN: Please see concurrently performed CT abdomen/pelvis. OTHER: No acute or aggressive osseous abnormalities identified. SUPERFICIAL SOFT TISSUES: Unremarkable. _______________ IMPRESSION: 1. Acute pulmonary embolism involving the right lower lobe medial basal segmental artery. 2. 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 3. No focal consolidative process in the chest. Critical results discussed with Dr. Luis Felipe Keita at 7:33 PM on 10/30/2020. Ct Abd Pelv W Cont Result Date: 12/13/2020 EXAM: CT of the abdomen and pelvis INDICATION: Abdominal pain and swelling COMPARISON: November 30, 2020 TECHNIQUE: Axial CT imaging of the abdomen and pelvis was performed with intravenous contrast. Multiplanar reformats were generated. One or more dose reduction techniques were used on this CT: automated exposure control, adjustment of the mAs and/or kVp according to patient size, and iterative reconstruction techniques. The specific techniques used on this CT exam have been documented in the patient's electronic medical record. Digital Imaging and Communications in Medicine (DICOM) format image data are available to nonaffiliated external healthcare facilities or entities on a secure, media free, reciprocally searchable basis with patient authorization for at least a 12-month period after this study. _______________ FINDINGS: LOWER CHEST: Unremarkable. LIVER, BILIARY: There is a nodular appearing liver suggesting cirrhosis. No focal hepatic lesion seen. No biliary dilation. Cholecystectomy PANCREAS: Pancreatic duct is mildly dilated to 6 mm. There is no atrophy of the pancreas. No discrete pancreatic mass identified however prior GI consultation and further workup. SPLEEN: Enlarged measuring 14.2 cm. ADRENALS: Normal. KIDNEYS: Punctate 2 to 3 mm calculi seen in both kidneys. Kidneys demonstrate no hydronephrosis. VASCULATURE: Mild to moderate calcific atherosclerosis present. There is recanalization the periumbilical vein suggesting portal hypertension. LYMPH NODES: No enlarged lymph nodes. GASTROINTESTINAL TRACT: Large volume of stool present throughout the colon. There are thickened small bowel loops along the left side of the midabdomen which may related to the ascites and cirrhosis however enteritis cannot be excluded. Is no bowel obstruction. PELVIC ORGANS: Unremarkable. BONES: No acute or aggressive osseous abnormalities identified. There is a sacral decubitus ulcer without evidence of osteomyelitis. OTHER: Large volume of ascites present. _______________ IMPRESSION: Cirrhosis, splenomegaly, large volume of ascites and portal hypertension. Thickened small bowel loops throughout the abdomen which may be related to the ascites and cirrhosis however enteritis not excluded. There is pancreatic duct dilatation however no atrophy or discrete mass identified. However I would advise further workup exclude any ampullary lesion. Advise GI consultation. Sacral decubitus ulcer without osteomyelitis. Large volume of retained stool in the colon. Ct Abd Pelv W Cont Result Date: 11/30/2020 EXAM: CT of the abdomen and pelvis CLINICAL INDICATION/HISTORY:  Weakness and dyspnea. Abdominal pain. COMPARISON: None. TECHNIQUE: Axial CT imaging of the abdomen and pelvis was performed with intravenous contrast. Multiplanar reformats were generated. One or more dose reduction techniques were used on this CT: automated exposure control, adjustment of the mAs and/or kVp according to patient size, and iterative reconstruction techniques. The specific techniques used on this CT exam have been documented in the patient's electronic medical record. Digital Imaging and Communications in Medicine (DICOM) format image data are available to nonaffiliated external healthcare facilities or entities on a secure, media free, reciprocally searchable basis with patient authorization for at least a 12-month period after this study. _______________ FINDINGS: LOWER CHEST: Please see concurrently performed CT of the chest. LIVER, BILIARY: There is cirrhotic morphology of the liver. No focal parenchymal masses visualized. There is no intra-or extrahepatic biliary ductal dilatation. Gallbladder is surgically absent. PANCREAS: Pancreatic duct is dilated measuring 5 mm. No discrete pancreatic mass or intraductal abnormality visualized. SPLEEN: Enlarged measuring 15.6 cm in AP diameter. ADRENALS: Normal. KIDNEYS: Normal. There is no nephrolithiasis. There is no hydronephrosis. LYMPH NODES: No enlarged lymph nodes. GASTROINTESTINAL TRACT: No bowel dilation or wall thickening. The appendix is visualized and unremarkable. PELVIC ORGANS: Unremarkable. VASCULATURE: Unremarkable. BONES: Post surgical changes of prior left hip open reduction and internal fixation. OTHER: There is no free intraperitoneal fluid or free air. SUPERFICIAL SOFT TISSUES: There is a soft tissue defect at the level of the lower sacral segments. No definite cortical changes in the sacrum _______________ IMPRESSION: 1. Cirrhotic morphology of liver with sequelae of portal venous hypertension including splenomegaly and moderate volume intra-abdominal ascites. 2. Nonspecific dilatation of the pancreatic duct up to 5 mm. 3. Lower sacral soft tissue ulcer without discrete CT evidence of osteomyelitis. Us Guide Paracentesis Result Date: 12/9/2020 PROCEDURE:  ULTRASOUND GUIDED PARACENTESIS INDICATION: Ascites. ________________________ TECHNIQUE/FINDINGS: I performed the procedure. The ascites in the right paracolic gutter was localized under ultrasound guidance. An image of the ascites was obtained and submitted into the patient record. The skin was marked, prepped and draped in sterile fashion and lidocaine was used for local anesthesia. Sterile probe cover and gel was used. A 5 Western Lisa Yueh sheathed needle was advanced into the fluid. A total of 3.8 L of straw colored fluid was drained using a Vacutainer system. The patient tolerated the procedure well and there were no immediate complications. Pre-procedure time out:  1659 hours. Post procedure pause:  1726 hours. GUIDANCE: Ultrasound guidance was used to position (and confirm the position of) the Yueh sheathed needle. Image(s) saved in PACS: Ultrasound ________________________ IMPRESSION: Successful ultrasound guided paracentesis of approximately 3.8 L of straw colored fluid. [address] [place] [address] Result Date: 12/2/2020 EXAM: Limited right upper quadrant abdominal ultrasound INDICATION: 42-year-old patient with cirrhosis and reported portal venous thrombosis. . COMPARISON: CT 11/30/2020. TECHNIQUE: Real-time abdomen/right upper quadrant sonography in multiple planes was performed with image documentation. Grayscale, color flow Doppler imaging, and velocity spectral waveform analysis of the portal vein was performed (duplex imaging). _______________ FINDINGS: LIVER: Diffusely coarsened hepatic parenchymal echotexture noted with lobular surface contour. . No focal mass Color flow Doppler and velocity spectral waveform analysis of the portal vein shows normal (hepatopedal) direction of flow. Yoel Kins BILIARY SYSTEM: No intrahepatic biliary dilatation. Common bile duct is normal in caliber measuring 0.4 cm. GALLBLADDER: Surgically absent. RIGHT KIDNEY: 10.8 cm in length. No hydronephrosis or renal mass. No visible calculi. PANCREAS: Nonvisualized. IVC: Visualized portions are unremarkable in appearance. OTHER: Moderate amount of intra-abdominal/pelvic ascites. . _______________ IMPRESSION: 1. Cirrhotic hepatic morphology. No biliary ductal dilatation or suspicious appearing hepatic lesion. 2. Moderate volume abdominal/pelvic ascites. . 3. Prior cholecystectomy. Xr Chest [facility] Result Date: 11/30/2020 EXAM: One view chest x-ray CLINICAL INDICATION/HISTORY: Weakness and dyspnea. COMPARISON: 10/22/2020. TECHNIQUE: Single AP view of the chest was obtained. _______________ FINDINGS: HEART, VESSELS, MEDIASTINUM: Heart size is normal. No vascular congestion. LUNGS, PLEURAL SPACES: The lungs are clear. No effusion or pneumothorax. BONY THORAX, SOFT TISSUES: Unremarkable. _______________ IMPRESSION: No acute cardiopulmonary process.  
 
 
Cristin Fierro MD

## 2020-12-17 NOTE — PROGRESS NOTES
Chart reviewed. PLT count improving INR improving but labile Coagulopathy dt/ Vitamin K deficiency, sepsis, DIC and liver dysfunction and maybe anticoagulant (usually does not prolong unless their are comorbid factors) He is high risk for both recurrent/progressive DVT/PE as well as bleeding. I would prefer to avoid an IVC filter if at all possible. If INR improving tomorrow and platelets still above 50, I would start enoxaparin prophylaxis and then consider therapeutic enoxaparin at time of discharge depending upon how he is doing. I will repeat duplex to evaluate for any residual clot and check d-dimer and fibrinogen again tomorrow as well. IVC filter would be the alternate plan. [facility] [address]

## 2020-12-17 NOTE — PROGRESS NOTES
Comprehensive Nutrition Assessment Type and Reason for Visit: (P) Reassess, Wound Nutrition Recommendations/Plan: Other: continue w/ POC Nutrition Assessment:  (P) pt admitted w/ ascities, cirrhosis, coagulopathy, anemia, thrombocytopenia, sacral wound POA, colitis, pnacreatic duct enlargement, PE/DVT Malnutrition Assessment: 
Malnutrition Status:  Severe malnutrition Context:  Chronic illness Findings of the 6 clinical characteristics of malnutrition:  
Energy Intake:  Unable to assess Weight Loss:  7 - Greater than 5% over 1 month Body Fat Loss:  7 - Severe body fat loss, Orbital, Buccal region Muscle Mass Loss:  7 - Severe muscle mass loss, Temples (temporalis), Scapula (trapezius) Fluid Accumulation:  7 - Severe, Ascites  Strength:  Not performed Estimated Daily Nutrient Needs: 
Energy (kcal): (P) 2504; Weight Used for Energy Requirements: (P) Current Protein (g): (P) 75-94; Weight Used for Protein Requirements: (P) Current Fluid (ml/day): (P) 2504; Method Used for Fluid Requirements: (P) 1 ml/kcal 
 
 
Nutrition Related Findings:  (P) K-3.2 Meds: colace, lasix, lantus, humalog, thiamine, lactulose, zofran, protonix +BM 12/17/20 Wounds: (P) Pressure injury Current Nutrition Therapies: DIET CARDIAC Regular DIET NUTRITIONAL SUPPLEMENTS Dinner, Lunch; Rawlings DIET NUTRITIONAL SUPPLEMENTS All Meals; Glucerna Shake Anthropometric Measures: 
· Height:  (P) 5' 9\" (175.3 cm) · Current Body Wt:  (P) 71.7 kg (158 lb) · Admission Body Wt:  (P) 138 lb · Usual Body Wt:  73 kg (161 lb) · Ideal Body Wt:  (P) 160 lbs:  (P) 98.8 % · Adjusted Body Weight:   ; Weight Adjustment for: · Adjusted BMI:      
· BMI Category:  (P) Normal weight (BMI 18.5-24. 9) Nutrition Diagnosis: · Severe malnutrition related to inadequate protein-energy intake as evidenced by wounds, weight loss greater than or equal to 5% in 1 month, localized or generalized fluid accumulation; potassium replacement today; due to lactulose pt is going to the bathroom frequently which caused a poor appetite at breakfast per nursing in rounds-lactulose dosage changing today hopefully intake will improve Nutrition Interventions:  
Food and/or Nutrient Delivery: (P) Continue current diet, Continue oral nutrition supplement Nutrition Education and Counseling: (P) No recommendations at this time Coordination of Nutrition Care: (P) Continue to monitor while inpatient, Interdisciplinary rounds Goals: (P) Encourage PO intake >50% at most meals in the next 3-5days Nutrition Monitoring and Evaluation:  
Behavioral-Environmental Outcomes:   
Food/Nutrient Intake Outcomes: (P) Diet advancement/tolerance, Food and nutrient intake, Supplement intake Physical Signs/Symptoms Outcomes: (P) Biochemical data, Meal time behavior, Nutrition focused physical findings, Skin, Weight, GI status, Fluid status or edema Discharge Planning: (P) Continue oral nutrition supplement, Continue current diet Electronically signed by Marya Dacosta on 12/17/2020 at 10:05 AM

## 2020-12-17 NOTE — PROGRESS NOTES
20:45 Assessment completed. Lungs are clear bilat. Resting quietly in bed. Offers 0 c/o CP,pressure, or SOB. 22:45 Shift assessment completed. See nsg flow sheet for details. 03:00 Reassessed with 0 changes noted. Resting quietly in bed with eyes closed x for voiding per urinal w/o difficulty. 07:30 Bedside and Verbal shift change report given to Joey Garner RN (oncoming nurse) by Deyanira Lauren RN (offgoing nurse). Report included the following information SBAR.

## 2020-12-18 NOTE — PROGRESS NOTES
0700 Bedside and Verbal shift change report given to LAVON Duong RN (oncoming nurse) by FERNANDA Larry (offgoing nurse). Report included the following information SBAR, Kardex, Intake/Output, and MAR. Pt in bed, call light in reach. 0743 Pt assessed. No signs of acute distress. Pt in bed. Pt given morphine for abd pain. 1015 Pt had nausea after eating breakfast, zofran given. 1105 Pt assessed. No signs of acute distress. Pt in bed. 
 
1156 Pt given morphine for abd pain. [phone] Pt given percocet for continuing abd pain. 12 Pt went down to ultrasound for paracentesis [address] to Dr. Nubia Sifuentes, pt needs PRN albumin for post-paracentesis. 1626 Pt back. Pt assessed. No signs of acute distress. Pt in bed. Pt given morphine for continued pain, but states it is improving very slightly. 1920 Bedside and Verbal shift change report given to BERENICE Mancuso RN (oncoming nurse) by Sherni Gutierrez. Radha Duong RN (offgoing nurse). Report included the following information SBAR, Kardex, Intake/Output, MAR, and Recent Results. Pt in bed, call light in reach.

## 2020-12-18 NOTE — PROGRESS NOTES
Problem: Risk for Spread of Infection Goal: Prevent transmission of infectious organism to others Description: Prevent the transmission of infectious organisms to other patients, staff members, and visitors. Outcome: Progressing Towards Goal 
  
Problem: Falls - Risk of 
Goal: *Absence of Falls Description: Document Michelle Miramontes Fall Risk and appropriate interventions in the flowsheet. Outcome: Progressing Towards Goal 
Note: Fall Risk Interventions: 
Mobility Interventions: Communicate number of staff needed for ambulation/transfer Mentation Interventions: Door open when patient unattended Medication Interventions: Patient to call before getting OOB, Teach patient to arise slowly Elimination Interventions: Patient to call for help with toileting needs

## 2020-12-18 NOTE — PROGRESS NOTES
Problem: Risk for Spread of Infection Goal: Prevent transmission of infectious organism to others Description: Prevent the transmission of infectious organisms to other patients, staff members, and visitors. Outcome: Progressing Towards Goal 
  
Problem: Falls - Risk of 
Goal: *Absence of Falls Description: Document Jalen Vale Fall Risk and appropriate interventions in the flowsheet. Outcome: Progressing Towards Goal 
Note: Fall Risk Interventions: 
Mobility Interventions: Communicate number of staff needed for ambulation/transfer Mentation Interventions: Door open when patient unattended Medication Interventions: Patient to call before getting OOB, Teach patient to arise slowly Elimination Interventions: Call light in reach Problem: Pressure Injury - Risk of 
Goal: *Prevention of pressure injury Description: Document Trav Scale and appropriate interventions in the flowsheet. Outcome: Progressing Towards Goal 
Note: Pressure Injury Interventions: 
Sensory Interventions: Assess changes in LOC, Keep linens dry and wrinkle-free Moisture Interventions: Absorbent underpads, Check for incontinence Q2 hours and as needed Activity Interventions: Increase time out of bed, Pressure redistribution bed/mattress(bed type) Mobility Interventions: HOB 30 degrees or less, Pressure redistribution bed/mattress (bed type) Nutrition Interventions: Document food/fluid/supplement intake Friction and Shear Interventions: HOB 30 degrees or less

## 2020-12-18 NOTE — PROGRESS NOTES
Hospitalist Progress Note-critical care note Patient: Marisa Fitzpatrick MRN: [id number]  [facility]: [id number] YOB: 1965  Age: 54 y.o. Sex: male DOA: 12/13/2020 LOS:  LOS: 5 days Chief complaint: colitis, bacteremia, decubitus ulcer , ascites ,cirrhosis Assessment/Plan Hospital Problems  Date Reviewed: 10/9/2020 Codes Class Noted POA Bacteremia ICD-10-CM: R78.81 ICD-9-CM: 790.7  12/16/2020 Unknown * (Principal) Colitis ICD-10-CM: K52.9 ICD-9-CM: 558.9  12/14/2020 Unknown Positive blood culture ICD-10-CM: R78.81 ICD-9-CM: 790.7  12/14/2020 Unknown Coagulopathy ([facility] ICD-10-CM: K49.5 ICD-9-CM: 286.9  12/13/2020 Unknown Stage 4 decubitus ulcer ([facility] ICD-10-CM: L89.94 
ICD-9-CM: 707.00, 707.24  12/3/2020 Yes Ascites ICD-10-CM: R18.8 ICD-9-CM: 789.59  12/3/2020 Unknown Severe protein-calorie malnutrition ([facility] ICD-10-CM: G41 ICD-9-CM: 157  10/26/2020 Yes Cirrhosis ([facility] ICD-10-CM: K74.60 ICD-9-CM: 571.5  10/9/2020 Unknown Bacteremia Enterococcus faecalis Continue zosyn 4/14 days Repeated cx no growth Cirrhosis ([facility] (10/9/2020) with ascites IV albumin Ammonia daily -now  Ammonia level was at the baseline Lasix and spirnolactone   
Abdomen more distended today-will have paracentesis and send for the lab Dr. Hanna Boyd working for liver transplant -The CTP is 9.  Child class B.  The MELD score is 33. Will give vit k for paracentesis Coagulopathy ([facility] (12/13/2020) 
inr daily Continue hold xarelto     
  
PE /DVT on 11/30/2020 Appreciated dr. Jaz Logan on board Continue hold ac for now  
  
Pancreatic Duct enlargement MRI of Abdomen :Evidence of hepatic cirrhosis with no mass lesion, but chronic pancreatitis and fibrotic changes at the ampulla with mild extrinsic narrowing of the distal CBD.  No mass lesion identified 
 
  
DM 
ssi and hypoglycemia protocol  
 
  
 Large Sacral wound Continue abx and Continue local wound care  
  
Colitis on CT 
IV zosyn,no diarrhea Hypokalemia K replacement per iv and po Subjective :feel fine, abdomen bigger Disposition :tbd, Review of systems: 
 
General: No fevers or chills. Tired Cardiovascular: No chest pain or pressure. No palpitations. Pulmonary:  No shortness of breath  . Gastrointestinal: No nausea, vomiting. no abdomen pain Vital signs/Intake and Output: 
Visit Vitals /73 (BP 1 Location: Left arm, BP Patient Position: At rest;Supine) Pulse 95 Temp 97.9 °F (36.6 °C) Resp 16 Ht 5' 9\" (1.753 m) Wt 71.9 kg (158 lb 8 oz) SpO2 100% BMI 23.41 kg/m² Current Shift:  No intake/output data recorded. Last three shifts:  12/16 1901 - 12/18 0700 In: 360 [P.O.:360] Out: 200 [Urine:200] Physical Exam: 
General: WD, WN. Alert, cooperative, no acute distress HEENT: NC, Atraumatic. PERRLA, anicteric sclerae. Lungs: CTA Bilaterally. No Wheezing/Rhonchi/Rales. Heart:  Regular  rhythm,  No murmur, No Rubs, No Gallops Abdomen: Soft, +distended, Non tender. +Bowel sounds, Extremities: No c/c/e Psych:   + anxious or agitated, tearful Neurologic:  No acute neurological deficit. Labs: Results:  
   
Chemistry Recent Labs 12/18/20 
6935 12/17/20 
0915 12/16/20 
7560 * 159* 123*  143 141  
K 2.9* 3.2* 3.2*  
 106 110 CO2 28 24 22 BUN 2* 2* 3*  
CREA 0.88 1.11 0.67 CA 8.5 8.9 8.4* AGAP 11 13 9 BUCR 2* 2* 4* AP 39* 40* 48  
TP 6.2* 6.8 5.8* ALB 3.3* 3.7 3.4 GLOB 2.9 3.1 2.4 AGRAT 1.1 1.2 1.4  
  
CBC w/Diff Recent Labs 12/18/20 
8639 12/17/20 
2493 12/16/20 
6178 WBC 4.8 7.6 3.1*  
RBC 3.16* 3.42* 2.68* HGB 9.9* 10.6* 8.3* HCT 28.2* 31.2* 24.4*  
PLT 52* 63* 43* GRANS 50 72 56 LYMPH 36 20* 31 EOS 2 1 2 Cardiac Enzymes No results for input(s): CPK, CKND1, LYDIA in the last 72 hours. No lab exists for component: [place] Coagulation Recent Labs 12/18/20 
5811 12/17/20 
6657 PTP 36.7* 42.8* INR 3.9* 4.7* Lipid Panel No results found for: CHOL, CHOLPOCT, CHOLX, CHLST, CHOLV, 532843, HDL, HDLP, LDL, LDLC, DLDLP, 994947, VLDLC, VLDL, TGLX, TRIGL, TRIGP, TGLPOCT, CHHD, CHHDX  
BNP No results for input(s): BNPP in the last 72 hours. Liver Enzymes Recent Labs 12/18/20 
0416  
TP 6.2* ALB 3.3* AP 39* Thyroid Studies No results found for: T4, T3U, TSH, TSHEXT, TSHEXT Procedures/imaging: see electronic medical records for all procedures/Xrays and details which were not copied into this note but were reviewed prior to creation of Plan Cta Chest W Or W Wo Cont Result Date: 11/30/2020 EXAM: CTA chest CLINICAL INDICATION/HISTORY:  Generalized fatigue. Dyspnea. COMPARISON: None TECHNIQUE: Axial CT imaging from the thoracic inlet through the diaphragm with intravenous contrast. Coronal and sagittal MIP reformats were generated. One or more dose reduction techniques were used on this CT: automated exposure control, adjustment of the mAs and/or kVp according to patient size, and iterative reconstruction techniques. The specific techniques used on this CT exam have been documented in the patient's electronic medical record. Digital Imaging and Communications in Medicine (DICOM) format image data are available to nonaffiliated external healthcare facilities or entities on a secure, media free, reciprocally searchable basis with patient authorization for at least a 12-month period after this study. _______________ FINDINGS: EXAM QUALITY: Adequate opacification of the pulmonary arteries. PULMONARY ARTERIES: Critical result: There is a filling defect in the right lower lobe medial basal segmental artery. Remaining pulmonary arteries are unremarkable. MEDIASTINUM: Normal heart size. The ascending aorta is dilated measuring up to 4 cm. No pericardial effusion. Right heart strain indices: RV/LV ratio: Normal (ratio </= 1). Interventricular septal bowing: None. Main pulmonary artery diameter: Normal. Contrast reflux into the IVC: None. LUNGS: No suspicious nodule or mass. No abnormal opacities. PLEURA: Normal. AIRWAY: Normal. LYMPH NODES: No enlarged nodes. UPPER ABDOMEN: Please see concurrently performed CT abdomen/pelvis. OTHER: No acute or aggressive osseous abnormalities identified. SUPERFICIAL SOFT TISSUES: Unremarkable. _______________ IMPRESSION: 1. Acute pulmonary embolism involving the right lower lobe medial basal segmental artery. 2. 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 3. No focal consolidative process in the chest. Critical results discussed with Dr. Roshan Cardenas at 7:33 PM on 10/30/2020. Ct Abd Pelv W Cont Result Date: 12/13/2020 EXAM: CT of the abdomen and pelvis INDICATION: Abdominal pain and swelling COMPARISON: November 30, 2020 TECHNIQUE: Axial CT imaging of the abdomen and pelvis was performed with intravenous contrast. Multiplanar reformats were generated. One or more dose reduction techniques were used on this CT: automated exposure control, adjustment of the mAs and/or kVp according to patient size, and iterative reconstruction techniques. The specific techniques used on this CT exam have been documented in the patient's electronic medical record. Digital Imaging and Communications in Medicine (DICOM) format image data are available to nonaffiliated external healthcare facilities or entities on a secure, media free, reciprocally searchable basis with patient authorization for at least a 12-month period after this study. _______________ FINDINGS: LOWER CHEST: Unremarkable. LIVER, BILIARY: There is a nodular appearing liver suggesting cirrhosis. No focal hepatic lesion seen. No biliary dilation. Cholecystectomy PANCREAS: Pancreatic duct is mildly dilated to 6 mm. There is no atrophy of the pancreas. No discrete pancreatic mass identified however prior GI consultation and further workup. SPLEEN: Enlarged measuring 14.2 cm. ADRENALS: Normal. KIDNEYS: Punctate 2 to 3 mm calculi seen in both kidneys. Kidneys demonstrate no hydronephrosis. VASCULATURE: Mild to moderate calcific atherosclerosis present. There is recanalization the periumbilical vein suggesting portal hypertension. LYMPH NODES: No enlarged lymph nodes. GASTROINTESTINAL TRACT: Large volume of stool present throughout the colon. There are thickened small bowel loops along the left side of the midabdomen which may related to the ascites and cirrhosis however enteritis cannot be excluded. Is no bowel obstruction. PELVIC ORGANS: Unremarkable. BONES: No acute or aggressive osseous abnormalities identified. There is a sacral decubitus ulcer without evidence of osteomyelitis. OTHER: Large volume of ascites present. _______________ IMPRESSION: Cirrhosis, splenomegaly, large volume of ascites and portal hypertension. Thickened small bowel loops throughout the abdomen which may be related to the ascites and cirrhosis however enteritis not excluded. There is pancreatic duct dilatation however no atrophy or discrete mass identified. However I would advise further workup exclude any ampullary lesion. Advise GI consultation. Sacral decubitus ulcer without osteomyelitis. Large volume of retained stool in the colon. Ct Abd Pelv W Cont Result Date: 11/30/2020 EXAM: CT of the abdomen and pelvis CLINICAL INDICATION/HISTORY:  Weakness and dyspnea. Abdominal pain. COMPARISON: None. TECHNIQUE: Axial CT imaging of the abdomen and pelvis was performed with intravenous contrast. Multiplanar reformats were generated. One or more dose reduction techniques were used on this CT: automated exposure control, adjustment of the mAs and/or kVp according to patient size, and iterative reconstruction techniques. The specific techniques used on this CT exam have been documented in the patient's electronic medical record. Digital Imaging and Communications in Medicine (DICOM) format image data are available to nonaffiliated external healthcare facilities or entities on a secure, media free, reciprocally searchable basis with patient authorization for at least a 12-month period after this study. _______________ FINDINGS: LOWER CHEST: Please see concurrently performed CT of the chest. LIVER, BILIARY: There is cirrhotic morphology of the liver. No focal parenchymal masses visualized. There is no intra-or extrahepatic biliary ductal dilatation. Gallbladder is surgically absent. PANCREAS: Pancreatic duct is dilated measuring 5 mm. No discrete pancreatic mass or intraductal abnormality visualized. SPLEEN: Enlarged measuring 15.6 cm in AP diameter. ADRENALS: Normal. KIDNEYS: Normal. There is no nephrolithiasis. There is no hydronephrosis. LYMPH NODES: No enlarged lymph nodes. GASTROINTESTINAL TRACT: No bowel dilation or wall thickening. The appendix is visualized and unremarkable. PELVIC ORGANS: Unremarkable. VASCULATURE: Unremarkable. BONES: Post surgical changes of prior left hip open reduction and internal fixation. OTHER: There is no free intraperitoneal fluid or free air. SUPERFICIAL SOFT TISSUES: There is a soft tissue defect at the level of the lower sacral segments. No definite cortical changes in the sacrum _______________ IMPRESSION: 1. Cirrhotic morphology of liver with sequelae of portal venous hypertension including splenomegaly and moderate volume intra-abdominal ascites. 2. Nonspecific dilatation of the pancreatic duct up to 5 mm. 3. Lower sacral soft tissue ulcer without discrete CT evidence of osteomyelitis. Us Guide Paracentesis Result Date: 12/9/2020 PROCEDURE:  ULTRASOUND GUIDED PARACENTESIS INDICATION: Ascites. ________________________ TECHNIQUE/FINDINGS: I performed the procedure. The ascites in the right paracolic gutter was localized under ultrasound guidance. An image of the ascites was obtained and submitted into the patient record. The skin was marked, prepped and draped in sterile fashion and lidocaine was used for local anesthesia. Sterile probe cover and gel was used. A 5 Western Lisa Yueh sheathed needle was advanced into the fluid. A total of 3.8 L of straw colored fluid was drained using a Vacutainer system. The patient tolerated the procedure well and there were no immediate complications. Pre-procedure time out:  1659 hours. Post procedure pause:  1726 hours. GUIDANCE: Ultrasound guidance was used to position (and confirm the position of) the Yueh sheathed needle. Image(s) saved in PACS: Ultrasound ________________________ IMPRESSION: Successful ultrasound guided paracentesis of approximately 3.8 L of straw colored fluid. [address] [place] [address] Result Date: 12/2/2020 EXAM: Limited right upper quadrant abdominal ultrasound INDICATION: 77-year-old patient with cirrhosis and reported portal venous thrombosis. . COMPARISON: CT 11/30/2020. TECHNIQUE: Real-time abdomen/right upper quadrant sonography in multiple planes was performed with image documentation. Grayscale, color flow Doppler imaging, and velocity spectral waveform analysis of the portal vein was performed (duplex imaging). _______________ FINDINGS: LIVER: Diffusely coarsened hepatic parenchymal echotexture noted with lobular surface contour. . No focal mass Color flow Doppler and velocity spectral waveform analysis of the portal vein shows normal (hepatopedal) direction of flow. Ivrin Bees BILIARY SYSTEM: No intrahepatic biliary dilatation. Common bile duct is normal in caliber measuring 0.4 cm. GALLBLADDER: Surgically absent. RIGHT KIDNEY: 10.8 cm in length. No hydronephrosis or renal mass. No visible calculi. PANCREAS: Nonvisualized. IVC: Visualized portions are unremarkable in appearance. OTHER: Moderate amount of intra-abdominal/pelvic ascites. . _______________ IMPRESSION: 1. Cirrhotic hepatic morphology. No biliary ductal dilatation or suspicious appearing hepatic lesion. 2. Moderate volume abdominal/pelvic ascites. . 3. Prior cholecystectomy. Xr Chest [facility] Result Date: 11/30/2020 EXAM: One view chest x-ray CLINICAL INDICATION/HISTORY: Weakness and dyspnea. COMPARISON: 10/22/2020. TECHNIQUE: Single AP view of the chest was obtained. _______________ FINDINGS: HEART, VESSELS, MEDIASTINUM: Heart size is normal. No vascular congestion. LUNGS, PLEURAL SPACES: The lungs are clear. No effusion or pneumothorax. BONY THORAX, SOFT TISSUES: Unremarkable. _______________ IMPRESSION: No acute cardiopulmonary process.  
 
 
Jacky Kidd MD

## 2020-12-18 NOTE — PROGRESS NOTES
Problem: Self Care Deficits Care Plan (Adult) Goal: *Acute Goals and Plan of Care (Insert Text) Description: Initial Occupational Therapy Goals (12/18/2020) Within 7 day(s): 1. Patient will perform grooming standing at sink with supervision x 5 minutes for increased independence with ADLs. 2. Patient will perform UB dressing with mod I for increased independence with ADLs. 3. Patient will perform LB dressing with supervision & A/E PRN for increased independence with ADLs. 4. Patient will perform all aspects of toileting with supervision for increased independence in ADLs 5. Patient will independently apply energy conservation techniques with 1 verbal cue(s) for increased independence with ADLs. 6. Patient will utilize good body mechanics during ADLs with 1 verbal cue(s). Outcome: Progressing Towards Goal 
 OCCUPATIONAL THERAPY EVALUATION Patient: Daniel Murray (54 y.o. male) Date: 12/18/2020 Primary Diagnosis: Coagulopathy ([facility] [D68.9] Ascites [R18.8] Cirrhosis ([facility] [J01.12] Precautions: Fall PLOF: pt grossly mod I for ADLs, pt reports spouse would occasionally assist with lower body ADLs, ambulated with quad cane PTA and was receiving HHPT and HHOT 
 
ASSESSMENT : 
 Based on the objective data described below, the patient presents with decreased activity tolerance, BUE/BLE strength, and abdominal pain limiting independence with ADLs. Pt found supine in bed, agreeable to therapy, reporting abdominal pain 7/10. Instructed pt on log rolling technique to decreased abdominal discomfort. Pt SBA for supine>sit. Pt unable to complete sock donning without assistance at this time due to abdominal discomfort, however reports was able to complete PTA with additional time. Pt CGA for STS with RW and was able to take ~3 side steps to L side. Pt requesting to sit back down due to stomach pain/discomfort, and pt required CGA for BLE to return to supine. Pt motivated to participate in therapy however primarily limited by abdominal pain. Pt voiced desire to return home at d/c. 
 
Education: role of OT in acute care, fall prevention, independent pressure relief, log rolling technique Patient will benefit from skilled intervention to address the above impairments. Patient's rehabilitation potential is considered to be Good Factors which may influence rehabilitation potential include:  
[]             None noted []             Mental ability/status [x]             Medical condition []             Home/family situation and support systems []             Safety awareness [x]             Pain tolerance/management 
[]             Other: PLAN : 
Recommendations and Planned Interventions:  
[x]               Self Care Training                  [x]      Therapeutic Activities [x]               Functional Mobility Training   []      Cognitive Retraining 
[x]               Therapeutic Exercises           [x]      Endurance Activities [x]               Balance Training                    []      Neuromuscular Re-Education []               Visual/Perceptual Training     [x]      Home Safety Training 
[x]               Patient Education                   [x]      Family Training/Education []               Other (comment): Frequency/Duration: Patient will be followed by occupational therapy 1-2 times per day/4-7 days per week to address goals. Discharge Recommendations: Home Health (pending pain management and progress during hospital stay) Further Equipment Recommendations for Discharge: N/A  
 
SUBJECTIVE:  
Patient stated I'm not sure how much I can do but I'll try.  OBJECTIVE DATA SUMMARY:  
 
Past Medical History:  
Diagnosis Date  Cir[facility]osis ([facility]  Diabetes ([facility]  Gastroparesis  Pancreatitis  [facility] Past Surgical History:  
Procedure Laterality Date  HX CHOLECYSTECTOMY  HX HIP FRACTURE TX    
 left hip sx 9.23/2018 Barriers to Learning/Limitations: yes;  physical  
Compensate with: visual, verbal, tactile, kinesthetic cues/model Home Situation: Pt grossly mod I for ADLs, spouse occasionally assist with lower body ADLs, ambulated with quad cane Home Situation Home Environment: Private residence # Steps to Enter: 3 Rails to Enter: Yes Hand Rails : Bilateral(wide) One/Two Story Residence: Two story, live on 1st floor Living Alone: No 
Support Systems: Spouse/Significant Other/Partner Patient Expects to be Discharged to[de-identified] Private residence Current DME Used/Available at Home: Cane, quad, Grab bars, Walker, rolling, Shower chair, Raised toilet seat Tub or Shower Type: Shower 
[]  Right hand dominant   []  Left hand dominant Cognitive/Behavioral Status: 
Neurologic State: Alert Orientation Level: Oriented X4 Cognition: Follows commands; Appropriate for age attention/concentration Safety/Judgement: Awareness of environment; Insight into deficits Coordination: BUE Coordination: Within functional limits Fine Motor Skills-Upper: Left Intact; Right Intact Gross Motor Skills-Upper: Left Intact; Right Intact Balance: 
Sitting: Intact Standing: Intact; With support Strength: BUE 
 Strength: Generally decreased, functional 
  
Tone & Sensation: BUE Tone: Normal 
 
Range of Motion: BUE 
AROM: Generally decreased, functional 
PROM: Generally decreased, functional 
 
Functional Mobility and Transfers for ADLs: 
Bed Mobility: 
Supine to Sit: Stand-by assistance Sit to Supine: Contact guard assistance; Additional time(BLE) Scooting: Stand-by assistance Transfers: 
Sit to Stand: Contact guard assistance ADL Assessment:  
Feeding: Setup;Modified independent Oral Facial Hygiene/Grooming: Stand-by assistance(seated) Bathing: Moderate assistance Upper Body Dressing: Supervision Lower Body Dressing: Minimum assistance Toileting: Contact guard assistance ADL Intervention: Lower Body Dressing Assistance Dressing Assistance: Moderate assistance Socks: Moderate assistance Leg Crossed Method Used: Yes Position Performed: Seated edge of bed Cues: Verbal cues provided;Visual cues provided Cognitive Retraining Safety/Judgement: Awareness of environment; Insight into deficits Pain: 
Pain level pre-treatment: 7/10 Pain level post-treatment: 8/10 Pain Intervention(s): Medication provided by Nursing (see MAR); Rest, Ice, Repositioning Response to intervention: Nurse notified, See doc flow sheet Activity Tolerance:  
Fair-. Patient able to stand ~1 minute(s). Patient requires intermittent rest breaks. Patient limited by pain, strength, ROM, endurance. Patient unsteady. Please refer to the flowsheet for vital signs taken during this treatment. After treatment:  
[] Patient left in no apparent distress sitting up in chair 
[x] Patient left in no apparent distress in bed 
[x] Call bell left within reach [x] Nursing notified 
[] Caregiver present 
[] Bed alarm activated COMMUNICATION/EDUCATION:  
[x] Role of Occupational Therapy in the acute care setting 
[x] Home safety education was provided and the patient/caregiver indicated understanding. [x] Patient/family have participated as able in goal setting and plan of care. [x] Patient/family agree to work toward stated goals and plan of care. [] Patient understands intent and goals of therapy, but is neutral about his/her participation. [] Patient is unable to participate in goal setting and plan of care. Thank you for this referral. 
Carla Champion, OTR/L Time Calculation: 24 mins Eval Complexity: History: MEDIUM Complexity : Expanded review of history including physical, cognitive and psychosocial  history ; Examination: MEDIUM Complexity : 3-5 performance deficits relating to physical, cognitive , or psychosocial skils that result in activity limitations and / or participation restrictions; Decision Making:MEDIUM Complexity : Patient may present with comorbidities that affect occupational performnce. Miniml to moderate modification of tasks or assistance (eg, physical or verbal ) with assesment(s) is necessary to enable patient to complete evaluation

## 2020-12-18 NOTE — PROGRESS NOTES
Problem: Mobility Impaired (Adult and Pediatric) Goal: *Acute Goals and Plan of Care (Insert Text) Description: Description: Physical Therapy short term goals initiated 12/17/2020, to be achieved in 4-7 days. Pt will: 1. Perform bed mobility with indep in prep for OOB activity. 2. Perform sit <> stand transfers with LRAD and S in prep for functional mobility and ambulation. 3. Ambulate 200 ft with LRAD and S in prep for household and community mobility. 4. Ascend/descend 3 stairs with 1 HR, LRAD, and S for safe home entry. Outcome: Not Progressing Towards Goal 
  
PT session held due to: 
[x]  Pt off unit for Paracentesis []  RN Communication/ suggestion 
[]  Extreme Pain 
[]  Dialysis treatment in progress. Will f/u Tomorrow. Thank you.  
Darren Chaudhry, PTA

## 2020-12-18 NOTE — PROGRESS NOTES
Called and spoke to nurse at 15:30 letting her know that patient was above 5liters in drainage of Paracentesis and patient needs albumin, standard protocol for procedure. (5 Liters or more drainage for paracentesis due to liver disease, patient is to receive albumin) .

## 2020-12-18 NOTE — PROGRESS NOTES
1920 Pt received from offgoing nurse without any signs or symptoms of distress. Pt vitals are stable and within normal limits. Pt bed in low position with wheels locked and call bell within reach. 1939 Assessment completed and documented in flow sheet. Pt denies any further needs at this time. Pt in NAD with bed in low position, wheels locked and call bell within reach. 2149 Scheduled medications administered as ordered. Pain medication administered as per PRN order for c/o pain. See flow sheets for follow up documentation. 0022 Scheduled medications administered as ordered. Reassessment completed with no changes noted. Bed locked, in lowest position, with call light within reach. Purposeful rounding completed. Pt resting quietly. No further needs voiced at this time. 0033 Pain medication administered as per PRN order for c/o pain. See flow sheets for follow up documentation. 3310 Purposeful rounding completed. Pt resting quietly. No further needs voiced at this time. Pain medication administered as per PRN order for c/o pain. See flow sheets for follow up documentation. 3289 Reassessment completed with no changes noted. Bed locked, in lowest position, with call light within reach. Purposeful rounding completed. Pt resting quietly. No further needs voiced at this time. 4976 Scheduled medications administered as ordered. Purposeful rounding completed. Pt resting quietly. No further needs voiced at this time. 3402 Bedside and Verbal shift change report given to Emiliana [facility] 72. (oncoming nurse) by Chrissy Hughes RN (offgoing nurse). Report included the following information SBAR, Intake/Output, MAR, and Recent Results.

## 2020-12-18 NOTE — PROGRESS NOTES
Problem: Falls - Risk of 
Goal: *Absence of Falls Description: Document Aishwarya Joseph Fall Risk and appropriate interventions in the flowsheet. Outcome: Progressing Towards Goal 
Note: Fall Risk Interventions: 
Mobility Interventions: Communicate number of staff needed for ambulation/transfer, Patient to call before getting OOB, PT Consult for mobility concerns, PT Consult for assist device competence, Utilize walker, cane, or other assistive device Mentation Interventions: Adequate sleep, hydration, pain control, Door open when patient unattended, Evaluate medications/consider consulting pharmacy, Increase mobility, More frequent rounding, Update white board Medication Interventions: Evaluate medications/consider consulting pharmacy, Patient to call before getting OOB, Teach patient to arise slowly Elimination Interventions: Call light in reach, Patient to call for help with toileting needs, Toileting schedule/hourly rounds, Urinal in reach

## 2020-12-18 NOTE — PROGRESS NOTES
Palliative Medicine [facility] [phone] [facility] [phone] Palliative consult to assist with goals of care and support. Patient is well known to palliative services from last admission this past Oct. During that admission he was on life support and was medically extubate. His wife made him DNR/DNI but once patient became oriented off ventilator he changed his code status to full code with plan to continue liver transplant work up. He also executed an AMD appointing his wife then daughter. Its on file in EMR. Palliative SW met with Mr. Raj Favre at bedside this morning. He was AAOx4 laying down in bed. He appeared tired and sad with blunted affect and fair eye contact. He denied pain or sob but stated feeling slightly nauseous. Bedside RN Castro Lin was informed and provided medication. He was emotional and tearful throughout our conversation about his declined health. He shared \"feeling tired\" with his current state of health; not having the energy or strength. He stated his hope was improving health but knows \"its not possible\". Palliative SW provided empathic listening and a supportive environment to share. Questioned his goals at this time for his healthcare. He stated wants to continue with liver transplant work up and any other treatment needed. Mentioned hospice services are an option for when he feels he's ready. Mr. Raj Favre stated more then once with tears in his eyes \"I'm not ready\". Reassured him his care wasn't going to change until he was ready. Questioned wishes for intubation and CPR. Mr. Raj Favre stated wanting aggressive measures such as intubation and CPR to give if a chance of recovery but doesn't want long-term life support if he has no potential of recovery or quality of life. Mr. Raj Favre will remain FULL code and FULL aggressive interventions. Goals of care are unchanged: FULL code and FULL aggressive interventions. Palliative will remain available to assist and support. Thank you for the opportunity to assist in the care of Mr. Billy Berry. LORENA Givens, [facility] HealthSW-C Palliative Medicine

## 2020-12-18 NOTE — PROGRESS NOTES
Care  manager rounded; noted plan for patient to have paracentesis today; care manager will continue to follow for discharge needs. Care Management Interventions PCP Verified by CM: Yes Mode of Transport at Discharge: Self Transition of Care Consult (CM Consult): Discharge Planning, Home Health [address] Yes Current Support Network: Lives with Spouse, Own Home Confirm Follow Up Transport: Family The Plan for Transition of Care is Related to the Following Treatment Goals : home when medically stable The Patient and/or Patient Representative was Provided with a Choice of Provider and Agrees with the Discharge Plan?: Yes Name of the Patient Representative Who was Provided with a Choice of Provider and Agrees with the Discharge Plan: Bobbi Coley, patient Freedom of Choice List was Provided with Basic Dialogue that Supports the Patient's Individualized Plan of Care/Goals, Treatment Preferences and Shares the Quality Data Associated with the Providers?: Yes Discharge Location Discharge Placement: Home with home health

## 2020-12-18 NOTE — ROUTINE PROCESS
Ultrasound Guided Left Lower Quad Paracentesis was preformed. Patient tolerated procedure without pain. Fluid was sent to lab for testing. 6.1 Liters was drained. Patient was handed off to transportation in stable condition.

## 2020-12-19 NOTE — PROGRESS NOTES
Problem: Mobility Impaired (Adult and Pediatric) Goal: *Acute Goals and Plan of Care (Insert Text) Description: Description: Physical Therapy short term goals initiated 12/17/2020, to be achieved in 4-7 days. Pt will: 1. Perform bed mobility with indep in prep for OOB activity. 2. Perform sit <> stand transfers with LRAD and S in prep for functional mobility and ambulation. 3. Ambulate 200 ft with LRAD and S in prep for household and community mobility. 4. Ascend/descend 3 stairs with 1 HR, LRAD, and S for safe home entry. Outcome: Progressing Towards Goal 
 PHYSICAL THERAPY TREATMENT Patient: Charlette Elaine (54 y.o. male) Date: 12/19/2020 Diagnosis: Coagulopathy ([facility] [address] [D68.9] Ascites [R18.8] Cirrhosis ([facility] [address] [K74.60] Colitis Precautions: Fall PLOF: ambulatory with a quad cane, receiving HH services PTA ASSESSMENT: 
No assistance needed for supine to sit. CGA/SBA for sit to stand with bed elevated slightly. Ambulated with RW 200ft, reciprocal gt pattern, steady slow pace, decreased step height and length, narrow PAOLO, no LOB or path deviations. Returned to room and left sitting EOB, spouse to assist changing gown and undergarments. Progression toward goals:  
[x]      Improving appropriately and progressing toward goals 
[]      Improving slowly and progressing toward goals 
[]      Not making progress toward goals and plan of care will be adjusted PLAN: 
Patient continues to benefit from skilled intervention to address the above impairments. Continue treatment per established plan of care. Discharge Recommendations:  Home Health Further Equipment Recommendations for Discharge:  rolling walker SUBJECTIVE:  
Patient stated Not as much pain as yesterday.  OBJECTIVE DATA SUMMARY:  
Critical Behavior: 
Neurologic State: Alert, Eyes open spontaneously Orientation Level: Oriented X4 
 Cognition: Appropriate decision making, Appropriate for age attention/concentration, Appropriate safety awareness, Follows commands Safety/Judgement: Awareness of environment, Insight into deficits Functional Mobility Training: 
Bed Mobility: 
 
Supine to Sit: Supervision Scooting: Supervision Transfers: 
Sit to Stand: Contact guard assistance;Stand-by assistance Stand to Sit: Supervision Balance: 
Sitting: Intact Standing: Intact; With support Ambulation/Gait Training: 
Distance (ft): 200 Feet (ft) Assistive Device: Gait belt;Walker, rolling Ambulation - Level of Assistance: Stand-by assistance;Supervision Gait Abnormalities: Decreased step clearance Speed/Ewa: Slow Step Length: Right shortened;Left shortened Pain: 
Pain level pre-treatment: 5/10 Pain level post-treatment: 5/10 Pain Intervention(s): Medication (see MAR); Rest, Ice, Repositioning Response to intervention: Nurse notified, See doc flow Activity Tolerance:  
Fair+ Please refer to the flowsheet for vital signs taken during this treatment. After treatment:  
[] Patient left in no apparent distress sitting up in chair 
[x] Patient left in no apparent distress in bed 
[x] Call bell left within reach 
[] Nursing notified 
[x] Caregiver present 
[] Bed alarm activated 
[] SCDs applied COMMUNICATION/EDUCATION:  
[x]         Role of Physical Therapy in the acute care setting. [x]         Fall prevention education was provided and the patient/caregiver indicated understanding. [x]         Patient/family have participated as able in working toward goals and plan of care. [x]         Patient/family agree to work toward stated goals and plan of care. []         Patient understands intent and goals of therapy, but is neutral about his/her participation.  
[]         Patient is unable to participate in stated goals/plan of care: ongoing with therapy staff. 
[]         Other: 
 
   
Jaz Acevedo, PTA 
 Time Calculation: 18 mins

## 2020-12-19 NOTE — PROGRESS NOTES
1643 
Bedside and Verbal shift change report given to Giovana Solis RN (oncoming nurse) by Alfred Haney RN (offgoing nurse). Report included the following information SBAR, Kardex and MAR.

## 2020-12-19 NOTE — PROGRESS NOTES
19:35  Assessment completed. Lungs are clear bilat. Offers 0 c/o CP,pressure, or SOB. Abd dsg is C/D/I Resting quietly in bed x for voiding per [facility] w/o difficulty. 22:50 Shift assessment completed. See nsg flow sheet for details. 02:55 Reassessed with 0 changes noted. Paracentesis dsg remains C/D/I. Resting quietly in bed with eyes closed between cares x for voiding per [facility] w/o difficulty. 07:15 Bedside and Verbal shift change report given to EDOUARD Cid RN (oncoming nurse) by Donal Alpers RN (offgoing nurse). Report included the following information SBAR.

## 2020-12-19 NOTE — PROGRESS NOTES
Problem: Falls - Risk of 
Goal: *Absence of Falls Description: Document Gustavo Khan Fall Risk and appropriate interventions in the flowsheet. Outcome: Progressing Towards Goal 
Note: Fall Risk Interventions: 
Mobility Interventions: Communicate number of staff needed for ambulation/transfer, Patient to call before getting OOB, Utilize walker, cane, or other assistive device Mentation Interventions: Adequate sleep, hydration, pain control Medication Interventions: Assess postural VS orthostatic hypotension, Patient to call before getting OOB, Teach patient to arise slowly Elimination Interventions: Call light in reach, Patient to call for help with toileting needs

## 2020-12-19 NOTE — PROGRESS NOTES
0735 
 Pt resting in bed. Resting in bed. Pt has mepilex dressings to left heel and sacrum. 4928 Pt's  Pain level to abdomen, left heel and sacrum 7/10. Morphine 2 mg IV given. Pt has  big bandaid to left abdomen. Lungs are clear but diminished. Abdomen soft with active BS. 
1129 Pain level 6/10. Dressing to sacrum with scant amt of serous d/c. New mepilex dressing applied. Dressing to left heel was changed. No drainage noted. [address] Dressing to sacrum was changed. Pt has scant amt of serous drainage on old dressing. New mepilex dressing applied. Dressing to left heel was changed. No drainage noted to superficial wound. New mepilex dressing was applied. Pt was seen by PT. Pt ambulated in hallway then back in bed.

## 2020-12-20 NOTE — PROGRESS NOTES
Problem: Mobility Impaired (Adult and Pediatric) Goal: *Acute Goals and Plan of Care (Insert Text) Description: Physical Therapy short term goals initiated 12/17/2020, to be achieved in 4-7 days. Pt will: 1. Perform bed mobility with indep in prep for OOB activity. 2. Perform sit <> stand transfers with LRAD and S in prep for functional mobility and ambulation. 3. Ambulate 200 ft with LRAD and S in prep for household and community mobility. 4. Ascend/descend 3 stairs with 1 HR, LRAD, and S for safe home entry. Outcome: Progressing Towards Goal 
  
PHYSICAL THERAPY TREATMENT Patient: Radha Tate (54 y.o. male) Date: 12/20/2020 Diagnosis: Coagulopathy ([facility] [address] [D68.9] Ascites [R18.8] [facility] [K74.60] Colitis Precautions: Fall Chart, physical therapy assessment, plan of care and goals were reviewed. ASSESSMENT: 
Pt with fair general mobility and understanding of limitations. Pt take 2 standing rest breaks to complete final 100' of amb, but totals 400'+ on the day. Pt refused stair trial, noting that he would like to address on next session. Progression toward goals: 
[]      Improving appropriately and progressing toward goals [x]      Improving slowly and progressing toward goals 
[]      Not making progress toward goals and plan of care will be adjusted PLAN: 
Patient continues to benefit from skilled intervention to address the above impairments. Continue treatment per established plan of care. Discharge Recommendations:  Home Health Further Equipment Recommendations for Discharge:  rolling walker SUBJECTIVE:  
Patient stated I'll move around for a while.  OBJECTIVE DATA SUMMARY:  
Critical Behavior: 
Neurologic State: Alert Orientation Level: Oriented X4 Cognition: Appropriate decision making, Appropriate for age attention/concentration, Appropriate safety awareness, Follows commands Safety/Judgement: Awareness of environment, Insight into deficits Functional Mobility Training: 
Bed Mobility: 
Rolling: Supervision Supine to Sit: Supervision Sit to Supine: Supervision Scooting: Supervision Transfers: 
Sit to Stand: Contact guard assistance Stand to Sit: Supervision Balance: 
Sitting: Intact Standing: Intact; With support Ambulation/Gait Training: 
Distance (ft): 400 Feet (ft) Assistive Device: Gait belt;Walker, rolling Ambulation - Level of Assistance: Stand-by assistance;Supervision Gait Abnormalities: Decreased step clearance Base of Support: Widened Speed/Ewa: Slow Step Length: Right shortened;Left shortened Pain: 
Pain Scale 1: Numeric (0 - 10) Pain Intensity 1: 7 Pain Location 1: Abdomen Pain Orientation 1: Anterior Pain Description 1: Aching Pain Intervention(s) 1: Medication (see MAR); Distraction; Emotional support; Food Activity Tolerance:  
Fair Please refer to the flowsheet for vital signs taken during this treatment. After treatment:  
[] Patient left in no apparent distress sitting up in chair 
[x] Patient left in no apparent distress in bed 
[x] Call bell left within reach [x] Nursing notified 
[] Caregiver present 
[] Bed alarm activated Maximilian Celestin PTA Time Calculation: 35 mins

## 2020-12-20 NOTE — PROGRESS NOTES
Hospitalist Progress Note-critical care note Patient: Nicola Jacobs MRN: [id number]  CSN: [id number] YOB: 1965  Age: 54 y.o. Sex: male DOA: 12/13/2020 LOS:  LOS: 7 days Chief complaint: colitis, bacteremia, decubitus ulcer , ascites ,cirrhosis Assessment/Plan Hospital Problems  Date Reviewed: 10/9/2020 Codes Class Noted POA Bacteremia ICD-10-CM: R78.81 ICD-9-CM: 790.7  12/16/2020 Unknown * (Principal) Colitis ICD-10-CM: K52.9 ICD-9-CM: 558.9  12/14/2020 Unknown Positive blood culture ICD-10-CM: R78.81 ICD-9-CM: 790.7  12/14/2020 Unknown Coagulopathy ([facility] ICD-10-CM: G23.3 ICD-9-CM: 286.9  12/13/2020 Unknown Stage 4 decubitus ulcer ([facility] ICD-10-CM: L89.94 
ICD-9-CM: 707.00, 707.24  12/3/2020 Yes Ascites ICD-10-CM: R18.8 ICD-9-CM: 789.59  12/3/2020 Unknown Severe protein-calorie malnutrition ([facility] ICD-10-CM: W00 ICD-9-CM: 424  10/26/2020 Yes Cirrhosis ([facility] ICD-10-CM: K74.60 ICD-9-CM: 571.5  10/9/2020 Unknown Bacteremia Enterococcus faecalis Continue zosyn 6/14 days Repeated cx no growth Cirrhosis ([facility] (10/9/2020) with ascites IV albumin -dr. Fabiana Martinez will be amin tomorrow to see if he wants any else to be done before d/c Ammonia daily -continue encourage pt to take lactulose Lasix and spirnolactone   
Dr. Anna Pineda working for liver transplant -The CTP is 9.  Child class B.  The MELD score is 33. Tolerated paracentesis on 12/18-sbp noted from lab Coagulopathy ([facility] (12/13/2020) 
inr daily Continue hold xarelto     
  
PE /DVT on 11/30/2020 Appreciated dr. Mary Jo Guillory on board -discussed with dr. Kaci Geller for dvt prophy -waiting for final recommendation for [facility]  
pvl no dvt  
  
Pancreatic Duct enlargement MRI of Abdomen :Evidence of hepatic cirrhosis with no mass lesion, but chronic pancreatitis and fibrotic changes at the ampulla with mild extrinsic narrowing of the distal CBD. No mass lesion identified 
 
  
DM 
ssi and hypoglycemia protocol  
 
  
Large Sacral wound Continue abx and Continue local wound care  
  
Colitis on CT 
IV zosyn,no diarrhea Hypokalemia K replacement , continue monitoring Subjective : would like to go home Disposition :possible tomorrow Review of systems: 
 
General: No fevers or chills. Tired Cardiovascular: No chest pain or pressure. No palpitations. Pulmonary:  No shortness of breath  . Gastrointestinal: No nausea, vomiting. Mild  abdomen pain Vital signs/Intake and Output: 
Visit Vitals BP 95/70 Pulse 99 Temp 98.3 °F (36.8 °C) Resp 16 Ht 5' 9\" (1.753 m) Wt 63.8 kg (140 lb 10.5 oz) SpO2 100% BMI 20.77 kg/m² Current Shift:  No intake/output data recorded. Last three shifts:  12/18 1901 - 12/20 0700 In: 1640 [P.O.:1440; I.V.:200] Out: 2450 [Urine:2450] Physical Exam: 
General: WD, WN. Alert, cooperative, no acute distress HEENT: NC, Atraumatic. PERRLA, anicteric sclerae. Lungs: CTA Bilaterally. No Wheezing/Rhonchi/Rales. Heart:  Regular  rhythm,  No murmur, No Rubs, No Gallops Abdomen: Soft, +distended, Non tender. +Bowel sounds, Extremities: No c/c/e Psych:   no anxious or agitated, Neurologic:  No acute neurological deficit. Labs: Results:  
   
Chemistry Recent Labs  
  12/20/20 
1520 12/19/20 
0303 12/18/20 
2779 * 135* 151*  142 141  
K 2.9* 3.2* 2.9*  
 103 102 CO2 29 29 28 BUN 3* 3* 2*  
CREA 0.98 0.82 0.88 CA 8.7 8.5 8.5 AGAP 10 10 11 BUCR 3* 4* 2* AP 29* 34* 39* TP 6.0* 6.2* 6.2* ALB 3.8 3.6 3.3*  
GLOB 2.2 2.6 2.9 AGRAT 1.7 1.4 1.1  
  
CBC w/Diff Recent Labs  
  12/20/20 
0420 12/19/20 
0303 12/18/20 
0416 WBC 3.4* 3.8* 4.8  
RBC 2.58* 2.80* 3.16* HGB 8.0* 8.7* 9.9*  
HCT 22.9* 24.8* 28.2*  
PLT 53* 53* 52* GRANS 50 55 50 LYMPH 39 32 36 EOS 2 2 2 Cardiac Enzymes No results for input(s): CPK, CKND1, LYDIA in the last 72 hours. No lab exists for component: Demetrice  Coagulation Recent Labs  
  12/20/20 
0420 12/19/20 
0303 PTP 35.1* 35.2* INR 3.6* 3.7* Lipid Panel No results found for: CHOL, CHOLPOCT, CHOLX, CHLST, CHOLV, 767265, HDL, HDLP, LDL, LDLC, DLDLP, 904376, VLDLC, VLDL, TGLX, TRIGL, TRIGP, TGLPOCT, CHHD, CHHDX  
BNP No results for input(s): BNPP in the last 72 hours. Liver Enzymes Recent Labs  
  12/20/20 
0420 TP 6.0* ALB 3.8 AP 29* Thyroid Studies No results found for: T4, T3U, TSH, TSHEXT, TSHEXT Procedures/imaging: see electronic medical records for all procedures/Xrays and details which were not copied into this note but were reviewed prior to creation of Plan Cta Chest W Or W Wo Cont Result Date: 11/30/2020 EXAM: CTA chest CLINICAL INDICATION/HISTORY:  Generalized fatigue. Dyspnea. COMPARISON: None TECHNIQUE: Axial CT imaging from the thoracic inlet through the diaphragm with intravenous contrast. Coronal and sagittal MIP reformats were generated. One or more dose reduction techniques were used on this CT: automated exposure control, adjustment of the mAs and/or kVp according to patient size, and iterative reconstruction techniques. The specific techniques used on this CT exam have been documented in the patient's electronic medical record. Digital Imaging and Communications in Medicine (DICOM) format image data are available to nonaffiliated external healthcare facilities or entities on a secure, media free, reciprocally searchable basis with patient authorization for at least a 12-month period after this study. _______________ FINDINGS: EXAM QUALITY: Adequate opacification of the pulmonary arteries. PULMONARY ARTERIES: Critical result: There is a filling defect in the right lower lobe medial basal segmental artery. Remaining pulmonary arteries are unremarkable. MEDIASTINUM: Normal heart size. The ascending aorta is dilated measuring up to 4 cm. No pericardial effusion. Right heart strain indices: RV/LV ratio: Normal (ratio </= 1). Interventricular septal bowing: None. Main pulmonary artery diameter: Normal. Contrast reflux into the IVC: None. LUNGS: No suspicious nodule or mass. No abnormal opacities. PLEURA: Normal. AIRWAY: Normal. LYMPH NODES: No enlarged nodes. UPPER ABDOMEN: Please see concurrently performed CT abdomen/pelvis. OTHER: No acute or aggressive osseous abnormalities identified. SUPERFICIAL SOFT TISSUES: Unremarkable. _______________ IMPRESSION: 1. Acute pulmonary embolism involving the right lower lobe medial basal segmental artery. 2. 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 3. No focal consolidative process in the chest. Critical results discussed with Dr. Dyllan Siddiqui at 7:33 PM on 10/30/2020. Ct Abd Pelv W Cont Result Date: 12/13/2020 EXAM: CT of the abdomen and pelvis INDICATION: Abdominal pain and swelling COMPARISON: November 30, 2020 TECHNIQUE: Axial CT imaging of the abdomen and pelvis was performed with intravenous contrast. Multiplanar reformats were generated. One or more dose reduction techniques were used on this CT: automated exposure control, adjustment of the mAs and/or kVp according to patient size, and iterative reconstruction techniques. The specific techniques used on this CT exam have been documented in the patient's electronic medical record. Digital Imaging and Communications in Medicine (DICOM) format image data are available to nonaffiliated external healthcare facilities or entities on a secure, media free, reciprocally searchable basis with patient authorization for at least a 12-month period after this study. _______________ FINDINGS: LOWER CHEST: Unremarkable. LIVER, BILIARY: There is a nodular appearing liver suggesting cirrhosis. No focal hepatic lesion seen. No biliary dilation. Cholecystectomy PANCREAS: Pancreatic duct is mildly dilated to 6 mm. There is no atrophy of the pancreas. No discrete pancreatic mass identified however prior GI consultation and further workup. SPLEEN: Enlarged measuring 14.2 cm. ADRENALS: Normal. KIDNEYS: Punctate 2 to 3 mm calculi seen in both kidneys. Kidneys demonstrate no hydronephrosis. VASCULATURE: Mild to moderate calcific atherosclerosis present. There is recanalization the periumbilical vein suggesting portal hypertension. LYMPH NODES: No enlarged lymph nodes. GASTROINTESTINAL TRACT: Large volume of stool present throughout the colon. There are thickened small bowel loops along the left side of the midabdomen which may related to the ascites and cirrhosis however enteritis cannot be excluded. Is no bowel obstruction. PELVIC ORGANS: Unremarkable. BONES: No acute or aggressive osseous abnormalities identified. There is a sacral decubitus ulcer without evidence of osteomyelitis. OTHER: Large volume of ascites present. _______________ IMPRESSION: Cirrhosis, splenomegaly, large volume of ascites and portal hypertension. Thickened small bowel loops throughout the abdomen which may be related to the ascites and cirrhosis however enteritis not excluded. There is pancreatic duct dilatation however no atrophy or discrete mass identified. However I would advise further workup exclude any ampullary lesion. Advise GI consultation. Sacral decubitus ulcer without osteomyelitis. Large volume of retained stool in the colon. Ct Abd Pelv W Cont Result Date: 11/30/2020 EXAM: CT of the abdomen and pelvis CLINICAL INDICATION/HISTORY:  Weakness and dyspnea. Abdominal pain. COMPARISON: None. TECHNIQUE: Axial CT imaging of the abdomen and pelvis was performed with intravenous contrast. Multiplanar reformats were generated. One or more dose reduction techniques were used on this CT: automated exposure control, adjustment of the mAs and/or kVp according to patient size, and iterative reconstruction techniques. The specific techniques used on this CT exam have been documented in the patient's electronic medical record. Digital Imaging and Communications in Medicine (DICOM) format image data are available to nonaffiliated external healthcare facilities or entities on a secure, media free, reciprocally searchable basis with patient authorization for at least a 12-month period after this study. _______________ FINDINGS: LOWER CHEST: Please see concurrently performed CT of the chest. LIVER, BILIARY: There is cirrhotic morphology of the liver. No focal parenchymal masses visualized. There is no intra-or extrahepatic biliary ductal dilatation. Gallbladder is surgically absent. PANCREAS: Pancreatic duct is dilated measuring 5 mm. No discrete pancreatic mass or intraductal abnormality visualized. SPLEEN: Enlarged measuring 15.6 cm in AP diameter. ADRENALS: Normal. KIDNEYS: Normal. There is no nephrolithiasis. There is no hydronephrosis. LYMPH NODES: No enlarged lymph nodes. GASTROINTESTINAL TRACT: No bowel dilation or wall thickening. The appendix is visualized and unremarkable. PELVIC ORGANS: Unremarkable. VASCULATURE: Unremarkable. BONES: Post surgical changes of prior left hip open reduction and internal fixation. OTHER: There is no free intraperitoneal fluid or free air. SUPERFICIAL SOFT TISSUES: There is a soft tissue defect at the level of the lower sacral segments. No definite cortical changes in the sacrum _______________ IMPRESSION: 1. Cirrhotic morphology of liver with sequelae of portal venous hypertension including splenomegaly and moderate volume intra-abdominal ascites. 2. Nonspecific dilatation of the pancreatic duct up to 5 mm. 3. Lower sacral soft tissue ulcer without discrete CT evidence of osteomyelitis. Us Guide Paracentesis Result Date: 12/9/2020 PROCEDURE:  ULTRASOUND GUIDED PARACENTESIS INDICATION: Ascites. ________________________ TECHNIQUE/FINDINGS: I performed the procedure. The ascites in the right paracolic gutter was localized under ultrasound guidance. An image of the ascites was obtained and submitted into the patient record. The skin was marked, prepped and draped in sterile fashion and lidocaine was used for local anesthesia. Sterile probe cover and gel was used. A 5 [employer] Lisa Yueh sheathed needle was advanced into the fluid. A total of 3.8 L of straw colored fluid was drained using a Vacutainer system. The patient tolerated the procedure well and there were no immediate complications. Pre-procedure time out:  1659 hours. Post procedure pause:  1726 hours. GUIDANCE: Ultrasound guidance was used to position (and confirm the position of) the Yueh sheathed needle. Image(s) saved in PACS: Ultrasound ________________________ IMPRESSION: Successful ultrasound guided paracentesis of approximately 3.8 L of straw colored fluid. [address] [place] [address] Result Date: 12/2/2020 EXAM: Limited right upper quadrant abdominal ultrasound INDICATION: 78-year-old patient with cirrhosis and reported portal venous thrombosis. . COMPARISON: CT 11/30/2020. TECHNIQUE: Real-time abdomen/right upper quadrant sonography in multiple planes was performed with image documentation. Grayscale, color flow Doppler imaging, and velocity spectral waveform analysis of the portal vein was performed (duplex imaging). _______________ FINDINGS: LIVER: Diffusely coarsened hepatic parenchymal echotexture noted with lobular surface contour. . No focal mass Color flow Doppler and velocity spectral waveform analysis of the portal vein shows normal (hepatopedal) direction of flow. Gloria Sorrow BILIARY SYSTEM: No intrahepatic biliary dilatation. Common bile duct is normal in caliber measuring 0.4 cm. GALLBLADDER: Surgically absent. RIGHT KIDNEY: 10.8 cm in length. No hydronephrosis or renal mass. No visible calculi. PANCREAS: Nonvisualized. IVC: Visualized portions are unremarkable in appearance. OTHER: Moderate amount of intra-abdominal/pelvic ascites. . _______________ IMPRESSION: 1. Cirrhotic hepatic morphology. No biliary ductal dilatation or suspicious appearing hepatic lesion. 2. Moderate volume abdominal/pelvic ascites. . 3. Prior cholecystectomy. Xr Chest Im [address] Result Date: 11/30/2020 EXAM: One view chest x-ray CLINICAL INDICATION/HISTORY: Weakness and dyspnea. COMPARISON: 10/22/2020. TECHNIQUE: Single AP view of the chest was obtained. _______________ FINDINGS: HEART, VESSELS, MEDIASTINUM: Heart size is normal. No vascular congestion. LUNGS, PLEURAL SPACES: The lungs are clear. No effusion or pneumothorax. BONY THORAX, SOFT TISSUES: Unremarkable. _______________ IMPRESSION: No acute cardiopulmonary process.  
 
 
Manpreet Cevallos MD

## 2020-12-20 NOTE — PROGRESS NOTES
2341-Resting quietly in bed . Stable. White board updated. Call light and personal items within reach. 2358-Assessment complete. Lidoderm patches in place. Mepilex to left heel and buttocks noted. Stable. White board updated. Resting in bed, no complaints at this time. Call light and personal items within reach. 0402-Stable. No change from initial assessment. Resting in bed. 0537-Reported Potassium 2.9 to Dr. Meek Miramontes. No orders given. 0630-Resting in bed, stable. Call light within reach. Shift Summary:  Uneventful shift. Stable at shift change report.

## 2020-12-20 NOTE — PROGRESS NOTES
Bedside shift change report given to [address] [place] [address] (oncoming nurse) by Vinny Alvarez (offgoing nurse). Report included the following information SBAR, Kardex and ED Summary. 1030 Shift assessment complete. TRANSFER - OUT REPORT: 
 
Verbal report given to Ellis Schafer RN(name) on Radha Tate  being transferred to (unit) for routine progression of care Report consisted of patients Situation, Background, Assessment and  
Recommendations(SBAR). Information from the following report(s) SBAR, Kardex and ED Summary was reviewed with the receiving nurse. Lines:  
Peripheral IV 12/19/20 Anterior;Distal;Left Wrist (Active) Site Assessment Clean, dry, & intact 12/20/20 1030 Phlebitis Assessment 0 12/20/20 1030 Infiltration Assessment 0 12/20/20 1030 Dressing Status Clean, dry, & intact 12/20/20 1030 Dressing Type Tape;Transparent 12/20/20 1030 Hub Color/Line Status Blue; Infusing 12/20/20 1030 Action Taken Open ports on tubing capped 12/20/20 1030 Alcohol Cap Used Yes 12/20/20 1030 Opportunity for questions and clarification was provided. Patient transported with: 
 Registered Nurse

## 2020-12-21 NOTE — PROGRESS NOTES
Problem: Patient Education:  Go to Education Activity Goal: Patient/Family Education Outcome: Progressing Towards Goal 
  
Problem: Falls - Risk of 
Goal: *Absence of Falls Description: Document Evan Dee Fall Risk and appropriate interventions in the flowsheet. Outcome: Progressing Towards Goal 
Note: Fall Risk Interventions: 
Mobility Interventions: Assess mobility with egress test, Bed/chair exit alarm Mentation Interventions: Adequate sleep, hydration, pain control, Bed/chair exit alarm Medication Interventions: Evaluate medications/consider consulting pharmacy Elimination Interventions: Call light in reach, Toileting schedule/hourly rounds Problem: Patient Education: Go to Patient Education Activity Goal: Patient/Family Education Outcome: Progressing Towards Goal 
  
Problem: Pressure Injury - Risk of 
Goal: *Prevention of pressure injury Description: Document Trav Scale and appropriate interventions in the flowsheet. Outcome: Progressing Towards Goal 
Note: Pressure Injury Interventions: 
Sensory Interventions: Assess changes in LOC Moisture Interventions: Absorbent underpads, Offer toileting Q_hr Activity Interventions: Increase time out of bed Mobility Interventions: Pressure redistribution bed/mattress (bed type) Nutrition Interventions: Document food/fluid/supplement intake, Discuss nutritional consult with provider Friction and Shear Interventions: HOB 30 degrees or less Problem: Patient Education: Go to Patient Education Activity Goal: Patient/Family Education Outcome: Progressing Towards Goal 
  
Problem: Pressure Injury - Risk of 
Goal: *Prevention of pressure injury Description: Document Trav Scale and appropriate interventions in the flowsheet. Outcome: Progressing Towards Goal 
Note: Pressure Injury Interventions: 
Sensory Interventions: Assess changes in LOC Moisture Interventions: Absorbent underpads, Offer toileting Q_hr 
 
 Activity Interventions: Increase time out of bed Mobility Interventions: Pressure redistribution bed/mattress (bed type) Nutrition Interventions: Document food/fluid/supplement intake, Discuss nutritional consult with provider Friction and Shear Interventions: HOB 30 degrees or less Problem: Patient Education: Go to Patient Education Activity Goal: Patient/Family Education Outcome: Progressing Towards Goal 
  
Problem: Pain Goal: *Control of Pain Outcome: Progressing Towards Goal 
Goal: *PALLIATIVE CARE:  Alleviation of Pain Outcome: Progressing Towards Goal 
  
Problem: Patient Education: Go to Patient Education Activity Goal: Patient/Family Education Outcome: Progressing Towards Goal 
  
Problem: Ascities-Palliative Care Goal: *PALLIATIVE CARE-Alleviation of ascities Outcome: Progressing Towards Goal 
  
Problem: General Infection Care Plan (Adult and Pediatric) Goal: Improvement in signs and symptoms of infection Outcome: Progressing Towards Goal 
Goal: *Optimize nutritional status Outcome: Progressing Towards Goal 
 The care plan was initiated and reviewed with pt. Care plan options were discussed and questions answered. The pt understand instructions and will follow up as directed.

## 2020-12-21 NOTE — PROGRESS NOTES
Problem: Patient Education:  Go to Education Activity Goal: Patient/Family Education Outcome: Progressing Towards Goal 
  
Problem: Falls - Risk of 
Goal: *Absence of Falls Description: Document Madeline Judge Fall Risk and appropriate interventions in the flowsheet. Outcome: Progressing Towards Goal 
Note: Fall Risk Interventions: 
Mobility Interventions: Assess mobility with egress test, Communicate number of staff needed for ambulation/transfer, Patient to call before getting OOB Mentation Interventions: Adequate sleep, hydration, pain control Medication Interventions: Evaluate medications/consider consulting pharmacy, Patient to call before getting OOB, Teach patient to arise slowly Elimination Interventions: Call light in reach, Patient to call for help with toileting needs, Stay With Me (per policy), Toileting schedule/hourly rounds Problem: Patient Education: Go to Patient Education Activity Goal: Patient/Family Education Outcome: Progressing Towards Goal 
  
Problem: Pressure Injury - Risk of 
Goal: *Prevention of pressure injury Description: Document Trav Scale and appropriate interventions in the flowsheet. Outcome: Progressing Towards Goal 
Note: Pressure Injury Interventions: 
Sensory Interventions: Assess changes in LOC, Check visual cues for pain, Keep linens dry and wrinkle-free Moisture Interventions: Absorbent underpads, Offer toileting Q_hr Activity Interventions: Increase time out of bed, Pressure redistribution bed/mattress(bed type) Mobility Interventions: Pressure redistribution bed/mattress (bed type) Nutrition Interventions: Document food/fluid/supplement intake, Offer support with meals,snacks and hydration Friction and Shear Interventions: HOB 30 degrees or less, Lift sheet, Lift team/patient mobility team 
 
  
 
 
 
  
Problem: Patient Education: Go to Patient Education Activity Goal: Patient/Family Education Outcome: Progressing Towards Goal 
  
 Problem: Pain Goal: *Control of Pain Outcome: Progressing Towards Goal 
Goal: *PALLIATIVE CARE:  Alleviation of Pain Outcome: Progressing Towards Goal 
  
Problem: Patient Education: Go to Patient Education Activity Goal: Patient/Family Education Outcome: Progressing Towards Goal 
  
Problem: Ascities-Palliative Care Goal: *PALLIATIVE CARE-Alleviation of ascities Outcome: Progressing Towards Goal 
  
Problem: Patient Education: Go to Patient Education Activity Goal: Patient/Family Education Outcome: Progressing Towards Goal 
  
Problem: Patient Education: Go to Patient Education Activity Goal: Patient/Family Education Outcome: Progressing Towards Goal 
  
Problem: Patient Education: Go to Patient Education Activity Goal: Patient/Family Education Outcome: Progressing Towards Goal

## 2020-12-21 NOTE — PROGRESS NOTES
2350  Pt is resting quietly in bed, reports pain is decreasing after he received some pain medication. Denies other needs, concerns, states he is tired. Skin is jaundiced. IV intact. NAD. BP low as his usual. 
 
0335  Given po pain med per request. Up to [facility] to void. NAD. 0500  Morphine given per request. Watching TV. 
 
0744  [facility]Bedside shift change report given to LAVON Cline (oncoming nurse) by DANNY Chun RN (offgoing nurse).  Report included the following information Kardex, Intake/Output, Recent Results, Med Rec Status and Cardiac Rhythm SR.

## 2020-12-21 NOTE — PROGRESS NOTES
0720 
Bedside and Verbal shift change report given to LAVON Hong (oncoming nurse) by DANNY Root RN (offgoing nurse). Report included the following information SBAR, Kardex, OR Summary, Intake/Output and MAR. 
 
0830 Shift assessment completed. Pt AOX4 RA. Lung sounds clear, bowel sounds active. Abdomen is distended and tender to palpation. Pt has wound to the sacrum and wound to LLE that are both enforced with mepilex foam dressings and CDI. Pt also has 2X2 enforced to the abdomen r/t paracentesis insertion site. Skin is jaundiced and fragile with scattered bruising but otherwise intact. Pt has 22G Left Wrist CDI. Pt on Telebox 34 NSR. Platelets 57 this AM. Per offgoing RN, pt to receive scheduled Lovenox regardless. Pt reports 7/10 pain in the abdomen but denies any n/v.  
 
9207 Pt received PRN Morphine (2mg) for 7/10 pain in the abdomen. [address] [facility] Pt received PRN Percocet (2 tabs) for 6/10 pain in the abdomen. [address] New dressings applied to LLE and Sacrum. Pt's wife provided wound care materials from home and specific instructions for care. Directions included in a nursing message. [address] Pt received PRN Morphine (2mg) for 7/10 pain in the abdomen. [address] Pt received PRN Percocet (2 tabs) for 6/10 pain in the abdomen. [place] Uneventful shift. The priority for this shift has been pain management. Pt reports pain has been managed well with PRN pain medications. Dressings to LLE and Sacrum changed x1. Pt's wife provided home health materials for wound care. Directions included in a nursing message. Pt consumed 100% of all meals. Pt voiding well. 299 [facility] Bedside and Verbal shift change report given by LAVON Barbosa RN (off going nurse) to BERENICE Covarrubias RN (on coming nurse). Report included the following information SBAR, Kardex, OR Summary, Intake/Output and MAR.

## 2020-12-21 NOTE — PROGRESS NOTES
Problem: Mobility Impaired (Adult and Pediatric) Goal: *Acute Goals and Plan of Care (Insert Text) Description: Physical Therapy short term goals initiated 12/17/2020, to be achieved in 4-7 days. Pt will: 1. Perform bed mobility with indep in prep for OOB activity. 2. Perform sit <> stand transfers with LRAD and S in prep for functional mobility and ambulation. 3. Ambulate 200 ft with LRAD and S in prep for household and community mobility. 4. Ascend/descend 3 stairs with 1 HR, LRAD, and S for safe home entry. Outcome: Progressing Towards Goal 
 
PHYSICAL THERAPY TREATMENT Patient: Matt Salazar (54 y.o. male) Date: 12/21/2020 Diagnosis: Coagulopathy ([facility] [D68.9] Ascites [R18.8] Cirrhosis ([facility] [K74.60] Colitis Precautions: Fall Chart, physical therapy assessment, plan of care and goals were reviewed. ASSESSMENT: 
Nurse Ta in room completing dressing change pt foot. Pt reports dressing to buttocks done as well. Pt making improvement in gt distance and dynamic standing balance. Gait distance increased to 500ft with 2 standing and 1 seated rest break. O2 st 100% on RA;  during seated rest. Pt returned to room, reports fatigue so did not wish to sit up in chair. Pt left back in bed with lunch meal present and pt spouse in room. Will continue per POC for goals achievement. Progression toward goals: 
[x]      Improving appropriately and progressing toward goals 
[]      Improving slowly and progressing toward goals 
[]      Not making progress toward goals and plan of care will be adjusted PLAN: 
Patient continues to benefit from skilled intervention to address the above impairments. Continue treatment per established plan of care. Discharge Recommendations:  Home Health Further Equipment Recommendations for Discharge:  N/A  
 
SUBJECTIVE:  
Patient stated So, so. OBJECTIVE DATA SUMMARY:  
Critical Behavior: 
Neurologic State: Alert, Appropriate for age Orientation Level: Oriented X4 Cognition: Appropriate decision making, Follows commands Safety/Judgement: Awareness of environment, Insight into deficits Functional Mobility Training: 
Bed Mobility: 
Sit to Supine: Supervision; Additional time Scooting: Supervision Transfers: 
Sit to Stand: Contact guard assistance Stand to Sit: Contact guard assistance Balance: 
Sitting: Intact Standing: Intact; With support Ambulation/Gait Training: 
Distance (ft): 500 Feet (ft) Assistive Device: Gait belt;Walker, rolling Ambulation - Level of Assistance: Contact guard assistance;Supervision Gait Abnormalities: Decreased step clearance Speed/Ewa: Slow Step Length: Right shortened;Left shortened Interventions: Safety awareness training;Verbal cues Pain: 
Pain Scale 1: Numeric (0 - 10) Pain Intensity 1: 6 Pain Location 1: Abdomen Pain Orientation 1: Anterior Pain Description 1: Aching Activity Tolerance:  
Fair+/good Please refer to the flowsheet for vital signs taken during this treatment. After treatment:  
[] Patient left in no apparent distress sitting up in chair 
[x] Patient left in no apparent distress in bed 
[x] Call bell left within reach 
[] Nursing notified 
[x] Caregiver present 
[] Bed alarm activated Lubna Mcfadden PT Time Calculation: 31 mins

## 2020-12-21 NOTE — PROGRESS NOTES
Patient required Morpine 2 mg IV for pain to abdomen rating at 7/10, which provided relief at comfortable level. Vital signs continue to run low, patient on albumin. POC glucose this evening 199. Patient stated ate a cookie, but no other dinner. Scheduled  Lantus 10 units given, and held Sliding scale. 2310 - Bedside and Verbal shift change report given to DANNY Maria RN (oncoming nurse) by Kimberly Lucas (offgoing nurse). Report included the following information SBAR, Kardex, Intake/Output and MAR.

## 2020-12-21 NOTE — PROGRESS NOTES
Occupational Therapy Treatment Attempt Chart reviewed. Attempted Occupational Therapy Treatment, however, patient unable to be seen due to: 
[]  Nausea/vomiting 
[]  Eating 
[x]  Pain/pt reports abdominal pain and declines any participation. Pt noted to be at <30 degrees HOB and encouraged more upright sitting for digestion and aspiration. Pt agreeable to ~ 45 degree HOB, but declined EOB. []  Patient too lethargic 
[]  Off Unit for testing/procedure 
[]  Dialysis treatment in progress 
[]  Telemetry Results []  Other:  
 
Will f/u later as patient's schedule allows.   
Thank you for this referral. 
Germaine Arora, OTR/L, CSRS, CDCS, CFPS

## 2020-12-21 NOTE — CONSULTS
Continue Lovenox as doing. If platelets > 75 at discharge can convert to therapeutic Lovenox 1mg/kg q12. No role for IVC filter given negative dopplers. Following peripherally. Jomar Michelle. Tanika Estevez M.D. 
[facility]

## 2020-12-21 NOTE — PROGRESS NOTES
D/C Plan: [place] and physician follow up vs home with physician follow up Noted therapy recommendation for [place] services. Noted pt was previously discharged with [facility]. CM will follow up with pt to further discuss transition of care needs. Care Management Interventions PCP Verified by CM: Yes Mode of Transport at Discharge: Self Transition of Care Consult (CM Consult): Discharge Planning, Home Health [address] [place] [address] Road: Yes Current Support Network: Lives with Spouse, Own Home Confirm Follow Up Transport: Family The Plan for Transition of Care is Related to the Following Treatment Goals : home when medically stable The Patient and/or Patient Representative was Provided with a Choice of Provider and Agrees with the Discharge Plan?: Yes Name of the Patient Representative Who was Provided with a Choice of Provider and Agrees with the Discharge Plan: Suly Posada, patient Freedom of Choice List was Provided with Basic Dialogue that Supports the Patient's Individualized Plan of Care/Goals, Treatment Preferences and Shares the Quality Data Associated with the Providers?: Yes Discharge Location Discharge Placement: Home with home health

## 2020-12-21 NOTE — PROGRESS NOTES
Comprehensive Nutrition Assessment Type and Reason for Visit: (P) Reassess Nutrition Recommendations/Plan: continue current POC, continue oral nutritional supplements Nutrition Assessment:  (P) pt admitted with colitis, bacteremia, decub ulcer, ascites, cirrhosis. s/p paracentesis per MD note. Estimated Daily Nutrient Needs: 
Energy (kcal): (P) 2208; Weight Used for Energy Requirements: (P) Current Protein (g): (P) 82; Weight Used for Protein Requirements: (P) Current(1.3g/kg) Fluid (ml/day): (P) 2208; Method Used for Fluid Requirements: (P) 1 ml/kcal 
 
 
Nutrition Related Findings:  (P) Labs reviewed: K-3.4, POC tests 181-312, ammonia 20. Meds reviewed: lastix, lantus, lispro, lactulose, thiamine, aldactone, protonix. PO intakes reviewed, %. ? if wt loss is secondary to paracentesis and diuresis Wounds: (P) Pressure injury Current Nutrition Therapies: DIET CARDIAC Regular DIET NUTRITIONAL SUPPLEMENTS Dinner, Lunch; [place] DIET NUTRITIONAL SUPPLEMENTS All Meals; Glucerna Shake Anthropometric Measures: 
Height:  (P) 5' 9\" (175.3 cm) Current Body Wt:  (P) 63.1 kg (139 lb 1.8 oz) Admission Body Wt:  (P) 138 lb Usual Body Wt:  (P) 73 kg (160 lb 15 oz) Ideal Body Wt:  (P) 160 lbs:  (P) 86.9 % Adjusted Body Weight:   ; Weight Adjustment for: (P) No adjustment Adjusted BMI:      
BMI Category:  (P) Normal weight (BMI 22.0-24.9) age over 72 Nutrition Diagnosis: (P) Severe malnutrition related to (P) inadequate protein-energy intake as evidenced by (P) weight loss greater than or equal to 5% in 1 month, wounds, localized or generalized fluid accumulation Nutrition Interventions:  
Food and/or Nutrient Delivery: (P) Continue current diet, Continue oral nutrition supplement Nutrition Education and Counseling: (P) No recommendations at this time Coordination of Nutrition Care: (P) Continue to monitor while inpatient, Interdisciplinary rounds Goals: (P) Continue to encourage PO intakes >50% at most meals throughout the next 3-5 days Nutrition Monitoring and Evaluation:  
Behavioral-Environmental Outcomes: (P) None identified Food/Nutrient Intake Outcomes: (P) Diet advancement/tolerance, Food and nutrient intake, Supplement intake Physical Signs/Symptoms Outcomes: (P) Biochemical data, GI status, Fluid status or edema, Skin, Weight, Nutrition focused physical findings Discharge Planning: (P) Continue oral nutrition supplement, Continue current diet Electronically signed by Aileen Graff on 12/21/2020 at 11:52 AM

## 2020-12-21 NOTE — PROGRESS NOTES
Hospitalist Progress Note Patient: Maryellen Andrade MRN: [id number]  CSN: [id number] YOB: 1965  Age: 54 y.o. Sex: male DOA: 12/13/2020 LOS:  LOS: 8 days Assessment/Plan Patient Active Problem List  
Diagnosis Code  Hypocalcemia E83.51  
 Hypomagnesemia E83.42  
 Alcoholism ([facility] F10.20  Cirrhosis ([facility] K74.60  Thrombocytopenia ([facility] D69.6  Severe protein-calorie malnutrition ([facility] E43  Pulmonary embolism (HCC) I26.99  
 Acute deep vein thrombosis (DVT) of both lower extremities (HCC) I82.403  Stage 4 decubitus ulcer (HCC) L89.94  
 Ascites R18.8  Coagulopathy (HCC) D68.9  
 Colitis K52.9  Positive blood culture R78.81  Bacteremia R65.80  
  
 
54 y.o.  male who has history of recent pulmonary embolism and DVT, cirrhosis, diabetes, vent dependent respiratory failure with pneumonia presents to the emergency room with increasing abdominal pain and swelling despite abdominal paracentesis Bacteremia - 
1/2 blood cultures growing Enterococcus faecalis. Continue zosyn 6/14 days Repeated cx no growth  
  
Cirrhosis ([facility] (10/9/2020) with ascites IV albumin - Seen by Dr. Rand Mortimer Ammonia daily - on lactulose Lasix and spirnolactone   
Dr. Rand Mortimer working for liver transplant - The CTP is 9.  Child class B.  The MELD score is 33. Tolerated paracentesis on 12/18- Peritoneal fluid with no growth.  
  
Coagulopathy ([facility] (12/13/2020) 
inr daily Still coagulopathic despite vitamin K Continue hold xarelto secondary to elevated INR and thrombocytopenia. Thrombocytopenia - Secondary to cirrhosis  
  
PE /DVT on 11/30/2020 - Seen by heme/onc, recommend lovenox if plt above 75 
  
Pancreatic Duct enlargement - MRI of Abdomen :Evidence of hepatic cirrhosis with no mass lesion, but chronic pancreatitis and fibrotic changes at the ampulla with mild extrinsic narrowing of the distal CBD.  No mass lesion identified.  
 
 Chronic pancreatitis - 
  
DM - 
ssi and hypoglycemia protocol   
  
Large Sacral wound - 
Continue abx and Continue local wound care  
  
Colitis on CT - 
IV zosyn,no diarrhea  
  
Hypokalemia - 
K replacement , continue monitoring Disposition : 1-2 days Review of systems General: No fevers or chills. Cardiovascular: No chest pain or pressure. No palpitations. Pulmonary: No shortness of breath. Gastrointestinal: No nausea, vomiting. Physical Exam: 
General: Awake, cooperative, no acute distress   
HEENT: NC, Atraumatic. PERRLA, anicteric sclerae. Lungs: CTA Bilaterally. No Wheezing/Rhonchi/Rales. Heart:  S1 S2,  No murmur, No Rubs, No Gallops Abdomen: Soft, Non distended, Non tender.  +Bowel sounds, Extremities: No c/c/e Psych:   Not anxious or agitated. Neurologic:  No acute neurological deficit. Vital signs/Intake and Output: 
Visit Vitals BP 99/68 (BP 1 Location: Right arm, BP Patient Position: At rest) Pulse (!) 107 Temp 98.5 °F (36.9 °C) Resp 18 Ht 5' 9\" (1.753 m) Wt 63.1 kg (139 lb 1.8 oz) SpO2 98% BMI 20.54 kg/m² Current Shift:  No intake/output data recorded. Last three shifts:  12/19 1901 - 12/21 0700 In: 1380 [P.O.:440; I.V.:940] Out: 900 [Urine:900] Labs: Results:  
   
Chemistry Recent Labs  
  12/21/20 
8479 12/20/20 
0420 12/19/20 
0303 * 149* 135*  140 142  
K 3.4* 2.9* 3.2*  
CL 98* 101 103 CO2 30 29 29 BUN 3* 3* 3*  
CREA 1.05 0.98 0.82 CA 9.7 8.7 8.5 AGAP 10 10 10 BUCR 3* 3* 4* AP 37* 29* 34* TP 7.3 6.0* 6.2* ALB 5.0 3.8 3.6 GLOB 2.3 2.2 2.6 AGRAT 2.2* 1.7 1.4  
  
CBC w/Diff Recent Labs  
  12/21/20 
0318 12/20/20 
0420 12/19/20 
0303 WBC 4.8 3.4* 3.8*  
RBC 3.18* 2.58* 2.80* HGB 9.8* 8.0* 8.7* HCT 28.6* 22.9* 24.8*  
PLT 57* 53* 53* GRANS 59 50 55 LYMPH 32 39 32 EOS 1 2 2 Cardiac Enzymes No results for input(s): CPK, CKND1, LYDIA in the last 72 hours. No lab exists for component: Hue Rojas Coagulation Recent Labs  
  12/21/20 0318 12/20/20 
0420 PTP 29.5* 35.1* INR 2.9* 3.6* Lipid Panel No results found for: CHOL, CHOLPOCT, CHOLX, CHLST, CHOLV, 736802, HDL, HDLP, LDL, LDLC, DLDLP, 874838, VLDLC, VLDL, TGLX, TRIGL, TRIGP, TGLPOCT, CHHD, CHHDX  
BNP No results for input(s): BNPP in the last 72 hours. Liver Enzymes Recent Labs  
  12/21/20 0318 TP 7.3 ALB 5.0 AP 37* Thyroid Studies No results found for: T4, T3U, TSH, TSHEXT Procedures/imaging: see electronic medical records for all procedures/Xrays and details which were not copied into this note but were reviewed prior to creation of Plan

## 2020-12-22 NOTE — PROGRESS NOTES
D/C Plan: [address] [facility] and physician follow up CM met with pt at bedside to discuss therapy recommendation for [facility]. Pt has indicated he has used [facility] in the past and would like to use this agency again. CMS has been notified to assist.  Anticipate pt will transition home with in the next 24-48 hours with [facility] and physician follow up. Pt does have a RW, cane and an elevated toilet seat. Pt's wife will transport pt home upon discharge. CM to continue to follow and assist as needed. Care Management Interventions PCP Verified by CM: Yes Mode of Transport at Discharge: Self Transition of Care Consult (CM Consult): Discharge Planning, Home Health [address]: Yes Current Support Network: Lives with Spouse, Own Home Confirm Follow Up Transport: Family The Plan for Transition of Care is Related to the Following Treatment Goals : home when medically stable The Patient and/or Patient Representative was Provided with a Choice of Provider and Agrees with the Discharge Plan?: Yes Name of the Patient Representative Who was Provided with a Choice of Provider and Agrees with the Discharge Plan: Charlette Elaine patient Freedom of Choice List was Provided with Basic Dialogue that Supports the Patient's Individualized Plan of Care/Goals, Treatment Preferences and Shares the Quality Data Associated with the Providers?: Yes Discharge Location Discharge Placement: Home with home health

## 2020-12-22 NOTE — PROGRESS NOTES
Problem: Patient Education:  Go to Education Activity Goal: Patient/Family Education Outcome: Progressing Towards Goal

## 2020-12-22 NOTE — PROGRESS NOTES
19:45 Assessment completed. Lungs are clear bilat. Abd remains soft & tender with + BS. Abd dsg (lg bandaid) remains C/D/I. Resting quietly in bed talking on his phone. Using [facility] w/o difficulty. 22:25 Shift assessment completed. See nsg flow sheet for details. 02:55 Reassessed with 0 changes noted. Resting quietly in bed with eyes closed x for using the [facility] w/o difficulty. 07:05 Bedside and Verbal shift change report given to Blayne Rome RN (oncoming nurs) by Rasheed Astudillo RN (offgoing nurse). Report included the following information SBAR.

## 2020-12-22 NOTE — PROGRESS NOTES
Problem: Falls - Risk of 
Goal: *Absence of Falls Description: Document Mary Anne Arias Fall Risk and appropriate interventions in the flowsheet. Outcome: Progressing Towards Goal 
Note: Fall Risk Interventions: 
Mobility Interventions: Communicate number of staff needed for ambulation/transfer, Patient to call before getting OOB, Utilize walker, cane, or other assistive device Mentation Interventions: Adequate sleep, hydration, pain control Medication Interventions: Assess postural VS orthostatic hypotension, Patient to call before getting OOB, Teach patient to arise slowly Elimination Interventions: Call light in reach, Patient to call for help with toileting needs, Urinal in reach

## 2020-12-22 NOTE — PROGRESS NOTES
19:45 Assessment completed. Lungs are clear bilat. Abd remains soft & tender with + BS. Abd dsg (lg bandaid) remains C/D/I. Resting quietly in bed talking on his phone. Using [facility] w/o difficulty. 22:25 Shift assessment completed. See nsg flow sheet for details. 02:55 Reassessed with 0 changes noted. Resting quietly in bed with eyes closed between cares. Voiding per BSC w/o difficulty.

## 2020-12-22 NOTE — PROGRESS NOTES
Problem: Patient Education:  Go to Education Activity Goal: Patient/Family Education 12/22/2020 1052 by Carlos Pride Outcome: Progressing Towards Goal 
12/22/2020 1051 by Carlos Pride Outcome: Progressing Towards Goal

## 2020-12-22 NOTE — PROGRESS NOTES
0715 
Bedside and Verbal shift change report given to LAVON Gutierrez (oncoming nurse) by Kerrie Pedroza. Amanda Tian RN (offgoing nurse). Report included the following information SBAR, Kardex, OR Summary, Intake/Output and MAR. 
 
5824 Shift assessment completed. Pt AOX4 RA. Lung sounds clear, bowel sounds hypoactive. Abdomen is slightly distended and tender on palpation. Pt has wound to the sacrum and wound to LLE that are both enforced with mepilex foam dressings and CDI. Pt also has 2X2 enforced to the abdomen r/t paracentesis insertion site. Skin is jaundiced and fragile with scattered bruising but otherwise intact. Pt has 22G Left Wrist CDI. Pt on Telebox 34 NSR. Pt denies any pain, discomfort, or n/v.  
 
0930 Pt received PRN Morphine (2mg) for 5/10 pain in the abdomen. Pt consumed 75% of breakfast. 
 
1152 Pt received PRN Percocet (2 tabs) for 5/10 pain in the abdomen. [address] Pt received PRN Morphine (2mg) for 6/10 pain in the abdomen. New dressings applied to LLE and sacrum. Pt tolerated procedure well. Pt consumed 65% of lunch. [address] Pt reports 4/10 pain for which he is utilizing rest for. [facility]28 [facility] Pt received PRN Morphine (2mg) for 6/10 pain in the abdomen. [facility] Uneventful shift. Pt's pain has been managed well with PRN Morphine and PRN Percocet. Pt consumed the majority of his meals. Pt remains hydrated. Pt reports BM x1 that was loose and large in nature. 299 [facility] Bedside and Verbal shift change report given by LAVON Moreno RN (off going nurse) to RANDY North RN (on coming nurse). Report included the following information SBAR, Kardex, OR Summary, Intake/Output and MAR.

## 2020-12-22 NOTE — PROGRESS NOTES
Dressings changed to right heel, sacrum, and small mepilex applied to right posterior thigh. All wounds cleaned with wound  and OTC antibiotic ointment applied.

## 2020-12-22 NOTE — PROGRESS NOTES
Hospitalist Progress Note Patient: Lyubov Denis MRN: [id number]  CSN: [id number] YOB: 1965  Age: 54 y.o. Sex: male DOA: 12/13/2020 LOS:  LOS: 9 days Assessment/Plan Patient Active Problem List  
Diagnosis Code  Hypocalcemia E83.51  
 Hypomagnesemia E83.42  
 Alcoholism ([facility] [address] F10.20  Cirrhosis ([facility] [facility] K74.60  Thrombocytopenia ([facility] [address] D69.6  Severe protein-calorie malnutrition ([address] E43  Pulmonary embolism (HCC) I26.99  
 Acute deep vein thrombosis (DVT) of both lower extremities (HCC) I82.403  Stage 4 decubitus ulcer (HCC) L89.94  
 Ascites R18.8  Coagulopathy (HCC) D68.9  
 Colitis K52.9  Positive blood culture R78.81  Bacteremia R65.80  
  
 
54 y.o.  male who has history of recent pulmonary embolism and DVT, cirrhosis, diabetes, vent dependent respiratory failure with pneumonia presents to the emergency room with increasing abdominal pain and swelling despite abdominal paracentesis Bacteremia - 
1/2 blood cultures growing Enterococcus faecalis. Continue zosyn 6/14 days Repeated cx no growth  
  
Cirrhosis ([facility] [address] (10/9/2020) with ascites IV albumin - Seen by Dr. Reba Fitzgerald Ammonia daily - on lactulose Lasix and spirnolactone   
Dr. Reba Fitzgerald working for liver transplant - The CTP is 9.  Child class B.  The MELD score is 33. Tolerated paracentesis on 12/18- Peritoneal fluid with no growth.  
  
Coagulopathy ([facility] [address] (12/13/2020) 
inr daily Still coagulopathic despite vitamin K Continue hold xarelto secondary to elevated INR and thrombocytopenia. Thrombocytopenia - Secondary to cirrhosis  
  
PE /DVT on 11/30/2020 - Seen by heme/onc, recommend lovenox if plt above 75 
  
Pancreatic Duct enlargement - MRI of Abdomen :Evidence of hepatic cirrhosis with no mass lesion, but chronic pancreatitis and fibrotic changes at the ampulla with mild extrinsic narrowing of the distal CBD.  No mass lesion identified.  
 
 Chronic pancreatitis - 
  
DM - 
ssi and hypoglycemia protocol   
  
Large Sacral wound - 
Continue abx and Continue local wound care  
  
Colitis on CT - 
IV zosyn,no diarrhea  
  
Hypokalemia - 
K replacement , continue monitoring Disposition : 1-2 days Review of systems General: No fevers or chills. Cardiovascular: No chest pain or pressure. No palpitations. Pulmonary: No shortness of breath. Gastrointestinal: No nausea, vomiting. Physical Exam: 
General: Awake, cooperative, no acute distress   
HEENT: NC, Atraumatic. PERRLA, anicteric sclerae. Lungs: CTA Bilaterally. No Wheezing/Rhonchi/Rales. Heart:  S1 S2,  No murmur, No Rubs, No Gallops Abdomen: Soft, Non distended, Non tender.  +Bowel sounds, Extremities: No c/c/e Psych:   Not anxious or agitated. Neurologic:  No acute neurological deficit. Vital signs/Intake and Output: 
Visit Vitals BP (!) 88/59 (BP 1 Location: Right arm, BP Patient Position: At rest) Pulse (!) 103 Temp 98.1 °F (36.7 °C) Resp 18 Ht 5' 9\" (1.753 m) Wt 62.8 kg (138 lb 8 oz) SpO2 100% BMI 20.45 kg/m² Current Shift:  12/22 0701 - 12/22 1900 In: 100 [P.O.:100] Out: - Last three shifts:  12/20 1901 - 12/22 0700 In: 7855 [P.O.:735; I.V.:740] Out: 250 [Urine:250] Labs: Results:  
   
Chemistry Recent Labs  
  12/22/20 
1516 12/21/20 
6831 12/20/20 
7558 * 168* 149*  138 140  
K 3.1* 3.4* 2.9*  
CL 99* 98* 101 CO2 32 30 29 BUN 5* 3* 3*  
CREA 0.93 1.05 0.98  
CA 9.7 9.7 8.7 AGAP 8 10 10 BUCR 5* 3* 3* AP 25* 37* 29* TP 6.4 7.3 6.0* ALB 4.4 5.0 3.8 GLOB 2.0 2.3 2.2 AGRAT 2.2* 2.2* 1.7  
  
CBC w/Diff Recent Labs  
  12/22/20 
0435 12/21/20 
0318 12/20/20 
0420 WBC 4.4* 4.8 3.4*  
RBC 2.58* 3.18* 2.58* HGB 8.1* 9.8* 8.0*  
HCT 22.8* 28.6* 22.9*  
PLT 44* 57* 53* GRANS 56 59 50 LYMPH 35 32 39 EOS 1 1 2 Cardiac Enzymes No results for input(s): CPK, CKND1, LYDIA in the last 72 hours. No lab exists for component: Anurag Nguyen Coagulation Recent Labs  
  12/22/20 0435 12/21/20 0318 PTP 37.4* 29.5* INR 4.0* 2.9* Lipid Panel No results found for: CHOL, CHOLPOCT, CHOLX, CHLST, CHOLV, 308627, HDL, HDLP, LDL, LDLC, DLDLP, 684550, VLDLC, VLDL, TGLX, TRIGL, TRIGP, TGLPOCT, CHHD, CHHDX  
BNP No results for input(s): BNPP in the last 72 hours. Liver Enzymes Recent Labs  
  12/22/20 0435 TP 6.4 ALB 4.4 AP 25* Thyroid Studies No results found for: T4, T3U, TSH, TSHEXT, TSHEXT Procedures/imaging: see electronic medical records for all procedures/Xrays and details which were not copied into this note but were reviewed prior to creation of Plan

## 2020-12-22 NOTE — PROGRESS NOTES
Palliative Medicine [facility] [phone]  [facility] [phone] Palliative SW f/u with patient at bedside. He was AAOx4 sitting up in bed taking morning medications. He presented guarded with sharing emotions and how he was truly feeling. All his answers to questions were short; he stated feeling \"fine\". He denied having any worries but is concerned he isn't able to stay out of the hospital longer then a week. He hoping to be d/c before lauren. Patient denied needing any further support from palliative. Will continue to be available as needed/requested. No change to goals of care. Remains FULL code and FULL aggressive care. Thank you for the opportunity to assist in the care of Mr. Emelina Castro. LORENA Dobbins, [facility]

## 2020-12-23 PROBLEM — B95.2 ENTEROCOCCUS FAECALIS INFECTION: Status: ACTIVE | Noted: 2020-01-01

## 2020-12-23 NOTE — PROGRESS NOTES
2056 - Head to toe assessment completed at this time. Pt lungs clear, bowel sounds hypoactive. Pt denies any chest pain or numbness or tingling to extremities. Pt encouraged to manage pain and to call for assistance. Pt left in bed with no sign of distress. Pt left with call light within reach, bed in the low position and wheels locked. Will continue to monitor. Shift summary - Pt had an uneventful night. Pt left with no signs of distress.

## 2020-12-23 NOTE — PROGRESS NOTES
Patient presents compliant to current plan of care as directed. Radha Orosco 12/23/2020 
11:39 AM

## 2020-12-23 NOTE — PROGRESS NOTES
Palliative Medicine [facility] [phone] [facility] [phone] Goals of care have been established as FULL code and FULL aggressive interventions. Patient isn't ready to switch care to comfort he still wants to pursue work up for liver transplant. He has completed AMD on file naming his wife primary then daughter secondary. No further palliative interventions needed at this time. Will sign off. Thank you for the opportunity to assist in the care of Mr. Steve Chapa. LORENA Rouse, [facility]

## 2020-12-23 NOTE — PROGRESS NOTES
12/23/20 1001 12/23/20 1003 12/23/20 1004 Vital Signs Temp 98.3 °F (36.8 °C) 98.3 °F (36.8 °C)  -- Temp Source Oral Oral  --   
Pulse (Heart Rate) (!) 107 (!) 108  -- Heart Rate Source Monitor Monitor  --   
Cardiac Rhythm  --   --  Sinus Tach 
(100's-110's) Resp Rate 16 16  --   
O2 Sat (%) 100 % 100 %  -- Level of Consciousness Alert Alert  --   
BP 97/63 (!) 90/58  --   
MAP (Calculated) 74 69  --   
BP 1 Method Automatic Automatic  --   
BP 1 Location Right arm Left arm  --   
BP Patient Position At rest At rest  --   
MEWS Score 3 3  -- Box Number TELE #34 TELE #34 TELE #34 Electrodes Replaced  --   --  Yes  
 
 12/23/20 1055 12/23/20 1056 Vital Signs Temp  --  98.3 °F (36.8 °C) Temp Source  --  Oral  
Pulse (Heart Rate)  --  (!) 106 Heart Rate Source  --  Monitor Cardiac Rhythm  --   -- Resp Rate  --  16  
O2 Sat (%)  --  100 % Level of Consciousness  --  Alert /60 97/72 MAP (Calculated) 78 80 BP 1 Method Manual Automatic  
BP 1 Location Right arm Right arm BP Patient Position At rest At rest  
MEWS Score  --  3 Box Number TELE #34 TELE #34 Electrodes Replaced  --   --   
 
 
S: MD Notified about vital signs & MEWS score of 3 for management/ Telephone orders provided in accordance to MEWS score. B: Patient presented awake, alert and responsive with the following vital signs listed above and a MEWs score of 3. Patient presented daily scheduled oral Spironolactone 100 mg and Lasix 80 mg. Additionally, patient requested IV morphine 2 mg for management of 10/10 abdominal pain. Patient was informed that Dr. Janina Chávez would be contacted and inquired about management to be given for MEWS score of 3 and systolic blood pressure readings less than 100. A: At 1108 am and 1126 am, Dr. Inocencia Moralez was paged via . At 1129 am, Dr. Inocencia Moralez contacted unit and was notified about patient's MEWS score of 3, vital signs as listed above and was inquired as to whether patient should receive scheduled oral Spironolactone 100 mg, scheduled oral Lasix 80 mg and PRN IV Morphine 2 mg every 4 hours for severe pain. Dr. Inocencia Moralez provided telephone orders with readback to hold patient's scheduled Lasix 80 mg by mouth dose, to give Spironolactone 100 mg by mouth and that patient could have IV Morphine 2 mg. Dr. Inocencia Moralez provided telephone order with readback was provided and understanding was verbalized. Patient will be notified. R: Will continue with prescribed plan of care as directed. Vee Meredith 12/23/2020 
11:37 AM

## 2020-12-23 NOTE — PROGRESS NOTES
12/23/20 1001 12/23/20 1003 12/23/20 1055 Vital Signs Temp 98.3 °F (36.8 °C) 98.3 °F (36.8 °C)  -- Temp Source Oral Oral  --   
Pulse (Heart Rate) (!) 107 (!) 108  -- Heart Rate Source Monitor Monitor  -- Resp Rate 16 16  --   
O2 Sat (%) 100 % 100 %  -- Level of Consciousness Alert Alert  --   
BP 97/63 (!) 90/58 115/60 MAP (Calculated) 74 69 78 BP 1 Method Automatic Automatic Manual  
BP 1 Location Right arm Left arm Right arm BP Patient Position At rest At rest At rest  
MEWS Score 3 3  -- Box Number TELE #34 TELE #34 TELE #34 Pain 1 Pain Scale 1 Numeric (0 - 10)  --   --   
Pain Intensity 1 4  --   --   
Patient Stated Pain Goal 0  --   --   
Pain Reassessment 1 Yes  --   --   
 
 12/23/20 1056 Vital Signs Temp 98.3 °F (36.8 °C) Temp Source Oral  
Pulse (Heart Rate) (!) 106 Heart Rate Source Monitor Resp Rate 16  
O2 Sat (%) 100 % Level of Consciousness Alert BP 97/72 MAP (Calculated) 80 BP 1 Method Automatic  
BP 1 Location Right arm BP Patient Position At rest  
MEWS Score 3 Box Number TELE #34 Pain 1 Pain Scale 1  --   
Pain Intensity 1  -- Patient Stated Pain Goal  --   
Pain Reassessment 1  --   
 
 
S: Patient presented with MEWS score of 3/ Spironolactone & Furosemide held/ MD paged. HPI Comments: 61 y.o. female with past medical history significant for tubal ligation and HLD who presents from [facility] with chief complaint of severe diffuse abdominal pain x 4 days. Pt states she started taking ceftin generic on 2/14/2018 for streptococcal pharyngitis. On the 6th day of her 7 day course of abx (5 days ago), she reports onset of watery diarrhea. Pt states stool is hernandez yellow but not red or black. She reports 5-6 episodes of diarrhea/day. Since 3 days ago, pt reports severe diffuse abdominal pain. She states breathing fast relieves her pain, which she rates as 8/10. Pt denies any CP, nausea, or sore throat. She denies ill contacts, and her most recent PO intake was 2 days ago. Pt reports normal colonoscopy in 2014 w/ no polyps. Denies contrast allergy. There are no other acute medical concerns at this time. Social hx: never smoker, no alcohol or drug use  PCP: No PCP as pt moved to [place] 3 weeks ago    Old chart review: no prior ED visits. Reviewed [facility] records. WBC count 32,000. Hemoglobin 14. Platelets 462. Total bilirubin 2.3. Renal function normal. K+ 3.3. Note written by Fran Osler A. Janeane Charles, as dictated by Karrie Yan, DO 12:04 PM      The history is provided by the patient. No  was used. No past medical history on file. No past surgical history on file. No family history on file. Social History     Social History    Marital status:      Spouse name: N/A    Number of children: N/A    Years of education: N/A     Occupational History    Not on file. Social History Main Topics    Smoking status: Not on file    Smokeless tobacco: Not on file    Alcohol use Not on file    Drug use: Not on file    Sexual activity: Not on file     Other Topics Concern    Not on file     Social History Narrative         ALLERGIES: Review of patient's allergies indicates not on file.     Review of Systems   Constitutional: Negative for B: Patient presented awake, alert and responsive with no signs/symptoms of complications or distress with the following vital signs listed above and consistent MEWS Score of 3. Patient presented with Scheduled am oral Spironolactone 200 mg and oral Furosemide 80 mg which were held due to low systolic blood pressure readings. Patient also requested IV Morphine 2 mg for pain but patient was informed that Dr. Emmanuel Schultz (on call hospitalist) would be contacted and inquired about whether medications could be given as directed with the following vital signs listed above and would be inquired about further interventions to be provided for management of MEWS score of 3. A: At 1108 am, On call hospitalist Dr. Emmanuel Schultz was paged via . Awaiting MD response. R: Will continue with prescribed plan of care as directed. Alcides العراقي 12/23/2020 
11:21 AM 
 
 fever.   HENT: Negative for sore throat. Cardiovascular: Negative for chest pain. Gastrointestinal: Positive for abdominal pain and diarrhea. Negative for nausea. All other systems reviewed and are negative. There were no vitals filed for this visit. Physical Exam      Constitutional: Pt is awake and alert. Pt appears well-developed and well-nourished. NAD. HENT:   Head: Normocephalic and atraumatic. Nose: Nose normal.   Mouth/Throat: Oropharynx is clear. Dry MM. No oropharyngeal exudate. Eyes: Conjunctivae and extraocular motions are normal. Pupils are equal, round, and reactive to light. Right eye exhibits no discharge. Left eye exhibits no discharge. No scleral icterus. Neck: No tracheal deviation present. Supple neck. Cardiovascular: Normal rate, regular rhythm, normal heart sounds and intact distal pulses. Exam reveals no gallop and no friction rub. No murmur heard. Pulmonary/Chest: Breath sounds normal.  Pt  has no wheezes. Pt  has no rales. Tachypnea noted  Abdominal: Soft. Pt  exhibits no distension and no mass. Diffuse tenderness. Pt  has no rebound and no guarding. Musculoskeletal:  Pt  exhibits no edema and no tenderness. Ext: Normal ROM in all four extremities; not tender to palpation; distal pulses are normal, no edema. Neurological:  Pt is alert. nonfocal neuro exam.  Skin: Skin is warm and dry. Pt  is not diaphoretic. Psychiatric:  Pt  has a normal mood and affect. Behavior is normal.   Note written by Steve Valentin, as dictated by Walt Haque DO 12:06 PM        MDM  Number of Diagnoses or Management Options  [facility]):   Critical Care  Total time providing critical care: 30-74 minutes    30 minutes    ED Course     ED EKG interpretation:  Rhythm: normal sinus rhythm Rate (approx.): 79; Axis: normal; ST/T wave: non-specific changes; prolonged QTc  Note written by Steve Valentin, as dictated by Walt Haque DO 1:04 PM      Procedures      CONSULT NOTE:  1:01 PM Gisella Kramer DO spoke with Dr. Giovanny Soria for General Surgery. Discussed available diagnostic tests and clinical findings. Dr. Annie Alejandro agrees with flagyl, oral vancomycin, and admit to medical service. Dr. Kathleen Berman will see patient. PROGRESS NOTE:  1:13 PM Pt is pain free and feels significantly improved. CONSULT NOTE:  1:39 PM Gisella Kramer DO spoke with Dr. Devon Muro, Consult for Hospitalist.  Discussed available diagnostic tests and clinical findings. Dr. Devon Muro will evaluate and admit patient.        Pt quite ill  Free fluid  pancolitis  Reviewed CT images  Oral vanco ordered along with iv flagyl  Wbc is quite high

## 2020-12-23 NOTE — CONSULTS
[facility] Consult Note Patient: Grant Mcelroy MRN: [id number]  SSN: [id number] YOB: 1965  Age: 54 y.o. Sex: male Date of Consultation: 12/23/2020 Referring Physician: Dr. Edil Reese Reason for Consultation: VIJAYA 
 
HPI: 
I was asked by Dr. Edil Reese for VIJAYA. Grant Mcelroy is 54years oldGentleman with past medical history is significant for chronic liver disease, cirrhosis, DVT pulmonary embolism, history of respiratory failure, diabetes mellitus, gastroparesis, history of pancreatitis admitted in the hospital with hypercoagulopathy abdominal pain patient also have bacteremia. Cardiology was asked to consult of for transesophageal echocardiogram.  On admission patient INR was above 9 and platelet count is less than 50,000. Today's INR is 4.0 and platelet count is 11,510 Past Medical History:  
Diagnosis Date  Cirrhosis ([address]  Diabetes ([facility] [address]  Gastroparesis  Pancreatitis  Pulmonary embolism ([facility] [address] Past Surgical History:  
Procedure Laterality Date  HX CHOLECYSTECTOMY  HX HIP FRACTURE TX    
 left hip sx 9.23/2018 Current Facility-Administered Medications Medication Dose Route Frequency  magnesium oxide (MAG-OX) tablet 400 mg  400 mg Oral BID  ampicillin (OMNIPEN) 2 g in 0.9% sodium chloride (MBP/ADV) 100 mL MBP  2 g IntraVENous Q4H  
 albumin human 25% (BUMINATE) solution 25 g  25 g IntraVENous Q6H  
 lactulose (CHRONULAC) 10 gram/15 mL solution 45 mL  30 g Oral DAILY  furosemide (LASIX) tablet 80 mg  80 mg Oral DAILY  spironolactone (ALDACTONE) tablet 200 mg  200 mg Oral DAILY  nicotine (NICODERM CQ) 21 mg/24 hr patch 1 Patch  1 Patch TransDERmal DAILY  lidocaine 4 % patch 2 Patch  2 Patch TransDERmal Q24H  
 oxyCODONE-acetaminophen (PERCOCET) 5-325 mg per tablet 1-2 Tab  1-2 Tab Oral Q6H PRN  
 morphine injection 2 mg  2 mg IntraVENous Q4H PRN  
 insulin lispro (HUMALOG) injection   SubCUTAneous AC&HS  
  glucose chewable tablet 16 g  4 Tab Oral PRN  
 glucagon (GLUCAGEN) injection 1 mg  1 mg IntraMUSCular PRN  
 dextrose 10% infusion 125-250 mL  125-250 mL IntraVENous PRN  
 DULoxetine (CYMBALTA) capsule 60 mg  60 mg Oral DAILY  [Held by provider] insulin glargine (LANTUS) injection 10 Units  10 Units SubCUTAneous QHS  insulin lispro (HUMALOG) injection 6 Units  6 Units SubCUTAneous TIDAC  pantoprazole (PROTONIX) tablet 40 mg  40 mg Oral ACB  thiamine mononitrate (B-1) tablet 100 mg  100 mg Oral DAILY  traZODone (DESYREL) tablet 50 mg  50 mg Oral QHS  ondansetron (ZOFRAN) injection 4 mg  4 mg IntraVENous Q6H PRN Allergies and Intolerances:  
No Known Allergies Family History:  
History reviewed. No pertinent family history. Social History: He  reports that he has been smoking. He has never used smokeless tobacco.  He  reports previous alcohol use. Review of Systems:  
 
Review of Systems Gen: No fever, chills, malaise, weight loss/gain. Heent: No headache, rhinorrhea, epistaxis, ear pain, hearing loss, sinus pain, neck pain/stiffness, sore throat. Heart: No chest pain, palpitations, ACOSTA, pnd, or orthopnea. Resp: No cough, hemoptysis, wheezing and shortness of breath. GI: No nausea, vomiting, diarrhea, constipation, melena or hematochezia. : No urinary obstruction, dysuria or hematuria. Derm: No rash, new skin lesion or pruritis. Musc/skeletal: no bone or joint complains. Vasc: No edema, cyanosis or claudication. Endo: No heat/cold intolerance, no polyuria,polydipsia or polyphagia. Neuro: No unilateral weakness, numbness, tingling. No seizures. Heme: No easy bruising or bleeding. Physical:  
Patient Vitals for the past 6 hrs: 
 Temp Pulse Resp BP SpO2  
12/23/20 1523 98.2 °F (36.8 °C) 99 16 (!) 90/50 99 % 12/23/20 1522 98.2 °F (36.8 °C) 99 16 94/65 99 % 12/23/20 1132 98.5 °F (36.9 °C) 100 16 98/67 99 % 12/23/20 1128 98.5 °F (36.9 °C) (!) 109 16 (!) 103/53 99 % 12/23/20 1056 98.3 °F (36.8 °C) (!) 106 16 97/72 100 % 12/23/20 1055    115/60  Exam:  
General Appearance: Comfortable, HEENT: KAT. HEAD: Atraumatic NECK: No JVD, no thyroidomeglay. LUNGS: Clear bilaterally. HEART: S1+S2 ABD: Non-tender, BS Audible EXT: No edema, and no cysnosis. VASCULAR EXAM: Pulses are intact. PSYCHIATRIC EXAM: Mood is appropriate. MUSCULOSKELETAL: Grossly no joint deformity. NEUROLOGICAL: Motor and sensory sytem intact and Cranial nerves II-XII intact. Review of Data:  
LABS:  
Lab Results Component Value Date/Time WBC 4.4 (L) 12/22/2020 04:35 AM  
 HGB 8.1 (L) 12/22/2020 04:35 AM  
 HCT 22.8 (L) 12/22/2020 04:35 AM  
 PLATELET 44 (L) 35/92/5466 04:35 AM  
 
Lab Results Component Value Date/Time Sodium 139 12/22/2020 04:35 AM  
 Potassium 3.1 (L) 12/22/2020 04:35 AM  
 Chloride 99 (L) 12/22/2020 04:35 AM  
 CO2 32 12/22/2020 04:35 AM  
 Glucose 147 (H) 12/22/2020 04:35 AM  
 BUN 5 (L) 12/22/2020 04:35 AM  
 Creatinine 0.93 12/22/2020 04:35 AM  
 
No results found for: CHOL, CHOLX, CHLST, CHOLV, HDL, HDLP, LDL, LDLC, DLDLP, TGLX, TRIGL, TRIGP No components found for: GPT Lab Results Component Value Date/Time Hemoglobin A1c 5.8 (H) 11/30/2020 05:20 PM  
 
 
RADIOLOGY: 
CT Results  (Last 48 hours) None CXR Results  (Last 48 hours) None Cardiology Procedures:  
Results for orders placed or performed during the hospital encounter of 12/13/20 EKG, 12 LEAD, INITIAL Result Value Ref Range Ventricular Rate 108 BPM  
 Atrial Rate 108 BPM  
 P-R Interval 170 ms QRS Duration 76 ms  
 Q-T Interval 342 ms QTC Calculation (Bezet) 458 ms Calculated R Axis 16 degrees Calculated T Axis -5 degrees Diagnosis Sinus tachycardia Low voltage QRS Possible Inferior infarct , age undetermined Abnormal ECG When compared with ECG of 30-NOV-2020 17:07, 
 Sinus rhythm has replaced Ectopic atrial rhythm Incomplete right bundle branch block is no longer present Borderline criteria for Inferior infarct are now present Confirmed by Gaudencio Cosme MD, -- ([id number]) on 12/13/2020 8:17:06 PM 
  
  
Echo Results  (Last 48 hours) None Cardiolite (Tc-99m Sestamibi) stress test 
 
 
 
Impression / Plan:   
Patient Active Problem List  
Diagnosis Code  Hypocalcemia E83.51  
 Hypomagnesemia E83.42  
 Alcoholism ([facility] F10.20  Cirrhosis ([address] K74.60  Thrombocytopenia ([address] D69.6  Severe protein-calorie malnutrition ([facility] [facility] E43  Pulmonary embolism (HCC) I26.99  
 Acute deep vein thrombosis (DVT) of both lower extremities (HCC) I82.403  Stage 4 decubitus ulcer (HCC) L89.94  
 Ascites R18.8  Coagulopathy (HCC) D68.9  
 Colitis K52.9  Positive blood culture R78.81  Bacteremia R78.81  
 Enterococcus faecalis infection B95.2 Assessment and plan 
 
 
 
 bacteremia Chronic liver disease /cirrhosis of liver Coagulopathy, INR is 4.0 today Thrombocytopenia, 44,000 today 
 
 
 plan. In view of patient's thrombocytopenia and coagulopathy with INR of 4.0 it is not safe and high risk for bleeding complication with transesophageal echocardiogram. 
Discussed with patient and wife. Discussed with Dr. Tonny Thomas and Dr. Christie Treviño Since patient is normal/low weight, will repeat transthoracic echocardiogram. 
VIJAYA can be considered in future when INR/thrombocytopenia is table or may need in tertiary care hospital. 
 
 
 
 
Signed By: Joey Barfield MD   
 December 23, 2020

## 2020-12-23 NOTE — PROGRESS NOTES
1329: Pt reports he almost fell getting up to the bathroom with wife. Wants pain meds, reports the nurse is working on it. Will follow up as schedule permits. 1510: Pt still not feeling well and wants to hold off on PT today. Will follow up tomorrow.

## 2020-12-23 NOTE — PROGRESS NOTES
Results for Alex Weller (MRN [id number]) as of 12/23/2020 11:41 Ref. Range 12/23/2020 04:10 INR Latest Ref Range: 0.8 - 1.2   4.0 (H) Prothrombin time Latest Ref Range: 11.5 - 15.2 sec 37.6 (H) Magnesium Latest Ref Range: 1.6 - 2.6 mg/dL 1.5 (L) Ammonia Latest Ref Range: 11 - 32 UMOL/L 16 Results for Alex Weller (MRN [id number]) as of 12/23/2020 11:41 Ref. Range 12/18/2020 04:16 12/19/2020 03:03 12/20/2020 04:20 12/21/2020 03:18 12/22/2020 04:35 PLATELET Latest Ref Range: 135 - 420 K/uL 52 (L) 53 (L) 53 (L) 57 (L) 44 (L)  
 
 
S: MD Inquired about whether subcutaneous Lovenox should be given for anticoagulation management. B: Patient presented with the following anticoagulation labs presented above with concern with trending down platelet levels over the past several days as indicated above and  INR of 4.0. At 1600 pm, patient presented with lovenox 40 mg subcutaneously to be given daily for anticoagulation management. A: Dr. Jocelyn Fournier was paged to inquire about medication should be given as directed around [phone] am via . Awaiting MD response. R: Will continue with prescribed plan of care as directed. Simón Falling 12/23/2020 
11:45 AM

## 2020-12-23 NOTE — PROGRESS NOTES
[phone] - Dr. Elza James paged at this time in reference to [facility] being 70 and whether to give Lantus at this time.

## 2020-12-23 NOTE — PROGRESS NOTES
Problem: Falls - Risk of 
Goal: *Absence of Falls Description: Document Heydi Coppola Fall Risk and appropriate interventions in the flowsheet. Outcome: Progressing Towards Goal 
Note: Fall Risk Interventions: 
Mobility Interventions: Assess mobility with egress test, Patient to call before getting OOB, PT Consult for mobility concerns, Utilize walker, cane, or other assistive device Mentation Interventions: Adequate sleep, hydration, pain control, Update white board, Increase mobility Medication Interventions: Teach patient to arise slowly, Patient to call before getting OOB Elimination Interventions: Call light in reach

## 2020-12-23 NOTE — CONSULTS
[facility] 
(A Division of [address] [place] [address]) Consultation Note Date of Admission: 12/13/2020    Date of Consultation: 12/23/2020 Referred by: Dell Essex, MD 
 
Reason for Referral: bacteremia Current Antimicrobials:    Prior Antimicrobials: 1. Pip/tzb IV (12/13-) #10 1. Vanco IV (12/13-16) 2. amox-clav PO (12/5-12/10) Assessment: Rec / Plan: Occult Enterococcus faecalis bacteremia - limited duration, but community-acquired. The vast majority of community-acquired enterococcal bacteremias (>66%) are SBE; but you can make a strong argument for all his time in hospitals in the preceeding two months, that his is hospital-acquired, and almost NONE of those are SBE. Proof is in a VIJAYA. Not too late for one this far into therapy. ->Pip/tzb #10 VIJAYA - if (+), he goes another 32 days (6wks total) and will need an adjunct abx to go with the ampicillin; if (-), he only needs 14d therapy (time served/counts) -- go 4 more days. I switched his pip/tzb to pure ampicillin (q4h) to lessen the salt load on him. Cirrhosis - alcohol   
thrombo-embolic disease Microbiology:  12/18 - Peritoneal fluid - NOS (-) 
   12/14 - BCx x2 (-) 
   12/13 - BCx 1/2 (+) Enterococcus faecalis Lines / Catheters: peripheral 
 
 
HPI: 
CC:  \"I'm weak\" MMCS(fabiana) Rolando is a 49y WM with underlying alcoholic cirrhosis who was spent a lot of time hospitalized since early OCT20 during the work-up of his cirrhosis -- PE last admission 11/30-12/5, sent home on 5d amox-clav, but as he finished these, became feverish/weak prompting ED presentation/admission 12/13. At that time he had abdominal fullness/pain and diarrhea. One of two BCx popped for Enterooccus faecalis; started empirically on vanco + pip/tzb on admission (before aware of bacteremia); repeated BCx a couple days later when aware, and those have remained sterile since. Vanco stopped a few days later, and has been on pip/tzb IV for past 10d. Tired today; had weak knees/legs earlier today and nearly fell. Felt much better the past week/days. No pain right now. 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  
Diagnosis Date  Cirrhosis ([address]  Diabetes ([address]  Gastroparesis  Pancreatitis  Pulmonary embolism ([address] Past Surgical History:  
Procedure Laterality Date  HX CHOLECYSTECTOMY  HX HIP FRACTURE TX    
 left hip sx 9.23/2018 History reviewed. No pertinent family history. Social History Socioeconomic History  Marital status:  Spouse name: Not on file  Number of children: Not on file  Years of education: Not on file  Highest education level: Not on file Occupational History  Not on file Social Needs  Financial resource strain: Not on file  Food insecurity Worry: Not on file Inability: Not on file  Transportation needs Medical: Not on file Non-medical: Not on file Tobacco Use  Smoking status: Current Every Day Smoker  Smokeless tobacco: Never Used Substance and Sexual Activity  Alcohol use: Not Currently  Drug use: No  
 Sexual activity: Not on file Lifestyle  Physical activity Days per week: Not on file Minutes per session: Not on file  Stress: Not on file Relationships  Social connections Talks on phone: Not on file Gets together: Not on file Attends [employer] service: Not on file Active member of club or organization: Not on file Attends meetings of clubs or organizations: Not on file Relationship status: Not on file  Intimate partner violence Fear of current or ex partner: Not on file Emotionally abused: Not on file Physically abused: Not on file Forced sexual activity: Not on file Other Topics Concern  Not on file Social History Narrative  Not on file Allergies: 
Patient has no known allergies. Medications: 
Current Facility-Administered Medications Medication Dose Route Frequency  magnesium oxide (MAG-OX) tablet 400 mg  400 mg Oral BID  ampicillin (OMNIPEN) 2 g in 0.9% sodium chloride (MBP/ADV) 100 mL MBP  2 g IntraVENous Q4H  
 enoxaparin (LOVENOX) injection 40 mg  40 mg SubCUTAneous Q24H  
 albumin human 25% (BUMINATE) solution 25 g  25 g IntraVENous Q6H  
 lactulose (CHRONULAC) 10 gram/15 mL solution 45 mL  30 g Oral DAILY  furosemide (LASIX) tablet 80 mg  80 mg Oral DAILY  spironolactone (ALDACTONE) tablet 200 mg  200 mg Oral DAILY  nicotine (NICODERM CQ) 21 mg/24 hr patch 1 Patch  1 Patch TransDERmal DAILY  lidocaine 4 % patch 2 Patch  2 Patch TransDERmal Q24H  
 oxyCODONE-acetaminophen (PERCOCET) 5-325 mg per tablet 1-2 Tab  1-2 Tab Oral Q6H PRN  
 morphine injection 2 mg  2 mg IntraVENous Q4H PRN  
 insulin lispro (HUMALOG) injection   SubCUTAneous AC&HS  
 glucose chewable tablet 16 g  4 Tab Oral PRN  
  glucagon (GLUCAGEN) injection 1 mg  1 mg IntraMUSCular PRN  
 dextrose 10% infusion 125-250 mL  125-250 mL IntraVENous PRN  
 DULoxetine (CYMBALTA) capsule 60 mg  60 mg Oral DAILY  [Held by provider] insulin glargine (LANTUS) injection 10 Units  10 Units SubCUTAneous QHS  insulin lispro (HUMALOG) injection 6 Units  6 Units SubCUTAneous TIDAC  pantoprazole (PROTONIX) tablet 40 mg  40 mg Oral ACB  thiamine mononitrate (B-1) tablet 100 mg  100 mg Oral DAILY  traZODone (DESYREL) tablet 50 mg  50 mg Oral QHS  ondansetron (ZOFRAN) injection 4 mg  4 mg IntraVENous Q6H PRN  
  
 
ROS: 
Constitutional: positive for fevers, fatigue, malaise and weight loss, negative for chills and sweats Respiratory: negative for cough or dyspnea on exertion Cardiovascular: negative for chest pain, dyspnea Gastrointestinal: negative for nausea, vomiting, diarrhea and abdominal pain Physical Exam: HPI: 
Temp (24hrs), Av.4 °F (36.9 °C), Min:98.2 °F (36.8 °C), Max:98.9 °F (37.2 °C) Visit Vitals BP (!) 90/50 (BP 1 Location: Left arm, BP Patient Position: At rest) Pulse 99 Temp 98.2 °F (36.8 °C) Resp 16 Ht 5' 9\" (1.753 m) Wt 61.1 kg (134 lb 9.6 oz) SpO2 99% BMI 19.88 kg/m² General: Thin cachectic 54 y.o. WHITE OR  male in no acute distress. ENT: slight icterus presentl, no neck nodes or sinus tenderness Head: normocephalic, without obvious abnormality Mouth:  mucous membranes moist, pharynx normal without lesions Neck: supple, symmetrical, trachea midline Cardio:  regular rate and rhythm, S1, S2 normal, trace II/VI holosystolic murmur RUSB; no click, rub or gallop Chest: inspection normal - no chest wall deformities or tenderness, respiratory effort normal 
Lungs: clear to auscultation, no wheezes or rales and unlabored breathing Abdomen: soft, non-tender. Bowel sounds normal. No masses, no organomegaly. Extremities:  no redness or tenderness in the calves or thighs, no edema Neuro: Grossly normal  
 
 
Lab results: 
 
Chemistry Recent Labs  
  12/22/20 
0435 12/21/20 
0321 * 168*  138  
K 3.1* 3.4*  
CL 99* 98* CO2 32 30 BUN 5* 3*  
CREA 0.93 1.05  
CA 9.7 9.7 AGAP 8 10 BUCR 5* 3* AP 25* 37* TP 6.4 7.3 ALB 4.4 5.0  
GLOB 2.0 2.3 AGRAT 2.2* 2.2*  
 
 
CBC w/ Diff Recent Labs  
  12/22/20 
0435 12/21/20 
4749 WBC 4.4* 4.8  
RBC 2.58* 3.18* HGB 8.1* 9.8* HCT 22.8* 28.6* PLT 44* 57* GRANS 56 59 LYMPH 35 32 EOS 1 1 Microbiology All Micro Results Procedure Component Value Units Date/Time CULTURE, BODY FLUID [[id number]] Collected: 12/18/20 1516 Order Status: Completed Specimen: Body Fluid from Ascitic Fluid Updated: 12/22/20 1231 Special Requests: NO SPECIAL REQUESTS     
  GRAM STAIN FEW WBCS SEEN     
   NO ORGANISMS SEEN Culture result: NO GROWTH 4 DAYS     
 CULTURE, BLOOD [[id number]] Collected: 12/14/20 1202 Order Status: Completed Specimen: Blood Updated: 12/20/20 1955 Special Requests: NO SPECIAL REQUESTS Culture result: NO GROWTH 6 DAYS     
 CULTURE, BLOOD [[id number]] Collected: 12/14/20 1202 Order Status: Completed Specimen: Blood Updated: 12/20/20 4370 Special Requests: NO SPECIAL REQUESTS Culture result: NO GROWTH 6 DAYS     
 CULTURE, BLOOD [[id number]] Collected: 12/13/20 1755 Order Status: Completed Specimen: Blood Updated: 12/19/20 0703 Special Requests: NO SPECIAL REQUESTS Culture result: NO GROWTH 6 DAYS     
 CULTURE, BLOOD [[id number]]  (Abnormal)  (Susceptibility) Collected: 12/13/20 1619 Order Status: Completed Specimen: Blood Updated: 12/16/20 0955 Special Requests: NO SPECIAL REQUESTS     
  GRAM STAIN    
  GRAM POSITIVE COCCI IN PAIRS ANAEROBIC BOTTLE  
     
      
  SMEAR CALLED TO AND CORRECTLY REPEATED BY: MARY REYES RN 3N TO [address] [facility] ON 12/14/2020. Culture result:    
  ENTEROCOCCUS FAECALIS GROWING IN THE ANAEROBIC BOTTLE  
     
 ENTERIC BACTERIA PANEL, DNA [[id number]] Collected: 12/13/20 2215 Order Status: Canceled Specimen: Stool OVA & Kumar Delores [[id number]] Collected: 12/13/20 2215 Order Status: Canceled Specimen: Stool C. DIFFICILE AG & TOXIN A/B [[id number]] Collected: 12/13/20 2215 Order Status: Canceled Specimen: Stool Bethany Garner MD 
Cell [phone] Jonatan Montanez[facility] 12/23/2020  
4:03 PM

## 2020-12-23 NOTE — PROGRESS NOTES
Results for Paula Zapata (MRN [id number]) as of 12/23/2020 11:41 
  Ref. Range 12/23/2020 04:10 INR Latest Ref Range: 0.8 - 1.2   4.0 (H) Prothrombin time Latest Ref Range: 11.5 - 15.2 sec 37.6 (H) Magnesium Latest Ref Range: 1.6 - 2.6 mg/dL 1.5 (L) Ammonia Latest Ref Range: 11 - 32 UMOL/L 16 Results for Paula Zapata (MRN [id number]) as of 12/23/2020 11:41 
  Ref. Range 12/18/2020 04:16 12/19/2020 03:03 12/20/2020 04:20 12/21/2020 03:18 12/22/2020 04:35 PLATELET Latest Ref Range: 135 - 420 K/uL 52 (L) 53 (L) 53 (L) 57 (L) 44 (L)  
  
  
S: MD Inquired about whether subcutaneous Lovenox should be given for anticoagulation management/MD notified and informed nursing staff to hold lovenox injection. B: Patient presented with the following anticoagulation labs presented above with concern with trending down platelet levels over the past several days as indicated above and  INR of 4.0. At 1600 pm, patient presented with lovenox 40 mg subcutaneously to be given daily for anticoagulation management. Dr. Kirt Prieto was paged to inquire about medication should be given as directed around [phone] am via . Awaiting MD response. A: [facility] 1245 [facility] after interdisciplinary rounds, Dr. Patria Rollins was informed about patient's anticoagulation labs as listed above and was inquired about whether patient should receive scheduled lovenox 40 mg injection as directed. Dr. Kirt Prieto verbalized to nursing staff with readback to hold lovenox injection as directed. R: Will continue with prescribed plan of care as directed. Becky Guevara 12/23/2020  
12:45 PM

## 2020-12-23 NOTE — PROGRESS NOTES
Hospitalist Progress Note Patient: Nelson Meyers MRN: [id number]  CSN: [id number] YOB: 1965  Age: 54 y.o. Sex: male DOA: 12/13/2020 LOS:  LOS: 10 days Assessment/Plan Patient Active Problem List  
Diagnosis Code  Hypocalcemia E83.51  
 Hypomagnesemia E83.42  
 Alcoholism ([address] F10.20  Cirrhosis ([address] K74.60  Thrombocytopenia ([address] D69.6  Severe protein-calorie malnutrition ([address] E43  Pulmonary embolism (HCC) I26.99  
 Acute deep vein thrombosis (DVT) of both lower extremities (HCC) I82.403  Stage 4 decubitus ulcer (HCC) L89.94  
 Ascites R18.8  Coagulopathy (HCC) D68.9  
 Colitis K52.9  Positive blood culture R78.81  Bacteremia R78.81  
 Enterococcus faecalis infection B95.2  
  
 
54 y.o.  male who has history of recent pulmonary embolism and DVT, cirrhosis, diabetes, vent dependent respiratory failure with pneumonia presents to the emergency room with increasing abdominal pain and swelling despite abdominal paracentesis Bacteremia - 
1/2 blood cultures growing Enterococcus faecalis. Repeated cx no growth. ID consulted, recommend VIJAYA Antibiotics - ampicillin  
  
[address] (10/9/2020) with ascites IV albumin - Seen by Dr. Fabienne Abdalla Ammonia daily - on lactulose Lasix and spirnolactone. On hold due to hypotension   
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  
  
Coagulopathy ([address] (12/13/2020) 
inr daily Still coagulopathic despite vitamin K Continue hold xarelto secondary to elevated INR and thrombocytopenia. Thrombocytopenia - Secondary to cirrhosis  
  
PE /DVT on 11/30/2020 - Seen by heme/onc, recommend lovenox if plt above 75 
  
Pancreatic Duct enlargement - 
 MRI of Abdomen :Evidence of hepatic cirrhosis with no mass lesion, but chronic pancreatitis and fibrotic changes at the ampulla with mild extrinsic narrowing of the distal CBD. No mass lesion identified.  
 
Chronic pancreatitis - 
  
DM - 
ssi and hypoglycemia protocol   
  
Large Sacral wound - 
Continue abx and Continue local wound care  
  
  
Hypokalemia - 
K replacement , continue monitoring Disposition : 1-2 days Review of systems General: No fevers or chills. Cardiovascular: No chest pain or pressure. No palpitations. Pulmonary: No shortness of breath. Gastrointestinal: No nausea, vomiting. Physical Exam: 
General: Awake, cooperative, no acute distress   
HEENT: NC, Atraumatic. PERRLA, anicteric sclerae. Lungs: CTA Bilaterally. No Wheezing/Rhonchi/Rales. Heart:  S1 S2,  No murmur, No Rubs, No Gallops Abdomen: Soft, Non distended, Non tender.  +Bowel sounds, Extremities: No c/c/e Psych:   Not anxious or agitated. Neurologic:  No acute neurological deficit. Vital signs/Intake and Output: 
Visit Vitals BP (!) 90/50 (BP 1 Location: Left arm, BP Patient Position: At rest) Pulse 99 Temp 98.2 °F (36.8 °C) Resp 16 Ht 5' 9\" (1.753 m) Wt 61.1 kg (134 lb 9.6 oz) SpO2 99% BMI 19.88 kg/m² Current Shift:  12/23 0701 - 12/23 1900 In: 494 [I.V.:494] Out: - Last three shifts:  12/21 1901 - 12/23 0700 In: 850 [P.O.:850] Out: -  
 
 
 
 
 
Labs: Results:  
   
Chemistry Recent Labs  
  12/22/20 
9999 12/21/20 
2993 * 168*  138  
K 3.1* 3.4*  
CL 99* 98* CO2 32 30 BUN 5* 3*  
CREA 0.93 1.05  
CA 9.7 9.7 AGAP 8 10 BUCR 5* 3* AP 25* 37* TP 6.4 7.3 ALB 4.4 5.0  
GLOB 2.0 2.3 AGRAT 2.2* 2.2*  
  
CBC w/Diff Recent Labs  
  12/22/20 
0435 12/21/20 
7462 WBC 4.4* 4.8  
RBC 2.58* 3.18* HGB 8.1* 9.8* HCT 22.8* 28.6* PLT 44* 57* GRANS 56 59 LYMPH 35 32 EOS 1 1  
  
 Cardiac Enzymes No results for input(s): CPK, CKND1, LYDIA in the last 72 hours. No lab exists for component: Ernesto Justice Coagulation Recent Labs  
  12/23/20 0410 12/22/20 0435 PTP 37.6* 37.4* INR 4.0* 4.0* Lipid Panel No results found for: CHOL, CHOLPOCT, CHOLX, CHLST, CHOLV, 019230, HDL, HDLP, LDL, LDLC, DLDLP, 871122, VLDLC, VLDL, TGLX, TRIGL, TRIGP, TGLPOCT, CHHD, CHHDX  
BNP No results for input(s): BNPP in the last 72 hours. Liver Enzymes Recent Labs  
  12/22/20 0435 TP 6.4 ALB 4.4 AP 25* Thyroid Studies No results found for: T4, T3U, TSH, TSHEXT, TSHEXT Procedures/imaging: see electronic medical records for all procedures/Xrays and details which were not copied into this note but were reviewed prior to creation of Plan

## 2020-12-24 NOTE — PROGRESS NOTES
Patient presents compliant to current plan of care as directed. Rock Collet 12/24/2020 
12:08 PM

## 2020-12-24 NOTE — PROGRESS NOTES
Comprehensive Nutrition Assessment Type and Reason for Visit: Genie Reese Nutrition Recommendations/Plan: Will add MVI+M to aid in wound healing Continue current POC Push oral and supplement intake Nutrition Assessment:  pt admitted with colitis, bacteremia, decub ulcer, ascites, cirrhosis, s/p paracentesis per MD note Estimated Daily Nutrient Needs: 
Energy (kcal): 2138; Weight Used for Energy Requirements: Current Protein (g): 79; Weight Used for Protein Requirements: Current Fluid (ml/day): 2138; Method Used for Fluid Requirements: 1 ml/kcal 
 
 
Nutrition Related Findings:  labs reviewed. Meds reviewed: lasix, lantus, lispro, protonix, KCL, aldactone, thiamine. PO Intaeks reviewed %. Wounds:   
Stage III, Stage IV(per wound documentation stage 4 to sacrum and stage 3 to left heel) Current Nutrition Therapies: DIET CARDIAC Regular DIET NUTRITIONAL SUPPLEMENTS Dinner, Lunch; [place] DIET NUTRITIONAL SUPPLEMENTS All Meals; Glucerna Shake Anthropometric Measures: 
· Height:  5' 9\" (175.3 cm) · Current Body Wt:  61.1 kg (134 lb 11.2 oz) · Admission Body Wt:  138 lb · Usual Body Wt:  73 kg (160 lb 15 oz) · Ideal Body Wt:  160 lbs:  84.2 % · Adjusted Body Weight:   ; Weight Adjustment for: No adjustment · Adjusted BMI:      
· BMI Category:  Normal weight (BMI 18.5-24. 9) Nutrition Diagnosis: · Severe malnutrition related to catabolic illness, inadequate protein-energy intake as evidenced by weight loss greater than or equal to 5% in 1 month, wounds, localized or generalized fluid accumulation Nutrition Interventions:  
Food and/or Nutrient Delivery: Continue current diet, Continue oral nutrition supplement Nutrition Education and Counseling: No recommendations at this time Coordination of Nutrition Care: Continue to monitor while inpatient, Interdisciplinary rounds Goals: Encourage PO intakes >75% of meals and supplements throughout the next 3-5 days Nutrition Monitoring and Evaluation:  
Behavioral-Environmental Outcomes: None identified Food/Nutrient Intake Outcomes: Food and nutrient intake, Supplement intake Physical Signs/Symptoms Outcomes: Biochemical data, Skin, Nutrition focused physical findings Discharge Planning:   
Continue oral nutrition supplement, Continue current diet Electronically signed by Gilles Bray on 12/24/2020 at 7:55 AM

## 2020-12-24 NOTE — PROGRESS NOTES
D/C Plan: [id number] and physician follow up Noted pt is currently pending an ECHO. Pending the result of the ECHO pt will transition home Sunday with [facility] vs home with [facility] and [facility] early next week. Clinical information has been sent to [facility] to assist with identifying any potential cost pt may have. CM to continue to follow and assit. Care Management Interventions PCP Verified by CM: Yes Mode of Transport at Discharge: Self Transition of Care Consult (CM Consult): Discharge Planning, Home Health [address]: Yes Current Support Network: Lives with Spouse, Own Home Confirm Follow Up Transport: Family The Plan for Transition of Care is Related to the Following Treatment Goals : home when medically stable The Patient and/or Patient Representative was Provided with a Choice of Provider and Agrees with the Discharge Plan?: Yes Name of the Patient Representative Who was Provided with a Choice of Provider and Agrees with the Discharge Plan: Abhinav Rodríguez, patient Freedom of Choice List was Provided with Basic Dialogue that Supports the Patient's Individualized Plan of Care/Goals, Treatment Preferences and Shares the Quality Data Associated with the Providers?: Yes Discharge Location Discharge Placement: Home with home health

## 2020-12-24 NOTE — PROGRESS NOTES
Bedside and Verbal shift change report given to Rebel Hayes  (oncoming nurse) by Ken NICHOLAS, RN  (offgoing nurse). Report included the following information SBAR, Kardex and MAR. SHIFT UPDATES:  Patient presented with systolic low blood pressure readings (below 100) for duration of shift and MEWS score of 3. Dr. Kristine Felipe was notified and oral Spironolactone 100 mg was given as directed. Dr. Jared Kennedy stated that IV Morphine 2 mg could be given as directed. Also patient had 1600 pm lovenox 40 mg injection held due to low platelet count of 67,179 and INR of 4.0. Patient had wound dressing changes performed to left lower leg, sacrum and Left heel as directed. Bacitracin was applied to wound bed and mepilex dressings were utilized to cover wound beds. Also, patient received one dose of Magnesium oxide orally as directed for MG level of 1.5 today. Patient presented with insulin lispro sliding scale for blood sugar ranges greater than 150 and presented insulin lispro 6 standing units with meals. Patient had evening blood sugar level of 91 and Dr. Jared Kennedy was notified who instructed nursing staff to hold evening standing 6 units of insulin lispro. Additionally, towards change of shift, Pharmacy contacted unit and stated that patient needed potassium chloride replacement for potassium level of 3.1 on 12/22/2020. At 1802 pm, Dr. Alexander Weston was notified and oral potassium dose was added as directed. Nightshift RN staff will be notified to given Magnesium and Potassium supplements as directed. ABNORMAL LAB:  
Results for Alan Khalil (MRN [id number]) as of 12/23/2020 17:59 Ref. Range 12/23/2020 04:10 INR Latest Ref Range: 0.8 - 1.2   4.0 (H) Prothrombin time Latest Ref Range: 11.5 - 15.2 sec 37.6 (H) Magnesium Latest Ref Range: 1.6 - 2.6 mg/dL 1.5 (L) Ammonia Latest Ref Range: 11 - 32 UMOL/L 16 Results for Alan Khalil (MRN [id number]) as of 12/23/2020 17:59 Ref.  Range 12/22/2020 04:35  
 WBC Latest Ref Range: 4.6 - 13.2 K/uL 4.4 (L) RBC Latest Ref Range: 4.70 - 5.50 M/uL 2.58 (L) HGB Latest Ref Range: 13.0 - 16.0 g/dL 8.1 (L) HCT Latest Ref Range: 36.0 - 48.0 % 22.8 (L) MCV Latest Ref Range: 74.0 - 97.0 FL 88.4 MCH Latest Ref Range: 24.0 - 34.0 PG 31.4 MCHC Latest Ref Range: 31.0 - 37.0 g/dL 35.5 RDW Latest Ref Range: 11.6 - 14.5 % 14.8 (H) PLATELET Latest Ref Range: 135 - 420 K/uL 44 (L) MPV Latest Ref Range: 9.2 - 11.8 FL 12.0 (H) NEUTROPHILS Latest Ref Range: 40 - 73 % 56 LYMPHOCYTES Latest Ref Range: 21 - 52 % 35 MONOCYTES Latest Ref Range: 3 - 10 % 7 EOSINOPHILS Latest Ref Range: 0 - 5 % 1  
BASOPHILS Latest Ref Range: 0 - 2 % 1 DF Latest Units:   AUTOMATED  
ABS. NEUTROPHILS Latest Ref Range: 1.8 - 8.0 K/UL 2.5  
ABS. LYMPHOCYTES Latest Ref Range: 0.9 - 3.6 K/UL 1.5  
ABS. MONOCYTES Latest Ref Range: 0.05 - 1.2 K/UL 0.3  
ABS. EOSINOPHILS Latest Ref Range: 0.0 - 0.4 K/UL 0.1 ABS. BASOPHILS Latest Ref Range: 0.0 - 0.1 K/UL 0.0 Results for Luz Cook (MRN [id number]) as of 12/23/2020 17:59 Ref. Range 12/22/2020 04:35 Sodium Latest Ref Range: 136 - 145 mmol/L 139 Potassium Latest Ref Range: 3.5 - 5.5 mmol/L 3.1 (L) Chloride Latest Ref Range: 100 - 111 mmol/L 99 (L)  
CO2 Latest Ref Range: 21 - 32 mmol/L 32 Anion gap Latest Ref Range: 3.0 - 18 mmol/L 8 Glucose Latest Ref Range: 74 - 99 mg/dL 147 (H) BUN Latest Ref Range: 7.0 - 18 MG/DL 5 (L) Creatinine Latest Ref Range: 0.6 - 1.3 MG/DL 0.93  
BUN/Creatinine ratio Latest Ref Range: 12 - 20   5 (L) Calcium Latest Ref Range: 8.5 - 10.1 MG/DL 9.7 Magnesium Latest Ref Range: 1.6 - 2.6 mg/dL 1.7 GFR est non-AA Latest Ref Range: >60 ml/min/1.73m2 >60  
GFR est AA Latest Ref Range: >60 ml/min/1.73m2 >60 Bilirubin, total Latest Ref Range: 0.2 - 1.0 MG/DL 3.1 (H) Protein, total Latest Ref Range: 6.4 - 8.2 g/dL 6.4 Albumin Latest Ref Range: 3.4 - 5.0 g/dL 4.4 Globulin Latest Ref Range: 2.0 - 4.0 g/dL 2.0 A-G Ratio Latest Ref Range: 0.8 - 1.7   2.2 (H) ALT Latest Ref Range: 16 - 61 U/L 9 (L) AST Latest Ref Range: 10 - 38 U/L 14 Alk. phosphatase Latest Ref Range: 45 - 117 U/L 25 (L) Ammonia Latest Ref Range: 11 - 32 UMOL/L 49 (H) Wounds? Sacrum wound Stage IV, Left lower leg abrasion & left heel abrasion (Bacitracin ointment to wound bed & Mepilex dressing to cover wound. Changed by ) Central Line?: None. Last BM:  12/23/2020 Pending Labs for AM Draw:  Ammonia & Magnesium levels As of 4 am on 12/24/2020 Discharge plan:  As of 12/22/2020 at 1345 pm, case management note states that patient will discharge home with home health when medically stable. Corky Woods 12/23/2020 
7:22 PM

## 2020-12-24 NOTE — CONSULTS
[facility] 
(A Division of [address] [place] [address]) Follow-up Note Date of Admission: 12/13/2020       Date of Note:  12/24/2020 Summary:   
[facility] 49[facility] with underlying alcoholic cirrhosis who was spent a lot of time hospitalized since early OCT20 during the work-up of his cirrhosis -- PE last admission 11/30-12/5, sent home on 5d amox-clav, but as he finished these, became feverish/weak prompting ED presentation/admission 12/13. At that time he had abdominal fullness/pain and diarrhea. One of two BCx popped for Enterooccus faecalis; started empirically on vanco + pip/tzb on admission (before aware of bacteremia); repeated BCx a couple days later when aware, and those have remained sterile since. Vanco stopped a few days later, and has been on pip/tzb IV for past 10d.   
Tired today; had weak knees/legs earlier today and nearly fell. Felt much better the past week/days. No pain right now. 
  
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 
   
 
CC:  \"Better\" Interval History: 
Sea legs. Getting surface sonar currently. Current Antimicrobials:    Prior Antimicrobials: 1. Amp IV (12/23-) #1 1. Vanco IV (12/13-16) 2. amox-clav PO (12/5-12/10 3. Pip/tzb IV (12/13-23) #10 Assessment: Rec / Plan: Occult Enterococcus faecalis bacteremia - limited duration I see Dr Lobo Fierro point -- VIJAYA risky given his coagulopathy from his ESLD. I buy it. He IS skinny, so the windows on the TTE ought to be good. I'll buy it. ->amp (Pip/tzb) #11 IF TTE (-), needs 3 more days. IF TTE (+), will need PICC and 31 more days (with addition of cefepime bid). Cirrhosis - alcohol   
thrombo-embolic disease Microbiology:                12/18 - Peritoneal fluid - NOS (-) 
                                       12/14 - BCx x2 (-) 
                                       12/13 - BCx 1/2 (+) Enterococcus faecalis   
  
 Lines / Catheters:         peripheral 
 
 
 
Patient Active Problem List  
Diagnosis Code  Hypocalcemia E83.51  
 Hypomagnesemia E83.42  
 Alcoholism ([facility] F10.20  Cir[facility] K74.60  Thrombocytopenia ([facility] D69.6  Severe protein-calorie malnutrition ([facility] E43  Pulmonary embolism (HCC) I26.99  
 Acute deep vein thrombosis (DVT) of both lower extremities (HCC) I82.403  Stage 4 decubitus ulcer (HCC) L89.94  
 Ascites R18.8  Coagulopathy (HCC) D68.9  
 Colitis K52.9  Positive blood culture R78.81  Bacteremia R78.81  
 Enterococcus faecalis infection B95.2 Current Facility-Administered Medications Medication Dose Route Frequency  multivitamin, tx-iron-ca-min (THERA-M w/ IRON) tablet 1 Tab  1 Tab Oral DAILY  magnesium oxide (MAG-OX) tablet 400 mg  400 mg Oral BID  ampicillin (OMNIPEN) 2 g in 0.9% sodium chloride (MBP/ADV) 100 mL MBP  2 g IntraVENous Q4H  potassium chloride (K-DUR, KLOR-CON) SR tablet 40 mEq  40 mEq Oral Q4H  
 albumin human 25% (BUMINATE) solution 25 g  25 g IntraVENous Q6H  
 lactulose (CHRONULAC) 10 gram/15 mL solution 45 mL  30 g Oral DAILY  furosemide (LASIX) tablet 80 mg  80 mg Oral DAILY  spironolactone (ALDACTONE) tablet 200 mg  200 mg Oral DAILY  nicotine (NICODERM CQ) 21 mg/24 hr patch 1 Patch  1 Patch TransDERmal DAILY  lidocaine 4 % patch 2 Patch  2 Patch TransDERmal Q24H  
 oxyCODONE-acetaminophen (PERCOCET) 5-325 mg per tablet 1-2 Tab  1-2 Tab Oral Q6H PRN  
 morphine injection 2 mg  2 mg IntraVENous Q4H PRN  
 insulin lispro (HUMALOG) injection   SubCUTAneous AC&HS  
 glucose chewable tablet 16 g  4 Tab Oral PRN  
 glucagon (GLUCAGEN) injection 1 mg  1 mg IntraMUSCular PRN  
 dextrose 10% infusion 125-250 mL  125-250 mL IntraVENous PRN  
 DULoxetine (CYMBALTA) capsule 60 mg  60 mg Oral DAILY  [Held by provider] insulin glargine (LANTUS) injection 10 Units  10 Units SubCUTAneous QHS  insulin lispro (HUMALOG) injection 6 Units  6 Units SubCUTAneous TIDAC  pantoprazole (PROTONIX) tablet 40 mg  40 mg Oral ACB  thiamine mononitrate (B-1) tablet 100 mg  100 mg Oral DAILY  traZODone (DESYREL) tablet 50 mg  50 mg Oral QHS  ondansetron (ZOFRAN) injection 4 mg  4 mg IntraVENous Q6H PRN Review of Systems - General ROS: negative for - chills, fever or night sweats Respiratory ROS: no cough, shortness of breath, or wheezing Cardiovascular ROS: no chest pain or dyspnea on exertion Objective: 
 
Visit Vitals /66 Pulse 97 Temp 99.1 °F (37.3 °C) Resp 14 Ht 5' 9\" (1.753 m) Wt 61.1 kg (134 lb 9.6 oz) SpO2 100% BMI 19.88 kg/m² Temp (24hrs), Av.4 °F (36.9 °C), Min:97.7 °F (36.5 °C), Max:99.1 °F (37.3 °C) GEN: Thin jaundiced WM in NAD HEENT: icteric Lab results: 
 
Chemistry Recent Labs  
  20 
0335 20 
0435 * 147*  139  
K 3.8 3.1*  
 99* CO2 32 32 BUN 7 5* CREA 0.82 0.93  
CA 10.1 9.7 AGAP 7 8 BUCR 9* 5* AP 24* 25* TP 6.7 6.4 ALB 5.0 4.4 GLOB 1.7* 2.0 AGRAT 2.9* 2.2*  
 
 
CBC w/ Diff Recent Labs  
  20 
9358 WBC 4.4*  
RBC 2.58* HGB 8.1* HCT 22.8* PLT 44* GRANS 56 LYMPH 35 EOS 1 Microbiology All Micro Results Procedure Component Value Units Date/Time CULTURE, BODY FLUID [[id number]] Collected: 20 1516 Order Status: Completed Specimen: Body Fluid from Ascitic Fluid Updated: 20 1231 Special Requests: NO SPECIAL REQUESTS     
  GRAM STAIN FEW WBCS SEEN     
   NO ORGANISMS SEEN Culture result: NO GROWTH 4 DAYS     
 CULTURE, BLOOD [[id number]] Collected: 20 1202 Order Status: Completed Specimen: Blood Updated: 20 4047 Special Requests: NO SPECIAL REQUESTS Culture result: NO GROWTH 6 DAYS     
 CULTURE, BLOOD [[id number]] Collected: 20 1208 Order Status: Completed Specimen: Blood Updated: 12/20/20 5854 Special Requests: NO SPECIAL REQUESTS Culture result: NO GROWTH 6 DAYS     
 CULTURE, BLOOD [[id number]] Collected: 12/13/20 1755 Order Status: Completed Specimen: Blood Updated: 12/19/20 0703 Special Requests: NO SPECIAL REQUESTS Culture result: NO GROWTH 6 DAYS     
 CULTURE, BLOOD [[id number]]  (Abnormal)  (Susceptibility) Collected: 12/13/20 1619 Order Status: Completed Specimen: Blood Updated: 12/16/20 0955 Special Requests: NO SPECIAL REQUESTS     
  GRAM STAIN    
  GRAM POSITIVE COCCI IN PAIRS ANAEROBIC BOTTLE  
     
      
  SMEAR CALLED TO AND CORRECTLY REPEATED BY: MARY REYES RN 3N TO [facility] AT 5014 ON 12/14/2020. Culture result:    
  ENTEROCOCCUS FAECALIS GROWING IN THE ANAEROBIC BOTTLE  
     
 ENTERIC BACTERIA PANEL, DNA [[id number]] Collected: 12/13/20 2215 Order Status: Canceled Specimen: Stool OVA & Kumar Delores [[id number]] Collected: 12/13/20 2215 Order Status: Canceled Specimen: Stool C. DIFFICILE AG & TOXIN A/B [[id number]] Collected: 12/13/20 2215 Order Status: Canceled Specimen: Stool Bethany Garner MD 
Cell [phone] [facility] 12/24/2020  
9:46 AM

## 2020-12-24 NOTE — WOUND CARE
IP WOUND CONSULT Foster Roberts MEDICAL RECORD NUMBER:  [id number] AGE: 54 y.o. GENDER: male  : 1965 TODAY'S DATE:  2020 GENERAL [x] Follow-up [] New Consult Foster Roberts is a 54 y.o. male referred by:  
[] Physician 
[] Nursing 
[] Other: PAST MEDICAL HISTORY Past Medical History:  
Diagnosis Date  [facility]osis ([facility]  Diabetes ([facility]  Gastroparesis  Pancreatitis  Pulmonary embolism ([facility] PAST SURGICAL HISTORY Past Surgical History:  
Procedure Laterality Date  HX CHOLECYSTECTOMY  HX HIP FRACTURE TX    
 left hip sx  FAMILY HISTORY History reviewed. No pertinent family history. SOCIAL HISTORY Social History Tobacco Use  Smoking status: Current Every Day Smoker  Smokeless tobacco: Never Used Substance Use Topics  Alcohol use: Not Currently  Drug use: No  
 
 
ALLERGIES No Known Allergies MEDICATIONS No current facility-administered medications on file prior to encounter. Current Outpatient Medications on File Prior to Encounter Medication Sig Dispense Refill  insulin glargine (LANTUS) 100 unit/mL injection 10 Units by SubCUTAneous route nightly. 1 Vial 0  
 furosemide (LASIX) 40 mg tablet Take 0.5 Tabs by mouth daily. 30 Tab 1  
 multivitamin, tx-iron-ca-min (THERA-M w/ IRON) 9 mg iron-400 mcg tab tablet Take 1 Tab by mouth daily. 30 Tab 1  rivaroxaban (XARELTO) 15 mg tab tablet Take 1 Tab by mouth two (2) times daily (with meals) for 20 days. 40 Tab 0  
 rivaroxaban (Xarelto) 20 mg tab tablet Take 1 Tab by mouth daily (with breakfast). 30 Tab 0  
 amoxicillin-clavulanate (Augmentin) 875-125 mg per tablet Take 1 Tab by mouth two (2) times a day. 8 Tab 0  
 methocarbamoL (ROBAXIN) 500 mg tablet Take 500 mg by mouth four (4) times daily as needed for Muscle Spasm(s) or Pain.  insulin lispro (HUMALOG) 100 unit/mL injection 6 Units by SubCUTAneous route Before breakfast, lunch, and dinner. 1 Vial 0  
 lactulose (CHRONULAC) 10 gram/15 mL solution Take 45 mL by mouth daily. 1 Bottle 0  
 pantoprazole (PROTONIX) 40 mg tablet Take 1 Tab by mouth Daily (before breakfast). 30 Tab 0  
 potassium chloride (K-DUR, KLOR-CON) 20 mEq tablet Take 1 Tab by mouth daily. 10 Tab 0  
 thiamine mononitrate (B-1) 100 mg tablet Take 1 Tab by mouth daily. 10 Tab 0  
 lidocaine (LIDODERM) 5 % 2 Patches by TransDERmal route every twenty-four (24) hours. Apply patch to the affected area for 12 hours a day and remove for 12 hours a day. One patch is applied to left hip. One patch is applied to right shoulder blade.  DULoxetine (CYMBALTA) 60 mg capsule Take 1 Cap by mouth daily.  traZODone (DESYREL) 50 mg tablet Take 50 mg by mouth nightly. Wt Readings from Last 3 Encounters:  
12/24/20 60.8 kg (134 lb) 12/07/20 59.4 kg (131 lb) 12/05/20 62.2 kg (137 lb 3.2 oz) [unfilled] Visit Vitals /66 Pulse 97 Temp 99.1 °F (37.3 °C) Resp 14 Ht 5' 9\" (1.753 m) Wt 60.8 kg (134 lb) SpO2 100% BMI 19.79 kg/m² ASSESSMENT Ulcer Identification: 
Ulcer Type: pressure Contributing Factors: chronic pressure and decreased mobility Wound Heel Left stage III 12/02/20 (Active) Wound Image   12/24/20 1053 Wound Etiology Pressure Stage 3 12/24/20 1053 Dressing Status Intact 12/24/20 1053 Cleansed Cleansed with saline 12/24/20 1053 Dressing/Treatment Foam 12/24/20 1053 Dressing Change Due 12/30/20 12/24/20 1053 Wound Length (cm) 0.4 cm 12/24/20 1053 Wound Width (cm) 0.4 cm 12/24/20 1053 Wound Depth (cm) 0.1 cm 12/24/20 1053 Wound Surface Area (cm^2) 0.16 cm^2 12/24/20 1053 Change in Wound Size % 83.33 12/24/20 1053 Wound Volume (cm^3) 0.02 cm^3 12/24/20 1053 Wound Healing % 89 12/24/20 1053 Wound Assessment Pale granulation tissue 12/24/20 1053 Drainage Amount Small 12/24/20 1053 Drainage Description Serosanguinous 12/24/20 1053 Wound Odor None 12/24/20 1053 Kandice-Wound/Incision Assessment Intact 12/24/20 1053 Edges Attached edges 12/24/20 1053 Wound Thickness Description Full thickness 12/24/20 1053 Number of days: 22 Wound Sacrum 11/22/20 (Active) Wound Image   12/24/20 1053 Wound Etiology Pressure Stage 4 12/24/20 1053 Dressing Status Intact 12/24/20 1053 Cleansed Cleansed with saline 12/24/20 1053 Dressing/Treatment Foam;Collagen with Ag 12/24/20 1053 Offloading for Diabetic Foot Ulcers No offloading required 12/14/20 1400 Dressing Change Due 12/30/20 12/24/20 1053 Wound Length (cm) 5 cm 12/24/20 1053 Wound Width (cm) 3 cm 12/24/20 1053 Wound Depth (cm) 0.4 cm 12/24/20 1053 Wound Surface Area (cm^2) 15 cm^2 12/24/20 1053 Change in Wound Size % 28.57 12/24/20 1053 Wound Volume (cm^3) 6 cm^3 12/24/20 1053 Wound Healing % 43 12/24/20 1053 Wound Assessment Granulation tissue 12/24/20 1053 Drainage Amount Moderate 12/24/20 1053 Drainage Description Serosanguinous 12/24/20 1053 Wound Odor None 12/24/20 1053 Kandice-Wound/Incision Assessment Intact 12/24/20 1053 Edges Unattached edges; Undefined edges 12/24/20 1053 Wound Thickness Description Full thickness 12/24/20 1053 Number of days: 15  
   
[REMOVED] Wound Back Lateral;Left; Inferior small opeing to the left flank ( pt states it sn abcess) 10/09/20 (Removed) Number of days: 44  
   
[REMOVED] Wound Sacral/coccyx Lower;Medial redden and small opening to scral/coccyx 10/09/20 (Removed) Number of days: 44  
   
[REMOVED] Wound Heel Left purple/red, non blanchable, skin intact, dsg present upon starting shift (Removed) Number of days: 30  
   
[REMOVED] Wound Sacrum Left;Upper (Removed) Number of days: 19 PLAN Skin Care & Pressure Relief Recommendations Minimize layers of linen Pads under patient to optimize support surface Turn/reposition approximately every 2 hours Pillow wedges Manage incontinence Please use in bed position system Promote continence; Skin Protective lotion/cream to buttocks and sacrum daily and as needed with incontinence care Offload heels pillows Offloading boots Physician/Provider notified: Yes Recommendations: Foam dressing Teaching completed with:  
[x] Patient          
[] Family member      
[] Caregiver         
[] Nursing 
[] Other Patient/Caregiver Teaching: 
Level of patient/caregiver understanding able to:  
[x] Indicates understanding       [] Needs reinforcement 
[] Unsuccessful      [] Verbal Understanding 
[] Demonstrated understanding       [] No evidence of learning 
[] Refused teaching         [] N/A Electronically signed by Rosebud Nageotte, RN on 12/24/2020 at 10:56 AM

## 2020-12-24 NOTE — PROGRESS NOTES
Problem: Falls - Risk of 
Goal: *Absence of Falls Description: Document Edmonia Morning Fall Risk and appropriate interventions in the flowsheet. Outcome: Progressing Towards Goal 
Note: Fall Risk Interventions: 
Mobility Interventions: Communicate number of staff needed for ambulation/transfer, Patient to call before getting OOB Mentation Interventions: Evaluate medications/consider consulting pharmacy Medication Interventions: Evaluate medications/consider consulting pharmacy, Patient to call before getting OOB, Teach patient to arise slowly Elimination Interventions: Call light in reach, Patient to call for help with toileting needs Problem: Pressure Injury - Risk of 
Goal: *Prevention of pressure injury Description: Document Trav Scale and appropriate interventions in the flowsheet. Outcome: Progressing Towards Goal 
Note: Pressure Injury Interventions: 
Sensory Interventions: Avoid rigorous massage over bony prominences Moisture Interventions: Check for incontinence Q2 hours and as needed Activity Interventions: Increase time out of bed, Pressure redistribution bed/mattress(bed type), PT/OT evaluation Mobility Interventions: HOB 30 degrees or less, PT/OT evaluation, Pressure redistribution bed/mattress (bed type) Nutrition Interventions: Document food/fluid/supplement intake Friction and Shear Interventions: HOB 30 degrees or less, Foam dressings/transparent film/skin sealants, Minimize layers

## 2020-12-24 NOTE — PROGRESS NOTES
Hospitalist Progress Note Patient: Kwasi Bonilla MRN: [id number]  CSN: [id number] YOB: 1965  Age: 54 y.o. Sex: male DOA: 12/13/2020 LOS:  LOS: 11 days Assessment/Plan Patient Active Problem List  
Diagnosis Code  Hypocalcemia E83.51  
 Hypomagnesemia E83.42  
 Alcoholism ([facility] F10.20  Cirrhosis ([facility] K74.60  Thrombocytopenia ([facility] D69.6  Severe protein-calorie malnutrition ([facility] E43  Pulmonary embolism (HCC) I26.99  
 Acute deep vein thrombosis (DVT) of both lower extremities (HCC) I82.403  Stage 4 decubitus ulcer (HCC) L89.94  
 Ascites R18.8  Coagulopathy (HCC) D68.9  
 Colitis K52.9  Positive blood culture R78.81  Bacteremia R78.81  
 Enterococcus faecalis infection B95.2  
  
 
54 y.o.  male who has history of recent pulmonary embolism and DVT, cirrhosis, diabetes, vent dependent respiratory failure with pneumonia presents to the emergency room with increasing abdominal pain and swelling despite abdominal paracentesis Bacteremia - 
1/2 blood cultures growing Enterococcus faecalis. Repeated cx no growth. VIJAYA high risk given coagulopathy TTE with no vegetations. Antibiotics - ampicillin  
  
Cirrhosis ([facility] (10/9/2020) with ascites IV albumin - Seen by Dr. Tiffanie Oliver Ammonia daily - on lactulose Lasix and spirnolactone. On hold due to hypotension   
Tolerated paracentesis on 12/18- Peritoneal fluid with no growth.  
  
Coagulopathy ([facility] (12/13/2020) 
inr daily Still coagulopathic despite vitamin K Continue hold xarelto secondary to elevated INR and thrombocytopenia. Thrombocytopenia - Secondary to cirrhosis  
  
PE /DVT on 11/30/2020 - Seen by heme/onc, recommend lovenox if plt above 75 
  
Pancreatic Duct enlargement - 
 MRI of Abdomen :Evidence of hepatic cirrhosis with no mass lesion, but chronic pancreatitis and fibrotic changes at the ampulla with mild extrinsic narrowing of the distal CBD. No mass lesion identified.  
 
Chronic pancreatitis - 
  
DM - 
ssi and hypoglycemia protocol   
  
Large Sacral wound - 
Continue abx and Continue local wound care  
  
  
Hypokalemia - 
K replacement , continue monitoring Disposition : TBD Review of systems General: No fevers or chills. Cardiovascular: No chest pain or pressure. No palpitations. Pulmonary: No shortness of breath. Gastrointestinal: No nausea, vomiting. Physical Exam: 
General: Awake, cooperative, no acute distress   
HEENT: NC, Atraumatic. PERRLA, anicteric sclerae. Lungs: CTA Bilaterally. No Wheezing/Rhonchi/Rales. Heart:  S1 S2,  No murmur, No Rubs, No Gallops Abdomen: Soft, Non distended, Non tender.  +Bowel sounds, Extremities: No c/c/e Psych:   Not anxious or agitated. Neurologic:  No acute neurological deficit. Vital signs/Intake and Output: 
Visit Vitals BP 99/73 Pulse (!) 105 Temp 99 °F (37.2 °C) Resp 14 Ht 5' 9\" (1.753 m) Wt 60.8 kg (134 lb) SpO2 94% BMI 19.79 kg/m² Current Shift:  No intake/output data recorded. Last three shifts:  12/22 1901 - 12/24 0700 In: 494 [I.V.:494] Out: -  
 
 
 
 
 
Labs: Results:  
   
Chemistry Recent Labs  
  12/24/20 
0335 12/22/20 
0435 * 147*  139  
K 3.8 3.1*  
 99* CO2 32 32 BUN 7 5* CREA 0.82 0.93  
CA 10.1 9.7 AGAP 7 8 BUCR 9* 5* AP 24* 25* TP 6.7 6.4 ALB 5.0 4.4 GLOB 1.7* 2.0 AGRAT 2.9* 2.2*  
  
CBC w/Diff Recent Labs  
  12/22/20 
0435 WBC 4.4*  
RBC 2.58* HGB 8.1* HCT 22.8* PLT 44* GRANS 56 LYMPH 35 EOS 1 Cardiac Enzymes No results for input(s): CPK, CKND1, LYDIA in the last 72 hours. No lab exists for component: Genny Plunkett Coagulation Recent Labs  
  12/24/20 
0335 12/23/20 
0410 PTP 33.3* 37.6*  
 INR 3.4* 4.0* Lipid Panel No results found for: CHOL, CHOLPOCT, CHOLX, CHLST, CHOLV, 719075, HDL, HDLP, LDL, LDLC, DLDLP, 025311, VLDLC, VLDL, TGLX, TRIGL, TRIGP, TGLPOCT, CHHD, CHHDX  
BNP No results for input(s): BNPP in the last 72 hours. Liver Enzymes Recent Labs  
  12/24/20 
0335 TP 6.7 ALB 5.0 AP 24* Thyroid Studies No results found for: T4, T3U, TSH, TSHEXT, TSHEXT Procedures/imaging: see electronic medical records for all procedures/Xrays and details which were not copied into this note but were reviewed prior to creation of Plan

## 2020-12-24 NOTE — PROGRESS NOTES
1922: Bedside report received from Godwin Harrison RN. Assumed care of pt at this time. Pt in bed with no signs of distress. Pt left with call light within reach and encouraged to call for assistance. 2058: Morphine 2mg given PRN. 2245: Zofran given for nausea. 2338: Percocet 5-325 mg 1 Tablet given PRN.  
 
0346: Morphine 2mg given PRN.  
 
2785: Percocet 5-325 mg 1 Tablet given PRN. 6636: Bedside shift change report given to Godwin Harrison RN (oncoming nurse) by Anna KOWALSKI RN (offgoing nurse). Report included the following information SBAR, Kardex and MAR. Shift summary: Patient had uneventful shift. Pt denies chest pain, SOB or distress.

## 2020-12-24 NOTE — PROGRESS NOTES
Cardiology Progress Note Patient: Bianca Plummer        Sex: male          DOA: 12/13/2020 YOB: 1965      Age:  54 y.o.        LOS:  LOS: 11 days Assessment/Plan Principal Problem: 
  Bacteremia (12/16/2020) Active Problems: 
  Cirrhosis ([facility] (10/9/2020) Severe protein-calorie malnutrition ([facility] (10/26/2020) Stage 4 decubitus ulcer ([facility] (12/3/2020) Ascites (12/3/2020) Coagulopathy ([facility] (12/13/2020) Colitis (12/14/2020) Positive blood culture (12/14/2020) Enterococcus faecalis infection (12/23/2020) Plan: 
 
Transthoracic echocardiogram done, good transthoracic echocardiographic windows · LV: Estimated LVEF is 55 - 60%. Normal cavity size, wall thickness, systolic function (ejection fraction normal) and diastolic function. E'E= 9.21. 
· There is no vegetation on the aortic valve. · There is no vegetation on the mitral valve. · There is no vegetation on the tricuspid valve. · There is no vegetation on the pulmonic valve. · Pericardium: Small pericardial effusion noted. No indications of tamponade present. Ascites present. Will consider higher risk VIJAYA if thrombocyte & coagulation profile is stable if clinically indicated. Discussed with Dr. Monalisa Head and Dr. Dominick Salinas. Cardiology will sign off and please call if you have any questions or concerns Subjective:  
 cc: 
VIJAYA REVIEW OF SYSTEMS:  
 
General: No fevers or chills. Cardiovascular: No chest pain or pressure. No palpitations. No ankle swelling Pulmonary: No SOB, orthopnea, PND Gastrointestinal: No nausea, vomiting or diarrhea Objective:  
  
Visit Vitals BP 99/73 Pulse (!) 105 Temp 99 °F (37.2 °C) Resp 14 Ht 5' 9\" (1.753 m) Wt 60.8 kg (134 lb) SpO2 94% BMI 19.79 kg/m² Body mass index is 19.79 kg/m². Physical Exam: 
General Appearance: Comfortable, not using accessory muscles of respiration. NECK: No JVD, no thyroidomeglay. LUNGS: Clear bilaterally. HEART: S1+S2 audible, ABD: Non-tender, BS Audible EXT: No edema, and no cysnosis. VASCULAR EXAM: Pulses are intact. PSYCHIATRIC EXAM: Mood is appropriate. Medication: 
Current Facility-Administered Medications Medication Dose Route Frequency  multivitamin, tx-iron-ca-min (THERA-M w/ IRON) tablet 1 Tab  1 Tab Oral DAILY  magnesium oxide (MAG-OX) tablet 400 mg  400 mg Oral BID  ampicillin (OMNIPEN) 2 g in 0.9% sodium chloride (MBP/ADV) 100 mL MBP  2 g IntraVENous Q4H  potassium chloride (K-DUR, KLOR-CON) SR tablet 40 mEq  40 mEq Oral Q4H  
 albumin human 25% (BUMINATE) solution 25 g  25 g IntraVENous Q6H  
 lactulose (CHRONULAC) 10 gram/15 mL solution 45 mL  30 g Oral DAILY  furosemide (LASIX) tablet 80 mg  80 mg Oral DAILY  spironolactone (ALDACTONE) tablet 200 mg  200 mg Oral DAILY  nicotine (NICODERM CQ) 21 mg/24 hr patch 1 Patch  1 Patch TransDERmal DAILY  lidocaine 4 % patch 2 Patch  2 Patch TransDERmal Q24H  
 oxyCODONE-acetaminophen (PERCOCET) 5-325 mg per tablet 1-2 Tab  1-2 Tab Oral Q6H PRN  
 morphine injection 2 mg  2 mg IntraVENous Q4H PRN  
 insulin lispro (HUMALOG) injection   SubCUTAneous AC&HS  
 glucose chewable tablet 16 g  4 Tab Oral PRN  
 glucagon (GLUCAGEN) injection 1 mg  1 mg IntraMUSCular PRN  
 dextrose 10% infusion 125-250 mL  125-250 mL IntraVENous PRN  
 DULoxetine (CYMBALTA) capsule 60 mg  60 mg Oral DAILY  [Held by provider] insulin glargine (LANTUS) injection 10 Units  10 Units SubCUTAneous QHS  insulin lispro (HUMALOG) injection 6 Units  6 Units SubCUTAneous TIDAC  pantoprazole (PROTONIX) tablet 40 mg  40 mg Oral ACB  thiamine mononitrate (B-1) tablet 100 mg  100 mg Oral DAILY  traZODone (DESYREL) tablet 50 mg  50 mg Oral QHS  ondansetron (ZOFRAN) injection 4 mg  4 mg IntraVENous Q6H PRN Lab/Data Reviewed: Procedures/imaging: see electronic medical records for all procedures/Xrays  
and details which were not copied into this note but were reviewed prior to creation of Plan All lab results for the last 24 hours reviewed. Recent Labs  
  12/22/20 
2878 WBC 4.4* HGB 8.1* HCT 22.8* PLT 44* Recent Labs  
  12/24/20 
0335 12/22/20 
0435  139  
K 3.8 3.1*  
 99* CO2 32 32 * 147* BUN 7 5* CREA 0.82 0.93  
CA 10.1 9.7 RADIOLOGY: 
CT Results  (Last 48 hours) None CXR Results  (Last 48 hours) None Cardiology Procedures:  
Results for orders placed or performed during the hospital encounter of 12/13/20 EKG, 12 LEAD, INITIAL Result Value Ref Range Ventricular Rate 108 BPM  
 Atrial Rate 108 BPM  
 P-R Interval 170 ms QRS Duration 76 ms  
 Q-T Interval 342 ms QTC Calculation (Bezet) 458 ms Calculated R Axis 16 degrees Calculated T Axis -5 degrees Diagnosis Sinus tachycardia Low voltage QRS Possible Inferior infarct , age undetermined Abnormal ECG When compared with ECG of 30-NOV-2020 17:07, Sinus rhythm has replaced Ectopic atrial rhythm Incomplete right bundle branch block is no longer present Borderline criteria for Inferior infarct are now present Confirmed by Marciano Torres MD, -- ([id number]) on 12/13/2020 8:17:06 PM 
  
  
Echo Results  (Last 48 hours) None Cardiolite (Tc-99m Sestamibi) stress test 
 
Signed By: Ankush Lewis MD   
 December 24, 2020

## 2020-12-24 NOTE — PROGRESS NOTES
Problem: Mobility Impaired (Adult and Pediatric) Goal: *Acute Goals and Plan of Care (Insert Text) Description: Physical Therapy short term goals revised 12/24/2020, to be achieved in 4-7 days. Pt will: 1. Perform bed mobility with indep in prep for OOB activity. 2. Perform sit <> stand transfers with LRAD and S in prep for functional mobility and ambulation. 3. Ambulate 200 ft with LRAD and S in prep for household and community mobility. 4. Ascend/descend 3 stairs with 1 HR, LRAD, and S for safe home entry. 5. Transfer bed <> chair with LRAD and S in prep for OOB activity and ADL completion Description: Physical Therapy short term goals initiated 12/17/2020, to be achieved in 4-7 days. Pt will: 1. Perform bed mobility with indep in prep for OOB activity. 2. Perform sit <> stand transfers with LRAD and S in prep for functional mobility and ambulation. 3. Ambulate 200 ft with LRAD and S in prep for household and community mobility. 4. Ascend/descend 3 stairs with 1 HR, LRAD, and S for safe home entry. Note: PHYSICAL THERAPY REEVALUATION AND TREATMENT Patient: Dee Feliz (54 y.o. male) Date: 12/24/2020 Diagnosis: Coagulopathy ([facility] [D68.9] Ascites [R18.8] Cirrhosis ([facility] [K74.60] Bacteremia Precautions: Fall PLOF: Pt was indep with QC PTA.   
 
ASSESSMENT: 
 Pt presents with continued decr B UE and LE strength, decr balance, decr activity tolerance, and abdominal pain resulting in deficits in bed mobility, transfers, and gait. Pt reports that he was feeling better until the past two days, when he has felt weaker. Pt performed bed mobility with SBA. Pt required CGA for sit <> stand transfers with RW. Pt amb 40 ft in room and reyes with RW and CGA with decr speed and step length, pt became increasingly fatigued, required a chair to be brought over by nursing staff for a seated rest break. After 2-3 minutes in sitting, pt was able to ambulate 10 ft back to bed. Pt returned to supine in bed, left with all needs met and all questions answered. Pt continues to benefit from skilled therapy in order to incr functional mobility and activity tolerance. Recommend HHPT on discharge. Progression toward goals:  
[]      Improving appropriately and progressing toward goals [x]      Improving slowly and progressing toward goals 
[]      Not making progress toward goals and plan of care will be adjusted PLAN: 
Patient continues to benefit from skilled intervention to address the above impairments. Continue treatment per established plan of care. Discharge Recommendations:  Home Health Further Equipment Recommendations for Discharge:  N/A  
 
SUBJECTIVE:  
Patient stated yesterday I felt really weak, I'm a little better today.  OBJECTIVE DATA SUMMARY:  
Critical Behavior: 
Neurologic State: Alert Orientation Level: Oriented X4 Cognition: Appropriate decision making, Appropriate for age attention/concentration, Appropriate safety awareness, Follows commands Safety/Judgement: Awareness of environment, Insight into deficits Functional Mobility Training: 
Bed Mobility: 
Supine to Sit: Stand-by assistance Sit to Supine: Stand-by assistance Scooting: Supervision Transfers: 
Sit to Stand: Contact guard assistance Stand to Sit: Contact guard assistance Balance: Sitting: Intact Standing: Intact; With support Range Of Motion: 
 AROM: Generally decreased, functional 
 PROM: Generally decreased, functional 
Ambulation/Gait Training: 
Distance (ft): 40 Feet (ft)(40x1 rep, then 10 ft) Assistive Device: Gait belt;Walker, rolling Ambulation - Level of Assistance: Contact guard assistance; Additional time Gait Abnormalities: Decreased step clearance; Step to gait Base of Support: Narrowed Speed/Ewa: Slow Step Length: Left shortened;Right shortened Interventions: Safety awareness training;Verbal cues Pain: 
Pain level pre-treatment: 7/10 Pain level post-treatment: 7/10 Pain Intervention(s): Medication (see MAR); Rest, Ice, Repositioning Response to intervention: Nurse notified, See doc flow Activity Tolerance: Pt with decr gait tolerance, requiring seated rest break with ambulation d/t LE muscle fatigue. Please refer to the flowsheet for vital signs taken during this treatment. After treatment:  
[] Patient left in no apparent distress sitting up in chair 
[x] Patient left in no apparent distress in bed 
[x] Call bell left within reach [x] Nursing notified 
[] Caregiver present [x] Bed alarm activated 
[] SCDs applied COMMUNICATION/EDUCATION:  
[x]         Role of Physical Therapy in the acute care setting. [x]         Fall prevention education was provided and the patient/caregiver indicated understanding. [x]         Patient/family have participated as able in working toward goals and plan of care. [x]         Patient/family agree to work toward stated goals and plan of care. []         Patient understands intent and goals of therapy, but is neutral about his/her participation. []         Patient is unable to participate in stated goals/plan of care: ongoing with therapy staff. 
[]         Other: Mercedez Wang Time Calculation: 23 mins

## 2020-12-25 NOTE — PROGRESS NOTES
Attempted to work with pt but pt declines physical therapy stating that he has had a rough day today. Pt requesting therapy to return tomorrow. RN updated.  
 
GIOVANY WilliamsonT

## 2020-12-25 NOTE — PROGRESS NOTES
Shift Summary :  Slept at intervals with pain medication. Continues on telemetry, albumin and antibiotics. Complained of pain with pain medication effective.

## 2020-12-25 NOTE — PROGRESS NOTES
0710 
Bedside and Verbal shift change report given to LAVON Mckoy (oncoming nurse) by DANNY Concepcion RN (offgoing nurse). Report included the following information SBAR, Kardex, OR Summary, Intake/Output and MAR. 
 
0800 Shift assessment completed. Pt AOX4 RA. Lung sounds clear, bowel sounds hypoactive. Abdomen is slightly distended and tender on palpation. Pt has a wound to the sacrum and wound to LLE that are both enforced with mepilex foam dressings and CDI. Skin is jaundiced and fragile with scattered bruising but otherwise intact. Pt has 22G Left Wrist CDI. Pt on Telebox 34 NSR.  
 
0820 Pt received PRN Morphine (2mg) for 6/10 pain in the abdomen. 450 [facility] Pt consumed 25% of breakfast. Pt refused feeding assistance from staff. [address] [facility] Breakthrough drainage noted. New dressings applied to sacrum and left lower heel. Pt tolerated procedure poorly. Pt received PRN Morphine (2 mg) for 6/10 pain in the abdomen. Pt consumed 25% of lunch. [address] Pt received PRN Morphine (2mg) for 6/10 pain in the abdomen. [place] [address] Pt experiencing dyspnea at rest. Pt placed on 2L NC. Will continue to reassess. 22G Left Wrist removed d/t infiltration. [address] Uneventful shift. Pt experienced episodes of dyspnea at rest and on exertion. Pt placed on 2L NC. Pt tolerating well. Pt reports limited food intake since 12/23. Pt consumed 25% of breakfast and lunch. Pt refusing feeding assistance but could be more successful with encouragement. Pt's wife states, \"I think he needs another paracentesis. \" Dr. Ignacio Coreas notified. MD states he will monitor. VSS throughout the shift. Patient Vitals for the past 12 hrs: 
 Temp Pulse Resp BP SpO2  
12/25/20 1905   18  98 % 12/25/20 1534 98.8 °F (37.1 °C) 100 16 106/63 99 % 12/25/20 1121 98.7 °F (37.1 °C) 86 16 110/71 100 % 12/25/20 0721 98.5 °F (36.9 °C) 96 16 110/68 99 % 1300 [facility] 02W Left Basilic established by DAVE Hutton. Albumin restarted. Spoke with Nicol Hu Pharmacist. Scheduled Yaz Vila re-timed for 2100.  
 
3033 Bedside and Verbal shift change report given by LAVON Barbosa RN (off going nurse) to DAVE Fitzgerald RN (on coming nurse). Report included the following information SBAR, Kardex, OR Summary, Intake/Output and MAR.

## 2020-12-25 NOTE — PROGRESS NOTES
Hospitalist Progress Note Patient: Shameka Forbes MRN: [id number]  CSN: [id number] YOB: 1965  Age: 54 y.o. Sex: male DOA: 12/13/2020 LOS:  LOS: 12 days Assessment/Plan Patient Active Problem List  
Diagnosis Code  Hypocalcemia E83.51  
 Hypomagnesemia E83.42  
 Alcoholism ([facility] F10.20  Cirrhosis ([facility] K74.60  Thrombocytopenia ([facility] [address] D69.6  Severe protein-calorie malnutrition ([address] E43  Pulmonary embolism (HCC) I26.99  
 Acute deep vein thrombosis (DVT) of both lower extremities (HCC) I82.403  Stage 4 decubitus ulcer (HCC) L89.94  
 Ascites R18.8  Coagulopathy (HCC) D68.9  
 Colitis K52.9  Positive blood culture R78.81  Bacteremia R78.81  
 Enterococcus faecalis infection B95.2  
  
 
54 y.o.  male who has history of recent pulmonary embolism and DVT, cirrhosis, diabetes, vent dependent respiratory failure with pneumonia presents to the emergency room with increasing abdominal pain and swelling despite abdominal paracentesis Bacteremia - 
1/2 blood cultures growing Enterococcus faecalis. Repeated cx no growth. VIJAYA high risk given coagulopathy TTE with no vegetations. Antibiotics - ampicillin  
  
Cirrhosis ([facility] (10/9/2020) with ascites IV albumin - Seen by Dr. Marisela Mckenzie Ammonia daily - on lactulose Lasix and spirnolactone. On hold due to hypotension   
Tolerated paracentesis on 12/18- Peritoneal fluid with no growth.  
  
Coagulopathy ([address] (12/13/2020) 
inr daily Still coagulopathic despite vitamin K Continue hold xarelto secondary to elevated INR and thrombocytopenia. Thrombocytopenia - Secondary to cirrhosis  
  
PE /DVT on 11/30/2020 - Seen by heme/onc, recommend lovenox if plt above 75 
  
Pancreatic Duct enlargement - 
 MRI of Abdomen :Evidence of hepatic cirrhosis with no mass lesion, but chronic pancreatitis and fibrotic changes at the ampulla with mild extrinsic narrowing of the distal CBD. No mass lesion identified.  
 
Chronic pancreatitis - 
  
DM - 
ssi and hypoglycemia protocol   
  
Large Sacral wound - 
Continue abx and Continue local wound care  
  
  
Hypokalemia - 
K replacement , continue monitoring Disposition : TBD Review of systems General: No fevers or chills. Cardiovascular: No chest pain or pressure. No palpitations. Pulmonary: No shortness of breath. Gastrointestinal: No nausea, vomiting. Physical Exam: 
General: Awake, cooperative, no acute distress   
HEENT: NC, Atraumatic. PERRLA, anicteric sclerae. Lungs: CTA Bilaterally. No Wheezing/Rhonchi/Rales. Heart:  S1 S2,  No murmur, No Rubs, No Gallops Abdomen: Soft, Non distended, Non tender.  +Bowel sounds, Extremities: No c/c/e Psych:   Not anxious or agitated. Neurologic:  No acute neurological deficit. Vital signs/Intake and Output: 
Visit Vitals /63 (BP 1 Location: Right arm, BP Patient Position: At rest) Pulse 100 Temp 98.8 °F (37.1 °C) Resp 16 Ht 5' 9\" (1.753 m) Wt 63.5 kg (140 lb) SpO2 99% BMI 20.67 kg/m² Current Shift:  No intake/output data recorded. Last three shifts:  12/23 1901 - 12/25 0700 In: 535 [I.V.:535] Out: -  
 
 
 
 
 
Labs: Results:  
   
Chemistry Recent Labs  
  12/24/20 0335 *   
K 3.8  CO2 32 BUN 7  
CREA 0.82  
CA 10.1 AGAP 7  
BUCR 9* AP 24* TP 6.7 ALB 5.0  
GLOB 1.7* AGRAT 2.9*  
  
CBC w/Diff No results for input(s): WBC, RBC, HGB, HCT, PLT, GRANS, LYMPH, EOS, HGBEXT, HCTEXT, PLTEXT, HGBEXT, HCTEXT, PLTEXT in the last 72 hours. Cardiac Enzymes No results for input(s): CPK, CKND1, LYDIA in the last 72 hours. No lab exists for component: Arnaud Diver Coagulation Recent Labs  
  12/25/20 0332 12/24/20 0335 PTP 35.6* 33.3*  
 INR 3.7* 3.4* Lipid Panel No results found for: CHOL, CHOLPOCT, CHOLX, CHLST, CHOLV, 573598, HDL, HDLP, LDL, LDLC, DLDLP, 176018, VLDLC, VLDL, TGLX, TRIGL, TRIGP, TGLPOCT, CHHD, CHHDX  
BNP No results for input(s): BNPP in the last 72 hours. Liver Enzymes Recent Labs  
  12/24/20 
0335 TP 6.7 ALB 5.0 AP 24* Thyroid Studies No results found for: T4, T3U, TSH, TSHEXT, TSHEXT Procedures/imaging: see electronic medical records for all procedures/Xrays and details which were not copied into this note but were reviewed prior to creation of Plan

## 2020-12-25 NOTE — PROGRESS NOTES
Bedside and Verbal shift change report given to  MsGiuliano Laboy RN  (oncoming nurse) by Turner NICHOLAS, RN  (offgoing nurse). Report included the following information SBAR, Kardex and MAR. SHIFT UPDATES:  Patient presented to IV ampicillin every 4 hours scheduled for management/prevention of infection. Patient had cardiac ECHO at bedside performed today in which patient presented with EF of 55-60% and no other abnormalities. Patient receives  IV Morphine 2 mg PRN every 4 hours for severe pain and Percocent 5-325 mg 1-2 tabs every 6 hours PRN For moderate to Severe pain. Patient also received IV Zofran 4 mg every 6 hours PRN for nausea/vomiting management. Patient had dressings on Sacrum, dorsal left leg and left heel wounds were changed and assessed by wound team. Patient ambulated with physical therapy in reyes with walker and back to bed with assistance. Patient presents with generalized weakness but utilizes bedside commode with assistance. ABNORMAL LAB:  
Results for Vania Kennedy (MRN [id number]) as of 12/24/2020 18:52 Ref. Range 12/24/2020 03:35 INR Latest Ref Range: 0.8 - 1.2   3.4 (H) Prothrombin time Latest Ref Range: 11.5 - 15.2 sec 33.3 (H) Sodium Latest Ref Range: 136 - 145 mmol/L 139 Potassium Latest Ref Range: 3.5 - 5.5 mmol/L 3.8 Chloride Latest Ref Range: 100 - 111 mmol/L 100 CO2 Latest Ref Range: 21 - 32 mmol/L 32 Anion gap Latest Ref Range: 3.0 - 18 mmol/L 7 Glucose Latest Ref Range: 74 - 99 mg/dL 110 (H) BUN Latest Ref Range: 7.0 - 18 MG/DL 7 Creatinine Latest Ref Range: 0.6 - 1.3 MG/DL 0.82  
BUN/Creatinine ratio Latest Ref Range: 12 - 20   9 (L) Calcium Latest Ref Range: 8.5 - 10.1 MG/DL 10.1 Magnesium Latest Ref Range: 1.6 - 2.6 mg/dL 2.1 GFR est non-AA Latest Ref Range: >60 ml/min/1.73m2 >60  
GFR est AA Latest Ref Range: >60 ml/min/1.73m2 >60 Bilirubin, total Latest Ref Range: 0.2 - 1.0 MG/DL 4.8 (H) Protein, total Latest Ref Range: 6.4 - 8.2 g/dL 6.7 Albumin Latest Ref Range: 3.4 - 5.0 g/dL 5.0 Globulin Latest Ref Range: 2.0 - 4.0 g/dL 1.7 (L) A-G Ratio Latest Ref Range: 0.8 - 1.7   2.9 (H) ALT Latest Ref Range: 16 - 61 U/L 8 (L) AST Latest Ref Range: 10 - 38 U/L 19 Alk. phosphatase Latest Ref Range: 45 - 117 U/L 24 (L) Ammonia Latest Ref Range: 11 - 32 UMOL/L 20 Wounds? Sacrum wound Stage IV, Left lower leg abrasion & left heel abrasion (Bacitracin ointment to wound bed & Mepilex dressing to cover wound. Changed by ) 
  Central Line?: None.  
  
  
Last BM:  12/23/2020 
  
  
Pending Labs for AM Draw:  Ammonia & Magnesium levels As of 4 am on 12/24/2020 
  
  
Discharge plan:  As of 12/24/2020 at 1200 pm, case management note states that patient will discharge home with home health when medically stable.  
  
Leo Godwni 7:35 PM 
12/24/2020

## 2020-12-25 NOTE — PROGRESS NOTES
Problem: Falls - Risk of 
Goal: *Absence of Falls Description: Document Edmonia Morning Fall Risk and appropriate interventions in the flowsheet. Outcome: Progressing Towards Goal 
Note: Fall Risk Interventions: 
Mobility Interventions: Patient to call before getting OOB Mentation Interventions: Adequate sleep, hydration, pain control, Eyeglasses and hearing aids Medication Interventions: Evaluate medications/consider consulting pharmacy, Patient to call before getting OOB, Teach patient to arise slowly Elimination Interventions: Call light in reach, Patient to call for help with toileting needs

## 2020-12-26 NOTE — PROGRESS NOTES
Problem: Falls - Risk of 
Goal: *Absence of Falls Description: Document Jeff Butler Fall Risk and appropriate interventions in the flowsheet. Outcome: Progressing Towards Goal 
Note: Fall Risk Interventions: 
Mobility Interventions: Assess mobility with egress test 
 
Mentation Interventions: Adequate sleep, hydration, pain control, Eyeglasses and hearing aids Medication Interventions: Patient to call before getting OOB Elimination Interventions: Call light in reach Problem: Patient Education: Go to Patient Education Activity Goal: Patient/Family Education Outcome: Progressing Towards Goal 
  
Problem: Pressure Injury - Risk of 
Goal: *Prevention of pressure injury Description: Document Trav Scale and appropriate interventions in the flowsheet. Outcome: Progressing Towards Goal 
Note: Pressure Injury Interventions: 
Sensory Interventions: Assess changes in LOC Moisture Interventions: Absorbent underpads Activity Interventions: Increase time out of bed Mobility Interventions: HOB 30 degrees or less Nutrition Interventions: Document food/fluid/supplement intake Friction and Shear Interventions: Foam dressings/transparent film/skin sealants Problem: Patient Education: Go to Patient Education Activity Goal: Patient/Family Education Outcome: Progressing Towards Goal 
  
Problem: Pain Goal: *Control of Pain Outcome: Progressing Towards Goal 
Goal: *PALLIATIVE CARE:  Alleviation of Pain Outcome: Progressing Towards Goal 
  
Problem: Patient Education: Go to Patient Education Activity Goal: Patient/Family Education Outcome: Progressing Towards Goal 
  
Problem: Ascities-Palliative Care Goal: *PALLIATIVE CARE-Alleviation of ascities Outcome: Progressing Towards Goal 
  
Problem: Patient Education: Go to Patient Education Activity Goal: Patient/Family Education Outcome: Progressing Towards Goal 
  
Problem: Patient Education: Go to Patient Education Activity Goal: Patient/Family Education Outcome: Progressing Towards Goal 
  
Problem: Patient Education: Go to Patient Education Activity Goal: Patient/Family Education Outcome: Progressing Towards Goal 
  
Problem: General Infection Care Plan (Adult and Pediatric) Goal: Improvement in signs and symptoms of infection Outcome: Progressing Towards Goal 
Goal: *Optimize nutritional status Outcome: Progressing Towards Goal 
  
Problem: Patient Education: Go to Patient Education Activity Goal: Patient/Family Education Outcome: Progressing Towards Goal 
  
Problem: Nutrition Deficit Goal: *Optimize nutritional status Outcome: Progressing Towards Goal

## 2020-12-26 NOTE — PROGRESS NOTES
Hospitalist Progress Note Patient: Cindy Perez MRN: [id number]  CSN: [id number] YOB: 1965  Age: 54 y.o. Sex: male DOA: 12/13/2020 LOS:  LOS: 13 days Assessment/Plan Patient Active Problem List  
Diagnosis Code  Hypocalcemia E83.51  
 Hypomagnesemia E83.42  
 Alcoholism ([facility] F10.20  [facility] K74.60  Thrombocytopenia ([facility] D69.6  Severe protein-calorie malnutrition ([facility] E43  Pulmonary embolism (HCC) I26.99  
 Acute deep vein thrombosis (DVT) of both lower extremities (HCC) I82.403  Stage 4 decubitus ulcer (HCC) L89.94  
 Ascites R18.8  Coagulopathy (HCC) D68.9  
 Colitis K52.9  Positive blood culture R78.81  Bacteremia R78.81  
 Enterococcus faecalis infection B95.2  
  
 
54 y.o.  male who has history of recent pulmonary embolism and DVT, cirrhosis, diabetes, vent dependent respiratory failure with pneumonia presents to the emergency room with increasing abdominal pain and swelling despite abdominal paracentesis. Feels better, abdomen a little distended. Wants to go home Bacteremia - 
1/2 blood cultures growing Enterococcus faecalis. Repeated cx no growth. VIJAYA high risk given coagulopathy TTE with no vegetations. Antibiotics - ampicillin, will complete course tomorrow 
  
Cirrhosis ([facility] (10/9/2020) with ascites IV albumin - Seen by Dr. Brayan Bartlett Ammonia daily - on lactulose Lasix and spirnolactone. On hold due to hypotension   
Tolerated paracentesis on 12/18- Peritoneal fluid with no growth.  
  
Coagulopathy ([facility] (12/13/2020) 
inr daily Still coagulopathic despite vitamin K Continue hold xarelto secondary to elevated INR and thrombocytopenia. May have to stop anticoagulation at discharge and he needs to follow-up with heme-onc and hepatology and follow-up on his INR and platelet levels to start on anticoagulation. Thrombocytopenia - Secondary to cirrhosis, monitor platelets 
  
 PE /DVT on 11/30/2020 - Seen by heme/onc, recommend lovenox if plt above 75 
  
Pancreatic Duct enlargement - MRI of Abdomen :Evidence of hepatic cirrhosis with no mass lesion, but chronic pancreatitis and fibrotic changes at the ampulla with mild extrinsic narrowing of the distal CBD. No mass lesion identified.  
 
Chronic pancreatitis - 
  
DM - 
ssi and hypoglycemia protocol   
  
Pressure ulcer sacral area stage III- Continue abx and Continue local wound care  
  
  
Hypokalemia - 
K replacement , continue monitoring Disposition : TBD Review of systems General: No fevers or chills. Cardiovascular: No chest pain or pressure. No palpitations. Pulmonary: No shortness of breath. Gastrointestinal: No nausea, vomiting. Physical Exam: 
General: Awake, cooperative, no acute distress   
HEENT: NC, Atraumatic. PERRLA, icteric sclerae. Lungs: CTA Bilaterally. No Wheezing/Rhonchi/Rales. Heart:  S1 S2,  No murmur, No Rubs, No Gallops Abdomen: Soft,  distended, Non tender.  +Bowel sounds, Extremities: No c/c/e Psych:   Not anxious or agitated. Neurologic:  No acute neurological deficit. Pressure ulcer sacral area stage III Vital signs/Intake and Output: 
Visit Vitals /74 (BP 1 Location: Right arm, BP Patient Position: At rest) Pulse 95 Temp 97.8 °F (36.6 °C) Resp 16 Ht 5' 9\" (1.753 m) Wt 63.5 kg (140 lb) SpO2 100% BMI 20.67 kg/m² Current Shift:  No intake/output data recorded. Last three shifts:  12/24 1901 - 12/26 0700 In: 850 [P.O.:315; I.V.:535] Out: -  
 
 
 
 
 
Labs: Results:  
   
Chemistry Recent Labs  
  12/24/20 
0335 *   
K 3.8  CO2 32 BUN 7  
CREA 0.82  
CA 10.1 AGAP 7  
BUCR 9* AP 24* TP 6.7 ALB 5.0  
GLOB 1.7* AGRAT 2.9*  
  
CBC w/Diff No results for input(s): WBC, RBC, HGB, HCT, PLT, GRANS, LYMPH, EOS, HGBEXT, HCTEXT, PLTEXT, HGBEXT, HCTEXT, PLTEXT in the last 72 hours. Cardiac Enzymes No results for input(s): CPK, CKND1, LYDIA in the last 72 hours. No lab exists for component: Marly Dunn Coagulation Recent Labs  
  12/26/20 
0139 12/25/20 
5286 PTP 34.3* 35.6* INR 3.5* 3.7* Lipid Panel No results found for: CHOL, CHOLPOCT, CHOLX, CHLST, CHOLV, 819001, HDL, HDLP, LDL, LDLC, DLDLP, 578411, VLDLC, VLDL, TGLX, TRIGL, TRIGP, TGLPOCT, CHHD, CHHDX  
BNP No results for input(s): BNPP in the last 72 hours. Liver Enzymes Recent Labs  
  12/24/20 
0335 TP 6.7 ALB 5.0 AP 24* Thyroid Studies No results found for: T4, T3U, TSH, TSHEXT, TSHEXT Procedures/imaging: see electronic medical records for all procedures/Xrays and details which were not copied into this note but were reviewed prior to creation of Plan

## 2020-12-26 NOTE — PROGRESS NOTES
Problem: Mobility Impaired (Adult and Pediatric) Goal: *Acute Goals and Plan of Care (Insert Text) Description: Physical Therapy short term goals revised 12/24/2020, to be achieved in 4-7 days. Pt will: 1. Perform bed mobility with indep in prep for OOB activity. 2. Perform sit <> stand transfers with LRAD and S in prep for functional mobility and ambulation. 3. Ambulate 200 ft with LRAD and S in prep for household and community mobility. 4. Ascend/descend 3 stairs with 1 HR, LRAD, and S for safe home entry. 5. Transfer bed <> chair with LRAD and S in prep for OOB activity and ADL completion Description: Physical Therapy short term goals initiated 12/17/2020, to be achieved in 4-7 days. Pt will: 1. Perform bed mobility with indep in prep for OOB activity. 2. Perform sit <> stand transfers with LRAD and S in prep for functional mobility and ambulation. 3. Ambulate 200 ft with LRAD and S in prep for household and community mobility. 4. Ascend/descend 3 stairs with 1 HR, LRAD, and S for safe home entry. Note: PHYSICAL THERAPY TREATMENT Patient: Nicola Jacosb (54 y.o. male) Date: 12/26/2020 Diagnosis: Coagulopathy ([facility] [D68.9] Ascites [R18.8] [facility] [K74.60] Bacteremia Precautions: Fall Chart, physical therapy assessment, plan of care and goals were reviewed. ASSESSMENT: 
Pt reports slight improvement compared to yesterday however with continued weakness/tiredness. Pt tolerated 2 session gait training with extended sitting rest in between. Pt tolerated seated there ex with fatigue noted. Pt educated on energy conservation, HEP and overall activity recommendations. Will continue to progress mobility as pt tolerates. Progression toward goals: 
[]      Improving appropriately and progressing toward goals [x]      Improving slowly and progressing toward goals 
[]      Not making progress toward goals and plan of care will be adjusted PLAN: 
 Patient continues to benefit from skilled intervention to address the above impairments. Continue treatment per established plan of care. Discharge Recommendations:  Home Health Further Equipment Recommendations for Discharge:  N/A  
 
SUBJECTIVE:  
Patient stated I feel a little better today.  OBJECTIVE DATA SUMMARY:  
Critical Behavior: 
Neurologic State: Alert, Appropriate for age Orientation Level: Oriented X4 Cognition: Follows commands Safety/Judgement: Awareness of environment, Insight into deficits Functional Mobility Training: 
Bed Mobility: 
 Supine to Sit: Stand-by assistance Sit to Supine: Stand-by assistance Transfers: 
Sit to Stand: Contact guard assistance Stand to Sit: Contact guard assistance Balance: 
Sitting: Intact Standing: Intact; With support Ambulation/Gait Training: 
Distance (ft): 30 Feet (ft)(x2) 
Assistive Device: Gait belt;Walker, rolling Ambulation - Level of Assistance: Contact guard assistance Gait Abnormalities: Decreased step clearance Base of Support: Narrowed Speed/Ewa: Slow Step Length: Right shortened;Left shortened Therapeutic Exercises:  
Seated there ex: ankle pumps, LAQ, marches, hip abd/add, x10 reps ea Pain: 
Pain Scale 1: Numeric (0 - 10) Pain Intensity 1: 5 Pain Location 1: Abdomen Pain Orientation 1: Anterior Pain Description 1: Aching;Constant Pain Intervention(s) 1: Medication (see MAR) Activity Tolerance:  
Fair Please refer to the flowsheet for vital signs taken during this treatment. After treatment:  
[] Patient left in no apparent distress sitting up in chair 
[x] Patient left in no apparent distress in bed 
[x] Call bell left within reach [x] Nursing notified 
[] Caregiver present 
[] Bed alarm activated Yeyo Negrete Time Calculation: 26 mins

## 2020-12-26 NOTE — PROGRESS NOTES
Problem: Falls - Risk of 
Goal: *Absence of Falls Description: Document Aishwarya Joseph Fall Risk and appropriate interventions in the flowsheet. Outcome: Progressing Towards Goal 
Note: Fall Risk Interventions: 
Mobility Interventions: Patient to call before getting OOB, Utilize walker, cane, or other assistive device Mentation Interventions: Adequate sleep, hydration, pain control Medication Interventions: Teach patient to arise slowly, Patient to call before getting OOB Elimination Interventions: Call light in reach Problem: Patient Education: Go to Patient Education Activity Goal: Patient/Family Education Outcome: Progressing Towards Goal 
  
Problem: Pressure Injury - Risk of 
Goal: *Prevention of pressure injury Description: Document Trav Scale and appropriate interventions in the flowsheet. Outcome: Progressing Towards Goal 
Note: Pressure Injury Interventions: 
Sensory Interventions: Assess changes in LOC, Pressure redistribution bed/mattress (bed type) Moisture Interventions: Minimize layers, Absorbent underpads Activity Interventions: Increase time out of bed, Pressure redistribution bed/mattress(bed type) Mobility Interventions: HOB 30 degrees or less, Pressure redistribution bed/mattress (bed type) Nutrition Interventions: Document food/fluid/supplement intake Friction and Shear Interventions: Foam dressings/transparent film/skin sealants Problem: Pain Goal: *Control of Pain Outcome: Progressing Towards Goal

## 2020-12-27 NOTE — PROGRESS NOTES
Problem: Mobility Impaired (Adult and Pediatric) Goal: *Acute Goals and Plan of Care (Insert Text) Description: Physical Therapy short term goals revised 12/24/2020, to be achieved in 4-7 days. Pt will: 1. Perform bed mobility with indep in prep for OOB activity. 2. Perform sit <> stand transfers with LRAD and S in prep for functional mobility and ambulation. 3. Ambulate 200 ft with LRAD and S in prep for household and community mobility. 4. Ascend/descend 3 stairs with 1 HR, LRAD, and S for safe home entry. 5. Transfer bed <> chair with LRAD and S in prep for OOB activity and ADL completion Description: Physical Therapy short term goals initiated 12/17/2020, to be achieved in 4-7 days. Pt will: 1. Perform bed mobility with indep in prep for OOB activity. 2. Perform sit <> stand transfers with LRAD and S in prep for functional mobility and ambulation. 3. Ambulate 200 ft with LRAD and S in prep for household and community mobility. 4. Ascend/descend 3 stairs with 1 HR, LRAD, and S for safe home entry. Note: PHYSICAL THERAPY TREATMENT Patient: Foster Roberts (54 y.o. male) Date: 12/27/2020 Diagnosis: Coagulopathy ([facility] [D68.9] Ascites [R18.8] Cirrhosis ([facility] [K74.60] Bacteremia Precautions: Fall PLOF: Pt used QC for mobility PTA. ASSESSMENT: 
Pt supine on PT arrival, reported 7/10 pain in abdomen. Pt performed bed mobility with SBA, required CGA for sit <> stand transfers with RW. Pt progressing with gait tolerance. Able to perform 1 gait trail of 120 ft with RW and CGA/Jann with 2 short standing rest breaks (<1 minute). Pt with LOB to R x2 occurrences during gait training, requiring CGA/Jann to recover. Pt left supine in bed with all needs met. Pt continues to benefit from skilled therapy in order to incr functional mobility and decr risk of falling. Recommend HHPT on discharge. Progression toward goals:  
[]      Improving appropriately and progressing toward goals [x]      Improving slowly and progressing toward goals 
[]      Not making progress toward goals and plan of care will be adjusted PLAN: 
Patient continues to benefit from skilled intervention to address the above impairments. Continue treatment per established plan of care. Discharge Recommendations:  Home Health Further Equipment Recommendations for Discharge:  N/A  
 
SUBJECTIVE:  
Patient stated I think things are getting better.  OBJECTIVE DATA SUMMARY:  
Critical Behavior: 
Neurologic State: Alert Orientation Level: Oriented X4 Cognition: Follows commands Safety/Judgement: Awareness of environment, Insight into deficits Functional Mobility Training: 
Bed Mobility: 
Supine to Sit: Stand-by assistance Sit to Supine: Stand-by assistance Transfers: 
Sit to Stand: Contact guard assistance Stand to Sit: Contact guard assistance Balance: 
Sitting: Intact Standing: Impaired; With support Ambulation/Gait Training: 
Distance (ft): 120 Feet (ft) Assistive Device: Gait belt;Walker, rolling Ambulation - Level of Assistance: Contact guard assistance Gait Abnormalities: Decreased step clearance; Step to gait Base of Support: Narrowed Speed/Ewa: Slow Step Length: Right shortened;Left shortened Interventions: Verbal cues; Safety awareness training Pain: 
Pain level pre-treatment: 7/10 Pain level post-treatment: 7/10 Pain Intervention(s): Medication (see MAR); Rest, Ice, Repositioning Response to intervention: Nurse notified, See doc flow Activity Tolerance:  
Pt required 2 short standing rest breaks during gait trial of 120 ft. Pt with LOB x2 occurrences during gait training with RW and CGA, required CGA/Jann to recover. Please refer to the flowsheet for vital signs taken during this treatment. After treatment:  
[] Patient left in no apparent distress sitting up in chair 
[x] Patient left in no apparent distress in bed 
[x] Call bell left within reach [x] Nursing notified [] Caregiver present 
[] Bed alarm activated 
[] SCDs applied COMMUNICATION/EDUCATION:  
[x]         Role of Physical Therapy in the acute care setting. [x]         Fall prevention education was provided and the patient/caregiver indicated understanding. [x]         Patient/family have participated as able in working toward goals and plan of care. [x]         Patient/family agree to work toward stated goals and plan of care. []         Patient understands intent and goals of therapy, but is neutral about his/her participation. []         Patient is unable to participate in stated goals/plan of care: ongoing with therapy staff. 
[]         Other: Anna Beckham Time Calculation: 23 mins

## 2020-12-27 NOTE — PROGRESS NOTES
Bedside and verbal shift change report given to Herson Ann RN (on coming nurse) by Stephanie Zavala RN (off going nurse). Report included the following information SBAR, Kardex, OR Summary, Intake/Output and MAR. 
 
1555 AVS review with pt, opportunity for questions and concerns at this time. D/c IV clean and dry. Properly discarded armbands.

## 2020-12-27 NOTE — PROGRESS NOTES
Problem: Falls - Risk of 
Goal: *Absence of Falls Description: Document Tim Noel Fall Risk and appropriate interventions in the flowsheet. 12/26/2020 2051 by Rommie Sicard Outcome: Progressing Towards Goal 
Note: Fall Risk Interventions: 
Mobility Interventions: Patient to call before getting OOB, Utilize walker, cane, or other assistive device Mentation Interventions: Adequate sleep, hydration, pain control Medication Interventions: Patient to call before getting OOB Elimination Interventions: Call light in reach, Patient to call for help with toileting needs, Toilet paper/wipes in reach 
 
  
 
 
12/26/2020 0931 by Rommie Sicard Outcome: Progressing Towards Goal 
Note: Fall Risk Interventions: 
Mobility Interventions: Assess mobility with egress test 
 
Mentation Interventions: Adequate sleep, hydration, pain control, Eyeglasses and hearing aids Medication Interventions: Patient to call before getting OOB Elimination Interventions: Call light in reach Problem: Patient Education: Go to Patient Education Activity Goal: Patient/Family Education 12/26/2020 2051 by Rommie Sicard Outcome: Progressing Towards Goal 
12/26/2020 0931 by Rommie Sicard Outcome: Progressing Towards Goal 
  
Problem: Pressure Injury - Risk of 
Goal: *Prevention of pressure injury Description: Document Trav Scale and appropriate interventions in the flowsheet. 12/26/2020 2051 by Rommie Sicard Outcome: Progressing Towards Goal 
Note: Pressure Injury Interventions: 
Sensory Interventions: Assess changes in LOC, Check visual cues for pain, Keep linens dry and wrinkle-free Moisture Interventions: Absorbent underpads, Minimize layers Activity Interventions: Pressure redistribution bed/mattress(bed type), Increase time out of bed Mobility Interventions: HOB 30 degrees or less, Pressure redistribution bed/mattress (bed type) Nutrition Interventions: Document food/fluid/supplement intake Friction and Shear Interventions: Lift team/patient mobility team, Foam dressings/transparent film/skin sealants 12/26/2020 0931 by Tg Knutson Outcome: Progressing Towards Goal 
Note: Pressure Injury Interventions: 
Sensory Interventions: Assess changes in LOC Moisture Interventions: Absorbent underpads Activity Interventions: Increase time out of bed Mobility Interventions: HOB 30 degrees or less Nutrition Interventions: Document food/fluid/supplement intake Friction and Shear Interventions: Foam dressings/transparent film/skin sealants Problem: Patient Education: Go to Patient Education Activity Goal: Patient/Family Education 12/26/2020 2051 by Tg Knutson Outcome: Progressing Towards Goal 
12/26/2020 0931 by Tg Knutson Outcome: Progressing Towards Goal 
  
Problem: Pain Goal: *Control of Pain 
12/26/2020 2051 by Tg Knutson Outcome: Progressing Towards Goal 
12/26/2020 0931 by Tg Knutson Outcome: Progressing Towards Goal 
Goal: *PALLIATIVE CARE:  Alleviation of Pain 
12/26/2020 2051 by Tg Knutson Outcome: Progressing Towards Goal 
12/26/2020 0931 by Tg Knutson Outcome: Progressing Towards Goal 
  
Problem: Patient Education: Go to Patient Education Activity Goal: Patient/Family Education 12/26/2020 2051 by Tg Knutson Outcome: Progressing Towards Goal 
12/26/2020 0931 by Tg Knutson Outcome: Progressing Towards Goal 
  
Problem: Ascities-Palliative Care Goal: *PALLIATIVE CARE-Alleviation of ascities 12/26/2020 2051 by Tg Knutson Outcome: Progressing Towards Goal 
12/26/2020 0931 by Tg Knutson Outcome: Progressing Towards Goal 
  
Problem: Patient Education: Go to Patient Education Activity Goal: Patient/Family Education 12/26/2020 2051 by Tg Knutson Outcome: Progressing Towards Goal 
12/26/2020 0931 by Tg Knutson Outcome: Progressing Towards Goal 
  
 Problem: Patient Education: Go to Patient Education Activity Goal: Patient/Family Education 12/26/2020 2051 by Naveed Bean Outcome: Progressing Towards Goal 
12/26/2020 0931 by Naveed Bean Outcome: Progressing Towards Goal 
  
Problem: Patient Education: Go to Patient Education Activity Goal: Patient/Family Education 12/26/2020 2051 by Naveed Bean Outcome: Progressing Towards Goal 
12/26/2020 0931 by Naveed Bean Outcome: Progressing Towards Goal 
  
Problem: General Infection Care Plan (Adult and Pediatric) Goal: Improvement in signs and symptoms of infection 12/26/2020 2051 by Naveed Bean Outcome: Progressing Towards Goal 
12/26/2020 0931 by Naveed Bean Outcome: Progressing Towards Goal 
Goal: *Optimize nutritional status 12/26/2020 2051 by Naveed Bean Outcome: Progressing Towards Goal 
12/26/2020 0931 by Naveed Bean Outcome: Progressing Towards Goal 
  
Problem: Patient Education: Go to Patient Education Activity Goal: Patient/Family Education 12/26/2020 2051 by Naveed Bean Outcome: Progressing Towards Goal 
12/26/2020 0931 by Naveed Bean Outcome: Progressing Towards Goal 
  
Problem: Nutrition Deficit Goal: *Optimize nutritional status 12/26/2020 2051 by Naveed Bean Outcome: Progressing Towards Goal 
12/26/2020 0931 by Naveed Bean Outcome: Progressing Towards Goal

## 2020-12-27 NOTE — DISCHARGE SUMMARY
Discharge Summary Patient: Kwasi Bonilla MRN: [id number]  CSN: [id number] YOB: 1965  Age: 54 y.o. Sex: male DOA: 12/13/2020 LOS:  LOS: 14 days   Discharge Date: 12/27/2020 Primary Care Provider:  Erica Reed NP Admission Diagnoses: Coagulopathy ([facility] [D68.9] Ascites [R18.8] Cirrhosis ([facility] [C32.76] Discharge Diagnoses:   
Problem List as of 12/27/2020 Date Reviewed: 10/9/2020 Codes Class Noted - Resolved Enterococcus faecalis infection ICD-10-CM: B95.2 ICD-9-CM: 041.04  12/23/2020 - Present * (Principal) Bacteremia ICD-10-CM: R78.81 ICD-9-CM: 790.7  12/16/2020 - Present Colitis ICD-10-CM: K52.9 ICD-9-CM: 558.9  12/14/2020 - Present Positive blood culture ICD-10-CM: R78.81 ICD-9-CM: 790.7  12/14/2020 - Present Coagulopathy ([facility] ICD-10-CM: R02.3 ICD-9-CM: 286.9  12/13/2020 - Present Stage 4 decubitus ulcer ([facility] ICD-10-CM: L89.94 
ICD-9-CM: 707.00, 707.24  12/3/2020 - Present Ascites ICD-10-CM: R18.8 ICD-9-CM: 789.59  12/3/2020 - Present Acute deep vein thrombosis (DVT) of both lower extremities (HCC) ICD-10-CM: I82.403 ICD-9-CM: 453.40  12/1/2020 - Present Pulmonary embolism ([facility] ICD-10-CM: I26.99 
ICD-9-CM: 415.19  11/30/2020 - Present Severe protein-calorie malnutrition ([facility] ICD-10-CM: G55 ICD-9-CM: 731  10/26/2020 - Present Thrombocytopenia ([facility] ICD-10-CM: D69.6 ICD-9-CM: 287.5  10/13/2020 - Present Hypocalcemia ICD-10-CM: E83.51 
ICD-9-CM: 275.41  10/9/2020 - Present Hypomagnesemia ICD-10-CM: P91.16 
ICD-9-CM: 275.2  10/9/2020 - Present Alcoholism ([facility] ICD-10-CM: G57.48 ICD-9-CM: 303.90  10/9/2020 - Present Cirrhosis ([facility] ICD-10-CM: K74.60 ICD-9-CM: 571.5  10/9/2020 - Present RESOLVED: Sacral decubitus ulcer, stage III ([facility] ICD-10-CM: Z48.717 ICD-9-CM: 707.03, 707.23  11/30/2020 - 12/3/2020 RESOLVED: Acute respiratory failure with hypoxemia ([facility] ICD-10-CM: J96.01 
ICD-9-CM: 518.81  10/13/2020 - 12/3/2020 RESOLVED: Aspiration pneumonia ([facility] ICD-10-CM: J69.0 ICD-9-CM: 507.0  10/13/2020 - 12/3/2020 RESOLVED: Acute diastolic CHF (congestive heart failure) (HCC) ICD-10-CM: I50.31 ICD-9-CM: 428.31, 428.0  10/13/2020 - 12/3/2020 RESOLVED: Colitis ICD-10-CM: K52.9 ICD-9-CM: 558.9  10/9/2020 - 12/3/2020 Discharge Medications:    
Discharge Medication List as of 12/27/2020  3:33 PM  
  
START taking these medications Details  
oxyCODONE-acetaminophen (PERCOCET) 5-325 mg per tablet Take 1-2 Tabs by mouth every six (6) hours as needed for Pain for up to 3 days. Max Daily Amount: 8 Tabs., Normal, Disp-12 Tab, R-0  
  
  
CONTINUE these medications which have NOT CHANGED Details  
insulin glargine (LANTUS) 100 unit/mL injection 10 Units by SubCUTAneous route nightly., No Print, Disp-1 Vial,R-0  
  
furosemide (LASIX) 40 mg tablet Take 0.5 Tabs by mouth daily. , Normal, Disp-30 Tab,R-1  
  
multivitamin, tx-iron-ca-min (THERA-M w/ IRON) 9 mg iron-400 mcg tab tablet Take 1 Tab by mouth daily. , Normal, Disp-30 Tab,R-1  
  
insulin lispro (HUMALOG) 100 unit/mL injection 6 Units by SubCUTAneous route Before breakfast, lunch, and dinner., No Print, Disp-1 Vial,R-0  
  
lactulose (CHRONULAC) 10 gram/15 mL solution Take 45 mL by mouth daily. , No Print, Disp-1 Bottle,R-0  
  
pantoprazole (PROTONIX) 40 mg tablet Take 1 Tab by mouth Daily (before breakfast). , No Print, Disp-30 Tab,R-0  
  
potassium chloride (K-DUR, KLOR-CON) 20 mEq tablet Take 1 Tab by mouth daily. , No Print, Disp-10 Tab,R-0  
  
thiamine mononitrate (B-1) 100 mg tablet Take 1 Tab by mouth daily. , No Print, Disp-10 Tab,R-0  
  
 lidocaine (LIDODERM) 5 % 2 Patches by TransDERmal route every twenty-four (24) hours. Apply patch to the affected area for 12 hours a day and remove for 12 hours a day. One patch is applied to left hip. One patch is applied to right shoulder blade., Historical Med DULoxetine (CYMBALTA) 60 mg capsule Take 1 Cap by mouth daily. , Historical Med  
  
traZODone (DESYREL) 50 mg tablet Take 50 mg by mouth nightly., Historical Med  
  
  
STOP taking these medications  
  
 rivaroxaban (XARELTO) 15 mg tab tablet Comments:  
Reason for Stopping:   
   
 rivaroxaban (Xarelto) 20 mg tab tablet Comments:  
Reason for Stopping:   
   
 amoxicillin-clavulanate (Augmentin) 875-125 mg per tablet Comments:  
Reason for Stopping:   
   
 methocarbamoL (ROBAXIN) 500 mg tablet Comments:  
Reason for Stopping:   
   
  
 
 
Discharge Condition: Good Procedures : Paracentesis Consults: Cardiology, Hematology/Oncology, Infectious Disease and Hepatology PHYSICAL EXAM  
Visit Vitals /64 Pulse 97 Temp 99.5 °F (37.5 °C) Resp 15 Ht 5' 9\" (1.753 m) Wt 67.2 kg (148 lb 1.6 oz) SpO2 100% BMI 21.87 kg/m² General: Awake, cooperative, no acute distress HEENT: NC, Atraumatic. PERRLA, EOMI. Anicteric sclerae. Lungs:  CTA Bilaterally. No Wheezing/Rhonchi/Rales. Heart:  Regular  rhythm,  No murmur, No Rubs, No Gallops Abdomen: Soft, Non distended, Non tender. +Bowel sounds, Extremities: No c/c/e Psych:   Not anxious or agitated. Neurologic:  No acute neurological deficits.      
                          
 
 
Admission HPI :  
 Nicola Jacobs is a 54 y.o.  male who has history of recent pulmonary embolism and DVT, cirrhosis, diabetes, vent dependent respiratory failure with pneumonia presents to the emergency room with increasing abdominal pain and swelling despite abdominal paracentesis December 4. Patient has had bout of diarrhea that recently resolved about a day ago he took over-the-counter Imodium to help with that. He is feeling increasingly weak he has had some bleeding mostly through his sacral decub and some mild bleeding in his heel ulcer. He is starting to ambulate more with his cane but in general still feeling largely weak. He denies drinking alcohol recently He has been using Lidoderm patches for his pain in his back and hip. In the emergency room he was found to have an INR of 9 with no active bleeding CT scan does show portal hypertension and cirrhosis as well as enlarging ascites there is mention of possible pancreatic duct dilatation and ampullary lesion.    
 
 
Hospital Course :  
 Mr. Bharath Sterling was admitted to medical floor, he was seen and followed by hepatology, hematology oncology, infectious disease. During his previous hospitalization he developed DVT and PE, he was started on Xarelto. His CT scan demonstrated dilation of PD without obvious pancreatic mass, cirrhosis and large amount of ascites. He had ultrasound-guided paracentesis done. He also had MRI done that showed chronic pancreatitis no pancreatic mass. Pancreatic duct and CBD not dilated. For his coagulopathy he received 1 dose of vitamin K. His INR still elevated. He was seen by hematology for advice on anticoagulation. Hematology recommended that if his platelets are above 75 can start on anticoagulation. During his hospitalization his INR remained above 3.7. His platelets less than 61,819. He was not placed on anticoagulation during hospitalization. I have discussed this with the patient and his wife in detail. He will need follow-up with hematology oncology and hepatology for restarting on anticoagulation based on his INR and platelet levels. Is 1 of 2 blood cultures done on 12/13/2020 showed growth of Enterococcus faecalis. ID was consulted who was concerned about endocarditis. He was started on IV antibiotics broad-spectrum and later switched to ampicillin. VIJAYA was recommended and cardiology consulted however cardiology thought that given patient's elevated INR and thrombocytopenia he is high risk for VIJAYA. Given patient's low BMI cardiology recommended getting TTE to rule out vegetation. TTE did not show any vegetations. ID recommended total 14 days of antibiotic. He completed his antibiotic course today. He will follow-up with hepatology. Bacteremia - 
1/2 blood cultures growing Enterococcus faecalis. Repeated cx no growth. VIJAYA high risk given coagulopathy TTE with no vegetations. Antibiotics - ampicillin, completed total 14 days of antibiotics 
  
Cirrhosis ([address] (10/9/2020) with ascites Received IV albumin - Seen by Dr. Hebert Gates Ammonia daily - on lactulose  
Lasix and spirnolactone. Ascites- Tolerated paracentesis on 12/18- Peritoneal fluid with no growth.  
  
Coagulopathy ([address] (12/13/2020) Monitored INR daily Still coagulopathic despite vitamin K Continue hold xarelto secondary to elevated INR and thrombocytopenia. Follow-up with hematology and hepatology to decide on anticoagulation in the setting of elevated INR and thrombocytopenia 
  
Thrombocytopenia - Secondary to cirrhosis  
  
PE /DVT on 11/30/2020 - Seen by heme/onc, recommend lovenox if plt above 75. 
  
Pancreatic Duct enlargement - MRI of Abdomen :Evidence of hepatic cirrhosis with no mass lesion, but chronic pancreatitis and fibrotic changes at the ampulla with mild extrinsic narrowing of the distal CBD. No mass lesion identified.  
  
Chronic pancreatitis - 
  
DM - Started on ssi and hypoglycemia protocol   
  
Large Sacral wound - Received local wound care Activity: Activity as tolerated Diet: Regular Diet Follow-up: PCP, Hepatology, heme/onc Disposition: home with home health Minutes spent on discharge: 45 Labs: Results:  
   
Chemistry Recent Labs  
  12/27/20 
0378 *   
K 3.5 CL 98* CO2 29 BUN 6*  
CREA 1.08  
CA 10.0 AGAP 9  
BUCR 6* AP 29* TP 6.9 ALB 5.0  
GLOB 1.9* AGRAT 2.6* CBC w/Diff Recent Labs  
  12/27/20 
0542 WBC 3.1*  
RBC 2.31* HGB 7.1*  
HCT 20.5* PLT 38* Cardiac Enzymes No results for input(s): CPK, CKND1, LYDIA in the last 72 hours. No lab exists for component: Anurag [place] Coagulation Recent Labs  
  12/27/20 
0542 12/26/20 
0139 PTP 35.8* 34.3* INR 3.7* 3.5* Lipid Panel No results found for: CHOL, CHOLPOCT, CHOLX, CHLST, CHOLV, 622831, HDL, HDLP, LDL, LDLC, DLDLP, 148619, VLDLC, VLDL, TGLX, TRIGL, TRIGP, TGLPOCT, CHHD, CHHDX  
BNP No results for input(s): BNPP in the last 72 hours. Liver Enzymes Recent Labs  
  12/27/20 
0542 TP 6.9 ALB 5.0 AP 29* Thyroid Studies No results found for: T4, T3U, TSH, TSHEXT Significant Diagnostic Studies: Cta Chest W Or W Wo Cont Result Date: 11/30/2020 EXAM: CTA chest CLINICAL INDICATION/HISTORY:  Generalized fatigue. Dyspnea. COMPARISON: None TECHNIQUE: Axial CT imaging from the thoracic inlet through the diaphragm with intravenous contrast. Coronal and sagittal MIP reformats were generated. One or more dose reduction techniques were used on this CT: automated exposure control, adjustment of the mAs and/or kVp according to patient size, and iterative reconstruction techniques. The specific techniques used on this CT exam have been documented in the patient's electronic medical record. Digital Imaging and Communications in Medicine (DICOM) format image data are available to nonaffiliated external healthcare facilities or entities on a secure, media free, reciprocally searchable basis with patient authorization for at least a 12-month period after this study. _______________ FINDINGS: EXAM QUALITY: Adequate opacification of the pulmonary arteries. PULMONARY ARTERIES: Critical result: There is a filling defect in the right lower lobe medial basal segmental artery. Remaining pulmonary arteries are unremarkable. MEDIASTINUM: Normal heart size. The ascending aorta is dilated measuring up to 4 cm. No pericardial effusion. Right heart strain indices: RV/LV ratio: Normal (ratio </= 1). Interventricular septal bowing: None. Main pulmonary artery diameter: Normal. Contrast reflux into the IVC: None. LUNGS: No suspicious nodule or mass. No abnormal opacities. PLEURA: Normal. AIRWAY: Normal. LYMPH NODES: No enlarged nodes. UPPER ABDOMEN: Please see concurrently performed CT abdomen/pelvis. OTHER: No acute or aggressive osseous abnormalities identified. SUPERFICIAL SOFT TISSUES: Unremarkable. _______________ IMPRESSION: 1. Acute pulmonary embolism involving the right lower lobe medial basal segmental artery. 2. 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 3. No focal consolidative process in the chest. Critical results discussed with Dr. Luis Felipe Keita at 7:33 PM on 10/30/2020. Ct Abd Pelv W Cont Result Date: 12/13/2020 EXAM: CT of the abdomen and pelvis INDICATION: Abdominal pain and swelling COMPARISON: November 30, 2020 TECHNIQUE: Axial CT imaging of the abdomen and pelvis was performed with intravenous contrast. Multiplanar reformats were generated. One or more dose reduction techniques were used on this CT: automated exposure control, adjustment of the mAs and/or kVp according to patient size, and iterative reconstruction techniques. The specific techniques used on this CT exam have been documented in the patient's electronic medical record. Digital Imaging and Communications in Medicine (DICOM) format image data are available to nonaffiliated external healthcare facilities or entities on a secure, media free, reciprocally searchable basis with patient authorization for at least a 12-month period after this study. _______________ FINDINGS: LOWER CHEST: Unremarkable. LIVER, BILIARY: There is a nodular appearing liver suggesting cirrhosis. No focal hepatic lesion seen. No biliary dilation. Cholecystectomy PANCREAS: Pancreatic duct is mildly dilated to 6 mm. There is no atrophy of the pancreas. No discrete pancreatic mass identified however prior GI consultation and further workup. SPLEEN: Enlarged measuring 14.2 cm. ADRENALS: Normal. KIDNEYS: Punctate 2 to 3 mm calculi seen in both kidneys. Kidneys demonstrate no hydronephrosis. VASCULATURE: Mild to moderate calcific atherosclerosis present. There is recanalization the periumbilical vein suggesting portal hypertension. LYMPH NODES: No enlarged lymph nodes. GASTROINTESTINAL TRACT: Large volume of stool present throughout the colon. There are thickened small bowel loops along the left side of the midabdomen which may related to the ascites and cirrhosis however enteritis cannot be excluded. Is no bowel obstruction. PELVIC ORGANS: Unremarkable. BONES: No acute or aggressive osseous abnormalities identified. There is a sacral decubitus ulcer without evidence of osteomyelitis. OTHER: Large volume of ascites present. _______________ IMPRESSION: Cirrhosis, splenomegaly, large volume of ascites and portal hypertension. Thickened small bowel loops throughout the abdomen which may be related to the ascites and cirrhosis however enteritis not excluded. There is pancreatic duct dilatation however no atrophy or discrete mass identified. However I would advise further workup exclude any ampullary lesion. Advise GI consultation. Sacral decubitus ulcer without osteomyelitis. Large volume of retained stool in the colon. Ct Abd Pelv W Cont Result Date: 11/30/2020 EXAM: CT of the abdomen and pelvis CLINICAL INDICATION/HISTORY:  Weakness and dyspnea. Abdominal pain. COMPARISON: None. TECHNIQUE: Axial CT imaging of the abdomen and pelvis was performed with intravenous contrast. Multiplanar reformats were generated. One or more dose reduction techniques were used on this CT: automated exposure control, adjustment of the mAs and/or kVp according to patient size, and iterative reconstruction techniques. The specific techniques used on this CT exam have been documented in the patient's electronic medical record. Digital Imaging and Communications in Medicine (DICOM) format image data are available to nonaffiliated external healthcare facilities or entities on a secure, media free, reciprocally searchable basis with patient authorization for at least a 12-month period after this study. _______________ FINDINGS: LOWER CHEST: Please see concurrently performed CT of the chest. LIVER, BILIARY: There is cirrhotic morphology of the liver. No focal parenchymal masses visualized. There is no intra-or extrahepatic biliary ductal dilatation. Gallbladder is surgically absent. PANCREAS: Pancreatic duct is dilated measuring 5 mm. No discrete pancreatic mass or intraductal abnormality visualized. SPLEEN: Enlarged measuring 15.6 cm in AP diameter. ADRENALS: Normal. KIDNEYS: Normal. There is no nephrolithiasis. There is no hydronephrosis. LYMPH NODES: No enlarged lymph nodes. GASTROINTESTINAL TRACT: No bowel dilation or wall thickening. The appendix is visualized and unremarkable. PELVIC ORGANS: Unremarkable. VASCULATURE: Unremarkable. BONES: Post surgical changes of prior left hip open reduction and internal fixation. OTHER: There is no free intraperitoneal fluid or free air. SUPERFICIAL SOFT TISSUES: There is a soft tissue defect at the level of the lower sacral segments. No definite cortical changes in the sacrum _______________ IMPRESSION: 1. Cirrhotic morphology of liver with sequelae of portal venous hypertension including splenomegaly and moderate volume intra-abdominal ascites. 2. Nonspecific dilatation of the pancreatic duct up to 5 mm. 3. Lower sacral soft tissue ulcer without discrete CT evidence of osteomyelitis. Us Guide Paracentesis Result Date: 12/18/2020 PROCEDURE: ULTRASOUND GUIDED PARACENTESIS ATTENDING: Rylan Araiza M.D. COMPARISON: None CLINICAL INDICATION: Ascites. TECHNIQUE: Informed consent was obtained. Procedural time was performed. Limited ultrasound evaluation of the abdomen was performed to localize a skin entry site. An image was recorded for the patient's file. Site was sterilely prepared and draped. Local anesthetic with 1% buffered lidocaine. A 5 American needle catheter was advanced into the fluid. Specimen of 6.1 L fluid was aspirated. Needle was withdrawn and hemostasis obtained with manual pressure. Sterile dressing was applied. Patient tolerated the procedure well, no immediate complications. IMPRESSION: Successful ultrasound-guided therapeutic paracentesis. Us Guide Paracentesis Result Date: 12/9/2020 PROCEDURE:  ULTRASOUND GUIDED PARACENTESIS INDICATION: Ascites. ________________________ TECHNIQUE/FINDINGS: I performed the procedure. The ascites in the right paracolic gutter was localized under ultrasound guidance. An image of the ascites was obtained and submitted into the patient record. The skin was marked, prepped and draped in sterile fashion and lidocaine was used for local anesthesia. Sterile probe cover and gel was used. A 5 [place] Lisa Yueh sheathed needle was advanced into the fluid. A total of 3.8 L of straw colored fluid was drained using a Vacutainer system. The patient tolerated the procedure well and there were no immediate complications. Pre-procedure time out:  1659 hours. Post procedure pause:  1726 hours. GUIDANCE: Ultrasound guidance was used to position (and confirm the position of) the Yueh sheathed needle. Image(s) saved in PACS: Ultrasound ________________________ IMPRESSION: Successful ultrasound guided paracentesis of approximately 3.8 L of straw colored fluid. [address] [place] [address] Result Date: 12/2/2020 EXAM: Limited right upper quadrant abdominal ultrasound INDICATION: 42-year-old patient with cirrhosis and reported portal venous thrombosis. . COMPARISON: CT 11/30/2020. TECHNIQUE: Real-time abdomen/right upper quadrant sonography in multiple planes was performed with image documentation. Grayscale, color flow Doppler imaging, and velocity spectral waveform analysis of the portal vein was performed (duplex imaging). _______________ FINDINGS: LIVER: Diffusely coarsened hepatic parenchymal echotexture noted with lobular surface contour. . No focal mass Color flow Doppler and velocity spectral waveform analysis of the portal vein shows normal (hepatopedal) direction of flow. [place] BILIARY SYSTEM: No intrahepatic biliary dilatation. Common bile duct is normal in caliber measuring 0.4 cm. GALLBLADDER: Surgically absent. RIGHT KIDNEY: 10.8 cm in length. No hydronephrosis or renal mass. No visible calculi. PANCREAS: Nonvisualized. IVC: Visualized portions are unremarkable in appearance. OTHER: Moderate amount of intra-abdominal/pelvic ascites. . _______________ IMPRESSION: 1. Cirrhotic hepatic morphology. No biliary ductal dilatation or suspicious appearing hepatic lesion. 2. Moderate volume abdominal/pelvic ascites. . 3. Prior cholecystectomy. Xr Chest [facility] Result Date: 11/30/2020 EXAM: One view chest x-ray CLINICAL INDICATION/HISTORY: Weakness and dyspnea. COMPARISON: 10/22/2020. TECHNIQUE: Single AP view of the chest was obtained. _______________ FINDINGS: HEART, VESSELS, MEDIASTINUM: Heart size is normal. No vascular congestion. LUNGS, PLEURAL SPACES: The lungs are clear. No effusion or pneumothorax. BONY THORAX, SOFT TISSUES: Unremarkable. _______________ IMPRESSION: No acute cardiopulmonary process. Mri Abd W Mrcp W Wo Cont Result Date: 12/14/2020 EXAM: MRI ABDOMEN CLINICAL INDICATION/HISTORY:  Pancreatic ductal dilation seen on prior exam. Cirrhosis. -Additional: None COMPARISON: 12/13/2020 TECHNIQUE: MRI of the abdomen was performed with and without intravenous contrast. MRCP included. Post processing 3D rendering was done on a separate workstation under concurrent physician supervision. _______________ FINDINGS: LOWER CHEST: Mild passive atelectasis in the lung bases. LIVER: Liver is somewhat shrunken and heterogeneous. Arterial phase enhancement pattern is particularly heterogeneous, but there are no discrete mass lesions identified. BILIARY: Distal CBD is narrowed in the region of the pancreatic head though remains visible, smoothly narrowed without irregularity. No filling defects seen. No significant ductal dilation. Gallbladder is not identified. PANCREAS: Low signal of the pancreatic parenchyma suggests chronic pancreatitis. The duct is ectatic with some irregularities. Hypointensity extends to the ampullary region suggestive of chronic fibrosis. SPLEEN: Mildly enlarged. ADRENALS: Normal. KIDNEYS: Normal. LYMPH NODES: No enlarged lymph nodes. GASTROINTESTINAL TRACT: No bowel dilation or wall thickening. VASCULATURE: Aortic atherosclerosis. Small upper abdominal varices. BONES: No acute or aggressive osseous abnormalities identified. OTHER: Large volume ascites. _______________ IMPRESSION: 1. Evidence of hepatic cirrhosis with no mass lesion. Mild splenomegaly, small upper abdominal varices, and large volume ascites. 2. Pancreatic findings favoring sequela of chronic pancreatitis. There are associated fibrotic changes at the ampulla with mild extrinsic narrowing of the distal CBD. No mass lesion identified. Echo Adult Complete Result Date: 12/1/2020 · Left Ventricle: Normal cavity size and systolic function (ejection fraction normal). Moderate concentric hypertrophy. The estimated EF is 55 - 60%. There is mild (grade 1) left ventricular diastolic dysfunction E'E= 10.23. · Tricuspid Valve: Tricuspid valve not well visualized. No stenosis. Tricuspid regurgitation is inadequate for estimation of right ventricular systolic pressure. Echo Adult Follow-up Or Limited Result Date: 12/24/2020 · LV: Estimated LVEF is 55 - 60%. Normal cavity size, wall thickness, systolic function (ejection fraction normal) and diastolic function. E'E= 9.21. · There is no vegetation on the aortic valve. · There is no vegetation on the mitral valve. · There is no vegetation on the tricuspid valve. · There is no vegetation on the pulmonic valve. · Pericardium: Small pericardial effusion noted. No indications of tamponade present. Ascites present. Duplex Lower Ext Venous Bilat Result Date: 12/17/2020 · No evidence of deep vein thrombosis in the right lower extremity. · No evidence of deep vein thrombosis in the left lower extremity. Duplex Lower Ext Venous Bilat Result Date: 12/1/2020 · Non-occlusive thrombus present in the right common femoral vein. · Non-occlusive thrombus present in the left profunda femoral vein. No results found for this or any previous visit. Please note that this dictation was completed with [employer], the [employer] voice recognition software. Quite often unanticipated grammatical, syntax, homophones, and other interpretive errors are inadvertently transcribed by the computer software. Please disregard these errors. Please excuse any errors that have escaped final proofreading.   
 
CC: Niko Aguiar NP

## 2020-12-27 NOTE — PROGRESS NOTES
Bedside and verbal shift change report given to Wallace Bolivar RN (on coming nurse) by Peggy Moncada RN (off going nurse). Report included the following information SBAR, Kardex, OR Summary, Intake/Output and MAR. Shift summary: Pt c/o of 6/10 pain to abdomen PRN pain medication administered. No new events. Bedside and verbal shift change report given by Wallace Bolivar RN (off going nurse) to Peggy Moncada RN(on coming nurse). Report included the following information SBAR, Kardex, OR Summary, Intake/Output and MAR.

## 2020-12-27 NOTE — PROGRESS NOTES
DC Plan: Discharge home with [facility] [place] [place] Chart reviewed as CM on call. CM spoke with MD Kelton Pichardo, pt to dc home today. Pt will not require IV abx at home. Per previous CM notes pt arranged with [facility] [place] [place]. [facility] [place] [place] notified of pt discharge. CM will cont to be available via hospital  on weekends for any transition of care needs. Jose Enciso Spoke with pt on phone to discuss dc, appears referrals for [facility] [place] [place] and [facility] were placed, FOC offered pt states he wants [facility] [place] [place]. Pt states he has ride home. No dc concerns identified Care Management Interventions PCP Verified by CM: Yes Mode of Transport at Discharge: Self Transition of Care Consult (CM Consult): Discharge Planning, Home Health [address]: Yes Current Support Network: Lives with Spouse, Own Home Confirm Follow Up Transport: Family The Plan for Transition of Care is Related to the Following Treatment Goals : home when medically stable The Patient and/or Patient Representative was Provided with a Choice of Provider and Agrees with the Discharge Plan?: Yes Name of the Patient Representative Who was Provided with a Choice of Provider and Agrees with the Discharge Plan: Roxanna Marsh, patient [place] of Choice List was Provided with Basic Dialogue that Supports the Patient's Individualized Plan of Care/Goals, Treatment Preferences and Shares the Quality Data Associated with the Providers?: Yes Discharge Location Discharge Placement: Home with home health

## 2020-12-29 PROBLEM — L89.153 PRESSURE ULCER OF SACRAL REGION, STAGE 3 (HCC): Status: ACTIVE | Noted: 2020-01-01

## 2020-12-29 PROBLEM — L89.623 PRESSURE ULCER OF LEFT HEEL, STAGE 3 (HCC): Status: ACTIVE | Noted: 2020-01-01

## 2020-12-29 NOTE — WOUND CARE
[facility] Initial Consult note Chief Complaint: 
Forrest Bloch is a 54 y.o.  male who presents with pressure ulcer of sacral area Referred by:  Hospital follow up HPI:   He developed cirrhosis this year from alcohol abuse. He stopped drinking in September. He had an extensive hospitalization in October for advanced cirrhosis of the liver with acute respiratory failure and aspiration pneumonia. He was in the intensive care unit on a ventilator during that period of time and was very ill. He ultimately improved enough to be discharged to a skilled nursing facility and then had come home from my understanding, but came back to the emergency room on the 11/30 with worsening fatigue and a sacral decubitus ulcer noted by the home health nurse. He was admitted at [facility] and discharged on 12/05/2020. He was admitted for PE/DVT, he was started on anticoagulation and discharged on xarelto. He is followed by Dr. Leoncio Ocampo for cirrhosis. He is on evaluation for liver transplant. Wound caused by: chronic pressure Current wound care: local wound care Offloading wound: no Appetite: poor Wound associated pain: mild Diabetic: No 
Smoker: Yes Past Medical History:  
Diagnosis Date  Cirrhosis ([address]  Diabetes [facility]  Gastroparesis  Pancreatitis  Pulmonary embolism ([facility] Past Surgical History:  
Procedure Laterality Date  HX CHOLECYSTECTOMY  HX HIP FRACTURE [place]    
 left hip sx 9.23/2018 No family history on file. Social History Tobacco Use  Smoking status: Current Every Day Smoker  Smokeless tobacco: Never Used Substance Use Topics  Alcohol use: Not Currently Prior to Admission medications Medication Sig Start Date End Date Taking? Authorizing Provider oxyCODONE-acetaminophen (PERCOCET) 5-325 mg per tablet Take 1-2 Tabs by mouth every six (6) hours as needed for Pain for up to 3 days. Max Daily Amount: 8 Tabs. 12/27/20 12/30/20  Heidi Diaz MD  
insulin glargine (LANTUS) 100 unit/mL injection 10 Units by SubCUTAneous route nightly. 12/5/20   Leora Lovett MD  
furosemide (LASIX) 40 mg tablet Take 0.5 Tabs by mouth daily. 12/5/20   Leora Lovett MD  
multivitamin, tx-iron-ca-min (THERA-M w/ IRON) 9 mg iron-400 mcg tab tablet Take 1 Tab by mouth daily. 12/6/20   Leora Lovett MD  
insulin lispro (HUMALOG) 100 unit/mL injection 6 Units by SubCUTAneous route Before breakfast, lunch, and dinner. 10/29/20   Leora Lovett MD  
lactulose (CHRONULAC) 10 gram/15 mL solution Take 45 mL by mouth daily. 10/30/20   Leora Lovett MD  
pantoprazole (PROTONIX) 40 mg tablet Take 1 Tab by mouth Daily (before breakfast). 10/30/20   Leora Lovett MD  
potassium chloride (K-DUR, KLOR-CON) 20 mEq tablet Take 1 Tab by mouth daily. 10/30/20   Leora Lovett MD  
thiamine mononitrate (B-1) 100 mg tablet Take 1 Tab by mouth daily. 10/30/20   Leora Lovett MD  
lidocaine (LIDODERM) 5 % 2 Patches by TransDERmal route every twenty-four (24) hours. Apply patch to the affected area for 12 hours a day and remove for 12 hours a day. One patch is applied to left hip. One patch is applied to right shoulder blade. Provider, Historical  
DULoxetine (CYMBALTA) 60 mg capsule Take 1 Cap by mouth daily. 1/6/19   Meño Springer MD  
traZODone (DESYREL) 50 mg tablet Take 50 mg by mouth nightly. Meño Springer MD  
 
No Known Allergies Review of Systems Gen: No fever, chills, malaise, weight loss/gain. Heent: No headache, rhinorrhea, epistaxis, ear pain, hearing loss, sinus pain, neck pain/stiffness, sore throat. Heart: No chest pain, palpitations, ACOSTA, pnd, or orthopnea. Resp: No cough, hemoptysis, wheezing and shortness of breath. GI: see above : No urinary obstruction, dysuria or hematuria. Derm: see above Musc/skeletal: no bone or joint complains. Vasc: No edema, cyanosis or claudication. Endo: No heat/cold intolerance, no polyuria,polydipsia or polyphagia. Neuro: No unilateral weakness, numbness, tingling. No seizures. Heme: No easy bruising or bleeding. Objective:  
 
Physical Exam:  
 
Visit Vitals BP 96/70 (BP 1 Location: Left arm, BP Patient Position: Supine) Pulse (!) 113 Temp 98.4 °F (36.9 °C) Resp 18 SpO2 100% General: chronic ill appearing Psych: cooperative. Pleasant. No anxiety or depression. Normal mood and affect. Neuro: alert and oriented to person/place/situation. Otherwise nonfocal. 
Derm: Skin turgor for age, dry skin HEENT: Normocephalic, atraumatic. EOMI. Conjunctiva clear. icterus. Neck: Normal range of motion. No masses. Chest: Good air entry bilaterally. Respirations nonlabored Cardio[de-identified] Normal heart sounds,no rubs, murmurs or gallops Abdomen: Soft, nontender, distended, normoactive bowel sounds Lower extremities: color normal; temperature normal. Calves are supple, nontender. Capillary refill <3 sec Wound Description: Wound Length:   
 
Wound Width :  
 
Wound Depth :  
 
Wound Heel Left stage III 12/02/20 Date First Assessed/Time First Assessed: 12/02/20 1052   Present on Hospital Admission: Yes  Primary Wound Type: Pressure Injury  Location: Heel  Wound Location Orientation: Left  Wound Description: stage III  Date of First Observation: 12/02/20 Wound Etiology Venous Dressing Status   
(no dressing) Cleansed Wound cleanser Dressing/Treatment Cast padding;Coban/self-adherent bandages; Collagen with Ag;Gauze dressing/dressing sponge Wound Length (cm) 0.8 cm Wound Width (cm) 1.3 cm Wound Depth (cm) 0.1 cm Wound Surface Area (cm^2) 1.04 cm^2 Change in Wound Size % -8.33 Wound Volume (cm^3) 0.1 cm^3 Wound Healing % 47 Post-Procedure Length (cm) 0.9 cm  
 Post-Procedure Width (cm) 1.4 cm Post-Procedure Depth (cm) 0.1 cm Post-Procedure Surface Area (cm^2) 1.26 cm^2 Post-Procedure Volume (cm^3) 0.13 cm^3 Wound Assessment Pink/red Drainage Amount Small Drainage Description Serosanguinous Wound Odor None Kandice-Wound/Incision Assessment Intact Edges Defined edges Wound Thickness Description Full thickness Wound Sacrum 11/22/20 Date First Assessed/Time First Assessed: 12/09/20 1612   Primary Wound Type: Pressure Injury  Location: Sacrum  Date of First Observation: 11/22/20 Wound Etiology Pressure Stage 3 Dressing Status Breakthrough drainage noted Cleansed Wound cleanser Dressing/Treatment Alginate with Ag;Collagen with Ag;Dry dressing; Foam  
Wound Length (cm) 7 cm Wound Width (cm) 3 cm Wound Depth (cm) 0.5 cm Wound Surface Area (cm^2) 21 cm^2 Wound Volume (cm^3) 10.5 cm^3 Post-Procedure Length (cm) 7.1 cm Post-Procedure Width (cm) 3.1 cm Post-Procedure Depth (cm) 0.6 cm Post-Procedure Surface Area (cm^2) 22.01 cm^2 Post-Procedure Volume (cm^3) 13.21 cm^3 Wound Assessment Fibrin;Pink/red Drainage Amount Moderate Drainage Description Serosanguinous Wound Odor None Kandice-Wound/Incision Assessment Intact Edges Epibole (rolled edges) Wound Thickness Description Full thickness Data Review: No results found for this or any previous visit (from the past 24 hour(s)). Assessment:  
 
Patient Active Problem List  
Diagnosis Code  Hypocalcemia E83.51  
 Hypomagnesemia E83.42  
 Alcoholism ([facility] F10.20  Cirrhosis ([facility] K74.60  Thrombocytopenia ([facility] D69.6  Severe protein-calorie malnutrition ([facility] E43  Pulmonary embolism (HCC) I26.99  
 Acute deep vein thrombosis (DVT) of both lower extremities (HCC) I82.403  Ascites R18.8  Coagulopathy (HCC) D68.9  
 Colitis K52.9  Positive blood culture R78.81  Bacteremia R78.81  
 Enterococcus faecalis infection B95.2  Pressure ulcer of sacral region, stage 3 ([facility]) R88.554  Pressure ulcer of left heel, stage 3 [facility]) K14.686  
 
54 y.o. male with pressure ulcer of sacral area stage 3 and pressure ulcer of left heel stage 3 Needs : 
Serial debridement- debrided today- see note below Good local wound care Nutrition optimization Keep pressure off Plan:  
Informed consent obtained. Patient/family member explained about the need of the procedure. Consent in chart Debridement:  
Excisional debridement of pressure ulcer of sacral area and left heel Indication: to remove necrotic tissue/ devitalized tissue/ soft eschar/ infected tissue through subcutaneous tissue epidermis and dermis layer of wound bed Anesthesia: Topical 2% lidocaine jelly / Instrument: Curette, Blade,  Residual Necrosis: Present and scored Bleeding: <2ml Hemostasis: Pressure Patient tolerated procedure well Procedural Pain: mild Post - procedural pain: mild Post debridement measurements: 7 x 3, 1.4 x 0.9 cm 
Surface area debrided: 22.26 sq. cm In wound care clinic today:  
Cleanse wound with NS or soap and water or commercial wound cleanser Apply the following topically to wound bed: [place], bacitracin, alginate Apply the following to eb-wound:  
Apply the following dressings: mepitel one, exudry, optifoam border, tape Keep dressing dry and intact when bathing For Home Health:  
Frequency: 2 times a week ( Pt comes to wound clinic on Wednesdays) Cleanse wound with NS or soap and water or commercial wound cleanser  
   
Apply the following to wound bed of left heel and sacrum: [place], bacitracin Apply the following to skin around wound:  
 
Apply the following dressings: exudry, optifoam border/ mepilex border Leave dressings in place until next [address] visit Patient to return for wound care in:  7 Days PLEASE CONTACT OFFICE AS SOON AS POSSIBLE IF UNABLE TO MAKE THIS APPOINTMENT. Inspect your wounds, looking for signs of infection which may include the following:  Increase in redness  Red \"streaks\" from wound  Increase in swelling  Fever  Unusual odor  Change in the amount of wound drainage. Should you experience any significant changes in your wound(s) or have any questions regarding your home care instructions please contact the wound center or your home health company. If after regular business hours, please call your family doctor or local emergency room. Edema Control: Elevate legs as much as possible. Avoid standing in one position for more than 10 minutes. Avoid setting with legs down. Do not cross legs while sitting. Off-Loading:Frequent position changes. Do not cross legs while sitting. Shift weight every 20 minutes or more when sitting for prolonged periods of time. Signed By: Rody Champion MD   
 December 29, 2020

## 2020-12-29 NOTE — WOUND CARE
Discharge Instructions for [address] [place], Willian Schroeder 
[phone] Fax [phone] NAME:  Ernesto Glynn YOB: 1965 MEDICAL RECORD NUMBER:  [id number] DATE:  12/13/2020 Wound Cleansing: Do not scrub or use excessive force. Dressings: For [facility]:  
Frequency: 2 times a week ( Pt comes to wound clinic on Wednesdays) Cleanse wound with NS or soap and water or commercial wound cleanser  
   
Apply the following to wound bed of left heel and sacrum: washington, bacitracin Apply the following to skin around wound:  
 
Apply the following dressings: exudry, optifoam border/ mepilex border Pressure Relief: 
[x] When sitting, shift position or do seat lifts every 15 minutes. [x] Wheelchair cushion [] Specialty Bed/Mattress 
[x] Turn every 2 hours when in bed. Avoid position directing pressure on wound site. Limit side lying to 30 degree tilt. Limit HOB elevation to 30 degrees. Off-Loading:  
[x] Off-loading when [] walking  [x] in bed [x] sitting 
[] Total non-weight bearing  [] Right Leg  [] Left Leg  
[] Assistive Device [] Walker [] Cane  [] Wheelchair  [] Crutches 
 [] Surgical shoe    [] Podus Boot(s)   [] Foam Boot(s)  [] Roll About 
  [] Cast Boot [] CROW Boot  [] Other: 
 
  
 
Dietary: 
[] Diet as tolerated: [] Calorie Diabetic Diet: [x] No Added Salt: 
[x] Increase Protein: [] Other:  
Activity: 
[x] Activity as tolerated:  [] Patient has no activity restrictions     [] Strict Bedrest: [] Remain off Work:     [] May return to full duty work:                                   [] Return to work with restrictions:  
         
Return Appointment: 
[] Wound and dressing supply provider:  
[] ECF or Home Healthcare: 
[] Wound Assessment: [] Physician or NP scheduled for Wound Assessment:  
[x] Return Appointment: With Katie Jim  in 1-2 Week(s) [] Ordered tests: Electronically signed Jairo Proctor RN on 12/29/2020 at 3:25 PM  
 
Jemma Mcgee [address]: Should you experience any significant changes in your wound(s) or have questions about your wound care, please contact the [facility] at [address] [place] [address] 8:00 am - 4:30. If you need help with your wound outside these hours and cannot wait until we are again available, contact your PCP or go to the hospital emergency room. PLEASE NOTE: IF YOU ARE UNABLE TO OBTAIN WOUND SUPPLIES, CONTINUE TO USE THE SUPPLIES YOU HAVE AVAILABLE UNTIL YOU ARE ABLE TO REACH US. IT IS MOST IMPORTANT TO KEEP THE WOUND COVERED AT ALL TIMES. Physician Signature:_______________________ Date: ___________ Time:  ____________

## 2021-01-01 ENCOUNTER — APPOINTMENT (OUTPATIENT)
Dept: CT IMAGING | Age: 56
DRG: 871 | End: 2021-01-01
Attending: INTERNAL MEDICINE
Payer: OTHER GOVERNMENT

## 2021-01-01 ENCOUNTER — APPOINTMENT (OUTPATIENT)
Dept: GENERAL RADIOLOGY | Age: 56
DRG: 871 | End: 2021-01-01
Attending: EMERGENCY MEDICINE
Payer: OTHER GOVERNMENT

## 2021-01-01 ENCOUNTER — HOSPITAL ENCOUNTER (INPATIENT)
Age: 56
LOS: 1 days | DRG: 871 | End: 2021-01-04
Attending: EMERGENCY MEDICINE | Admitting: INTERNAL MEDICINE
Payer: OTHER GOVERNMENT

## 2021-01-01 ENCOUNTER — HOME CARE VISIT (OUTPATIENT)
Dept: HOME HEALTH SERVICES | Facility: HOME HEALTH | Age: 56
End: 2021-01-01
Payer: OTHER GOVERNMENT

## 2021-01-01 ENCOUNTER — APPOINTMENT (OUTPATIENT)
Dept: CT IMAGING | Age: 56
DRG: 871 | End: 2021-01-01
Attending: EMERGENCY MEDICINE
Payer: OTHER GOVERNMENT

## 2021-01-01 ENCOUNTER — APPOINTMENT (OUTPATIENT)
Dept: ULTRASOUND IMAGING | Age: 56
DRG: 871 | End: 2021-01-01
Attending: INTERNAL MEDICINE
Payer: OTHER GOVERNMENT

## 2021-01-01 ENCOUNTER — HOME CARE VISIT (OUTPATIENT)
Dept: SCHEDULING | Facility: HOME HEALTH | Age: 56
End: 2021-01-01
Payer: OTHER GOVERNMENT

## 2021-01-01 VITALS
HEIGHT: 69 IN | HEART RATE: 116 BPM | RESPIRATION RATE: 15 BRPM | OXYGEN SATURATION: 98 % | TEMPERATURE: 97.5 F | WEIGHT: 147.7 LBS | DIASTOLIC BLOOD PRESSURE: 78 MMHG | SYSTOLIC BLOOD PRESSURE: 123 MMHG | BODY MASS INDEX: 21.88 KG/M2

## 2021-01-01 VITALS
OXYGEN SATURATION: 98 % | HEART RATE: 97 BPM | SYSTOLIC BLOOD PRESSURE: 114 MMHG | TEMPERATURE: 96.6 F | RESPIRATION RATE: 16 BRPM | DIASTOLIC BLOOD PRESSURE: 78 MMHG

## 2021-01-01 DIAGNOSIS — R17 JAUNDICE: ICD-10-CM

## 2021-01-01 DIAGNOSIS — L89.153 PRESSURE ULCER OF SACRAL REGION, STAGE 3 (HCC): ICD-10-CM

## 2021-01-01 DIAGNOSIS — D65 DIC (DISSEMINATED INTRAVASCULAR COAGULATION) (HCC): ICD-10-CM

## 2021-01-01 DIAGNOSIS — D68.9 COAGULOPATHY (HCC): ICD-10-CM

## 2021-01-01 DIAGNOSIS — M62.59 ATROPHY OF MUSCLE OF MULTIPLE SITES: ICD-10-CM

## 2021-01-01 DIAGNOSIS — R41.0 DELIRIUM: Primary | ICD-10-CM

## 2021-01-01 DIAGNOSIS — A41.9 SEPSIS, DUE TO UNSPECIFIED ORGANISM, UNSPECIFIED WHETHER ACUTE ORGAN DYSFUNCTION PRESENT (HCC): ICD-10-CM

## 2021-01-01 DIAGNOSIS — R53.81 DEBILITY: ICD-10-CM

## 2021-01-01 DIAGNOSIS — E43 SEVERE PROTEIN-CALORIE MALNUTRITION (HCC): ICD-10-CM

## 2021-01-01 DIAGNOSIS — K70.9 ALCOHOLIC LIVER DISEASE (HCC): ICD-10-CM

## 2021-01-01 DIAGNOSIS — K72.10 END STAGE LIVER DISEASE (HCC): ICD-10-CM

## 2021-01-01 DIAGNOSIS — D72.825 BANDEMIA: ICD-10-CM

## 2021-01-01 DIAGNOSIS — Z71.89 GOALS OF CARE, COUNSELING/DISCUSSION: ICD-10-CM

## 2021-01-01 DIAGNOSIS — K70.31 ASCITES DUE TO ALCOHOLIC CIRRHOSIS (HCC): ICD-10-CM

## 2021-01-01 DIAGNOSIS — K70.31 ALCOHOLIC CIRRHOSIS OF LIVER WITH ASCITES (HCC): ICD-10-CM

## 2021-01-01 DIAGNOSIS — D69.6 THROMBOCYTOPENIA (HCC): ICD-10-CM

## 2021-01-01 DIAGNOSIS — R63.0 ANOREXIA: ICD-10-CM

## 2021-01-01 LAB
ALBUMIN SERPL-MCNC: 4.5 G/DL (ref 3.4–5)
ALBUMIN SERPL-MCNC: 4.6 G/DL (ref 3.4–5)
ALBUMIN SERPL-MCNC: 4.9 G/DL (ref 3.4–5)
ALBUMIN/GLOB SERPL: 1.5 {RATIO} (ref 0.8–1.7)
ALBUMIN/GLOB SERPL: 1.6 {RATIO} (ref 0.8–1.7)
ALBUMIN/GLOB SERPL: 1.7 {RATIO} (ref 0.8–1.7)
ALP SERPL-CCNC: 31 U/L (ref 45–117)
ALP SERPL-CCNC: 37 U/L (ref 45–117)
ALP SERPL-CCNC: 50 U/L (ref 45–117)
ALT SERPL-CCNC: 21 U/L (ref 16–61)
ALT SERPL-CCNC: 23 U/L (ref 16–61)
ALT SERPL-CCNC: 29 U/L (ref 16–61)
AMMONIA PLAS-SCNC: 23 UMOL/L (ref 11–32)
ANION GAP SERPL CALC-SCNC: 11 MMOL/L (ref 3–18)
ANION GAP SERPL CALC-SCNC: 13 MMOL/L (ref 3–18)
APTT PPP: 63.8 SEC (ref 23–36.4)
AST SERPL-CCNC: 39 U/L (ref 10–38)
AST SERPL-CCNC: 49 U/L (ref 10–38)
AST SERPL-CCNC: 68 U/L (ref 10–38)
ATRIAL RATE: 105 BPM
BASOPHILS # BLD: 0 K/UL (ref 0–0.1)
BASOPHILS NFR BLD: 0 % (ref 0–3)
BASOPHILS NFR BLD: 1 % (ref 0–2)
BASOPHILS NFR BLD: 1 % (ref 0–2)
BILIRUB DIRECT SERPL-MCNC: 4 MG/DL (ref 0–0.2)
BILIRUB SERPL-MCNC: 10.2 MG/DL (ref 0.2–1)
BILIRUB SERPL-MCNC: 10.2 MG/DL (ref 0.2–1)
BILIRUB SERPL-MCNC: 11.3 MG/DL (ref 0.2–1)
BUN SERPL-MCNC: 35 MG/DL (ref 7–18)
BUN SERPL-MCNC: 36 MG/DL (ref 7–18)
BUN/CREAT SERPL: 17 (ref 12–20)
BUN/CREAT SERPL: 20 (ref 12–20)
CALCIUM SERPL-MCNC: 10.2 MG/DL (ref 8.5–10.1)
CALCIUM SERPL-MCNC: 9.9 MG/DL (ref 8.5–10.1)
CALCULATED P AXIS, ECG09: 42 DEGREES
CALCULATED T AXIS, ECG11: -18 DEGREES
CHLORIDE SERPL-SCNC: 96 MMOL/L (ref 100–111)
CHLORIDE SERPL-SCNC: 99 MMOL/L (ref 100–111)
CK MB CFR SERPL CALC: NORMAL % (ref 0–4)
CK MB SERPL-MCNC: <1 NG/ML (ref 5–25)
CK SERPL-CCNC: 41 U/L (ref 39–308)
CO2 SERPL-SCNC: 25 MMOL/L (ref 21–32)
CO2 SERPL-SCNC: 27 MMOL/L (ref 21–32)
CREAT SERPL-MCNC: 1.78 MG/DL (ref 0.6–1.3)
CREAT SERPL-MCNC: 2.09 MG/DL (ref 0.6–1.3)
CRP SERPL-MCNC: 4.9 MG/DL (ref 0–0.3)
DIAGNOSIS, 93000: NORMAL
DIFFERENTIAL METHOD BLD: ABNORMAL
EOSINOPHIL # BLD: 0 K/UL (ref 0–0.4)
EOSINOPHIL NFR BLD: 0 % (ref 0–5)
EOSINOPHIL NFR BLD: 0 % (ref 0–5)
EOSINOPHIL NFR BLD: 1 % (ref 0–5)
ERYTHROCYTE [DISTWIDTH] IN BLOOD BY AUTOMATED COUNT: 19.5 % (ref 11.6–14.5)
ERYTHROCYTE [DISTWIDTH] IN BLOOD BY AUTOMATED COUNT: 20 % (ref 11.6–14.5)
ERYTHROCYTE [DISTWIDTH] IN BLOOD BY AUTOMATED COUNT: 20 % (ref 11.6–14.5)
ERYTHROCYTE [SEDIMENTATION RATE] IN BLOOD: 17 MM/HR (ref 0–20)
EST. AVERAGE GLUCOSE BLD GHB EST-MCNC: 134 MG/DL
GLOBULIN SER CALC-MCNC: 2.7 G/DL (ref 2–4)
GLOBULIN SER CALC-MCNC: 2.8 G/DL (ref 2–4)
GLOBULIN SER CALC-MCNC: 3.2 G/DL (ref 2–4)
GLUCOSE BLD STRIP.AUTO-MCNC: 100 MG/DL (ref 70–110)
GLUCOSE BLD STRIP.AUTO-MCNC: 117 MG/DL (ref 70–110)
GLUCOSE BLD STRIP.AUTO-MCNC: 153 MG/DL (ref 70–110)
GLUCOSE BLD STRIP.AUTO-MCNC: 169 MG/DL (ref 70–110)
GLUCOSE BLD STRIP.AUTO-MCNC: 211 MG/DL (ref 70–110)
GLUCOSE BLD STRIP.AUTO-MCNC: 213 MG/DL (ref 70–110)
GLUCOSE BLD STRIP.AUTO-MCNC: 231 MG/DL (ref 70–110)
GLUCOSE BLD STRIP.AUTO-MCNC: 258 MG/DL (ref 70–110)
GLUCOSE BLD STRIP.AUTO-MCNC: 90 MG/DL (ref 70–110)
GLUCOSE SERPL-MCNC: 159 MG/DL (ref 74–99)
GLUCOSE SERPL-MCNC: 234 MG/DL (ref 74–99)
HBA1C MFR BLD: 6.3 % (ref 4.2–5.6)
HCT VFR BLD AUTO: 22.4 % (ref 36–48)
HCT VFR BLD AUTO: 22.6 % (ref 36–48)
HCT VFR BLD AUTO: 26.6 % (ref 36–48)
HGB BLD-MCNC: 7.5 G/DL (ref 13–16)
HGB BLD-MCNC: 7.6 G/DL (ref 13–16)
HGB BLD-MCNC: 9 G/DL (ref 13–16)
INR PPP: 4.7 (ref 0.8–1.2)
INR PPP: 7.1 (ref 0.8–1.2)
LACTATE BLD-SCNC: 6.27 MMOL/L (ref 0.4–2)
LACTATE SERPL-SCNC: 7.3 MMOL/L (ref 0.4–2)
LDH SERPL L TO P-CCNC: 254 U/L (ref 81–234)
LYMPHOCYTES # BLD: 0.6 K/UL (ref 0.9–3.6)
LYMPHOCYTES # BLD: 0.7 K/UL (ref 0.8–3.5)
LYMPHOCYTES # BLD: 0.8 K/UL (ref 0.9–3.6)
LYMPHOCYTES NFR BLD: 14 % (ref 21–52)
LYMPHOCYTES NFR BLD: 17 % (ref 20–51)
LYMPHOCYTES NFR BLD: 20 % (ref 21–52)
MAGNESIUM SERPL-MCNC: 2.2 MG/DL (ref 1.6–2.6)
MCH RBC QN AUTO: 30.7 PG (ref 24–34)
MCH RBC QN AUTO: 31.4 PG (ref 24–34)
MCH RBC QN AUTO: 31.7 PG (ref 24–34)
MCHC RBC AUTO-ENTMCNC: 33.2 G/DL (ref 31–37)
MCHC RBC AUTO-ENTMCNC: 33.8 G/DL (ref 31–37)
MCHC RBC AUTO-ENTMCNC: 33.9 G/DL (ref 31–37)
MCV RBC AUTO: 92.6 FL (ref 74–97)
MCV RBC AUTO: 92.7 FL (ref 74–97)
MCV RBC AUTO: 93.3 FL (ref 74–97)
METAMYELOCYTES NFR BLD MANUAL: 1 %
MONOCYTES # BLD: 0.2 K/UL (ref 0–1)
MONOCYTES # BLD: 0.5 K/UL (ref 0.05–1.2)
MONOCYTES # BLD: 0.6 K/UL (ref 0.05–1.2)
MONOCYTES NFR BLD: 11 % (ref 3–10)
MONOCYTES NFR BLD: 13 % (ref 3–10)
MONOCYTES NFR BLD: 5 % (ref 2–9)
NEUTS BAND NFR BLD MANUAL: 20 % (ref 0–5)
NEUTS SEG # BLD: 2.4 K/UL (ref 1.8–8)
NEUTS SEG # BLD: 2.9 K/UL (ref 1.8–8)
NEUTS SEG # BLD: 3.3 K/UL (ref 1.8–8)
NEUTS SEG NFR BLD: 57 % (ref 42–75)
NEUTS SEG NFR BLD: 68 % (ref 40–73)
NEUTS SEG NFR BLD: 71 % (ref 40–73)
P-R INTERVAL, ECG05: 188 MS
PLATELET # BLD AUTO: 25 K/UL (ref 135–420)
PLATELET # BLD AUTO: 30 K/UL (ref 135–420)
PLATELET # BLD AUTO: 42 K/UL (ref 135–420)
PLATELET COMMENTS,PCOM: ABNORMAL
PLATELET COMMENTS,PCOM: ABNORMAL
POTASSIUM SERPL-SCNC: 3.6 MMOL/L (ref 3.5–5.5)
POTASSIUM SERPL-SCNC: 3.9 MMOL/L (ref 3.5–5.5)
PROT SERPL-MCNC: 7.3 G/DL (ref 6.4–8.2)
PROT SERPL-MCNC: 7.3 G/DL (ref 6.4–8.2)
PROT SERPL-MCNC: 8.1 G/DL (ref 6.4–8.2)
PROTHROMBIN TIME: 42.9 SEC (ref 11.5–15.2)
PROTHROMBIN TIME: 58.8 SEC (ref 11.5–15.2)
Q-T INTERVAL, ECG07: 360 MS
QRS DURATION, ECG06: 88 MS
QTC CALCULATION (BEZET), ECG08: 475 MS
RBC # BLD AUTO: 2.4 M/UL (ref 4.7–5.5)
RBC # BLD AUTO: 2.44 M/UL (ref 4.7–5.5)
RBC # BLD AUTO: 2.87 M/UL (ref 4.7–5.5)
RBC MORPH BLD: ABNORMAL
SODIUM SERPL-SCNC: 134 MMOL/L (ref 136–145)
SODIUM SERPL-SCNC: 137 MMOL/L (ref 136–145)
TROPONIN I SERPL-MCNC: <0.02 NG/ML (ref 0–0.04)
VENTRICULAR RATE, ECG03: 105 BPM
WBC # BLD AUTO: 4.2 K/UL (ref 4.6–13.2)
WBC # BLD AUTO: 4.2 K/UL (ref 4.6–13.2)
WBC # BLD AUTO: 4.6 K/UL (ref 4.6–13.2)
WBC MORPH BLD: ABNORMAL
WBC MORPH BLD: ABNORMAL

## 2021-01-01 PROCEDURE — 82140 ASSAY OF AMMONIA: CPT

## 2021-01-01 PROCEDURE — 99285 EMERGENCY DEPT VISIT HI MDM: CPT

## 2021-01-01 PROCEDURE — 80053 COMPREHEN METABOLIC PANEL: CPT

## 2021-01-01 PROCEDURE — 96372 THER/PROPH/DIAG INJ SC/IM: CPT

## 2021-01-01 PROCEDURE — 74011250636 HC RX REV CODE- 250/636: Performed by: EMERGENCY MEDICINE

## 2021-01-01 PROCEDURE — P9045 ALBUMIN (HUMAN), 5%, 250 ML: HCPCS | Performed by: EMERGENCY MEDICINE

## 2021-01-01 PROCEDURE — 99223 1ST HOSP IP/OBS HIGH 75: CPT | Performed by: INTERNAL MEDICINE

## 2021-01-01 PROCEDURE — 83735 ASSAY OF MAGNESIUM: CPT

## 2021-01-01 PROCEDURE — 82553 CREATINE MB FRACTION: CPT

## 2021-01-01 PROCEDURE — 74011250636 HC RX REV CODE- 250/636: Performed by: INTERNAL MEDICINE

## 2021-01-01 PROCEDURE — 3331090002 HH PPS REVENUE DEBIT

## 2021-01-01 PROCEDURE — 74011000258 HC RX REV CODE- 258: Performed by: INTERNAL MEDICINE

## 2021-01-01 PROCEDURE — 83615 LACTATE (LD) (LDH) ENZYME: CPT

## 2021-01-01 PROCEDURE — 36415 COLL VENOUS BLD VENIPUNCTURE: CPT

## 2021-01-01 PROCEDURE — 85730 THROMBOPLASTIN TIME PARTIAL: CPT

## 2021-01-01 PROCEDURE — 3331090001 HH PPS REVENUE CREDIT

## 2021-01-01 PROCEDURE — 3331090003 HH PPS REVENUE ADJ

## 2021-01-01 PROCEDURE — 83036 HEMOGLOBIN GLYCOSYLATED A1C: CPT

## 2021-01-01 PROCEDURE — 99223 1ST HOSP IP/OBS HIGH 75: CPT | Performed by: NURSE PRACTITIONER

## 2021-01-01 PROCEDURE — 83605 ASSAY OF LACTIC ACID: CPT

## 2021-01-01 PROCEDURE — 85652 RBC SED RATE AUTOMATED: CPT

## 2021-01-01 PROCEDURE — 74011636637 HC RX REV CODE- 636/637: Performed by: INTERNAL MEDICINE

## 2021-01-01 PROCEDURE — P9047 ALBUMIN (HUMAN), 25%, 50ML: HCPCS | Performed by: INTERNAL MEDICINE

## 2021-01-01 PROCEDURE — 65660000000 HC RM CCU STEPDOWN

## 2021-01-01 PROCEDURE — 85025 COMPLETE CBC W/AUTO DIFF WBC: CPT

## 2021-01-01 PROCEDURE — 94762 N-INVAS EAR/PLS OXIMTRY CONT: CPT

## 2021-01-01 PROCEDURE — 86140 C-REACTIVE PROTEIN: CPT

## 2021-01-01 PROCEDURE — 82962 GLUCOSE BLOOD TEST: CPT

## 2021-01-01 PROCEDURE — 71045 X-RAY EXAM CHEST 1 VIEW: CPT

## 2021-01-01 PROCEDURE — 74176 CT ABD & PELVIS W/O CONTRAST: CPT

## 2021-01-01 PROCEDURE — 85610 PROTHROMBIN TIME: CPT

## 2021-01-01 PROCEDURE — 80076 HEPATIC FUNCTION PANEL: CPT

## 2021-01-01 PROCEDURE — 93005 ELECTROCARDIOGRAM TRACING: CPT

## 2021-01-01 PROCEDURE — G0299 HHS/HOSPICE OF RN EA 15 MIN: HCPCS

## 2021-01-01 PROCEDURE — 74011000258 HC RX REV CODE- 258: Performed by: EMERGENCY MEDICINE

## 2021-01-01 PROCEDURE — 77010033678 HC OXYGEN DAILY

## 2021-01-01 PROCEDURE — 2709999900 HC NON-CHARGEABLE SUPPLY

## 2021-01-01 PROCEDURE — 87040 BLOOD CULTURE FOR BACTERIA: CPT

## 2021-01-01 PROCEDURE — 70450 CT HEAD/BRAIN W/O DYE: CPT

## 2021-01-01 RX ORDER — PHYTONADIONE 10 MG/ML
2.5 INJECTION, EMULSION INTRAMUSCULAR; INTRAVENOUS; SUBCUTANEOUS ONCE
Status: COMPLETED | OUTPATIENT
Start: 2021-01-01 | End: 2021-01-01

## 2021-01-01 RX ORDER — ACETAMINOPHEN 325 MG/1
650 TABLET ORAL
Status: DISCONTINUED | OUTPATIENT
Start: 2021-01-01 | End: 2021-01-05 | Stop reason: HOSPADM

## 2021-01-01 RX ORDER — HYDROMORPHONE HYDROCHLORIDE 2 MG/ML
2 INJECTION, SOLUTION INTRAMUSCULAR; INTRAVENOUS; SUBCUTANEOUS
Status: DISCONTINUED | OUTPATIENT
Start: 2021-01-01 | End: 2021-01-05 | Stop reason: HOSPADM

## 2021-01-01 RX ORDER — SODIUM CHLORIDE 0.9 % (FLUSH) 0.9 %
5-10 SYRINGE (ML) INJECTION AS NEEDED
Status: DISCONTINUED | OUTPATIENT
Start: 2021-01-01 | End: 2021-01-05 | Stop reason: HOSPADM

## 2021-01-01 RX ORDER — HYDROMORPHONE HYDROCHLORIDE 2 MG/ML
2 INJECTION, SOLUTION INTRAMUSCULAR; INTRAVENOUS; SUBCUTANEOUS ONCE
Status: COMPLETED | OUTPATIENT
Start: 2021-01-01 | End: 2021-01-01

## 2021-01-01 RX ORDER — LEVOFLOXACIN 5 MG/ML
750 INJECTION, SOLUTION INTRAVENOUS EVERY 24 HOURS
Status: DISCONTINUED | OUTPATIENT
Start: 2021-01-01 | End: 2021-01-01 | Stop reason: DRUGHIGH

## 2021-01-01 RX ORDER — SODIUM CHLORIDE 0.9 % (FLUSH) 0.9 %
5-40 SYRINGE (ML) INJECTION AS NEEDED
Status: DISCONTINUED | OUTPATIENT
Start: 2021-01-01 | End: 2021-01-05 | Stop reason: HOSPADM

## 2021-01-01 RX ORDER — NALOXONE HYDROCHLORIDE 1 MG/ML
1 INJECTION INTRAMUSCULAR; INTRAVENOUS; SUBCUTANEOUS ONCE
Status: DISPENSED | OUTPATIENT
Start: 2021-01-01 | End: 2021-01-01

## 2021-01-01 RX ORDER — MORPHINE SULFATE 2 MG/ML
2 INJECTION, SOLUTION INTRAMUSCULAR; INTRAVENOUS
Status: DISCONTINUED | OUTPATIENT
Start: 2021-01-01 | End: 2021-01-05 | Stop reason: HOSPADM

## 2021-01-01 RX ORDER — PROMETHAZINE HYDROCHLORIDE 25 MG/1
12.5 TABLET ORAL
Status: DISCONTINUED | OUTPATIENT
Start: 2021-01-01 | End: 2021-01-05 | Stop reason: HOSPADM

## 2021-01-01 RX ORDER — LEVOFLOXACIN 5 MG/ML
750 INJECTION, SOLUTION INTRAVENOUS EVERY 24 HOURS
Status: DISCONTINUED | OUTPATIENT
Start: 2021-01-01 | End: 2021-01-01

## 2021-01-01 RX ORDER — MORPHINE SULFATE 2 MG/ML
1 INJECTION, SOLUTION INTRAMUSCULAR; INTRAVENOUS ONCE
Status: COMPLETED | OUTPATIENT
Start: 2021-01-01 | End: 2021-01-01

## 2021-01-01 RX ORDER — MAGNESIUM SULFATE 100 %
4 CRYSTALS MISCELLANEOUS AS NEEDED
Status: DISCONTINUED | OUTPATIENT
Start: 2021-01-01 | End: 2021-01-05 | Stop reason: HOSPADM

## 2021-01-01 RX ORDER — ENOXAPARIN SODIUM 100 MG/ML
40 INJECTION SUBCUTANEOUS DAILY
Status: DISCONTINUED | OUTPATIENT
Start: 2021-01-01 | End: 2021-01-01

## 2021-01-01 RX ORDER — SODIUM CHLORIDE 0.9 % (FLUSH) 0.9 %
5-40 SYRINGE (ML) INJECTION EVERY 8 HOURS
Status: DISCONTINUED | OUTPATIENT
Start: 2021-01-01 | End: 2021-01-01

## 2021-01-01 RX ORDER — ALBUMIN HUMAN 50 G/1000ML
25 SOLUTION INTRAVENOUS ONCE
Status: COMPLETED | OUTPATIENT
Start: 2021-01-01 | End: 2021-01-01

## 2021-01-01 RX ORDER — ONDANSETRON 2 MG/ML
4 INJECTION INTRAMUSCULAR; INTRAVENOUS
Status: DISCONTINUED | OUTPATIENT
Start: 2021-01-01 | End: 2021-01-05 | Stop reason: HOSPADM

## 2021-01-01 RX ORDER — INSULIN LISPRO 100 [IU]/ML
INJECTION, SOLUTION INTRAVENOUS; SUBCUTANEOUS EVERY 6 HOURS
Status: DISCONTINUED | OUTPATIENT
Start: 2021-01-01 | End: 2021-01-05 | Stop reason: HOSPADM

## 2021-01-01 RX ORDER — VANCOMYCIN HYDROCHLORIDE
1250 EVERY 24 HOURS
Status: DISCONTINUED | OUTPATIENT
Start: 2021-01-01 | End: 2021-01-05 | Stop reason: HOSPADM

## 2021-01-01 RX ORDER — ALBUMIN HUMAN 250 G/1000ML
25 SOLUTION INTRAVENOUS EVERY 6 HOURS
Status: DISCONTINUED | OUTPATIENT
Start: 2021-01-01 | End: 2021-01-05 | Stop reason: HOSPADM

## 2021-01-01 RX ORDER — ACETAMINOPHEN 650 MG/1
650 SUPPOSITORY RECTAL
Status: DISCONTINUED | OUTPATIENT
Start: 2021-01-01 | End: 2021-01-05 | Stop reason: HOSPADM

## 2021-01-01 RX ORDER — LEVOFLOXACIN 5 MG/ML
750 INJECTION, SOLUTION INTRAVENOUS
Status: DISCONTINUED | OUTPATIENT
Start: 2021-01-05 | End: 2021-01-05 | Stop reason: HOSPADM

## 2021-01-01 RX ORDER — DEXTROSE MONOHYDRATE 100 MG/ML
125-250 INJECTION, SOLUTION INTRAVENOUS AS NEEDED
Status: DISCONTINUED | OUTPATIENT
Start: 2021-01-01 | End: 2021-01-05 | Stop reason: HOSPADM

## 2021-01-01 RX ORDER — HYDROMORPHONE HYDROCHLORIDE 2 MG/ML
INJECTION, SOLUTION INTRAMUSCULAR; INTRAVENOUS; SUBCUTANEOUS
Status: DISPENSED
Start: 2021-01-01 | End: 2021-01-01

## 2021-01-01 RX ADMIN — HYDROMORPHONE HYDROCHLORIDE 2 MG: 2 INJECTION, SOLUTION INTRAMUSCULAR; INTRAVENOUS; SUBCUTANEOUS at 21:04

## 2021-01-01 RX ADMIN — PHYTONADIONE 10 MG: 10 INJECTION, EMULSION INTRAMUSCULAR; INTRAVENOUS; SUBCUTANEOUS at 20:49

## 2021-01-01 RX ADMIN — HYDROMORPHONE HYDROCHLORIDE 2 MG: 2 INJECTION, SOLUTION INTRAMUSCULAR; INTRAVENOUS; SUBCUTANEOUS at 12:29

## 2021-01-01 RX ADMIN — INSULIN LISPRO 4 UNITS: 100 INJECTION, SOLUTION INTRAVENOUS; SUBCUTANEOUS at 18:55

## 2021-01-01 RX ADMIN — VANCOMYCIN HYDROCHLORIDE 1250 MG: 10 INJECTION, POWDER, LYOPHILIZED, FOR SOLUTION INTRAVENOUS at 15:43

## 2021-01-01 RX ADMIN — HYDROMORPHONE HYDROCHLORIDE 2 MG: 2 INJECTION, SOLUTION INTRAMUSCULAR; INTRAVENOUS; SUBCUTANEOUS at 05:01

## 2021-01-01 RX ADMIN — MORPHINE SULFATE 1 MG: 2 INJECTION, SOLUTION INTRAMUSCULAR; INTRAVENOUS at 16:24

## 2021-01-01 RX ADMIN — ALBUMIN (HUMAN) 25 G: 0.25 INJECTION, SOLUTION INTRAVENOUS at 09:25

## 2021-01-01 RX ADMIN — ALBUMIN (HUMAN) 25 G: 0.25 INJECTION, SOLUTION INTRAVENOUS at 20:42

## 2021-01-01 RX ADMIN — ALBUMIN (HUMAN) 25 G: 0.25 INJECTION, SOLUTION INTRAVENOUS at 15:41

## 2021-01-01 RX ADMIN — PIPERACILLIN AND TAZOBACTAM 3.38 G: 3; .375 INJECTION, POWDER, LYOPHILIZED, FOR SOLUTION INTRAVENOUS at 09:26

## 2021-01-01 RX ADMIN — PIPERACILLIN AND TAZOBACTAM 4.5 G: 4; .5 INJECTION, POWDER, LYOPHILIZED, FOR SOLUTION INTRAVENOUS; PARENTERAL at 15:41

## 2021-01-01 RX ADMIN — INSULIN LISPRO 2 UNITS: 100 INJECTION, SOLUTION INTRAVENOUS; SUBCUTANEOUS at 06:57

## 2021-01-01 RX ADMIN — ALBUMIN (HUMAN) 25 G: 0.25 INJECTION, SOLUTION INTRAVENOUS at 03:22

## 2021-01-01 RX ADMIN — LEVOFLOXACIN 750 MG: 5 INJECTION, SOLUTION INTRAVENOUS at 13:52

## 2021-01-01 RX ADMIN — PIPERACILLIN AND TAZOBACTAM 3.38 G: 3; .375 INJECTION, POWDER, LYOPHILIZED, FOR SOLUTION INTRAVENOUS at 00:59

## 2021-01-01 RX ADMIN — HYDROMORPHONE HYDROCHLORIDE 2 MG: 2 INJECTION, SOLUTION INTRAMUSCULAR; INTRAVENOUS; SUBCUTANEOUS at 15:41

## 2021-01-01 RX ADMIN — ALBUMIN (HUMAN) 25 G: 0.25 INJECTION, SOLUTION INTRAVENOUS at 21:05

## 2021-01-01 RX ADMIN — HYDROMORPHONE HYDROCHLORIDE 2 MG: 2 INJECTION, SOLUTION INTRAMUSCULAR; INTRAVENOUS; SUBCUTANEOUS at 19:30

## 2021-01-01 RX ADMIN — PIPERACILLIN AND TAZOBACTAM 4.5 G: 4; .5 INJECTION, POWDER, LYOPHILIZED, FOR SOLUTION INTRAVENOUS; PARENTERAL at 21:05

## 2021-01-01 RX ADMIN — INSULIN LISPRO 2 UNITS: 100 INJECTION, SOLUTION INTRAVENOUS; SUBCUTANEOUS at 00:59

## 2021-01-01 RX ADMIN — Medication 10 ML: at 14:00

## 2021-01-01 RX ADMIN — PHYTONADIONE 2.5 MG: 10 INJECTION, EMULSION INTRAMUSCULAR; INTRAVENOUS; SUBCUTANEOUS at 10:19

## 2021-01-01 RX ADMIN — Medication 10 ML: at 13:53

## 2021-01-01 RX ADMIN — MORPHINE SULFATE 1 MG: 2 INJECTION, SOLUTION INTRAMUSCULAR; INTRAVENOUS at 15:58

## 2021-01-01 RX ADMIN — MORPHINE SULFATE 2 MG: 2 INJECTION, SOLUTION INTRAMUSCULAR; INTRAVENOUS at 18:52

## 2021-01-01 RX ADMIN — VANCOMYCIN HYDROCHLORIDE 1000 MG: 1 INJECTION, POWDER, LYOPHILIZED, FOR SOLUTION INTRAVENOUS at 16:02

## 2021-01-01 RX ADMIN — PIPERACILLIN AND TAZOBACTAM 4.5 G: 4; .5 INJECTION, POWDER, LYOPHILIZED, FOR SOLUTION INTRAVENOUS; PARENTERAL at 12:31

## 2021-01-01 RX ADMIN — PIPERACILLIN AND TAZOBACTAM 3.38 G: 3; .375 INJECTION, POWDER, LYOPHILIZED, FOR SOLUTION INTRAVENOUS at 18:58

## 2021-01-01 RX ADMIN — ALBUMIN (HUMAN) 25 G: 12.5 INJECTION, SOLUTION INTRAVENOUS at 12:29

## 2021-01-03 PROBLEM — D72.825 BANDEMIA: Status: ACTIVE | Noted: 2021-01-01

## 2021-01-03 PROBLEM — R41.82 AMS (ALTERED MENTAL STATUS): Status: ACTIVE | Noted: 2021-01-01

## 2021-01-03 PROBLEM — K72.90 LIVER FAILURE (HCC): Status: ACTIVE | Noted: 2021-01-01

## 2021-01-03 PROBLEM — R17 JAUNDICE: Status: ACTIVE | Noted: 2021-01-01

## 2021-01-03 NOTE — ED TRIAGE NOTES
Pt brought into ED via EMS with c/o AMS;  Pt alert to self at this time; Pt jaundiced upon arrival;  Dr. Derrick Rodriguez to bedside

## 2021-01-03 NOTE — ED PROVIDER NOTES
EMERGENCY DEPARTMENT HISTORY AND PHYSICAL EXAM 
 
Date: 1/3/2021 Patient Name: Jayson Virk History of Presenting Illness Chief Complaint Patient presents with  Altered mental status History Provided By: Patient and Patient's Wife History Jasmyne Banegas):  
11:26 AM 
Jayson Virk is a 54 y.o. male with PMHX of Cirrhosis, diabetes, gastroparesis,  pancreatitis, pulmonary embolism who presents to the emergency department by EMS C/O AMS with insidious onset. Associated sxs include jaundice. Patient was just admitted and started at the hospital for 2 weeks ultimately discharged with a diagnosis of end-stage liver disease coagulopathy associated jaundice. Wife states that his mental status has been declining but has been unable to give us a distinct timeframe. He was prescribed pain medications to help treat with his stage IV decubitus ulcer paramedics report that there was a significant change after he had this medication. He was brought to the hospital for evaluation. He arrives alert and oriented times self and place but otherwise unable to give any details of his medical history. No further history is available for the patient. Pt is unable to confirm or deny any other sxs or complaints. Chief Complaint: Delirium and altered mental status associated with worsening of his jaundice and end-stage liver disease Duration: Unknown time of large Timing: Not applicable. Location: Not applicable Quality: Not applicable Severity: N/A Modifying Factors: Not applicable Associated Symptoms: Unable to give any more history PCP: Nataliya Hawthorne NP Social history: 
Unavailable Family history: Not Available Current Outpatient Medications Medication Sig Dispense Refill  lactulose (CHRONULAC) 10 gram/15 mL solution Take 10 g by mouth daily. TAKE 15 ML/10GM DAILY  insulin glargine (LANTUS) 100 unit/mL injection 10 Units by SubCUTAneous route nightly.  1 Vial 0  
  furosemide (LASIX) 40 mg tablet Take 0.5 Tabs by mouth daily. 30 Tab 1  
 multivitamin, tx-iron-ca-min (THERA-M w/ IRON) 9 mg iron-400 mcg tab tablet Take 1 Tab by mouth daily. 30 Tab 1  
 insulin lispro (HUMALOG) 100 unit/mL injection 6 Units by SubCUTAneous route Before breakfast, lunch, and dinner. 1 Vial 0  
 lactulose (CHRONULAC) 10 gram/15 mL solution Take 45 mL by mouth daily. 1 Bottle 0  
 pantoprazole (PROTONIX) 40 mg tablet Take 1 Tab by mouth Daily (before breakfast). 30 Tab 0  
 potassium chloride (K-DUR, KLOR-CON) 20 mEq tablet Take 1 Tab by mouth daily. 10 Tab 0  
 thiamine mononitrate (B-1) 100 mg tablet Take 1 Tab by mouth daily. 10 Tab 0  
 lidocaine (LIDODERM) 5 % 2 Patches by TransDERmal route every twenty-four (24) hours. Apply patch to the affected area for 12 hours a day and remove for 12 hours a day. One patch is applied to left hip. One patch is applied to right shoulder blade.  DULoxetine (CYMBALTA) 60 mg capsule Take 1 Cap by mouth daily.  traZODone (DESYREL) 50 mg tablet Take 50 mg by mouth nightly. Past History Past Medical History: 
Past Medical History:  
Diagnosis Date  Cirrhosis ([facility] [address]  Diabetes ([facility] [address]  Gastroparesis  Pancreatitis  Pulmonary embolism ([facility] [address] Past Surgical History: 
Past Surgical History:  
Procedure Laterality Date  HX CHOLECYSTECTOMY  HX HIP FRACTURE TX    
 left hip sx 9.23/2018 Family History: 
History reviewed. No pertinent family history. Social History: 
Social History Tobacco Use  Smoking status: Current Every Day Smoker  Smokeless tobacco: Never Used Substance Use Topics  Alcohol use: Not Currently  Drug use: No  
 
 
Allergies: 
No Known Allergies Review of Systems Review of Systems Unable to perform ROS: Mental status change Physical Exam  
 
Vitals:  
 01/03/21 0455 01/03/21 0500 01/03/21 0800 01/03/21 1000 BP: (!) 130/98 110/75 115/81 114/80 Pulse: (!) 105 (!) 104 (!) 102 (!) 121 Resp: 19 18 15 16 Temp: 97.8 °F (36.6 °C) SpO2: 98% 99% 98% Weight: 66.7 kg (147 lb) Height: 5' 9\" (1.753 m) Physical Exam 
Vitals signs and nursing note reviewed. Constitutional:   
   Appearance: He is ill-appearing. Comments: Alert to person and place only Chronically ill-appearing with altered sensorium. His appearance is somewhat disheveled HENT:  
   Head: Normocephalic and atraumatic. No right periorbital erythema or left periorbital erythema. Mouth/Throat:  
   Mouth: Mucous membranes are dry. Eyes:  
   General: Scleral icterus present. Extraocular Movements:  
   Right eye: Normal extraocular motion and no nystagmus. Left eye: Normal extraocular motion and no nystagmus. Conjunctiva/sclera: Conjunctivae normal.  
   Pupils: Pupils are equal, round, and reactive to light. Comments: Pupils are 3 mm. Neck: Musculoskeletal: Normal range of motion and neck supple. Trachea: No tracheal deviation. Cardiovascular:  
   Rate and Rhythm: Regular rhythm. Tachycardia present. Heart sounds: Heart sounds are distant. Pulmonary:  
   Effort: Pulmonary effort is normal. No respiratory distress. Breath sounds: Normal breath sounds. Decreased air movement present. No stridor. Comments: Lung sounds are clear but with diminished respiratory effort Abdominal:  
   General: Bowel sounds are decreased. There is distension. Palpations: Abdomen is soft. There is shifting dullness and fluid wave. Tenderness: There is no abdominal tenderness. There is no rebound. Hernia: A hernia is present. Hernia is present in the umbilical area. Comments: Positive caput medusa Genitourinary: 
   Comments: No evidence of inflammation or erythema within the perineal region. Musculoskeletal: Normal range of motion. General: No tenderness. Comments: Grossly unremarkable without abnormalities Skin: 
   General: Skin is warm and dry. Capillary Refill: Capillary refill takes less than 2 seconds. Findings: No erythema or rash. Comments: Diminished skin tone and turgor. Scattered ecchymoses. Skin is a generalized jaundice appearance dropping towards his center of his chest.  
Neurological:  
   Mental Status: He is disoriented and confused. GCS: GCS eye subscore is 4. GCS verbal subscore is 4. GCS motor subscore is 6. Cranial Nerves: No cranial nerve deficit. Motor: Weakness and atrophy present. No tremor or abnormal muscle tone. Deep Tendon Reflexes: Reflexes are normal and symmetric. Reflex Scores: 
     Patellar reflexes are 2+ on the right side and 2+ on the left side. Achilles reflexes are 2+ on the right side and 2+ on the left side. Comments: Patient does not follow commands well. Psychiatric:     
   Attention and Perception: He is inattentive. Speech: Speech is delayed. Behavior: Behavior is slowed. Comments: Patient has slowed delayed speech seems to answer some questions well. At times she appears quite confused. We are unable to find a better psychiatric determination according to his feedback. Diagnostic Study Results Labs - Recent Results (from the past 12 hour(s)) CBC WITH AUTOMATED DIFF Collection Time: 01/03/21  4:18 AM  
Result Value Ref Range WBC 4.2 (L) 4.6 - 13.2 K/uL  
 RBC 2.87 (L) 4.70 - 5.50 M/uL HGB 9.0 (L) 13.0 - 16.0 g/dL HCT 26.6 (L) 36.0 - 48.0 % MCV 92.7 74.0 - 97.0 FL  
 MCH 31.4 24.0 - 34.0 PG  
 MCHC 33.8 31.0 - 37.0 g/dL  
 RDW 19.5 (H) 11.6 - 14.5 % PLATELET 42 (L) 584 - 420 K/uL NEUTROPHILS 57 42 - 75 % BAND NEUTROPHILS 20 (H) 0 - 5 % LYMPHOCYTES 17 (L) 20 - 51 % MONOCYTES 5 2 - 9 % EOSINOPHILS 0 0 - 5 % BASOPHILS 0 0 - 3 % METAMYELOCYTES 1 (H) 0 % ABS. NEUTROPHILS 2.4 1.8 - 8.0 K/UL  
 ABS. LYMPHOCYTES 0.7 (L) 0.8 - 3.5 K/UL  
 ABS. MONOCYTES 0.2 0 - 1.0 K/UL  
 ABS. EOSINOPHILS 0.0 0.0 - 0.4 K/UL  
 ABS. BASOPHILS 0.0 0.0 - 0.1 K/UL PLATELET COMMENTS DECREASED PLATELETS    
 RBC COMMENTS ANISOCYTOSIS 1+ 
    
 RBC COMMENTS TARGET CELLS 1+ RBC COMMENTS OVALOCYTES 1+ RBC COMMENTS SCHISTOCYTES 
OCCASIONAL 
SPHEROCYTES 
FEW 
    
 WBC COMMENTS VACUOLATED POLYS    
 DF MANUAL MAGNESIUM Collection Time: 01/03/21  4:18 AM  
Result Value Ref Range Magnesium 2.2 1.6 - 2.6 mg/dL HEPATIC FUNCTION PANEL Collection Time: 01/03/21  4:18 AM  
Result Value Ref Range Protein, total 8.1 6.4 - 8.2 g/dL Albumin 4.9 3.4 - 5.0 g/dL Globulin 3.2 2.0 - 4.0 g/dL A-G Ratio 1.5 0.8 - 1.7 Bilirubin, total 11.3 (H) 0.2 - 1.0 MG/DL Bilirubin, direct 4.0 (H) 0.0 - 0.2 MG/DL Alk. phosphatase 50 45 - 117 U/L  
 AST (SGOT) 68 (H) 10 - 38 U/L  
 ALT (SGPT) 29 16 - 61 U/L  
PTT Collection Time: 01/03/21  4:18 AM  
Result Value Ref Range aPTT 63.8 (H) 23.0 - 36.4 SEC PROTHROMBIN TIME + INR Collection Time: 01/03/21  4:18 AM  
Result Value Ref Range Prothrombin time 42.9 (H) 11.5 - 15.2 sec INR 4.7 (H) 0.8 - 1.2 CARDIAC PANEL,(CK, CKMB & TROPONIN) Collection Time: 01/03/21  4:18 AM  
Result Value Ref Range CK - MB <1.0 <3.6 ng/ml CK-MB Index  0.0 - 4.0 % CALCULATION NOT PERFORMED WHEN RESULT IS BELOW LINEAR LIMIT  
 CK 41 39 - 308 U/L Troponin-I, QT <0.02 0.0 - 0.045 NG/ML  
GLUCOSE, POC Collection Time: 01/03/21  4:53 AM  
Result Value Ref Range Glucose (POC) 211 (H) 70 - 110 mg/dL EKG, 12 LEAD, INITIAL Collection Time: 01/03/21  4:55 AM  
Result Value Ref Range Ventricular Rate 105 BPM  
 Atrial Rate 105 BPM  
 P-R Interval 188 ms QRS Duration 88 ms Q-T Interval 360 ms QTC Calculation (Bezet) 475 ms Calculated P Axis 42 degrees Calculated T Axis -18 degrees Diagnosis Sinus tachycardia with fusion complexes Low voltage QRS Possible Inferior infarct (cited on or before 30-NOV-2020) Abnormal ECG When compared with ECG of 13-DEC-2020 15:42, 
fusion complexes are now present AMMONIA Collection Time: 01/03/21  5:28 AM  
Result Value Ref Range Ammonia 23 11 - 32 UMOL/L  
GLUCOSE, POC Collection Time: 01/03/21 11:13 AM  
Result Value Ref Range Glucose (POC) 213 (H) 70 - 110 mg/dL Radiologic Studies -  
CT HEAD WO CONT Final Result IMPRESSION:  
  
Unremarkable noncontrast head CT. XR CHEST PORT Final Result IMPRESSION:  
  
Underexpanded lungs and bandlike areas of lower lung zone atelectasis. CT Results  (Last 48 hours) 01/03/21 0619  CT HEAD WO CONT Final result Impression:  IMPRESSION:  
   
Unremarkable noncontrast head CT. Narrative:  EXAM: CT HEAD WO CONT  
   
CLINICAL INDICATION/HISTORY: altered, liver disease, possible bleed  
  > Additional: None COMPARISON: None.  
  > Correlative imaging: None. TECHNIQUE: Volumetric scanning without IV contrast.  Sagittal and coronal  
reconstructions. One or more dose reduction techniques were used on this CT:  
automated exposure control, adjustment of the mAs and/or kVp according to  
patient size, and iterative reconstruction techniques. The specific techniques  
used on this CT exam have been documented in the patient's electronic medical  
record. Digital imaging and communications in medicine (DICOM) format image data  
are available to nonaffiliated external healthcare facilities or entities on a  
secure, media free, reciprocally searchable basis with patient authorization for  
at least a 12 month period after this study. _______________ FINDINGS:  
   
BRAIN:   
  > Brain volume: Age appropriate.   
  > White matter disease:  
    >> Severity: Minimal.  
    >> Relative to age: Age appropriate. > Infarcts, encephalomalacia: None.  
  > Parenchymal mass: None.   
  > Parenchymal hemorrhage: None.   
  > Midline shift: None.   
  > Miscellaneous: None. EXTRA-AXIAL SPACES: Unremarkable. No fluid collections. CALVARIUM: Intact. SINUSES, MASTOIDS: Clear. OTHER EXTRACRANIAL: Unremarkable.  
   
_______________ CXR Results  (Last 48 hours) 01/03/21 0606  XR CHEST PORT Final result Impression:  IMPRESSION:  
   
Underexpanded lungs and bandlike areas of lower lung zone atelectasis. Narrative:  EXAM: XR CHEST PORT  
   
CLINICAL INDICATION/HISTORY: Altered mental status  
-Additional: None COMPARISON: None TECHNIQUE: Frontal view of the chest  
   
_______________ FINDINGS:  
   
HEART AND MEDIASTINUM: Normal cardiac size and mediastinal contours. LUNGS AND PLEURAL SPACES: Bandlike areas of linear opacity are present at each  
lung base. No pneumothorax or large pleural effusion. BONY THORAX AND SOFT TISSUES: No acute osseous abnormality  
   
_______________ Medications given in the ED- Medications  
phytonadione (vitamin K1) (AQUA-MEPHYTON) injection 2.5 mg (2.5 mg SubCUTAneous Given 1/3/21 1019) Medical Decision Making I am the first provider for this patient. I reviewed the vital signs, available nursing notes, past medical history, past surgical history, family history and social history. Vital Signs-Reviewed the patient's vital signs. Pulse Oximetry Analysis - 98% on RA Cardiac Monitor: 
Rate: 106 bpm 
Rhythm: Borderline sinus tachycardia EKG interpretation: (Preliminary) Rhythm: Sinus tach. Rate: 106, low voltage QRS, nonspecific ST changes, QTC is 475 EKG read by Farhana Giles MD 
 at 4:57 AM 
 
Records Reviewed: Nursing Notes Provider Notes (Medical Decision Making):  
 DDX: Altered mental status with consideration of intracranial bleeding or coagulopathy ammonia electrolyte disorders changes in renal function including uremia hypoglycemia hypoglycemia. Would also consider sepsis as a contributing factor including, infection, sepsis, bacteremia, SBP, hepatic encephalopathy, medication effect Procedures: 
Procedures ED Course:  
5:23 AM initial assessment performed. He is unable to give much feedback or input condition of his medical state. Information was otherwise taken from full course of his wife for medical records Medical decision making: 
Patient has worsening deterioration of his condition with end-stage liver failure. His jaundice is significantly worsening. Patient oriented to person and place but otherwise confused. His biggest issue is no longer eating or drinking. Currently we do not see dramatic changes anticipated course from his liver failure. Pulmonary embolism ACS unlikely to be contributing factors to changes in delirium. Clinically there is no evidence of pneumonia however brain scan was obtained to find no evidence of bleeding or mass. His coagulopathy is worsened by his nontender blood but unlikely this represents an internal bleeding or injury. Ultimately we will give him some vitamin K in order to attempt to improve his coagulopathy. We have been given this medication ultimately move towards discharge. However free noticed significant change in his vital signs or he is unable to mobilize towards him we will readdress the issue for admission at hospitalist team. 
 
9:35 AM 
Progress note All patient's lab work is back. Called and reviewed the patient with hospitalist regarding the patient's condition. We reviewed his medical record and determine this did not represent a significant deviation from the patient's previous conditions he has a diminished white blood cell count but this has been present for some time. He is (he is taking a significant change for the worse since likely on terminal state. He does have a slight tachycardia associated with his condition otherwise vital signs are unremarkable. After reviewing the case with the hospitalist work on discussed with his wife ultimate disposition return. BEDSIDE TRANSFER OF CARE: 
9:38 AM  
Patient care will be transferred from Zuri Upton MD to Angie Portillo MD.  Discussed available diagnostic results and care plan at length at beside. Patient and family made aware of provider change. All questions answered. Patient and family voiced understanding. 11:15 AM at the patient's bedside by his nurse. Patient is unable to follow commands or ambulate with his walker. Wife is present in the ED now and is unable to assist the patient with his ADLs. He is not at his baseline per the wife. Will discuss with hospitalist for admission. 11:28 AM discussed with Dr. Gretchen Contreras for telemetry admission. Patient has ascites and a bandemia and is unable to follow commands at this time. Patient will need consideration for placement at the end of his hospital stay. Diagnosis and Disposition Discussion: 
54 y.o. male with deterioration in cognitive status ascites secondary to end-stage liver disease with alcoholic cirrhosis. While white cell count has not changed from his baseline we do significant changes bandemia so we will consider sepsis is contributing factor. Antibiotics to include vancomycin and Zosyn will be added to his course patient will be admitted to the hospital service. Ultimately he will need placement. Critical Care Time:  
I have spent 45 minutes of critical care time involved in lab review, consultations with specialist, family decision-making, and documentation. During this entire length of time I was immediately available to the patient. Critical Care: The reason for providing this level of medical care for this critically ill patient was due a critical illness that impaired one or more vital organ systems such that there was a high probability of imminent or life threatening deterioration in the patients condition. This care involved high complexity decision making to assess, manipulate, and support vital system functions, to treat this degreee vital organ system failure and to prevent further life threatening deterioration of the patients condition. Critical Care Time: 45 minutes Core Measures: 
For Hospitalized Patients: 
 
1. Hospitalization Decision Time: The decision to hospitalize the patient was made by Aline Marie MD, MD at 11:15 on 1/3/2021 2. Aspirin: Aspirin was not given because the patient did not present with a stroke at the time of their Emergency Department evaluation 11:28 AM  Patient is being admitted to the hospital by Dr. Andrew Alston. The results of their tests and reasons for their admission have been discussed with them and/or available family. They convey agreement and understanding for the need to be admitted and for their admission diagnosis. CONDITIONS ON ADMISSION: 
Sepsis is present at the time of admission. Deep Vein Thrombosis is not present at the time of admission. Thrombosis is not present at the time of admission. Urinary Tract Infection is not present at the time of admission. Pneumonia is not present at the time of admission. MRSA is not present at the time of admission. Wound infection is not present at the time of admission. Pressure Ulcer is not present at the time of admission. CLINICAL IMPRESSION: 
 
1. Delirium 2. Anorexia 3. End stage liver disease (HCC)   
4. Jaundice   
5. Coagulopathy (HCC)   
6. Ascites due to alcoholic cirrhosis (HCC)   
7. Bandemia   
 
 
Dragon Disclaimer  
 
Please note that this dictation was completed with [employer], the computer voice recognition software.  Quite often unanticipated grammatical, syntax, homophones, and other interpretive errors are inadvertently transcribed by the computer software.  Please disregard these errors.  Please excuse any errors that have escaped final proofreading. 
 
Lester Mckay MD

## 2021-01-03 NOTE — H&P
History & Physical 
 
Patient: Marybeth Daley MRN: [id number]  CSN: [id number] YOB: 1965  Age: 54 y.o. Sex: male DOA: 1/3/2021 Primary Care Provider:  Cruz Lloyd NP Assessment/Plan 1. Metabolic encephalopathy 2.sepsis of unclear etiology 3.cirrhosis 2/2 etoh 4.stage IV pressure ulceration 5.pancytopenia with bandemia 6.coaguloapthy 7.history of pulmonary embolism and deep vein thrombosis not on anticoagulation due to thrombocytopenia 8. type 2 diabetes 9.recent Enterococcus faecalis bacteremia PLAN: 
- Admit to medicine floor admit to medical service with telemetry monitoring IV albumin now and every 6 hours Culture blood, urine, will order a diagnostic paracentesis as well as a CT of his abdomen and pelvis to rule out intra-abdominal source, assess for osteomyelitis in setting of his pressure ulcer Start empiric broad-spectrum antibiotics with vancomycin, Pipracil and tazobactam and Levaquin N. p.o. Vitamin K has been administered in the emergency department we will monitor his INR, currently no signs of bleeding Obtain a BMP Trend lactic acid 
-Monitor sugars every 6 hours and utilize correction insulin as required -DVT prophylaxis with SCDs He is a full code lengthy discussion with his wife regarding this and she wishes to keep him as such Addendum 1:52 PM 
Pt re-evaluated on floor. More awake, answering questions appropriately. Remains hemodynamically stable Addendum 4:49 PM 
Labs reviewed- pt now w RODERICK, elevated lactic acid 
-> CT with OUT contrast 
-> continue albumin & broad spectrum antibiotics Gildardo Vergara DO Internal Medicine/ Geriatrics Patient Active Problem List  
Diagnosis Code  Hypocalcemia E83.51  
 Hypomagnesemia E83.42  
 Alcoholism ([address] F10.20  Cirrhosis ([address] K74.60  Thrombocytopenia ([address] D69.6  Severe protein-calorie malnutrition ([address] E43  Pulmonary embolism ([address] I26.99  
  Acute deep vein thrombosis (DVT) of both lower extremities (HCC) I82.403  Ascites R18.8  Coagulopathy (HCC) D68.9  
 Colitis K52.9  Positive blood culture R78.81  Bacteremia R78.81  
 Enterococcus faecalis infection B95.2  Pressure ulcer of sacral region, stage 3 (HCC) M62.986  Pressure ulcer of left heel, stage 3 ([address] J85.813  Liver failure (HCC) K72.90  Jaundice R17  Bandemia D72.825  
 AMS (altered mental status) R41.82  
 
HPI:  
CC:altered mental status History is obtained from family at bedside & available chart review as he is unable to provide Sparkle Milan is a 54 y.o. male with past medical history significant for cirrhosis secondary to alcohol use, DM2, PE/DVT  Not anticoauglated due to thrombocytopenia, debility w stage 4 pressure ulceration who was recently hospitalized for bacteremia. He presents to the ER with acute worsening of altered metnal status. His wife states that he woke up in the middle of the night after taking her Percocet with generalized confusion and unable to make simple conversation or his needs known. On presentation to the emergency department he was alert to himself and place . He was evaluated, given vitamin K and was to be discharged home by was called due to worsening of his clinical status. He became minimally unresponsive. His remained afebrile but was tachycardic and normotensive without hypoxia. Physical exam demonstrates a chronically ill-appearing male with jaundice and ascites present. He has a stage IV decubitus ulceration with bone present minimal redness surrounding but no significant purulent drainage. He has pancytopenia with a platelet count of 99,176 and white blood cells of 4.2 with a noted bandemia, elevated total bilirubin of 11.8 INR 4.7, AST of 6.8, negative troponin ammonia of 23 checks x-ray without significant infiltrate but does have atelectasis. CT of his head without acute infarct. Medicine is asked admit for further management Past Medical History:  
Diagnosis Date  Cirrhosis ([facility]  Diabetes ([facility]  Gastroparesis  Pancreatitis  Pulmonary embolism ([facility] Past Surgical History:  
Procedure Laterality Date  HX CHOLECYSTECTOMY  HX HIP FRACTURE TX    
 left hip sx 9.23/2018 Social History Tobacco Use  Smoking status: Current Every Day Smoker  Smokeless tobacco: Never Used Substance Use Topics  Alcohol use: Not Currently  Drug use: No  
 
History reviewed. No pertinent family history. No current facility-administered medications on file prior to encounter. Current Outpatient Medications on File Prior to Encounter Medication Sig Dispense Refill  lactulose (CHRONULAC) 10 gram/15 mL solution Take 10 g by mouth daily. TAKE 15 ML/10GM DAILY  insulin glargine (LANTUS) 100 unit/mL injection 10 Units by SubCUTAneous route nightly. 1 Vial 0  
 furosemide (LASIX) 40 mg tablet Take 0.5 Tabs by mouth daily.  30 Tab 1  
  multivitamin, tx-iron-ca-min (THERA-M w/ IRON) 9 mg iron-400 mcg tab tablet Take 1 Tab by mouth daily. 30 Tab 1  
 insulin lispro (HUMALOG) 100 unit/mL injection 6 Units by SubCUTAneous route Before breakfast, lunch, and dinner. 1 Vial 0  
 lactulose (CHRONULAC) 10 gram/15 mL solution Take 45 mL by mouth daily. 1 Bottle 0  
 pantoprazole (PROTONIX) 40 mg tablet Take 1 Tab by mouth Daily (before breakfast). 30 Tab 0  
 potassium chloride (K-DUR, KLOR-CON) 20 mEq tablet Take 1 Tab by mouth daily. 10 Tab 0  
 thiamine mononitrate (B-1) 100 mg tablet Take 1 Tab by mouth daily. 10 Tab 0  
 lidocaine (LIDODERM) 5 % 2 Patches by TransDERmal route every twenty-four (24) hours. Apply patch to the affected area for 12 hours a day and remove for 12 hours a day. One patch is applied to left hip. One patch is applied to right shoulder blade.  DULoxetine (CYMBALTA) 60 mg capsule Take 1 Cap by mouth daily.  traZODone (DESYREL) 50 mg tablet Take 50 mg by mouth nightly. No Known Allergies Review of Systems Unable to obtain 2/2 altered mental status Physical Exam:  
  
 
Visit Vitals /70 (BP 1 Location: Right arm) Pulse 97 Temp 97.4 °F (36.3 °C) Resp 11 Ht 5' 9\" (1.753 m) Wt 66.7 kg (147 lb) SpO2 99% BMI 21.71 kg/m² O2 Device: Room air Temp (24hrs), Av.6 °F (36.4 °C), Min:97.4 °F (36.3 °C), Max:97.8 °F (36.6 °C) No intake/output data recorded. No intake/output data recorded. Body mass index is 21.71 kg/m². General:  Unresponsive. Grimaces during examination Head:  Normocephalic, without obvious abnormality, atraumatic. Eyes:  Conjunctivae/corneas  sclera anicteric, EOMs intact. Nose: Nares normal. No drainage or sinus tenderness. Throat: Lips, mucosa, and tongue normal dry Neck: Supple, symmetrical, trachea midline, no adenopathy. Lungs:   Clear to auscultation bilaterally, equal expansion Heart:  Regular rate and rhythm, S1, S2 normal, no murmur, click, rub or gallop, cap refill normal   
  Abdomen: + ascietes Soft,  Grimaces w abdominal palpation. Bowel sounds normal. No masses,  No organomegaly. Extremities: Extremities normal, atraumatic, no cyanosis or edema. Pulses: 2+ and symmetric all extremities. Skin: Stage 4 pressure ulceration noted Neurologic: moves all 4 extremities Laboratory Studies: 
 
CMP:  
Lab Results Component Value Date/Time MG 2.2 01/03/2021 04:18 AM  
 ALB 4.9 01/03/2021 04:18 AM  
 TP 8.1 01/03/2021 04:18 AM  
 GLOB 3.2 01/03/2021 04:18 AM  
 AGRAT 1.5 01/03/2021 04:18 AM  
 ALT 29 01/03/2021 04:18 AM  
 
CBC:  
Lab Results Component Value Date/Time WBC 4.2 (L) 01/03/2021 04:18 AM  
 HGB 9.0 (L) 01/03/2021 04:18 AM  
 HCT 26.6 (L) 01/03/2021 04:18 AM  
 PLT 42 (L) 01/03/2021 04:18 AM  
 
COAGS:  
Lab Results Component Value Date/Time APTT 63.8 (H) 01/03/2021 04:18 AM  
 PTP 42.9 (H) 01/03/2021 04:18 AM  
 INR 4.7 (H) 01/03/2021 04:18 AM  
 
 
Imaging studies personally reviewed: 
Reviewed old records including old H&P, consultation notes and DC summaries Ct head: no ICH 
CXR: clear Time spent discussing advance care planning aside exam: 16 minutes Lashonda Wiseman,  Internal Medicine/Geriatrics

## 2021-01-03 NOTE — PROGRESS NOTES
Spoke with Dr. Cathy Brasher at 14:50 regarding Paracentesis order. INR 4.7 , lab cut off for liver patients is 4.0. Dr. Cathy Brasher said patient was given Vit. K in ED and INR will be re-drawn tomorrow and procedure can be done tomorrow after re-checking INR.

## 2021-01-03 NOTE — PROGRESS NOTES
Problem: Pressure Injury - Risk of 
Goal: *Prevention of pressure injury Description: Document Trav Scale and appropriate interventions in the flowsheet. Outcome: Progressing Towards Goal 
Note: Pressure Injury Interventions: 
Sensory Interventions: Assess changes in LOC, Discuss PT/OT consult with provider, Keep linens dry and wrinkle-free, Minimize linen layers, Pressure redistribution bed/mattress (bed type) Moisture Interventions: Absorbent underpads, Limit adult briefs, Minimize layers Activity Interventions: Pressure redistribution bed/mattress(bed type), PT/OT evaluation Mobility Interventions: Pressure redistribution bed/mattress (bed type), PT/OT evaluation Nutrition Interventions: Document food/fluid/supplement intake Friction and Shear Interventions: Apply protective barrier, creams and emollients, Minimize layers Problem: Patient Education: Go to Patient Education Activity Goal: Patient/Family Education Outcome: Progressing Towards Goal

## 2021-01-03 NOTE — PROGRESS NOTES
Pharmacy Renal Dosing Services:  
 
Levofloxacin was automatically dose-adjusted per THE [facility] P&T Committee Protocol, with respect to renal function. Consult provided for this   54 y.o. , male , for the indication of Sepsis. Dose adjusted to:  Levofloxacin 750 mg IV q48h Pt Weight:  
Wt Readings from Last 1 Encounters:  
01/03/21 66.7 kg (147 lb) Previous Regimen   Levofloxacin 750 mg IV q24h Serum Creatinine Lab Results Component Value Date/Time Creatinine 2.09 (H) 01/03/2021 03:50 PM  
 Creatinine, POC 0.9 10/07/2019 03:54 PM  
   
Creatinine Clearance Estimated Creatinine Clearance: 37.7 mL/min (A) (based on SCr of 2.09 mg/dL (H)). BUN Lab Results Component Value Date/Time BUN 35 (H) 01/03/2021 03:50 PM  
   
 
 
Pharmacy to continue to monitor patient daily. Will make dosage adjustments based upon changing renal function. Signed Светлана Navarro information:  [phone]

## 2021-01-03 NOTE — PROGRESS NOTES
Reason for Admission:   Chart reviewed; per H&P, patient is [place] 54 y.o. male with past medical history significant for cirrhosis secondary to alcohol use, DM2, PE/DVT  Not anticoauglated due to thrombocytopenia, debility w stage 4 pressure ulceration who was recently hospitalized for bacteremia He presents to the ER with acute worsening of altered metnal status. His wife states that he woke up in the middle of the night after taking her Percocet with generalized confusion and unable to make simple conversation or his needs known. Initially alert and oriented to person and place, his mentation decreased and he was minimally unresponsive, tachycardic. Admitted for further evaluation. Was last at [facility] 12/13 to 12/27/20 RUR Score: Moderate; 25% PCP: First and Last name:   
 Name of Practice:  
 Are you a current patient: Yes/No:  
 Approximate date of last visit:  
 Can you participate in a virtual visit if needed: Do you (patient/family) have any concerns for transition/discharge? Plan for utilizing home health:   When discharged, was with [id number]; [facility] visit 1/2/21. Current Advanced Directive/Advance Care Plan:  Full code with ACP on record Transition of Care Plan:      Care management will follow for discharge needs. Care Management Interventions PCP Verified by CM: Yes Mode of Transport at Discharge: Self Transition of Care Consult (CM Consult): Discharge Planning Current Support Network: Own Home, Lives with Spouse Confirm Follow Up Transport: Family The Plan for Transition of Care is Related to the Following Treatment Goals : home when medically stable Readmission Assessment Number of days since last admission?: 1-7 days Previous disposition: Home with Home Health Who is being interviewed?: Caregiver What was the patient's/caregiver's perception as to why they think they needed to return back to the hospital?: Other (Comment)(unknown at this time) Did you visit your Primary Care Physician after you left the hospital, before you returned this time?: No 
Why weren't you able to visit your PCP?: Other (Comment)(readmitted before follow up appointment) Did you see a specialist, such as Cardiac, Pulmonary, Orthopedic Physician, etc. after you left the hospital?: No 
Who advised the patient to return to the hospital?: Caregiver Does the patient report anything that got in the way of taking their medications?: No 
In our efforts to provide the best possible care to you and others like you, can you think of anything that we could have done to help you after you left the hospital the first time, so that you might not have needed to return so soon?: Other (Comment)(none at this time-discharge to SNF?)

## 2021-01-03 NOTE — PROGRESS NOTES
1315: Patient care received. Patient lethargic not responding to orientation questions. Patient resting in bed denies pain. Patient stable. Call light with in reach bed in lowest position. 1530: Informed Romario Jacob that patient is in severe pain rolling around in bed and yelling out. He  gave orders to give patient 1 mg of morphine one time. 1615:Informed  that 1 mg of morphine did not help patient's pain and that he had not voided since admission. Pt had urine specimen due. Romario Jacob said to straight cath patient one time to obtain urine specimen. He also gave orders to give 1 mg of morphine again to patient. 1647:Informed  that patient had a critical INR of 7.1 and a critical Lactic acid of 7. 3. Romario Jacob said he would order Vitamin K for patient. Made  aware that pt had no urine output when straight cath. Also informed him that Ct tech called and said that they could not use contrast to do patient's abdominal scan because of the dramatic change in his labs from last week to today. 1916:RRT called on patient with mew score of 4. Pt in severe pain and did not respond to Morphine that was administered at [facility]. Pt rolling in bed and moaning loudly. Pt was not able to verbalize where pain was coming from. Pt wife expressed prior to leaving that this pain seemed abnormal from his pain that he usually gets from his sacrum.  ordered Dilaudid for patient which allowed patient to rest and pt went down for scheduled C. T.

## 2021-01-03 NOTE — PROGRESS NOTES
Pharmacy Dosing Services: Vancomycin Consult for Vancomycin Dosing by Pharmacy by Dr. Andrew Alston Consult provided for this 54y.o. year old male , for indication of Sepsis. Day of Therapy 1 Ht Readings from Last 1 Encounters:  
01/03/21 175.3 cm (69\") Wt Readings from Last 1 Encounters:  
01/03/21 66.7 kg (147 lb) Other Current Antibiotics Zosyn  / Levofloxacin Significant Cultures pending Serum Creatinine Lab Results Component Value Date/Time Creatinine 2.09 (H) 01/03/2021 03:50 PM  
 Creatinine, POC 0.9 10/07/2019 03:54 PM  
  
Creatinine Clearance Estimated Creatinine Clearance: 37.7 mL/min (A) (based on SCr of 2.09 mg/dL (H)). BUN Lab Results Component Value Date/Time BUN 35 (H) 01/03/2021 03:50 PM  
  
WBC Lab Results Component Value Date/Time WBC 4.2 (L) 01/03/2021 03:50 PM  
  
H/H Lab Results Component Value Date/Time HGB 7.5 (L) 01/03/2021 03:50 PM  
  
Platelets Lab Results Component Value Date/Time PLATELET 30 (L) 91/00/5416 03:50 PM  
  
Temp 97.7 °F (36.5 °C) Start Vancomycin therapy, with loading dose of Vancomycin 1000 mg IV once, administered 1/3/21 at 16:02 Follow with maintenance dose:  Vancomycin 1250 mg IV q24h Dose calculated to approximate a therapeutic trough:  15 - 20 mcg/mL. Pharmacy to follow daily and will make changes to dose and/or frequency based on clinical status. Pharmacist Alisson Yoder, [facility]

## 2021-01-03 NOTE — Clinical Note
Status[de-identified] INPATIENT [101]   Type of Bed: Telemetry [19]   Inpatient Hospitalization Certified Necessary for the Following Reasons: 3. Patient receiving treatment that can only be provided in an inpatient setting (further clarification in H&P documentation)   Admitting Diagnosis: Bandemia Norma.Peon. ICD-9-CM]   Admitting Diagnosis: AMS (altered mental status) [7252309]   Admitting Diagnosis: Liver failure [facility]) [[id number]]   Admitting Diagnosis: Jaundice [[id number]]   Admitting Diagnosis: Ascites [[id number]]   Admitting Physician: Jose Rafael Pérez   Attending Physician: Jose Rafael Pérez   Estimated Length of Stay: 2 Midnights   Discharge Plan[de-identified] Extended Care Facility (e.g. Adult Home, Nursing Home, etc.)

## 2021-01-03 NOTE — ROUTINE PROCESS
TRANSFER - IN REPORT: 
 
Verbal report received from LAYTON Parks(name) on Venu Salgado  being received from ED(unit) for routine progression of care Report consisted of patients Situation, Background, Assessment and  
Recommendations(SBAR). Information from the following report(s) SBAR, Kardex, Intake/Output, MAR, Accordion, Recent Results and Med Rec Status was reviewed with the receiving nurse. Opportunity for questions and clarification was provided. Assessment completed upon patients arrival to unit and care assumed.

## 2021-01-04 PROBLEM — G93.40 ENCEPHALOPATHY: Status: ACTIVE | Noted: 2021-01-01

## 2021-01-04 NOTE — PROGRESS NOTES
Comprehensive Nutrition Assessment Type and Reason for Visit: Initial, Wound Nutrition Recommendations/Plan: Diet: advance diet per MD when clinically feasible as current diet does not meet nutritional needs. Nutrition Assessment:  pt admitted w/ ascites, liver failure, jaundice, bandemia, altered mental status. Paracentesis was held yesterday. Estimated Daily Nutrient Needs: 
Energy (kcal): 6767-7917([id number]); Weight Used for Energy Requirements: Current Protein (g): 101(1.5); Weight Used for Protein Requirements: Current Fluid (ml/day): 4351-3388; Method Used for Fluid Requirements: 1 ml/kcal 
 
 
Nutrition Related Findings:  Lab: glucose 159, GFR est AA 48, BUN 36. Med: humalog, zofran, phenergan. No BM at this time. No BM at this time. Wounds:   
Pressure injury Current Nutrition Therapies: DIET NPO Anthropometric Measures: 
· Height:  5' 9\" (175.3 cm) · Current Body Wt:  67 kg (147 lb 11.3 oz) · Admission Body Wt:  147 lb 11.3 oz · Usual Body Wt:  61.1 kg (134 lb 11.2 oz)(Notes written on 12/24/20) · Ideal Body Wt:  160 lbs:  92.3 % · Adjusted Body Weight:   ; Weight Adjustment for: No adjustment · Adjusted BMI:      
· BMI Category:  Normal weight (BMI 18.5-24. 9) Nutrition Diagnosis:  
· Inadequate oral intake related to acute injury/trauma as evidenced by NPO or clear liquid status due to medical condition, wounds Nutrition Interventions:  
Food and/or Nutrient Delivery: Start oral diet Nutrition Education and Counseling: No recommendations at this time Coordination of Nutrition Care: Continue to monitor while inpatient, Interdisciplinary rounds Goals: 
Advance diet when clinically possible within the next 2-3 days Nutrition Monitoring and Evaluation:  
Behavioral-Environmental Outcomes: None identified Food/Nutrient Intake Outcomes: Diet advancement/tolerance, Food and nutrient intake Physical Signs/Symptoms Outcomes: Biochemical data, Skin, Weight, GI status, Nausea/vomiting, Fluid status or edema Discharge Planning: Too soon to determine Electronically signed by Davis Patiño on 1/4/2021 at 1:52 PM

## 2021-01-04 NOTE — CONSULTS
[address] 137 [facility] Kristin Son Flores MD, [facility] Jeramie Melendez MD, MPH Angeli Jauregui, RASHID Rosa, [facility] Catalina Capone, [facility]   Ontiveros Aniyah, FNP-C Wyousmane Сергей, [facility] 136 
  at [place] [id number] [facility] [place], [address] [facility], [address] 
  [phone] FAX: [address] [place] [address] Avenue 
  [facility] 
  [address], [address] 98 [facility] [place], [address] 
  [phone] FAX: [phone] HEPATOLOGY CONSULT NOTE The patient is known to me from 2 previous hospitalization at [facility] in 10 and 11/2020. The patient is a 47year old  male without previous knowledge of liver disease. Millie Lindsay says he used to consume alcohol fairly heavily several years ago but cut back to only 1-2 a few days per week 2 years ago.   
  
The first hospitalization was due to acute hepatic decompensation with, AMS, ascites, edema and muscle wasting. During the first hospitalization in 10/2020 he developed a DVT and PE and was stated on Xaralto. 
  
He came to the ED because of abdominal pain, distention weakness, lethargy. He has not eaten at home and wife says he has given up on life. He is apparently NDR 
 
CT scan demonstrated dilation of PD without obvious pancreas mass, cirrhosis and large amount of ascites. Labs were significant for INR 8.5, TBILI 1.7, SCr .61 mg 
  
This AM he is unresponsive to stimuli.   
  
  
ASSESSMENT AND PLAN: 
Cirrhosis The diagnosis of cirrhosis is based upon laboratory studies, imaging, complications of cirrhosis. The etiology is post-likely due to alcohol. Serology for other causes of chronic liver disease are all negative. 
  
The CTP is 9.  Child class B.  The MELD score is 33. The MELD score is being driven up by the prolonged INR. If this were 2.0 the MELD would be 17.  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  
  
We had started evaluating him for LT during the previous hospitalization but will put this on hold given the progressive decline in his condition, muscle wasting and poor mental status. It is unlikely he will ever become a LT candidate. 
  
Coagulopathy Unclear why this is so prolonged. May be due to infection, DIC. Could be due to malnutrition. It is a poor prognostic sign. He is on Xaralto but this will not due this. Will start IV vitamin K 10 mg every day x 3 to see if this malnutrition of fat malabsorption. There is no evidence of bleeding. No need for FFP at this time. 
  
Ascites Ascites is persistent Will start step 1 diuretics since Scr is normal. 
Start IV albumin 25 gm Q6H No paracentesis at this time since ascites not tight and both PLT low and INR markedly prolonged. 2 gm NA diet 
  
Screening for Esophageal varices The patient has not had an EGD to screen for varices.   
HB is stable and no evidence of bleeding. Will hold on EGD until INR improved. 
  
Hepatic encephalopathy Overt HE is controlled on current dose of lactulose. Will continue lactulose once QD 
  
Anemia This is due to multifactorial causes including portal hypertension with chronic GI blood loss, bone marrow suppression secondary to malnutrition and alcohol. FE panel was normal 
  Thrombocytopenia This is secondary to cirrhosis. There is no evidence of overt bleeding. No treatment is required. The platelet count is under 50K. The use of a platelet growth factor to raise the platelet count and avoid platelet transfusions would be indicated if the patient requires a medical or surgical procedure. 
  
Dilated pancreatic duct Concern is that this is due to pancreatic cancer. CBD not dilated. Schedule for MRI later today SYSTEM REVIEW: 
Constitution systems: Generalized weakness. Eyes: Negative for visual changes. ENT: Negative for sore throat, painful swallowing. Respiratory: Negative for cough, hemoptysis, SOB. Cardiology: Swelling of legs has resolved. GI:  Negative for constipation or diarrhea. : Negative for urinary frequency, dysuria, hematuria, nocturia. Skin: Negative for rash. Hematology: Negative for easy bruising, blood clots. Musculo-skelatal: Negative for back pain, muscle pain, weakness. Neurologic: Negative for headaches, dizziness, vertigo, memory problems not related to HE. Psychology: Negative for anxiety, depression.  
  
  
FAMILY HISTORY: 
The patient has no knowledge of the father's medical condition. The mother Has/had the following chronic disease(s): MS. There is no family history of liver disease.   
  
SOCIAL HISTORY: 
The patient is . The patient has 2 children, 1 stepchild, and 3 grandchildren. The patient currently smokes 1 pack of tobacco daily. The patient has previously consumed alcohol in excess. The patient has been abstinent from alcohol since 9/2020. The patient currently works full time as  for [employer] at [employer].   
  
   
  
PHYSICAL EXAMINATION: 
VS: per nursing note General: No acute distress. Eyes: Sclera anicteric. ENT: No oral lesions. Thyroid normal. 
Nodes: No adenopathy. Skin: No spider angiomata. No jaundice. No palmar erythema. Respiratory: Lungs clear to auscultation. Cardiovascular: Regular heart rate. No murmurs. No JVD. Abdomen: Soft non-tender, Some ascites is present. Extremities: No edema. No muscle wasting. No gross arthritic changes. Neurologic: Alert and oriented. Cranial nerves grossly intact. No asterixis. 
  
  
LABORATORY: 
Results for Reinaldo Jewell (MRN [id number]) as of 1/4/2021 07:09 Ref. Range 1/3/2021 05:28 1/3/2021 15:50 1/4/2021 00:40 WBC Latest Ref Range: 4.6 - 13.2 K/uL  4.2 (L) 4.6 HGB Latest Ref Range: 13.0 - 16.0 g/dL  7.5 (L) 7.6 (L) PLATELET Latest Ref Range: 135 - 420 K/uL  30 (L) 25 (LL) INR Latest Ref Range: 0.8 - 1.2    7.1 (HH) Sodium Latest Ref Range: 136 - 145 mmol/L  134 (L) 137 Potassium Latest Ref Range: 3.5 - 5.5 mmol/L  3.9 3.6 Chloride Latest Ref Range: 100 - 111 mmol/L  96 (L) 99 (L)  
CO2 Latest Ref Range: 21 - 32 mmol/L  25 27 Glucose Latest Ref Range: 74 - 99 mg/dL  234 (H) 159 (H) BUN Latest Ref Range: 7.0 - 18 MG/DL  35 (H) 36 (H) Creatinine Latest Ref Range: 0.6 - 1.3 MG/DL  2.09 (H) 1.78 (H) Bilirubin, total Latest Ref Range: 0.2 - 1.0 MG/DL  10.2 (H) 10.2 (H) Albumin Latest Ref Range: 3.4 - 5.0 g/dL  4.5 4.6 ALT Latest Ref Range: 16 - 61 U/L  23 21 AST Latest Ref Range: 10 - 38 U/L  49 (H) 39 (H) Alk. phosphatase Latest Ref Range: 45 - 117 U/L  37 (L) 31 (L) Ammonia Latest Ref Range: 11 - 32 UMOL/L 23    
 
 
RADIOLOGY: 
1/2021. CT scan abdomen without IV contrast.  Changes consistent with cirrhosis and fatty liver. No liver mass lesions. No dilated bile ducts. Colon wall thickening. Large ascites. Tata Sesay MD 
[address] [facility], [address] 98 Rusofia Gilman, [address] 
[phone] [address] [facility]

## 2021-01-04 NOTE — PROGRESS NOTES
0700  Bedside, Verbal and Written shift change report given to PHILIPPE Tang (oncoming nurse) by DANNY Rico,RN (offgoing nurse). Report included the following information SBAR, Kardex, Intake/Output and Recent Results. 0800  Pt aox1, does not follow commands, moaning, tele monitored, 4L NC, pressure ulcer. Wound Care consulted. 1400  Wife at bedside, Dr. Alexa Sow at bedside, SBAR given to wife by Dr. Alexa Sow. He states family needs to consider [place]. 1900  Bedside, Verbal and Written shift change report given to 1111 MABEL Brandt (oncoming nurse) by PHILIPPE Tang (offgoing nurse). Report included the following information SBAR, Kardex, Intake/Output and Recent Results.

## 2021-01-04 NOTE — CONSULTS
Palliative Medicine Consult [facility]: [phone] ([phone]) [facility]: [phone] [facility]: [phone] Patient Name: Riccardo King YOB: 1965 Date of Initial Consult: 1/4/2021 Reason for Consult: care decisions Requesting Provider: Dr Elizabeth Birch Primary Care Physician: Malia Sepulveda, LEANNE 
  
 SUMMARY:  
Riccardo King is a 54y.o. year old with a past history of cirrhosis of the liver due to etoh, pulmonary embolism not on AC due to pancytopenia, protein malnutrition, pressure sore of sacral region, who was admitted on 1/3/2021 from home  with a diagnosis of metabolic encephalopathy . Current medical issues leading to Palliative Medicine involvement include: 54year old male with the above history who has per wife become increasingly confused over the past several days. He was recently hospitalized due to bacteremia. Palliative medicine is consulted for care decisions. PALLIATIVE DIAGNOSES:  
1. Goals of care 2. Cirrhosis of the liver 3. Decubitus ulcer of the sacrum 4. Debility PLAN:  
1. Goals of care patient seen along with Ms Demond Stallworth. Patient alerted to his name however was moaning and unable to participate in his medical decisions. Spoke with his wife via phone and in person at bedside. She has been well updated by the hospitalist team . Goals of care re visited with Ms Dandy Martinez, who shares she has spoken to her entire family and all agree no chest compressions, no shock in the event of cardiac arrest and no intubation for any reason. POST form completion introduced and discussed. Wife signed for DNR/DNI limited interventions, alternative feeding options to be discussed at a later date. Copy of POST to his wife and chart. Attending and BSRN aware of goals of care change. Goals of care DNR/DNI. limited interventions, feeding tubes to be discussed at a later date. 2. Cirrhosis of the liver likely due to ETOH. Has been seen by Dr Yo Barger in the past. Per Dr Yo Barger notes Silas Hunter has started eval for LT. 3. Decubitus ulcer of the sacrum. CT did not demonstrate OM. Still seems to be painful. Pain control managed by primary team. Wound consult pending 4. Debility per wife self care at home, however appetite has been poor. Currently PPS is 40, however if he improves from acute illness this may improve. 5. Initial consult note routed to primary continuity provider 6. Communicated plan of care with: Palliative IDT Patient/Health Care Proxy Stated Goals: Prolong life TREATMENT PREFERENCES:  
Code Status: DNR/ DNI Advance Care Planning: 
[] [facility] Interdisciplinary Team has updated the ACP Navigator with [address] and Patient Capacity Primary Decision [facility] Agent):   Primary Decision Maker: Reginaldo Herron Spouse - [phone] Secondary Decision Maker: Estrellaguanakito Lopes - Daughter - [phone] Medical Interventions: Limited additional interventions Other: As far as possible, the palliative care team has discussed with patient / health care proxy about goals of care / treatment preferences for patient. HISTORY:  
 
History obtained from: chart and wife CHIEF COMPLAINT: encephalopathy HPI/SUBJECTIVE: The patient is:  
[] Verbal and participatory [x] Non-participatory due to: AMS Please see summary Clinical Pain Assessment (nonverbal scale for nonverbal patients): Clinical Pain Assessment Severity: 3 Activity (Movement): Seeking attention through movement or slow, cautious movement Duration: for how long has pt been experiencing pain (e.g., 2 days, 1 month, years) Frequency: how often pain is an issue (e.g., several times per day, once every few days, constant) FUNCTIONAL ASSESSMENT:  
 
Palliative Performance Scale (PPS): PPS: 40 ECOG 
ECOG Status : Completely disabled PSYCHOSOCIAL/SPIRITUAL SCREENING:  
  
Any spiritual / Sikhism concerns: unable to assess for patient  
[] Yes /  [] No 
 
Caregiver Burnout: 
[] Yes /  [x] No /  [] No Caregiver Present Anticipatory grief assessment:  Unable to assess for patient  
[] Normal  / [] Maladaptive REVIEW OF SYSTEMS:  
 
Positive and pertinent negative findings in ROS are noted above in HPI. The following systems were [] reviewed / [x] unable to be reviewed as noted in HPI Other findings are noted below. Systems: constitutional, ears/nose/mouth/throat, respiratory, gastrointestinal, genitourinary, musculoskeletal, integumentary, neurologic, psychiatric, endocrine. Positive findings noted below. Modified ESAS Completed by: provider Pain: 3 Dyspnea: 0 PHYSICAL EXAM:  
 
Wt Readings from Last 3 Encounters:  
01/04/21 67 kg (147 lb 11.2 oz) 12/26/20 67.2 kg (148 lb 1.6 oz) 12/07/20 59.4 kg (131 lb) Blood pressure 125/75, pulse (!) 126, temperature 97.8 °F (36.6 °C), resp. rate 20, height 5' 9\" (1.753 m), weight 67 kg (147 lb 11.2 oz), SpO2 98 %. Pain: 
Pain Scale 1: Adult Nonverbal Pain Scale Last bowel movement: none recorded Constitutional: chronically ill, thin, weak appearing gentleman lying in bed in NAD Eyes: + icteric Respiratory: breathing not labored Gastrointestinal: soft distended Musculoskeletal: thin extremities Skin: warm, dry, jaundice Neurologic: opens his eyes to his name HISTORY:  
 
Active Problems: 
  Ascites (12/3/2020) Liver failure ([address] (1/3/2021) Jaundice (1/3/2021) Bandemia (1/3/2021) AMS (altered mental status) (1/3/2021) Past Medical History:  
Diagnosis Date  Cirrhosis ([address]  Diabetes ([address]  Gastroparesis  Pancreatitis  Pulmonary embolism ([address] Past Surgical History:  
Procedure Laterality Date  HX CHOLECYSTECTOMY [address]    
 left hip sx 9.23/2018 History reviewed. No pertinent family history. History reviewed, no pertinent family history. Social History Tobacco Use  Smoking status: Current Every Day Smoker  Smokeless tobacco: Never Used Substance Use Topics  Alcohol use: Not Currently No Known Allergies Current Facility-Administered Medications Medication Dose Route Frequency  piperacillin-tazobactam (ZOSYN) 4.5 g in 0.9% sodium chloride (MBP/ADV) 100 mL MBP  4.5 g IntraVENous Q6H  Vancomycin - Pharmacy to Dose  1 Each Other Rx Dosing/Monitoring  sodium chloride (NS) flush 5-10 mL  5-10 mL IntraVENous PRN  
 sodium chloride (NS) flush 5-40 mL  5-40 mL IntraVENous Q8H  
 sodium chloride (NS) flush 5-40 mL  5-40 mL IntraVENous PRN  
 acetaminophen (TYLENOL) tablet 650 mg  650 mg Oral Q6H PRN Or  
 acetaminophen (TYLENOL) suppository 650 mg  650 mg Rectal Q6H PRN  promethazine (PHENERGAN) tablet 12.5 mg  12.5 mg Oral Q6H PRN Or  
 ondansetron (ZOFRAN) injection 4 mg  4 mg IntraVENous Q6H PRN  
 insulin lispro (HUMALOG) injection   SubCUTAneous Q6H  
 glucose chewable tablet 16 g  4 Tab Oral PRN  
 glucagon (GLUCAGEN) injection 1 mg  1 mg IntraMUSCular PRN  
 dextrose 10% infusion 125-250 mL  125-250 mL IntraVENous PRN  
 albumin human 25% (BUMINATE) solution 25 g  25 g IntraVENous Q6H  
 morphine injection 2 mg  2 mg IntraVENous Q4H PRN  
 vancomycin (VANCOCIN) 1250 mg in  ml infusion  1,250 mg IntraVENous Q24H  
 [START ON 1/5/2021] levoFLOXacin (LEVAQUIN) 750 mg in D5W IVPB  750 mg IntraVENous Q48H  
 HYDROmorphone (PF) (DILAUDID) injection 2 mg  2 mg IntraVENous Q3H PRN  
 
 
 LAB AND IMAGING FINDINGS:  
 
Lab Results Component Value Date/Time WBC 4.6 01/04/2021 12:40 AM  
 HGB 7.6 (L) 01/04/2021 12:40 AM  
 PLATELET 25 (LL) 70/30/1706 12:40 AM  
 
Lab Results Component Value Date/Time  Sodium 137 01/04/2021 12:40 AM  
 Potassium 3.6 01/04/2021 12:40 AM  
 Chloride 99 (L) 01/04/2021 12:40 AM  
 CO2 27 01/04/2021 12:40 AM  
 BUN 36 (H) 01/04/2021 12:40 AM  
 Creatinine 1.78 (H) 01/04/2021 12:40 AM  
 Calcium 9.9 01/04/2021 12:40 AM  
 Magnesium 2.2 01/03/2021 04:18 AM  
 Phosphorus 2.0 (L) 10/24/2020 02:30 AM  
  
Lab Results Component Value Date/Time Alk. phosphatase 31 (L) 01/04/2021 12:40 AM  
 Protein, total 7.3 01/04/2021 12:40 AM  
 Albumin 4.6 01/04/2021 12:40 AM  
 Globulin 2.7 01/04/2021 12:40 AM  
 
Lab Results Component Value Date/Time INR 7.1 (HH) 01/03/2021 03:50 PM  
 Prothrombin time 58.8 (H) 01/03/2021 03:50 PM  
 aPTT 63.8 (H) 01/03/2021 04:18 AM  
  
Lab Results Component Value Date/Time Iron 45 (L) 10/13/2020 03:48 AM  
 TIBC 69 (L) 10/13/2020 03:48 AM  
 Iron % saturation 65 (H) 10/13/2020 03:48 AM  
 Ferritin 227 10/13/2020 03:48 AM  
  
No results found for: PH, PCO2, PO2 No components found for: [place] Lab Results Component Value Date/Time CK 41 01/03/2021 04:18 AM  
 CK - MB <1.0 01/03/2021 04:18 AM  
  
 
   
 
Total time: 70 minutes Counseling / coordination time, spent as noted above: 60 minutes  
> 50% counseling / coordination: yes with Kashif Schwab, wife, attending Prolonged service was provided for  []30 min   []75 min in face to face time in the presence of the patient, spent as noted above. Time Start:  
Time End:  
Note: this can only be billed with 55967 (initial) or 25110 (follow up). If multiple start / stop times, list each separately.

## 2021-01-04 NOTE — PROGRESS NOTES
Responded to RRT per primary Rn patient with mental status change, specifically becoming more aggitated, yelling and rolling around In bed. Dr Johnson  at bedside, patient with stage 3 sacral decubitus, worsening since last admission. Patient well known to Dr Elizabeth Bronson, pain management changed to dilaudid as patient has tolerated that before. Patient's vital signs stable,. Patient off the floor for scheduled CT of abdomen at completion of RRT.

## 2021-01-04 NOTE — PROGRESS NOTES
Hospitalist Progress Note Patient: Prasanna Vieira MRN: [id number]  CSN: [id number] YOB: 1965  Age: 54 y.o. Sex: male DOA: 1/3/2021 LOS:  LOS: 1 day Assessment/Plan Patient Active Problem List  
Diagnosis Code  Hypocalcemia E83.51  
 Hypomagnesemia E83.42  
 Alcoholism ([address] F10.20  Cirrhosis ([address] K74.60  Thrombocytopenia ([address] D69.6  Severe protein-calorie malnutrition ([address] E43  Pulmonary embolism (HCC) I26.99  
 Acute deep vein thrombosis (DVT) of both lower extremities (HCC) I82.403  Ascites R18.8  Coagulopathy (HCC) D68.9  
 Colitis K52.9  Positive blood culture R78.81  Bacteremia R78.81  
 Enterococcus faecalis infection B95.2  Pressure ulcer of sacral region, stage 3 (HCC) F91.282  Pressure ulcer of left heel, stage 3 ([address] I95.575  Liver failure (HCC) K72.90  Jaundice R17  Bandemia D72.825  
 Encephalopathy G80.41  
  
 
54 y.o.  male who has history of recent pulmonary embolism and DVT, cirrhosis, diabetes, vent dependent respiratory failure with pneumonia admitted for AMS. Recent admission to this hospital for bacteremia. Confused, does not answer to questions. Encephalopathy - Metabolic, ammonia in normal range. Sepsis - Unclear source, 
?source sacral ulcer Follow cultures Recent bacteremia. Completed antibiotics. 
  
Cirrhosis ([address] (10/9/2020) with ascites IV albumin - 
Dr. Ryan Vicente consulted 
  
Coagulopathy - 
INR daily Thrombocytopenia - Secondary to cirrhosis  
  
PE /DVT on 11/30/2020 - Anticoagulation on hold due to coagulopathy and thrombocytopenia 
  
Recent MRI of Abdomen : Evidence of hepatic cirrhosis with no mass lesion, but chronic pancreatitis and fibrotic changes at the ampulla with mild extrinsic narrowing of the distal CBD.  No mass lesion identified.  
 
Chronic pancreatitis - 
  
DM - 
ssi and hypoglycemia protocol   
  
Sacral ulcer stage 3 - 
 Continue abx and Continue local wound care Follow up with imaging. Poor prognosis. Discussed with wife at bedside, she has met with palliative care team, also discussed about hospice.  
  
 
Disposition : TBD Review of systems General: No fevers or chills. Cardiovascular: No chest pain or pressure. No palpitations. Pulmonary: No shortness of breath. Gastrointestinal: No nausea, vomiting. Physical Exam: 
General: Awake, cooperative, no acute distress   
HEENT: NC, Atraumatic. PERRLA, anicteric sclerae. Lungs: CTA Bilaterally. No Wheezing/Rhonchi/Rales. Heart:  S1 S2,  No murmur, No Rubs, No Gallops Abdomen: Soft, distended, Non tender.  +Bowel sounds, Extremities: No c/c/e Psych:   Not anxious or agitated. Neurologic:  No acute neurological deficit. Vital signs/Intake and Output: 
Visit Vitals /75 (BP 1 Location: Right arm) Pulse (!) 126 Temp 97.8 °F (36.6 °C) Resp 20 Ht 5' 9\" (1.753 m) Wt 67 kg (147 lb 11.2 oz) SpO2 98% BMI 21.81 kg/m² Current Shift:  01/04 0701 - 01/04 1900 In: 450 [I.V.:450] Out: - Last three shifts:  No intake/output data recorded. Labs: Results:  
   
Chemistry Recent Labs 01/04/21 0040 01/03/21 
1550 01/03/21 
0827 * 234*  --   
 134*  --   
K 3.6 3.9  --   
CL 99* 96*  --   
CO2 27 25  --   
BUN 36* 35*  --   
CREA 1.78* 2.09*  --   
CA 9.9 10.2*  --   
AGAP 11 13  --   
BUCR 20 17  --   
AP 31* 37* 50  
TP 7.3 7.3 8.1 ALB 4.6 4.5 4.9 GLOB 2.7 2.8 3.2 AGRAT 1.7 1.6 1.5  
  
CBC w/Diff Recent Labs 01/04/21 
0040 01/03/21 
1550 01/03/21 
2650 WBC 4.6 4.2* 4.2*  
RBC 2.40* 2.44* 2.87* HGB 7.6* 7.5* 9.0*  
HCT 22.4* 22.6* 26.6*  
PLT 25* 30* 42* GRANS 71 68 57 LYMPH 14* 20* 17* EOS 1 0 0 Cardiac Enzymes Recent Labs 01/03/21 
3869 CPK 41 CKND1 CALCULATION NOT PERFORMED WHEN RESULT IS BELOW LINEAR LIMIT Coagulation Recent Labs 01/03/21 
1550 01/03/21 
0306 PTP 58.8* 42.9* INR 7.1* 4.7* APTT  --  63.8* Lipid Panel No results found for: CHOL, CHOLPOCT, CHOLX, CHLST, CHOLV, 189456, HDL, HDLP, LDL, LDLC, DLDLP, 343267, VLDLC, VLDL, TGLX, TRIGL, TRIGP, TGLPOCT, CHHD, CHHDX  
BNP No results for input(s): BNPP in the last 72 hours. Liver Enzymes Recent Labs 01/04/21 
0040 TP 7.3 ALB 4.6 AP 31* Thyroid Studies No results found for: T4, T3U, TSH, TSHEXT, TSHEXT Procedures/imaging: see electronic medical records for all procedures/Xrays and details which were not copied into this note but were reviewed prior to creation of Plan

## 2021-01-04 NOTE — WOUND CARE
IP WOUND CONSULT Jayson Virk MEDICAL RECORD NUMBER:  [id number] AGE: 54 y.o. GENDER: male  : 1965 TODAY'S DATE:  2021 GENERAL  
 
[] Follow-up [x] New Consult Jayson Virk is a 54 y.o. male referred by:  
[x] Physician 
[] Nursing 
[] Other: PAST MEDICAL HISTORY Past Medical History:  
Diagnosis Date  Cirrhosis ([facility]  Diabetes ([facility]  Gastroparesis  Pancreatitis  Pulmonary embolism ([facility] PAST SURGICAL HISTORY Past Surgical History:  
Procedure Laterality Date  HX CHOLECYSTECTOMY  HX HIP FRACTURE TX    
 left hip sx  FAMILY HISTORY History reviewed. No pertinent family history. SOCIAL HISTORY Social History Tobacco Use  Smoking status: Current Every Day Smoker  Smokeless tobacco: Never Used Substance Use Topics  Alcohol use: Not Currently  Drug use: No  
 
 
ALLERGIES No Known Allergies MEDICATIONS No current facility-administered medications on file prior to encounter. Current Outpatient Medications on File Prior to Encounter Medication Sig Dispense Refill  lactulose (CHRONULAC) 10 gram/15 mL solution Take 10 g by mouth daily. TAKE 15 ML/10GM DAILY  insulin glargine (LANTUS) 100 unit/mL injection 10 Units by SubCUTAneous route nightly. 1 Vial 0  
 furosemide (LASIX) 40 mg tablet Take 0.5 Tabs by mouth daily. 30 Tab 1  
 multivitamin, tx-iron-ca-min (THERA-M w/ IRON) 9 mg iron-400 mcg tab tablet Take 1 Tab by mouth daily. 30 Tab 1  
 insulin lispro (HUMALOG) 100 unit/mL injection 6 Units by SubCUTAneous route Before breakfast, lunch, and dinner. 1 Vial 0  
 lactulose (CHRONULAC) 10 gram/15 mL solution Take 45 mL by mouth daily. 1 Bottle 0  
 pantoprazole (PROTONIX) 40 mg tablet Take 1 Tab by mouth Daily (before breakfast). 30 Tab 0  
 potassium chloride (K-DUR, KLOR-CON) 20 mEq tablet Take 1 Tab by mouth daily.  10 Tab 0  
 • thiamine mononitrate (B-1) 100 mg tablet Take 1 Tab by mouth daily. 10 Tab 0  
• lidocaine (LIDODERM) 5 % 2 Patches by TransDERmal route every twenty-four (24) hours. Apply patch to the affected area for 12 hours a day and remove for 12 hours a day. One patch is applied to left hip. One patch is applied to right shoulder blade.    
• DULoxetine (CYMBALTA) 60 mg capsule Take 1 Cap by mouth daily.    
• traZODone (DESYREL) 50 mg tablet Take 50 mg by mouth nightly.    
 
 
Wt Readings from Last 3 Encounters:  
01/04/21 67 kg (147 lb 11.2 oz)  
12/26/20 67.2 kg (148 lb 1.6 oz)  
12/07/20 59.4 kg (131 lb)  
 
 
[unfilled] 
Visit Vitals 
/75 (BP 1 Location: Right arm)  
Pulse (!) 126  
Temp 97.8 °F (36.6 °C)  
Resp 20  
Ht 5' 9\" (1.753 m)  
Wt 67 kg (147 lb 11.2 oz)  
SpO2 98%  
BMI 21.81 kg/m²  
 
 
ASSESSMENT  
 
Ulcer Identification: 
Ulcer Type: pressure 
 
Contributing Factors: chronic pressure, decreased mobility and shear force 
 
Wound Heel Left stage II 12/02/20 (Active)  
Wound Etiology Pressure Stage 2 01/04/21 1446  
Dressing Status Intact 01/04/21 1446  
Cleansed Wound cleanser 01/04/21 1446  
Dressing/Treatment Collagen with Ag;Silicone border 01/04/21 1446  
Dressing Change Due 01/07/21 01/04/21 1446  
Wound Length (cm) 0.5 cm 01/04/21 1446  
Wound Width (cm) 0.7 cm 01/04/21 1446  
Wound Depth (cm) 0.1 cm 01/04/21 1446  
Wound Surface Area (cm^2) 0.35 cm^2 01/04/21 1446  
Change in Wound Size % 63.54 01/04/21 1446  
Wound Volume (cm^3) 0.04 cm^3 01/04/21 1446  
Wound Healing % 79 01/04/21 1446  
Wound Assessment Pink/red 01/04/21 1446  
Drainage Amount Scant 01/04/21 1446  
Wound Odor None 01/04/21 1446  
Kandice-Wound/Incision Assessment Intact 01/04/21 1446  
Edges Defined edges 01/04/21 1446  
Wound Thickness Description Full thickness 01/04/21 1446  
Number of days: 33  
   
Wound Sacrum 11/22/20 (Active)  
Wound Etiology Pressure Stage 3 01/04/21 1446  
 Dressing Status Intact 01/04/21 1446 Cleansed Wound cleanser 01/04/21 1446 Dressing/Treatment Collagen with Ag;Silicone border 58/98/81 1446 Wound Length (cm) 6 cm 01/04/21 1446 Wound Width (cm) 3.4 cm 01/04/21 1446 Wound Depth (cm) 0.5 cm 01/04/21 1446 Wound Surface Area (cm^2) 20.4 cm^2 01/04/21 1446 Change in Wound Size % 2.86 01/04/21 1446 Wound Volume (cm^3) 10.2 cm^3 01/04/21 1446 Wound Healing % 3 01/04/21 1446 Wound Assessment Granulation tissue;Pink/red 01/04/21 1446 Drainage Amount Small 01/04/21 1446 Drainage Description Serosanguinous 01/04/21 1446 Wound Odor None 01/04/21 1446 Kandice-Wound/Incision Assessment Non-Blanchable erythema 01/04/21 1446 Edges Epibole (rolled edges) 01/04/21 1446 Wound Thickness Description Full thickness 01/04/21 1446 Number of days: 26 PLAN Skin Care & Pressure Relief Recommendations Minimize layers of linen Pads under patient to optimize support surface Turn/reposition approximately every 2 hours Pillow wedges Manage incontinence Promote continence; Skin Protective lotion/cream to buttocks and sacrum daily and as needed with incontinence care Offload heels pillows Recommendations:  Continue previous wound clinic orders:  Queta to wounds followed by optifoam dressings to sacral wound and left heel wound. Teaching completed with:  
[] Patient          
[] Family member      
[] Caregiver [x] Nursing 
[] Other Patient/Caregiver Teaching: 
Level of patient/caregiver understanding able to:  
[x] Indicates understanding       [] Needs reinforcement 
[] Unsuccessful      [] Verbal Understanding 
[] Demonstrated understanding       [] No evidence of learning 
[] Refused teaching         [] N/A Electronically signed by Mercedes Cuba RN on 1/4/2021 at 3:02 PM

## 2021-01-04 NOTE — PROGRESS NOTES
Chart reviewed and spoke with spouse to discuss d/c planning and cm consult for nursing home placement,at this time spouse unsure about pt needs at time of discharge and does not want to make any decisions at this time, wife states  pt  active with [facility] services prior to admission, for continuity of care cm will initiate bed search for SNF to prevent delay in discharge when medically ready.

## 2021-01-04 NOTE — PROGRESS NOTES
Problem: Pressure Injury - Risk of 
Goal: *Prevention of pressure injury Description: Document Trav Scale and appropriate interventions in the flowsheet. Outcome: Progressing Towards Goal 
Note: Pressure Injury Interventions: 
Sensory Interventions: Assess changes in LOC, Avoid rigorous massage over bony prominences, Float heels, Keep linens dry and wrinkle-free, Maintain/enhance activity level, Minimize linen layers, Monitor skin under medical devices, Pad between skin to skin, Pressure redistribution bed/mattress (bed type) Moisture Interventions: Absorbent underpads, Limit adult briefs, Minimize layers Activity Interventions: Pressure redistribution bed/mattress(bed type), Increase time out of bed, PT/OT evaluation Mobility Interventions: HOB 30 degrees or less, Pressure redistribution bed/mattress (bed type), PT/OT evaluation Nutrition Interventions: Document food/fluid/supplement intake Friction and Shear Interventions: Lift sheet, Minimize layers, Apply protective barrier, creams and emollients Problem: Patient Education: Go to Patient Education Activity Goal: Patient/Family Education Outcome: Progressing Towards Goal 
  
Problem: Falls - Risk of 
Goal: *Absence of Falls Description: Document Guilherme Lombardo Fall Risk and appropriate interventions in the flowsheet. Outcome: Progressing Towards Goal 
Note: Fall Risk Interventions: 
Mobility Interventions: Communicate number of staff needed for ambulation/transfer, Bed/chair exit alarm Mentation Interventions: Adequate sleep, hydration, pain control, Bed/chair exit alarm, Door open when patient unattended, More frequent rounding Medication Interventions: Teach patient to arise slowly, Patient to call before getting OOB, Bed/chair exit alarm Elimination Interventions: Bed/chair exit alarm, Call light in reach, Patient to call for help with toileting needs Problem: Falls - Risk of 
Goal: *Absence of Falls Description: Document Herman Anderson Fall Risk and appropriate interventions in the flowsheet. Outcome: Progressing Towards Goal 
Note: Fall Risk Interventions: 
Mobility Interventions: Communicate number of staff needed for ambulation/transfer, Bed/chair exit alarm Mentation Interventions: Adequate sleep, hydration, pain control, Bed/chair exit alarm, Door open when patient unattended, More frequent rounding Medication Interventions: Teach patient to arise slowly, Patient to call before getting OOB, Bed/chair exit alarm Elimination Interventions: Bed/chair exit alarm, Call light in reach, Patient to call for help with toileting needs Problem: Patient Education: Go to Patient Education Activity Goal: Patient/Family Education Outcome: Progressing Towards Goal

## 2021-01-04 NOTE — PALLIATIVE CARE
Patient seen along with Ligia Torres NP. Patient alerts to voice but is moaning and unable to participate in conversation. Re-discussed goals of care with wife, Merlyn Sr. Merlyn Sr expressed that she had discussed patient's condition with the rest of the family and they agree to no chest compressions, no shock in the event of cardiac arrest and no intubation for any reason. Patient is to be DNR/DNI. POST completed this date by Ligia Torres NP and signed by wife, Merlyn Sr. POST reflects wishes for DNR/DNI, limited interventions and alternative feeding options to be discussed at a later date. Original given to wife and copies placed in chart on nursing unit.

## 2021-01-04 NOTE — ROUTINE PROCESS
Bedside and Verbal shift change report given to DANNY Rico R.N. (oncoming nurse) by NATHAN Benson R.N. (offgoing nurse). Report included the following information SBAR, Kardex, Intake/Output, MAR, Accordion, Recent Results, and Med Rec Status.

## 2021-01-04 NOTE — PROGRESS NOTES
Rapid response note Called due to severe buttuck pain Wound slighly progressed from before Patient not able to verbalize much CT abd/pelvis pending now For paracentesis Change to Dilaudid And get wound care consult for am

## 2021-01-04 NOTE — PROGRESS NOTES
1915 
Assumed care of pt Day shift nurse had called RRT Prn dilaudid given per hospitalist  
2124 Shift assessment complete Pt resting quietly after IV dilaudid Chest rising and falling evenly and eyes opening spontaneously 2000 
assisted day shift nurse with taking pt to [address] Reassessment complete Pt resting quietly with chest rising and falling evenly and eyes closed [address] Reassessment complete Pt resting quietly with chest rising and falling evenly and eyes closed  
 
0501 Pt began to moan and toss and turn with grimaced expression and face Prn IV dilaudid given Will continue nafisa monitor  
0600 Pt resting quietly 3 Francis Cardiac/Medical Night Shift Chart Audit Chart Audit completed? YES Bedside and Verbal shift change report given to Сергей Marrero (oncoming nurse) by Lizette Foreman RN (offgoing nurse). Report included the following information SBAR, Kardex, ED Summary, Intake/Output, MAR, Recent Results, Med Rec Status and Cardiac Rhythm ST.

## 2021-01-05 PROCEDURE — 3331090002 HH PPS REVENUE DEBIT

## 2021-01-05 PROCEDURE — 3331090001 HH PPS REVENUE CREDIT

## 2021-01-05 PROCEDURE — 2709999900 HC NON-CHARGEABLE SUPPLY

## 2021-01-05 NOTE — PROGRESS NOTES
BEREAVEMENT NOTE  responded to the death of Renetta Guadarrama, who is a 54 y.o., male, offering Spiritual Care to patient and family, see flow sheets for interventions. Date of Death: 2021 Time of Death: -- 
Pronounced by:  
 
Extended Emergency Contact Information Primary Emergency Contact: Maria Del Carmen Marshall Address: [address] [place] [address] [employer], [address] [place] [address] Avenue Home Phone: [phone] Relation: Spouse Secondary Emergency Contact: Trevor Karo Home Phone: [phone] Mobile Phone: [phone] Relation: Daughter Advance Care Plan: On file [place] Status: Patient is a . [facility] contact information given to next of kin YES      NO  UNKNOWN Life Net   []        [x]    [] Eye Bank   [] [x] [] Medical Examiner  []        [x]  [] Going to [facility]  []        [x] [] Autopsy   []        [x]        [] Sympathy Card  [x]        [] Bereavement Materials  []        [x] Business Card Provided  []        [x] Organ Donor/Anatomical Gift Preference: Medically unsuitable  Home 
Name: [facility] Phone: [phone] Chaplains will continue to follow family and will provide spiritual care as needed. Brett Lim M.Div. [address] [facility] Department [facility] AT [place] Ph. [phone]

## 2021-01-05 NOTE — ROUTINE PROCESS
Assume care of patient from Avis Revel, [place]. Patient received in bed awake. Patient w/ occasional moaning and groaning, eyes open spontaneously. Patient does not respond to questions asked. 2055- Was informed by Baldwin Crigler, [email or web address] that patient's HR up in 140's. At bedside patient screaming displaying non-verbal signs of pain. PRN Dilaudid 2 mg IV given. 2115- Patient cleaned on incontinence of urine. Paitient repositioned in bed. During assessment patient breathing decreased, also was called by Baldwin Crigler for HR in 30's. Checked apical pulse, non present,  
 
2120- Patient stopped breathing, no pulse. Called and informed Dr. Doug Hanson, will come to bedside to confirm. Patient is a DNR/DNI.  
 
2128- Life net called and informed. 2130- Called and informed patient's wife Fabiola Colón ([phone]). Wife stated she does not want too see her loved one like that, she has seen him today. 2153- Nursing supervisor notified. 0140- Postmortem care complete. [facility] at bedside. Patient placed on stretcher. 1736- Patient off unit.

## 2021-01-05 NOTE — PROGRESS NOTES
Called to examine patient who has . No response to verbal and tactile stimuli. No respiratory effort. Absent heart sounds and pulses. Pupils fixed and dilated. Time of death: 21:20  Rebekah Hopkins MD 
 Hospitalist, Inpatient Medicine Team

## 2021-01-05 NOTE — PROGRESS NOTES
Problem: Pressure Injury - Risk of 
Goal: *Prevention of pressure injury Description: Document Trav Scale and appropriate interventions in the flowsheet. Outcome: Resolved/Not Met Note: Pressure Injury Interventions: 
Sensory Interventions: Check visual cues for pain, Discuss PT/OT consult with provider, Float heels, Minimize linen layers, Monitor skin under medical devices, Pressure redistribution bed/mattress (bed type), Turn and reposition approx. every two hours (pillows and wedges if needed) Moisture Interventions: Absorbent underpads, Check for incontinence Q2 hours and as needed, Internal/External urinary devices, Minimize layers, Moisture barrier Activity Interventions: Increase time out of bed, Pressure redistribution bed/mattress(bed type), PT/OT evaluation Mobility Interventions: Pressure redistribution bed/mattress (bed type), PT/OT evaluation, HOB 30 degrees or less Nutrition Interventions: Document food/fluid/supplement intake, Offer support with meals,snacks and hydration Friction and Shear Interventions: Apply protective barrier, creams and emollients, Foam dressings/transparent film/skin sealants, HOB 30 degrees or less Problem: Patient Education: Go to Patient Education Activity Goal: Patient/Family Education Outcome: Resolved/Not Met Problem: Falls - Risk of 
Goal: *Absence of Falls Description: Document Radha Tran Fall Risk and appropriate interventions in the flowsheet. Outcome: Resolved/Not Met Note: Fall Risk Interventions: 
Mobility Interventions: Bed/chair exit alarm Mentation Interventions: Bed/chair exit alarm, Evaluate medications/consider consulting pharmacy Medication Interventions: Bed/chair exit alarm Elimination Interventions: Bed/chair exit alarm Problem: Patient Education: Go to Patient Education Activity Goal: Patient/Family Education Outcome: Resolved/Not Met

## 2021-01-06 PROCEDURE — 3331090002 HH PPS REVENUE DEBIT

## 2021-01-06 PROCEDURE — 3331090001 HH PPS REVENUE CREDIT

## 2021-01-07 PROCEDURE — 3331090002 HH PPS REVENUE DEBIT

## 2021-01-07 PROCEDURE — 3331090001 HH PPS REVENUE CREDIT

## 2021-01-07 NOTE — DISCHARGE SUMMARY
Admit Date: 1/3/2021 Date of Death: 01/04/2020 Patient ID:  
Zahraa Bates 54 y.o.  
1965 Time of Death: 2120 Cause of Death: cirrhosis, ascites, pulmonary embolism, coagulopathy, DVT Diagnosis Patient Active Problem List  
 Diagnosis Date Noted  Encephalopathy 01/04/2021  Liver failure ([address] 01/03/2021  Jaundice 01/03/2021  Bandemia 01/03/2021  Pressure ulcer of sacral region, stage 3 ([address] 12/29/2020  Pressure ulcer of left heel, stage 3 ([address] 12/29/2020  Enterococcus faecalis infection 12/23/2020  Bacteremia 12/16/2020  Colitis 12/14/2020  Positive blood culture 12/14/2020  Coagulopathy ([address] 12/13/2020  Ascites 12/03/2020  Acute deep vein thrombosis (DVT) of both lower extremities ([address] 12/01/2020  Pulmonary embolism ([address] 11/30/2020  Severe protein-calorie malnutrition ([address] 10/26/2020  Thrombocytopenia ([address] 10/13/2020  Hypocalcemia 10/09/2020  Hypomagnesemia 10/09/2020  Alcoholism ([address] 10/09/2020  Cirrhosis ([address] 10/09/2020 Hospital Course:  
Zahraa Bates is a 54 y.o. male with past medical history significant for cirrhosis secondary to alcohol use, DM2, PE/DVT  Not anticoauglated due to thrombocytopenia, debility w stage 4 pressure ulceration who was recently hospitalized for bacteremia. He presents to the ER with acute worsening of altered metnal status.   His wife states that he woke up in the middle of the night after taking her Percocet with generalized confusion and unable to make simple conversation or his needs known. 
  
 On presentation to the emergency department he was alert to himself and place . He was evaluated, given vitamin K and was to be discharged home by was called due to worsening of his clinical status. He became minimally unresponsive. His remained afebrile but was tachycardic and normotensive without hypoxia. He has a stage IV decubitus ulceration with bone present minimal redness surrounding but no significant purulent drainage. He has pancytopenia with a platelet count of 18,626 and white blood cells of 4.2 with a noted bandemia, elevated total bilirubin of 11.8 INR 4.7, AST of 6.8, negative troponin ammonia of 23 checks x-ray without significant infiltrate but does have atelectasis. CT of his head without acute infarct. Medicine is asked admit for further management. He was admitted to monitored floor. He was seen by hepatology. His prognosis very poor. His MELD score 33, CTP 9, Child class B. He was started on albumin, step 1 diuresis and vitamin K. He was seen and palliative care and he was made DNR/DNI. His condition got worse and he  at above date and time.   
PCP: Heaven Montalvo NP

## 2021-01-08 PROCEDURE — 3331090001 HH PPS REVENUE CREDIT

## 2021-01-08 PROCEDURE — 3331090002 HH PPS REVENUE DEBIT

## 2021-01-08 NOTE — PROGRESS NOTES
Physician Progress Note Damian Carrillo 
CSN #:                  [id number] :                       1965 ADMIT DATE:       1/3/2021 4:48 AM 
DISCH DATE:        2021 9:20 PM 
RESPONDING 
PROVIDER #:        Claribel Roberto MD 
 
 
 
 
QUERY TEXT: 
 
Pt admitted with Sepsis and end stage liver disease. Noted elevated INR 7.1 and documentation of Coagulopathy and possible DIC by Hepatology on 21. If possible, please document in progress notes and discharge summary: The medical record reflects the following: 
Risk Factors: alcoholic cirrhosis, sepsis, stage 3 sacral pressure ulcer, malnutrition Clinical Indicators: 
Labs:1/3/21 @ 0400: H/H 9/26.2 Plt 42, INR 4.7 PT 42.9 PTT 63.8  T. bili 11.3  ALT 29 AST 68 Alk phos 50 
1/3/21 @ 1550: H/H 7.5/22.6 Plt 30  INR 7.1 PT 58.8  T. bili 10.2 
21: H/H 7.6/22.4 Plt 25 
 Hepatology note: ? Coagulopathy. Heidy Gunn May be due to infection, DIC. Could be due to malnutrition. Heidy Gunn He is on Xaralto but this will not due this. Will start IV vitamin K 10 mg every day x 3 Treatment: Hepatology consult, IV Vit K, monitor labs Thank you, Jesus Yoder RN, BSN, [address] [place] [address] 
[phone] Options provided: 
-- Possible DIC 
-- DIC ruled out 
-- Other - I will add my own diagnosis -- Disagree - Not applicable / Not valid -- Disagree - Clinically unable to determine / Unknown 
-- Refer to Clinical Documentation Reviewer PROVIDER RESPONSE TEXT: 
 
The diagnosis of DIC was ruled out.  
 
Query created by: Onur Bronson on 2021 4:17 PM 
 
 
Electronically signed by:  Claribel Roberto MD 2021 3:21 PM

## 2021-01-09 LAB
BACTERIA SPEC CULT: NORMAL
BACTERIA SPEC CULT: NORMAL
SERVICE CMNT-IMP: NORMAL
SERVICE CMNT-IMP: NORMAL

## 2021-01-09 PROCEDURE — 3331090002 HH PPS REVENUE DEBIT

## 2021-01-09 PROCEDURE — 3331090001 HH PPS REVENUE CREDIT

## 2021-01-10 PROCEDURE — 3331090002 HH PPS REVENUE DEBIT

## 2021-01-10 PROCEDURE — 3331090001 HH PPS REVENUE CREDIT

## 2021-01-11 PROCEDURE — 3331090002 HH PPS REVENUE DEBIT

## 2021-01-11 PROCEDURE — 3331090001 HH PPS REVENUE CREDIT

## 2021-01-12 PROCEDURE — 3331090001 HH PPS REVENUE CREDIT

## 2021-01-12 PROCEDURE — 3331090002 HH PPS REVENUE DEBIT

## 2021-01-13 PROCEDURE — 3331090002 HH PPS REVENUE DEBIT

## 2021-01-13 PROCEDURE — 3331090001 HH PPS REVENUE CREDIT

## 2021-01-14 PROCEDURE — 3331090001 HH PPS REVENUE CREDIT

## 2021-01-14 PROCEDURE — 3331090002 HH PPS REVENUE DEBIT

## 2021-01-15 PROCEDURE — 3331090001 HH PPS REVENUE CREDIT

## 2021-01-15 PROCEDURE — 3331090002 HH PPS REVENUE DEBIT

## 2021-01-16 PROCEDURE — 3331090002 HH PPS REVENUE DEBIT

## 2021-01-16 PROCEDURE — 3331090001 HH PPS REVENUE CREDIT

## 2021-01-17 PROCEDURE — 3331090001 HH PPS REVENUE CREDIT

## 2021-01-17 PROCEDURE — 3331090002 HH PPS REVENUE DEBIT

## 2021-01-18 PROCEDURE — 3331090001 HH PPS REVENUE CREDIT

## 2021-01-18 PROCEDURE — 3331090002 HH PPS REVENUE DEBIT

## 2021-01-19 PROCEDURE — 3331090002 HH PPS REVENUE DEBIT

## 2021-01-19 PROCEDURE — 3331090001 HH PPS REVENUE CREDIT

## 2021-01-20 ENCOUNTER — HOME CARE VISIT (OUTPATIENT)
Dept: HOME HEALTH SERVICES | Facility: HOME HEALTH | Age: 56
End: 2021-01-20
Payer: OTHER GOVERNMENT

## 2021-01-20 PROCEDURE — 3331090002 HH PPS REVENUE DEBIT

## 2021-01-20 PROCEDURE — 3331090003 HH PPS REVENUE ADJ

## 2021-01-20 PROCEDURE — 3331090001 HH PPS REVENUE CREDIT

## 2022-01-01 NOTE — PROGRESS NOTES
Bedside shift change report given to [address]) by Leesa Cornelius  (offgoing nurse). Report included the following information SBAR, Kardex, ED Summary, Intake/Output, MAR, Accordion, Recent Results, Med Rec Status, Cardiac Rhythm Sinus Tach and Quality Measures. [id number]

## 2022-02-03 NOTE — ROUTINE PROCESS
Bedside and Verbal shift change report given to Michael Del Rio RN (oncoming nurse) by Lilian Springer RN (offgoing nurse). Report included the following information SBAR, Kardex, Procedure Summary, Intake/Output, MAR, and Cardiac Rhythm NSR. sudden onset/intermittent

## 2022-05-17 NOTE — ROUTINE PROCESS
Assume care of patient from [address]. Patient received in bed awake. Patient A&Ox4, has abd pain 6/10. No distress noted. Bed locked in low position. Call bell within reach and patient verbalized understanding of use for assistance and needs. Contact precaution implemented. 7840- Bedside and Verbal shift change report given to Tree Torres (oncoming nurse) by Bonnie Contreras RN (offgoing nurse). Report included the following information SBAR, Kardex, Intake/Output, MAR and Recent Results. [facility] [place] Night Shift Chart Audit Chart Audit completed?  YES 
 
 
 
 
 
  

Bedside and Verbal shift change report given to Antonia Hong RN (oncoming nurse) by Qamar Dunbar RN (offgoing nurse). Report included the following information SBAR and Kardex.
Bedside and Verbal shift change report given to DANNY Gomez RN (oncoming nurse) by Danni Lord. Rui Gamboa RN (offgoing nurse). Report included the following information SBAR, Kardex, Intake/Output, MAR and Recent Results.
Bedside and Verbal shift change report given to DAVE Horan RN  (oncoming nurse) by VAMSI Shen RN (offgoing nurse). Report included the following information SBAR, Kardex, Intake/Output, MAR and Recent Results. Introduced pt, updated whiteboard. Dinner tray at bedside, <5% eaten, pt states will keep tea and cookie for later but does not want to eat anymore. NAD noted. No complaints at this time. Call bell within reach. 2038- pt c/o pain 7/10 in abd, requests pain medicine. Given morphine per MD order. 2138- Dressing change to sacrum and L heel, pt tolerated well. Pain in abd subsiding per pt. No other complaints at this time. Bedside and Verbal shift change report given to VAMSI Shen RN (oncoming nurse) by DAVE Horan RN (offgoing nurse). Report included the following information SBAR, Kardex, Intake/Output, MAR and Recent Results.
Bedside and Verbal shift change report given to DAVE Vera RN (oncoming nurse) by Cherelle Milton RN (offgoing nurse). Report included the following information SBAR, Kardex, Intake/Output, MAR and Recent Results. Introduced self to pt, updated whiteboard. NAD noted. Shift Summary: 
Uneventful shift Pain meds given x2. Bedside and Verbal shift change report given to VAMSI Shen RN (oncoming nurse) by DAVE Vera RN (offgoing nurse). Report included the following information SBAR, Kardex, Intake/Output, MAR and Recent Results.
Bedside and Verbal shift change report given to JO-ANN Villa RN (oncoming nurse) by [facility]. Chiqui RN (offgoing nurse). Report included the following information SBAR, Kardex, Intake/Output and MAR.
Bedside and Verbal shift change report given to LAVON Milton RN (oncoming nurse) by  DANNY Arellano RN  (offgoing nurse). Report included the following information SBAR, Kardex, Intake/Output, MAR and Recent Results.
Bedside and Verbal shift change report given to Lucius Arboleda RN (oncoming nurse) by Rosalba Alcantara RN (offgoing nurse). Report included the following information SBAR and Kardex.
Bedside and Verbal shift change report given to VAMSI Colin RN (oncoming nurse) by Jacques Duong RN (offgoing nurse). Report included the following information SBAR, Kardex, Intake/Output and MAR.
Summary -- Pt comfortable upon admission to unit. Intermittent abd pain well-controlled by prn regimen. No new concerns/complatins noted. Patient Vitals for the past 12 hrs: 
 Temp Pulse Resp BP SpO2  
12/20/20 1144 98.3 °F (36.8 °C) 99 16 95/70 100 % 12/20/20 1018    98/65   
12/20/20 0756 98.3 °F (36.8 °C) 93 16 (!) 89/56 98 % Bedside shift change report given to Meredith Plunkett RN (oncoming nurse) by Jose Talley RN (offgoing nurse). Report included the following information SBAR, Kardex, ED Summary, Procedure Summary, Intake/Output, MAR, Recent Results and Cardiac Rhythm NSR. [address].
Initiate Treatment: Efudex cream bid to arms x 2 weeks, stop x 2 weeks, retreat x 2 weeks
Detail Level: Zone
Render In Strict Bullet Format?: No

## 2022-10-18 NOTE — PROGRESS NOTES
Lito, this is Terri Lager with [facility].  We are working on your prescription that your doctor has sent us. We will be working with your insurance to get this approved for you. We will be calling you along the way with updates on your medication.  If you have any questions, you can reach us at [phone].    Welcome call outcome: Patient/caregiver reached    Patient aware Kendra will require a prior authorization (PA) from insurance.     Mentioned that her great niece is an inpatient pharmacist at the [facility] main campus and that she has never injected herself before although does [facility] Informed her that once we have coverage squared away that the pharmacist will do an initial consult and provide injection education and resources when that time comes. Patient verbalized understanding.     Will proceed with submitting prior authorization.    Problem: Mobility Impaired (Adult and Pediatric) Goal: *Acute Goals and Plan of Care (Insert Text) Description: Description: Physical Therapy short term goals initiated 12/17/2020, to be achieved in 4-7 days. Pt will: 1. Perform bed mobility with indep in prep for OOB activity. 2. Perform sit <> stand transfers with LRAD and S in prep for functional mobility and ambulation. 3. Ambulate 200 ft with LRAD and S in prep for household and community mobility. 4. Ascend/descend 3 stairs with 1 HR, LRAD, and S for safe home entry. Outcome: Progressing Towards Goal 
 PHYSICAL THERAPY TREATMENT Patient: Justin Golden (54 y.o. male) Date: 12/22/2020 Diagnosis: Coagulopathy ([facility] [address] [D68.9] Ascites [R18.8] Cirrhosis ([facility] [address] [K74.60] Colitis Precautions: Fall PLOF: ambulatory with a quad cane, has a RW, ETOH abuse ASSESSMENT: 
NO assistance needed for bed mobility, SBA for sit to stand. Ambulated with RW 380ft, reciprocal gt pattern, decreased pace, multiple standing rest breaks due to decreased endurance, no LOB or path deviations. Returned to supine in bed. Pt needs to ambulate more, is safe to do so with staff and RW. Progression toward goals:  
[x]      Improving appropriately and progressing toward goals 
[]      Improving slowly and progressing toward goals 
[]      Not making progress toward goals and plan of care will be adjusted PLAN: 
Patient continues to benefit from skilled intervention to address the above impairments. Continue treatment per established plan of care. Discharge Recommendations:  Home Health Further Equipment Recommendations for Discharge:  rolling walker SUBJECTIVE:  
Patient stated ok.  OBJECTIVE DATA SUMMARY:  
Critical Behavior: 
Neurologic State: Alert Orientation Level: Oriented X4 Cognition: Follows commands Safety/Judgement: Awareness of environment, Insight into deficits Functional Mobility Training: 
Bed Mobility: 
 
Supine to Sit: Supervision Sit to Supine: Supervision Transfers: 
Sit to Stand: Stand-by assistance Stand to Sit: Stand-by assistance Balance: 
Sitting: Intact Standing: Intact; With support Ambulation/Gait Training: 
Distance (ft): 380 Feet (ft) Assistive Device: Gait belt;Walker, rolling Ambulation - Level of Assistance: Stand-by assistance;Contact guard assistance Speed/Ewa: Slow Step Length: Right shortened;Left shortened Pain: 
Pain level pre-treatment: 3/10 Pain level post-treatment: 3/10 Pain Intervention(s): Medication (see MAR); Rest, Ice, Repositioning Response to intervention: Nurse notified, See doc flow Activity Tolerance:  
Fair Please refer to the flowsheet for vital signs taken during this treatment. After treatment:  
[] Patient left in no apparent distress sitting up in chair 
[x] Patient left in no apparent distress in bed 
[x] Call bell left within reach 
[] Nursing notified 
[] Caregiver present 
[] Bed alarm activated 
[] SCDs applied COMMUNICATION/EDUCATION:  
[x]         Role of Physical Therapy in the acute care setting. [x]         Fall prevention education was provided and the patient/caregiver indicated understanding. [x]         Patient/family have participated as able in working toward goals and plan of care. [x]         Patient/family agree to work toward stated goals and plan of care. []         Patient understands intent and goals of therapy, but is neutral about his/her participation. []         Patient is unable to participate in stated goals/plan of care: ongoing with therapy staff. 
[]         Other: 
 
   
Janie Portillo PTA Time Calculation: 31 mins

## 2023-09-06 NOTE — ROUTINE PROCESS
Bedside and Verbal shift change report given to DAVE Del Toro RN (oncoming nurse) by DEX Yo RN (offgoing nurse). Report included the following information SBAR, Kardex, Intake/Output, MAR and Recent Results. Shift Summary: 
Incontinent x 2 of stool, partial bath given, almonte cath care given. Repositioned in bed. Pain meds x1 NAD noted Bedside and Verbal shift change report given to PHILIPPE Tyler RN (oncoming nurse) by Al Del Toro RN (offgoing nurse). Report included the following information SBAR, Kardex, Intake/Output, MAR and Recent Results. Chronic atrial fibrillation

## 2023-09-18 NOTE — DIABETES MGMT
Called and informed patient that We-Govy medication was approved   GLYCEMIC CONTROL PROGRESS NOTE: 
 
- known h/o T2DM HbA1C within recommended range for age + comorbids on insulin home regimen - BG out of target range ICU:140-180 mg/dL - TDD =18 (10 Lantus + 8 - Humalog Normal Insulin Sensitivity Corrective Coverage) - BG trending down, recommend decrease in mealtime insulin *Humalog 2 units qac Recent Glucose Results:  
Lab Results Component Value Date/Time  (H) 12/16/2020 04:20 AM  
 GLUCPOC 136 (H) 12/16/2020 06:10 AM  
 GLUCPOC 199 (H) 12/15/2020 09:14 PM  
 GLUCPOC 189 (H) 12/15/2020 03:58 PM  
 
 
Stuart White MS, RN, CDE Glycemic Control Team 
[phone] Pager [phone] (M-TH 8:00-4:30P) *After Hours pager [phone]

## 2025-02-08 NOTE — PROGRESS NOTES
[address] [facility] Kristin Walker MD, Phi Martinez, [facility] Pamela Hardy MD, MPH Francisco Chin, RASHID Kauffman, [facility] Catalina MONTOYA Estelita, [facility]   Jaclyn Marquez, JANNIE-HUSEYIN Anaya, [facility] Jonatan Isisnikki [facility] 136 
  at [address], [address] [place] [address], Vini  22. 
  [phone] FAX: [address] [place] [address] Avenue 
  [facility] 
  [address], [address] [facility], [address] 
  [phone] FAX: [phone] HEPATOLOGY PROGRESS NOTE The patient is a 47year old  male who was found to have cirrhosis when he was hospitalized at [facility] in 10/2020. He says he used to consume alcohol fairly heavily several years ago but cut back to only 1-2 a few days per week 2 years ago. He has been abstinent from alcohol since 9/2020. During the previous hospitalization he developed a DVT and PE and was stated on Xaralto. He came to the ED because of abdominal pain and distention. CT scan demonstrated dilation of PD without obvious pancreas mass, cirrhosis and large amount of ascites. Labs were significant for INR 8.5, TBILI 1.7, SCr .61 mg Blood cultures were positive for enterococcus MRI yesterday is consistent with chronic pancreatitis. No pancreas mass. He is eating without pain. INR is down to 6.0 with vitamin K and treatment of sepsis.   
 
  
ASSESSMENT AND PLAN: 
Cirrhosis The diagnosis of cirrhosis is based upon laboratory studies, imaging, complications of cirrhosis. The etiology is post-likely due to alcohol. Serology for other causes of chronic liver disease are all negative. 
  
The CTP is 9. Child class B. The MELD score is 33. The MELD score is being driven up by the prolonged INR. If this were 2.0 the MELD would be 17.  We had started evaluating him for LT during the previous hospitalization in 10/2020 Now that we know he has chronic pancreatitis and not pancreatic CA we can complete his LT evaluation. Cardiac portion of liver transplant evaluation testing was negative but he failed to achieve target with lexicon stress and will likely need cardiac catheterization. We will deal with this after hospital discharge as OP. ECHO was normal.   
 
Coagulopathy This is due to malnutrition, sepsis, DIC. He is on Xaralto but this will not due this. This was held because of the prolonged INR He is being treated with IV vitamin K 10 mg every day x 3 Sepsis is being treated with IV ABX. The INR is slowly improving. There is no evidence of bleeding. No need for FFP at this time. Ascites Ascites is a bit softer Scr and Sna still normal on step 1 diuretics. Will increase to step 2 Continue IV albumin 25 gm Q6H 
NO paracentesis at this time since ascites not tight and both PLT low and INR markedly prolonged. 2 gm NA diet Screening for Esophageal varices The patient has not had an EGD to screen for varices. HB is stable and no evidence of bleeding. Will hold on EGD until INR improved  
  
Hepatic encephalopathy Overt HE is controlled on current dose of lactulose. Will continue lactulose once QD 
  
Anemia This is due to multifactorial causes including portal hypertension with chronic GI blood loss, bone marrow suppression secondary to malnutrition and alcohol. FE panel was normal 
  Thrombocytopenia This is secondary to cirrhosis, DIC There is no evidence of overt bleeding. No treatment is required. The platelet count is under 50K. The use of a platelet growth factor to raise the platelet count and avoid platelet transfusions would be indicated if the patient requires a medical or surgical procedure. 
  
Chronic pancreatitis This was demonstrated on MRI. The PD is irregular and there is no pancreas mass. CBD not dilated. Sepsis Blood cultures were positive for enterococcus He is on IV ABX and responding. Malnutrition/muscle wasting The patient has severe protein-calorie malnutrition with severe muscle wasting of the upper extremities and lower extremities Malnutrition and muscle wasting is due to Alcohol abuse and poor caloric intake, The patient needs large amounts of calories as carbs and as much protein as tolerated without developed HE to increase muscle mass. The patient needs OT to help with ADL and upper extremity exercise with weights as tolerated to improve upper extremity muscle mass. The patient needs PT to help increase ambulation to improve lower extremity muscle mass. 
   
  
PHYSICAL EXAMINATION: 
VS: per nursing note General: No acute distress. Eyes: Sclera anicteric. ENT: No oral lesions. Thyroid normal. 
Nodes: No adenopathy. Skin: No spider angiomata. No jaundice. No palmar erythema. Respiratory: Lungs clear to auscultation. Cardiovascular: Regular heart rate. No murmurs. No JVD. Abdomen: Soft non-tender, Some ascites is present. Extremities: No edema. No muscle wasting. No gross arthritic changes. Neurologic: Alert and oriented. Cranial nerves grossly intact. No asterixis. LABORATORY: 
Results for Sylvia Noe (MRN [id number]) as of 12/16/2020 18:02 Ref. Range 12/13/2020 16:19 12/14/2020 03:48 12/15/2020 01:40 12/16/2020 04:20 WBC Latest Ref Range: 4.6 - 13.2 K/uL 6.9 7.5 2.9 (L) 3.1 (L) HGB Latest Ref Range: 13.0 - 16.0 g/dL 10.8 (L) 10.2 (L) 8.1 (L) 8.3 (L) PLATELET Latest Ref Range: 135 - 420 K/uL 68 (L) 43 (L) 44 (L) 43 (L) INR Latest Ref Range: 0.8 - 1.2   8.5 (HH) 7.5 (HH) 6.9 (HH) 6.0 (HH) Sodium Latest Ref Range: 136 - 145 mmol/L 137 138 140 141 Potassium Latest Ref Range: 3.5 - 5.5 mmol/L 3.2 (L) 3.5 4.0 3.2 (L) Chloride Latest Ref Range: 100 - 111 mmol/L 100 104 109 110 CO2 Latest Ref Range: 21 - 32 mmol/L 28 26 22 22 Glucose Latest Ref Range: 74 - 99 mg/dL 186 (H) 125 (H) 134 (H) 123 (H) BUN Latest Ref Range: 7.0 - 18 MG/DL 9 7 5 (L) 3 (L) Creatinine Latest Ref Range: 0.6 - 1.3 MG/DL 0.88 0.61 0.80 0.67 Bilirubin, total Latest Ref Range: 0.2 - 1.0 MG/DL 2.0 (H) 1.7 (H) 1.5 (H) 1.4 (H) Albumin Latest Ref Range: 3.4 - 5.0 g/dL 2.9 (L) 2.6 (L) 3.3 (L) 3.4 ALT Latest Ref Range: 16 - 61 U/L 22 20 15 (L) 14 (L) AST Latest Ref Range: 10 - 38 U/L 48 (H) 43 (H) 37 26 Alk. phosphatase Latest Ref Range: 45 - 117 U/L 57 50 49 48 Ammonia Latest Ref Range: 11 - 32 UMOL/L 32   63 (H) RADIOLOGY: 
12/2020. CT scan abdomen with IV contrast.  Changes consistent with cirrhosis. No liver mass lesions. No dilated bile ducts. Moderate ascites. Dilated PD. No pancreas mass. Daniel Elizabeth MD 
[address] [facility], [address] [facility], [address] 
[phone] [address] [place] [address] 
 
 
 No

## 2025-06-18 NOTE — PROGRESS NOTES
Message left for patient to call back to schedule his yearly follow up due in December with Dr. Del Toro with [facility].   Phone: [phone] Paging : [phone] Hematology / Oncology Progress Note Admit Date: 10/9/2020 Assessment:  
 
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 Immune suppressive state with humira ? Colitis ? Respiratory failure with ARDS picture, intubated 10/14/2020 ? Stress negative for ischemia 
? ETOH abuse ? Ascites and portal hypertension ? Aspiration pneumonia, sepsis ? Ruled out COVID as doing - negative x2 already Principal Problem: Hypocalcemia (10/9/2020) Active Problems: Hypomagnesemia (10/9/2020) Alcoholism ([address] (10/9/2020) Cirrhosis ([address] (10/9/2020) Colitis (10/9/2020) Acute respiratory failure with hypoxemia ([address] (10/13/2020) Aspiration pneumonia ([address] (10/13/2020) Acute diastolic CHF (congestive heart failure) ([address] (10/13/2020) Thrombocytopenia ([address] (10/13/2020) Plan:  
 
· He overall is stable, no clinical bleeding. Received platelet transfusion and FFP on 10/14/2020. · Continue supportive care as doing · Thrombocytopenia with underlying alcoholic cirrhosis- On dexamathesone 40 mg IV daily x4 days, day #3 today. Possible component of ITP (he does have large platelets). Plts improving, 53 K today. Deboraha Buys · Transfuse platelet for < 20 or clinical bleeding · If clinical bleeding, may also give Amicar to improve platelet function · Monitor DIC panel and LFT, if fibrinogen is lower than 100,  Rx cryoprecipitate 10 bags, fibrinogen is 75. · Patient has received Vit K, continue · May also give FFP to correct INR . INR is 2.4. Plan 2 units FFP. · For his portal thrombosis, he is not a candidate for anticoagulation anyway given severe thrombocytopenia · Respiratory failure, on vent. Continue antibiotics (Zosyn/Vancomycin) as doing · Severe malnutrition Subjectives: Intubated, on vent. Sedated. No active bleeding  
+ Ward Nimbex, Versed and Fentanyl drips. Objectives: VITAL SIGNS:  
Patient Vitals for the past 24 hrs: 
 BP Temp Pulse Resp SpO2  
10/17/20 0851 119/76  82    
10/17/20 0732   94 22 94 % 10/17/20 0704  98.8 °F (37.1 °C)     
10/17/20 0700 117/78  83 22 93 % 10/17/20 0630 122/78  80 22 93 % 10/17/20 0600 117/77  80 22 93 % 10/17/20 0530 118/77  83 22 96 % 10/17/20 0500 120/79  83 22 95 % 10/17/20 0434   84 22 97 % 10/17/20 0430 122/84  83 22 97 % 10/17/20 0400 121/78  90 22 97 % 10/17/20 0358  98.4 °F (36.9 °C)     
10/17/20 0330 119/80  89 22 97 % 10/17/20 0300 120/79  94 22 96 % 10/17/20 0230 121/80  97 22 96 % 10/17/20 0200 125/82  (!) 103 22 95 % 10/17/20 0130 124/78  89 22 96 % 10/17/20 0115 121/78  85 22 96 % 10/17/20 0100 122/77  83 22 96 % 10/17/20 0045 124/78  82 22 96 % 10/17/20 0030 121/80  82 22 97 % 10/17/20 0015 120/76  81 22 96 % 10/17/20 0000 119/78  81 22 97 % 10/16/20 2345 118/78  79 22 97 % 10/16/20 2330 118/79  81 22 96 % 10/16/20 2325  98.9 °F (37.2 °C)     
10/16/20 2315 121/81  82 22 96 % 10/16/20 2300 122/79  81 22 96 % 10/16/20 2245 119/78  79 22 96 % 10/16/20 2230 119/78  85 22 96 % 10/16/20 2215 120/77  85 22 96 % 10/16/20 2200 119/78  89 22 96 % 10/16/20 2145 122/78  95 22 96 % 10/16/20 2130 119/79  95 22 95 % 10/16/20 2115 120/77  100 22 95 % 10/16/20 2100 (!) 142/95  (!) 105 22 96 % 10/16/20 2045 126/84  (!) 101 22 95 % 10/16/20 2030 128/83  (!) 102 22 95 % 10/16/20 2028   (!) 103 22 95 % 10/16/20 2015 131/80  (!) 102 22 95 % 10/16/20 2000 130/79 98.9 °F (37.2 °C) (!) 103 22 95 % 10/16/20 1945 128/82  (!) 103 22 95 % 10/16/20 1930 130/81  (!) 104 22 95 % 10/16/20 1915 128/83  (!) 101 22 95 % 10/16/20 1900 127/84  99 22 95 % 10/16/20 1830 124/83  97 22 95 % 10/16/20 1800 128/82  97 22 95 % 10/16/20 1730 125/80  94 22 96 % 10/16/20 1700 123/81  94 22 96 % 10/16/20 1630 122/80  94 22 96 % 10/16/20 1600 121/80  93 22 96 % 10/16/20 1546  98.6 °F (37 °C)     
10/16/20 1530 120/77  91 22 95 % 10/16/20 1509   94 22 95 % 10/16/20 1500 121/80  88 22 95 % 10/16/20 1430 123/80  98 22 94 % 10/16/20 1408     (!) 89 % 10/16/20 1407     (!) 89 % 10/16/20 1406     (!) 89 % 10/16/20 1405     (!) 88 % 10/16/20 1404     (!) 88 % 10/16/20 1400 125/85  (!) 104 22 93 % 10/16/20 1330 131/87  (!) 102 22 94 % 10/16/20 1300 123/82  (!) 102 22 94 % 10/16/20 1230 122/80  (!) 102 22 93 % 10/16/20 1200 124/82  (!) 106 22 91 % 10/16/20 1130 117/80  83 22 95 % 10/16/20 1100 113/77  68 22 96 % 10/16/20 1044   72 22 96 % GENERAL: intubated and sedated Medications:   
 
Current Medications: 
 
Current Facility-Administered Medications Medication Dose Route Frequency  0.9% sodium chloride infusion 250 mL  250 mL IntraVENous PRN  
 furosemide (LASIX) injection 20 mg  20 mg IntraVENous Q12H  
 lactulose (CHRONULAC) 10 gram/15 mL solution 15 mL  10 g Oral BID  midazolam in normal saline (VERSED) 1 mg/mL infusion  1-5 mg/hr IntraVENous TITRATE  fentaNYL (PF) 900 mcg/30 ml infusion soln  0-200 mcg/hr IntraVENous TITRATE  fentaNYL citrate (PF) injection  mcg   mcg IntraVENous Q4H PRN  
 LORazepam (ATIVAN) injection 1 mg  1 mg IntraVENous Q2H PRN  piperacillin-tazobactam (ZOSYN) 4.5 g in 0.9% sodium chloride (MBP/ADV) 100 mL MBP  4.5 g IntraVENous Q8H  
 cisatracurium (NIMBEX) 100 mg in 0.9% sodium chloride 100 mL infusion  0-10 mcg/kg/min IntraVENous TITRATE  
 0.9% sodium chloride 1,000 mL with mvi, adult no. 4 with vit K 10 mL, thiamine 980 mg, folic acid 1 mg infusion   IntraVENous DAILY  albumin human 25% (BUMINATE) solution 25 g  25 g IntraVENous TID  metoclopramide HCl (REGLAN) injection 5 mg  5 mg IntraVENous Q6H PRN  
 dexamethasone (PF) (DECADRON) 10 mg/mL injection 40 mg  40 mg IntraVENous Q24H  
 insulin lispro (HUMALOG) injection   SubCUTAneous Q6H  
 ELECTROLYTE REPLACEMENT PROTOCOL - Potassium Standard Dosing   1 Each Other PRN  
 ELECTROLYTE REPLACEMENT PROTOCOL - Magnesium   1 Each Other PRN  
 ELECTROLYTE REPLACEMENT PROTOCOL - Phosphorus  Standard Dosing  1 Each Other PRN  
 ELECTROLYTE REPLACEMENT PROTOCOL - Calcium   1 Each Other PRN  
 vancomycin (VANCOCIN) 1,000 mg in 0.9% sodium chloride 250 mL (VIAL-MATE)  1,000 mg IntraVENous Q12H  Vancomycin - Rx to dose and monitor  1 Each Other Rx Dosing/Monitoring  pantoprazole (PROTONIX) 40 mg in 0.9% sodium chloride 10 mL injection  40 mg IntraVENous Q24H  
 HYDROmorphone (PF) (DILAUDID) injection 1 mg  1 mg IntraVENous Q3H PRN  
 oxyCODONE-acetaminophen (PERCOCET) 5-325 mg per tablet 1-2 Tab  1-2 Tab Oral Q6H PRN  
 lidocaine 4 % patch 2 Patch  2 Patch TransDERmal Q24H  
 ondansetron (ZOFRAN) injection 4 mg  4 mg IntraVENous Q6H PRN  
 glucose chewable tablet 16 g  4 Tab Oral PRN  
 glucagon (GLUCAGEN) injection 1 mg  1 mg IntraMUSCular PRN  
 dextrose 10% infusion 125-250 mL  125-250 mL IntraVENous PRN Allergies: 
 
No Known Allergies Ancillary Study Results:  
  
Laboratory: 
 
Recent Results (from the past 24 hour(s)) GLUCOSE, POC Collection Time: 10/16/20 11:43 AM  
Result Value Ref Range Glucose (POC) 230 (H) 70 - 110 mg/dL POTASSIUM Collection Time: 10/16/20  2:00 PM  
Result Value Ref Range Potassium 4.2 3.5 - 5.5 mmol/L PHOSPHORUS Collection Time: 10/16/20  2:00 PM  
Result Value Ref Range Phosphorus 2.8 2.5 - 4.9 MG/DL  
GLUCOSE, POC Collection Time: 10/16/20  6:06 PM  
Result Value Ref Range Glucose (POC) 265 (H) 70 - 110 mg/dL GLUCOSE, POC Collection Time: 10/17/20  1:03 AM  
Result Value Ref Range Glucose (POC) 285 (H) 70 - 110 mg/dL MAGNESIUM Collection Time: 10/17/20  3:45 AM  
Result Value Ref Range Magnesium 1.7 1.6 - 2.6 mg/dL PHOSPHORUS Collection Time: 10/17/20  3:45 AM  
Result Value Ref Range Phosphorus 3.1 2.5 - 4.9 MG/DL  
CALCIUM, IONIZED Collection Time: 10/17/20  3:45 AM  
Result Value Ref Range Ionized Calcium 1.06 (L) 1.12 - 1.32 MMOL/L  
PROTHROMBIN TIME + INR Collection Time: 10/17/20  3:45 AM  
Result Value Ref Range Prothrombin time 25.4 (H) 11.5 - 15.2 sec INR 2.4 (H) 0.8 - 1.2 AMMONIA Collection Time: 10/17/20  3:45 AM  
Result Value Ref Range Ammonia 21 11 - 32 UMOL/L  
CBC WITH AUTOMATED DIFF Collection Time: 10/17/20  3:45 AM  
Result Value Ref Range WBC 6.6 4.6 - 13.2 K/uL  
 RBC 2.60 (L) 4.70 - 5.50 M/uL HGB 8.2 (L) 13.0 - 16.0 g/dL HCT 24.9 (L) 36.0 - 48.0 % MCV 95.8 74.0 - 97.0 FL  
 MCH 31.5 24.0 - 34.0 PG  
 MCHC 32.9 31.0 - 37.0 g/dL  
 RDW 16.7 (H) 11.6 - 14.5 % PLATELET 53 (L) 329 - 420 K/uL NEUTROPHILS 83 (H) 40 - 73 % LYMPHOCYTES 8 (L) 21 - 52 % MONOCYTES 9 3 - 10 % EOSINOPHILS 0 0 - 5 % BASOPHILS 0 0 - 2 %  
 ABS. NEUTROPHILS 5.5 1.8 - 8.0 K/UL  
 ABS. LYMPHOCYTES 0.5 (L) 0.9 - 3.6 K/UL  
 ABS. MONOCYTES 0.6 0.05 - 1.2 K/UL  
 ABS. EOSINOPHILS 0.0 0.0 - 0.4 K/UL  
 ABS. BASOPHILS 0.0 0.0 - 0.1 K/UL PLATELET COMMENTS DECREASED PLATELETS    
 RBC COMMENTS ANISOCYTOSIS 1+ 
    
 RBC COMMENTS TARGET CELLS 1+ RBC COMMENTS HYPOCHROMIA 1+ 
    
 DF AUTOMATED FIBRINOGEN Collection Time: 10/17/20  3:45 AM  
Result Value Ref Range Fibrinogen 75 (L) 210 - 451 mg/dL METABOLIC PANEL, COMPREHENSIVE Collection Time: 10/17/20  3:45 AM  
Result Value Ref Range Sodium 151 (H) 136 - 145 mmol/L Potassium 3.7 3.5 - 5.5 mmol/L  Chloride 112 (H) 100 - 111 mmol/L  
 CO2 28 21 - 32 mmol/L Anion gap 11 3.0 - 18 mmol/L Glucose 251 (H) 74 - 99 mg/dL BUN 22 (H) 7.0 - 18 MG/DL Creatinine 1.20 0.6 - 1.3 MG/DL  
 BUN/Creatinine ratio 18 12 - 20 GFR est AA >60 >60 ml/min/1.73m2 GFR est non-AA >60 >60 ml/min/1.73m2 Calcium 8.2 (L) 8.5 - 10.1 MG/DL Bilirubin, total 3.2 (H) 0.2 - 1.0 MG/DL  
 ALT (SGPT) 36 16 - 61 U/L  
 AST (SGOT) 72 (H) 10 - 38 U/L Alk. phosphatase 59 45 - 117 U/L Protein, total 5.6 (L) 6.4 - 8.2 g/dL Albumin 3.8 3.4 - 5.0 g/dL Globulin 1.8 (L) 2.0 - 4.0 g/dL A-G Ratio 2.1 (H) 0.8 - 1.7 POC G3 Collection Time: 10/17/20  4:41 AM  
Result Value Ref Range Device: VENT    
 FIO2 (POC) 0.7 % pH (POC) 7.43 7.35 - 7.45    
 pCO2 (POC) 40.7 35.0 - 45.0 MMHG  
 pO2 (POC) 109 (H) 80 - 100 MMHG  
 HCO3 (POC) 26.7 (H) 22 - 26 MMOL/L  
 sO2 (POC) 98 (H) 92 - 97 % Base excess (POC) 2 mmol/L Mode ASSIST CONTROL Tidal volume 420 ml Set Rate 22 bpm  
 PEEP/CPAP (POC) 10 cmH2O  
 PIP (POC) 26 Allens test (POC) N/A Inspiratory Time 0.85 sec Total resp. rate 22 Site RIGHT RADIAL Patient temp. 98.4 Specimen type (POC) ARTERIAL Performed by Kesha Clinton Volume control plus YES    
GLUCOSE, POC Collection Time: 10/17/20  5:36 AM  
Result Value Ref Range Glucose (POC) 289 (H) 70 - 110 mg/dL CALCIUM, IONIZED Collection Time: 10/17/20 10:00 AM  
Result Value Ref Range Ionized Calcium 1.12 1.12 - 1.32 MMOL/L Radiology: Xr Chest Sngl V Result Date: 10/13/2020 EXAM: XR CHEST SNGL V CLINICAL INDICATION/HISTORY: Hypoxia -Additional: None COMPARISON: 10/11/2020 TECHNIQUE: Frontal view of the chest _______________ FINDINGS: HEART AND MEDIASTINUM: Stable appearing cardiac size and mediastinal contours. LUNGS AND PLEURAL SPACES: Patchy multifocal alveolar opacities are newly present on the second examination.  No pneumothorax or pleural effusion. BONY THORAX AND SOFT TISSUES: No acute osseous abnormality _______________ IMPRESSION: Short interval development of patchy multifocal airspace disease favored to reflect pulmonary edema without significant pleural effusion. Xr Shoulder Lt Ap/lat Min 2 V Result Date: 9/25/2020 EXAM: XR SHOULDER LT AP/LAT MIN 2 V CLINICAL INDICATION/HISTORY: Left shoulder pain after fall -Additional: None COMPARISON: None TECHNIQUE: 3 views of the left shoulder _______________ FINDINGS: BONES: Osseous limited is normal. No fracture demonstrated. Mild degenerative changes across the acromioclavicular joint noted. Glenohumeral joint not profiled. SOFT TISSUES: Included left lung and left chest wall clear _______________ IMPRESSION: Mild degenerative changes as described without superimposed acute radiographic abnormality. Xr Hip Lt W Or Wo Pelv 2-3 Vws Result Date: 9/25/2020 EXAM: LEFT HIP RADIOGRAPHS CLINICAL INDICATION/HISTORY: Fall with left hip pain -Additional: None COMPARISON: No prior imaging available for review. TECHNIQUE: AP pelvis and frog-leg lateral view of left hip. _______________ FINDINGS: BONES: Evidence of prior long intramedullary yadira fixation and proximal and distal hip screw for left hip fracture fixation. There is retraction of the lesser trochanter of the femur, with corticated margins suggesting a chronic avulsion related to the initial fracture presentation. No superimposed acute femoral fracture identified. Mild bilateral hip joint osteoarthritis. SOFT TISSUES: Unremarkable. _______________ IMPRESSION: 1. Reduced and internally fixated proximal left femoral fracture with likely chronic avulsion of the lesser trochanter the left femur. Comparison prior imaging is recommended. No acute displaced fracture demonstrated. Mild bilateral hip joint osteoarthritis. Ct Abd Pelv W Cont Result Date: 10/9/2020 EXAM: CT of the abdomen and pelvis INDICATION: Abdominal pain, greatest in the left lower quadrant with nausea and vomiting. COMPARISON: MRI pelvis 10/7/2019. CT left hip 4/17/2019 TECHNIQUE: Axial CT imaging of the abdomen and pelvis was performed with intravenous contrast. Multiplanar reformats were generated. One or more dose reduction techniques were used on this CT: automated exposure control, adjustment of the mAs and/or kVp according to patient size, and iterative reconstruction techniques. The specific techniques used on this CT exam have been documented in the patient's electronic medical record. Digital Imaging and Communications in Medicine (DICOM) format image data are available to nonaffiliated external healthcare facilities or entities on a secure, media free, reciprocally searchable basis with patient authorization for at least a 12-month period after this study. _______________ FINDINGS: LOWER CHEST: Minor basilar changes of atelectasis. No alveolar consolidation or pleural effusion. Normal cardiac size without pericardial effusion. LIVER, BILIARY: Diffuse hepatic hypoattenuation is present with hepatic size estimated at approximately 27 cm in craniocaudal span. No discrete liver lesion. No intrahepatic or extra hepatic biliary ductal dilatation. Patient is status post cholecystectomy. PANCREAS: Pancreatic enhancement is normal. Mild and diffuse pancreatic ductal ectasia is noted. No discrete pancreatic mass lesion noted. SPLEEN: Mildly enlarged at 14 cm. ADRENALS: Normal. KIDNEYS/URETERS/BLADDER: Symmetric renal enhancement. No bowel obstruction PELVIC ORGANS: Symmetric renal enhancement. Punctate nonobstructing upper pole left renal stone. No distal ureteral or urinary bladder stone. Urinary bladder unremarkable in CT appearance. VASCULATURE: Diffuse aortobiiliac atherosclerotic vascular calcification is present without evidence of aneurysmal dilatation.  Main portal vein as well as right and left portal venous branches are widely patent. LYMPH NODES: Scattered subcentimeter mesenteric and retroperitoneal lymph nodes are noted. No evidence of abdominal or pelvic adenopathy. GASTROINTESTINAL TRACT: No morphology of bowel obstruction. No free intraperitoneal gas. Mild thickening of the proximal ascending colon and cecum noted. BONES: No acute or aggressive osseous abnormalities identified. Partial visualization healed proximal left femoral fracture status post IM nailing and dynamic hip screw placement. OTHER: Small quantity of abdominal/pelvic ascites. Thin subcutaneous linear density tracking along the right gluteal fold, likely in keeping previously noted fistula tract. _______________ IMPRESSION: 1. Diffuse hepatic hypoattenuation compatible with steatosis with hepatomegaly, mild splenomegaly, and small quantity of abdominal/pelvic ascites. The totality of findings compatible with hepatocellular dysfunction and potentially associated portal hypertension. > Patent main and proximal portal venous branches.   > Mild thickening of the proximal ascending colon and cecum may reflect sequela of portal colopathy. Infectious/inflammatory colitis also a possibility but thought less likely given the imaging appearance. 2. Likely correlate for small area of perianal fistula noted on prior MRI pelvis. 3. Punctate nonobstructing upper pole left renal stone. [address] [place] [address] Addendum Date: 10/13/2020 Addendum: Impression 1 discussed with Dr. Chante Lopes at 10:47 AM on same day. Result Date: 10/13/2020 Right upper quadrant abdominal ultrasound INDICATION: Hepatomegaly. TECHNIQUE: Real-time sonography in multiple planes of the abdomen was performed with image documentation. Grayscale, color flow Doppler imaging, and velocity spectral waveform analysis of the portal vein was performed (duplex imaging).  COMPARISON: None. _______________ FINDINGS: LIVER: The liver is diffusely increased in echogenicity, without focal mass. Liver measures at least 23.1 cm sagittal. Color and spectral flow analysis of the portal vein shows lack of flow and dilated measuring 1.5 cm. There appears periportal collaterals. Decrease flow in the hepatic vein. BILIARY SYSTEM: No intrahepatic biliary dilatation. Common bile duct is normal in caliber measuring 5-6 mm. GALLBLADDER: Cholecystectomy. RIGHT KIDNEY: 11.9 cm in length. No hydronephrosis or renal mass. No visible calculi. PANCREAS: Obscured by overlying bowel gas. IVC: Visualized portions are unremarkable in appearance. OTHER: There is perihepatic ascites and recanalized umbilical vein. _______________ IMPRESSION: 1. Nonspecific increased hepatic echotexture suggesting intrinsic liver disease. Dilated portal vein, recanalized umbilical vein and perihepatic ascites. There is lack of flow within the portal vein, suggestive of thrombosis, with periportal collaterals and decreased flows in the hepatic vein. However, portal and hepatic veins patent on recent CT abdomen 10/09/2020. Multiple attempts to contact Dr. Elton Michael with this findings, awaiting callback. 2. No discrete hepatic mass identified. 3. Cholecystectomy. 4. Pancreas obscured by bowel gas. Xr Chest [facility] Result Date: 10/15/2020 EXAM: XR CHEST PORT CLINICAL INDICATION/HISTORY: Respiratory failure, bilateral airspace disease -Additional: None COMPARISON: 10/14/2020 TECHNIQUE: Portable frontal view of the chest _______________ FINDINGS: SUPPORT DEVICES: Endotracheal tube and nasogastric tube project in stable position. HEART AND MEDIASTINUM: Stable appearing cardiac size and mediastinal contours. LUNGS AND PLEURAL SPACES: Interstitial and airspace disease appearing similar to prior study. No evidence of pneumothorax or definite pleural effusion. BONY THORAX AND SOFT TISSUES: Unremarkable. _______________ IMPRESSION: Support devices in stable/appropriate position. Bilateral interstitial and airspace disease appearing overall similar to prior study. Xr Chest [facility] Result Date: 10/14/2020 EXAM: XR CHEST PORT CLINICAL INDICATION/HISTORY: ET tube placement -Additional: None COMPARISON: 10/14/2020 TECHNIQUE: Portable frontal view of the chest _______________ FINDINGS: SUPPORT DEVICES: Endotracheal tube 4 cm above the billie. Nasogastric tube below the diaphragm, tip projecting over the stomach. HEART AND MEDIASTINUM: Stable appearing cardiac size and mediastinal contours. LUNGS AND PLEURAL SPACES: Multifocal interstitial and airspace disease. Overall similar appearance to prior examination. No evidence of pneumothorax or definite pleural effusion. BONY THORAX AND SOFT TISSUES: Unremarkable. _______________ IMPRESSION: 1. Support devices in stable/appropriate position. 2. Extensive bilateral interstitial and airspace disease similar to prior. Xr Chest [facility] Result Date: 10/14/2020 EXAM: One-view chest CLINICAL HISTORY: Dyspnea, COMPARISON: None FINDINGS: Frontal view of the chest demonstrate extensive bilateral airspace disease mildly worsened. Cardiac silhouette is normal in size and contour. No acute bony or soft tissue abnormality. IMPRESSION: Extensive bilateral nonspecific airspace disease appears worsened since prior examination. Xr Chest [facility] Result Date: 10/11/2020 EXAM: CHEST RADIOGRAPH CLINICAL INDICATION/HISTORY: Shortness of breath -Additional: None COMPARISON: October 9, 2020 TECHNIQUE: Portable frontal view of the chest _______________ FINDINGS: SUPPORT DEVICES: None. HEART AND MEDIASTINUM: No appreciable cardiomegaly. Remaining mediastinal contours within normal limits. LUNGS AND PLEURAL SPACES: Lung volumes are hypoinflated. There are linear opacities at both lung bases and blunting of the costophrenic sulci. BONY THORAX AND SOFT TISSUES: Unremarkable. _______________ IMPRESSION: Bibasilar opacities that likely represent a combination of atelectasis and/or small pleural effusions Xr Chest [facility] Result Date: 10/9/2020 EXAM: XR CHEST PORT CLINICAL INDICATION/HISTORY: abnormal ekg -Additional: None COMPARISON: None TECHNIQUE: Portable frontal view of the chest _______________ FINDINGS: SUPPORT DEVICES: None. HEART AND MEDIASTINUM: Cardiomediastinal silhouette within normal limits. LUNGS AND PLEURAL SPACES: No dense consolidation, large effusion or pneumothorax. _______________ IMPRESSION: No acute cardiopulmonary abnormality. Nicky Bales. Lonnie Stewart MD 
Phone: [phone] Pager: [phone]

## (undated) DEVICE — MOUTHPIECE ENDOSCP 20X27MM --

## (undated) DEVICE — TRAP SPEC COLL POLYP POLYSTYR --

## (undated) DEVICE — SPONGE GZ W4XL4IN COT 12 PLY TYP VII WVN C FLD DSGN

## (undated) DEVICE — MAJ-1414 SINGLE USE ADPATER BIOPSY VALV: Brand: SINGLE USE ADAPTOR BIOPSY VALVE

## (undated) DEVICE — ENDO CARRY-ON PROCEDURE KIT INCLUDES ENZYMATIC SPONGE, GAUZE, BIOHAZARD LABEL, TRAY, LUBRICANT, DIRTY SCOPE LABEL, WATER LABEL, TRAY, DRAWSTRING PAD, AND DEFENDO 4-PIECE KIT.: Brand: ENDO CARRY-ON PROCEDURE KIT

## (undated) DEVICE — SINGLE PORT MANIFOLD: Brand: NEPTUNE 2

## (undated) DEVICE — KENDALL RADIOLUCENT FOAM MONITORING ELECTRODE RECTANGULAR SHAPE: Brand: KENDALL

## (undated) DEVICE — MEDI-VAC NON-CONDUCTIVE SUCTION TUBING: Brand: CARDINAL HEALTH